# Patient Record
Sex: MALE | Race: WHITE | Employment: UNEMPLOYED | URBAN - METROPOLITAN AREA
[De-identification: names, ages, dates, MRNs, and addresses within clinical notes are randomized per-mention and may not be internally consistent; named-entity substitution may affect disease eponyms.]

---

## 2017-03-24 ENCOUNTER — APPOINTMENT (OUTPATIENT)
Dept: GENERAL RADIOLOGY | Age: 52
End: 2017-03-24
Payer: MEDICARE

## 2017-03-24 ENCOUNTER — HOSPITAL ENCOUNTER (EMERGENCY)
Age: 52
Discharge: HOME | End: 2017-03-24
Payer: MEDICARE

## 2017-03-24 VITALS
TEMPERATURE: 98.4 F | HEART RATE: 79 BPM | SYSTOLIC BLOOD PRESSURE: 172 MMHG | BODY MASS INDEX: 33.6 KG/M2 | RESPIRATION RATE: 18 BRPM | HEIGHT: 71 IN | OXYGEN SATURATION: 96 % | DIASTOLIC BLOOD PRESSURE: 95 MMHG | WEIGHT: 240 LBS

## 2017-03-24 DIAGNOSIS — R07.9 CHEST PAIN, UNSPECIFIED TYPE: Primary | ICD-10-CM

## 2017-03-24 LAB
ANION GAP SERPL CALCULATED.3IONS-SCNC: 9 MEQ/L (ref 7–13)
BUN BLDV-MCNC: 6 MG/DL (ref 6–20)
CALCIUM SERPL-MCNC: 9 MG/DL (ref 8.6–10.2)
CHLORIDE BLD-SCNC: 98 MEQ/L (ref 98–107)
CO2: 29 MEQ/L (ref 22–29)
CREAT SERPL-MCNC: 1.2 MG/DL (ref 0.7–1.2)
GFR AFRICAN AMERICAN: >60
GFR NON-AFRICAN AMERICAN: >60
GLUCOSE BLD-MCNC: 110 MG/DL (ref 74–109)
HCT VFR BLD CALC: 40.8 % (ref 42–52)
HEMOGLOBIN: 13.5 G/DL (ref 14–18)
MCH RBC QN AUTO: 30.6 PG (ref 27–31.3)
MCHC RBC AUTO-ENTMCNC: 33.1 % (ref 33–37)
MCV RBC AUTO: 92.5 FL (ref 80–100)
PDW BLD-RTO: 15.5 % (ref 11.5–14.5)
PLATELET # BLD: 375 K/UL (ref 130–400)
POTASSIUM SERPL-SCNC: 3.9 MEQ/L (ref 3.5–5.1)
RBC # BLD: 4.41 M/UL (ref 4.7–6.1)
SODIUM BLD-SCNC: 136 MEQ/L (ref 132–144)
TROPONIN: <0.01 NG/ML (ref 0–0.01)
WBC # BLD: 13.8 K/UL (ref 4.8–10.8)

## 2017-03-24 PROCEDURE — 93005 ELECTROCARDIOGRAM TRACING: CPT

## 2017-03-24 PROCEDURE — 36415 COLL VENOUS BLD VENIPUNCTURE: CPT

## 2017-03-24 PROCEDURE — 71010 XR CHEST PORTABLE: CPT

## 2017-03-24 PROCEDURE — 6370000000 HC RX 637 (ALT 250 FOR IP): Performed by: PHYSICIAN ASSISTANT

## 2017-03-24 PROCEDURE — 84484 ASSAY OF TROPONIN QUANT: CPT

## 2017-03-24 PROCEDURE — 99284 EMERGENCY DEPT VISIT MOD MDM: CPT

## 2017-03-24 PROCEDURE — 85027 COMPLETE CBC AUTOMATED: CPT

## 2017-03-24 PROCEDURE — 80048 BASIC METABOLIC PNL TOTAL CA: CPT

## 2017-03-24 RX ORDER — LORAZEPAM 0.5 MG/1
2 TABLET ORAL ONCE
Status: DISCONTINUED | OUTPATIENT
Start: 2017-03-24 | End: 2017-03-24

## 2017-03-24 RX ORDER — CARISOPRODOL 350 MG/1
350 TABLET ORAL
Status: ON HOLD | COMMUNITY
Start: 2017-02-23 | End: 2019-11-03

## 2017-03-24 RX ORDER — BUPRENORPHINE AND NALOXONE 8; 2 MG/1; MG/1
FILM, SOLUBLE BUCCAL; SUBLINGUAL 2 TIMES DAILY
COMMUNITY

## 2017-03-24 RX ORDER — BUPROPION HYDROCHLORIDE 150 MG/1
150 TABLET ORAL
Status: ON HOLD | COMMUNITY
Start: 2017-01-24 | End: 2019-11-03

## 2017-03-24 RX ORDER — LORAZEPAM 0.5 MG/1
2 TABLET ORAL ONCE
Status: COMPLETED | OUTPATIENT
Start: 2017-03-24 | End: 2017-03-24

## 2017-03-24 RX ADMIN — LORAZEPAM 1.5 MG: 0.5 TABLET ORAL at 20:26

## 2017-03-24 ASSESSMENT — PAIN SCALES - GENERAL: PAINLEVEL_OUTOF10: 7

## 2017-03-24 ASSESSMENT — PAIN DESCRIPTION - LOCATION: LOCATION: ARM;NECK

## 2017-03-24 ASSESSMENT — PAIN DESCRIPTION - PAIN TYPE: TYPE: ACUTE PAIN

## 2017-03-24 ASSESSMENT — PAIN DESCRIPTION - DESCRIPTORS: DESCRIPTORS: ACHING

## 2017-03-27 LAB
EKG ATRIAL RATE: 70 BPM
EKG P AXIS: 20 DEGREES
EKG P-R INTERVAL: 174 MS
EKG Q-T INTERVAL: 384 MS
EKG QRS DURATION: 90 MS
EKG QTC CALCULATION (BAZETT): 414 MS
EKG R AXIS: 26 DEGREES
EKG T AXIS: 25 DEGREES
EKG VENTRICULAR RATE: 70 BPM

## 2018-08-22 ENCOUNTER — HOSPITAL ENCOUNTER (EMERGENCY)
Age: 53
Discharge: HOME | End: 2018-08-22
Attending: STUDENT IN AN ORGANIZED HEALTH CARE EDUCATION/TRAINING PROGRAM | Admitting: INTERNAL MEDICINE
Payer: MEDICARE

## 2018-08-22 ENCOUNTER — APPOINTMENT (OUTPATIENT)
Dept: GENERAL RADIOLOGY | Age: 53
End: 2018-08-22
Payer: MEDICARE

## 2018-08-22 VITALS
OXYGEN SATURATION: 96 % | HEIGHT: 71 IN | BODY MASS INDEX: 36.4 KG/M2 | SYSTOLIC BLOOD PRESSURE: 124 MMHG | RESPIRATION RATE: 12 BRPM | DIASTOLIC BLOOD PRESSURE: 75 MMHG | TEMPERATURE: 97.4 F | WEIGHT: 260 LBS | HEART RATE: 65 BPM

## 2018-08-22 DIAGNOSIS — E78.1 HYPERTRIGLYCERIDEMIA: ICD-10-CM

## 2018-08-22 DIAGNOSIS — E78.5 DYSLIPIDEMIA (HIGH LDL; LOW HDL): ICD-10-CM

## 2018-08-22 DIAGNOSIS — R73.9 HYPERGLYCEMIA: ICD-10-CM

## 2018-08-22 DIAGNOSIS — I20.8 ANGINAL CHEST PAIN AT REST (HCC): Primary | ICD-10-CM

## 2018-08-22 DIAGNOSIS — E78.5 DYSLIPIDEMIA: ICD-10-CM

## 2018-08-22 DIAGNOSIS — E66.9 OBESITY WITHOUT SERIOUS COMORBIDITY, UNSPECIFIED CLASSIFICATION, UNSPECIFIED OBESITY TYPE: ICD-10-CM

## 2018-08-22 PROBLEM — I20.89 ANGINAL CHEST PAIN AT REST: Status: ACTIVE | Noted: 2018-08-22

## 2018-08-22 LAB
ALBUMIN SERPL-MCNC: 4 G/DL (ref 3.9–4.9)
ALP BLD-CCNC: 105 U/L (ref 35–104)
ALT SERPL-CCNC: 23 U/L (ref 0–41)
ANION GAP SERPL CALCULATED.3IONS-SCNC: 14 MEQ/L (ref 7–13)
APTT: 27.7 SEC (ref 21.6–35.4)
AST SERPL-CCNC: 24 U/L (ref 0–40)
BASOPHILS ABSOLUTE: 0.1 K/UL (ref 0–0.2)
BASOPHILS RELATIVE PERCENT: 1.3 %
BILIRUB SERPL-MCNC: 0.5 MG/DL (ref 0–1.2)
BUN BLDV-MCNC: 7 MG/DL (ref 6–20)
C-REACTIVE PROTEIN, HIGH SENSITIVITY: 13.4 MG/L (ref 0–5)
CALCIUM SERPL-MCNC: 9.1 MG/DL (ref 8.6–10.2)
CHLORIDE BLD-SCNC: 94 MEQ/L (ref 98–107)
CHOLESTEROL, TOTAL: 195 MG/DL (ref 0–199)
CK MB: 3.4 NG/ML (ref 0–6.7)
CO2: 30 MEQ/L (ref 22–29)
CREAT SERPL-MCNC: 0.96 MG/DL (ref 0.7–1.2)
CREATINE KINASE-MB INDEX: 0.9 % (ref 0–3.5)
EKG ATRIAL RATE: 81 BPM
EKG P AXIS: 23 DEGREES
EKG P-R INTERVAL: 178 MS
EKG Q-T INTERVAL: 364 MS
EKG QRS DURATION: 102 MS
EKG QTC CALCULATION (BAZETT): 422 MS
EKG R AXIS: 39 DEGREES
EKG T AXIS: 36 DEGREES
EKG VENTRICULAR RATE: 81 BPM
EOSINOPHILS ABSOLUTE: 0.6 K/UL (ref 0–0.7)
EOSINOPHILS RELATIVE PERCENT: 7.1 %
GFR AFRICAN AMERICAN: >60
GFR NON-AFRICAN AMERICAN: >60
GLOBULIN: 3.5 G/DL (ref 2.3–3.5)
GLUCOSE BLD-MCNC: 129 MG/DL (ref 74–109)
HCT VFR BLD CALC: 43.2 % (ref 42–52)
HDLC SERPL-MCNC: 22 MG/DL (ref 40–59)
HEMOGLOBIN: 14.8 G/DL (ref 14–18)
INR BLD: 1
LDL CHOLESTEROL CALCULATED: ABNORMAL MG/DL (ref 0–129)
LYMPHOCYTES ABSOLUTE: 3.3 K/UL (ref 1–4.8)
LYMPHOCYTES RELATIVE PERCENT: 37.2 %
MAGNESIUM: 1.7 MG/DL (ref 1.7–2.3)
MCH RBC QN AUTO: 31.6 PG (ref 27–31.3)
MCHC RBC AUTO-ENTMCNC: 34.3 % (ref 33–37)
MCV RBC AUTO: 92.1 FL (ref 80–100)
MONOCYTES ABSOLUTE: 0.5 K/UL (ref 0.2–0.8)
MONOCYTES RELATIVE PERCENT: 5.8 %
NEUTROPHILS ABSOLUTE: 4.3 K/UL (ref 1.4–6.5)
NEUTROPHILS RELATIVE PERCENT: 48.6 %
PDW BLD-RTO: 15.6 % (ref 11.5–14.5)
PLATELET # BLD: 234 K/UL (ref 130–400)
POTASSIUM SERPL-SCNC: 3.5 MEQ/L (ref 3.5–5.1)
PRO-BNP: 29 PG/ML
PROTHROMBIN TIME: 10.4 SEC (ref 9.6–12.3)
RBC # BLD: 4.69 M/UL (ref 4.7–6.1)
SODIUM BLD-SCNC: 138 MEQ/L (ref 132–144)
TOTAL CK: 358 U/L (ref 0–190)
TOTAL PROTEIN: 7.5 G/DL (ref 6.4–8.1)
TRIGL SERPL-MCNC: 404 MG/DL (ref 0–200)
TROPONIN: <0.01 NG/ML (ref 0–0.01)
TSH SERPL DL<=0.05 MIU/L-ACNC: 1.5 UIU/ML (ref 0.27–4.2)
WBC # BLD: 8.8 K/UL (ref 4.8–10.8)

## 2018-08-22 PROCEDURE — 6370000000 HC RX 637 (ALT 250 FOR IP): Performed by: STUDENT IN AN ORGANIZED HEALTH CARE EDUCATION/TRAINING PROGRAM

## 2018-08-22 PROCEDURE — 85730 THROMBOPLASTIN TIME PARTIAL: CPT

## 2018-08-22 PROCEDURE — G0378 HOSPITAL OBSERVATION PER HR: HCPCS

## 2018-08-22 PROCEDURE — 86141 C-REACTIVE PROTEIN HS: CPT

## 2018-08-22 PROCEDURE — 36415 COLL VENOUS BLD VENIPUNCTURE: CPT

## 2018-08-22 PROCEDURE — 84443 ASSAY THYROID STIM HORMONE: CPT

## 2018-08-22 PROCEDURE — 93005 ELECTROCARDIOGRAM TRACING: CPT

## 2018-08-22 PROCEDURE — 80053 COMPREHEN METABOLIC PANEL: CPT

## 2018-08-22 PROCEDURE — 85025 COMPLETE CBC W/AUTO DIFF WBC: CPT

## 2018-08-22 PROCEDURE — 84484 ASSAY OF TROPONIN QUANT: CPT

## 2018-08-22 PROCEDURE — 82550 ASSAY OF CK (CPK): CPT

## 2018-08-22 PROCEDURE — 80061 LIPID PANEL: CPT

## 2018-08-22 PROCEDURE — 82553 CREATINE MB FRACTION: CPT

## 2018-08-22 PROCEDURE — 85610 PROTHROMBIN TIME: CPT

## 2018-08-22 PROCEDURE — 71046 X-RAY EXAM CHEST 2 VIEWS: CPT

## 2018-08-22 PROCEDURE — 99285 EMERGENCY DEPT VISIT HI MDM: CPT

## 2018-08-22 PROCEDURE — 83735 ASSAY OF MAGNESIUM: CPT

## 2018-08-22 PROCEDURE — 83880 ASSAY OF NATRIURETIC PEPTIDE: CPT

## 2018-08-22 RX ORDER — METOPROLOL SUCCINATE 50 MG/1
50 TABLET, EXTENDED RELEASE ORAL 2 TIMES DAILY
Status: CANCELLED | OUTPATIENT
Start: 2018-08-22

## 2018-08-22 RX ORDER — FENOFIBRATE 145 MG/1
145 TABLET, COATED ORAL DAILY
Status: CANCELLED | OUTPATIENT
Start: 2018-08-22

## 2018-08-22 RX ORDER — PREGABALIN 150 MG/1
150 CAPSULE ORAL 2 TIMES DAILY
Status: CANCELLED | OUTPATIENT
Start: 2018-08-22

## 2018-08-22 RX ORDER — NITROGLYCERIN 0.4 MG/1
0.4 TABLET SUBLINGUAL EVERY 5 MIN PRN
Status: DISCONTINUED | OUTPATIENT
Start: 2018-08-22 | End: 2018-08-22 | Stop reason: HOSPADM

## 2018-08-22 RX ORDER — ONDANSETRON 2 MG/ML
4 INJECTION INTRAMUSCULAR; INTRAVENOUS EVERY 6 HOURS PRN
Status: CANCELLED | OUTPATIENT
Start: 2018-08-22

## 2018-08-22 RX ORDER — ATORVASTATIN CALCIUM 40 MG/1
20 TABLET, FILM COATED ORAL NIGHTLY
Status: CANCELLED | OUTPATIENT
Start: 2018-08-22

## 2018-08-22 RX ORDER — BUPROPION HYDROCHLORIDE 150 MG/1
150 TABLET ORAL DAILY
Status: CANCELLED | OUTPATIENT
Start: 2018-08-22

## 2018-08-22 RX ORDER — PREGABALIN 150 MG/1
300 CAPSULE ORAL 2 TIMES DAILY
COMMUNITY

## 2018-08-22 RX ORDER — SODIUM CHLORIDE 0.9 % (FLUSH) 0.9 %
10 SYRINGE (ML) INJECTION EVERY 12 HOURS SCHEDULED
Status: CANCELLED | OUTPATIENT
Start: 2018-08-22

## 2018-08-22 RX ORDER — SODIUM CHLORIDE 0.9 % (FLUSH) 0.9 %
10 SYRINGE (ML) INJECTION PRN
Status: CANCELLED | OUTPATIENT
Start: 2018-08-22

## 2018-08-22 RX ORDER — ACETAMINOPHEN 325 MG/1
650 TABLET ORAL EVERY 4 HOURS PRN
Status: CANCELLED | OUTPATIENT
Start: 2018-08-22

## 2018-08-22 RX ORDER — ASPIRIN 81 MG/1
324 TABLET, CHEWABLE ORAL ONCE
Status: COMPLETED | OUTPATIENT
Start: 2018-08-22 | End: 2018-08-22

## 2018-08-22 RX ORDER — NITROGLYCERIN 0.4 MG/1
0.4 TABLET SUBLINGUAL EVERY 5 MIN PRN
Status: CANCELLED | OUTPATIENT
Start: 2018-08-22

## 2018-08-22 RX ORDER — TIZANIDINE 2 MG/1
2 TABLET ORAL 3 TIMES DAILY
Status: CANCELLED | OUTPATIENT
Start: 2018-08-22

## 2018-08-22 RX ORDER — ASPIRIN 81 MG/1
81 TABLET, CHEWABLE ORAL DAILY
Status: CANCELLED | OUTPATIENT
Start: 2018-08-23

## 2018-08-22 RX ADMIN — ASPIRIN 81 MG 324 MG: 81 TABLET ORAL at 17:08

## 2018-08-22 RX ADMIN — NITROGLYCERIN 0.4 MG: 0.4 TABLET, ORALLY DISINTEGRATING SUBLINGUAL at 17:09

## 2018-08-22 ASSESSMENT — PAIN DESCRIPTION - PAIN TYPE
TYPE: ACUTE PAIN
TYPE: ACUTE PAIN

## 2018-08-22 ASSESSMENT — PAIN DESCRIPTION - LOCATION: LOCATION: CHEST

## 2018-08-22 ASSESSMENT — HEART SCORE
ECG: 0
ECG: 0

## 2018-08-22 ASSESSMENT — ENCOUNTER SYMPTOMS
DIARRHEA: 0
BACK PAIN: 0
SINUS PRESSURE: 0
CHEST TIGHTNESS: 0
ABDOMINAL PAIN: 0
VOMITING: 0
COUGH: 0
SHORTNESS OF BREATH: 0
TROUBLE SWALLOWING: 0

## 2018-08-22 ASSESSMENT — PAIN DESCRIPTION - PROGRESSION: CLINICAL_PROGRESSION: RAPIDLY IMPROVING

## 2018-08-22 ASSESSMENT — PAIN SCALES - GENERAL
PAINLEVEL_OUTOF10: 2
PAINLEVEL_OUTOF10: 6

## 2018-08-22 NOTE — ED TRIAGE NOTES
A  & o x4 obese male, skin pk/w/d, resp unlabored, pt states pain started after eating ravioli & he had a recent pain attack, his pain is in left side of chest, + nasal congestion, denies cough, denies n/v/d, debnies any edema, speech clear, hayes x4 = & spont.

## 2018-08-22 NOTE — ED NOTES
Talk with female  ,reinforce information given per nurse taking care of pt. Patient did not give permission to give any information out. This female started cussing,yelling ,\" stated she was coming down and there was going to be fucking problems \". Security aware.      Kell Nichols RN  08/22/18 5890

## 2018-08-22 NOTE — ED PROVIDER NOTES
[facility] ED  eMERGENCY dEPARTMENT eNCOUnter      Pt Name: William Suazo  MRN: [id number]  [place] 1965  Date of evaluation: 8/22/2018  Provider: Daxa Moreno, [address] [place] [address]       Chief Complaint   Patient presents with    Chest Pain     started one hour pta         HISTORY OF PRESENT ILLNESS   (Location/Symptom, Timing/Onset, Context/Setting, Quality, Duration, Modifying Factors, Severity)  Note limiting factors. William Suazo is a 48 y.o. male who presents to the emergency department with complaint of Chest pain as in left sided pressure. Patient states it felt like severe indigestion. Patient denies any fever, chills or cough. Patient denies any nausea or vomiting. HPI    Nursing Notes were reviewed. REVIEW OF SYSTEMS    (2-9 systems for level 4, 10 or more for level 5)     Review of Systems   Constitutional: Negative for activity change, appetite change, chills, fever and unexpected weight change. HENT: Negative for drooling, ear pain, nosebleeds, sinus pressure and trouble swallowing. Respiratory: Negative for cough, chest tightness and shortness of breath. Cardiovascular: Positive for chest pain. Negative for leg swelling. Gastrointestinal: Negative for abdominal pain, diarrhea and vomiting. Endocrine: Negative for polydipsia and polyphagia. Genitourinary: Negative for dysuria, flank pain and frequency. Musculoskeletal: Negative for back pain and myalgias. Skin: Negative for pallor and rash. Neurological: Negative for syncope, weakness and headaches. Hematological: Does not bruise/bleed easily. All other systems reviewed and are negative. Except as noted above the remainder of the review of systems was reviewed and negative.        PAST MEDICAL HISTORY     Past Medical History:   Diagnosis Date    Chronic pain     in neck & back, in pain management    Hypertension          SURGICAL HISTORY       Past Surgical History:   Procedure Laterality Date    BACK SURGERY      x2    NECK SURGERY      x2         CURRENT MEDICATIONS       Previous Medications    ATORVASTATIN (LIPITOR) 80 MG TABLET    Take 75 mg by mouth daily    BUPRENORPHINE HCL-NALOXONE HCL (SUBOXONE) 8-2 MG FILM    Place under the tongue    BUPROPION (WELLBUTRIN XL) 150 MG EXTENDED RELEASE TABLET    Take 150 mg by mouth    CARISOPRODOL (SOMA) 350 MG TABLET    Take 350 mg by mouth    FENOFIBRATE (TRICOR) 145 MG TABLET    Take 145 mg by mouth daily    LANSOPRAZOLE (PREVACID SOLUTAB) 30 MG DISINTEGRATING TABLET    Take 30 mg by mouth daily    METOPROLOL SUCCINATE (TOPROL XL) 50 MG EXTENDED RELEASE TABLET    Take 50 mg by mouth 2 times daily    PREGABALIN (LYRICA) 150 MG CAPSULE    Take 150 mg by mouth 2 times daily. .       ALLERGIES     Patient has no known allergies. FAMILY HISTORY     History reviewed. No pertinent family history.        SOCIAL HISTORY       Social History     Social History    Marital status:      Spouse name: N/A    Number of children: N/A    Years of education: N/A     Social History Main Topics    Smoking status: Current Every Day Smoker     Packs/day: 1.00     Years: 15.00     Types: Cigarettes    Smokeless tobacco: Never Used    Alcohol use No    Drug use: No    Sexual activity: Yes     Partners: Female     Other Topics Concern    None     Social History Narrative    None       SCREENINGS      Heart Score for chest pain patients  History: Highly Suspicious  ECG: Normal  Patient Age: > 39 and < 65 years  *Risk factors for Atherosclerotic disease: Hypercholesterolemia, Hypertension, Obesity  Risk Factors: > 3 Risk factors or history of atherosclerotic disease*  Troponin: < 1X normal limit  Heart Score Total: 5      PHYSICAL EXAM    (up to 7 for level 4, 8 or more for level 5)     ED Triage Vitals [08/22/18 1620]   BP Temp Temp Source Pulse Resp SpO2 Height Weight   (!) 157/82 97.4 °F (36.3 °C) Oral 85 18 96 % 5' 11\" (1.803 m) 260 lb (117.9 kg)

## 2018-08-23 PROCEDURE — 93010 ELECTROCARDIOGRAM REPORT: CPT | Performed by: INTERNAL MEDICINE

## 2019-02-03 ENCOUNTER — HOSPITAL ENCOUNTER (INPATIENT)
Age: 54
LOS: 2 days | Discharge: HOME | DRG: 682 | End: 2019-02-06
Attending: EMERGENCY MEDICINE | Admitting: INTERNAL MEDICINE
Payer: MEDICARE

## 2019-02-03 ENCOUNTER — APPOINTMENT (OUTPATIENT)
Dept: GENERAL RADIOLOGY | Age: 54
DRG: 682 | End: 2019-02-03
Payer: MEDICARE

## 2019-02-03 ENCOUNTER — APPOINTMENT (OUTPATIENT)
Dept: CT IMAGING | Age: 54
DRG: 682 | End: 2019-02-03
Payer: MEDICARE

## 2019-02-03 DIAGNOSIS — G47.30 SLEEP-DISORDERED BREATHING: ICD-10-CM

## 2019-02-03 DIAGNOSIS — D72.829 LEUKOCYTOSIS, UNSPECIFIED TYPE: ICD-10-CM

## 2019-02-03 DIAGNOSIS — J18.9 PNEUMONIA DUE TO ORGANISM: ICD-10-CM

## 2019-02-03 DIAGNOSIS — J44.1 COPD EXACERBATION (HCC): ICD-10-CM

## 2019-02-03 DIAGNOSIS — M62.82 NON-TRAUMATIC RHABDOMYOLYSIS: ICD-10-CM

## 2019-02-03 DIAGNOSIS — R41.82 ALTERED MENTAL STATUS, UNSPECIFIED ALTERED MENTAL STATUS TYPE: Primary | ICD-10-CM

## 2019-02-03 LAB
ACETAMINOPHEN LEVEL: <5 UG/ML (ref 10–30)
ALBUMIN SERPL-MCNC: 3.7 G/DL (ref 3.9–4.9)
ALP BLD-CCNC: 90 U/L (ref 35–104)
ALT SERPL-CCNC: 15 U/L (ref 0–41)
AMPHETAMINE SCREEN, URINE: NORMAL
ANION GAP SERPL CALCULATED.3IONS-SCNC: 15 MEQ/L (ref 7–13)
AST SERPL-CCNC: 49 U/L (ref 0–40)
ATYPICAL LYMPHOCYTE RELATIVE PERCENT: 3 %
BACTERIA: NEGATIVE /HPF
BARBITURATE SCREEN URINE: NORMAL
BASOPHILS ABSOLUTE: 0.3 K/UL (ref 0–0.2)
BASOPHILS RELATIVE PERCENT: 1 %
BENZODIAZEPINE SCREEN, URINE: NORMAL
BILIRUB SERPL-MCNC: 0.6 MG/DL (ref 0–1.2)
BILIRUBIN URINE: NEGATIVE
BLOOD, URINE: ABNORMAL
BUN BLDV-MCNC: 24 MG/DL (ref 6–20)
CALCIUM SERPL-MCNC: 9.4 MG/DL (ref 8.6–10.2)
CANNABINOID SCREEN URINE: NORMAL
CASTS: ABNORMAL /LPF
CHLORIDE BLD-SCNC: 95 MEQ/L (ref 98–107)
CK MB: 4.2 NG/ML (ref 0–6.7)
CLARITY: CLEAR
CO2: 23 MEQ/L (ref 22–29)
COCAINE METABOLITE SCREEN URINE: NORMAL
COLOR: YELLOW
CREAT SERPL-MCNC: 3.41 MG/DL (ref 0.7–1.2)
CREATINE KINASE-MB INDEX: 0.1 % (ref 0–3.5)
EOSINOPHILS ABSOLUTE: 0 K/UL (ref 0–0.7)
EOSINOPHILS RELATIVE PERCENT: 0.2 %
EPITHELIAL CELLS, UA: ABNORMAL /HPF (ref 0–5)
ETHANOL PERCENT: NORMAL G/DL
ETHANOL: <10 MG/DL (ref 0–0.08)
GFR AFRICAN AMERICAN: 23.1
GFR NON-AFRICAN AMERICAN: 19.1
GLOBULIN: 3.7 G/DL (ref 2.3–3.5)
GLUCOSE BLD-MCNC: 110 MG/DL (ref 74–109)
GLUCOSE URINE: NEGATIVE MG/DL
HCT VFR BLD CALC: 46.2 % (ref 42–52)
HEMOGLOBIN: 15.2 G/DL (ref 14–18)
KETONES, URINE: ABNORMAL MG/DL
LEUKOCYTE ESTERASE, URINE: NEGATIVE
LYMPHOCYTES ABSOLUTE: 2.7 K/UL (ref 1–4.8)
LYMPHOCYTES RELATIVE PERCENT: 7 %
Lab: NORMAL
MCH RBC QN AUTO: 31.2 PG (ref 27–31.3)
MCHC RBC AUTO-ENTMCNC: 33 % (ref 33–37)
MCV RBC AUTO: 94.8 FL (ref 80–100)
MONOCYTES ABSOLUTE: 1.4 K/UL (ref 0.2–0.8)
MONOCYTES RELATIVE PERCENT: 4.6 %
NEUTROPHILS ABSOLUTE: 22.8 K/UL (ref 1.4–6.5)
NEUTROPHILS RELATIVE PERCENT: 84 %
NITRITE, URINE: NEGATIVE
OPIATE SCREEN URINE: NORMAL
PDW BLD-RTO: 16.3 % (ref 11.5–14.5)
PH UA: 5 (ref 5–9)
PHENCYCLIDINE SCREEN URINE: NORMAL
PLATELET # BLD: ABNORMAL K/UL (ref 130–400)
PLATELET SLIDE REVIEW: ABNORMAL
POTASSIUM SERPL-SCNC: 4.7 MEQ/L (ref 3.5–5.1)
PROTEIN UA: ABNORMAL MG/DL
RBC # BLD: 4.87 M/UL (ref 4.7–6.1)
RBC UA: ABNORMAL /HPF (ref 0–5)
SALICYLATE, SERUM: <0.3 MG/DL (ref 15–30)
SODIUM BLD-SCNC: 133 MEQ/L (ref 132–144)
SPECIFIC GRAVITY UA: 1.01 (ref 1–1.03)
TOTAL CK: 5201 U/L (ref 0–190)
TOTAL PROTEIN: 7.4 G/DL (ref 6.4–8.1)
URINE REFLEX TO CULTURE: YES
UROBILINOGEN, URINE: 0.2 E.U./DL
WBC # BLD: 27.1 K/UL (ref 4.8–10.8)
WBC UA: ABNORMAL /HPF (ref 0–5)

## 2019-02-03 PROCEDURE — 99285 EMERGENCY DEPT VISIT HI MDM: CPT

## 2019-02-03 PROCEDURE — 80184 ASSAY OF PHENOBARBITAL: CPT

## 2019-02-03 PROCEDURE — 96365 THER/PROPH/DIAG IV INF INIT: CPT

## 2019-02-03 PROCEDURE — 80307 DRUG TEST PRSMV CHEM ANLYZR: CPT

## 2019-02-03 PROCEDURE — 87040 BLOOD CULTURE FOR BACTERIA: CPT

## 2019-02-03 PROCEDURE — 36415 COLL VENOUS BLD VENIPUNCTURE: CPT

## 2019-02-03 PROCEDURE — 2580000003 HC RX 258: Performed by: EMERGENCY MEDICINE

## 2019-02-03 PROCEDURE — 80177 DRUG SCRN QUAN LEVETIRACETAM: CPT

## 2019-02-03 PROCEDURE — 82553 CREATINE MB FRACTION: CPT

## 2019-02-03 PROCEDURE — 36600 WITHDRAWAL OF ARTERIAL BLOOD: CPT

## 2019-02-03 PROCEDURE — 84132 ASSAY OF SERUM POTASSIUM: CPT

## 2019-02-03 PROCEDURE — 84295 ASSAY OF SERUM SODIUM: CPT

## 2019-02-03 PROCEDURE — 83735 ASSAY OF MAGNESIUM: CPT

## 2019-02-03 PROCEDURE — G0480 DRUG TEST DEF 1-7 CLASSES: HCPCS

## 2019-02-03 PROCEDURE — 82803 BLOOD GASES ANY COMBINATION: CPT

## 2019-02-03 PROCEDURE — 80053 COMPREHEN METABOLIC PANEL: CPT

## 2019-02-03 PROCEDURE — 82550 ASSAY OF CK (CPK): CPT

## 2019-02-03 PROCEDURE — 85025 COMPLETE CBC W/AUTO DIFF WBC: CPT

## 2019-02-03 PROCEDURE — 87086 URINE CULTURE/COLONY COUNT: CPT

## 2019-02-03 PROCEDURE — 84145 PROCALCITONIN (PCT): CPT

## 2019-02-03 PROCEDURE — 82330 ASSAY OF CALCIUM: CPT

## 2019-02-03 PROCEDURE — 84484 ASSAY OF TROPONIN QUANT: CPT

## 2019-02-03 PROCEDURE — 70450 CT HEAD/BRAIN W/O DYE: CPT

## 2019-02-03 PROCEDURE — 83605 ASSAY OF LACTIC ACID: CPT

## 2019-02-03 PROCEDURE — 96375 TX/PRO/DX INJ NEW DRUG ADDON: CPT

## 2019-02-03 PROCEDURE — 6360000002 HC RX W HCPCS: Performed by: EMERGENCY MEDICINE

## 2019-02-03 PROCEDURE — 71045 X-RAY EXAM CHEST 1 VIEW: CPT

## 2019-02-03 PROCEDURE — 82435 ASSAY OF BLOOD CHLORIDE: CPT

## 2019-02-03 PROCEDURE — G0378 HOSPITAL OBSERVATION PER HR: HCPCS

## 2019-02-03 PROCEDURE — 85014 HEMATOCRIT: CPT

## 2019-02-03 PROCEDURE — 81001 URINALYSIS AUTO W/SCOPE: CPT

## 2019-02-03 RX ORDER — 0.9 % SODIUM CHLORIDE 0.9 %
1000 INTRAVENOUS SOLUTION INTRAVENOUS ONCE
Status: COMPLETED | OUTPATIENT
Start: 2019-02-03 | End: 2019-02-04

## 2019-02-03 RX ORDER — KETOROLAC TROMETHAMINE 30 MG/ML
30 INJECTION, SOLUTION INTRAMUSCULAR; INTRAVENOUS ONCE
Status: COMPLETED | OUTPATIENT
Start: 2019-02-03 | End: 2019-02-03

## 2019-02-03 RX ORDER — SODIUM CHLORIDE 0.9 % (FLUSH) 0.9 %
3 SYRINGE (ML) INJECTION EVERY 8 HOURS
Status: DISCONTINUED | OUTPATIENT
Start: 2019-02-03 | End: 2019-02-04

## 2019-02-03 RX ADMIN — CEFTRIAXONE SODIUM 1 G: 1 INJECTION, POWDER, FOR SOLUTION INTRAMUSCULAR; INTRAVENOUS at 23:30

## 2019-02-03 RX ADMIN — SODIUM CHLORIDE 1000 ML: 9 INJECTION, SOLUTION INTRAVENOUS at 21:00

## 2019-02-03 RX ADMIN — KETOROLAC TROMETHAMINE 30 MG: 30 INJECTION, SOLUTION INTRAMUSCULAR; INTRAVENOUS at 21:00

## 2019-02-03 RX ADMIN — SODIUM CHLORIDE 1000 ML: 9 INJECTION, SOLUTION INTRAVENOUS at 22:35

## 2019-02-03 ASSESSMENT — PAIN DESCRIPTION - FREQUENCY: FREQUENCY: CONTINUOUS

## 2019-02-03 ASSESSMENT — PAIN SCALES - GENERAL
PAINLEVEL_OUTOF10: 9
PAINLEVEL_OUTOF10: 9

## 2019-02-03 ASSESSMENT — PAIN DESCRIPTION - PROGRESSION: CLINICAL_PROGRESSION: NOT CHANGED

## 2019-02-04 ENCOUNTER — APPOINTMENT (OUTPATIENT)
Dept: MRI IMAGING | Age: 54
DRG: 682 | End: 2019-02-04
Payer: MEDICARE

## 2019-02-04 ENCOUNTER — APPOINTMENT (OUTPATIENT)
Dept: CT IMAGING | Age: 54
DRG: 682 | End: 2019-02-04
Payer: MEDICARE

## 2019-02-04 LAB
ACANTHOCYTES: 0
ALBUMIN SERPL-MCNC: 3 G/DL (ref 3.9–4.9)
ALP BLD-CCNC: 78 U/L (ref 35–104)
ALT SERPL-CCNC: 12 U/L (ref 0–41)
ANION GAP SERPL CALCULATED.3IONS-SCNC: 14 MEQ/L (ref 7–13)
ANISOCYTOSIS: 0
AST SERPL-CCNC: 38 U/L (ref 0–40)
AUER RODS: 0
BASE EXCESS VENOUS: 3 (ref -3–3)
BASOPHILIC STIPPLING: 0
BASOPHILS ABSOLUTE: 0.2 K/UL (ref 0–0.2)
BASOPHILS RELATIVE PERCENT: 1 %
BILIRUB SERPL-MCNC: <0.2 MG/DL (ref 0–1.2)
BUN BLDV-MCNC: 26 MG/DL (ref 6–20)
BURR CELLS: 0
CABOT RINGS: 0
CALCIUM IONIZED: 1.09 MMOL/L (ref 1.12–1.32)
CALCIUM SERPL-MCNC: 8 MG/DL (ref 8.6–10.2)
CHLORIDE BLD-SCNC: 100 MEQ/L (ref 98–107)
CK MB: 4.6 NG/ML (ref 0–6.7)
CK MB: 4.7 NG/ML (ref 0–6.7)
CO2: 22 MEQ/L (ref 22–29)
CREAT SERPL-MCNC: 3.14 MG/DL (ref 0.7–1.2)
CREATINE KINASE-MB INDEX: 0.1 % (ref 0–3.5)
CREATINE KINASE-MB INDEX: 0.1 % (ref 0–3.5)
DOHLE BODIES: 0
EOSINOPHILS ABSOLUTE: 0 K/UL (ref 0–0.7)
EOSINOPHILS RELATIVE PERCENT: 0.5 %
GFR AFRICAN AMERICAN: 25.4
GFR NON-AFRICAN AMERICAN: 21
GLOBULIN: 3.1 G/DL (ref 2.3–3.5)
GLUCOSE BLD-MCNC: 111 MG/DL (ref 60–115)
GLUCOSE BLD-MCNC: 118 MG/DL (ref 74–109)
GLUCOSE BLD-MCNC: 141 MG/DL (ref 60–115)
GLUCOSE, CSF: 137 MG/DL (ref 50–70)
GRAM STAIN RESULT: NORMAL
HAIRY CELLS: 0
HCO3 VENOUS: 27.1 MMOL/L (ref 23–29)
HCT VFR BLD CALC: 40.6 % (ref 42–52)
HEMOGLOBIN: 13.2 G/DL (ref 14–18)
HEMOGLOBIN: 14.4 GM/DL (ref 13.5–17.5)
HOWELL-JOLLY BODIES: 0
HYPERSEGMENTED NEUTROPHILS: 0
HYPOCHROMIA: 0
LACTATE: 2.43 MMOL/L (ref 0.4–2)
LACTIC ACID: 2.8 MMOL/L (ref 0.5–2.2)
LYMPHOCYTES ABSOLUTE: 4.2 K/UL (ref 1–4.8)
LYMPHOCYTES RELATIVE PERCENT: 20 %
MACROCYTES: 0
MAGNESIUM: 2.4 MG/DL (ref 1.7–2.3)
MCH RBC QN AUTO: 31.2 PG (ref 27–31.3)
MCHC RBC AUTO-ENTMCNC: 32.4 % (ref 33–37)
MCV RBC AUTO: 96.2 FL (ref 80–100)
MICROCYTES: 0
MONOCYTES ABSOLUTE: 1.7 K/UL (ref 0.2–0.8)
MONOCYTES RELATIVE PERCENT: 7.6 %
NEUTROPHILS ABSOLUTE: 15 K/UL (ref 1.4–6.5)
NEUTROPHILS RELATIVE PERCENT: 72 %
O2 SAT, VEN: 66 %
OVALOCYTES: 0
PAPPENHEIMER BODIES: 0
PCO2, VEN: 41.5 MM HG (ref 40–50)
PDW BLD-RTO: 16.2 % (ref 11.5–14.5)
PELGER HUET CELLS: 0 %
PERFORMED ON: ABNORMAL
PERFORMED ON: ABNORMAL
PH VENOUS: 7.42 (ref 7.35–7.45)
PHENOBARBITAL LEVEL: <2.4 UG/ML (ref 10–30)
PLATELET # BLD: 231 K/UL (ref 130–400)
PLATELET SLIDE REVIEW: ADEQUATE
PO2, VEN: 34 MM HG
POC CHLORIDE: 103 MEQ/L (ref 99–110)
POC HEMATOCRIT: 42 % (ref 41–53)
POC POTASSIUM: 4.3 MEQ/L (ref 3.5–5.1)
POC SAMPLE TYPE: ABNORMAL
POC SODIUM: 132 MEQ/L (ref 136–145)
POIKILOCYTES: 0
POLYCHROMASIA: 0
POTASSIUM REFLEX MAGNESIUM: 4.2 MEQ/L (ref 3.5–5.1)
PROCALCITONIN: 11.13 NG/ML (ref 0–0.15)
RBC # BLD: 4.22 M/UL (ref 4.7–6.1)
RBC # BLD: NORMAL 10*6/UL
REASON FOR REJECTION: NORMAL
REJECTED TEST: NORMAL
SCHISTOCYTES: 0
SICKLE CELLS: 0
SMUDGE CELLS: 2.8
SODIUM BLD-SCNC: 136 MEQ/L (ref 132–144)
SPHEROCYTES: 0
STOMATOCYTES: 0
TARGET CELLS: 0
TCO2 CALC VENOUS: 28 MMOL/L
TEAR DROP CELLS: 0
TOTAL CK: 3498 U/L (ref 0–190)
TOTAL CK: 4626 U/L (ref 0–190)
TOTAL PROTEIN: 6.1 G/DL (ref 6.4–8.1)
TOXIC GRANULATION: 0
TROPONIN: <0.01 NG/ML (ref 0–0.01)
VACUOLATED NEUTROPHILS: 0
WBC # BLD: 20.9 K/UL (ref 4.8–10.8)

## 2019-02-04 PROCEDURE — 2580000003 HC RX 258: Performed by: NURSE PRACTITIONER

## 2019-02-04 PROCEDURE — 009U3ZX DRAINAGE OF SPINAL CANAL, PERCUTANEOUS APPROACH, DIAGNOSTIC: ICD-10-PCS | Performed by: EMERGENCY MEDICINE

## 2019-02-04 PROCEDURE — 80053 COMPREHEN METABOLIC PANEL: CPT

## 2019-02-04 PROCEDURE — 72148 MRI LUMBAR SPINE W/O DYE: CPT

## 2019-02-04 PROCEDURE — 6370000000 HC RX 637 (ALT 250 FOR IP): Performed by: INTERNAL MEDICINE

## 2019-02-04 PROCEDURE — 94762 N-INVAS EAR/PLS OXIMTRY CONT: CPT

## 2019-02-04 PROCEDURE — 87040 BLOOD CULTURE FOR BACTERIA: CPT

## 2019-02-04 PROCEDURE — 95816 EEG AWAKE AND DROWSY: CPT

## 2019-02-04 PROCEDURE — 2580000003 HC RX 258

## 2019-02-04 PROCEDURE — 96372 THER/PROPH/DIAG INJ SC/IM: CPT

## 2019-02-04 PROCEDURE — 99223 1ST HOSP IP/OBS HIGH 75: CPT | Performed by: INTERNAL MEDICINE

## 2019-02-04 PROCEDURE — 80177 DRUG SCRN QUAN LEVETIRACETAM: CPT

## 2019-02-04 PROCEDURE — 6360000002 HC RX W HCPCS: Performed by: INTERNAL MEDICINE

## 2019-02-04 PROCEDURE — 2580000003 HC RX 258: Performed by: EMERGENCY MEDICINE

## 2019-02-04 PROCEDURE — 72131 CT LUMBAR SPINE W/O DYE: CPT

## 2019-02-04 PROCEDURE — 86694 HERPES SIMPLEX NES ANTBDY: CPT

## 2019-02-04 PROCEDURE — 82945 GLUCOSE OTHER FLUID: CPT

## 2019-02-04 PROCEDURE — 87186 SC STD MICRODIL/AGAR DIL: CPT

## 2019-02-04 PROCEDURE — 87077 CULTURE AEROBIC IDENTIFY: CPT

## 2019-02-04 PROCEDURE — 96375 TX/PRO/DX INJ NEW DRUG ADDON: CPT

## 2019-02-04 PROCEDURE — 87070 CULTURE OTHR SPECIMN AEROBIC: CPT

## 2019-02-04 PROCEDURE — 6360000002 HC RX W HCPCS: Performed by: EMERGENCY MEDICINE

## 2019-02-04 PROCEDURE — 82550 ASSAY OF CK (CPK): CPT

## 2019-02-04 PROCEDURE — 96366 THER/PROPH/DIAG IV INF ADDON: CPT

## 2019-02-04 PROCEDURE — 85025 COMPLETE CBC W/AUTO DIFF WBC: CPT

## 2019-02-04 PROCEDURE — 6360000002 HC RX W HCPCS: Performed by: NURSE PRACTITIONER

## 2019-02-04 PROCEDURE — 1210000000 HC MED SURG R&B

## 2019-02-04 PROCEDURE — 96367 TX/PROPH/DG ADDL SEQ IV INF: CPT

## 2019-02-04 PROCEDURE — 82553 CREATINE MB FRACTION: CPT

## 2019-02-04 PROCEDURE — G0480 DRUG TEST DEF 1-7 CLASSES: HCPCS

## 2019-02-04 PROCEDURE — 36415 COLL VENOUS BLD VENIPUNCTURE: CPT

## 2019-02-04 PROCEDURE — 87205 SMEAR GRAM STAIN: CPT

## 2019-02-04 PROCEDURE — 2500000003 HC RX 250 WO HCPCS: Performed by: INTERNAL MEDICINE

## 2019-02-04 RX ORDER — SODIUM CHLORIDE 0.9 % (FLUSH) 0.9 %
10 SYRINGE (ML) INJECTION PRN
Status: DISCONTINUED | OUTPATIENT
Start: 2019-02-04 | End: 2019-02-06 | Stop reason: HOSPADM

## 2019-02-04 RX ORDER — PANTOPRAZOLE SODIUM 20 MG/1
20 TABLET, DELAYED RELEASE ORAL
Status: DISCONTINUED | OUTPATIENT
Start: 2019-02-05 | End: 2019-02-06 | Stop reason: HOSPADM

## 2019-02-04 RX ORDER — SODIUM CHLORIDE 9 MG/ML
INJECTION, SOLUTION INTRAVENOUS CONTINUOUS
Status: DISCONTINUED | OUTPATIENT
Start: 2019-02-04 | End: 2019-02-06 | Stop reason: HOSPADM

## 2019-02-04 RX ORDER — ONDANSETRON 2 MG/ML
4 INJECTION INTRAMUSCULAR; INTRAVENOUS EVERY 6 HOURS PRN
Status: DISCONTINUED | OUTPATIENT
Start: 2019-02-04 | End: 2019-02-06 | Stop reason: HOSPADM

## 2019-02-04 RX ORDER — FENOFIBRATE 160 MG/1
160 TABLET ORAL DAILY
Status: DISCONTINUED | OUTPATIENT
Start: 2019-02-04 | End: 2019-02-06 | Stop reason: HOSPADM

## 2019-02-04 RX ORDER — 0.9 % SODIUM CHLORIDE 0.9 %
1000 INTRAVENOUS SOLUTION INTRAVENOUS ONCE
Status: COMPLETED | OUTPATIENT
Start: 2019-02-04 | End: 2019-02-04

## 2019-02-04 RX ORDER — SODIUM CHLORIDE 9 MG/ML
INJECTION, SOLUTION INTRAVENOUS
Status: COMPLETED
Start: 2019-02-04 | End: 2019-02-04

## 2019-02-04 RX ORDER — ATORVASTATIN CALCIUM 80 MG/1
80 TABLET, FILM COATED ORAL NIGHTLY
Status: DISCONTINUED | OUTPATIENT
Start: 2019-02-04 | End: 2019-02-06 | Stop reason: HOSPADM

## 2019-02-04 RX ORDER — BUPROPION HYDROCHLORIDE 100 MG/1
150 TABLET ORAL 2 TIMES DAILY
Status: DISCONTINUED | OUTPATIENT
Start: 2019-02-04 | End: 2019-02-06 | Stop reason: HOSPADM

## 2019-02-04 RX ORDER — METOPROLOL SUCCINATE 50 MG/1
50 TABLET, EXTENDED RELEASE ORAL 2 TIMES DAILY
Status: DISCONTINUED | OUTPATIENT
Start: 2019-02-04 | End: 2019-02-06 | Stop reason: HOSPADM

## 2019-02-04 RX ORDER — LORAZEPAM 2 MG/ML
1 INJECTION INTRAMUSCULAR
Status: ACTIVE | OUTPATIENT
Start: 2019-02-04 | End: 2019-02-04

## 2019-02-04 RX ORDER — SODIUM CHLORIDE 0.9 % (FLUSH) 0.9 %
10 SYRINGE (ML) INJECTION EVERY 12 HOURS SCHEDULED
Status: DISCONTINUED | OUTPATIENT
Start: 2019-02-04 | End: 2019-02-06 | Stop reason: HOSPADM

## 2019-02-04 RX ORDER — BUPRENORPHINE HYDROCHLORIDE AND NALOXONE HYDROCHLORIDE DIHYDRATE 8; 2 MG/1; MG/1
1 TABLET SUBLINGUAL DAILY
Status: DISCONTINUED | OUTPATIENT
Start: 2019-02-04 | End: 2019-02-05

## 2019-02-04 RX ORDER — LORAZEPAM 2 MG/ML
1 INJECTION INTRAMUSCULAR ONCE
Status: COMPLETED | OUTPATIENT
Start: 2019-02-04 | End: 2019-02-04

## 2019-02-04 RX ORDER — TIZANIDINE 2 MG/1
2 TABLET ORAL 3 TIMES DAILY
Status: DISCONTINUED | OUTPATIENT
Start: 2019-02-04 | End: 2019-02-06 | Stop reason: HOSPADM

## 2019-02-04 RX ORDER — CARISOPRODOL 350 MG/1
350 TABLET ORAL EVERY 12 HOURS
Status: DISCONTINUED | OUTPATIENT
Start: 2019-02-04 | End: 2019-02-04 | Stop reason: CLARIF

## 2019-02-04 RX ORDER — 0.9 % SODIUM CHLORIDE 0.9 %
500 INTRAVENOUS SOLUTION INTRAVENOUS ONCE
Status: COMPLETED | OUTPATIENT
Start: 2019-02-04 | End: 2019-02-04

## 2019-02-04 RX ORDER — PREGABALIN 150 MG/1
150 CAPSULE ORAL 2 TIMES DAILY
Status: DISCONTINUED | OUTPATIENT
Start: 2019-02-04 | End: 2019-02-06 | Stop reason: HOSPADM

## 2019-02-04 RX ADMIN — METOPROLOL SUCCINATE 50 MG: 50 TABLET, EXTENDED RELEASE ORAL at 19:06

## 2019-02-04 RX ADMIN — ATORVASTATIN CALCIUM 80 MG: 80 TABLET, FILM COATED ORAL at 19:06

## 2019-02-04 RX ADMIN — SODIUM CHLORIDE 500 ML: 9 INJECTION, SOLUTION INTRAVENOUS at 02:10

## 2019-02-04 RX ADMIN — TIZANIDINE 2 MG: 2 TABLET ORAL at 19:05

## 2019-02-04 RX ADMIN — BUPRENORPHINE HYDROCHLORIDE AND NALOXONE HYDROCHLORIDE DIHYDRATE 1 TABLET: 8; 2 TABLET SUBLINGUAL at 22:45

## 2019-02-04 RX ADMIN — PREGABALIN 150 MG: 150 CAPSULE ORAL at 19:06

## 2019-02-04 RX ADMIN — AZITHROMYCIN MONOHYDRATE 500 MG: 500 INJECTION, POWDER, LYOPHILIZED, FOR SOLUTION INTRAVENOUS at 02:10

## 2019-02-04 RX ADMIN — SODIUM CHLORIDE: 9 INJECTION, SOLUTION INTRAVENOUS at 00:22

## 2019-02-04 RX ADMIN — CEFTRIAXONE SODIUM 1 G: 1 INJECTION, POWDER, FOR SOLUTION INTRAMUSCULAR; INTRAVENOUS at 22:41

## 2019-02-04 RX ADMIN — Medication 3 ML: at 00:28

## 2019-02-04 RX ADMIN — Medication 10 ML: at 22:42

## 2019-02-04 RX ADMIN — Medication 10 ML: at 08:47

## 2019-02-04 RX ADMIN — BUPROPION HYDROCHLORIDE 150 MG: 100 TABLET, FILM COATED ORAL at 19:05

## 2019-02-04 RX ADMIN — LORAZEPAM 1 MG: 2 INJECTION, SOLUTION INTRAMUSCULAR; INTRAVENOUS at 13:26

## 2019-02-04 RX ADMIN — ENOXAPARIN SODIUM 30 MG: 30 INJECTION SUBCUTANEOUS at 08:47

## 2019-02-04 RX ADMIN — SODIUM CHLORIDE: 9 INJECTION, SOLUTION INTRAVENOUS at 00:26

## 2019-02-04 RX ADMIN — METRONIDAZOLE 500 MG: 500 INJECTION, SOLUTION INTRAVENOUS at 22:52

## 2019-02-04 RX ADMIN — SODIUM CHLORIDE 1000 ML: 9 INJECTION, SOLUTION INTRAVENOUS at 00:25

## 2019-02-04 RX ADMIN — SODIUM CHLORIDE: 9 INJECTION, SOLUTION INTRAVENOUS at 13:33

## 2019-02-04 RX ADMIN — SODIUM CHLORIDE: 9 INJECTION, SOLUTION INTRAVENOUS at 22:41

## 2019-02-04 ASSESSMENT — PAIN SCALES - GENERAL
PAINLEVEL_OUTOF10: 5
PAINLEVEL_OUTOF10: 4
PAINLEVEL_OUTOF10: 0
PAINLEVEL_OUTOF10: 4
PAINLEVEL_OUTOF10: 5
PAINLEVEL_OUTOF10: 0
PAINLEVEL_OUTOF10: 9
PAINLEVEL_OUTOF10: 5
PAINLEVEL_OUTOF10: 0

## 2019-02-04 ASSESSMENT — PAIN DESCRIPTION - PAIN TYPE
TYPE: CHRONIC PAIN
TYPE: CHRONIC PAIN

## 2019-02-04 ASSESSMENT — PAIN DESCRIPTION - ORIENTATION
ORIENTATION: LOWER
ORIENTATION: LOWER

## 2019-02-04 ASSESSMENT — PAIN DESCRIPTION - LOCATION
LOCATION: BACK;LEG
LOCATION: BACK;LEG

## 2019-02-04 ASSESSMENT — ENCOUNTER SYMPTOMS
BACK PAIN: 1
CHEST TIGHTNESS: 0
SHORTNESS OF BREATH: 0
TROUBLE SWALLOWING: 0
DIARRHEA: 0
VOMITING: 0
CHOKING: 0
NAUSEA: 0

## 2019-02-05 LAB
ALBUMIN SERPL-MCNC: 3 G/DL (ref 3.9–4.9)
ALP BLD-CCNC: 78 U/L (ref 35–104)
ALT SERPL-CCNC: 13 U/L (ref 0–41)
ANION GAP SERPL CALCULATED.3IONS-SCNC: 14 MEQ/L (ref 7–13)
AST SERPL-CCNC: 31 U/L (ref 0–40)
BASOPHILS ABSOLUTE: 0.1 K/UL (ref 0–0.2)
BASOPHILS RELATIVE PERCENT: 1 %
BILIRUB SERPL-MCNC: <0.2 MG/DL (ref 0–1.2)
BUN BLDV-MCNC: 14 MG/DL (ref 6–20)
CALCIUM SERPL-MCNC: 8.4 MG/DL (ref 8.6–10.2)
CHLORIDE BLD-SCNC: 106 MEQ/L (ref 98–107)
CK MB: 2.4 NG/ML (ref 0–6.7)
CK MB: 3.6 NG/ML (ref 0–6.7)
CO2: 21 MEQ/L (ref 22–29)
CREAT SERPL-MCNC: 1.33 MG/DL (ref 0.7–1.2)
CREATINE KINASE-MB INDEX: 0.1 % (ref 0–3.5)
CREATINE KINASE-MB INDEX: 0.2 % (ref 0–3.5)
EOSINOPHILS ABSOLUTE: 0.4 K/UL (ref 0–0.7)
EOSINOPHILS RELATIVE PERCENT: 3.2 %
GFR AFRICAN AMERICAN: >60
GFR NON-AFRICAN AMERICAN: 56.5
GLOBULIN: 3.3 G/DL (ref 2.3–3.5)
GLUCOSE BLD-MCNC: 130 MG/DL (ref 74–109)
HCT VFR BLD CALC: 40.3 % (ref 42–52)
HEMOGLOBIN: 13.3 G/DL (ref 14–18)
KEPPRA: <2 UG/ML (ref 12–46)
KEPPRA: <2 UG/ML (ref 12–46)
LYMPHOCYTES ABSOLUTE: 2.7 K/UL (ref 1–4.8)
LYMPHOCYTES RELATIVE PERCENT: 23.9 %
MCH RBC QN AUTO: 31 PG (ref 27–31.3)
MCHC RBC AUTO-ENTMCNC: 32.9 % (ref 33–37)
MCV RBC AUTO: 94.1 FL (ref 80–100)
MONOCYTES ABSOLUTE: 0.7 K/UL (ref 0.2–0.8)
MONOCYTES RELATIVE PERCENT: 6.1 %
NEUTROPHILS ABSOLUTE: 7.4 K/UL (ref 1.4–6.5)
NEUTROPHILS RELATIVE PERCENT: 65.8 %
PDW BLD-RTO: 16 % (ref 11.5–14.5)
PLATELET # BLD: 228 K/UL (ref 130–400)
POTASSIUM REFLEX MAGNESIUM: 4.2 MEQ/L (ref 3.5–5.1)
RBC # BLD: 4.28 M/UL (ref 4.7–6.1)
SODIUM BLD-SCNC: 141 MEQ/L (ref 132–144)
TOTAL CK: 1748 U/L (ref 0–190)
TOTAL CK: 2312 U/L (ref 0–190)
TOTAL PROTEIN: 6.3 G/DL (ref 6.4–8.1)
TSH SERPL DL<=0.05 MIU/L-ACNC: 2.88 UIU/ML (ref 0.27–4.2)
URINE CULTURE, ROUTINE: NORMAL
WBC # BLD: 11.3 K/UL (ref 4.8–10.8)

## 2019-02-05 PROCEDURE — 82553 CREATINE MB FRACTION: CPT

## 2019-02-05 PROCEDURE — 84443 ASSAY THYROID STIM HORMONE: CPT

## 2019-02-05 PROCEDURE — 2700000000 HC OXYGEN THERAPY PER DAY

## 2019-02-05 PROCEDURE — 36415 COLL VENOUS BLD VENIPUNCTURE: CPT

## 2019-02-05 PROCEDURE — 80053 COMPREHEN METABOLIC PANEL: CPT

## 2019-02-05 PROCEDURE — 6370000000 HC RX 637 (ALT 250 FOR IP): Performed by: INTERNAL MEDICINE

## 2019-02-05 PROCEDURE — 99232 SBSQ HOSP IP/OBS MODERATE 35: CPT | Performed by: INTERNAL MEDICINE

## 2019-02-05 PROCEDURE — 2500000003 HC RX 250 WO HCPCS: Performed by: INTERNAL MEDICINE

## 2019-02-05 PROCEDURE — 85025 COMPLETE CBC W/AUTO DIFF WBC: CPT

## 2019-02-05 PROCEDURE — 2580000003 HC RX 258: Performed by: EMERGENCY MEDICINE

## 2019-02-05 PROCEDURE — 6360000002 HC RX W HCPCS: Performed by: INTERNAL MEDICINE

## 2019-02-05 PROCEDURE — 82550 ASSAY OF CK (CPK): CPT

## 2019-02-05 PROCEDURE — 2580000003 HC RX 258: Performed by: NURSE PRACTITIONER

## 2019-02-05 PROCEDURE — 1210000000 HC MED SURG R&B

## 2019-02-05 PROCEDURE — 96366 THER/PROPH/DIAG IV INF ADDON: CPT

## 2019-02-05 RX ORDER — BUPRENORPHINE HYDROCHLORIDE AND NALOXONE HYDROCHLORIDE DIHYDRATE 8; 2 MG/1; MG/1
1 TABLET SUBLINGUAL 2 TIMES DAILY
Status: DISCONTINUED | OUTPATIENT
Start: 2019-02-05 | End: 2019-02-06 | Stop reason: HOSPADM

## 2019-02-05 RX ADMIN — PREGABALIN 150 MG: 150 CAPSULE ORAL at 20:10

## 2019-02-05 RX ADMIN — PANTOPRAZOLE SODIUM 20 MG: 20 TABLET, DELAYED RELEASE ORAL at 05:53

## 2019-02-05 RX ADMIN — BUPRENORPHINE HYDROCHLORIDE AND NALOXONE HYDROCHLORIDE DIHYDRATE 1 TABLET: 8; 2 TABLET SUBLINGUAL at 20:22

## 2019-02-05 RX ADMIN — TIZANIDINE 2 MG: 2 TABLET ORAL at 20:10

## 2019-02-05 RX ADMIN — Medication 10 ML: at 09:45

## 2019-02-05 RX ADMIN — METRONIDAZOLE 500 MG: 500 INJECTION, SOLUTION INTRAVENOUS at 13:23

## 2019-02-05 RX ADMIN — BUPRENORPHINE HYDROCHLORIDE AND NALOXONE HYDROCHLORIDE DIHYDRATE 1 TABLET: 8; 2 TABLET SUBLINGUAL at 09:59

## 2019-02-05 RX ADMIN — METOPROLOL SUCCINATE 50 MG: 50 TABLET, EXTENDED RELEASE ORAL at 09:44

## 2019-02-05 RX ADMIN — TIZANIDINE 2 MG: 2 TABLET ORAL at 09:44

## 2019-02-05 RX ADMIN — BUPROPION HYDROCHLORIDE 150 MG: 100 TABLET, FILM COATED ORAL at 09:43

## 2019-02-05 RX ADMIN — METOPROLOL SUCCINATE 50 MG: 50 TABLET, EXTENDED RELEASE ORAL at 20:10

## 2019-02-05 RX ADMIN — SODIUM CHLORIDE: 9 INJECTION, SOLUTION INTRAVENOUS at 06:10

## 2019-02-05 RX ADMIN — ATORVASTATIN CALCIUM 80 MG: 80 TABLET, FILM COATED ORAL at 20:08

## 2019-02-05 RX ADMIN — METRONIDAZOLE 500 MG: 500 INJECTION, SOLUTION INTRAVENOUS at 05:53

## 2019-02-05 RX ADMIN — PREGABALIN 150 MG: 150 CAPSULE ORAL at 09:43

## 2019-02-05 RX ADMIN — BUPROPION HYDROCHLORIDE 150 MG: 100 TABLET, FILM COATED ORAL at 20:09

## 2019-02-05 RX ADMIN — TIZANIDINE 2 MG: 2 TABLET ORAL at 13:23

## 2019-02-05 ASSESSMENT — PAIN - FUNCTIONAL ASSESSMENT: PAIN_FUNCTIONAL_ASSESSMENT: PREVENTS OR INTERFERES SOME ACTIVE ACTIVITIES AND ADLS

## 2019-02-05 ASSESSMENT — PAIN SCALES - GENERAL
PAINLEVEL_OUTOF10: 10
PAINLEVEL_OUTOF10: 9

## 2019-02-05 ASSESSMENT — PAIN DESCRIPTION - PAIN TYPE: TYPE: CHRONIC PAIN

## 2019-02-05 ASSESSMENT — PAIN DESCRIPTION - ONSET: ONSET: PROGRESSIVE

## 2019-02-05 ASSESSMENT — PAIN DESCRIPTION - PROGRESSION: CLINICAL_PROGRESSION: GRADUALLY WORSENING

## 2019-02-06 VITALS
WEIGHT: 264.55 LBS | SYSTOLIC BLOOD PRESSURE: 151 MMHG | BODY MASS INDEX: 37.04 KG/M2 | OXYGEN SATURATION: 99 % | HEART RATE: 71 BPM | DIASTOLIC BLOOD PRESSURE: 86 MMHG | TEMPERATURE: 97.9 F | RESPIRATION RATE: 16 BRPM | HEIGHT: 71 IN

## 2019-02-06 LAB
ALBUMIN SERPL-MCNC: 2.7 G/DL (ref 3.9–4.9)
ALP BLD-CCNC: 81 U/L (ref 35–104)
ALT SERPL-CCNC: 13 U/L (ref 0–41)
ANION GAP SERPL CALCULATED.3IONS-SCNC: 17 MEQ/L (ref 7–13)
AST SERPL-CCNC: 24 U/L (ref 0–40)
BASOPHILS ABSOLUTE: 0.1 K/UL (ref 0–0.2)
BASOPHILS RELATIVE PERCENT: 1.3 %
BILIRUB SERPL-MCNC: 0.4 MG/DL (ref 0–1.2)
BUN BLDV-MCNC: 9 MG/DL (ref 6–20)
CALCIUM SERPL-MCNC: 8.2 MG/DL (ref 8.6–10.2)
CHLORIDE BLD-SCNC: 104 MEQ/L (ref 98–107)
CK MB: 1.6 NG/ML (ref 0–6.7)
CO2: 19 MEQ/L (ref 22–29)
CREAT SERPL-MCNC: 0.96 MG/DL (ref 0.7–1.2)
CREATINE KINASE-MB INDEX: 0.1 % (ref 0–3.5)
CULTURE, RESPIRATORY: ABNORMAL
EOSINOPHILS ABSOLUTE: 0.5 K/UL (ref 0–0.7)
EOSINOPHILS RELATIVE PERCENT: 4.4 %
GFR AFRICAN AMERICAN: >60
GFR NON-AFRICAN AMERICAN: >60
GLOBULIN: 3.8 G/DL (ref 2.3–3.5)
GLUCOSE BLD-MCNC: 84 MG/DL (ref 74–109)
GRAM STAIN RESULT: ABNORMAL
HCT VFR BLD CALC: 42.5 % (ref 42–52)
HEMOGLOBIN: 14 G/DL (ref 14–18)
LYMPHOCYTES ABSOLUTE: 3.4 K/UL (ref 1–4.8)
LYMPHOCYTES RELATIVE PERCENT: 31.4 %
MCH RBC QN AUTO: 31.1 PG (ref 27–31.3)
MCHC RBC AUTO-ENTMCNC: 32.9 % (ref 33–37)
MCV RBC AUTO: 94.5 FL (ref 80–100)
MONOCYTES ABSOLUTE: 0.9 K/UL (ref 0.2–0.8)
MONOCYTES RELATIVE PERCENT: 8 %
NEUTROPHILS ABSOLUTE: 6 K/UL (ref 1.4–6.5)
NEUTROPHILS RELATIVE PERCENT: 54.9 %
ORGANISM: ABNORMAL
ORGANISM: ABNORMAL
PDW BLD-RTO: 16 % (ref 11.5–14.5)
PLATELET # BLD: 151 K/UL (ref 130–400)
POTASSIUM REFLEX MAGNESIUM: 4.3 MEQ/L (ref 3.5–5.1)
RBC # BLD: 4.5 M/UL (ref 4.7–6.1)
SODIUM BLD-SCNC: 140 MEQ/L (ref 132–144)
TOTAL CK: 1281 U/L (ref 0–190)
TOTAL PROTEIN: 6.5 G/DL (ref 6.4–8.1)
WBC # BLD: 10.9 K/UL (ref 4.8–10.8)

## 2019-02-06 PROCEDURE — 82550 ASSAY OF CK (CPK): CPT

## 2019-02-06 PROCEDURE — 96372 THER/PROPH/DIAG INJ SC/IM: CPT

## 2019-02-06 PROCEDURE — 80053 COMPREHEN METABOLIC PANEL: CPT

## 2019-02-06 PROCEDURE — 85025 COMPLETE CBC W/AUTO DIFF WBC: CPT

## 2019-02-06 PROCEDURE — 6360000002 HC RX W HCPCS: Performed by: NURSE PRACTITIONER

## 2019-02-06 PROCEDURE — 36415 COLL VENOUS BLD VENIPUNCTURE: CPT

## 2019-02-06 PROCEDURE — 2700000000 HC OXYGEN THERAPY PER DAY

## 2019-02-06 PROCEDURE — 6370000000 HC RX 637 (ALT 250 FOR IP): Performed by: INTERNAL MEDICINE

## 2019-02-06 PROCEDURE — 99232 SBSQ HOSP IP/OBS MODERATE 35: CPT | Performed by: INTERNAL MEDICINE

## 2019-02-06 PROCEDURE — 2580000003 HC RX 258: Performed by: NURSE PRACTITIONER

## 2019-02-06 PROCEDURE — 2580000003 HC RX 258: Performed by: EMERGENCY MEDICINE

## 2019-02-06 PROCEDURE — 6360000002 HC RX W HCPCS: Performed by: INTERNAL MEDICINE

## 2019-02-06 PROCEDURE — 82553 CREATINE MB FRACTION: CPT

## 2019-02-06 RX ADMIN — ENOXAPARIN SODIUM 30 MG: 30 INJECTION SUBCUTANEOUS at 08:40

## 2019-02-06 RX ADMIN — Medication 10 ML: at 08:42

## 2019-02-06 RX ADMIN — BUPRENORPHINE HYDROCHLORIDE AND NALOXONE HYDROCHLORIDE DIHYDRATE 1 TABLET: 8; 2 TABLET SUBLINGUAL at 08:40

## 2019-02-06 RX ADMIN — SODIUM CHLORIDE: 9 INJECTION, SOLUTION INTRAVENOUS at 00:45

## 2019-02-06 RX ADMIN — BUPROPION HYDROCHLORIDE 150 MG: 100 TABLET, FILM COATED ORAL at 08:40

## 2019-02-06 RX ADMIN — PREGABALIN 150 MG: 150 CAPSULE ORAL at 08:41

## 2019-02-06 RX ADMIN — FENOFIBRATE 160 MG: 160 TABLET ORAL at 08:40

## 2019-02-06 RX ADMIN — TIZANIDINE 2 MG: 2 TABLET ORAL at 08:40

## 2019-02-06 RX ADMIN — SODIUM CHLORIDE: 9 INJECTION, SOLUTION INTRAVENOUS at 08:40

## 2019-02-06 RX ADMIN — PANTOPRAZOLE SODIUM 20 MG: 20 TABLET, DELAYED RELEASE ORAL at 05:56

## 2019-02-06 RX ADMIN — METOPROLOL SUCCINATE 50 MG: 50 TABLET, EXTENDED RELEASE ORAL at 08:40

## 2019-02-06 ASSESSMENT — PAIN SCALES - GENERAL
PAINLEVEL_OUTOF10: 4
PAINLEVEL_OUTOF10: 9

## 2019-02-07 LAB — CSF CULTURE: NORMAL

## 2019-02-08 LAB
REASON FOR REJECTION: NORMAL
REJECTED TEST: NORMAL

## 2019-02-09 LAB
AMOBARBITAL URINE QUANT: <50 NG/ML
BLOOD CULTURE, ROUTINE: NORMAL
BUTALBITAL URINE QUANT: <50 NG/ML
CULTURE, BLOOD 2: NORMAL
PENTOBARBITAL URINE QUANT: <50 NG/ML
PHENOBARBITAL URINE QUANT: <50 NG/ML
SECOBARBITAL URINE QUANT: <50 NG/ML

## 2019-10-28 ENCOUNTER — APPOINTMENT (OUTPATIENT)
Dept: GENERAL RADIOLOGY | Age: 54
DRG: 917 | End: 2019-10-28
Payer: MEDICARE

## 2019-10-28 ENCOUNTER — HOSPITAL ENCOUNTER (INPATIENT)
Age: 54
LOS: 8 days | Discharge: HOME | DRG: 917 | End: 2019-11-05
Attending: INTERNAL MEDICINE | Admitting: INTERNAL MEDICINE
Payer: MEDICARE

## 2019-10-28 DIAGNOSIS — J96.02 ACUTE RESPIRATORY FAILURE WITH HYPERCAPNIA (HCC): ICD-10-CM

## 2019-10-28 DIAGNOSIS — T40.1X1A ACCIDENTAL OVERDOSE OF HEROIN, INITIAL ENCOUNTER (HCC): ICD-10-CM

## 2019-10-28 DIAGNOSIS — T40.601A OPIATE OVERDOSE, ACCIDENTAL OR UNINTENTIONAL, INITIAL ENCOUNTER (HCC): Primary | ICD-10-CM

## 2019-10-28 LAB
ALBUMIN SERPL-MCNC: 4 G/DL (ref 3.5–4.6)
ALP BLD-CCNC: 132 U/L (ref 35–104)
ALT SERPL-CCNC: 11 U/L (ref 0–41)
ANION GAP SERPL CALCULATED.3IONS-SCNC: 12 MEQ/L (ref 9–15)
AST SERPL-CCNC: 13 U/L (ref 0–40)
BASOPHILS ABSOLUTE: 0.1 K/UL (ref 0–0.2)
BASOPHILS RELATIVE PERCENT: 0.7 %
BILIRUB SERPL-MCNC: <0.2 MG/DL (ref 0.2–0.7)
BUN BLDV-MCNC: 7 MG/DL (ref 6–20)
CALCIUM SERPL-MCNC: 9.1 MG/DL (ref 8.5–9.9)
CHLORIDE BLD-SCNC: 96 MEQ/L (ref 95–107)
CO2: 35 MEQ/L (ref 20–31)
CREAT SERPL-MCNC: 0.86 MG/DL (ref 0.7–1.2)
EOSINOPHILS ABSOLUTE: 0.1 K/UL (ref 0–0.7)
EOSINOPHILS RELATIVE PERCENT: 0.4 %
ETHANOL PERCENT: NORMAL G/DL
ETHANOL PERCENT: NORMAL G/DL
ETHANOL: <10 MG/DL (ref 0–0.08)
ETHANOL: <10 MG/DL (ref 0–0.08)
GFR AFRICAN AMERICAN: >60
GFR NON-AFRICAN AMERICAN: >60
GLOBULIN: 3.8 G/DL (ref 2.3–3.5)
GLUCOSE BLD-MCNC: 154 MG/DL (ref 70–99)
HCT VFR BLD CALC: 44.6 % (ref 42–52)
HEMOGLOBIN: 14.7 G/DL (ref 14–18)
LYMPHOCYTES ABSOLUTE: 1.8 K/UL (ref 1–4.8)
LYMPHOCYTES RELATIVE PERCENT: 12.5 %
MCH RBC QN AUTO: 31.1 PG (ref 27–31.3)
MCHC RBC AUTO-ENTMCNC: 32.9 % (ref 33–37)
MCV RBC AUTO: 94.5 FL (ref 80–100)
MONOCYTES ABSOLUTE: 0.7 K/UL (ref 0.2–0.8)
MONOCYTES RELATIVE PERCENT: 4.4 %
NEUTROPHILS ABSOLUTE: 12.1 K/UL (ref 1.4–6.5)
NEUTROPHILS RELATIVE PERCENT: 82 %
PDW BLD-RTO: 16.5 % (ref 11.5–14.5)
PLATELET # BLD: 329 K/UL (ref 130–400)
POTASSIUM SERPL-SCNC: 4.4 MEQ/L (ref 3.4–4.9)
RBC # BLD: 4.72 M/UL (ref 4.7–6.1)
SODIUM BLD-SCNC: 143 MEQ/L (ref 135–144)
TOTAL PROTEIN: 7.8 G/DL (ref 6.3–8)
WBC # BLD: 14.8 K/UL (ref 4.8–10.8)

## 2019-10-28 PROCEDURE — 71045 X-RAY EXAM CHEST 1 VIEW: CPT

## 2019-10-28 PROCEDURE — 93005 ELECTROCARDIOGRAM TRACING: CPT | Performed by: PHYSICIAN ASSISTANT

## 2019-10-28 PROCEDURE — 96365 THER/PROPH/DIAG IV INF INIT: CPT

## 2019-10-28 PROCEDURE — 80053 COMPREHEN METABOLIC PANEL: CPT

## 2019-10-28 PROCEDURE — 99285 EMERGENCY DEPT VISIT HI MDM: CPT

## 2019-10-28 PROCEDURE — 6360000002 HC RX W HCPCS: Performed by: PHYSICIAN ASSISTANT

## 2019-10-28 PROCEDURE — 84145 PROCALCITONIN (PCT): CPT

## 2019-10-28 PROCEDURE — 36415 COLL VENOUS BLD VENIPUNCTURE: CPT

## 2019-10-28 PROCEDURE — 85025 COMPLETE CBC W/AUTO DIFF WBC: CPT

## 2019-10-28 PROCEDURE — 96361 HYDRATE IV INFUSION ADD-ON: CPT

## 2019-10-28 PROCEDURE — 96376 TX/PRO/DX INJ SAME DRUG ADON: CPT

## 2019-10-28 PROCEDURE — 83605 ASSAY OF LACTIC ACID: CPT

## 2019-10-28 PROCEDURE — 2580000003 HC RX 258: Performed by: NURSE PRACTITIONER

## 2019-10-28 PROCEDURE — 6360000002 HC RX W HCPCS

## 2019-10-28 PROCEDURE — 96375 TX/PRO/DX INJ NEW DRUG ADDON: CPT

## 2019-10-28 PROCEDURE — 6360000002 HC RX W HCPCS: Performed by: NURSE PRACTITIONER

## 2019-10-28 PROCEDURE — G0480 DRUG TEST DEF 1-7 CLASSES: HCPCS

## 2019-10-28 PROCEDURE — 2000000000 HC ICU R&B

## 2019-10-28 RX ORDER — NALOXONE HYDROCHLORIDE 1 MG/ML
INJECTION INTRAMUSCULAR; INTRAVENOUS; SUBCUTANEOUS
Status: COMPLETED
Start: 2019-10-28 | End: 2019-10-28

## 2019-10-28 RX ORDER — ONDANSETRON 2 MG/ML
4 INJECTION INTRAMUSCULAR; INTRAVENOUS EVERY 6 HOURS PRN
Status: CANCELLED | OUTPATIENT
Start: 2019-10-28

## 2019-10-28 RX ORDER — NALOXONE HYDROCHLORIDE 1 MG/ML
2 INJECTION INTRAMUSCULAR; INTRAVENOUS; SUBCUTANEOUS ONCE
Status: COMPLETED | OUTPATIENT
Start: 2019-10-28 | End: 2019-10-28

## 2019-10-28 RX ORDER — 0.9 % SODIUM CHLORIDE 0.9 %
1000 INTRAVENOUS SOLUTION INTRAVENOUS ONCE
Status: COMPLETED | OUTPATIENT
Start: 2019-10-28 | End: 2019-10-28

## 2019-10-28 RX ADMIN — NALOXONE HYDROCHLORIDE 2 MG: 1 INJECTION PARENTERAL at 21:02

## 2019-10-28 RX ADMIN — SODIUM CHLORIDE 1000 ML: 9 INJECTION, SOLUTION INTRAVENOUS at 18:42

## 2019-10-28 RX ADMIN — CEFTRIAXONE SODIUM 1 G: 1 INJECTION, POWDER, FOR SOLUTION INTRAMUSCULAR; INTRAVENOUS at 23:34

## 2019-10-28 RX ADMIN — NALOXONE HYDROCHLORIDE 0.4 MG/HR: 1 INJECTION PARENTERAL at 23:34

## 2019-10-28 RX ADMIN — NALOXONE HYDROCHLORIDE: 1 INJECTION PARENTERAL at 18:40

## 2019-10-28 RX ADMIN — NALOXONE HYDROCHLORIDE 2 MG: 1 INJECTION PARENTERAL at 21:03

## 2019-10-28 RX ADMIN — NALOXONE HYDROCHLORIDE 2 MG: 1 INJECTION PARENTERAL at 18:03

## 2019-10-28 ASSESSMENT — ENCOUNTER SYMPTOMS
ABDOMINAL PAIN: 0
SORE THROAT: 0
BACK PAIN: 0
EYE DISCHARGE: 0
COLOR CHANGE: 0
WHEEZING: 0
DIARRHEA: 0
EYE REDNESS: 0
BLOOD IN STOOL: 0
SHORTNESS OF BREATH: 0
VOMITING: 0
COUGH: 0
NAUSEA: 0
TROUBLE SWALLOWING: 0
RHINORRHEA: 0
EYE PAIN: 0
CONSTIPATION: 0

## 2019-10-29 ENCOUNTER — APPOINTMENT (OUTPATIENT)
Dept: CT IMAGING | Age: 54
DRG: 917 | End: 2019-10-29
Payer: MEDICARE

## 2019-10-29 ENCOUNTER — APPOINTMENT (OUTPATIENT)
Dept: GENERAL RADIOLOGY | Age: 54
DRG: 917 | End: 2019-10-29
Payer: MEDICARE

## 2019-10-29 PROBLEM — J96.02 ACUTE RESPIRATORY FAILURE WITH HYPERCAPNIA (HCC): Status: ACTIVE | Noted: 2019-10-29

## 2019-10-29 LAB
ACETAMINOPHEN LEVEL: <5 UG/ML (ref 10–30)
ALBUMIN SERPL-MCNC: 3.5 G/DL (ref 3.5–4.6)
ALP BLD-CCNC: 111 U/L (ref 35–104)
ALT SERPL-CCNC: 13 U/L (ref 0–41)
AMPHETAMINE SCREEN, URINE: NORMAL
ANION GAP SERPL CALCULATED.3IONS-SCNC: 12 MEQ/L (ref 9–15)
AST SERPL-CCNC: 16 U/L (ref 0–40)
BARBITURATE SCREEN URINE: NORMAL
BASE EXCESS ARTERIAL: 12 (ref -3–3)
BASE EXCESS ARTERIAL: 9 (ref -3–3)
BASOPHILS ABSOLUTE: 0.1 K/UL (ref 0–0.2)
BASOPHILS RELATIVE PERCENT: 0.6 %
BENZODIAZEPINE SCREEN, URINE: NORMAL
BILIRUB SERPL-MCNC: 0.4 MG/DL (ref 0.2–0.7)
BUN BLDV-MCNC: 9 MG/DL (ref 6–20)
CALCIUM IONIZED: 1.09 MMOL/L (ref 1.12–1.32)
CALCIUM IONIZED: 1.14 MMOL/L (ref 1.12–1.32)
CALCIUM SERPL-MCNC: 8 MG/DL (ref 8.5–9.9)
CANNABINOID SCREEN URINE: NORMAL
CARBOXYHEMOGLOBIN: 4.7 % (ref 0–4)
CHLORIDE BLD-SCNC: 95 MEQ/L (ref 95–107)
CO2: 32 MEQ/L (ref 20–31)
COCAINE METABOLITE SCREEN URINE: NORMAL
CREAT SERPL-MCNC: 0.84 MG/DL (ref 0.7–1.2)
EOSINOPHILS ABSOLUTE: 0 K/UL (ref 0–0.7)
EOSINOPHILS RELATIVE PERCENT: 0.1 %
GFR AFRICAN AMERICAN: >60
GFR NON-AFRICAN AMERICAN: >60
GLOBULIN: 3.2 G/DL (ref 2.3–3.5)
GLUCOSE BLD-MCNC: 136 MG/DL (ref 70–99)
GLUCOSE BLD-MCNC: 172 MG/DL (ref 60–115)
GLUCOSE BLD-MCNC: 172 MG/DL (ref 60–115)
HCO3 ARTERIAL: 37.9 MMOL/L (ref 21–29)
HCO3 ARTERIAL: 38.1 MMOL/L (ref 21–29)
HCT VFR BLD CALC: 44.1 % (ref 42–52)
HEMOGLOBIN: 14.1 G/DL (ref 14–18)
HEMOGLOBIN: 15.4 GM/DL (ref 13.5–17.5)
HEMOGLOBIN: 17.7 GM/DL (ref 13.5–17.5)
LACTATE: 1.27 MMOL/L (ref 0.4–2)
LACTATE: 2.03 MMOL/L (ref 0.4–2)
LACTIC ACID: 1.9 MMOL/L (ref 0.5–2.2)
LACTIC ACID: 2.9 MMOL/L (ref 0.5–2.2)
LYMPHOCYTES ABSOLUTE: 2.3 K/UL (ref 1–4.8)
LYMPHOCYTES RELATIVE PERCENT: 11.3 %
Lab: NORMAL
MCH RBC QN AUTO: 30.8 PG (ref 27–31.3)
MCHC RBC AUTO-ENTMCNC: 32 % (ref 33–37)
MCV RBC AUTO: 96.1 FL (ref 80–100)
METHADONE SCREEN, URINE: NORMAL
MONOCYTES ABSOLUTE: 1 K/UL (ref 0.2–0.8)
MONOCYTES RELATIVE PERCENT: 5 %
NEUTROPHILS ABSOLUTE: 17.2 K/UL (ref 1.4–6.5)
NEUTROPHILS RELATIVE PERCENT: 83 %
O2 SAT, ARTERIAL: 84 % (ref 93–100)
O2 SAT, ARTERIAL: 93 % (ref 93–100)
OPIATE SCREEN URINE: NORMAL
PCO2 ARTERIAL: 109 MM HG (ref 35–45)
PCO2 ARTERIAL: 73 MM HG (ref 35–45)
PDW BLD-RTO: 16.8 % (ref 11.5–14.5)
PERFORMED ON: ABNORMAL
PERFORMED ON: ABNORMAL
PH ARTERIAL: 7.15 (ref 7.35–7.45)
PH ARTERIAL: 7.33 (ref 7.35–7.45)
PHENCYCLIDINE SCREEN URINE: NORMAL
PLATELET # BLD: 253 K/UL (ref 130–400)
PO2 ARTERIAL: 67 MM HG (ref 75–108)
PO2 ARTERIAL: 74 MM HG (ref 75–108)
POC CHLORIDE: 100 MEQ/L (ref 99–110)
POC CHLORIDE: 99 MEQ/L (ref 99–110)
POC CREATININE: 0.7 MG/DL (ref 0.9–1.3)
POC CREATININE: 0.8 MG/DL (ref 0.9–1.3)
POC FIO2: 100
POC FIO2: 100
POC HEMATOCRIT: 45 % (ref 41–53)
POC HEMATOCRIT: 52 % (ref 41–53)
POC POTASSIUM: 4.7 MEQ/L (ref 3.5–5.1)
POC POTASSIUM: 4.9 MEQ/L (ref 3.5–5.1)
POC SAMPLE TYPE: ABNORMAL
POC SAMPLE TYPE: ABNORMAL
POC SODIUM: 138 MEQ/L (ref 136–145)
POC SODIUM: 138 MEQ/L (ref 136–145)
POTASSIUM REFLEX MAGNESIUM: 4.5 MEQ/L (ref 3.4–4.9)
PROCALCITONIN: 0.07 NG/ML (ref 0–0.15)
PROPOXYPHENE SCREEN, URINE: NORMAL
RBC # BLD: 4.59 M/UL (ref 4.7–6.1)
SALICYLATE, SERUM: <0.3 MG/DL (ref 15–30)
SODIUM BLD-SCNC: 139 MEQ/L (ref 135–144)
TCO2 ARTERIAL: 40 (ref 22–29)
TCO2 ARTERIAL: 41 (ref 22–29)
TOTAL PROTEIN: 6.7 G/DL (ref 6.3–8)
UR OXYCODONE RAPID SCREEN: NORMAL
WBC # BLD: 20.7 K/UL (ref 4.8–10.8)

## 2019-10-29 PROCEDURE — 94002 VENT MGMT INPAT INIT DAY: CPT

## 2019-10-29 PROCEDURE — 82803 BLOOD GASES ANY COMBINATION: CPT

## 2019-10-29 PROCEDURE — 93010 ELECTROCARDIOGRAM REPORT: CPT | Performed by: INTERNAL MEDICINE

## 2019-10-29 PROCEDURE — 71250 CT THORAX DX C-: CPT

## 2019-10-29 PROCEDURE — 82375 ASSAY CARBOXYHB QUANT: CPT

## 2019-10-29 PROCEDURE — 70450 CT HEAD/BRAIN W/O DYE: CPT

## 2019-10-29 PROCEDURE — 31500 INSERT EMERGENCY AIRWAY: CPT

## 2019-10-29 PROCEDURE — 2000000000 HC ICU R&B

## 2019-10-29 PROCEDURE — 99291 CRITICAL CARE FIRST HOUR: CPT | Performed by: INTERNAL MEDICINE

## 2019-10-29 PROCEDURE — 84295 ASSAY OF SERUM SODIUM: CPT

## 2019-10-29 PROCEDURE — 85014 HEMATOCRIT: CPT

## 2019-10-29 PROCEDURE — 2580000003 HC RX 258: Performed by: HOSPITALIST

## 2019-10-29 PROCEDURE — 94640 AIRWAY INHALATION TREATMENT: CPT

## 2019-10-29 PROCEDURE — 2500000003 HC RX 250 WO HCPCS: Performed by: HOSPITALIST

## 2019-10-29 PROCEDURE — 6360000002 HC RX W HCPCS: Performed by: HOSPITALIST

## 2019-10-29 PROCEDURE — 93005 ELECTROCARDIOGRAM TRACING: CPT | Performed by: HOSPITALIST

## 2019-10-29 PROCEDURE — 36415 COLL VENOUS BLD VENIPUNCTURE: CPT

## 2019-10-29 PROCEDURE — 2580000003 HC RX 258: Performed by: INTERNAL MEDICINE

## 2019-10-29 PROCEDURE — G0480 DRUG TEST DEF 1-7 CLASSES: HCPCS

## 2019-10-29 PROCEDURE — 87077 CULTURE AEROBIC IDENTIFY: CPT

## 2019-10-29 PROCEDURE — 80053 COMPREHEN METABOLIC PANEL: CPT

## 2019-10-29 PROCEDURE — 82330 ASSAY OF CALCIUM: CPT

## 2019-10-29 PROCEDURE — 80306 DRUG TEST PRSMV INSTRMNT: CPT

## 2019-10-29 PROCEDURE — 85025 COMPLETE CBC W/AUTO DIFF WBC: CPT

## 2019-10-29 PROCEDURE — 6360000002 HC RX W HCPCS

## 2019-10-29 PROCEDURE — 87040 BLOOD CULTURE FOR BACTERIA: CPT

## 2019-10-29 PROCEDURE — 6370000000 HC RX 637 (ALT 250 FOR IP): Performed by: HOSPITALIST

## 2019-10-29 PROCEDURE — 87205 SMEAR GRAM STAIN: CPT

## 2019-10-29 PROCEDURE — 87070 CULTURE OTHR SPECIMN AEROBIC: CPT

## 2019-10-29 PROCEDURE — 6370000000 HC RX 637 (ALT 250 FOR IP): Performed by: INTERNAL MEDICINE

## 2019-10-29 PROCEDURE — 84132 ASSAY OF SERUM POTASSIUM: CPT

## 2019-10-29 PROCEDURE — 82565 ASSAY OF CREATININE: CPT

## 2019-10-29 PROCEDURE — 83605 ASSAY OF LACTIC ACID: CPT

## 2019-10-29 PROCEDURE — 71045 X-RAY EXAM CHEST 1 VIEW: CPT

## 2019-10-29 PROCEDURE — 36600 WITHDRAWAL OF ARTERIAL BLOOD: CPT

## 2019-10-29 PROCEDURE — 87149 DNA/RNA DIRECT PROBE: CPT

## 2019-10-29 PROCEDURE — 80307 DRUG TEST PRSMV CHEM ANLYZR: CPT

## 2019-10-29 PROCEDURE — 82435 ASSAY OF BLOOD CHLORIDE: CPT

## 2019-10-29 PROCEDURE — 5A1945Z RESPIRATORY VENTILATION, 24-96 CONSECUTIVE HOURS: ICD-10-PCS | Performed by: INTERNAL MEDICINE

## 2019-10-29 RX ORDER — PROPOFOL 10 MG/ML
10 INJECTION, EMULSION INTRAVENOUS CONTINUOUS
Status: DISCONTINUED | OUTPATIENT
Start: 2019-10-29 | End: 2019-10-31

## 2019-10-29 RX ORDER — ETOMIDATE 2 MG/ML
20 INJECTION INTRAVENOUS ONCE
Status: DISCONTINUED | OUTPATIENT
Start: 2019-10-29 | End: 2019-10-29

## 2019-10-29 RX ORDER — SODIUM CHLORIDE 0.9 % (FLUSH) 0.9 %
10 SYRINGE (ML) INJECTION EVERY 12 HOURS SCHEDULED
Status: DISCONTINUED | OUTPATIENT
Start: 2019-10-29 | End: 2019-11-05 | Stop reason: HOSPADM

## 2019-10-29 RX ORDER — FENTANYL CITRATE 50 UG/ML
INJECTION, SOLUTION INTRAMUSCULAR; INTRAVENOUS
Status: COMPLETED
Start: 2019-10-29 | End: 2019-10-29

## 2019-10-29 RX ORDER — SODIUM CHLORIDE 9 MG/ML
INJECTION, SOLUTION INTRAVENOUS CONTINUOUS
Status: DISCONTINUED | OUTPATIENT
Start: 2019-10-29 | End: 2019-10-30

## 2019-10-29 RX ORDER — ONDANSETRON 2 MG/ML
4 INJECTION INTRAMUSCULAR; INTRAVENOUS EVERY 6 HOURS PRN
Status: DISCONTINUED | OUTPATIENT
Start: 2019-10-29 | End: 2019-11-05 | Stop reason: HOSPADM

## 2019-10-29 RX ORDER — ETOMIDATE 2 MG/ML
34 INJECTION INTRAVENOUS ONCE
Status: COMPLETED | OUTPATIENT
Start: 2019-10-29 | End: 2019-10-29

## 2019-10-29 RX ORDER — SUCCINYLCHOLINE/SOD CL,ISO/PF 100 MG/5ML
100 SYRINGE (ML) INTRAVENOUS ONCE
Status: COMPLETED | OUTPATIENT
Start: 2019-10-29 | End: 2019-10-29

## 2019-10-29 RX ORDER — ACETAMINOPHEN 650 MG/1
650 SUPPOSITORY RECTAL EVERY 4 HOURS PRN
Status: DISCONTINUED | OUTPATIENT
Start: 2019-10-29 | End: 2019-11-01

## 2019-10-29 RX ORDER — IPRATROPIUM BROMIDE AND ALBUTEROL SULFATE 2.5; .5 MG/3ML; MG/3ML
1 SOLUTION RESPIRATORY (INHALATION) 4 TIMES DAILY
Status: DISCONTINUED | OUTPATIENT
Start: 2019-10-29 | End: 2019-10-29

## 2019-10-29 RX ORDER — ACETAMINOPHEN 160 MG/5ML
650 SOLUTION ORAL EVERY 4 HOURS PRN
Status: DISCONTINUED | OUTPATIENT
Start: 2019-10-29 | End: 2019-11-01 | Stop reason: ALTCHOICE

## 2019-10-29 RX ORDER — FENTANYL CITRATE 50 UG/ML
50 INJECTION, SOLUTION INTRAMUSCULAR; INTRAVENOUS
Status: DISCONTINUED | OUTPATIENT
Start: 2019-10-29 | End: 2019-10-31

## 2019-10-29 RX ORDER — ALBUTEROL SULFATE 90 UG/1
4 AEROSOL, METERED RESPIRATORY (INHALATION) 4 TIMES DAILY
Status: DISCONTINUED | OUTPATIENT
Start: 2019-10-29 | End: 2019-10-31

## 2019-10-29 RX ORDER — FENTANYL CITRATE 50 UG/ML
50 INJECTION, SOLUTION INTRAMUSCULAR; INTRAVENOUS ONCE
Status: COMPLETED | OUTPATIENT
Start: 2019-10-30 | End: 2019-10-29

## 2019-10-29 RX ORDER — CHLORHEXIDINE GLUCONATE 0.12 MG/ML
15 RINSE ORAL 2 TIMES DAILY
Status: DISCONTINUED | OUTPATIENT
Start: 2019-10-29 | End: 2019-10-31

## 2019-10-29 RX ORDER — SODIUM CHLORIDE 0.9 % (FLUSH) 0.9 %
10 SYRINGE (ML) INJECTION PRN
Status: DISCONTINUED | OUTPATIENT
Start: 2019-10-29 | End: 2019-11-05 | Stop reason: HOSPADM

## 2019-10-29 RX ORDER — HYDRALAZINE HYDROCHLORIDE 20 MG/ML
10 INJECTION INTRAMUSCULAR; INTRAVENOUS EVERY 4 HOURS PRN
Status: DISCONTINUED | OUTPATIENT
Start: 2019-10-29 | End: 2019-11-05 | Stop reason: HOSPADM

## 2019-10-29 RX ORDER — ALBUTEROL SULFATE 2.5 MG/3ML
2.5 SOLUTION RESPIRATORY (INHALATION)
Status: DISCONTINUED | OUTPATIENT
Start: 2019-10-29 | End: 2019-11-03

## 2019-10-29 RX ADMIN — SODIUM CHLORIDE: 9 INJECTION, SOLUTION INTRAVENOUS at 04:24

## 2019-10-29 RX ADMIN — DEXMEDETOMIDINE 0.4 MCG/KG/HR: 100 INJECTION, SOLUTION, CONCENTRATE INTRAVENOUS at 12:40

## 2019-10-29 RX ADMIN — Medication 100 MG: at 01:10

## 2019-10-29 RX ADMIN — DEXMEDETOMIDINE 0.6 MCG/KG/HR: 100 INJECTION, SOLUTION, CONCENTRATE INTRAVENOUS at 00:06

## 2019-10-29 RX ADMIN — PROPOFOL 30 MCG/KG/MIN: 10 INJECTION, EMULSION INTRAVENOUS at 19:28

## 2019-10-29 RX ADMIN — SODIUM CHLORIDE: 9 INJECTION, SOLUTION INTRAVENOUS at 23:12

## 2019-10-29 RX ADMIN — SODIUM CHLORIDE 3 G: 900 INJECTION, SOLUTION INTRAVENOUS at 14:17

## 2019-10-29 RX ADMIN — PROPOFOL 10 MCG/KG/MIN: 10 INJECTION, EMULSION INTRAVENOUS at 01:10

## 2019-10-29 RX ADMIN — FAMOTIDINE 20 MG: 10 INJECTION, SOLUTION INTRAVENOUS at 08:28

## 2019-10-29 RX ADMIN — CHLORHEXIDINE GLUCONATE 0.12% ORAL RINSE 15 ML: 1.2 LIQUID ORAL at 21:21

## 2019-10-29 RX ADMIN — ACETAMINOPHEN 650 MG: 650 SUPPOSITORY RECTAL at 23:20

## 2019-10-29 RX ADMIN — FENTANYL CITRATE 50 MCG: 50 INJECTION, SOLUTION INTRAMUSCULAR; INTRAVENOUS at 23:40

## 2019-10-29 RX ADMIN — IPRATROPIUM BROMIDE 4 PUFF: 17 AEROSOL, METERED RESPIRATORY (INHALATION) at 21:00

## 2019-10-29 RX ADMIN — SODIUM CHLORIDE 3 G: 900 INJECTION, SOLUTION INTRAVENOUS at 09:24

## 2019-10-29 RX ADMIN — SODIUM CHLORIDE 3 G: 900 INJECTION, SOLUTION INTRAVENOUS at 04:24

## 2019-10-29 RX ADMIN — ALBUTEROL SULFATE 4 PUFF: 90 AEROSOL, METERED RESPIRATORY (INHALATION) at 21:00

## 2019-10-29 RX ADMIN — SODIUM CHLORIDE 3 G: 900 INJECTION, SOLUTION INTRAVENOUS at 21:21

## 2019-10-29 RX ADMIN — SODIUM CHLORIDE: 9 INJECTION, SOLUTION INTRAVENOUS at 12:40

## 2019-10-29 RX ADMIN — ENOXAPARIN SODIUM 40 MG: 40 INJECTION SUBCUTANEOUS at 08:28

## 2019-10-29 RX ADMIN — CHLORHEXIDINE GLUCONATE 0.12% ORAL RINSE 15 ML: 1.2 LIQUID ORAL at 08:27

## 2019-10-29 RX ADMIN — ETOMIDATE 20 MG: 2 INJECTION, SOLUTION INTRAVENOUS at 01:10

## 2019-10-29 RX ADMIN — PROPOFOL 30 MCG/KG/MIN: 10 INJECTION, EMULSION INTRAVENOUS at 23:14

## 2019-10-29 RX ADMIN — DEXMEDETOMIDINE 0.2 MCG/KG/HR: 100 INJECTION, SOLUTION, CONCENTRATE INTRAVENOUS at 02:00

## 2019-10-29 RX ADMIN — FAMOTIDINE 20 MG: 10 INJECTION, SOLUTION INTRAVENOUS at 21:21

## 2019-10-29 RX ADMIN — Medication 10 ML: at 08:28

## 2019-10-29 RX ADMIN — PROPOFOL 10 MCG/KG/MIN: 10 INJECTION, EMULSION INTRAVENOUS at 09:29

## 2019-10-29 ASSESSMENT — PAIN SCALES - GENERAL
PAINLEVEL_OUTOF10: 0
PAINLEVEL_OUTOF10: 3
PAINLEVEL_OUTOF10: 0

## 2019-10-29 ASSESSMENT — PULMONARY FUNCTION TESTS
PIF_VALUE: 23
PIF_VALUE: 22
PIF_VALUE: 24
PIF_VALUE: 26
PIF_VALUE: 19
PIF_VALUE: 23
PIF_VALUE: 19
PIF_VALUE: 20
PIF_VALUE: 23
PIF_VALUE: 30
PIF_VALUE: 19
PIF_VALUE: 22
PIF_VALUE: 30
PIF_VALUE: 24
PIF_VALUE: 28
PIF_VALUE: 22
PIF_VALUE: 23
PIF_VALUE: 25
PIF_VALUE: 32
PIF_VALUE: 19
PIF_VALUE: 22
PIF_VALUE: 22

## 2019-10-30 ENCOUNTER — APPOINTMENT (OUTPATIENT)
Dept: GENERAL RADIOLOGY | Age: 54
DRG: 917 | End: 2019-10-30
Payer: MEDICARE

## 2019-10-30 ENCOUNTER — APPOINTMENT (OUTPATIENT)
Dept: INTERVENTIONAL RADIOLOGY/VASCULAR | Age: 54
DRG: 917 | End: 2019-10-30
Payer: MEDICARE

## 2019-10-30 LAB
ALBUMIN SERPL-MCNC: 2.9 G/DL (ref 3.5–4.6)
ALP BLD-CCNC: 99 U/L (ref 35–104)
ALT SERPL-CCNC: 9 U/L (ref 0–41)
ANION GAP SERPL CALCULATED.3IONS-SCNC: 12 MEQ/L (ref 9–15)
AST SERPL-CCNC: 12 U/L (ref 0–40)
BASE EXCESS ARTERIAL: 9 (ref -3–3)
BASOPHILS ABSOLUTE: 0.1 K/UL (ref 0–0.2)
BASOPHILS RELATIVE PERCENT: 0.7 %
BILIRUB SERPL-MCNC: 0.5 MG/DL (ref 0.2–0.7)
BUN BLDV-MCNC: 12 MG/DL (ref 6–20)
CALCIUM IONIZED: 1.11 MMOL/L (ref 1.12–1.32)
CALCIUM SERPL-MCNC: 8.1 MG/DL (ref 8.5–9.9)
CHLORIDE BLD-SCNC: 104 MEQ/L (ref 95–107)
CO2: 28 MEQ/L (ref 20–31)
CREAT SERPL-MCNC: 0.84 MG/DL (ref 0.7–1.2)
EOSINOPHILS ABSOLUTE: 0.2 K/UL (ref 0–0.7)
EOSINOPHILS RELATIVE PERCENT: 1.3 %
GFR AFRICAN AMERICAN: >60
GFR AFRICAN AMERICAN: >60
GFR NON-AFRICAN AMERICAN: >60
GFR NON-AFRICAN AMERICAN: >60
GLOBULIN: 3.2 G/DL (ref 2.3–3.5)
GLUCOSE BLD-MCNC: 135 MG/DL (ref 70–99)
GLUCOSE BLD-MCNC: 145 MG/DL (ref 60–115)
HCO3 ARTERIAL: 32.6 MMOL/L (ref 21–29)
HCT VFR BLD CALC: 40 % (ref 42–52)
HEMOGLOBIN: 13.2 G/DL (ref 14–18)
HEMOGLOBIN: 13.9 GM/DL (ref 13.5–17.5)
LACTATE: 1.09 MMOL/L (ref 0.4–2)
LYMPHOCYTES ABSOLUTE: 2.5 K/UL (ref 1–4.8)
LYMPHOCYTES RELATIVE PERCENT: 21.5 %
MCH RBC QN AUTO: 30.8 PG (ref 27–31.3)
MCHC RBC AUTO-ENTMCNC: 33 % (ref 33–37)
MCV RBC AUTO: 93.1 FL (ref 80–100)
MONOCYTES ABSOLUTE: 0.5 K/UL (ref 0.2–0.8)
MONOCYTES RELATIVE PERCENT: 3.9 %
NEUTROPHILS ABSOLUTE: 8.5 K/UL (ref 1.4–6.5)
NEUTROPHILS RELATIVE PERCENT: 72.6 %
O2 SAT, ARTERIAL: 97 % (ref 93–100)
PCO2 ARTERIAL: 43 MM HG (ref 35–45)
PDW BLD-RTO: 15.9 % (ref 11.5–14.5)
PERFORMED ON: ABNORMAL
PH ARTERIAL: 7.49 (ref 7.35–7.45)
PLATELET # BLD: 230 K/UL (ref 130–400)
PO2 ARTERIAL: 84 MM HG (ref 75–108)
POC CHLORIDE: 105 MEQ/L (ref 99–110)
POC CREATININE: 0.7 MG/DL (ref 0.9–1.3)
POC FIO2: 90
POC HEMATOCRIT: 41 % (ref 41–53)
POC POTASSIUM: 3.5 MEQ/L (ref 3.5–5.1)
POC SAMPLE TYPE: ABNORMAL
POC SODIUM: 142 MEQ/L (ref 136–145)
POTASSIUM REFLEX MAGNESIUM: 3.6 MEQ/L (ref 3.4–4.9)
PROCALCITONIN: 0.42 NG/ML (ref 0–0.15)
RBC # BLD: 4.29 M/UL (ref 4.7–6.1)
SODIUM BLD-SCNC: 144 MEQ/L (ref 135–144)
TCO2 ARTERIAL: 34 (ref 22–29)
TOTAL PROTEIN: 6.1 G/DL (ref 6.3–8)
WBC # BLD: 11.7 K/UL (ref 4.8–10.8)

## 2019-10-30 PROCEDURE — 94640 AIRWAY INHALATION TREATMENT: CPT

## 2019-10-30 PROCEDURE — 85025 COMPLETE CBC W/AUTO DIFF WBC: CPT

## 2019-10-30 PROCEDURE — 82565 ASSAY OF CREATININE: CPT

## 2019-10-30 PROCEDURE — 2580000003 HC RX 258: Performed by: INTERNAL MEDICINE

## 2019-10-30 PROCEDURE — 2500000003 HC RX 250 WO HCPCS: Performed by: INTERNAL MEDICINE

## 2019-10-30 PROCEDURE — 2700000000 HC OXYGEN THERAPY PER DAY

## 2019-10-30 PROCEDURE — 2709999900 IR PICC WO SQ PORT/PUMP > 5 YEARS

## 2019-10-30 PROCEDURE — 84132 ASSAY OF SERUM POTASSIUM: CPT

## 2019-10-30 PROCEDURE — 6360000002 HC RX W HCPCS: Performed by: INTERNAL MEDICINE

## 2019-10-30 PROCEDURE — 82803 BLOOD GASES ANY COMBINATION: CPT

## 2019-10-30 PROCEDURE — 6370000000 HC RX 637 (ALT 250 FOR IP): Performed by: INTERNAL MEDICINE

## 2019-10-30 PROCEDURE — 2580000003 HC RX 258: Performed by: HOSPITALIST

## 2019-10-30 PROCEDURE — 71045 X-RAY EXAM CHEST 1 VIEW: CPT

## 2019-10-30 PROCEDURE — 6370000000 HC RX 637 (ALT 250 FOR IP): Performed by: HOSPITALIST

## 2019-10-30 PROCEDURE — 83605 ASSAY OF LACTIC ACID: CPT

## 2019-10-30 PROCEDURE — 02HV33Z INSERTION OF INFUSION DEVICE INTO SUPERIOR VENA CAVA, PERCUTANEOUS APPROACH: ICD-10-PCS | Performed by: INTERNAL MEDICINE

## 2019-10-30 PROCEDURE — 99291 CRITICAL CARE FIRST HOUR: CPT | Performed by: INTERNAL MEDICINE

## 2019-10-30 PROCEDURE — 6360000002 HC RX W HCPCS: Performed by: HOSPITALIST

## 2019-10-30 PROCEDURE — 84145 PROCALCITONIN (PCT): CPT

## 2019-10-30 PROCEDURE — 82330 ASSAY OF CALCIUM: CPT

## 2019-10-30 PROCEDURE — 80053 COMPREHEN METABOLIC PANEL: CPT

## 2019-10-30 PROCEDURE — 36573 INSJ PICC RS&I 5 YR+: CPT | Performed by: RADIOLOGY

## 2019-10-30 PROCEDURE — 94003 VENT MGMT INPAT SUBQ DAY: CPT

## 2019-10-30 PROCEDURE — 36600 WITHDRAWAL OF ARTERIAL BLOOD: CPT

## 2019-10-30 PROCEDURE — 93005 ELECTROCARDIOGRAM TRACING: CPT | Performed by: HOSPITALIST

## 2019-10-30 PROCEDURE — 84295 ASSAY OF SERUM SODIUM: CPT

## 2019-10-30 PROCEDURE — 85014 HEMATOCRIT: CPT

## 2019-10-30 PROCEDURE — 82435 ASSAY OF BLOOD CHLORIDE: CPT

## 2019-10-30 PROCEDURE — 2000000000 HC ICU R&B

## 2019-10-30 PROCEDURE — 2500000003 HC RX 250 WO HCPCS: Performed by: HOSPITALIST

## 2019-10-30 PROCEDURE — 36415 COLL VENOUS BLD VENIPUNCTURE: CPT

## 2019-10-30 RX ORDER — LIDOCAINE HYDROCHLORIDE 20 MG/ML
5 INJECTION, SOLUTION INFILTRATION; PERINEURAL ONCE
Status: COMPLETED | OUTPATIENT
Start: 2019-10-30 | End: 2019-10-30

## 2019-10-30 RX ORDER — SODIUM CHLORIDE 9 MG/ML
250 INJECTION, SOLUTION INTRAVENOUS ONCE
Status: COMPLETED | OUTPATIENT
Start: 2019-10-30 | End: 2019-10-30

## 2019-10-30 RX ORDER — SODIUM CHLORIDE 0.9 % (FLUSH) 0.9 %
10 SYRINGE (ML) INJECTION EVERY 12 HOURS SCHEDULED
Status: DISCONTINUED | OUTPATIENT
Start: 2019-10-30 | End: 2019-11-05 | Stop reason: HOSPADM

## 2019-10-30 RX ORDER — CLONIDINE HYDROCHLORIDE 0.1 MG/1
0.1 TABLET ORAL 2 TIMES DAILY
Status: DISCONTINUED | OUTPATIENT
Start: 2019-10-30 | End: 2019-11-04

## 2019-10-30 RX ORDER — SODIUM CHLORIDE 0.9 % (FLUSH) 0.9 %
10 SYRINGE (ML) INJECTION PRN
Status: DISCONTINUED | OUTPATIENT
Start: 2019-10-30 | End: 2019-11-05 | Stop reason: HOSPADM

## 2019-10-30 RX ADMIN — IPRATROPIUM BROMIDE 4 PUFF: 17 AEROSOL, METERED RESPIRATORY (INHALATION) at 20:08

## 2019-10-30 RX ADMIN — FENTANYL CITRATE 50 MCG/HR: 50 INJECTION, SOLUTION INTRAMUSCULAR; INTRAVENOUS at 11:22

## 2019-10-30 RX ADMIN — SODIUM CHLORIDE 250 ML: 9 INJECTION, SOLUTION INTRAVENOUS at 15:09

## 2019-10-30 RX ADMIN — ACETAMINOPHEN ORAL SOLUTION 650 MG: 325 SOLUTION ORAL at 17:13

## 2019-10-30 RX ADMIN — CHLORHEXIDINE GLUCONATE 0.12% ORAL RINSE 15 ML: 1.2 LIQUID ORAL at 20:58

## 2019-10-30 RX ADMIN — CLONIDINE HYDROCHLORIDE 0.1 MG: 0.1 TABLET ORAL at 20:41

## 2019-10-30 RX ADMIN — IPRATROPIUM BROMIDE 4 PUFF: 17 AEROSOL, METERED RESPIRATORY (INHALATION) at 15:40

## 2019-10-30 RX ADMIN — PROPOFOL 40 MCG/KG/MIN: 10 INJECTION, EMULSION INTRAVENOUS at 05:44

## 2019-10-30 RX ADMIN — SODIUM CHLORIDE 3 G: 900 INJECTION, SOLUTION INTRAVENOUS at 09:37

## 2019-10-30 RX ADMIN — ACETAMINOPHEN ORAL SOLUTION 650 MG: 325 SOLUTION ORAL at 08:02

## 2019-10-30 RX ADMIN — DEXMEDETOMIDINE 1 MCG/KG/HR: 100 INJECTION, SOLUTION, CONCENTRATE INTRAVENOUS at 04:49

## 2019-10-30 RX ADMIN — SODIUM CHLORIDE 3 G: 900 INJECTION, SOLUTION INTRAVENOUS at 03:19

## 2019-10-30 RX ADMIN — Medication 10 ML: at 08:04

## 2019-10-30 RX ADMIN — DEXMEDETOMIDINE 1 MCG/KG/HR: 100 INJECTION, SOLUTION, CONCENTRATE INTRAVENOUS at 12:49

## 2019-10-30 RX ADMIN — ALBUTEROL SULFATE 4 PUFF: 90 AEROSOL, METERED RESPIRATORY (INHALATION) at 20:08

## 2019-10-30 RX ADMIN — Medication 10 ML: at 20:48

## 2019-10-30 RX ADMIN — ALBUTEROL SULFATE 4 PUFF: 90 AEROSOL, METERED RESPIRATORY (INHALATION) at 10:45

## 2019-10-30 RX ADMIN — ALBUTEROL SULFATE 4 PUFF: 90 AEROSOL, METERED RESPIRATORY (INHALATION) at 15:40

## 2019-10-30 RX ADMIN — ALBUTEROL SULFATE 4 PUFF: 90 AEROSOL, METERED RESPIRATORY (INHALATION) at 04:53

## 2019-10-30 RX ADMIN — IPRATROPIUM BROMIDE 4 PUFF: 17 AEROSOL, METERED RESPIRATORY (INHALATION) at 10:45

## 2019-10-30 RX ADMIN — PROPOFOL 40 MCG/KG/MIN: 10 INJECTION, EMULSION INTRAVENOUS at 02:21

## 2019-10-30 RX ADMIN — SODIUM CHLORIDE 3 G: 900 INJECTION, SOLUTION INTRAVENOUS at 20:41

## 2019-10-30 RX ADMIN — HYDRALAZINE HYDROCHLORIDE 10 MG: 20 INJECTION INTRAMUSCULAR; INTRAVENOUS at 04:50

## 2019-10-30 RX ADMIN — DEXMEDETOMIDINE 1 MCG/KG/HR: 100 INJECTION, SOLUTION, CONCENTRATE INTRAVENOUS at 08:52

## 2019-10-30 RX ADMIN — ACETAMINOPHEN ORAL SOLUTION 650 MG: 325 SOLUTION ORAL at 03:23

## 2019-10-30 RX ADMIN — CLONIDINE HYDROCHLORIDE 0.1 MG: 0.1 TABLET ORAL at 10:21

## 2019-10-30 RX ADMIN — PROPOFOL 40 MCG/KG/MIN: 10 INJECTION, EMULSION INTRAVENOUS at 20:00

## 2019-10-30 RX ADMIN — PROPOFOL 30 MCG/KG/MIN: 10 INJECTION, EMULSION INTRAVENOUS at 10:35

## 2019-10-30 RX ADMIN — LIDOCAINE HYDROCHLORIDE 5 ML: 20 INJECTION, SOLUTION INFILTRATION; PERINEURAL at 15:10

## 2019-10-30 RX ADMIN — IPRATROPIUM BROMIDE 4 PUFF: 17 AEROSOL, METERED RESPIRATORY (INHALATION) at 04:53

## 2019-10-30 RX ADMIN — CHLORHEXIDINE GLUCONATE 0.12% ORAL RINSE 15 ML: 1.2 LIQUID ORAL at 08:02

## 2019-10-30 RX ADMIN — FAMOTIDINE 20 MG: 10 INJECTION, SOLUTION INTRAVENOUS at 20:41

## 2019-10-30 RX ADMIN — PROPOFOL 40 MCG/KG/MIN: 10 INJECTION, EMULSION INTRAVENOUS at 16:45

## 2019-10-30 RX ADMIN — PROPOFOL 40 MCG/KG/MIN: 10 INJECTION, EMULSION INTRAVENOUS at 13:26

## 2019-10-30 RX ADMIN — FAMOTIDINE 20 MG: 10 INJECTION, SOLUTION INTRAVENOUS at 08:03

## 2019-10-30 RX ADMIN — PROPOFOL 40 MCG/KG/MIN: 10 INJECTION, EMULSION INTRAVENOUS at 23:35

## 2019-10-30 RX ADMIN — DEXMEDETOMIDINE 1 MCG/KG/HR: 100 INJECTION, SOLUTION, CONCENTRATE INTRAVENOUS at 21:00

## 2019-10-30 RX ADMIN — ENOXAPARIN SODIUM 40 MG: 40 INJECTION SUBCUTANEOUS at 08:03

## 2019-10-30 RX ADMIN — DEXMEDETOMIDINE 40 MCG/KG/HR: 100 INJECTION, SOLUTION, CONCENTRATE INTRAVENOUS at 01:24

## 2019-10-30 RX ADMIN — SODIUM CHLORIDE: 9 INJECTION, SOLUTION INTRAVENOUS at 12:30

## 2019-10-30 RX ADMIN — ACETAMINOPHEN ORAL SOLUTION 650 MG: 325 SOLUTION ORAL at 21:39

## 2019-10-30 RX ADMIN — SODIUM CHLORIDE 3 G: 900 INJECTION, SOLUTION INTRAVENOUS at 13:52

## 2019-10-30 RX ADMIN — Medication 10 ML: at 21:26

## 2019-10-30 RX ADMIN — Medication 10 ML: at 16:46

## 2019-10-30 RX ADMIN — DEXMEDETOMIDINE 1 MCG/KG/HR: 100 INJECTION, SOLUTION, CONCENTRATE INTRAVENOUS at 16:44

## 2019-10-30 RX ADMIN — HYDRALAZINE HYDROCHLORIDE 10 MG: 20 INJECTION INTRAMUSCULAR; INTRAVENOUS at 08:03

## 2019-10-30 ASSESSMENT — PAIN SCALES - GENERAL
PAINLEVEL_OUTOF10: 0

## 2019-10-30 ASSESSMENT — PULMONARY FUNCTION TESTS
PIF_VALUE: 28
PIF_VALUE: 24
PIF_VALUE: 21
PIF_VALUE: 25
PIF_VALUE: 25
PIF_VALUE: 23
PIF_VALUE: 24
PIF_VALUE: 28
PIF_VALUE: 36
PIF_VALUE: 25
PIF_VALUE: 25
PIF_VALUE: 27
PIF_VALUE: 23
PIF_VALUE: 25
PIF_VALUE: 24
PIF_VALUE: 25
PIF_VALUE: 24
PIF_VALUE: 35
PIF_VALUE: 25
PIF_VALUE: 26
PIF_VALUE: 27
PIF_VALUE: 24
PIF_VALUE: 25

## 2019-10-31 ENCOUNTER — APPOINTMENT (OUTPATIENT)
Dept: GENERAL RADIOLOGY | Age: 54
DRG: 917 | End: 2019-10-31
Payer: MEDICARE

## 2019-10-31 LAB
6-ACETYLMORPHINE: NOT DETECTED
7-AMINOCLONAZEPAM: PRESENT
ALBUMIN SERPL-MCNC: 2.9 G/DL (ref 3.5–4.6)
ALP BLD-CCNC: 113 U/L (ref 35–104)
ALPHA-OH-ALPRAZOLAM: NOT DETECTED
ALPRAZOLAM: NOT DETECTED
ALT SERPL-CCNC: 10 U/L (ref 0–41)
AMPHETAMINE: NOT DETECTED
ANION GAP SERPL CALCULATED.3IONS-SCNC: 15 MEQ/L (ref 9–15)
AST SERPL-CCNC: 16 U/L (ref 0–40)
BARBITURATES: NOT DETECTED
BASE EXCESS ARTERIAL: 4 (ref -3–3)
BASOPHILS ABSOLUTE: 0.1 K/UL (ref 0–0.2)
BASOPHILS RELATIVE PERCENT: 0.8 %
BENZOYLECGONINE: NOT DETECTED
BILIRUB SERPL-MCNC: 0.8 MG/DL (ref 0.2–0.7)
BUN BLDV-MCNC: 9 MG/DL (ref 6–20)
BUPRENORPHINE: PRESENT
CALCIUM IONIZED: 1.13 MMOL/L (ref 1.12–1.32)
CALCIUM SERPL-MCNC: 7.8 MG/DL (ref 8.5–9.9)
CARISOPRODOL: NOT DETECTED
CHLORIDE BLD-SCNC: 100 MEQ/L (ref 95–107)
CLONAZEPAM: NOT DETECTED
CO2: 27 MEQ/L (ref 20–31)
CODEINE: NOT DETECTED
CREAT SERPL-MCNC: 0.72 MG/DL (ref 0.7–1.2)
CREATININE URINE: 78 MG/DL (ref 20–400)
CULTURE, BLOOD 2: ABNORMAL
CULTURE, RESPIRATORY: NORMAL
DIAZEPAM: NOT DETECTED
EER PAIN MGT DRUG PANEL, HIGH RES/EMIT U: NORMAL
EOSINOPHILS ABSOLUTE: 0.2 K/UL (ref 0–0.7)
EOSINOPHILS RELATIVE PERCENT: 1.6 %
ETHYL GLUCURONIDE: NOT DETECTED
FENTANYL: NOT DETECTED
GFR AFRICAN AMERICAN: >60
GFR AFRICAN AMERICAN: >60
GFR NON-AFRICAN AMERICAN: >60
GFR NON-AFRICAN AMERICAN: >60
GLOBULIN: 3.5 G/DL (ref 2.3–3.5)
GLUCOSE BLD-MCNC: 109 MG/DL (ref 70–99)
GLUCOSE BLD-MCNC: 165 MG/DL (ref 60–115)
GRAM STAIN RESULT: NORMAL
HCO3 ARTERIAL: 27.8 MMOL/L (ref 21–29)
HCT VFR BLD CALC: 40.1 % (ref 42–52)
HEMOGLOBIN: 13.2 G/DL (ref 14–18)
HEMOGLOBIN: 14.1 GM/DL (ref 13.5–17.5)
HYDROCODONE: NOT DETECTED
HYDROMORPHONE: NOT DETECTED
LACTATE: 1.09 MMOL/L (ref 0.4–2)
LORAZEPAM: NOT DETECTED
LYMPHOCYTES ABSOLUTE: 1.4 K/UL (ref 1–4.8)
LYMPHOCYTES RELATIVE PERCENT: 12.3 %
MAGNESIUM: 1.6 MG/DL (ref 1.7–2.4)
MARIJUANA METABOLITE: NOT DETECTED
MCH RBC QN AUTO: 30.6 PG (ref 27–31.3)
MCHC RBC AUTO-ENTMCNC: 33 % (ref 33–37)
MCV RBC AUTO: 92.6 FL (ref 80–100)
MDA: NOT DETECTED
MDEA: NOT DETECTED
MDMA URINE: NOT DETECTED
MEPERIDINE: NOT DETECTED
METHADONE: NOT DETECTED
METHAMPHETAMINE: NOT DETECTED
METHYLPHENIDATE: NOT DETECTED
MIDAZOLAM: NOT DETECTED
MONOCYTES ABSOLUTE: 0.4 K/UL (ref 0.2–0.8)
MONOCYTES RELATIVE PERCENT: 4 %
MORPHINE: NOT DETECTED
NEUTROPHILS ABSOLUTE: 9 K/UL (ref 1.4–6.5)
NEUTROPHILS RELATIVE PERCENT: 81.3 %
NORBUPRENORPHINE, FREE: PRESENT
NORDIAZEPAM: NOT DETECTED
NORFENTANYL: NOT DETECTED
NORHYDROCODONE, URINE: NOT DETECTED
NOROXYCODONE: NOT DETECTED
NOROXYMORPHONE, URINE: NOT DETECTED
O2 SAT, ARTERIAL: 96 % (ref 93–100)
ORGANISM: ABNORMAL
ORGANISM: ABNORMAL
OXAZEPAM: NOT DETECTED
OXYCODONE: NOT DETECTED
OXYMORPHONE: NOT DETECTED
PAIN MANAGEMENT DRUG PANEL: NORMAL
PCO2 ARTERIAL: 40 MM HG (ref 35–45)
PCP: NOT DETECTED
PDW BLD-RTO: 16.1 % (ref 11.5–14.5)
PERFORMED ON: ABNORMAL
PH ARTERIAL: 7.45 (ref 7.35–7.45)
PHENTERMINE: NOT DETECTED
PLATELET # BLD: 213 K/UL (ref 130–400)
PO2 ARTERIAL: 80 MM HG (ref 75–108)
POC CHLORIDE: 105 MEQ/L (ref 99–110)
POC CREATININE: 0.8 MG/DL (ref 0.9–1.3)
POC FIO2: 100
POC HEMATOCRIT: 42 % (ref 41–53)
POC POTASSIUM: 3.1 MEQ/L (ref 3.5–5.1)
POC SAMPLE TYPE: ABNORMAL
POC SODIUM: 139 MEQ/L (ref 136–145)
POTASSIUM REFLEX MAGNESIUM: 2.8 MEQ/L (ref 3.4–4.9)
PROPOXYPHENE: NOT DETECTED
RBC # BLD: 4.33 M/UL (ref 4.7–6.1)
SODIUM BLD-SCNC: 142 MEQ/L (ref 135–144)
TAPENTADOL, URINE: NOT DETECTED
TAPENTADOL-O-SULFATE, URINE: NOT DETECTED
TCO2 ARTERIAL: 29 (ref 22–29)
TEMAZEPAM: NOT DETECTED
TOTAL PROTEIN: 6.4 G/DL (ref 6.3–8)
TRAMADOL: NOT DETECTED
WBC # BLD: 11 K/UL (ref 4.8–10.8)
ZOLPIDEM: NOT DETECTED

## 2019-10-31 PROCEDURE — 83735 ASSAY OF MAGNESIUM: CPT

## 2019-10-31 PROCEDURE — 2580000003 HC RX 258: Performed by: INTERNAL MEDICINE

## 2019-10-31 PROCEDURE — 82330 ASSAY OF CALCIUM: CPT

## 2019-10-31 PROCEDURE — 93005 ELECTROCARDIOGRAM TRACING: CPT | Performed by: HOSPITALIST

## 2019-10-31 PROCEDURE — 6360000002 HC RX W HCPCS: Performed by: INTERNAL MEDICINE

## 2019-10-31 PROCEDURE — 93010 ELECTROCARDIOGRAM REPORT: CPT | Performed by: INTERNAL MEDICINE

## 2019-10-31 PROCEDURE — 36600 WITHDRAWAL OF ARTERIAL BLOOD: CPT

## 2019-10-31 PROCEDURE — 2500000003 HC RX 250 WO HCPCS: Performed by: HOSPITALIST

## 2019-10-31 PROCEDURE — 83605 ASSAY OF LACTIC ACID: CPT

## 2019-10-31 PROCEDURE — 85025 COMPLETE CBC W/AUTO DIFF WBC: CPT

## 2019-10-31 PROCEDURE — 6370000000 HC RX 637 (ALT 250 FOR IP): Performed by: HOSPITALIST

## 2019-10-31 PROCEDURE — 82565 ASSAY OF CREATININE: CPT

## 2019-10-31 PROCEDURE — 82435 ASSAY OF BLOOD CHLORIDE: CPT

## 2019-10-31 PROCEDURE — 2580000003 HC RX 258: Performed by: HOSPITALIST

## 2019-10-31 PROCEDURE — 36415 COLL VENOUS BLD VENIPUNCTURE: CPT

## 2019-10-31 PROCEDURE — 99291 CRITICAL CARE FIRST HOUR: CPT | Performed by: INTERNAL MEDICINE

## 2019-10-31 PROCEDURE — 84132 ASSAY OF SERUM POTASSIUM: CPT

## 2019-10-31 PROCEDURE — 6360000002 HC RX W HCPCS: Performed by: HOSPITALIST

## 2019-10-31 PROCEDURE — 84295 ASSAY OF SERUM SODIUM: CPT

## 2019-10-31 PROCEDURE — 6370000000 HC RX 637 (ALT 250 FOR IP): Performed by: INTERNAL MEDICINE

## 2019-10-31 PROCEDURE — 2000000000 HC ICU R&B

## 2019-10-31 PROCEDURE — 80053 COMPREHEN METABOLIC PANEL: CPT

## 2019-10-31 PROCEDURE — 85014 HEMATOCRIT: CPT

## 2019-10-31 PROCEDURE — 2700000000 HC OXYGEN THERAPY PER DAY

## 2019-10-31 PROCEDURE — 71045 X-RAY EXAM CHEST 1 VIEW: CPT

## 2019-10-31 PROCEDURE — 82803 BLOOD GASES ANY COMBINATION: CPT

## 2019-10-31 RX ORDER — HALOPERIDOL 5 MG/ML
5 INJECTION INTRAMUSCULAR EVERY 6 HOURS PRN
Status: DISPENSED | OUTPATIENT
Start: 2019-10-31 | End: 2019-11-01

## 2019-10-31 RX ORDER — IPRATROPIUM BROMIDE AND ALBUTEROL SULFATE 2.5; .5 MG/3ML; MG/3ML
1 SOLUTION RESPIRATORY (INHALATION)
Status: DISCONTINUED | OUTPATIENT
Start: 2019-10-31 | End: 2019-11-01

## 2019-10-31 RX ORDER — POTASSIUM CHLORIDE 29.8 MG/ML
20 INJECTION INTRAVENOUS ONCE
Status: COMPLETED | OUTPATIENT
Start: 2019-10-31 | End: 2019-10-31

## 2019-10-31 RX ORDER — HALOPERIDOL 5 MG/ML
5 INJECTION INTRAMUSCULAR ONCE
Status: COMPLETED | OUTPATIENT
Start: 2019-10-31 | End: 2019-10-31

## 2019-10-31 RX ORDER — MAGNESIUM SULFATE IN WATER 40 MG/ML
2 INJECTION, SOLUTION INTRAVENOUS ONCE
Status: COMPLETED | OUTPATIENT
Start: 2019-10-31 | End: 2019-10-31

## 2019-10-31 RX ORDER — POTASSIUM CHLORIDE 29.8 MG/ML
20 INJECTION INTRAVENOUS
Status: DISPENSED | OUTPATIENT
Start: 2019-10-31 | End: 2019-10-31

## 2019-10-31 RX ORDER — POTASSIUM CHLORIDE 29.8 MG/ML
20 INJECTION INTRAVENOUS
Status: COMPLETED | OUTPATIENT
Start: 2019-10-31 | End: 2019-10-31

## 2019-10-31 RX ORDER — FENTANYL CITRATE 50 UG/ML
25 INJECTION, SOLUTION INTRAMUSCULAR; INTRAVENOUS
Status: DISCONTINUED | OUTPATIENT
Start: 2019-10-31 | End: 2019-11-03

## 2019-10-31 RX ADMIN — FENTANYL CITRATE 25 MCG: 50 INJECTION, SOLUTION INTRAMUSCULAR; INTRAVENOUS at 02:29

## 2019-10-31 RX ADMIN — MAGNESIUM SULFATE HEPTAHYDRATE 2 G: 40 INJECTION, SOLUTION INTRAVENOUS at 18:21

## 2019-10-31 RX ADMIN — HALOPERIDOL LACTATE 5 MG: 5 INJECTION, SOLUTION INTRAMUSCULAR at 11:55

## 2019-10-31 RX ADMIN — DEXMEDETOMIDINE 0.5 MCG/KG/HR: 100 INJECTION, SOLUTION, CONCENTRATE INTRAVENOUS at 02:16

## 2019-10-31 RX ADMIN — ACETAMINOPHEN 650 MG: 650 SUPPOSITORY RECTAL at 22:12

## 2019-10-31 RX ADMIN — DEXMEDETOMIDINE 0.8 MCG/KG/HR: 100 INJECTION, SOLUTION, CONCENTRATE INTRAVENOUS at 11:49

## 2019-10-31 RX ADMIN — FENTANYL CITRATE 25 MCG: 50 INJECTION, SOLUTION INTRAMUSCULAR; INTRAVENOUS at 11:55

## 2019-10-31 RX ADMIN — SODIUM CHLORIDE 3 G: 900 INJECTION, SOLUTION INTRAVENOUS at 20:37

## 2019-10-31 RX ADMIN — POTASSIUM CHLORIDE 20 MEQ: 29.8 INJECTION, SOLUTION INTRAVENOUS at 06:11

## 2019-10-31 RX ADMIN — HYDRALAZINE HYDROCHLORIDE 10 MG: 20 INJECTION INTRAMUSCULAR; INTRAVENOUS at 22:00

## 2019-10-31 RX ADMIN — SODIUM CHLORIDE 3 G: 900 INJECTION, SOLUTION INTRAVENOUS at 09:34

## 2019-10-31 RX ADMIN — CLONIDINE HYDROCHLORIDE 0.1 MG: 0.1 TABLET ORAL at 21:42

## 2019-10-31 RX ADMIN — Medication 10 ML: at 21:40

## 2019-10-31 RX ADMIN — DEXMEDETOMIDINE 1 MCG/KG/HR: 100 INJECTION, SOLUTION, CONCENTRATE INTRAVENOUS at 15:36

## 2019-10-31 RX ADMIN — ENOXAPARIN SODIUM 40 MG: 40 INJECTION SUBCUTANEOUS at 08:18

## 2019-10-31 RX ADMIN — DEXMEDETOMIDINE 1.3 MCG/KG/HR: 100 INJECTION, SOLUTION, CONCENTRATE INTRAVENOUS at 19:55

## 2019-10-31 RX ADMIN — POTASSIUM CHLORIDE 20 MEQ: 29.8 INJECTION, SOLUTION INTRAVENOUS at 08:43

## 2019-10-31 RX ADMIN — Medication 10 ML: at 08:47

## 2019-10-31 RX ADMIN — HYDRALAZINE HYDROCHLORIDE 10 MG: 20 INJECTION INTRAMUSCULAR; INTRAVENOUS at 10:09

## 2019-10-31 RX ADMIN — HYDRALAZINE HYDROCHLORIDE 10 MG: 20 INJECTION INTRAMUSCULAR; INTRAVENOUS at 06:04

## 2019-10-31 RX ADMIN — DEXMEDETOMIDINE 1 MCG/KG/HR: 100 INJECTION, SOLUTION, CONCENTRATE INTRAVENOUS at 00:29

## 2019-10-31 RX ADMIN — POTASSIUM CHLORIDE 20 MEQ: 29.8 INJECTION, SOLUTION INTRAVENOUS at 07:43

## 2019-10-31 RX ADMIN — POTASSIUM CHLORIDE 20 MEQ: 29.8 INJECTION, SOLUTION INTRAVENOUS at 10:54

## 2019-10-31 RX ADMIN — FAMOTIDINE 20 MG: 10 INJECTION, SOLUTION INTRAVENOUS at 08:18

## 2019-10-31 RX ADMIN — HALOPERIDOL LACTATE 5 MG: 5 INJECTION, SOLUTION INTRAMUSCULAR at 02:43

## 2019-10-31 RX ADMIN — DEXMEDETOMIDINE 0.7 MCG/KG/HR: 100 INJECTION, SOLUTION, CONCENTRATE INTRAVENOUS at 06:31

## 2019-10-31 RX ADMIN — POTASSIUM CHLORIDE 20 MEQ: 29.8 INJECTION, SOLUTION INTRAVENOUS at 09:32

## 2019-10-31 RX ADMIN — DEXMEDETOMIDINE 1.4 MCG/KG/HR: 100 INJECTION, SOLUTION, CONCENTRATE INTRAVENOUS at 22:54

## 2019-10-31 RX ADMIN — SODIUM CHLORIDE 3 G: 900 INJECTION, SOLUTION INTRAVENOUS at 15:36

## 2019-10-31 RX ADMIN — SODIUM CHLORIDE 3 G: 900 INJECTION, SOLUTION INTRAVENOUS at 02:46

## 2019-10-31 RX ADMIN — FAMOTIDINE 20 MG: 10 INJECTION, SOLUTION INTRAVENOUS at 21:42

## 2019-10-31 RX ADMIN — IPRATROPIUM BROMIDE AND ALBUTEROL SULFATE 1 AMPULE: .5; 3 SOLUTION RESPIRATORY (INHALATION) at 08:30

## 2019-10-31 ASSESSMENT — PAIN SCALES - GENERAL
PAINLEVEL_OUTOF10: 0
PAINLEVEL_OUTOF10: 0
PAINLEVEL_OUTOF10: 10
PAINLEVEL_OUTOF10: 0

## 2019-10-31 ASSESSMENT — PULMONARY FUNCTION TESTS
PIF_VALUE: 24
PIF_VALUE: 24
PIF_VALUE: 36

## 2019-11-01 LAB
ALBUMIN SERPL-MCNC: 3 G/DL (ref 3.5–4.6)
ALP BLD-CCNC: 106 U/L (ref 35–104)
ALT SERPL-CCNC: 12 U/L (ref 0–41)
ANION GAP SERPL CALCULATED.3IONS-SCNC: 16 MEQ/L (ref 9–15)
AST SERPL-CCNC: 30 U/L (ref 0–40)
BASOPHILS ABSOLUTE: 0.1 K/UL (ref 0–0.2)
BASOPHILS RELATIVE PERCENT: 0.7 %
BILIRUB SERPL-MCNC: 1.1 MG/DL (ref 0.2–0.7)
BUN BLDV-MCNC: 11 MG/DL (ref 6–20)
CALCIUM SERPL-MCNC: 7.9 MG/DL (ref 8.5–9.9)
CHLORIDE BLD-SCNC: 104 MEQ/L (ref 95–107)
CO2: 20 MEQ/L (ref 20–31)
CREAT SERPL-MCNC: 0.67 MG/DL (ref 0.7–1.2)
EOSINOPHILS ABSOLUTE: 0 K/UL (ref 0–0.7)
EOSINOPHILS RELATIVE PERCENT: 0.4 %
GFR AFRICAN AMERICAN: >60
GFR NON-AFRICAN AMERICAN: >60
GLOBULIN: 3.8 G/DL (ref 2.3–3.5)
GLUCOSE BLD-MCNC: 111 MG/DL (ref 70–99)
HCT VFR BLD CALC: 38.6 % (ref 42–52)
HEMOGLOBIN: 13 G/DL (ref 14–18)
LYMPHOCYTES ABSOLUTE: 1.7 K/UL (ref 1–4.8)
LYMPHOCYTES RELATIVE PERCENT: 14.5 %
MCH RBC QN AUTO: 30.8 PG (ref 27–31.3)
MCHC RBC AUTO-ENTMCNC: 33.8 % (ref 33–37)
MCV RBC AUTO: 91.1 FL (ref 80–100)
MONOCYTES ABSOLUTE: 0.8 K/UL (ref 0.2–0.8)
MONOCYTES RELATIVE PERCENT: 7.2 %
NEUTROPHILS ABSOLUTE: 9 K/UL (ref 1.4–6.5)
NEUTROPHILS RELATIVE PERCENT: 77.2 %
PDW BLD-RTO: 15.9 % (ref 11.5–14.5)
PLATELET # BLD: 198 K/UL (ref 130–400)
POTASSIUM SERPL-SCNC: 3.3 MEQ/L (ref 3.4–4.9)
RBC # BLD: 4.24 M/UL (ref 4.7–6.1)
REASON FOR REJECTION: NORMAL
REJECTED TEST: NORMAL
SODIUM BLD-SCNC: 140 MEQ/L (ref 135–144)
TOTAL PROTEIN: 6.8 G/DL (ref 6.3–8)
WBC # BLD: 11.7 K/UL (ref 4.8–10.8)

## 2019-11-01 PROCEDURE — 2500000003 HC RX 250 WO HCPCS: Performed by: HOSPITALIST

## 2019-11-01 PROCEDURE — 6360000002 HC RX W HCPCS: Performed by: INTERNAL MEDICINE

## 2019-11-01 PROCEDURE — 6370000000 HC RX 637 (ALT 250 FOR IP): Performed by: HOSPITALIST

## 2019-11-01 PROCEDURE — 2580000003 HC RX 258: Performed by: INTERNAL MEDICINE

## 2019-11-01 PROCEDURE — 2580000003 HC RX 258: Performed by: HOSPITALIST

## 2019-11-01 PROCEDURE — 85025 COMPLETE CBC W/AUTO DIFF WBC: CPT

## 2019-11-01 PROCEDURE — 99233 SBSQ HOSP IP/OBS HIGH 50: CPT | Performed by: INTERNAL MEDICINE

## 2019-11-01 PROCEDURE — 2000000000 HC ICU R&B

## 2019-11-01 PROCEDURE — 93005 ELECTROCARDIOGRAM TRACING: CPT | Performed by: HOSPITALIST

## 2019-11-01 PROCEDURE — 80053 COMPREHEN METABOLIC PANEL: CPT

## 2019-11-01 PROCEDURE — 6370000000 HC RX 637 (ALT 250 FOR IP): Performed by: INTERNAL MEDICINE

## 2019-11-01 PROCEDURE — 94640 AIRWAY INHALATION TREATMENT: CPT

## 2019-11-01 PROCEDURE — 6360000002 HC RX W HCPCS: Performed by: HOSPITALIST

## 2019-11-01 RX ORDER — LORAZEPAM 1 MG/1
3 TABLET ORAL
Status: DISCONTINUED | OUTPATIENT
Start: 2019-11-01 | End: 2019-11-04

## 2019-11-01 RX ORDER — SODIUM CHLORIDE 0.9 % (FLUSH) 0.9 %
10 SYRINGE (ML) INJECTION PRN
Status: DISCONTINUED | OUTPATIENT
Start: 2019-11-01 | End: 2019-11-05 | Stop reason: HOSPADM

## 2019-11-01 RX ORDER — LORAZEPAM 1 MG/1
1 TABLET ORAL
Status: DISCONTINUED | OUTPATIENT
Start: 2019-11-01 | End: 2019-11-04

## 2019-11-01 RX ORDER — IPRATROPIUM BROMIDE AND ALBUTEROL SULFATE 2.5; .5 MG/3ML; MG/3ML
1 SOLUTION RESPIRATORY (INHALATION)
Status: DISCONTINUED | OUTPATIENT
Start: 2019-11-01 | End: 2019-11-03

## 2019-11-01 RX ORDER — THIAMINE HYDROCHLORIDE 100 MG/ML
100 INJECTION, SOLUTION INTRAMUSCULAR; INTRAVENOUS DAILY
Status: DISCONTINUED | OUTPATIENT
Start: 2019-11-01 | End: 2019-11-05 | Stop reason: HOSPADM

## 2019-11-01 RX ORDER — ACETAMINOPHEN 325 MG/1
TABLET ORAL
Status: DISPENSED
Start: 2019-11-01 | End: 2019-11-02

## 2019-11-01 RX ORDER — LORAZEPAM 2 MG/ML
1 INJECTION INTRAMUSCULAR
Status: DISCONTINUED | OUTPATIENT
Start: 2019-11-01 | End: 2019-11-04

## 2019-11-01 RX ORDER — LORAZEPAM 2 MG/ML
4 INJECTION INTRAMUSCULAR
Status: DISCONTINUED | OUTPATIENT
Start: 2019-11-01 | End: 2019-11-04

## 2019-11-01 RX ORDER — SODIUM CHLORIDE 0.9 % (FLUSH) 0.9 %
10 SYRINGE (ML) INJECTION EVERY 12 HOURS SCHEDULED
Status: DISCONTINUED | OUTPATIENT
Start: 2019-11-01 | End: 2019-11-05 | Stop reason: HOSPADM

## 2019-11-01 RX ORDER — LORAZEPAM 2 MG/ML
2 INJECTION INTRAMUSCULAR
Status: DISCONTINUED | OUTPATIENT
Start: 2019-11-01 | End: 2019-11-04

## 2019-11-01 RX ORDER — ACETAMINOPHEN 325 MG/1
650 TABLET ORAL EVERY 4 HOURS PRN
Status: DISCONTINUED | OUTPATIENT
Start: 2019-11-01 | End: 2019-11-05 | Stop reason: HOSPADM

## 2019-11-01 RX ORDER — LORAZEPAM 1 MG/1
4 TABLET ORAL
Status: DISCONTINUED | OUTPATIENT
Start: 2019-11-01 | End: 2019-11-04

## 2019-11-01 RX ORDER — ACETAMINOPHEN 325 MG/1
650 TABLET ORAL EVERY 4 HOURS PRN
Status: DISCONTINUED | OUTPATIENT
Start: 2019-11-01 | End: 2019-11-01

## 2019-11-01 RX ORDER — LORAZEPAM 2 MG/ML
3 INJECTION INTRAMUSCULAR
Status: DISCONTINUED | OUTPATIENT
Start: 2019-11-01 | End: 2019-11-04

## 2019-11-01 RX ORDER — ACETAMINOPHEN 160 MG/5ML
650 SOLUTION ORAL EVERY 4 HOURS PRN
Status: DISCONTINUED | OUTPATIENT
Start: 2019-11-01 | End: 2019-11-01

## 2019-11-01 RX ORDER — LORAZEPAM 1 MG/1
2 TABLET ORAL
Status: DISCONTINUED | OUTPATIENT
Start: 2019-11-01 | End: 2019-11-04

## 2019-11-01 RX ADMIN — Medication 10 ML: at 09:07

## 2019-11-01 RX ADMIN — SODIUM CHLORIDE 3 G: 900 INJECTION, SOLUTION INTRAVENOUS at 04:00

## 2019-11-01 RX ADMIN — Medication 10 ML: at 09:06

## 2019-11-01 RX ADMIN — DEXMEDETOMIDINE 1.48 MCG/KG/HR: 100 INJECTION, SOLUTION, CONCENTRATE INTRAVENOUS at 10:10

## 2019-11-01 RX ADMIN — Medication 10 ML: at 20:51

## 2019-11-01 RX ADMIN — CLONIDINE HYDROCHLORIDE 0.1 MG: 0.1 TABLET ORAL at 09:21

## 2019-11-01 RX ADMIN — ACETAMINOPHEN 650 MG: 325 TABLET ORAL at 20:49

## 2019-11-01 RX ADMIN — ENOXAPARIN SODIUM 40 MG: 40 INJECTION SUBCUTANEOUS at 09:21

## 2019-11-01 RX ADMIN — DEXMEDETOMIDINE 1.4 MCG/KG/HR: 100 INJECTION, SOLUTION, CONCENTRATE INTRAVENOUS at 21:48

## 2019-11-01 RX ADMIN — DEXMEDETOMIDINE 1.4 MCG/KG/HR: 100 INJECTION, SOLUTION, CONCENTRATE INTRAVENOUS at 06:16

## 2019-11-01 RX ADMIN — FAMOTIDINE 20 MG: 10 INJECTION, SOLUTION INTRAVENOUS at 20:50

## 2019-11-01 RX ADMIN — IPRATROPIUM BROMIDE AND ALBUTEROL SULFATE 1 AMPULE: .5; 3 SOLUTION RESPIRATORY (INHALATION) at 21:13

## 2019-11-01 RX ADMIN — FAMOTIDINE 20 MG: 10 INJECTION, SOLUTION INTRAVENOUS at 09:21

## 2019-11-01 RX ADMIN — ALTEPLASE 1 MG: 2.2 INJECTION, POWDER, LYOPHILIZED, FOR SOLUTION INTRAVENOUS at 17:59

## 2019-11-01 RX ADMIN — DEXMEDETOMIDINE 1.4 MCG/KG/HR: 100 INJECTION, SOLUTION, CONCENTRATE INTRAVENOUS at 01:19

## 2019-11-01 RX ADMIN — LORAZEPAM 1 MG: 1 TABLET ORAL at 22:06

## 2019-11-01 RX ADMIN — THIAMINE HYDROCHLORIDE 100 MG: 100 INJECTION, SOLUTION INTRAMUSCULAR; INTRAVENOUS at 17:58

## 2019-11-01 RX ADMIN — DEXMEDETOMIDINE 1.48 MCG/KG/HR: 100 INJECTION, SOLUTION, CONCENTRATE INTRAVENOUS at 16:51

## 2019-11-01 RX ADMIN — DEXMEDETOMIDINE 1.48 MCG/KG/HR: 100 INJECTION, SOLUTION, CONCENTRATE INTRAVENOUS at 08:10

## 2019-11-01 RX ADMIN — DEXMEDETOMIDINE 1.4 MCG/KG/HR: 100 INJECTION, SOLUTION, CONCENTRATE INTRAVENOUS at 03:58

## 2019-11-01 RX ADMIN — DEXMEDETOMIDINE 1.48 MCG/KG/HR: 100 INJECTION, SOLUTION, CONCENTRATE INTRAVENOUS at 14:20

## 2019-11-01 RX ADMIN — CLONIDINE HYDROCHLORIDE 0.1 MG: 0.1 TABLET ORAL at 20:49

## 2019-11-01 RX ADMIN — HYDRALAZINE HYDROCHLORIDE 10 MG: 20 INJECTION INTRAMUSCULAR; INTRAVENOUS at 04:12

## 2019-11-01 RX ADMIN — DEXMEDETOMIDINE 1.48 MCG/KG/HR: 100 INJECTION, SOLUTION, CONCENTRATE INTRAVENOUS at 12:00

## 2019-11-01 RX ADMIN — IPRATROPIUM BROMIDE AND ALBUTEROL SULFATE 1 AMPULE: .5; 3 SOLUTION RESPIRATORY (INHALATION) at 14:55

## 2019-11-01 RX ADMIN — ACETAMINOPHEN 650 MG: 650 SUPPOSITORY RECTAL at 04:32

## 2019-11-01 RX ADMIN — SODIUM CHLORIDE 3 G: 900 INJECTION, SOLUTION INTRAVENOUS at 14:25

## 2019-11-01 RX ADMIN — SODIUM CHLORIDE 3 G: 900 INJECTION, SOLUTION INTRAVENOUS at 20:35

## 2019-11-01 RX ADMIN — SODIUM CHLORIDE 3 G: 900 INJECTION, SOLUTION INTRAVENOUS at 08:30

## 2019-11-01 ASSESSMENT — PAIN DESCRIPTION - PAIN TYPE: TYPE: CHRONIC PAIN

## 2019-11-01 ASSESSMENT — PAIN SCALES - GENERAL
PAINLEVEL_OUTOF10: 2
PAINLEVEL_OUTOF10: 0

## 2019-11-01 ASSESSMENT — PAIN DESCRIPTION - LOCATION: LOCATION: BACK

## 2019-11-01 ASSESSMENT — PAIN DESCRIPTION - FREQUENCY: FREQUENCY: INTERMITTENT

## 2019-11-02 LAB
ALBUMIN SERPL-MCNC: 3.2 G/DL (ref 3.5–4.6)
ALP BLD-CCNC: 100 U/L (ref 35–104)
ALT SERPL-CCNC: 19 U/L (ref 0–41)
ANION GAP SERPL CALCULATED.3IONS-SCNC: 17 MEQ/L (ref 9–15)
AST SERPL-CCNC: 29 U/L (ref 0–40)
BASOPHILS ABSOLUTE: 0.1 K/UL (ref 0–0.2)
BASOPHILS RELATIVE PERCENT: 1.5 %
BILIRUB SERPL-MCNC: 0.6 MG/DL (ref 0.2–0.7)
BUN BLDV-MCNC: 13 MG/DL (ref 6–20)
CALCIUM SERPL-MCNC: 8.2 MG/DL (ref 8.5–9.9)
CHLORIDE BLD-SCNC: 97 MEQ/L (ref 95–107)
CO2: 23 MEQ/L (ref 20–31)
CREAT SERPL-MCNC: 0.7 MG/DL (ref 0.7–1.2)
EOSINOPHILS ABSOLUTE: 0.3 K/UL (ref 0–0.7)
EOSINOPHILS RELATIVE PERCENT: 3.1 %
GFR AFRICAN AMERICAN: >60
GFR NON-AFRICAN AMERICAN: >60
GLOBULIN: 3.7 G/DL (ref 2.3–3.5)
GLUCOSE BLD-MCNC: 147 MG/DL (ref 70–99)
HCT VFR BLD CALC: 40.7 % (ref 42–52)
HEMOGLOBIN: 13.6 G/DL (ref 14–18)
LYMPHOCYTES ABSOLUTE: 2.3 K/UL (ref 1–4.8)
LYMPHOCYTES RELATIVE PERCENT: 23.3 %
MAGNESIUM: 2 MG/DL (ref 1.7–2.4)
MCH RBC QN AUTO: 31 PG (ref 27–31.3)
MCHC RBC AUTO-ENTMCNC: 33.5 % (ref 33–37)
MCV RBC AUTO: 92.7 FL (ref 80–100)
MONOCYTES ABSOLUTE: 0.6 K/UL (ref 0.2–0.8)
MONOCYTES RELATIVE PERCENT: 6.5 %
NEUTROPHILS ABSOLUTE: 6.5 K/UL (ref 1.4–6.5)
NEUTROPHILS RELATIVE PERCENT: 65.6 %
PDW BLD-RTO: 16 % (ref 11.5–14.5)
PLATELET # BLD: 248 K/UL (ref 130–400)
POTASSIUM REFLEX MAGNESIUM: 2.9 MEQ/L (ref 3.4–4.9)
RBC # BLD: 4.39 M/UL (ref 4.7–6.1)
SODIUM BLD-SCNC: 137 MEQ/L (ref 135–144)
TOTAL PROTEIN: 6.9 G/DL (ref 6.3–8)
WBC # BLD: 9.9 K/UL (ref 4.8–10.8)

## 2019-11-02 PROCEDURE — 6360000002 HC RX W HCPCS: Performed by: INTERNAL MEDICINE

## 2019-11-02 PROCEDURE — 85025 COMPLETE CBC W/AUTO DIFF WBC: CPT

## 2019-11-02 PROCEDURE — 2000000000 HC ICU R&B

## 2019-11-02 PROCEDURE — 6370000000 HC RX 637 (ALT 250 FOR IP): Performed by: INTERNAL MEDICINE

## 2019-11-02 PROCEDURE — 2580000003 HC RX 258: Performed by: INTERNAL MEDICINE

## 2019-11-02 PROCEDURE — 80053 COMPREHEN METABOLIC PANEL: CPT

## 2019-11-02 PROCEDURE — 2580000003 HC RX 258: Performed by: HOSPITALIST

## 2019-11-02 PROCEDURE — 94640 AIRWAY INHALATION TREATMENT: CPT

## 2019-11-02 PROCEDURE — 93005 ELECTROCARDIOGRAM TRACING: CPT | Performed by: HOSPITALIST

## 2019-11-02 PROCEDURE — 2500000003 HC RX 250 WO HCPCS: Performed by: HOSPITALIST

## 2019-11-02 PROCEDURE — 6360000002 HC RX W HCPCS: Performed by: HOSPITALIST

## 2019-11-02 PROCEDURE — 83735 ASSAY OF MAGNESIUM: CPT

## 2019-11-02 PROCEDURE — 99233 SBSQ HOSP IP/OBS HIGH 50: CPT | Performed by: INTERNAL MEDICINE

## 2019-11-02 RX ORDER — POTASSIUM CHLORIDE 29.8 MG/ML
20 INJECTION INTRAVENOUS
Status: COMPLETED | OUTPATIENT
Start: 2019-11-02 | End: 2019-11-02

## 2019-11-02 RX ORDER — POTASSIUM CHLORIDE 20 MEQ/1
40 TABLET, EXTENDED RELEASE ORAL ONCE
Status: COMPLETED | OUTPATIENT
Start: 2019-11-02 | End: 2019-11-02

## 2019-11-02 RX ADMIN — LORAZEPAM 1 MG: 1 TABLET ORAL at 20:01

## 2019-11-02 RX ADMIN — DEXMEDETOMIDINE 1.4 MCG/KG/HR: 100 INJECTION, SOLUTION, CONCENTRATE INTRAVENOUS at 03:38

## 2019-11-02 RX ADMIN — IPRATROPIUM BROMIDE AND ALBUTEROL SULFATE 1 AMPULE: .5; 3 SOLUTION RESPIRATORY (INHALATION) at 10:46

## 2019-11-02 RX ADMIN — LORAZEPAM 1 MG: 1 TABLET ORAL at 02:58

## 2019-11-02 RX ADMIN — HYDRALAZINE HYDROCHLORIDE 10 MG: 20 INJECTION INTRAMUSCULAR; INTRAVENOUS at 20:03

## 2019-11-02 RX ADMIN — Medication 10 ML: at 09:00

## 2019-11-02 RX ADMIN — LORAZEPAM 1 MG: 1 TABLET ORAL at 12:35

## 2019-11-02 RX ADMIN — DEXMEDETOMIDINE 1.1 MCG/KG/HR: 100 INJECTION, SOLUTION, CONCENTRATE INTRAVENOUS at 09:54

## 2019-11-02 RX ADMIN — Medication 10 ML: at 10:32

## 2019-11-02 RX ADMIN — LORAZEPAM 1 MG: 2 INJECTION INTRAMUSCULAR; INTRAVENOUS at 23:21

## 2019-11-02 RX ADMIN — POTASSIUM CHLORIDE 40 MEQ: 20 TABLET, EXTENDED RELEASE ORAL at 08:32

## 2019-11-02 RX ADMIN — SODIUM CHLORIDE 3 G: 900 INJECTION, SOLUTION INTRAVENOUS at 19:59

## 2019-11-02 RX ADMIN — DEXMEDETOMIDINE 1 MCG/KG/HR: 100 INJECTION, SOLUTION, CONCENTRATE INTRAVENOUS at 20:13

## 2019-11-02 RX ADMIN — LORAZEPAM 1 MG: 1 TABLET ORAL at 05:39

## 2019-11-02 RX ADMIN — IPRATROPIUM BROMIDE AND ALBUTEROL SULFATE 1 AMPULE: .5; 3 SOLUTION RESPIRATORY (INHALATION) at 05:13

## 2019-11-02 RX ADMIN — LORAZEPAM 1 MG: 1 TABLET ORAL at 17:52

## 2019-11-02 RX ADMIN — CLONIDINE HYDROCHLORIDE 0.1 MG: 0.1 TABLET ORAL at 08:32

## 2019-11-02 RX ADMIN — Medication 10 ML: at 21:51

## 2019-11-02 RX ADMIN — ENOXAPARIN SODIUM 40 MG: 40 INJECTION SUBCUTANEOUS at 08:33

## 2019-11-02 RX ADMIN — THIAMINE HYDROCHLORIDE 100 MG: 100 INJECTION, SOLUTION INTRAMUSCULAR; INTRAVENOUS at 20:06

## 2019-11-02 RX ADMIN — Medication 10 ML: at 08:35

## 2019-11-02 RX ADMIN — SODIUM CHLORIDE 3 G: 900 INJECTION, SOLUTION INTRAVENOUS at 13:54

## 2019-11-02 RX ADMIN — LORAZEPAM 1 MG: 2 INJECTION INTRAMUSCULAR; INTRAVENOUS at 21:48

## 2019-11-02 RX ADMIN — CLONIDINE HYDROCHLORIDE 0.1 MG: 0.1 TABLET ORAL at 20:01

## 2019-11-02 RX ADMIN — POTASSIUM CHLORIDE 20 MEQ: 29.8 INJECTION, SOLUTION INTRAVENOUS at 08:35

## 2019-11-02 RX ADMIN — SODIUM CHLORIDE 3 G: 900 INJECTION, SOLUTION INTRAVENOUS at 02:26

## 2019-11-02 RX ADMIN — LORAZEPAM 1 MG: 1 TABLET ORAL at 15:33

## 2019-11-02 RX ADMIN — FAMOTIDINE 20 MG: 10 INJECTION, SOLUTION INTRAVENOUS at 20:05

## 2019-11-02 RX ADMIN — SODIUM CHLORIDE 3 G: 900 INJECTION, SOLUTION INTRAVENOUS at 08:40

## 2019-11-02 RX ADMIN — FAMOTIDINE 20 MG: 10 INJECTION, SOLUTION INTRAVENOUS at 08:33

## 2019-11-02 RX ADMIN — DEXMEDETOMIDINE 0.5 MCG/KG/HR: 100 INJECTION, SOLUTION, CONCENTRATE INTRAVENOUS at 15:30

## 2019-11-02 RX ADMIN — POTASSIUM CHLORIDE 20 MEQ: 29.8 INJECTION, SOLUTION INTRAVENOUS at 09:56

## 2019-11-02 RX ADMIN — Medication 10 ML: at 08:36

## 2019-11-02 RX ADMIN — LORAZEPAM 1 MG: 1 TABLET ORAL at 08:32

## 2019-11-02 RX ADMIN — DEXMEDETOMIDINE 1.4 MCG/KG/HR: 100 INJECTION, SOLUTION, CONCENTRATE INTRAVENOUS at 06:20

## 2019-11-02 RX ADMIN — DEXMEDETOMIDINE 1.4 MCG/KG/HR: 100 INJECTION, SOLUTION, CONCENTRATE INTRAVENOUS at 00:44

## 2019-11-02 ASSESSMENT — PAIN SCALES - GENERAL
PAINLEVEL_OUTOF10: 0

## 2019-11-03 ENCOUNTER — APPOINTMENT (OUTPATIENT)
Dept: GENERAL RADIOLOGY | Age: 54
DRG: 917 | End: 2019-11-03
Payer: MEDICARE

## 2019-11-03 LAB
ALBUMIN SERPL-MCNC: 2.9 G/DL (ref 3.5–4.6)
ALP BLD-CCNC: 96 U/L (ref 35–104)
ALT SERPL-CCNC: 18 U/L (ref 0–41)
ANION GAP SERPL CALCULATED.3IONS-SCNC: 13 MEQ/L (ref 9–15)
AST SERPL-CCNC: 21 U/L (ref 0–40)
BASOPHILS ABSOLUTE: 0.1 K/UL (ref 0–0.2)
BASOPHILS RELATIVE PERCENT: 0.6 %
BILIRUB SERPL-MCNC: 0.4 MG/DL (ref 0.2–0.7)
BLOOD CULTURE, ROUTINE: NORMAL
BUN BLDV-MCNC: 9 MG/DL (ref 6–20)
CALCIUM SERPL-MCNC: 8.3 MG/DL (ref 8.5–9.9)
CHLORIDE BLD-SCNC: 102 MEQ/L (ref 95–107)
CO2: 25 MEQ/L (ref 20–31)
CREAT SERPL-MCNC: 0.66 MG/DL (ref 0.7–1.2)
EOSINOPHILS ABSOLUTE: 0.5 K/UL (ref 0–0.7)
EOSINOPHILS RELATIVE PERCENT: 5.3 %
GFR AFRICAN AMERICAN: >60
GFR NON-AFRICAN AMERICAN: >60
GLOBULIN: 3.9 G/DL (ref 2.3–3.5)
GLUCOSE BLD-MCNC: 82 MG/DL (ref 70–99)
HCT VFR BLD CALC: 40.3 % (ref 42–52)
HEMOGLOBIN: 13.5 G/DL (ref 14–18)
LYMPHOCYTES ABSOLUTE: 2.5 K/UL (ref 1–4.8)
LYMPHOCYTES RELATIVE PERCENT: 29.5 %
MAGNESIUM: 2 MG/DL (ref 1.7–2.4)
MCH RBC QN AUTO: 31.1 PG (ref 27–31.3)
MCHC RBC AUTO-ENTMCNC: 33.6 % (ref 33–37)
MCV RBC AUTO: 92.5 FL (ref 80–100)
MONOCYTES ABSOLUTE: 0.9 K/UL (ref 0.2–0.8)
MONOCYTES RELATIVE PERCENT: 9.9 %
NEUTROPHILS ABSOLUTE: 4.7 K/UL (ref 1.4–6.5)
NEUTROPHILS RELATIVE PERCENT: 54.7 %
PDW BLD-RTO: 15.9 % (ref 11.5–14.5)
PHOSPHORUS: 4 MG/DL (ref 2.3–4.8)
PLATELET # BLD: 248 K/UL (ref 130–400)
POTASSIUM REFLEX MAGNESIUM: 3.5 MEQ/L (ref 3.4–4.9)
PROCALCITONIN: 0.2 NG/ML (ref 0–0.15)
RBC # BLD: 4.35 M/UL (ref 4.7–6.1)
SODIUM BLD-SCNC: 140 MEQ/L (ref 135–144)
TOTAL PROTEIN: 6.8 G/DL (ref 6.3–8)
WBC # BLD: 8.6 K/UL (ref 4.8–10.8)

## 2019-11-03 PROCEDURE — 99232 SBSQ HOSP IP/OBS MODERATE 35: CPT | Performed by: INTERNAL MEDICINE

## 2019-11-03 PROCEDURE — 84100 ASSAY OF PHOSPHORUS: CPT

## 2019-11-03 PROCEDURE — 6370000000 HC RX 637 (ALT 250 FOR IP): Performed by: INTERNAL MEDICINE

## 2019-11-03 PROCEDURE — 97162 PT EVAL MOD COMPLEX 30 MIN: CPT

## 2019-11-03 PROCEDURE — 84145 PROCALCITONIN (PCT): CPT

## 2019-11-03 PROCEDURE — 83735 ASSAY OF MAGNESIUM: CPT

## 2019-11-03 PROCEDURE — 2580000003 HC RX 258: Performed by: INTERNAL MEDICINE

## 2019-11-03 PROCEDURE — 6370000000 HC RX 637 (ALT 250 FOR IP): Performed by: ANESTHESIOLOGY

## 2019-11-03 PROCEDURE — 93005 ELECTROCARDIOGRAM TRACING: CPT | Performed by: HOSPITALIST

## 2019-11-03 PROCEDURE — 2500000003 HC RX 250 WO HCPCS: Performed by: HOSPITALIST

## 2019-11-03 PROCEDURE — 97166 OT EVAL MOD COMPLEX 45 MIN: CPT

## 2019-11-03 PROCEDURE — 2580000003 HC RX 258: Performed by: HOSPITALIST

## 2019-11-03 PROCEDURE — 1210000000 HC MED SURG R&B

## 2019-11-03 PROCEDURE — 85025 COMPLETE CBC W/AUTO DIFF WBC: CPT

## 2019-11-03 PROCEDURE — 71045 X-RAY EXAM CHEST 1 VIEW: CPT

## 2019-11-03 PROCEDURE — 80053 COMPREHEN METABOLIC PANEL: CPT

## 2019-11-03 PROCEDURE — 36591 DRAW BLOOD OFF VENOUS DEVICE: CPT

## 2019-11-03 PROCEDURE — 6360000002 HC RX W HCPCS: Performed by: INTERNAL MEDICINE

## 2019-11-03 PROCEDURE — 6360000002 HC RX W HCPCS: Performed by: HOSPITALIST

## 2019-11-03 RX ORDER — POTASSIUM CHLORIDE 20 MEQ/1
40 TABLET, EXTENDED RELEASE ORAL ONCE
Status: COMPLETED | OUTPATIENT
Start: 2019-11-03 | End: 2019-11-03

## 2019-11-03 RX ORDER — ESCITALOPRAM OXALATE 10 MG/1
10 TABLET ORAL NIGHTLY
COMMUNITY

## 2019-11-03 RX ORDER — IPRATROPIUM BROMIDE AND ALBUTEROL SULFATE 2.5; .5 MG/3ML; MG/3ML
1 SOLUTION RESPIRATORY (INHALATION) EVERY 4 HOURS PRN
Status: DISCONTINUED | OUTPATIENT
Start: 2019-11-03 | End: 2019-11-05 | Stop reason: HOSPADM

## 2019-11-03 RX ORDER — METOPROLOL SUCCINATE 50 MG/1
50 TABLET, EXTENDED RELEASE ORAL DAILY
Status: DISCONTINUED | OUTPATIENT
Start: 2019-11-03 | End: 2019-11-05 | Stop reason: HOSPADM

## 2019-11-03 RX ORDER — ORPHENADRINE CITRATE 100 MG/1
100 TABLET, EXTENDED RELEASE ORAL 2 TIMES DAILY
Status: DISCONTINUED | OUTPATIENT
Start: 2019-11-03 | End: 2019-11-05 | Stop reason: HOSPADM

## 2019-11-03 RX ORDER — AMOXICILLIN AND CLAVULANATE POTASSIUM 875; 125 MG/1; MG/1
1 TABLET, FILM COATED ORAL EVERY 12 HOURS SCHEDULED
Status: DISCONTINUED | OUTPATIENT
Start: 2019-11-03 | End: 2019-11-05 | Stop reason: HOSPADM

## 2019-11-03 RX ORDER — BUPRENORPHINE HYDROCHLORIDE AND NALOXONE HYDROCHLORIDE DIHYDRATE 8; 2 MG/1; MG/1
1 TABLET SUBLINGUAL 2 TIMES DAILY
Status: DISCONTINUED | OUTPATIENT
Start: 2019-11-03 | End: 2019-11-05 | Stop reason: HOSPADM

## 2019-11-03 RX ORDER — MIRTAZAPINE 15 MG/1
15 TABLET, FILM COATED ORAL NIGHTLY
Status: ON HOLD | COMMUNITY
End: 2020-01-23 | Stop reason: HOSPADM

## 2019-11-03 RX ADMIN — LORAZEPAM 1 MG: 2 INJECTION INTRAMUSCULAR; INTRAVENOUS at 01:34

## 2019-11-03 RX ADMIN — Medication 10 ML: at 10:16

## 2019-11-03 RX ADMIN — AMOXICILLIN AND CLAVULANATE POTASSIUM 1 TABLET: 875; 125 TABLET, FILM COATED ORAL at 20:38

## 2019-11-03 RX ADMIN — ORPHENADRINE CITRATE 100 MG: 100 TABLET, EXTENDED RELEASE ORAL at 20:38

## 2019-11-03 RX ADMIN — POTASSIUM CHLORIDE 40 MEQ: 20 TABLET, EXTENDED RELEASE ORAL at 16:42

## 2019-11-03 RX ADMIN — LORAZEPAM 1 MG: 1 TABLET ORAL at 05:26

## 2019-11-03 RX ADMIN — FAMOTIDINE 20 MG: 10 INJECTION, SOLUTION INTRAVENOUS at 10:10

## 2019-11-03 RX ADMIN — FAMOTIDINE 20 MG: 10 INJECTION, SOLUTION INTRAVENOUS at 20:37

## 2019-11-03 RX ADMIN — THIAMINE HYDROCHLORIDE 100 MG: 100 INJECTION, SOLUTION INTRAMUSCULAR; INTRAVENOUS at 10:10

## 2019-11-03 RX ADMIN — LORAZEPAM 1 MG: 1 TABLET ORAL at 07:34

## 2019-11-03 RX ADMIN — Medication 10 ML: at 20:41

## 2019-11-03 RX ADMIN — METOPROLOL SUCCINATE 50 MG: 50 TABLET, EXTENDED RELEASE ORAL at 18:09

## 2019-11-03 RX ADMIN — DEXMEDETOMIDINE 0.8 MCG/KG/HR: 100 INJECTION, SOLUTION, CONCENTRATE INTRAVENOUS at 01:17

## 2019-11-03 RX ADMIN — Medication 10 ML: at 20:39

## 2019-11-03 RX ADMIN — LORAZEPAM 4 MG: 2 INJECTION INTRAMUSCULAR; INTRAVENOUS at 23:09

## 2019-11-03 RX ADMIN — SODIUM CHLORIDE 3 G: 900 INJECTION, SOLUTION INTRAVENOUS at 02:29

## 2019-11-03 RX ADMIN — BUPRENORPHINE AND NALOXONE 1 TABLET: 8; 2 TABLET SUBLINGUAL at 16:42

## 2019-11-03 RX ADMIN — LORAZEPAM 4 MG: 2 INJECTION INTRAMUSCULAR; INTRAVENOUS at 11:02

## 2019-11-03 RX ADMIN — CLONIDINE HYDROCHLORIDE 0.1 MG: 0.1 TABLET ORAL at 20:38

## 2019-11-03 RX ADMIN — Medication 10 ML: at 20:40

## 2019-11-03 RX ADMIN — LORAZEPAM 4 MG: 2 INJECTION INTRAMUSCULAR; INTRAVENOUS at 09:49

## 2019-11-03 RX ADMIN — LORAZEPAM 2 MG: 2 INJECTION INTRAMUSCULAR; INTRAVENOUS at 14:33

## 2019-11-03 RX ADMIN — LORAZEPAM 2 MG: 2 INJECTION INTRAMUSCULAR; INTRAVENOUS at 21:37

## 2019-11-03 RX ADMIN — SODIUM CHLORIDE 3 G: 900 INJECTION, SOLUTION INTRAVENOUS at 07:40

## 2019-11-03 RX ADMIN — LORAZEPAM 2 MG: 2 INJECTION INTRAMUSCULAR; INTRAVENOUS at 20:37

## 2019-11-03 RX ADMIN — LORAZEPAM 2 MG: 2 INJECTION INTRAMUSCULAR; INTRAVENOUS at 13:12

## 2019-11-03 RX ADMIN — Medication 10 ML: at 10:15

## 2019-11-03 RX ADMIN — CLONIDINE HYDROCHLORIDE 0.1 MG: 0.1 TABLET ORAL at 10:10

## 2019-11-03 ASSESSMENT — ENCOUNTER SYMPTOMS
STRIDOR: 0
ALLERGIC/IMMUNOLOGIC NEGATIVE: 1
SHORTNESS OF BREATH: 1
APNEA: 0
COUGH: 1
GASTROINTESTINAL NEGATIVE: 1
CHEST TIGHTNESS: 1
EYES NEGATIVE: 1
WHEEZING: 1
CHOKING: 0
BACK PAIN: 1

## 2019-11-03 ASSESSMENT — PAIN SCALES - GENERAL
PAINLEVEL_OUTOF10: 7
PAINLEVEL_OUTOF10: 6
PAINLEVEL_OUTOF10: 3
PAINLEVEL_OUTOF10: 0

## 2019-11-03 ASSESSMENT — PAIN DESCRIPTION - PAIN TYPE
TYPE: CHRONIC PAIN
TYPE: CHRONIC PAIN

## 2019-11-03 ASSESSMENT — PAIN DESCRIPTION - FREQUENCY: FREQUENCY: INTERMITTENT

## 2019-11-03 ASSESSMENT — PAIN DESCRIPTION - LOCATION
LOCATION: BACK
LOCATION: GENERALIZED

## 2019-11-04 VITALS
DIASTOLIC BLOOD PRESSURE: 69 MMHG | SYSTOLIC BLOOD PRESSURE: 111 MMHG | TEMPERATURE: 98.2 F | RESPIRATION RATE: 16 BRPM | BODY MASS INDEX: 35.31 KG/M2 | HEART RATE: 90 BPM | WEIGHT: 252.21 LBS | OXYGEN SATURATION: 90 % | HEIGHT: 71 IN

## 2019-11-04 LAB
ALBUMIN SERPL-MCNC: 3.4 G/DL (ref 3.5–4.6)
ALP BLD-CCNC: 98 U/L (ref 35–104)
ALT SERPL-CCNC: 16 U/L (ref 0–41)
ANION GAP SERPL CALCULATED.3IONS-SCNC: 14 MEQ/L (ref 9–15)
AST SERPL-CCNC: 19 U/L (ref 0–40)
BASOPHILS ABSOLUTE: 0 K/UL (ref 0–0.2)
BASOPHILS RELATIVE PERCENT: 0.5 %
BILIRUB SERPL-MCNC: 0.4 MG/DL (ref 0.2–0.7)
BUN BLDV-MCNC: 12 MG/DL (ref 6–20)
CALCIUM SERPL-MCNC: 9.1 MG/DL (ref 8.5–9.9)
CHLORIDE BLD-SCNC: 103 MEQ/L (ref 95–107)
CO2: 25 MEQ/L (ref 20–31)
CREAT SERPL-MCNC: 0.78 MG/DL (ref 0.7–1.2)
EOSINOPHILS ABSOLUTE: 0.3 K/UL (ref 0–0.7)
EOSINOPHILS RELATIVE PERCENT: 3.3 %
GFR AFRICAN AMERICAN: >60
GFR NON-AFRICAN AMERICAN: >60
GLOBULIN: 3.8 G/DL (ref 2.3–3.5)
GLUCOSE BLD-MCNC: 81 MG/DL (ref 70–99)
HCT VFR BLD CALC: 39.5 % (ref 42–52)
HEMOGLOBIN: 13.2 G/DL (ref 14–18)
LYMPHOCYTES ABSOLUTE: 2.8 K/UL (ref 1–4.8)
LYMPHOCYTES RELATIVE PERCENT: 28.8 %
MCH RBC QN AUTO: 30.8 PG (ref 27–31.3)
MCHC RBC AUTO-ENTMCNC: 33.4 % (ref 33–37)
MCV RBC AUTO: 92.1 FL (ref 80–100)
MONOCYTES ABSOLUTE: 0.9 K/UL (ref 0.2–0.8)
MONOCYTES RELATIVE PERCENT: 8.7 %
NEUTROPHILS ABSOLUTE: 5.8 K/UL (ref 1.4–6.5)
NEUTROPHILS RELATIVE PERCENT: 58.7 %
PDW BLD-RTO: 16.2 % (ref 11.5–14.5)
PLATELET # BLD: 296 K/UL (ref 130–400)
POTASSIUM REFLEX MAGNESIUM: 3.8 MEQ/L (ref 3.4–4.9)
RBC # BLD: 4.29 M/UL (ref 4.7–6.1)
SODIUM BLD-SCNC: 142 MEQ/L (ref 135–144)
TOTAL PROTEIN: 7.2 G/DL (ref 6.3–8)
WBC # BLD: 9.8 K/UL (ref 4.8–10.8)

## 2019-11-04 PROCEDURE — 97116 GAIT TRAINING THERAPY: CPT

## 2019-11-04 PROCEDURE — 6370000000 HC RX 637 (ALT 250 FOR IP): Performed by: INTERNAL MEDICINE

## 2019-11-04 PROCEDURE — 2580000003 HC RX 258: Performed by: INTERNAL MEDICINE

## 2019-11-04 PROCEDURE — 97535 SELF CARE MNGMENT TRAINING: CPT

## 2019-11-04 PROCEDURE — 1210000000 HC MED SURG R&B

## 2019-11-04 PROCEDURE — 93010 ELECTROCARDIOGRAM REPORT: CPT | Performed by: INTERNAL MEDICINE

## 2019-11-04 PROCEDURE — 6360000002 HC RX W HCPCS: Performed by: HOSPITALIST

## 2019-11-04 PROCEDURE — 85025 COMPLETE CBC W/AUTO DIFF WBC: CPT

## 2019-11-04 PROCEDURE — 80053 COMPREHEN METABOLIC PANEL: CPT

## 2019-11-04 PROCEDURE — 6360000002 HC RX W HCPCS: Performed by: INTERNAL MEDICINE

## 2019-11-04 PROCEDURE — 6360000002 HC RX W HCPCS

## 2019-11-04 PROCEDURE — 6370000000 HC RX 637 (ALT 250 FOR IP): Performed by: ANESTHESIOLOGY

## 2019-11-04 PROCEDURE — 93005 ELECTROCARDIOGRAM TRACING: CPT | Performed by: HOSPITALIST

## 2019-11-04 RX ORDER — CLONIDINE HYDROCHLORIDE 0.1 MG/1
0.1 TABLET ORAL 3 TIMES DAILY
Status: DISCONTINUED | OUTPATIENT
Start: 2019-11-04 | End: 2019-11-05 | Stop reason: HOSPADM

## 2019-11-04 RX ORDER — HALOPERIDOL 5 MG/ML
5 INJECTION INTRAMUSCULAR ONCE
Status: DISCONTINUED | OUTPATIENT
Start: 2019-11-04 | End: 2019-11-05 | Stop reason: HOSPADM

## 2019-11-04 RX ORDER — DIPHENHYDRAMINE HYDROCHLORIDE 50 MG/ML
50 INJECTION INTRAMUSCULAR; INTRAVENOUS ONCE
Status: COMPLETED | OUTPATIENT
Start: 2019-11-04 | End: 2019-11-04

## 2019-11-04 RX ORDER — HALOPERIDOL 5 MG/ML
INJECTION INTRAMUSCULAR
Status: COMPLETED
Start: 2019-11-04 | End: 2019-11-04

## 2019-11-04 RX ORDER — OLANZAPINE 5 MG/1
5 TABLET ORAL ONCE
Status: COMPLETED | OUTPATIENT
Start: 2019-11-04 | End: 2019-11-04

## 2019-11-04 RX ORDER — ZIPRASIDONE MESYLATE 20 MG/ML
20 INJECTION, POWDER, LYOPHILIZED, FOR SOLUTION INTRAMUSCULAR ONCE
Status: COMPLETED | OUTPATIENT
Start: 2019-11-04 | End: 2019-11-04

## 2019-11-04 RX ORDER — HALOPERIDOL 5 MG
5 TABLET ORAL EVERY 6 HOURS PRN
Status: DISCONTINUED | OUTPATIENT
Start: 2019-11-04 | End: 2019-11-04

## 2019-11-04 RX ORDER — CHLORDIAZEPOXIDE HYDROCHLORIDE 10 MG/1
10 CAPSULE, GELATIN COATED ORAL 3 TIMES DAILY
Status: DISCONTINUED | OUTPATIENT
Start: 2019-11-05 | End: 2019-11-05 | Stop reason: HOSPADM

## 2019-11-04 RX ORDER — CHLORDIAZEPOXIDE HYDROCHLORIDE 25 MG/1
25 CAPSULE, GELATIN COATED ORAL 3 TIMES DAILY
Status: COMPLETED | OUTPATIENT
Start: 2019-11-04 | End: 2019-11-05

## 2019-11-04 RX ORDER — PANTOPRAZOLE SODIUM 20 MG/1
20 TABLET, DELAYED RELEASE ORAL
Status: DISCONTINUED | OUTPATIENT
Start: 2019-11-05 | End: 2019-11-05 | Stop reason: HOSPADM

## 2019-11-04 RX ORDER — HALOPERIDOL 5 MG/ML
5 INJECTION INTRAMUSCULAR EVERY 6 HOURS PRN
Status: DISCONTINUED | OUTPATIENT
Start: 2019-11-04 | End: 2019-11-05 | Stop reason: HOSPADM

## 2019-11-04 RX ADMIN — HALOPERIDOL 5 MG: 5 TABLET ORAL at 01:55

## 2019-11-04 RX ADMIN — OLANZAPINE 5 MG: 5 TABLET, FILM COATED ORAL at 01:55

## 2019-11-04 RX ADMIN — CHLORDIAZEPOXIDE HYDROCHLORIDE 25 MG: 25 CAPSULE ORAL at 21:05

## 2019-11-04 RX ADMIN — Medication 10 ML: at 21:06

## 2019-11-04 RX ADMIN — CLONIDINE HYDROCHLORIDE 0.1 MG: 0.1 TABLET ORAL at 01:55

## 2019-11-04 RX ADMIN — Medication 10 ML: at 09:01

## 2019-11-04 RX ADMIN — CLONIDINE HYDROCHLORIDE 0.1 MG: 0.1 TABLET ORAL at 09:00

## 2019-11-04 RX ADMIN — BUPRENORPHINE AND NALOXONE 1 TABLET: 8; 2 TABLET SUBLINGUAL at 21:05

## 2019-11-04 RX ADMIN — BUPRENORPHINE AND NALOXONE 1 TABLET: 8; 2 TABLET SUBLINGUAL at 09:00

## 2019-11-04 RX ADMIN — ORPHENADRINE CITRATE 100 MG: 100 TABLET, EXTENDED RELEASE ORAL at 09:00

## 2019-11-04 RX ADMIN — HALOPERIDOL LACTATE 5 MG: 5 INJECTION, SOLUTION INTRAMUSCULAR at 12:45

## 2019-11-04 RX ADMIN — LORAZEPAM 4 MG: 2 INJECTION INTRAMUSCULAR; INTRAVENOUS at 01:04

## 2019-11-04 RX ADMIN — AMOXICILLIN AND CLAVULANATE POTASSIUM 1 TABLET: 875; 125 TABLET, FILM COATED ORAL at 21:06

## 2019-11-04 RX ADMIN — AMOXICILLIN AND CLAVULANATE POTASSIUM 1 TABLET: 875; 125 TABLET, FILM COATED ORAL at 09:00

## 2019-11-04 RX ADMIN — DIPHENHYDRAMINE HYDROCHLORIDE 50 MG: 50 INJECTION, SOLUTION INTRAMUSCULAR; INTRAVENOUS at 02:47

## 2019-11-04 RX ADMIN — ENOXAPARIN SODIUM 40 MG: 40 INJECTION SUBCUTANEOUS at 08:59

## 2019-11-04 RX ADMIN — Medication 10 ML: at 01:04

## 2019-11-04 RX ADMIN — ONDANSETRON 4 MG: 2 INJECTION INTRAMUSCULAR; INTRAVENOUS at 08:59

## 2019-11-04 RX ADMIN — THIAMINE HYDROCHLORIDE 100 MG: 100 INJECTION, SOLUTION INTRAMUSCULAR; INTRAVENOUS at 09:00

## 2019-11-04 RX ADMIN — HALOPERIDOL 5 MG: 5 TABLET ORAL at 09:00

## 2019-11-04 RX ADMIN — METOPROLOL SUCCINATE 50 MG: 50 TABLET, EXTENDED RELEASE ORAL at 09:00

## 2019-11-04 RX ADMIN — IBUPROFEN 600 MG: 400 TABLET, FILM COATED ORAL at 09:00

## 2019-11-04 RX ADMIN — CLONIDINE HYDROCHLORIDE 0.1 MG: 0.1 TABLET ORAL at 21:05

## 2019-11-04 RX ADMIN — ZIPRASIDONE MESYLATE 20 MG: 20 INJECTION, POWDER, LYOPHILIZED, FOR SOLUTION INTRAMUSCULAR at 02:21

## 2019-11-04 RX ADMIN — ORPHENADRINE CITRATE 100 MG: 100 TABLET, EXTENDED RELEASE ORAL at 21:05

## 2019-11-04 ASSESSMENT — PAIN SCALES - GENERAL
PAINLEVEL_OUTOF10: 0
PAINLEVEL_OUTOF10: 9
PAINLEVEL_OUTOF10: 7

## 2019-11-05 LAB
ALBUMIN SERPL-MCNC: 3.5 G/DL (ref 3.5–4.6)
ALP BLD-CCNC: 97 U/L (ref 35–104)
ALT SERPL-CCNC: 16 U/L (ref 0–41)
ANION GAP SERPL CALCULATED.3IONS-SCNC: 15 MEQ/L (ref 9–15)
AST SERPL-CCNC: 22 U/L (ref 0–40)
BASOPHILS ABSOLUTE: 0.1 K/UL (ref 0–0.2)
BASOPHILS RELATIVE PERCENT: 0.9 %
BILIRUB SERPL-MCNC: 0.3 MG/DL (ref 0.2–0.7)
BUN BLDV-MCNC: 12 MG/DL (ref 6–20)
CALCIUM SERPL-MCNC: 8.9 MG/DL (ref 8.5–9.9)
CHLORIDE BLD-SCNC: 99 MEQ/L (ref 95–107)
CO2: 26 MEQ/L (ref 20–31)
CREAT SERPL-MCNC: 0.95 MG/DL (ref 0.7–1.2)
EKG ATRIAL RATE: 101 BPM
EKG ATRIAL RATE: 104 BPM
EKG ATRIAL RATE: 51 BPM
EKG ATRIAL RATE: 60 BPM
EKG ATRIAL RATE: 65 BPM
EKG ATRIAL RATE: 68 BPM
EKG ATRIAL RATE: 70 BPM
EKG ATRIAL RATE: 77 BPM
EKG ATRIAL RATE: 84 BPM
EKG P AXIS: 27 DEGREES
EKG P AXIS: 28 DEGREES
EKG P AXIS: 3 DEGREES
EKG P AXIS: 42 DEGREES
EKG P AXIS: 44 DEGREES
EKG P AXIS: 46 DEGREES
EKG P AXIS: 47 DEGREES
EKG P AXIS: 53 DEGREES
EKG P AXIS: 53 DEGREES
EKG P-R INTERVAL: 134 MS
EKG P-R INTERVAL: 138 MS
EKG P-R INTERVAL: 150 MS
EKG P-R INTERVAL: 158 MS
EKG P-R INTERVAL: 160 MS
EKG P-R INTERVAL: 164 MS
EKG P-R INTERVAL: 166 MS
EKG P-R INTERVAL: 176 MS
EKG P-R INTERVAL: 176 MS
EKG Q-T INTERVAL: 330 MS
EKG Q-T INTERVAL: 360 MS
EKG Q-T INTERVAL: 392 MS
EKG Q-T INTERVAL: 404 MS
EKG Q-T INTERVAL: 416 MS
EKG Q-T INTERVAL: 418 MS
EKG Q-T INTERVAL: 420 MS
EKG Q-T INTERVAL: 450 MS
EKG Q-T INTERVAL: 474 MS
EKG QRS DURATION: 106 MS
EKG QRS DURATION: 106 MS
EKG QRS DURATION: 86 MS
EKG QRS DURATION: 88 MS
EKG QRS DURATION: 90 MS
EKG QRS DURATION: 92 MS
EKG QRS DURATION: 98 MS
EKG QTC CALCULATION (BAZETT): 418 MS
EKG QTC CALCULATION (BAZETT): 429 MS
EKG QTC CALCULATION (BAZETT): 433 MS
EKG QTC CALCULATION (BAZETT): 436 MS
EKG QTC CALCULATION (BAZETT): 453 MS
EKG QTC CALCULATION (BAZETT): 463 MS
EKG QTC CALCULATION (BAZETT): 466 MS
EKG QTC CALCULATION (BAZETT): 468 MS
EKG QTC CALCULATION (BAZETT): 470 MS
EKG R AXIS: 14 DEGREES
EKG R AXIS: 29 DEGREES
EKG R AXIS: 30 DEGREES
EKG R AXIS: 37 DEGREES
EKG R AXIS: 38 DEGREES
EKG R AXIS: 39 DEGREES
EKG R AXIS: 41 DEGREES
EKG R AXIS: 45 DEGREES
EKG R AXIS: 47 DEGREES
EKG T AXIS: 19 DEGREES
EKG T AXIS: 23 DEGREES
EKG T AXIS: 28 DEGREES
EKG T AXIS: 30 DEGREES
EKG T AXIS: 38 DEGREES
EKG T AXIS: 41 DEGREES
EKG T AXIS: 42 DEGREES
EKG T AXIS: 51 DEGREES
EKG T AXIS: 6 DEGREES
EKG VENTRICULAR RATE: 101 BPM
EKG VENTRICULAR RATE: 104 BPM
EKG VENTRICULAR RATE: 51 BPM
EKG VENTRICULAR RATE: 60 BPM
EKG VENTRICULAR RATE: 65 BPM
EKG VENTRICULAR RATE: 68 BPM
EKG VENTRICULAR RATE: 70 BPM
EKG VENTRICULAR RATE: 77 BPM
EKG VENTRICULAR RATE: 84 BPM
EOSINOPHILS ABSOLUTE: 0.6 K/UL (ref 0–0.7)
EOSINOPHILS RELATIVE PERCENT: 5.5 %
GFR AFRICAN AMERICAN: >60
GFR NON-AFRICAN AMERICAN: >60
GLOBULIN: 3.8 G/DL (ref 2.3–3.5)
GLUCOSE BLD-MCNC: 107 MG/DL (ref 70–99)
HCT VFR BLD CALC: 40.3 % (ref 42–52)
HEMOGLOBIN: 13.3 G/DL (ref 14–18)
LYMPHOCYTES ABSOLUTE: 3.8 K/UL (ref 1–4.8)
LYMPHOCYTES RELATIVE PERCENT: 36 %
MCH RBC QN AUTO: 30.7 PG (ref 27–31.3)
MCHC RBC AUTO-ENTMCNC: 32.9 % (ref 33–37)
MCV RBC AUTO: 93.4 FL (ref 80–100)
MONOCYTES ABSOLUTE: 0.8 K/UL (ref 0.2–0.8)
MONOCYTES RELATIVE PERCENT: 7.3 %
NEUTROPHILS ABSOLUTE: 5.3 K/UL (ref 1.4–6.5)
NEUTROPHILS RELATIVE PERCENT: 50.3 %
PDW BLD-RTO: 16.2 % (ref 11.5–14.5)
PLATELET # BLD: 368 K/UL (ref 130–400)
POTASSIUM REFLEX MAGNESIUM: 3.8 MEQ/L (ref 3.4–4.9)
RBC # BLD: 4.32 M/UL (ref 4.7–6.1)
SODIUM BLD-SCNC: 140 MEQ/L (ref 135–144)
TOTAL PROTEIN: 7.3 G/DL (ref 6.3–8)
WBC # BLD: 10.5 K/UL (ref 4.8–10.8)

## 2019-11-05 PROCEDURE — 6370000000 HC RX 637 (ALT 250 FOR IP): Performed by: INTERNAL MEDICINE

## 2019-11-05 PROCEDURE — 2580000003 HC RX 258: Performed by: INTERNAL MEDICINE

## 2019-11-05 PROCEDURE — 93010 ELECTROCARDIOGRAM REPORT: CPT | Performed by: INTERNAL MEDICINE

## 2019-11-05 PROCEDURE — 93005 ELECTROCARDIOGRAM TRACING: CPT | Performed by: HOSPITALIST

## 2019-11-05 PROCEDURE — 6360000002 HC RX W HCPCS: Performed by: INTERNAL MEDICINE

## 2019-11-05 PROCEDURE — 80053 COMPREHEN METABOLIC PANEL: CPT

## 2019-11-05 PROCEDURE — 85025 COMPLETE CBC W/AUTO DIFF WBC: CPT

## 2019-11-05 PROCEDURE — 99221 1ST HOSP IP/OBS SF/LOW 40: CPT | Performed by: PSYCHIATRY & NEUROLOGY

## 2019-11-05 PROCEDURE — 6360000002 HC RX W HCPCS: Performed by: HOSPITALIST

## 2019-11-05 PROCEDURE — 6370000000 HC RX 637 (ALT 250 FOR IP): Performed by: ANESTHESIOLOGY

## 2019-11-05 RX ADMIN — PANTOPRAZOLE SODIUM 20 MG: 20 TABLET, DELAYED RELEASE ORAL at 08:37

## 2019-11-05 RX ADMIN — Medication 10 ML: at 08:36

## 2019-11-05 RX ADMIN — CHLORDIAZEPOXIDE HYDROCHLORIDE 25 MG: 25 CAPSULE ORAL at 08:36

## 2019-11-05 RX ADMIN — THIAMINE HYDROCHLORIDE 100 MG: 100 INJECTION, SOLUTION INTRAMUSCULAR; INTRAVENOUS at 08:37

## 2019-11-05 RX ADMIN — CLONIDINE HYDROCHLORIDE 0.1 MG: 0.1 TABLET ORAL at 08:36

## 2019-11-05 RX ADMIN — AMOXICILLIN AND CLAVULANATE POTASSIUM 1 TABLET: 875; 125 TABLET, FILM COATED ORAL at 08:37

## 2019-11-05 RX ADMIN — METOPROLOL SUCCINATE 50 MG: 50 TABLET, EXTENDED RELEASE ORAL at 08:37

## 2019-11-05 RX ADMIN — ENOXAPARIN SODIUM 40 MG: 40 INJECTION SUBCUTANEOUS at 08:36

## 2019-11-05 RX ADMIN — BUPRENORPHINE AND NALOXONE 1 TABLET: 8; 2 TABLET SUBLINGUAL at 08:36

## 2019-11-05 RX ADMIN — ORPHENADRINE CITRATE 100 MG: 100 TABLET, EXTENDED RELEASE ORAL at 08:37

## 2019-11-05 ASSESSMENT — PAIN SCALES - GENERAL: PAINLEVEL_OUTOF10: 9

## 2019-11-09 ENCOUNTER — HOSPITAL ENCOUNTER (EMERGENCY)
Age: 54
Discharge: HOME | End: 2019-11-09
Payer: MEDICARE

## 2019-11-09 VITALS
BODY MASS INDEX: 34.3 KG/M2 | OXYGEN SATURATION: 90 % | SYSTOLIC BLOOD PRESSURE: 117 MMHG | HEART RATE: 118 BPM | TEMPERATURE: 98.9 F | WEIGHT: 245 LBS | HEIGHT: 71 IN | RESPIRATION RATE: 24 BRPM | DIASTOLIC BLOOD PRESSURE: 79 MMHG

## 2019-11-09 DIAGNOSIS — R09.02 HYPOXIA: ICD-10-CM

## 2019-11-09 DIAGNOSIS — R00.0 TACHYCARDIA: ICD-10-CM

## 2019-11-09 DIAGNOSIS — J18.9 PNEUMONIA DUE TO ORGANISM: Primary | ICD-10-CM

## 2019-11-09 PROCEDURE — 6370000000 HC RX 637 (ALT 250 FOR IP): Performed by: PERSONAL EMERGENCY RESPONSE ATTENDANT

## 2019-11-09 PROCEDURE — 99281 EMR DPT VST MAYX REQ PHY/QHP: CPT

## 2019-11-09 RX ORDER — AMOXICILLIN AND CLAVULANATE POTASSIUM 875; 125 MG/1; MG/1
1 TABLET, FILM COATED ORAL 2 TIMES DAILY
Qty: 20 TABLET | Refills: 0 | Status: SHIPPED | OUTPATIENT
Start: 2019-11-09 | End: 2019-11-19

## 2019-11-09 RX ORDER — ALBUTEROL SULFATE 90 UG/1
AEROSOL, METERED RESPIRATORY (INHALATION)
Qty: 1 INHALER | Refills: 0 | Status: SHIPPED | OUTPATIENT
Start: 2019-11-09

## 2019-11-09 RX ORDER — AMOXICILLIN AND CLAVULANATE POTASSIUM 875; 125 MG/1; MG/1
1 TABLET, FILM COATED ORAL ONCE
Status: COMPLETED | OUTPATIENT
Start: 2019-11-09 | End: 2019-11-09

## 2019-11-09 RX ORDER — PREDNISONE 50 MG/1
50 TABLET ORAL DAILY
Qty: 5 TABLET | Refills: 0 | Status: SHIPPED | OUTPATIENT
Start: 2019-11-09 | End: 2019-11-14

## 2019-11-09 RX ADMIN — PREDNISONE 50 MG: 20 TABLET ORAL at 19:50

## 2019-11-09 RX ADMIN — AMOXICILLIN AND CLAVULANATE POTASSIUM 1 TABLET: 875; 125 TABLET, FILM COATED ORAL at 19:51

## 2019-11-09 ASSESSMENT — ENCOUNTER SYMPTOMS
BLOOD IN STOOL: 0
NAUSEA: 0
SHORTNESS OF BREATH: 0
ABDOMINAL PAIN: 0
DIARRHEA: 0
WHEEZING: 1
COLOR CHANGE: 0
COUGH: 1
VOMITING: 0
RHINORRHEA: 0
SORE THROAT: 0

## 2019-11-09 ASSESSMENT — PAIN DESCRIPTION - PAIN TYPE: TYPE: ACUTE PAIN

## 2019-11-09 ASSESSMENT — PAIN SCALES - GENERAL: PAINLEVEL_OUTOF10: 5

## 2019-11-09 ASSESSMENT — PAIN DESCRIPTION - LOCATION: LOCATION: GENERALIZED

## 2019-11-09 ASSESSMENT — PAIN DESCRIPTION - DESCRIPTORS: DESCRIPTORS: ACHING

## 2020-01-07 ENCOUNTER — APPOINTMENT (OUTPATIENT)
Dept: GENERAL RADIOLOGY | Age: 55
End: 2020-01-07
Payer: MEDICARE

## 2020-01-07 ENCOUNTER — HOSPITAL ENCOUNTER (EMERGENCY)
Age: 55
Discharge: HOME | End: 2020-01-07
Attending: STUDENT IN AN ORGANIZED HEALTH CARE EDUCATION/TRAINING PROGRAM
Payer: MEDICARE

## 2020-01-07 VITALS
OXYGEN SATURATION: 94 % | SYSTOLIC BLOOD PRESSURE: 145 MMHG | TEMPERATURE: 96.9 F | DIASTOLIC BLOOD PRESSURE: 88 MMHG | RESPIRATION RATE: 18 BRPM | BODY MASS INDEX: 36.4 KG/M2 | HEART RATE: 67 BPM | HEIGHT: 71 IN | WEIGHT: 260 LBS

## 2020-01-07 LAB
INFLUENZA A BY PCR: NEGATIVE
INFLUENZA B BY PCR: NEGATIVE

## 2020-01-07 PROCEDURE — 6370000000 HC RX 637 (ALT 250 FOR IP): Performed by: STUDENT IN AN ORGANIZED HEALTH CARE EDUCATION/TRAINING PROGRAM

## 2020-01-07 PROCEDURE — 71046 X-RAY EXAM CHEST 2 VIEWS: CPT

## 2020-01-07 PROCEDURE — 87502 INFLUENZA DNA AMP PROBE: CPT

## 2020-01-07 PROCEDURE — 99283 EMERGENCY DEPT VISIT LOW MDM: CPT

## 2020-01-07 RX ORDER — AZITHROMYCIN 250 MG/1
TABLET, FILM COATED ORAL
Qty: 1 PACKET | Refills: 0 | Status: SHIPPED | OUTPATIENT
Start: 2020-01-07 | End: 2020-01-11

## 2020-01-07 RX ORDER — BENZONATATE 100 MG/1
200 CAPSULE ORAL ONCE
Status: COMPLETED | OUTPATIENT
Start: 2020-01-07 | End: 2020-01-07

## 2020-01-07 RX ORDER — AZITHROMYCIN 500 MG/1
500 TABLET, FILM COATED ORAL ONCE
Status: COMPLETED | OUTPATIENT
Start: 2020-01-07 | End: 2020-01-07

## 2020-01-07 RX ORDER — ECHINACEA PURPUREA EXTRACT 125 MG
1 TABLET ORAL PRN
Qty: 1 BOTTLE | Refills: 0 | Status: SHIPPED | OUTPATIENT
Start: 2020-01-07 | End: 2020-01-18

## 2020-01-07 RX ADMIN — BENZONATATE 200 MG: 100 CAPSULE ORAL at 15:46

## 2020-01-07 RX ADMIN — AZITHROMYCIN MONOHYDRATE 500 MG: 500 TABLET ORAL at 15:46

## 2020-01-07 ASSESSMENT — ENCOUNTER SYMPTOMS
SHORTNESS OF BREATH: 0
TROUBLE SWALLOWING: 0
CHEST TIGHTNESS: 0
SINUS PRESSURE: 0
ABDOMINAL PAIN: 0
BACK PAIN: 0
VOMITING: 0
COUGH: 1
DIARRHEA: 0
NAUSEA: 0

## 2020-01-07 NOTE — ED PROVIDER NOTES
[address] [facility] ED  eMERGENCY dEPARTMENT eNCOUnter      Pt Name: Aron Conteh  MRN: [id number]  [place] 1965  Date of evaluation: 1/7/2020  Provider: Mckayla Gresham DO    CHIEF COMPLAINT       Chief Complaint   Patient presents with    Cough     Cough and congestion times one week. HISTORY OF PRESENT ILLNESS   (Location/Symptom, Timing/Onset,Context/Setting, Quality, Duration, Modifying Factors, Severity)  Note limiting factors. Aron Conteh is a 48 y.o. male who presents to the emergency department with c/o congestion x1 month which was written on his form. This is contrary to the nurses notes that states 1 week. Patient states that he is because so much that he is actually quit smoking x1 week. Patient states he is coughing up mucus. Patient is concerned that he may have pneumonia. Patient states little over a month ago he had aspirated and was told that it was due to an accidental overdose but he does not necessarily believe this. Patient states that he feels congested. Patient's blood pressure is elevated and he states that he did take his blood pressure medication this morning. Patient denies any chest pain, shortness of breath, nausea, vomiting or diarrhea. Patient admits that he did get a flu shot during this flu season. States that nothing has helped his cough. The history is provided by the patient. NursingNotes were reviewed. REVIEW OF SYSTEMS    (2-9 systems for level 4, 10 or more for level 5)     Review of Systems   Constitutional: Negative for activity change, appetite change, chills, fever and unexpected weight change. HENT: Positive for congestion. Negative for drooling, ear pain, nosebleeds, sinus pressure and trouble swallowing. Respiratory: Positive for cough. Negative for chest tightness and shortness of breath. Cardiovascular: Negative for chest pain and leg swelling.    Gastrointestinal: Negative for abdominal pain, diarrhea, Right Ear: External ear normal.      Left Ear: External ear normal.      Nose: Rhinorrhea present. Mouth/Throat:      Mouth: Mucous membranes are moist.      Pharynx: No oropharyngeal exudate or posterior oropharyngeal erythema. Eyes:      General: No scleral icterus. Right eye: No foreign body. Conjunctiva/sclera: Conjunctivae normal.      Left eye: No exudate. Pupils: Pupils are equal, round, and reactive to light. Neck:      Musculoskeletal: Normal range of motion and neck supple. No neck rigidity. Vascular: No JVD. Trachea: No tracheal deviation. Cardiovascular:      Rate and Rhythm: Normal rate and regular rhythm. Heart sounds: Normal heart sounds. Heart sounds not distant. No murmur. No friction rub. No gallop. Pulmonary:      Effort: Pulmonary effort is normal. No tachypnea, accessory muscle usage, prolonged expiration, respiratory distress or retractions. Breath sounds: Normal breath sounds. No stridor. No wheezing, rhonchi or rales. Chest:      Chest wall: No tenderness. Abdominal:      General: Abdomen is flat. Bowel sounds are normal. There is no distension. Palpations: Abdomen is soft. There is no mass. Tenderness: There is no tenderness. There is no guarding or rebound. Hernia: No hernia is present. Musculoskeletal: Normal range of motion. General: No tenderness. Lymphadenopathy:      Head:      Right side of head: No submental adenopathy. Left side of head: No submental adenopathy. Skin:     General: Skin is warm and dry. Capillary Refill: Capillary refill takes less than 2 seconds. Coloration: Skin is not jaundiced or pale. Findings: No bruising, erythema, lesion or rash. Neurological:      General: No focal deficit present. Mental Status: He is alert. Mental status is at baseline. Cranial Nerves: No cranial nerve deficit. Sensory: No sensory deficit. Motor: No weakness.

## 2020-01-18 ENCOUNTER — APPOINTMENT (OUTPATIENT)
Dept: GENERAL RADIOLOGY | Age: 55
DRG: 208 | End: 2020-01-18
Payer: MEDICARE

## 2020-01-18 ENCOUNTER — ANESTHESIA (OUTPATIENT)
Dept: ICU | Age: 55
DRG: 208 | End: 2020-01-18
Payer: MEDICARE

## 2020-01-18 ENCOUNTER — APPOINTMENT (OUTPATIENT)
Dept: CT IMAGING | Age: 55
DRG: 208 | End: 2020-01-18
Payer: MEDICARE

## 2020-01-18 ENCOUNTER — ANESTHESIA EVENT (OUTPATIENT)
Dept: ICU | Age: 55
DRG: 208 | End: 2020-01-18
Payer: MEDICARE

## 2020-01-18 ENCOUNTER — HOSPITAL ENCOUNTER (INPATIENT)
Age: 55
LOS: 5 days | Discharge: HOME HEALTH | DRG: 208 | End: 2020-01-23
Attending: FAMILY MEDICINE | Admitting: INTERNAL MEDICINE
Payer: MEDICARE

## 2020-01-18 PROBLEM — I95.89 HYPOTENSION DUE TO HYPOVOLEMIA: Status: ACTIVE | Noted: 2020-01-18

## 2020-01-18 PROBLEM — E86.1 HYPOTENSION DUE TO HYPOVOLEMIA: Status: ACTIVE | Noted: 2020-01-18

## 2020-01-18 LAB
ALBUMIN SERPL-MCNC: 3.5 G/DL (ref 3.5–4.6)
ALP BLD-CCNC: 103 U/L (ref 35–104)
ALT SERPL-CCNC: 11 U/L (ref 0–41)
ANION GAP SERPL CALCULATED.3IONS-SCNC: 13 MEQ/L (ref 9–15)
ANISOCYTOSIS: ABNORMAL
AST SERPL-CCNC: 15 U/L (ref 0–40)
BANDED NEUTROPHILS RELATIVE PERCENT: 4 %
BASE EXCESS ARTERIAL: 9 (ref -3–3)
BASE EXCESS ARTERIAL: 9 (ref -3–3)
BASOPHILS ABSOLUTE: 0 K/UL (ref 0–0.2)
BASOPHILS RELATIVE PERCENT: 1.2 %
BILIRUB SERPL-MCNC: 0.3 MG/DL (ref 0.2–0.7)
BUN BLDV-MCNC: 10 MG/DL (ref 6–20)
CALCIUM IONIZED: 1.06 MMOL/L (ref 1.12–1.32)
CALCIUM IONIZED: 1.11 MMOL/L (ref 1.12–1.32)
CALCIUM SERPL-MCNC: 8.6 MG/DL (ref 8.5–9.9)
CHLORIDE BLD-SCNC: 92 MEQ/L (ref 95–107)
CO2: 30 MEQ/L (ref 20–31)
CREAT SERPL-MCNC: 1.92 MG/DL (ref 0.7–1.2)
EOSINOPHILS ABSOLUTE: 0.3 K/UL (ref 0–0.7)
EOSINOPHILS RELATIVE PERCENT: 2 %
ETHANOL PERCENT: NORMAL G/DL
ETHANOL: <10 MG/DL (ref 0–0.08)
GFR AFRICAN AMERICAN: 43
GFR AFRICAN AMERICAN: 44.6
GFR AFRICAN AMERICAN: 48
GFR NON-AFRICAN AMERICAN: 35
GFR NON-AFRICAN AMERICAN: 36.8
GFR NON-AFRICAN AMERICAN: 40
GLOBULIN: 3.4 G/DL (ref 2.3–3.5)
GLUCOSE BLD-MCNC: 119 MG/DL (ref 60–115)
GLUCOSE BLD-MCNC: 120 MG/DL (ref 70–99)
GLUCOSE BLD-MCNC: 127 MG/DL (ref 60–115)
HCO3 ARTERIAL: 34.5 MMOL/L (ref 21–29)
HCO3 ARTERIAL: 35.4 MMOL/L (ref 21–29)
HCT VFR BLD CALC: 41 % (ref 42–52)
HEMOGLOBIN: 14 G/DL (ref 14–18)
HEMOGLOBIN: 14.2 GM/DL (ref 13.5–17.5)
HEMOGLOBIN: 14.8 GM/DL (ref 13.5–17.5)
LACTATE: 1.14 MMOL/L (ref 0.4–2)
LACTATE: 1.7 MMOL/L (ref 0.4–2)
LYMPHOCYTES ABSOLUTE: 4.1 K/UL (ref 1–4.8)
LYMPHOCYTES RELATIVE PERCENT: 25 %
MCH RBC QN AUTO: 31.4 PG (ref 27–31.3)
MCHC RBC AUTO-ENTMCNC: 34.1 % (ref 33–37)
MCV RBC AUTO: 92 FL (ref 80–100)
MONOCYTES ABSOLUTE: 1.3 K/UL (ref 0.2–0.8)
MONOCYTES RELATIVE PERCENT: 8.4 %
NEUTROPHILS ABSOLUTE: 10.7 K/UL (ref 1.4–6.5)
NEUTROPHILS RELATIVE PERCENT: 61 %
O2 SAT, ARTERIAL: 91 % (ref 93–100)
O2 SAT, ARTERIAL: 93 % (ref 93–100)
PCO2 ARTERIAL: 65 MM HG (ref 35–45)
PCO2 ARTERIAL: 69 MM HG (ref 35–45)
PDW BLD-RTO: 15.8 % (ref 11.5–14.5)
PERFORMED ON: ABNORMAL
PERFORMED ON: ABNORMAL
PH ARTERIAL: 7.32 (ref 7.35–7.45)
PH ARTERIAL: 7.33 (ref 7.35–7.45)
PLATELET # BLD: 376 K/UL (ref 130–400)
PLATELET SLIDE REVIEW: NORMAL
PO2 ARTERIAL: 68 MM HG (ref 75–108)
PO2 ARTERIAL: 76 MM HG (ref 75–108)
POC CHLORIDE: 92 MEQ/L (ref 99–110)
POC CHLORIDE: 94 MEQ/L (ref 99–110)
POC CREATININE: 1.8 MG/DL (ref 0.9–1.3)
POC CREATININE: 2 MG/DL (ref 0.9–1.3)
POC FIO2: 3
POC FIO2: 40
POC HEMATOCRIT: 42 % (ref 41–53)
POC HEMATOCRIT: 44 % (ref 41–53)
POC POTASSIUM: 3.2 MEQ/L (ref 3.5–5.1)
POC POTASSIUM: 3.7 MEQ/L (ref 3.5–5.1)
POC SAMPLE TYPE: ABNORMAL
POC SAMPLE TYPE: ABNORMAL
POC SODIUM: 136 MEQ/L (ref 136–145)
POC SODIUM: 136 MEQ/L (ref 136–145)
POTASSIUM SERPL-SCNC: 3.8 MEQ/L (ref 3.4–4.9)
PROCALCITONIN: 0.17 NG/ML (ref 0–0.15)
RBC # BLD: 4.45 M/UL (ref 4.7–6.1)
SLIDE REVIEW: ABNORMAL
SODIUM BLD-SCNC: 135 MEQ/L (ref 135–144)
TCO2 ARTERIAL: 37 (ref 22–29)
TCO2 ARTERIAL: 38 (ref 22–29)
TOTAL PROTEIN: 6.9 G/DL (ref 6.3–8)
WBC # BLD: 16.5 K/UL (ref 4.8–10.8)

## 2020-01-18 PROCEDURE — 2000000000 HC ICU R&B

## 2020-01-18 PROCEDURE — 85014 HEMATOCRIT: CPT

## 2020-01-18 PROCEDURE — 31500 INSERT EMERGENCY AIRWAY: CPT

## 2020-01-18 PROCEDURE — 82435 ASSAY OF BLOOD CHLORIDE: CPT

## 2020-01-18 PROCEDURE — 84295 ASSAY OF SERUM SODIUM: CPT

## 2020-01-18 PROCEDURE — 96374 THER/PROPH/DIAG INJ IV PUSH: CPT

## 2020-01-18 PROCEDURE — 84132 ASSAY OF SERUM POTASSIUM: CPT

## 2020-01-18 PROCEDURE — 31500 INSERT EMERGENCY AIRWAY: CPT | Performed by: NURSE ANESTHETIST, CERTIFIED REGISTERED

## 2020-01-18 PROCEDURE — 96361 HYDRATE IV INFUSION ADD-ON: CPT

## 2020-01-18 PROCEDURE — 5A1945Z RESPIRATORY VENTILATION, 24-96 CONSECUTIVE HOURS: ICD-10-PCS | Performed by: INTERNAL MEDICINE

## 2020-01-18 PROCEDURE — 96372 THER/PROPH/DIAG INJ SC/IM: CPT

## 2020-01-18 PROCEDURE — 94002 VENT MGMT INPAT INIT DAY: CPT

## 2020-01-18 PROCEDURE — 71045 X-RAY EXAM CHEST 1 VIEW: CPT

## 2020-01-18 PROCEDURE — 83605 ASSAY OF LACTIC ACID: CPT

## 2020-01-18 PROCEDURE — 85025 COMPLETE CBC W/AUTO DIFF WBC: CPT

## 2020-01-18 PROCEDURE — 2580000003 HC RX 258: Performed by: INTERNAL MEDICINE

## 2020-01-18 PROCEDURE — 96375 TX/PRO/DX INJ NEW DRUG ADDON: CPT

## 2020-01-18 PROCEDURE — G0480 DRUG TEST DEF 1-7 CLASSES: HCPCS

## 2020-01-18 PROCEDURE — 84145 PROCALCITONIN (PCT): CPT

## 2020-01-18 PROCEDURE — 73700 CT LOWER EXTREMITY W/O DYE: CPT

## 2020-01-18 PROCEDURE — 2580000003 HC RX 258: Performed by: NURSE PRACTITIONER

## 2020-01-18 PROCEDURE — 36415 COLL VENOUS BLD VENIPUNCTURE: CPT

## 2020-01-18 PROCEDURE — 0BH17EZ INSERTION OF ENDOTRACHEAL AIRWAY INTO TRACHEA, VIA NATURAL OR ARTIFICIAL OPENING: ICD-10-PCS | Performed by: INTERNAL MEDICINE

## 2020-01-18 PROCEDURE — 2500000003 HC RX 250 WO HCPCS: Performed by: INTERNAL MEDICINE

## 2020-01-18 PROCEDURE — 70450 CT HEAD/BRAIN W/O DYE: CPT

## 2020-01-18 PROCEDURE — 6360000002 HC RX W HCPCS: Performed by: NURSE PRACTITIONER

## 2020-01-18 PROCEDURE — 73630 X-RAY EXAM OF FOOT: CPT

## 2020-01-18 PROCEDURE — 76376 3D RENDER W/INTRP POSTPROCES: CPT

## 2020-01-18 PROCEDURE — 6370000000 HC RX 637 (ALT 250 FOR IP): Performed by: INTERNAL MEDICINE

## 2020-01-18 PROCEDURE — 80053 COMPREHEN METABOLIC PANEL: CPT

## 2020-01-18 PROCEDURE — 6360000002 HC RX W HCPCS: Performed by: INTERNAL MEDICINE

## 2020-01-18 PROCEDURE — 36600 WITHDRAWAL OF ARTERIAL BLOOD: CPT

## 2020-01-18 PROCEDURE — 99285 EMERGENCY DEPT VISIT HI MDM: CPT

## 2020-01-18 PROCEDURE — 94660 CPAP INITIATION&MGMT: CPT

## 2020-01-18 PROCEDURE — 82565 ASSAY OF CREATININE: CPT

## 2020-01-18 PROCEDURE — 6360000002 HC RX W HCPCS

## 2020-01-18 PROCEDURE — 82330 ASSAY OF CALCIUM: CPT

## 2020-01-18 PROCEDURE — 82803 BLOOD GASES ANY COMBINATION: CPT

## 2020-01-18 PROCEDURE — 73610 X-RAY EXAM OF ANKLE: CPT

## 2020-01-18 RX ORDER — SUCCINYLCHOLINE/SOD CL,ISO/PF 100 MG/5ML
SYRINGE (ML) INTRAVENOUS
Status: COMPLETED
Start: 2020-01-18 | End: 2020-01-18

## 2020-01-18 RX ORDER — 0.9 % SODIUM CHLORIDE 0.9 %
1000 INTRAVENOUS SOLUTION INTRAVENOUS ONCE
Status: DISCONTINUED | OUTPATIENT
Start: 2020-01-19 | End: 2020-01-21

## 2020-01-18 RX ORDER — SUCCINYLCHOLINE/SOD CL,ISO/PF 100 MG/5ML
100 SYRINGE (ML) INTRAVENOUS ONCE
Status: COMPLETED | OUTPATIENT
Start: 2020-01-18 | End: 2020-01-18

## 2020-01-18 RX ORDER — CHLORHEXIDINE GLUCONATE 0.12 MG/ML
15 RINSE ORAL 2 TIMES DAILY
Status: DISCONTINUED | OUTPATIENT
Start: 2020-01-18 | End: 2020-01-20

## 2020-01-18 RX ORDER — FAMOTIDINE 20 MG/1
20 TABLET, FILM COATED ORAL 2 TIMES DAILY
Status: DISCONTINUED | OUTPATIENT
Start: 2020-01-18 | End: 2020-01-20

## 2020-01-18 RX ORDER — IPRATROPIUM BROMIDE AND ALBUTEROL SULFATE 2.5; .5 MG/3ML; MG/3ML
1 SOLUTION RESPIRATORY (INHALATION)
Status: DISCONTINUED | OUTPATIENT
Start: 2020-01-19 | End: 2020-01-19

## 2020-01-18 RX ORDER — ONDANSETRON 2 MG/ML
4 INJECTION INTRAMUSCULAR; INTRAVENOUS EVERY 6 HOURS PRN
Status: CANCELLED | OUTPATIENT
Start: 2020-01-18

## 2020-01-18 RX ORDER — METHYLPREDNISOLONE SODIUM SUCCINATE 40 MG/ML
40 INJECTION, POWDER, LYOPHILIZED, FOR SOLUTION INTRAMUSCULAR; INTRAVENOUS DAILY
Status: DISCONTINUED | OUTPATIENT
Start: 2020-01-18 | End: 2020-01-22

## 2020-01-18 RX ORDER — FENTANYL CITRATE 50 UG/ML
INJECTION, SOLUTION INTRAMUSCULAR; INTRAVENOUS
Status: COMPLETED
Start: 2020-01-18 | End: 2020-01-18

## 2020-01-18 RX ORDER — ALBUTEROL SULFATE 2.5 MG/3ML
2.5 SOLUTION RESPIRATORY (INHALATION) EVERY 6 HOURS PRN
Status: DISCONTINUED | OUTPATIENT
Start: 2020-01-18 | End: 2020-01-21

## 2020-01-18 RX ORDER — SODIUM CHLORIDE 9 MG/ML
INJECTION, SOLUTION INTRAVENOUS CONTINUOUS
Status: DISCONTINUED | OUTPATIENT
Start: 2020-01-18 | End: 2020-01-21

## 2020-01-18 RX ORDER — HYDROCODONE BITARTRATE AND ACETAMINOPHEN 5; 325 MG/1; MG/1
1 TABLET ORAL ONCE
Status: DISCONTINUED | OUTPATIENT
Start: 2020-01-18 | End: 2020-01-18

## 2020-01-18 RX ORDER — KETOROLAC TROMETHAMINE 30 MG/ML
30 INJECTION, SOLUTION INTRAMUSCULAR; INTRAVENOUS ONCE
Status: COMPLETED | OUTPATIENT
Start: 2020-01-18 | End: 2020-01-18

## 2020-01-18 RX ORDER — SODIUM CHLORIDE 0.9 % (FLUSH) 0.9 %
10 SYRINGE (ML) INJECTION EVERY 12 HOURS SCHEDULED
Status: DISCONTINUED | OUTPATIENT
Start: 2020-01-18 | End: 2020-01-23 | Stop reason: HOSPADM

## 2020-01-18 RX ORDER — PROPOFOL 10 MG/ML
INJECTION, EMULSION INTRAVENOUS
Status: COMPLETED
Start: 2020-01-18 | End: 2020-01-18

## 2020-01-18 RX ORDER — SODIUM CHLORIDE 0.9 % (FLUSH) 0.9 %
10 SYRINGE (ML) INJECTION PRN
Status: DISCONTINUED | OUTPATIENT
Start: 2020-01-18 | End: 2020-01-23 | Stop reason: HOSPADM

## 2020-01-18 RX ORDER — 0.9 % SODIUM CHLORIDE 0.9 %
1000 INTRAVENOUS SOLUTION INTRAVENOUS ONCE
Status: COMPLETED | OUTPATIENT
Start: 2020-01-18 | End: 2020-01-18

## 2020-01-18 RX ORDER — FENTANYL CITRATE 50 UG/ML
50 INJECTION, SOLUTION INTRAMUSCULAR; INTRAVENOUS ONCE
Status: COMPLETED | OUTPATIENT
Start: 2020-01-18 | End: 2020-01-18

## 2020-01-18 RX ADMIN — FENTANYL CITRATE 50 MCG: 50 INJECTION, SOLUTION INTRAMUSCULAR; INTRAVENOUS at 22:57

## 2020-01-18 RX ADMIN — Medication 100 MG: at 22:20

## 2020-01-18 RX ADMIN — KETOROLAC TROMETHAMINE 30 MG: 30 INJECTION, SOLUTION INTRAMUSCULAR at 14:27

## 2020-01-18 RX ADMIN — METHYLPREDNISOLONE SODIUM SUCCINATE 40 MG: 40 INJECTION, POWDER, FOR SOLUTION INTRAMUSCULAR; INTRAVENOUS at 21:11

## 2020-01-18 RX ADMIN — PROPOFOL 300 MG: 10 INJECTION, EMULSION INTRAVENOUS at 22:00

## 2020-01-18 RX ADMIN — CHLORHEXIDINE GLUCONATE 0.12% ORAL RINSE 15 ML: 1.2 LIQUID ORAL at 23:56

## 2020-01-18 RX ADMIN — Medication 100 MG: at 23:40

## 2020-01-18 RX ADMIN — SODIUM CHLORIDE 1000 ML: 9 INJECTION, SOLUTION INTRAVENOUS at 17:11

## 2020-01-18 RX ADMIN — SODIUM CHLORIDE: 9 INJECTION, SOLUTION INTRAVENOUS at 21:19

## 2020-01-18 RX ADMIN — ENOXAPARIN SODIUM 40 MG: 40 INJECTION SUBCUTANEOUS at 21:11

## 2020-01-18 RX ADMIN — CEFTRIAXONE SODIUM 1 G: 1 INJECTION, POWDER, FOR SOLUTION INTRAMUSCULAR; INTRAVENOUS at 18:46

## 2020-01-18 RX ADMIN — PIPERACILLIN SODIUM AND TAZOBACTAM SODIUM 3.38 G: 3; .375 INJECTION, POWDER, LYOPHILIZED, FOR SOLUTION INTRAVENOUS at 21:05

## 2020-01-18 RX ADMIN — DEXMEDETOMIDINE HYDROCHLORIDE 0.1 MCG/KG/HR: 100 INJECTION, SOLUTION INTRAVENOUS at 23:50

## 2020-01-18 RX ADMIN — Medication 10 ML: at 21:11

## 2020-01-18 ASSESSMENT — ENCOUNTER SYMPTOMS
SHORTNESS OF BREATH: 0
COUGH: 0
ABDOMINAL PAIN: 0
BACK PAIN: 0

## 2020-01-18 ASSESSMENT — PULMONARY FUNCTION TESTS
PIF_VALUE: 10
PIF_VALUE: 33
PIF_VALUE: 6
PIF_VALUE: 50
PIF_VALUE: 27
PIF_VALUE: 33
PIF_VALUE: 28
PIF_VALUE: 28
PIF_VALUE: 30
PIF_VALUE: 51

## 2020-01-18 ASSESSMENT — PAIN SCALES - GENERAL
PAINLEVEL_OUTOF10: 5
PAINLEVEL_OUTOF10: 10
PAINLEVEL_OUTOF10: 5
PAINLEVEL_OUTOF10: 0

## 2020-01-18 ASSESSMENT — PAIN DESCRIPTION - ORIENTATION: ORIENTATION: RIGHT

## 2020-01-18 ASSESSMENT — PAIN DESCRIPTION - LOCATION: LOCATION: FOOT

## 2020-01-18 NOTE — ED PROVIDER NOTES
[address] [facility] ED  eMERGENCY dEPARTMENT eNCOUnter      Pt Name: Ravin Peng  MRN: [id number]  [place] 1965  Date of evaluation: 1/18/2020  Provider: PEGGY Ruiz CNP      HISTORY OF PRESENT ILLNESS    Ravin Peng is a 48 y.o. male who presents to the Emergency Department with R ankle pain after inverting his foot last night. Pain is moderate. He states it is too painful to ambulate on. No other injury. REVIEW OF SYSTEMS       Review of Systems   Constitutional: Negative for fever. HENT: Negative for congestion. Respiratory: Negative for cough and shortness of breath. Cardiovascular: Negative for chest pain. Gastrointestinal: Negative for abdominal pain. Genitourinary: Negative for dysuria. Musculoskeletal: Negative for arthralgias and back pain. R foot pain   Skin: Negative for rash. All other systems reviewed and are negative. PAST MEDICAL HISTORY     Past Medical History:   Diagnosis Date    Aspiration into airway     Chronic pain     in neck & back, in pain management    Heroin use     Hypertension     Lumbar radiculopathy, chronic     Metabolic encephalopathy     medication induced    Non-traumatic rhabdomyolysis     Obesity     Pneumonia          SURGICAL HISTORY       Past Surgical History:   Procedure Laterality Date    BACK SURGERY      x2    NECK SURGERY      x2         CURRENT MEDICATIONS       Previous Medications    ALBUTEROL SULFATE HFA (PROAIR HFA) 108 (90 BASE) MCG/ACT INHALER    Use every 4 hours while awake for 7-10 days then PRN wheezing  Dispense with SPACER and Instruct on use. May sub Ventolin or Proventil as needed per [employer]. BUPRENORPHINE HCL-NALOXONE HCL (SUBOXONE) 8-2 MG FILM    Place under the tongue 2 times daily.      ESCITALOPRAM (LEXAPRO) 10 MG TABLET    Take 10 mg by mouth nightly    GUAIFENESIN (ROBITUSSIN) 100 MG/5ML LIQUID    Take 10 mLs by mouth 3 times daily as needed for Cough or Congestion    METOPROLOL SUCCINATE (TOPROL XL) 50 MG EXTENDED RELEASE TABLET    Take 50 mg by mouth 2 times daily    MIRTAZAPINE (REMERON) 15 MG TABLET    Take 15 mg by mouth nightly    PREGABALIN (LYRICA) 150 MG CAPSULE    Take 300 mg by mouth 2 times daily. SODIUM CHLORIDE (OCEAN) 0.65 % NASAL SPRAY    1 spray by Nasal route as needed for Congestion       ALLERGIES     Patient has no known allergies. FAMILY HISTORY     History reviewed. No pertinent family history.        SOCIAL HISTORY       Social History     Socioeconomic History    Marital status:      Spouse name: None    Number of children: None    Years of education: None    Highest education level: None   Occupational History    None   Social Needs    Financial resource strain: None    Food insecurity:     Worry: None     Inability: None    Transportation needs:     Medical: None     Non-medical: None   Tobacco Use    Smoking status: Current Every Day Smoker     Packs/day: 1.00     Years: 15.00     Pack years: 15.00     Types: Cigarettes    Smokeless tobacco: Never Used   Substance and Sexual Activity    Alcohol use: No    Drug use: Not Currently     Types: Opiates     Sexual activity: Yes     Partners: Female   Lifestyle    Physical activity:     Days per week: None     Minutes per session: None    Stress: None   Relationships    Social connections:     Talks on phone: None     Gets together: None     Attends Latter day service: None     Active member of club or organization: None     Attends meetings of clubs or organizations: None     Relationship status: None    Intimate partner violence:     Fear of current or ex partner: None     Emotionally abused: None     Physically abused: None     Forced sexual activity: None   Other Topics Concern    None   Social History Narrative    None       SCREENINGS      @FLOW([id number])@      PHYSICAL EXAM    (up to 7 for level 4, 8 or more for level 5)     ED Triage Vitals [01/18/20 1239] minutes    PROCEDURES:  Unless otherwise noted below, none     Procedures      FINAL IMPRESSION      1. Encephalopathy acute    2. Drug overdose, undetermined intent, initial encounter    3.  Aspiration pneumonia of right lower lobe, unspecified aspiration pneumonia type [facility])          DISPOSITION/PLAN   DISPOSITION Decision To Admit 01/18/2020 05:35:09 PM          PEGGY Macias - CNP (electronically signed)  Attending Emergency Physician     Chris Garecs MD  01/18/20 0149

## 2020-01-18 NOTE — H&P
Hospital Medicine  History and Physical    Patient:  Sandra Jimenez  MRN: [id number]    CHIEF COMPLAINT:    Chief Complaint   Patient presents with    Foot Injury     right foot injury sustained  last night       History Obtained From:  Patient, EMR  Primary Care Physician: Camille Reyes MD    HISTORY OF PRESENT ILLNESS:   This is a 51-year-old male with a past medical history of heroin use, hypertension, chronic pain, who presents with right ankle pain. Patient is somewhat obtunded and on BiPAP currently and is unable to provide a meaningful history. History is therefore obtained from ED staff. Patient initially came in for right ankle pain. States the pain was really severe and took for Lyrica. Then became hypotensive and slightly obtunded. Gases obtained and he is mildly hypercapnic. He has done this in the past as well. Blood pressures are now okay after fluid bolus, but he remains slightly obtunded. He is tolerating BiPAP well. He will need to be transferred to ICU for further evaluation and treatment. Ortho is also on consult for distal tib  fracture. Chest x-ray was obtained which showed questionable infiltrates. Past Medical History:      Diagnosis Date    Aspiration into airway     Chronic pain     in neck & back, in pain management    Heroin use     Hypertension     Lumbar radiculopathy, chronic     Metabolic encephalopathy     medication induced    Non-traumatic rhabdomyolysis     Obesity     Pneumonia        Past Surgical History:      Procedure Laterality Date    BACK SURGERY      x2    NECK SURGERY      x2       Medications Prior to Admission:    Prior to Admission medications    Medication Sig Start Date End Date Taking? Authorizing Provider   albuterol sulfate HFA (PROAIR HFA) 108 (90 Base) MCG/ACT inhaler Use every 4 hours while awake for 7-10 days then PRN wheezing  Dispense with SPACER and Instruct on use. May sub Ventolin or Proventil as needed per [employer].  11/9/19 tib/malleolar fracture  3. Mild acute hypercapnic respiratory failure: On BiPAP  4. Hypertension  5. History of drug abuse: Urine drug screen negative    Plan:  -Monitor in ICU  -BiPAP PRN  -Repeat blood gas in 2 hours  -Monitor for signs and symptoms of respiratory failure, intubation if necessary  - Serial EKGs for QT assessment  -Neurology consult  -Critical care consult  -Continue home medications when able to take p.o. Patient Active Problem List   Diagnosis Code    Acute encephalopathy G93.40    Accidental drug overdose T50.901A    Chronic back pain M54.9, G89.29    Fatty infiltration of liver K76.0    Acid reflux K21.9    Herniation of lumbar intervertebral disc without myelopathy M51.26    Current smoker F17.200    Lumbar canal stenosis M48.061    Anginal chest pain at rest (HCC) I20.8    Change in mental status R41.82    Non-traumatic rhabdomyolysis M62.82    Leukocytosis D72.829    Chronic pain syndrome G89.4    Aspiration into airway T17.908A    Accidental overdose of heroin (HCC) T40.1X1A    Acute respiratory failure with hypercapnia (HCC) J96.02    Hypotension due to hypovolemia I95.89, E86.1       Elena Lunch, DO  Admitting Hospitalist    TTS: 85mins where I focused more than 75% of my attention on rendering care, and planning treatment course for this patient, in addition to talking to RN team, mid levels, consulting with other physicians and following up on labs and imaging. High Risk Readmission Screening Tool Score Noted.      Emergency Contact:

## 2020-01-18 NOTE — ED NOTES
Yasmin EDWARDS requested assistance from charge Pritesh Jade with removing pt's pants. Pt  refused.      Foster Primrose, RN  01/18/20 6993

## 2020-01-18 NOTE — ED TRIAGE NOTES
Pt c/o  Right ankle injury after twisting the  Ankle  Last night while standing up to use the restroom.   Pt  States his left ankle gave out on him and  Caused him to twist his right ankle and is unable to walk on it

## 2020-01-19 ENCOUNTER — APPOINTMENT (OUTPATIENT)
Dept: GENERAL RADIOLOGY | Age: 55
DRG: 208 | End: 2020-01-19
Payer: MEDICARE

## 2020-01-19 LAB
ANION GAP SERPL CALCULATED.3IONS-SCNC: 9 MEQ/L (ref 9–15)
BASE EXCESS ARTERIAL: 4 (ref -3–3)
BUN BLDV-MCNC: 15 MG/DL (ref 6–20)
CALCIUM IONIZED: 1.11 MMOL/L (ref 1.12–1.32)
CALCIUM SERPL-MCNC: 8.1 MG/DL (ref 8.5–9.9)
CHLORIDE BLD-SCNC: 100 MEQ/L (ref 95–107)
CO2: 29 MEQ/L (ref 20–31)
CREAT SERPL-MCNC: 1.37 MG/DL (ref 0.7–1.2)
GFR AFRICAN AMERICAN: 59
GFR AFRICAN AMERICAN: >60
GFR NON-AFRICAN AMERICAN: 49
GFR NON-AFRICAN AMERICAN: 54.4
GLUCOSE BLD-MCNC: 135 MG/DL (ref 60–115)
GLUCOSE BLD-MCNC: 145 MG/DL (ref 70–99)
HCO3 ARTERIAL: 30.7 MMOL/L (ref 21–29)
HEMOGLOBIN: 13.6 GM/DL (ref 13.5–17.5)
LACTATE: 1.11 MMOL/L (ref 0.4–2)
MAGNESIUM: 1.7 MG/DL (ref 1.7–2.4)
O2 SAT, ARTERIAL: 93 % (ref 93–100)
PCO2 ARTERIAL: 61 MM HG (ref 35–45)
PERFORMED ON: ABNORMAL
PH ARTERIAL: 7.31 (ref 7.35–7.45)
PO2 ARTERIAL: 74 MM HG (ref 75–108)
POC CHLORIDE: 99 MEQ/L (ref 99–110)
POC CREATININE: 1.5 MG/DL (ref 0.9–1.3)
POC FIO2: 50
POC HEMATOCRIT: 40 % (ref 41–53)
POC POTASSIUM: 4.2 MEQ/L (ref 3.5–5.1)
POC SAMPLE TYPE: ABNORMAL
POC SODIUM: 138 MEQ/L (ref 136–145)
POTASSIUM REFLEX MAGNESIUM: 4.8 MEQ/L (ref 3.4–4.9)
SODIUM BLD-SCNC: 138 MEQ/L (ref 135–144)
TCO2 ARTERIAL: 33 (ref 22–29)

## 2020-01-19 PROCEDURE — 6360000002 HC RX W HCPCS: Performed by: HOSPITALIST

## 2020-01-19 PROCEDURE — 2700000000 HC OXYGEN THERAPY PER DAY

## 2020-01-19 PROCEDURE — 84295 ASSAY OF SERUM SODIUM: CPT

## 2020-01-19 PROCEDURE — 93005 ELECTROCARDIOGRAM TRACING: CPT | Performed by: INTERNAL MEDICINE

## 2020-01-19 PROCEDURE — 6360000002 HC RX W HCPCS: Performed by: INTERNAL MEDICINE

## 2020-01-19 PROCEDURE — 99223 1ST HOSP IP/OBS HIGH 75: CPT | Performed by: INTERNAL MEDICINE

## 2020-01-19 PROCEDURE — 2500000003 HC RX 250 WO HCPCS: Performed by: INTERNAL MEDICINE

## 2020-01-19 PROCEDURE — 85014 HEMATOCRIT: CPT

## 2020-01-19 PROCEDURE — 80048 BASIC METABOLIC PNL TOTAL CA: CPT

## 2020-01-19 PROCEDURE — 82803 BLOOD GASES ANY COMBINATION: CPT

## 2020-01-19 PROCEDURE — 94003 VENT MGMT INPAT SUBQ DAY: CPT

## 2020-01-19 PROCEDURE — 2000000000 HC ICU R&B

## 2020-01-19 PROCEDURE — 94761 N-INVAS EAR/PLS OXIMETRY MLT: CPT

## 2020-01-19 PROCEDURE — 82435 ASSAY OF BLOOD CHLORIDE: CPT

## 2020-01-19 PROCEDURE — 82565 ASSAY OF CREATININE: CPT

## 2020-01-19 PROCEDURE — 6370000000 HC RX 637 (ALT 250 FOR IP): Performed by: INTERNAL MEDICINE

## 2020-01-19 PROCEDURE — 99222 1ST HOSP IP/OBS MODERATE 55: CPT | Performed by: PSYCHIATRY & NEUROLOGY

## 2020-01-19 PROCEDURE — 83605 ASSAY OF LACTIC ACID: CPT

## 2020-01-19 PROCEDURE — 83735 ASSAY OF MAGNESIUM: CPT

## 2020-01-19 PROCEDURE — 36600 WITHDRAWAL OF ARTERIAL BLOOD: CPT

## 2020-01-19 PROCEDURE — 94640 AIRWAY INHALATION TREATMENT: CPT

## 2020-01-19 PROCEDURE — 82330 ASSAY OF CALCIUM: CPT

## 2020-01-19 PROCEDURE — 2580000003 HC RX 258: Performed by: INTERNAL MEDICINE

## 2020-01-19 PROCEDURE — 71045 X-RAY EXAM CHEST 1 VIEW: CPT

## 2020-01-19 PROCEDURE — 84132 ASSAY OF SERUM POTASSIUM: CPT

## 2020-01-19 PROCEDURE — 36415 COLL VENOUS BLD VENIPUNCTURE: CPT

## 2020-01-19 PROCEDURE — 51702 INSERT TEMP BLADDER CATH: CPT

## 2020-01-19 RX ORDER — HYDRALAZINE HYDROCHLORIDE 20 MG/ML
20 INJECTION INTRAMUSCULAR; INTRAVENOUS ONCE
Status: COMPLETED | OUTPATIENT
Start: 2020-01-20 | End: 2020-01-19

## 2020-01-19 RX ORDER — ALBUTEROL SULFATE 90 UG/1
2 AEROSOL, METERED RESPIRATORY (INHALATION)
Status: DISCONTINUED | OUTPATIENT
Start: 2020-01-19 | End: 2020-01-20

## 2020-01-19 RX ORDER — HYDRALAZINE HYDROCHLORIDE 20 MG/ML
10 INJECTION INTRAMUSCULAR; INTRAVENOUS EVERY 4 HOURS PRN
Status: DISCONTINUED | OUTPATIENT
Start: 2020-01-19 | End: 2020-01-23 | Stop reason: HOSPADM

## 2020-01-19 RX ADMIN — Medication 10 ML: at 20:27

## 2020-01-19 RX ADMIN — NOREPINEPHRINE BITARTRATE 2 MCG/MIN: 1 INJECTION, SOLUTION, CONCENTRATE INTRAVENOUS at 04:45

## 2020-01-19 RX ADMIN — IPRATROPIUM BROMIDE 2 PUFF: 17 AEROSOL, METERED RESPIRATORY (INHALATION) at 19:54

## 2020-01-19 RX ADMIN — FAMOTIDINE 20 MG: 20 TABLET ORAL at 20:15

## 2020-01-19 RX ADMIN — PIPERACILLIN SODIUM AND TAZOBACTAM SODIUM 3.38 G: 3; .375 INJECTION, POWDER, LYOPHILIZED, FOR SOLUTION INTRAVENOUS at 20:27

## 2020-01-19 RX ADMIN — CHLORHEXIDINE GLUCONATE 0.12% ORAL RINSE 15 ML: 1.2 LIQUID ORAL at 20:18

## 2020-01-19 RX ADMIN — PIPERACILLIN SODIUM AND TAZOBACTAM SODIUM 3.38 G: 3; .375 INJECTION, POWDER, LYOPHILIZED, FOR SOLUTION INTRAVENOUS at 12:07

## 2020-01-19 RX ADMIN — ALBUTEROL SULFATE 2 PUFF: 90 AEROSOL, METERED RESPIRATORY (INHALATION) at 11:46

## 2020-01-19 RX ADMIN — CHLORHEXIDINE GLUCONATE 0.12% ORAL RINSE 15 ML: 1.2 LIQUID ORAL at 08:44

## 2020-01-19 RX ADMIN — HYDRALAZINE HYDROCHLORIDE 10 MG: 20 INJECTION INTRAMUSCULAR; INTRAVENOUS at 21:50

## 2020-01-19 RX ADMIN — IPRATROPIUM BROMIDE 2 PUFF: 17 AEROSOL, METERED RESPIRATORY (INHALATION) at 07:42

## 2020-01-19 RX ADMIN — IPRATROPIUM BROMIDE 2 PUFF: 17 AEROSOL, METERED RESPIRATORY (INHALATION) at 11:46

## 2020-01-19 RX ADMIN — ENOXAPARIN SODIUM 40 MG: 40 INJECTION SUBCUTANEOUS at 08:44

## 2020-01-19 RX ADMIN — METHYLPREDNISOLONE SODIUM SUCCINATE 40 MG: 40 INJECTION, POWDER, FOR SOLUTION INTRAMUSCULAR; INTRAVENOUS at 08:44

## 2020-01-19 RX ADMIN — SODIUM CHLORIDE: 9 INJECTION, SOLUTION INTRAVENOUS at 17:22

## 2020-01-19 RX ADMIN — HYDRALAZINE HYDROCHLORIDE 20 MG: 20 INJECTION INTRAMUSCULAR; INTRAVENOUS at 23:38

## 2020-01-19 RX ADMIN — PIPERACILLIN SODIUM AND TAZOBACTAM SODIUM 3.38 G: 3; .375 INJECTION, POWDER, LYOPHILIZED, FOR SOLUTION INTRAVENOUS at 05:00

## 2020-01-19 RX ADMIN — ALBUTEROL SULFATE 2 PUFF: 90 AEROSOL, METERED RESPIRATORY (INHALATION) at 04:30

## 2020-01-19 RX ADMIN — ALBUTEROL SULFATE 2 PUFF: 90 AEROSOL, METERED RESPIRATORY (INHALATION) at 07:42

## 2020-01-19 RX ADMIN — SODIUM CHLORIDE: 9 INJECTION, SOLUTION INTRAVENOUS at 00:30

## 2020-01-19 RX ADMIN — ALBUTEROL SULFATE 2 PUFF: 90 AEROSOL, METERED RESPIRATORY (INHALATION) at 19:54

## 2020-01-19 RX ADMIN — Medication 10 ML: at 08:45

## 2020-01-19 RX ADMIN — IPRATROPIUM BROMIDE 2 PUFF: 17 AEROSOL, METERED RESPIRATORY (INHALATION) at 16:38

## 2020-01-19 RX ADMIN — IPRATROPIUM BROMIDE 2 PUFF: 17 AEROSOL, METERED RESPIRATORY (INHALATION) at 04:30

## 2020-01-19 RX ADMIN — FAMOTIDINE 20 MG: 20 TABLET ORAL at 08:44

## 2020-01-19 RX ADMIN — DEXMEDETOMIDINE HYDROCHLORIDE 0.6 MCG/KG/HR: 100 INJECTION, SOLUTION INTRAVENOUS at 13:09

## 2020-01-19 RX ADMIN — DEXMEDETOMIDINE HYDROCHLORIDE 0.6 MCG/KG/HR: 100 INJECTION, SOLUTION INTRAVENOUS at 20:39

## 2020-01-19 RX ADMIN — ALBUTEROL SULFATE 2 PUFF: 90 AEROSOL, METERED RESPIRATORY (INHALATION) at 16:38

## 2020-01-19 ASSESSMENT — PULMONARY FUNCTION TESTS
PIF_VALUE: 27
PIF_VALUE: 24
PIF_VALUE: 24
PIF_VALUE: 26
PIF_VALUE: 23
PIF_VALUE: 27
PIF_VALUE: 24
PIF_VALUE: 23
PIF_VALUE: 22
PIF_VALUE: 21
PIF_VALUE: 25
PIF_VALUE: 22
PIF_VALUE: 24
PIF_VALUE: 20
PIF_VALUE: 25
PIF_VALUE: 19
PIF_VALUE: 25
PIF_VALUE: 25
PIF_VALUE: 26
PIF_VALUE: 24
PIF_VALUE: 27
PIF_VALUE: 26
PIF_VALUE: 20
PIF_VALUE: 23
PIF_VALUE: 22
PIF_VALUE: 8
PIF_VALUE: 24
PIF_VALUE: 27
PIF_VALUE: 17
PIF_VALUE: 23
PIF_VALUE: 26
PIF_VALUE: 32
PIF_VALUE: 9
PIF_VALUE: 25
PIF_VALUE: 24
PIF_VALUE: 18
PIF_VALUE: 9
PIF_VALUE: 27
PIF_VALUE: 27
PIF_VALUE: 24
PIF_VALUE: 22
PIF_VALUE: 26
PIF_VALUE: 25
PIF_VALUE: 22
PIF_VALUE: 20
PIF_VALUE: 25
PIF_VALUE: 23
PIF_VALUE: 26
PIF_VALUE: 25
PIF_VALUE: 21
PIF_VALUE: 18
PIF_VALUE: 23
PIF_VALUE: 21
PIF_VALUE: 25
PIF_VALUE: 19
PIF_VALUE: 12
PIF_VALUE: 24
PIF_VALUE: 24
PIF_VALUE: 25
PIF_VALUE: 23
PIF_VALUE: 20
PIF_VALUE: 25
PIF_VALUE: 26
PIF_VALUE: 27
PIF_VALUE: 24
PIF_VALUE: 25
PIF_VALUE: 28
PIF_VALUE: 25
PIF_VALUE: 24
PIF_VALUE: 25

## 2020-01-19 ASSESSMENT — PAIN SCALES - GENERAL
PAINLEVEL_OUTOF10: 0

## 2020-01-19 ASSESSMENT — ENCOUNTER SYMPTOMS
VOMITING: 0
TROUBLE SWALLOWING: 0
CHOKING: 0
NAUSEA: 0
PHOTOPHOBIA: 0
SHORTNESS OF BREATH: 0
BACK PAIN: 0

## 2020-01-19 NOTE — CONSULTS
Subjective:      Patient ID: Ravin Peng is a 48 y.o. male who presents today for:  Chief Complaint   Patient presents with    Foot Injury     right foot injury sustained  last night       HPI 70-year-old right-hand gentleman who was admitted with history of hypertension with encephalopathy. Patient fell at home and sustained a fracture in his right lower extremity which is in a cast.  I was consulted patient remained encephalopathic yesterday and had to be intubated. His blood gases show that he had acidosis and hypercarbia. Is much comfortable now and is able to nod to all the questions. He does have sleep apnea but does not use a CPAP machine at home. He is endotracheal tube was adjusted and he is much better now. His blood pressure is better as well. CT scan of the head does not show anything significant. He denies any previous history of seizures or strokes. Review of Systems   Constitutional: Negative for fever. HENT: Negative for ear pain, tinnitus and trouble swallowing. Eyes: Negative for photophobia and visual disturbance. Respiratory: Negative for choking and shortness of breath. Cardiovascular: Negative for chest pain and palpitations. Gastrointestinal: Negative for nausea and vomiting. Musculoskeletal: Negative for back pain, gait problem, joint swelling, myalgias, neck pain and neck stiffness. Neurological: Negative for dizziness, tremors, seizures, syncope, facial asymmetry, speech difficulty, weakness, light-headedness, numbness and headaches. Psychiatric/Behavioral: Negative for behavioral problems, confusion, hallucinations and sleep disturbance.    His entire review of system is done through with yes and no    Past Medical History:   Diagnosis Date    Aspiration into airway     Chronic pain     in neck & back, in pain management    Heroin use     Hypertension     Lumbar radiculopathy, chronic     Metabolic encephalopathy     medication induced    Non-traumatic rhabdomyolysis     Obesity     Pneumonia      Past Surgical History:   Procedure Laterality Date    BACK SURGERY      x2    NECK SURGERY      x2     Social History     Socioeconomic History    Marital status:      Spouse name: Not on file    Number of children: Not on file    Years of education: Not on file    Highest education level: Not on file   Occupational History    Not on file   Social Needs    Financial resource strain: Not on file    Food insecurity:     Worry: Not on file     Inability: Not on file    Transportation needs:     Medical: Not on file     Non-medical: Not on file   Tobacco Use    Smoking status: Current Every Day Smoker     Packs/day: 1.00     Years: 15.00     Pack years: 15.00     Types: Cigarettes    Smokeless tobacco: Never Used   Substance and Sexual Activity    Alcohol use: No    Drug use: Not Currently     Types: Opiates     Sexual activity: Yes     Partners: Female   Lifestyle    Physical activity:     Days per week: Not on file     Minutes per session: Not on file    Stress: Not on file   Relationships    Social connections:     Talks on phone: Not on file     Gets together: Not on file     Attends Confucianist service: Not on file     Active member of club or organization: Not on file     Attends meetings of clubs or organizations: Not on file     Relationship status: Not on file    Intimate partner violence:     Fear of current or ex partner: Not on file     Emotionally abused: Not on file     Physically abused: Not on file     Forced sexual activity: Not on file   Other Topics Concern    Not on file   Social History Narrative    Not on file     History reviewed. No pertinent family history.   No Known Allergies  Current Facility-Administered Medications   Medication Dose Route Frequency Provider Last Rate Last Dose    albuterol sulfate  (90 Base) MCG/ACT inhaler 2 puff  2 puff Inhalation Q4H While awake [employer], DO   2 puff at 01/19/20 0742    ipratropium (ATROVENT HFA) 17 MCG/ACT inhaler 2 puff  2 puff Inhalation Q4H WA Navneet Bettencourt DO   2 puff at 01/19/20 0742    norepinephrine (LEVOPHED) 16 mg in dextrose 5 % 250 mL infusion  2 mcg/min Intravenous Continuous Navneet Bettencourt, DO 1.9 mL/hr at 01/19/20 0600 2 mcg/min at 01/19/20 0600    sodium chloride flush 0.9 % injection 10 mL  10 mL Intravenous 2 times per day Paola Motts, DO   10 mL at 01/19/20 0845    sodium chloride flush 0.9 % injection 10 mL  10 mL Intravenous PRN Paola Motts, DO        magnesium hydroxide (MILK OF MAGNESIA) 400 MG/5ML suspension 30 mL  30 mL Oral Daily PRN Paola Motts, DO        enoxaparin (LOVENOX) injection 40 mg  40 mg Subcutaneous Daily Paola Motts, DO   40 mg at 01/19/20 0844    0.9 % sodium chloride infusion   Intravenous Continuous Paola Motts,  mL/hr at 01/19/20 0030      piperacillin-tazobactam (ZOSYN) 3.375 g in dextrose 5 % 50 mL IVPB extended infusion (mini-bag)  3.375 g Intravenous Q8H Paola Motts, DO 12.5 mL/hr at 01/19/20 0500 3.375 g at 01/19/20 0500    0.9 % sodium chloride infusion   Intravenous Continuous Walter Garces MD        methylPREDNISolone sodium (SOLU-MEDROL) injection 40 mg  40 mg Intravenous Daily Navneet Bettencourt, DO   40 mg at 01/19/20 0844    albuterol (PROVENTIL) nebulizer solution 2.5 mg  2.5 mg Nebulization Q6H PRN Navneet Bettencourt DO        chlorhexidine (PERIDEX) 0.12 % solution 15 mL  15 mL Mouth/Throat BID Navneet Bettencourt, DO   15 mL at 01/19/20 0844    famotidine (PEPCID) tablet 20 mg  20 mg Per NG tube BID Navneet Bettencourt, DO   20 mg at 01/19/20 0844    dexmedetomidine (PRECEDEX) 400 mcg in sodium chloride 0.9 % 100 mL infusion  0.2 mcg/kg/hr Intravenous Continuous Navneet Bettencourt DO 6.1 mL/hr at 01/19/20 0636 0.2 mcg/kg/hr at 01/19/20 0636    0.9 % sodium chloride bolus  1,000 mL Intravenous Once Navneet Bettencourt DO            Objective:   BP (!) 157/78   Pulse 56   Temp 98.9 °F (37.2 °C) Resp 20   Ht 5' 11\" (1.803 m)   Wt 270 lb 15.1 oz (122.9 kg)   SpO2 97%   BMI 37.79 kg/m²     Physical Exam patient is intubated not sedated is awake aware and is good to comprehend questions and follows all commands. Pupils are equal reactive gaze is conjugate with no notable facial symmetry noted. Tongue and palate could not be examined. His extremities move to good strength except his right lower extremity is in a cast.  Reflexes are 1+ in the upper extremities no definite sensory levels are notable there is no meningeal signs. Cardiovascular system S1 and S2 are heard normal no murmurs appreciated respiratory system is clear to auscultation and there are no carotid bruits this is as far as we could get to the examination gait is deferred. Xr Ankle Right (min 3 Views)    Result Date: 1/18/2020  XR FOOT RIGHT (MIN 3 VIEWS), XR ANKLE RIGHT (MIN 3 VIEWS) : 1/18/2020 CLINICAL HISTORY: Pain after injury. COMPARISON: None available. TECHNIQUE: AP, lateral and oblique radiographs of the right foot, and AP, lateral and oblique radiographs of the right ankle were obtained. FINDINGS: A vertically oriented nondisplaced oblique fracture of the medial ankle mortise extends into the distal tibial metadiaphysis. Several small old avulsion fracture fragments are noted inferior to both malleoli; as well as soft tissue swelling about the ankle. There is no other fracture, radiodense foreign bodies, pathologic calcifications, or worrisome bone destruction identified. The visualized joint spaces and ankle mortise are intact. Mild to moderate degenerative changes are present of the first MTP joint. NONDISPLACED VERTICALLY ORIENTED MEDIAL MALLEOLAR/DISTAL TIBIAL FRACTURE AS NOTED. Xr Foot Right (min 3 Views)    Result Date: 1/18/2020  XR FOOT RIGHT (MIN 3 VIEWS), XR ANKLE RIGHT (MIN 3 VIEWS) : 1/18/2020 CLINICAL HISTORY: Pain after injury. COMPARISON: None available.  TECHNIQUE: AP, lateral and oblique radiographs of a 3-D workstation. All CT scans at this facility use dose modulation, iterative reconstruction, and/or weight based dosing when appropriate to reduce radiation dose to as low as reasonably achievable. FINDINGS: A vertically oriented nondisplaced oblique fracture of the medial ankle mortise extends into the distal tibial metadiaphysis. Several small old avulsion fracture fragments are noted inferior to both malleoli; as well as soft tissue swelling about the ankle. There is no other fracture, radiodense foreign bodies, pathologic calcifications, or worrisome bone destruction identified. The visualized joint spaces and ankle mortise are intact. NONDISPLACED VERTICALLY ORIENTED MEDIAL MALLEOLAR/DISTAL TIBIAL FRACTURE AS NOTED. Xr Chest Portable    Result Date: 1/19/2020  XR CHEST PORTABLE : 1/18/2020 CLINICAL HISTORY: Tube placement. COMPARISON: Earlier 1/18/2020. TECHNIQUE: A portable upright AP radiograph of the chest was obtained at approximately 11:38 PM. FINDINGS: Since the earlier study, endotracheal tube has migrated distally, with its tip extending into the right main bronchus, which should be withdrawn approximately 4-5 cm. There is been interval placement of an orogastric tube with its tip coiled overlying the fundus of the stomach, likely in good position. Significantly shallow inspiratory volumes are present with patchy airspace and interstitial infiltrates worsening inferiorly, which are probably mildly worsened compared to earlier. There is no significant cardiomegaly, worsening vascular congestion, pleural effusion, pneumothorax, or displaced fractures identified. ENDOTRACHEAL TUBE EXTENDING INTO THE RIGHT MAIN BRONCHUS, WHICH SHOULD BE WITHDRAWN 4 TO 5 CM. OROGASTRIC TUBE IN GOOD APPARENT POSITION. SHALLOW INSPIRATORY VOLUMES WITH PATCHY BILATERAL PULMONARY INFILTRATES. MOST LIKELY POSSIBILITIES ARE EDEMA, ATELECTASIS, BRONCHOPNEUMONIA AND/OR ARDS.  The findings were discussed with the patient's nurse at approximately 7:20 AM on 1/19/2020. Xr Chest Portable    Result Date: 1/18/2020  XR CHEST PORTABLE : 1/18/2020 CLINICAL HISTORY:  ET tube placement . COMPARISON: Earlier 1/18/2020. A portable upright AP radiograph of the chest was obtained at approximately 8:40 PM. FINDINGS: An endotracheal tube is noted with its approximately 3 cm above the peggy. A significantly poor inspiratory volume is present with moderate bibasilar infiltrate/atelectasis,, which appears worsened when compared to earlier 1/18/2020. There is no cardiomegaly, significant pleural effusion, vascular congestion, pneumothorax, or displaced fractures identified. ENDOTRACHEAL TUBE TIP APPROXIMATELY 3 CM ABOVE THE PEGGY. SIGNIFICANTLY POOR INSPIRATION WITH MILDLY WORSENING MODERATE BIBASILAR INFILTRATE/ATELECTASIS. Xr Chest Portable    Result Date: 1/18/2020  XR CHEST PORTABLE : 1/18/2020 CLINICAL HISTORY:  cough . COMPARISON: 1/7/2020. A portable upright AP radiograph of the chest was obtained. FINDINGS: A poor inspiratory volume is present with mild probable bibasilar atelectasis. Bronchopneumonia should be excluded clinically. There is no cardiomegaly, significant pleural effusion, vascular congestion, pneumothorax, or displaced fractures identified. POOR INSPIRATION WITH MILD PROBABLE BIBASILAR ATELECTASIS. BRONCHOPNEUMONIA SHOULD BE EXCLUDED CLINICALLY. Ct 3d Reconstruction    Result Date: 1/18/2020  CT ANKLE RIGHT WO CONTRAST, CT 3D RECONSTRUCTION : 1/18/2020 CLINICAL HISTORY: pain . COMPARISON: Right foot and ankle radiographs from earlier 1/18/2020. TECHNIQUE: Spiral unenhanced imaging of the right ankle was performed, with routine multiplanar reconstructions and volume rendered images obtained on a 3-D workstation. All CT scans at this facility use dose modulation, iterative reconstruction, and/or weight based dosing when appropriate to reduce radiation dose to as low as reasonably achievable.  FINDINGS: A vertically oriented nondisplaced oblique fracture of the medial ankle mortise extends into the distal tibial metadiaphysis. Several small old avulsion fracture fragments are noted inferior to both malleoli; as well as soft tissue swelling about the ankle. There is no other fracture, radiodense foreign bodies, pathologic calcifications, or worrisome bone destruction identified. The visualized joint spaces and ankle mortise are intact. NONDISPLACED VERTICALLY ORIENTED MEDIAL MALLEOLAR/DISTAL TIBIAL FRACTURE AS NOTED. Lab Results   Component Value Date    WBC 16.5 01/18/2020    RBC 4.45 01/18/2020    HGB 13.6 01/19/2020    HCT 41.0 01/18/2020    MCV 92.0 01/18/2020    MCH 31.4 01/18/2020    MCHC 34.1 01/18/2020    RDW 15.8 01/18/2020     01/18/2020    MPV 7.6 10/21/2015     Lab Results   Component Value Date     01/19/2020    K 4.8 01/19/2020     01/19/2020    CO2 29 01/19/2020    BUN 15 01/19/2020    CREATININE 1.37 01/19/2020    GFRAA >60.0 01/19/2020    LABGLOM 54.4 01/19/2020    GLUCOSE 145 01/19/2020    PROT 6.9 01/18/2020    LABALBU 3.5 01/18/2020    CALCIUM 8.1 01/19/2020    BILITOT 0.3 01/18/2020    ALKPHOS 103 01/18/2020    AST 15 01/18/2020    ALT 11 01/18/2020     Lab Results   Component Value Date    PROTIME 10.4 08/22/2018    INR 1.0 08/22/2018     Lab Results   Component Value Date    TSH 2.880 02/05/2019     Lab Results   Component Value Date    TRIG 404 08/22/2018    HDL 22 08/22/2018    LDLCALC see below 08/22/2018     Lab Results   Component Value Date    LABAMPH Neg 10/29/2019    BARBSCNU Neg 10/29/2019    LABBENZ Neg 10/29/2019    LABBENZ DTCD 07/28/2012    LABMETH Neg 10/29/2019    OPIATESCREENURINE Neg 10/29/2019    PHENCYCLIDINESCREENURINE Neg 10/29/2019    ETOH <10 01/18/2020     No results found for: LITHIUM, DILFRTOT, VALPROATE    Assessment:   Encephalopathy related to acute respiratory failure.   Patient does have sleep apnea and is likely that he had an accumulation of carbon dioxide with acidosis which may have initially caused his medical issues and a fall with sustained fracture in the right lower extremity and now much improved with intubation. Neurological examination is nonfocal CT scan of the head was reviewed. This is normal. we await extubation and then reexamine him. Detailed ER records and ICU records are reviewed.       Plan:

## 2020-01-19 NOTE — CARE COORDINATION
PER NURSING PT IS FROM HOME WITH FAMILY. PT CURRENTLY ON VENT. RN TO CALL CM IF FAMILY COMES IN. WILL CHECK TOMORROW.

## 2020-01-19 NOTE — ANESTHESIA PROCEDURE NOTES
Airway  Date/Time: 1/18/2020 10:41 PM  Urgency: emergent    Difficult airway    General Information and Staff    Patient location during procedure: ICU    Consent for Airway (if performed for an anesthetic, see related documentation for consents)  Patient identity confirmed: per hospital policy  Consent: The procedure was performed in an emergent situation. Verbal consent not obtained. Written consent not obtained.   Risks and benefits: risks, benefits and alternatives were not discussed      Indications and Patient Condition  Indications for airway management: respiratory distress and respiratory failure  Spontaneous ventilation: present  Sedation level: minimal  Preoxygenated: yes  Patient position: sniffing  MILS maintained throughout  Mask difficulty assessment: vent by bag mask + OA or adjuvant +/- NMBA    Final Airway Details  Final airway type: endotracheal airway      Successful airway: ETT  Cuffed: yes   Successful intubation technique: video laryngoscopy  Endotracheal tube insertion site: oral  Blade size: #4  ETT size (mm): 7.5  Cormack-Lehane Classification: grade IIa - partial view of glottis  Placement verified by: chest auscultation   Measured from: lips  ETT to lips (cm): 24  Number of attempts at approach: 2 (2 by anesthesia-unknown previous attemtps.)  Ventilation between attempts: 2 hand mask  Number of other approaches attempted: 1

## 2020-01-19 NOTE — ED NOTES
Pt sitting in wheel chair per request. PT alert, resps even and unlabored. PT declined po percocet. Requested tordol. Provider made aware.  PT to be medicated after CT     Tamera Gutierrez RN  01/19/20 [id number]

## 2020-01-19 NOTE — PROGRESS NOTES
The following substitutions have been approved by the P&T Committee. They are intended to provide the patient with most appropriate care possible. CURRENT ORDER: duoneb q4wa______________________________________________________    SUBSTITUTED TO:    If Beta2 Agonist ordered, substitute:   [x] Albuterol Sulfate MDI 4 puffs by inhalation at the same frequency    If Anticholinergic Bronchodilator ordered without Beta2 Agonist, substitute:   [x] Ipratropium Bromide (Atrovent) MDI 4 puffs by inhalation at the same frequency    If Beta2 Agonist and Anticholinergic Bronchodilator ordered, substitute:   [] Ipratropium Bromide and Albuterol Sulfate (Combivent) MDI 4 puffs by inhalation at the same frequency    If an Inhaled Steroid ordered, substitute:   [] Beclomethasone (Qvar) 80 mcg 2 puffs by inhalation BID    After extubation the patient may be converted to nebulizer order if they are unable to perform the MDI.     Physician signature not required, per protocol

## 2020-01-19 NOTE — CONSULTS
sodium chloride bolus  1,000 mL Intravenous Once Cameron Jones,            Family History    History reviewed. No pertinent family history. Social History:    Social History     Socioeconomic History    Marital status:      Spouse name: Not on file    Number of children: Not on file    Years of education: Not on file    Highest education level: Not on file   Occupational History    Not on file   Social Needs    Financial resource strain: Not on file    Food insecurity:     Worry: Not on file     Inability: Not on file    Transportation needs:     Medical: Not on file     Non-medical: Not on file   Tobacco Use    Smoking status: Current Every Day Smoker     Packs/day: 1.00     Years: 15.00     Pack years: 15.00     Types: Cigarettes    Smokeless tobacco: Never Used   Substance and Sexual Activity    Alcohol use: No    Drug use: Not Currently     Types: Opiates     Sexual activity: Yes     Partners: Female   Lifestyle    Physical activity:     Days per week: Not on file     Minutes per session: Not on file    Stress: Not on file   Relationships    Social connections:     Talks on phone: Not on file     Gets together: Not on file     Attends Hinduism service: Not on file     Active member of club or organization: Not on file     Attends meetings of clubs or organizations: Not on file     Relationship status: Not on file    Intimate partner violence:     Fear of current or ex partner: Not on file     Emotionally abused: Not on file     Physically abused: Not on file     Forced sexual activity: Not on file   Other Topics Concern    Not on file   Social History Narrative    Not on file       Review of Systems:  A 14 point review of systems was completed, reviewed and placed in the patients chart. It is otherwise negative except for the pertinent information in the HPI. Constitutional: Negative for fever, appetite change and unexpected weight change.    HENT: Negative for hearing loss, TIBIAL FRACTURE AS NOTED. Ct Head Wo Contrast    Result Date: 1/18/2020  CT HEAD WO CONTRAST : 1/18/2020 CLINICAL HISTORY:  change in mental status . COMPARISON: 10/29/2019. TECHNIQUE: Spiral unenhanced images were obtained of the head, with routine multiplanar reconstructions performed. All CT scans at this facility use dose modulation, iterative reconstruction, and/or weight based dosing when appropriate to reduce radiation dose to as low as reasonably achievable. FINDINGS: There is no intracranial hemorrhage, mass effect, midline shift, extra-axial collection, evidence of hydrocephalus, recent ischemic infarct, or skull fracture identified. There is no significant atrophy or white matter changes, for age. Moderate mucosal thickening within the paranasal sinuses again noted. The mastoid air cells are clear. NO ACUTE INTRACRANIAL PROCESS, OR SIGNIFICANT CHANGE FROM 10/29/2019 IDENTIFIED. Ct Ankle Right Wo Contrast    Result Date: 1/18/2020  CT ANKLE RIGHT WO CONTRAST, CT 3D RECONSTRUCTION : 1/18/2020 CLINICAL HISTORY: pain . COMPARISON: Right foot and ankle radiographs from earlier 1/18/2020. TECHNIQUE: Spiral unenhanced imaging of the right ankle was performed, with routine multiplanar reconstructions and volume rendered images obtained on a 3-D workstation. All CT scans at this facility use dose modulation, iterative reconstruction, and/or weight based dosing when appropriate to reduce radiation dose to as low as reasonably achievable. FINDINGS: A vertically oriented nondisplaced oblique fracture of the medial ankle mortise extends into the distal tibial metadiaphysis. Several small old avulsion fracture fragments are noted inferior to both malleoli; as well as soft tissue swelling about the ankle. There is no other fracture, radiodense foreign bodies, pathologic calcifications, or worrisome bone destruction identified. The visualized joint spaces and ankle mortise are intact.      NONDISPLACED VERTICALLY ORIENTED MEDIAL MALLEOLAR/DISTAL TIBIAL FRACTURE AS NOTED. Xr Chest Portable    Result Date: 1/19/2020  XR CHEST PORTABLE : 1/18/2020 CLINICAL HISTORY: Tube placement. COMPARISON: Earlier 1/18/2020. TECHNIQUE: A portable upright AP radiograph of the chest was obtained at approximately 11:38 PM. FINDINGS: Since the earlier study, endotracheal tube has migrated distally, with its tip extending into the right main bronchus, which should be withdrawn approximately 4-5 cm. There is been interval placement of an orogastric tube with its tip coiled overlying the fundus of the stomach, likely in good position. Significantly shallow inspiratory volumes are present with patchy airspace and interstitial infiltrates worsening inferiorly, which are probably mildly worsened compared to earlier. There is no significant cardiomegaly, worsening vascular congestion, pleural effusion, pneumothorax, or displaced fractures identified. ENDOTRACHEAL TUBE EXTENDING INTO THE RIGHT MAIN BRONCHUS, WHICH SHOULD BE WITHDRAWN 4 TO 5 CM. OROGASTRIC TUBE IN GOOD APPARENT POSITION. SHALLOW INSPIRATORY VOLUMES WITH PATCHY BILATERAL PULMONARY INFILTRATES. MOST LIKELY POSSIBILITIES ARE EDEMA, ATELECTASIS, BRONCHOPNEUMONIA AND/OR ARDS. The findings were discussed with the patient's nurse at approximately 7:20 AM on 1/19/2020. Xr Chest Portable    Result Date: 1/18/2020  XR CHEST PORTABLE : 1/18/2020 CLINICAL HISTORY:  ET tube placement . COMPARISON: Earlier 1/18/2020. A portable upright AP radiograph of the chest was obtained at approximately 8:40 PM. FINDINGS: An endotracheal tube is noted with its approximately 3 cm above the peggy. A significantly poor inspiratory volume is present with moderate bibasilar infiltrate/atelectasis,, which appears worsened when compared to earlier 1/18/2020. There is no cardiomegaly, significant pleural effusion, vascular congestion, pneumothorax, or displaced fractures identified. ENDOTRACHEAL TUBE TIP APPROXIMATELY 3 CM ABOVE THE GLADIS. SIGNIFICANTLY POOR INSPIRATION WITH MILDLY WORSENING MODERATE BIBASILAR INFILTRATE/ATELECTASIS. Xr Chest Portable    Result Date: 1/18/2020  XR CHEST PORTABLE : 1/18/2020 CLINICAL HISTORY:  cough . COMPARISON: 1/7/2020. A portable upright AP radiograph of the chest was obtained. FINDINGS: A poor inspiratory volume is present with mild probable bibasilar atelectasis. Bronchopneumonia should be excluded clinically. There is no cardiomegaly, significant pleural effusion, vascular congestion, pneumothorax, or displaced fractures identified. POOR INSPIRATION WITH MILD PROBABLE BIBASILAR ATELECTASIS. BRONCHOPNEUMONIA SHOULD BE EXCLUDED CLINICALLY. Ct 3d Reconstruction    Result Date: 1/18/2020  CT ANKLE RIGHT WO CONTRAST, CT 3D RECONSTRUCTION : 1/18/2020 CLINICAL HISTORY: pain . COMPARISON: Right foot and ankle radiographs from earlier 1/18/2020. TECHNIQUE: Spiral unenhanced imaging of the right ankle was performed, with routine multiplanar reconstructions and volume rendered images obtained on a 3-D workstation. All CT scans at this facility use dose modulation, iterative reconstruction, and/or weight based dosing when appropriate to reduce radiation dose to as low as reasonably achievable. FINDINGS: A vertically oriented nondisplaced oblique fracture of the medial ankle mortise extends into the distal tibial metadiaphysis. Several small old avulsion fracture fragments are noted inferior to both malleoli; as well as soft tissue swelling about the ankle. There is no other fracture, radiodense foreign bodies, pathologic calcifications, or worrisome bone destruction identified. The visualized joint spaces and ankle mortise are intact. NONDISPLACED VERTICALLY ORIENTED MEDIAL MALLEOLAR/DISTAL TIBIAL FRACTURE AS NOTED.          Problem List    Patient Active Problem List   Diagnosis    Acute encephalopathy    Accidental drug overdose    Chronic back pain    Fatty infiltration of liver    Acid reflux    Herniation of lumbar intervertebral disc without myelopathy    Current smoker    Lumbar canal stenosis    Anginal chest pain at rest [facility])    Change in mental status    Non-traumatic rhabdomyolysis    Leukocytosis    Chronic pain syndrome    Aspiration into airway    Accidental overdose of heroin ([facility] [address]    Acute respiratory failure with hypercapnia (HCC)    Hypotension due to hypovolemia          Note - This record has been created using [employer]. Chart creation errors have been sought, but may not always have been located. Such creation errors do not reflect on the standard of medical care.

## 2020-01-19 NOTE — CONSULTS
Pulmonary Medicine  Consult Note      Reason for consultation: Respiratory failure status post intubation on vent support    Consulting physician: Dr. Neisha Wynntingham:    This is a 28-year-old male with past medical history significant for hypertension, chronic back pain, obesity, lumbar radiculopathy chronic, encephalopathy, heroin use had fall at home and sustaining right ankle fracture currently on temporary cast.  He took Lyrica due to pain. Patient was very lethargic encephalopathy yesterday, he was placed on BiPAP. Patient was transferred to ICU and eventually required intubation and vent support. Patient was difficult intubation. Plus ET tube has cuff leak and required to exchange ET tube. Pulmonary was consulted. Patient was hypotensive improved with fluid and Levophed. Chest x-ray showing ET tube in right mainstem bronchus. ET tube was pulled out and repeat chest x-ray at this morning shows ET tube near the peggy. He is currently on a vent support with assist control with rate of 18 tidal volume 450 FiO2 of 50% and PEEP of 7. ABG done early this morning shows pH 7.308 PCO2 61 PO2 74 saturation 93 bicarb 30. 7. Rudi Garcia Currently O2 saturation 95%. patient is sedated with Precedex. He is arousable and comfortable in bed      Past Medical History:        Diagnosis Date    Aspiration into airway     Chronic pain     in neck & back, in pain management    Heroin use     Hypertension     Lumbar radiculopathy, chronic     Metabolic encephalopathy     medication induced    Non-traumatic rhabdomyolysis     Obesity     Pneumonia        Past Surgical History:        Procedure Laterality Date    BACK SURGERY      x2    NECK SURGERY      x2       Social History:     reports that he has been smoking cigarettes. He has a 15.00 pack-year smoking history. He has never used smokeless tobacco. He reports previous drug use. Drug: Opiates . He reports that he does not drink alcohol.     Family abnormality, normal reflex and sensation, no focal weakness   Skin: Warm and dry. No erythema rash on exposed extremities. Data Review  Recent Labs     01/18/20  1630 01/18/20 1736 01/18/20 1947 01/19/20 0059   WBC 16.5*  --   --   --    HGB 14.0 14.2 14.8 13.6   HCT 41.0*  --   --   --      --   --   --       Recent Labs     01/18/20  1645  01/18/20 1947 01/19/20 0059 01/19/20  0518     --   --   --  138   K 3.8  --   --   --  4.8   CL 92*  --   --   --  100   CO2 30  --   --   --  29   BUN 10  --   --   --  15   CREATININE 1.92*   < > 1.8* 1.5* 1.37*   GLUCOSE 120*  --   --   --  145*    < > = values in this interval not displayed. MV Settings:     Vent Mode: (P) AC/VC  Vt Ordered: 450 mL  Rate Set: 18 bmp  PEEP/CPAP: 7  Peak Inspiratory Pressure: 22 cmH2O  I:E Ratio: 1:2.60    ABGs:   Recent Labs     01/18/20 1736 01/18/20 1947 01/19/20 0059   PHART 7.333* 7.319* 7.308*   LEJ0RXA 65* 69* 61*   PO2ART 68* 76* 74*   DGY1YPC 34.5* 35.4* 30.7*   BEART 9* 9* 4*   X3JRLCKH 91* 93* 93*   LNC4XYG 37* 38* 33*     O2 Device: Ventilator  Lab Results   Component Value Date    LACTA 2.9 10/29/2019    LACTA 1.9 10/28/2019    LACTA 2.8 02/03/2019       Radiology  Xr Chest (single View Frontal)    Result Date: 1/19/2020  XR CHEST (SINGLE VIEW FRONTAL) : 1/19/2020 8:30 AM CLINICAL HISTORY:  ET tube placement . COMPARISON: None available. A portable upright AP radiograph of the chest was obtained. FINDINGS: An endotracheal tube has been mildly withdrawn with its tip approximately 1 cm above the peggy. An orogastric tube is again noted. A poor inspiratory volume is present with improving mild to moderate bibasilar infiltrates. There is no cardiomegaly, significant pleural effusion, vascular congestion, pneumothorax, or displaced fractures identified. MILD WITHDRAWAL OF AN ENDOTRACHEAL TUBE, WHICH IS STILL SOMEWHAT LOW IN POSITION.  IMPROVING PULMONARY INFILTRATES WITH MILD TO MODERATE RESIDUAL BIBASILAR OPACITIES. OTHERWISE, STABLE CHEST FROM 2020. Xr Chest Standard (2 Vw)    Result Date: 2020  Patient MRN: [id number] : 1965 Age:  48 years Gender: Male Order Date: 2020 2:30 PM. Exam: XR CHEST (2 VW) Number of Views: 2 Indication:  Cough x1 week. Evaluate for pneumonia Comparison: 11/3/2019 Findings: Evidence previous anterior cervical fusion. Cardiomediastinal silhouette is within normal limits. Lungs are clear without evidence of infiltrate/pneumonia, pneumothorax or pleural effusion. Impression:  No radiographic evidence of acute cardiopulmonary disease     Xr Ankle Right (min 3 Views)    Result Date: 2020  XR FOOT RIGHT (MIN 3 VIEWS), XR ANKLE RIGHT (MIN 3 VIEWS) : 2020 CLINICAL HISTORY: Pain after injury. COMPARISON: None available. TECHNIQUE: AP, lateral and oblique radiographs of the right foot, and AP, lateral and oblique radiographs of the right ankle were obtained. FINDINGS: A vertically oriented nondisplaced oblique fracture of the medial ankle mortise extends into the distal tibial metadiaphysis. Several small old avulsion fracture fragments are noted inferior to both malleoli; as well as soft tissue swelling about the ankle. There is no other fracture, radiodense foreign bodies, pathologic calcifications, or worrisome bone destruction identified. The visualized joint spaces and ankle mortise are intact. Mild to moderate degenerative changes are present of the first MTP joint. NONDISPLACED VERTICALLY ORIENTED MEDIAL MALLEOLAR/DISTAL TIBIAL FRACTURE AS NOTED. Xr Foot Right (min 3 Views)    Result Date: 2020  XR FOOT RIGHT (MIN 3 VIEWS), XR ANKLE RIGHT (MIN 3 VIEWS) : 2020 CLINICAL HISTORY: Pain after injury. COMPARISON: None available. TECHNIQUE: AP, lateral and oblique radiographs of the right foot, and AP, lateral and oblique radiographs of the right ankle were obtained.  FINDINGS: A vertically oriented nondisplaced oblique fracture of the medial ankle mortise extends into the distal tibial metadiaphysis. Several small old avulsion fracture fragments are noted inferior to both malleoli; as well as soft tissue swelling about the ankle. There is no other fracture, radiodense foreign bodies, pathologic calcifications, or worrisome bone destruction identified. The visualized joint spaces and ankle mortise are intact. Mild to moderate degenerative changes are present of the first MTP joint. NONDISPLACED VERTICALLY ORIENTED MEDIAL MALLEOLAR/DISTAL TIBIAL FRACTURE AS NOTED. Ct Head Wo Contrast    Result Date: 1/18/2020  CT HEAD WO CONTRAST : 1/18/2020 CLINICAL HISTORY:  change in mental status . COMPARISON: 10/29/2019. TECHNIQUE: Spiral unenhanced images were obtained of the head, with routine multiplanar reconstructions performed. All CT scans at this facility use dose modulation, iterative reconstruction, and/or weight based dosing when appropriate to reduce radiation dose to as low as reasonably achievable. FINDINGS: There is no intracranial hemorrhage, mass effect, midline shift, extra-axial collection, evidence of hydrocephalus, recent ischemic infarct, or skull fracture identified. There is no significant atrophy or white matter changes, for age. Moderate mucosal thickening within the paranasal sinuses again noted. The mastoid air cells are clear. NO ACUTE INTRACRANIAL PROCESS, OR SIGNIFICANT CHANGE FROM 10/29/2019 IDENTIFIED. Ct Ankle Right Wo Contrast    Result Date: 1/18/2020  CT ANKLE RIGHT WO CONTRAST, CT 3D RECONSTRUCTION : 1/18/2020 CLINICAL HISTORY: pain . COMPARISON: Right foot and ankle radiographs from earlier 1/18/2020. TECHNIQUE: Spiral unenhanced imaging of the right ankle was performed, with routine multiplanar reconstructions and volume rendered images obtained on a 3-D workstation.  All CT scans at this facility use dose modulation, iterative reconstruction, and/or weight based dosing when appropriate to reduce radiation dose to as low as reasonably achievable. FINDINGS: A vertically oriented nondisplaced oblique fracture of the medial ankle mortise extends into the distal tibial metadiaphysis. Several small old avulsion fracture fragments are noted inferior to both malleoli; as well as soft tissue swelling about the ankle. There is no other fracture, radiodense foreign bodies, pathologic calcifications, or worrisome bone destruction identified. The visualized joint spaces and ankle mortise are intact. NONDISPLACED VERTICALLY ORIENTED MEDIAL MALLEOLAR/DISTAL TIBIAL FRACTURE AS NOTED. Xr Chest Portable    Result Date: 1/19/2020  XR CHEST PORTABLE : 1/18/2020 CLINICAL HISTORY: Tube placement. COMPARISON: Earlier 1/18/2020. TECHNIQUE: A portable upright AP radiograph of the chest was obtained at approximately 11:38 PM. FINDINGS: Since the earlier study, endotracheal tube has migrated distally, with its tip extending into the right main bronchus, which should be withdrawn approximately 4-5 cm. There is been interval placement of an orogastric tube with its tip coiled overlying the fundus of the stomach, likely in good position. Significantly shallow inspiratory volumes are present with patchy airspace and interstitial infiltrates worsening inferiorly, which are probably mildly worsened compared to earlier. There is no significant cardiomegaly, worsening vascular congestion, pleural effusion, pneumothorax, or displaced fractures identified. ENDOTRACHEAL TUBE EXTENDING INTO THE RIGHT MAIN BRONCHUS, WHICH SHOULD BE WITHDRAWN 4 TO 5 CM. OROGASTRIC TUBE IN GOOD APPARENT POSITION. SHALLOW INSPIRATORY VOLUMES WITH PATCHY BILATERAL PULMONARY INFILTRATES. MOST LIKELY POSSIBILITIES ARE EDEMA, ATELECTASIS, BRONCHOPNEUMONIA AND/OR ARDS. The findings were discussed with the patient's nurse at approximately 7:20 AM on 1/19/2020.      Xr Chest Portable    Result Date: 1/18/2020  XR CHEST PORTABLE : 1/18/2020 CLINICAL HISTORY:  ET

## 2020-01-19 NOTE — PROGRESS NOTES
Hospitalist Progress Note      PCP: Fabiana Colon MD    Date of Admission: 1/18/2020    Chief Complaint:      Subjective: : Increasing lethargy overnight and decision was made to intubate the patient. He remains intubated today, but is awake and alert and answering some questions. No other acute events overnight. Tolerating mechanical ventilation well with Precedex onboard. Medications:  Reviewed    Infusion Medications    norepinephrine Stopped (01/19/20 1215)    sodium chloride 125 mL/hr at 01/19/20 0030    sodium chloride      dexmedetomidine (PRECEDEX) IV infusion 0.6 mcg/kg/hr (01/19/20 1309)     Scheduled Medications    albuterol sulfate HFA  2 puff Inhalation Q4H While awake    ipratropium  2 puff Inhalation Q4H WA    sodium chloride flush  10 mL Intravenous 2 times per day    enoxaparin  40 mg Subcutaneous Daily    piperacillin-tazobactam  3.375 g Intravenous Q8H    methylPREDNISolone  40 mg Intravenous Daily    chlorhexidine  15 mL Mouth/Throat BID    famotidine  20 mg Per NG tube BID    sodium chloride  1,000 mL Intravenous Once     PRN Meds: sodium chloride flush, magnesium hydroxide, albuterol      Intake/Output Summary (Last 24 hours) at 1/19/2020 1452  Last data filed at 1/19/2020 1112  Gross per 24 hour   Intake 3120 ml   Output 2625 ml   Net 495 ml       Exam:    BP (!) 141/92   Pulse 59   Temp 98.9 °F (37.2 °C)   Resp 19   Ht 5' 11\" (1.803 m)   Wt 270 lb 15.1 oz (122.9 kg)   SpO2 93%   BMI 37.79 kg/m²     General appearance: Intubated and sedated     Neck: Supple, with full range of motion. No jugular venous distention. Trachea midline. Respiratory: Intubated. cLear to auscultation, bilaterally without Rales/Wheezes/Rhonchi. Cardiovascular: Regular rate and rhythm with normal S1/S2 without murmurs, rubs or gallops. Abdomen: Soft, non-tender, non-distended with normal bowel sounds. Musculoskeletal: No clubbing, cyanosis or edema bilaterally.   Full range of motion Final Result      ENDOTRACHEAL TUBE TIP APPROXIMATELY 3 CM ABOVE THE GLADIS. SIGNIFICANTLY POOR INSPIRATION WITH MILDLY WORSENING MODERATE BIBASILAR INFILTRATE/ATELECTASIS. CT Head WO Contrast   Final Result      NO ACUTE INTRACRANIAL PROCESS, OR SIGNIFICANT CHANGE FROM 10/29/2019 IDENTIFIED. XR CHEST PORTABLE   Final Result      POOR INSPIRATION WITH MILD PROBABLE BIBASILAR ATELECTASIS. BRONCHOPNEUMONIA SHOULD BE EXCLUDED CLINICALLY. CT ANKLE RIGHT WO CONTRAST   Final Result      NONDISPLACED VERTICALLY ORIENTED MEDIAL MALLEOLAR/DISTAL TIBIAL FRACTURE AS NOTED. CT 3D RECONSTRUCTION   Final Result      NONDISPLACED VERTICALLY ORIENTED MEDIAL MALLEOLAR/DISTAL TIBIAL FRACTURE AS NOTED. XR ANKLE RIGHT (MIN 3 VIEWS)   Final Result      NONDISPLACED VERTICALLY ORIENTED MEDIAL MALLEOLAR/DISTAL TIBIAL FRACTURE AS NOTED. XR FOOT RIGHT (MIN 3 VIEWS)   Final Result      NONDISPLACED VERTICALLY ORIENTED MEDIAL MALLEOLAR/DISTAL TIBIAL FRACTURE AS NOTED. XR CHEST PORTABLE    (Results Pending)   XR CHEST (SINGLE VIEW FRONTAL)    (Results Pending)           Assessment/Plan:    Active Hospital Problems    Diagnosis Date Noted    Hypotension due to hypovolemia [I95.89, E86.1] 01/18/2020        1) nondisplaced medial malleoli are/distal tibia fracture  2) acute hypercapnic respiratory failure: requiring intubation  3) toxic encephalopathy  4) accidental drug overdose:. Lyrica  5) history of drug abuse  6) hypertension    Plan:  - Continue mechanical ventilation  - Critical care following for vent management  - Continue SBT trials daily/ICU prophylaxis  - Serial blood work  - Monitor QTC  - Orthopedics on consult for fracture  - Continue PRN antihypertensive regimen    Additional work up or/and treatment plan may be added today or then after based on clinical progression.  I am managing a portion of pt care. Some medical issues are handled by other specialists. Additional work up and treatment should be done in out pt setting by pt PCP and other out pt providers. In addition to examining and evaluating pt, I spent additional time explaining care, normal and abnormal findings, and treatment plan. All of pt questions were answered. Counseling, diet and education were  provided. Case will be discussed with nursing staff when appropriate. Family will be updated if and when appropriate.       Diet: No diet orders on file    Code Status: Full Code    PT/OT Eval           Electronically signed by Arlen Ritter DO on 1/19/2020 at 2:52 PM Pain management with PO meds  PO hydration  Urine culture  Flomax 0.4 mg daily  Strain urine  Case discussed with Dr Mcgowan/Dr Damico  F/U with Urology in 1-2 weeks [phone]  If febrile, uncontrolled pain, unable to tolerate PO, return To ED

## 2020-01-19 NOTE — PROGRESS NOTES
2018: Pt transferred from ED.    2030: Pt assessed and medicated per [facility].     2200: Hospitalist contacted about decline in pt respiratory status. Pt increasingly lethargic and respirations becoming labored. Pt SP02 in low 90s on 50% FiO2.     2230: Pt is intubated by anesthesia. 2300: Leak in ETT cuff discovered. Anasthesia contacted to change the tube. OG placed and x-ray completed to verify placement. 2330: Hospitalist contacted about hypotension. 1L NS bolus ordered. 0000: Pt assessed. See flowsheets. 0116: Dr. Osbaldo Rausch contacted about vent settings post ABG.     0125: Pt wife contacted about pt intubation. 0400: Pt assessed. No change. 0687: Pt continues to be hypotensive. Levophed ordered. 0600: Pt is more responsive than overnight. Pt gestures appropriately and follows commands.

## 2020-01-20 ENCOUNTER — APPOINTMENT (OUTPATIENT)
Dept: GENERAL RADIOLOGY | Age: 55
DRG: 208 | End: 2020-01-20
Payer: MEDICARE

## 2020-01-20 LAB
ALBUMIN SERPL-MCNC: 2.8 G/DL (ref 3.5–4.6)
ALP BLD-CCNC: 75 U/L (ref 35–104)
ALT SERPL-CCNC: 12 U/L (ref 0–41)
ANION GAP SERPL CALCULATED.3IONS-SCNC: 11 MEQ/L (ref 9–15)
AST SERPL-CCNC: 12 U/L (ref 0–40)
BASE EXCESS ARTERIAL: 7 (ref -3–3)
BASE EXCESS ARTERIAL: 8 (ref -3–3)
BASOPHILS ABSOLUTE: 0.1 K/UL (ref 0–0.2)
BASOPHILS RELATIVE PERCENT: 1.1 %
BILIRUB SERPL-MCNC: 0.3 MG/DL (ref 0.2–0.7)
BUN BLDV-MCNC: 19 MG/DL (ref 6–20)
C-REACTIVE PROTEIN, HIGH SENSITIVITY: 121.3 MG/L (ref 0–5)
CALCIUM IONIZED: 1.14 MMOL/L (ref 1.12–1.32)
CALCIUM IONIZED: 1.15 MMOL/L (ref 1.12–1.32)
CALCIUM SERPL-MCNC: 8.5 MG/DL (ref 8.5–9.9)
CHLORIDE BLD-SCNC: 99 MEQ/L (ref 95–107)
CO2: 29 MEQ/L (ref 20–31)
CREAT SERPL-MCNC: 0.85 MG/DL (ref 0.7–1.2)
EOSINOPHILS ABSOLUTE: 0.1 K/UL (ref 0–0.7)
EOSINOPHILS RELATIVE PERCENT: 0.6 %
GFR AFRICAN AMERICAN: >60
GFR NON-AFRICAN AMERICAN: >60
GLOBULIN: 3.2 G/DL (ref 2.3–3.5)
GLUCOSE BLD-MCNC: 119 MG/DL (ref 60–115)
GLUCOSE BLD-MCNC: 70 MG/DL (ref 60–115)
GLUCOSE BLD-MCNC: 83 MG/DL (ref 70–99)
HCO3 ARTERIAL: 27.4 MMOL/L (ref 21–29)
HCO3 ARTERIAL: 31.3 MMOL/L (ref 21–29)
HCT VFR BLD CALC: 35.7 % (ref 42–52)
HEMOGLOBIN: 11.7 GM/DL (ref 13.5–17.5)
HEMOGLOBIN: 12.1 G/DL (ref 14–18)
HEMOGLOBIN: 13.6 GM/DL (ref 13.5–17.5)
LACTATE: 0.74 MMOL/L (ref 0.4–2)
LACTATE: 0.86 MMOL/L (ref 0.4–2)
LV EF: 60 %
LVEF MODALITY: NORMAL
LYMPHOCYTES ABSOLUTE: 3.9 K/UL (ref 1–4.8)
LYMPHOCYTES RELATIVE PERCENT: 36.8 %
MAGNESIUM: 1.8 MG/DL (ref 1.7–2.4)
MCH RBC QN AUTO: 31.2 PG (ref 27–31.3)
MCHC RBC AUTO-ENTMCNC: 33.8 % (ref 33–37)
MCV RBC AUTO: 92.3 FL (ref 80–100)
MONOCYTES ABSOLUTE: 0.8 K/UL (ref 0.2–0.8)
MONOCYTES RELATIVE PERCENT: 7.1 %
NEUTROPHILS ABSOLUTE: 5.8 K/UL (ref 1.4–6.5)
NEUTROPHILS RELATIVE PERCENT: 54.4 %
O2 SAT, ARTERIAL: 100 % (ref 93–100)
O2 SAT, ARTERIAL: 95 % (ref 93–100)
PCO2 ARTERIAL: 22 MM HG (ref 35–45)
PCO2 ARTERIAL: 45 MM HG (ref 35–45)
PDW BLD-RTO: 15.4 % (ref 11.5–14.5)
PERFORMED ON: ABNORMAL
PERFORMED ON: ABNORMAL
PH ARTERIAL: 7.45 (ref 7.35–7.45)
PH ARTERIAL: 7.7 (ref 7.35–7.45)
PLATELET # BLD: 279 K/UL (ref 130–400)
PO2 ARTERIAL: 162 MM HG (ref 75–108)
PO2 ARTERIAL: 71 MM HG (ref 75–108)
POC CHLORIDE: 101 MEQ/L (ref 99–110)
POC CHLORIDE: 106 MEQ/L (ref 99–110)
POC CREATININE: 0.4 MG/DL (ref 0.9–1.3)
POC CREATININE: 0.8 MG/DL (ref 0.9–1.3)
POC FIO2: 40
POC FIO2: 50
POC HEMATOCRIT: 34 % (ref 41–53)
POC HEMATOCRIT: 40 % (ref 41–53)
POC POTASSIUM: 3.8 MEQ/L (ref 3.5–5.1)
POC POTASSIUM: 4.7 MEQ/L (ref 3.5–5.1)
POC SAMPLE TYPE: ABNORMAL
POC SAMPLE TYPE: ABNORMAL
POC SODIUM: 139 MEQ/L (ref 136–145)
POC SODIUM: 142 MEQ/L (ref 136–145)
POTASSIUM SERPL-SCNC: 3.8 MEQ/L (ref 3.4–4.9)
PROCALCITONIN: 0.14 NG/ML (ref 0–0.15)
RBC # BLD: 3.87 M/UL (ref 4.7–6.1)
SODIUM BLD-SCNC: 139 MEQ/L (ref 135–144)
TCO2 ARTERIAL: 28 (ref 22–29)
TCO2 ARTERIAL: 33 (ref 22–29)
TOTAL PROTEIN: 6 G/DL (ref 6.3–8)
WBC # BLD: 10.6 K/UL (ref 4.8–10.8)

## 2020-01-20 PROCEDURE — 80053 COMPREHEN METABOLIC PANEL: CPT

## 2020-01-20 PROCEDURE — 6370000000 HC RX 637 (ALT 250 FOR IP): Performed by: INTERNAL MEDICINE

## 2020-01-20 PROCEDURE — 6360000002 HC RX W HCPCS: Performed by: INTERNAL MEDICINE

## 2020-01-20 PROCEDURE — 2000000000 HC ICU R&B

## 2020-01-20 PROCEDURE — 2700000000 HC OXYGEN THERAPY PER DAY

## 2020-01-20 PROCEDURE — 71045 X-RAY EXAM CHEST 1 VIEW: CPT

## 2020-01-20 PROCEDURE — 94761 N-INVAS EAR/PLS OXIMETRY MLT: CPT

## 2020-01-20 PROCEDURE — 2580000003 HC RX 258: Performed by: INTERNAL MEDICINE

## 2020-01-20 PROCEDURE — 99233 SBSQ HOSP IP/OBS HIGH 50: CPT | Performed by: PSYCHIATRY & NEUROLOGY

## 2020-01-20 PROCEDURE — 85014 HEMATOCRIT: CPT

## 2020-01-20 PROCEDURE — 2500000003 HC RX 250 WO HCPCS: Performed by: INTERNAL MEDICINE

## 2020-01-20 PROCEDURE — C8929 TTE W OR WO FOL WCON,DOPPLER: HCPCS

## 2020-01-20 PROCEDURE — 93005 ELECTROCARDIOGRAM TRACING: CPT | Performed by: INTERNAL MEDICINE

## 2020-01-20 PROCEDURE — 99291 CRITICAL CARE FIRST HOUR: CPT | Performed by: INTERNAL MEDICINE

## 2020-01-20 PROCEDURE — 84145 PROCALCITONIN (PCT): CPT

## 2020-01-20 PROCEDURE — 83735 ASSAY OF MAGNESIUM: CPT

## 2020-01-20 PROCEDURE — 6360000002 HC RX W HCPCS: Performed by: HOSPITALIST

## 2020-01-20 PROCEDURE — 94640 AIRWAY INHALATION TREATMENT: CPT

## 2020-01-20 PROCEDURE — 94799 UNLISTED PULMONARY SVC/PX: CPT

## 2020-01-20 PROCEDURE — 93010 ELECTROCARDIOGRAM REPORT: CPT | Performed by: INTERNAL MEDICINE

## 2020-01-20 PROCEDURE — 84295 ASSAY OF SERUM SODIUM: CPT

## 2020-01-20 PROCEDURE — 82330 ASSAY OF CALCIUM: CPT

## 2020-01-20 PROCEDURE — 99223 1ST HOSP IP/OBS HIGH 75: CPT | Performed by: INTERNAL MEDICINE

## 2020-01-20 PROCEDURE — 82565 ASSAY OF CREATININE: CPT

## 2020-01-20 PROCEDURE — 36600 WITHDRAWAL OF ARTERIAL BLOOD: CPT

## 2020-01-20 PROCEDURE — 83605 ASSAY OF LACTIC ACID: CPT

## 2020-01-20 PROCEDURE — 94003 VENT MGMT INPAT SUBQ DAY: CPT

## 2020-01-20 PROCEDURE — 6360000002 HC RX W HCPCS

## 2020-01-20 PROCEDURE — 6360000004 HC RX CONTRAST MEDICATION: Performed by: INTERNAL MEDICINE

## 2020-01-20 PROCEDURE — 36415 COLL VENOUS BLD VENIPUNCTURE: CPT

## 2020-01-20 PROCEDURE — 82435 ASSAY OF BLOOD CHLORIDE: CPT

## 2020-01-20 PROCEDURE — 82803 BLOOD GASES ANY COMBINATION: CPT

## 2020-01-20 PROCEDURE — 84132 ASSAY OF SERUM POTASSIUM: CPT

## 2020-01-20 PROCEDURE — 85025 COMPLETE CBC W/AUTO DIFF WBC: CPT

## 2020-01-20 PROCEDURE — 86141 C-REACTIVE PROTEIN HS: CPT

## 2020-01-20 RX ORDER — PROPOFOL 10 MG/ML
10 INJECTION, EMULSION INTRAVENOUS
Status: DISCONTINUED | OUTPATIENT
Start: 2020-01-20 | End: 2020-01-21

## 2020-01-20 RX ORDER — UREA 10 %
2 LOTION (ML) TOPICAL ONCE
Status: COMPLETED | OUTPATIENT
Start: 2020-01-20 | End: 2020-01-20

## 2020-01-20 RX ORDER — POTASSIUM CHLORIDE 20 MEQ/1
20 TABLET, EXTENDED RELEASE ORAL ONCE
Status: COMPLETED | OUTPATIENT
Start: 2020-01-20 | End: 2020-01-20

## 2020-01-20 RX ORDER — LANOLIN ALCOHOL/MO/W.PET/CERES
400 CREAM (GRAM) TOPICAL ONCE
Status: COMPLETED | OUTPATIENT
Start: 2020-01-20 | End: 2020-01-20

## 2020-01-20 RX ORDER — KETOROLAC TROMETHAMINE 30 MG/ML
30 INJECTION, SOLUTION INTRAMUSCULAR; INTRAVENOUS ONCE
Status: COMPLETED | OUTPATIENT
Start: 2020-01-20 | End: 2020-01-20

## 2020-01-20 RX ORDER — IPRATROPIUM BROMIDE AND ALBUTEROL SULFATE 2.5; .5 MG/3ML; MG/3ML
1 SOLUTION RESPIRATORY (INHALATION) 3 TIMES DAILY
Status: DISCONTINUED | OUTPATIENT
Start: 2020-01-20 | End: 2020-01-23 | Stop reason: HOSPADM

## 2020-01-20 RX ORDER — PROPOFOL 10 MG/ML
INJECTION, EMULSION INTRAVENOUS
Status: COMPLETED
Start: 2020-01-20 | End: 2020-01-20

## 2020-01-20 RX ORDER — ALBUTEROL SULFATE 2.5 MG/3ML
2.5 SOLUTION RESPIRATORY (INHALATION) EVERY 4 HOURS PRN
Status: DISCONTINUED | OUTPATIENT
Start: 2020-01-20 | End: 2020-01-23 | Stop reason: HOSPADM

## 2020-01-20 RX ADMIN — IPRATROPIUM BROMIDE 2 PUFF: 17 AEROSOL, METERED RESPIRATORY (INHALATION) at 04:01

## 2020-01-20 RX ADMIN — DEXMEDETOMIDINE HYDROCHLORIDE 1 MCG/KG/HR: 100 INJECTION, SOLUTION INTRAVENOUS at 01:05

## 2020-01-20 RX ADMIN — ALBUTEROL SULFATE 2 PUFF: 90 AEROSOL, METERED RESPIRATORY (INHALATION) at 04:01

## 2020-01-20 RX ADMIN — Medication 10 ML: at 08:32

## 2020-01-20 RX ADMIN — SODIUM CHLORIDE: 9 INJECTION, SOLUTION INTRAVENOUS at 02:11

## 2020-01-20 RX ADMIN — FAMOTIDINE 20 MG: 20 TABLET ORAL at 09:38

## 2020-01-20 RX ADMIN — PERFLUTREN 1.65 MG: 6.52 INJECTION, SUSPENSION INTRAVENOUS at 14:20

## 2020-01-20 RX ADMIN — PIPERACILLIN SODIUM AND TAZOBACTAM SODIUM 3.38 G: 3; .375 INJECTION, POWDER, LYOPHILIZED, FOR SOLUTION INTRAVENOUS at 04:47

## 2020-01-20 RX ADMIN — KETOROLAC TROMETHAMINE 30 MG: 30 INJECTION, SOLUTION INTRAMUSCULAR; INTRAVENOUS at 22:10

## 2020-01-20 RX ADMIN — PROPOFOL 5 MCG/KG/MIN: 10 INJECTION, EMULSION INTRAVENOUS at 01:00

## 2020-01-20 RX ADMIN — IPRATROPIUM BROMIDE AND ALBUTEROL SULFATE 1 AMPULE: .5; 3 SOLUTION RESPIRATORY (INHALATION) at 19:58

## 2020-01-20 RX ADMIN — ENOXAPARIN SODIUM 40 MG: 40 INJECTION SUBCUTANEOUS at 08:32

## 2020-01-20 RX ADMIN — Medication 2 MG: at 22:10

## 2020-01-20 RX ADMIN — PIPERACILLIN SODIUM AND TAZOBACTAM SODIUM 3.38 G: 3; .375 INJECTION, POWDER, LYOPHILIZED, FOR SOLUTION INTRAVENOUS at 12:42

## 2020-01-20 RX ADMIN — Medication 400 MG: at 17:08

## 2020-01-20 RX ADMIN — SODIUM CHLORIDE 125 ML/HR: 9 INJECTION, SOLUTION INTRAVENOUS at 18:30

## 2020-01-20 RX ADMIN — CHLORHEXIDINE GLUCONATE 0.12% ORAL RINSE 15 ML: 1.2 LIQUID ORAL at 08:32

## 2020-01-20 RX ADMIN — POTASSIUM CHLORIDE 20 MEQ: 20 TABLET, EXTENDED RELEASE ORAL at 17:08

## 2020-01-20 RX ADMIN — IPRATROPIUM BROMIDE 2 PUFF: 17 AEROSOL, METERED RESPIRATORY (INHALATION) at 07:59

## 2020-01-20 RX ADMIN — METHYLPREDNISOLONE SODIUM SUCCINATE 40 MG: 40 INJECTION, POWDER, FOR SOLUTION INTRAMUSCULAR; INTRAVENOUS at 08:32

## 2020-01-20 RX ADMIN — ALBUTEROL SULFATE 2 PUFF: 90 AEROSOL, METERED RESPIRATORY (INHALATION) at 07:59

## 2020-01-20 RX ADMIN — IPRATROPIUM BROMIDE AND ALBUTEROL SULFATE 1 AMPULE: .5; 3 SOLUTION RESPIRATORY (INHALATION) at 14:51

## 2020-01-20 RX ADMIN — PIPERACILLIN SODIUM AND TAZOBACTAM SODIUM 3.38 G: 3; .375 INJECTION, POWDER, LYOPHILIZED, FOR SOLUTION INTRAVENOUS at 20:45

## 2020-01-20 RX ADMIN — PROPOFOL 15 MCG/KG/MIN: 10 INJECTION, EMULSION INTRAVENOUS at 09:39

## 2020-01-20 RX ADMIN — SODIUM CHLORIDE: 9 INJECTION, SOLUTION INTRAVENOUS at 10:18

## 2020-01-20 ASSESSMENT — PULMONARY FUNCTION TESTS
PIF_VALUE: 8
PIF_VALUE: 15
PIF_VALUE: 15
PIF_VALUE: 10
PIF_VALUE: 8
PIF_VALUE: 8
PIF_VALUE: 9
PIF_VALUE: 15
PIF_VALUE: 9
PIF_VALUE: 21
PIF_VALUE: 22
PIF_VALUE: 10
PIF_VALUE: 8
PIF_VALUE: 8
PIF_VALUE: 11
PIF_VALUE: 21
PIF_VALUE: 9
PIF_VALUE: 15
PIF_VALUE: 8
PIF_VALUE: 10
PIF_VALUE: 9
PIF_VALUE: 8
PIF_VALUE: 15

## 2020-01-20 ASSESSMENT — PAIN SCALES - GENERAL
PAINLEVEL_OUTOF10: 0
PAINLEVEL_OUTOF10: 8
PAINLEVEL_OUTOF10: 0

## 2020-01-20 NOTE — PROGRESS NOTES
0800 Assessment complete. Patient intubated and sedate. Opens eyes to verbal stimuli and follows simple commands. Mouth care done and patient repositioned for comfort. 0840 Ortho here and changed splint on right lower leg. 1000 Seen by Dr Kevan Eden on morning rounds, update given, Decreased propofol and placed on CPAP by Dr Kevan Eden. 1200 Patient extubated per resp therapy, patient yanira well. Placed on O2 4L NC, SPO2 92- 93%. Victor Hugo wrist restraints removed. 1210 SPO2 decreased to 89%, increased O2 to 5L NC, will cont to monitor. 1410 Echo being at present. No changes in assessment noted. 1515 Patients SPO2 dropping to 89% when he falls a sleep, resp therapy attempted to place patient on bipap but patient unable to yanira it. Encouraged patient to cough and deep breath as well as breathe through his nose. SOPO2 92% at present time. Will con to monitor. 1545 Around 1310 patient had about a 6 second pause was totally asytmptomatic, eating lunch. Dr Jana ralph servered to notify. 1645 Seen by Dr Marc Cortez, updated and shown EKG strip of patients pause on the monitor. Discussed plan condition and plan of care with patient and his wife. 1800 Resting watching TV, No changes in assessment noted.

## 2020-01-20 NOTE — PROGRESS NOTES
Hospitalist Progress Note      PCP: Jarocho Brown MD    Date of Admission: 1/18/2020    Chief Complaint:  Patient is intubated, on mechanical ventilation, on CPAP trial, off norepinephrine infusion    Medications:  Reviewed    Infusion Medications    propofol 15 mcg/kg/min (01/20/20 0939)    norepinephrine Stopped (01/19/20 1215)    sodium chloride 125 mL/hr at 01/20/20 0211    sodium chloride      dexmedetomidine (PRECEDEX) IV infusion Stopped (01/20/20 0630)     Scheduled Medications    albuterol sulfate HFA  2 puff Inhalation Q4H While awake    ipratropium  2 puff Inhalation Q4H WA    sodium chloride flush  10 mL Intravenous 2 times per day    enoxaparin  40 mg Subcutaneous Daily    piperacillin-tazobactam  3.375 g Intravenous Q8H    methylPREDNISolone  40 mg Intravenous Daily    chlorhexidine  15 mL Mouth/Throat BID    famotidine  20 mg Per NG tube BID    sodium chloride  1,000 mL Intravenous Once     PRN Meds: hydrALAZINE, sodium chloride flush, magnesium hydroxide, albuterol      Intake/Output Summary (Last 24 hours) at 1/20/2020 1016  Last data filed at 1/20/2020 0630  Gross per 24 hour   Intake 3595 ml   Output 2750 ml   Net 845 ml       Exam:    BP (!) 129/59   Pulse 75   Temp 98.7 °F (37.1 °C) (Axillary)   Resp 14   Ht 5' 11\" (1.803 m)   Wt 266 lb 5.1 oz (120.8 kg)   SpO2 96%   BMI 37.14 kg/m²     General appearance: intubated, on mechanical ventilation. Respiratory:  Clear to auscultation, bilaterally on anterior chest.  Cardiovascular: Regular rate and rhythm with normal S1/S2 . Abdomen: Soft, active bowel sounds. Musculoskeletal: right LE wrapped with splint in place. Labs:   Recent Labs     01/18/20  1630  01/18/20  1947 01/19/20  0059 01/20/20  0459   WBC 16.5*  --   --   --   --    HGB 14.0   < > 14.8 13.6 13.6   HCT 41.0*  --   --   --   --      --   --   --   --     < > = values in this interval not displayed.      Recent Labs     01/18/20  1645  01/19/20 0059 01/19/20  0518 01/20/20  0459     --   --  138  --    K 3.8  --   --  4.8  --    CL 92*  --   --  100  --    CO2 30  --   --  29  --    BUN 10  --   --  15  --    CREATININE 1.92*   < > 1.5* 1.37* 0.8*   CALCIUM 8.6  --   --  8.1*  --     < > = values in this interval not displayed. Recent Labs     01/18/20  1645   AST 15   ALT 11   BILITOT 0.3   ALKPHOS 103     No results for input(s): INR in the last 72 hours. No results for input(s): [place] in the last 72 hours. Urinalysis:      Lab Results   Component Value Date    NITRU Negative 02/03/2019    WBCUA 0-2 02/03/2019    BACTERIA Negative 02/03/2019    RBCUA 6-10 02/03/2019    BLOODU TRACE 02/03/2019    SPECGRAV 1.015 02/03/2019    GLUCOSEU Negative 02/03/2019       Radiology:  XR CHEST PORTABLE   Final Result   Impression:  Stable, enlarged cardiac mediastinal silhouette with mild central pulmonary vascular congestion. Nonspecific bibasilar airspace disease, worsened in overall appearance compared with the previous study, findings can be seen in    infiltrate/pneumonia and/or atelectasis. XR CHEST (SINGLE VIEW FRONTAL)   Final Result      MILD WITHDRAWAL OF AN ENDOTRACHEAL TUBE IN GOOD APPARENT POSITION. OTHERWISE, STABLE CHEST FROM EARLIER 1/19/2020. XR CHEST (SINGLE VIEW FRONTAL)   Final Result      MILD WITHDRAWAL OF AN ENDOTRACHEAL TUBE, WHICH IS STILL SOMEWHAT LOW IN POSITION. IMPROVING PULMONARY INFILTRATES WITH MILD TO MODERATE RESIDUAL BIBASILAR OPACITIES. OTHERWISE, STABLE CHEST FROM 1/18/2020. XR CHEST PORTABLE   Final Result      ENDOTRACHEAL TUBE EXTENDING INTO THE RIGHT MAIN BRONCHUS, WHICH SHOULD BE WITHDRAWN 4 TO 5 CM. OROGASTRIC TUBE IN GOOD APPARENT POSITION. SHALLOW INSPIRATORY VOLUMES WITH PATCHY BILATERAL PULMONARY INFILTRATES. MOST LIKELY POSSIBILITIES ARE EDEMA, ATELECTASIS, BRONCHOPNEUMONIA AND/OR ARDS.             The findings were discussed with the patient's nurse at approximately 7:20 AM on 1/19/2020. XR CHEST PORTABLE   Final Result      ENDOTRACHEAL TUBE TIP APPROXIMATELY 3 CM ABOVE THE GLADIS. SIGNIFICANTLY POOR INSPIRATION WITH MILDLY WORSENING MODERATE BIBASILAR INFILTRATE/ATELECTASIS. CT Head WO Contrast   Final Result      NO ACUTE INTRACRANIAL PROCESS, OR SIGNIFICANT CHANGE FROM 10/29/2019 IDENTIFIED. XR CHEST PORTABLE   Final Result      POOR INSPIRATION WITH MILD PROBABLE BIBASILAR ATELECTASIS. BRONCHOPNEUMONIA SHOULD BE EXCLUDED CLINICALLY. CT ANKLE RIGHT WO CONTRAST   Final Result      NONDISPLACED VERTICALLY ORIENTED MEDIAL MALLEOLAR/DISTAL TIBIAL FRACTURE AS NOTED. CT 3D RECONSTRUCTION   Final Result      NONDISPLACED VERTICALLY ORIENTED MEDIAL MALLEOLAR/DISTAL TIBIAL FRACTURE AS NOTED. XR ANKLE RIGHT (MIN 3 VIEWS)   Final Result      NONDISPLACED VERTICALLY ORIENTED MEDIAL MALLEOLAR/DISTAL TIBIAL FRACTURE AS NOTED. XR FOOT RIGHT (MIN 3 VIEWS)   Final Result      NONDISPLACED VERTICALLY ORIENTED MEDIAL MALLEOLAR/DISTAL TIBIAL FRACTURE AS NOTED.                     Assessment/Plan:    73-year-old male with a past medical history of heroin use, hypertension, chronic pain, who presented with right ankle pain    Nondisplaced right distal tibia fracture  - nonoperative management per Ortho    Acute hypercapnic/hypoxemic respiratory failure  - requiring intubation and mechanical ventilation  - on IV solumedrol and IV zosyn for possible PNA  - extubated on 1/20  - critical care service managing    Toxic-metabolic encephalopathy  - due to accidental drug overdose with Lyrica and hypercapnia  - CT head was negative for acute findings  - neurology following    Hypotension  - off levophed  - monitor BP closely    DIOGENES  - resolved    Leukocytosis  - monitor trend    Sinus pause  - noted to have a 6 second pause on telemetry today per nursing report  - TTE showed preserved LVEF with no significant valve disease  - cardiology consulted  - monitor on telemetry           Electronically signed by Angie Valle MD on 1/20/2020 at 10:16 AM

## 2020-01-20 NOTE — CARE COORDINATION
[facility] [place] Case Management Initial Discharge Assessment    Met with Patient to discuss discharge plan. PCP: Camille Reyes MD                                Date of Last Visit: 1/13/20    If no PCP, list provided? N/A    Discharge Planning    Living Arrangements: independently at home    Who do you live with? spouse    Who helps you with your care:  self    If lives at home:     Do you have any barriers navigating in your home? no    Patient can perform ADL? Yes    Current Services (outpatient and in home) :  None    Dialysis: No    Is transportation available to get to your appointments? Yes    DME Equipment:  no    Respiratory equipment: None    Respiratory provider:  no     Pharmacy:  yes - [facility] on [address] with Medication Assistance Program?  No      Patient agreeable to [address]? No and N/A    Patient agreeable to SNF/Rehab? Yes, Company Pt unsure if any needed    Other discharge needs identified? Other needs pt/ot eval     Freedom of choice list provided with basic dialogue that supports the patient's individualized plan of care/goals and shares the quality data associated with the providers. Yes    Does Patient Have a High-Risk for Readmission Diagnosis (CHF, PN, MI, COPD)? Yes    If Yes,     Consult with pulmonologist? Yes   Consult with cardiologist? N/A   Cardiac Rehab referral if EF <35%? N/A   Consult with Pharmacy for medication assessment prior to discharge? N/A   Consult with Behavioral health to aid in depression, anxiety, or coping issues? Yes may benefit from this  [place]? N/A   Pulmonary Rehab order for COPD, PN, and CHF (if EF > 35%)? Yes    Does patient have a reliable scale and know how to read it (for CHF)? N/A   Nutrition consult for CHF? N/A   Respiratory therapy consult that includes bedside instruction on administration of nebulizers and/or inhalers, and assessment of oxygen and equipment needs in the home?  Yes    The plan for Transition of Care is related to the following treatment goals:Improved resp status     Initial Discharge Plan? (Note: please see concurrent daily documentation for any updates after initial note). Pt plans to return home but will need pt/ot eval to determine any needs     Met with patient and he was just extubated. He states that he uses no hospital or resp equip at home. Per notes he does have RENETTA and has been a smoker for 23 + years. He has cast/brace to RLE and will need pt/ot eval to help determine any other d/c needs.    Electronically signed by Morena Garduno on 1/20/2020 at 3:28 PM

## 2020-01-20 NOTE — ED NOTES
Per dr Terrell Palomares, pt's fluids hung as bolus prior to transfer to ICU. Pt showed ongoing decreased alertness while in ED. Immediately priot to ICU transfer, pt very difficutl to arouse. Dr Garces at the bedside. Pt \"nodding off', awakening and telling staff he is \"fine\" and \"just sleeping\".       Foster Primrose, RN  01/19/20 9543

## 2020-01-20 NOTE — PROGRESS NOTES
1900: Report received from day shift nurse. 2000: Pt is assessed. Medications administered per MAR. Oral care performed. 2030: Pt's sister called and was updated on pt.     0000: Assessment unchanged. 0100: Propofol added for pt sedation. Pt bathed and complete linen change. Oral care performed. 0400: No change in pt assessment. Oral care performed.

## 2020-01-21 LAB
ALBUMIN SERPL-MCNC: 3 G/DL (ref 3.5–4.6)
ANION GAP SERPL CALCULATED.3IONS-SCNC: 14 MEQ/L (ref 9–15)
BASE EXCESS ARTERIAL: 4 (ref -3–3)
BUN BLDV-MCNC: 16 MG/DL (ref 6–20)
CALCIUM IONIZED: 1.18 MMOL/L (ref 1.12–1.32)
CALCIUM SERPL-MCNC: 8.7 MG/DL (ref 8.5–9.9)
CHLORIDE BLD-SCNC: 101 MEQ/L (ref 95–107)
CO2: 27 MEQ/L (ref 20–31)
CREAT SERPL-MCNC: 0.82 MG/DL (ref 0.7–1.2)
GFR AFRICAN AMERICAN: >60
GFR AFRICAN AMERICAN: >60
GFR NON-AFRICAN AMERICAN: >60
GFR NON-AFRICAN AMERICAN: >60
GLUCOSE BLD-MCNC: 83 MG/DL (ref 60–115)
GLUCOSE BLD-MCNC: 90 MG/DL (ref 70–99)
HCO3 ARTERIAL: 28 MMOL/L (ref 21–29)
HCT VFR BLD CALC: 37.3 % (ref 42–52)
HEMOGLOBIN: 12.5 GM/DL (ref 13.5–17.5)
HEMOGLOBIN: 12.6 G/DL (ref 14–18)
LACTATE: 0.63 MMOL/L (ref 0.4–2)
MAGNESIUM: 1.8 MG/DL (ref 1.7–2.4)
MCH RBC QN AUTO: 31.5 PG (ref 27–31.3)
MCHC RBC AUTO-ENTMCNC: 33.7 % (ref 33–37)
MCV RBC AUTO: 93.4 FL (ref 80–100)
O2 SAT, ARTERIAL: 97 % (ref 93–100)
PCO2 ARTERIAL: 42 MM HG (ref 35–45)
PDW BLD-RTO: 15.8 % (ref 11.5–14.5)
PERFORMED ON: ABNORMAL
PH ARTERIAL: 7.43 (ref 7.35–7.45)
PHOSPHORUS: 4.1 MG/DL (ref 2.3–4.8)
PLATELET # BLD: 310 K/UL (ref 130–400)
PO2 ARTERIAL: 87 MM HG (ref 75–108)
POC CHLORIDE: 101 MEQ/L (ref 99–110)
POC CREATININE: 0.7 MG/DL (ref 0.9–1.3)
POC FIO2: 5
POC HEMATOCRIT: 37 % (ref 41–53)
POC POTASSIUM: 3.8 MEQ/L (ref 3.5–5.1)
POC SAMPLE TYPE: ABNORMAL
POC SODIUM: 138 MEQ/L (ref 136–145)
POTASSIUM SERPL-SCNC: 3.9 MEQ/L (ref 3.4–4.9)
RBC # BLD: 3.99 M/UL (ref 4.7–6.1)
SODIUM BLD-SCNC: 142 MEQ/L (ref 135–144)
TCO2 ARTERIAL: 29 (ref 22–29)
WBC # BLD: 9.2 K/UL (ref 4.8–10.8)

## 2020-01-21 PROCEDURE — 6360000002 HC RX W HCPCS: Performed by: INTERNAL MEDICINE

## 2020-01-21 PROCEDURE — 36600 WITHDRAWAL OF ARTERIAL BLOOD: CPT

## 2020-01-21 PROCEDURE — 6360000002 HC RX W HCPCS: Performed by: HOSPITALIST

## 2020-01-21 PROCEDURE — 83605 ASSAY OF LACTIC ACID: CPT

## 2020-01-21 PROCEDURE — 36415 COLL VENOUS BLD VENIPUNCTURE: CPT

## 2020-01-21 PROCEDURE — 99232 SBSQ HOSP IP/OBS MODERATE 35: CPT | Performed by: INTERNAL MEDICINE

## 2020-01-21 PROCEDURE — 83735 ASSAY OF MAGNESIUM: CPT

## 2020-01-21 PROCEDURE — 82803 BLOOD GASES ANY COMBINATION: CPT

## 2020-01-21 PROCEDURE — 94664 DEMO&/EVAL PT USE INHALER: CPT

## 2020-01-21 PROCEDURE — 93005 ELECTROCARDIOGRAM TRACING: CPT | Performed by: INTERNAL MEDICINE

## 2020-01-21 PROCEDURE — 80069 RENAL FUNCTION PANEL: CPT

## 2020-01-21 PROCEDURE — 94640 AIRWAY INHALATION TREATMENT: CPT

## 2020-01-21 PROCEDURE — 82565 ASSAY OF CREATININE: CPT

## 2020-01-21 PROCEDURE — 94761 N-INVAS EAR/PLS OXIMETRY MLT: CPT

## 2020-01-21 PROCEDURE — 84132 ASSAY OF SERUM POTASSIUM: CPT

## 2020-01-21 PROCEDURE — 99233 SBSQ HOSP IP/OBS HIGH 50: CPT | Performed by: PSYCHIATRY & NEUROLOGY

## 2020-01-21 PROCEDURE — 6370000000 HC RX 637 (ALT 250 FOR IP): Performed by: INTERNAL MEDICINE

## 2020-01-21 PROCEDURE — 1210000000 HC MED SURG R&B

## 2020-01-21 PROCEDURE — 82435 ASSAY OF BLOOD CHLORIDE: CPT

## 2020-01-21 PROCEDURE — 2580000003 HC RX 258: Performed by: FAMILY MEDICINE

## 2020-01-21 PROCEDURE — 85027 COMPLETE CBC AUTOMATED: CPT

## 2020-01-21 PROCEDURE — 84295 ASSAY OF SERUM SODIUM: CPT

## 2020-01-21 PROCEDURE — 2580000003 HC RX 258: Performed by: INTERNAL MEDICINE

## 2020-01-21 PROCEDURE — 85014 HEMATOCRIT: CPT

## 2020-01-21 PROCEDURE — 94660 CPAP INITIATION&MGMT: CPT

## 2020-01-21 PROCEDURE — 99233 SBSQ HOSP IP/OBS HIGH 50: CPT | Performed by: INTERNAL MEDICINE

## 2020-01-21 PROCEDURE — 82330 ASSAY OF CALCIUM: CPT

## 2020-01-21 RX ORDER — BUPRENORPHINE HYDROCHLORIDE AND NALOXONE HYDROCHLORIDE DIHYDRATE 8; 2 MG/1; MG/1
1 TABLET SUBLINGUAL 2 TIMES DAILY
Status: DISCONTINUED | OUTPATIENT
Start: 2020-01-21 | End: 2020-01-23 | Stop reason: HOSPADM

## 2020-01-21 RX ORDER — PREGABALIN 150 MG/1
300 CAPSULE ORAL 2 TIMES DAILY
Status: DISCONTINUED | OUTPATIENT
Start: 2020-01-21 | End: 2020-01-23 | Stop reason: HOSPADM

## 2020-01-21 RX ORDER — AMLODIPINE BESYLATE 5 MG/1
5 TABLET ORAL DAILY
Status: DISCONTINUED | OUTPATIENT
Start: 2020-01-21 | End: 2020-01-21

## 2020-01-21 RX ORDER — MIRTAZAPINE 15 MG/1
15 TABLET, FILM COATED ORAL NIGHTLY
Status: DISCONTINUED | OUTPATIENT
Start: 2020-01-21 | End: 2020-01-23 | Stop reason: HOSPADM

## 2020-01-21 RX ORDER — ESCITALOPRAM OXALATE 10 MG/1
10 TABLET ORAL NIGHTLY
Status: DISCONTINUED | OUTPATIENT
Start: 2020-01-21 | End: 2020-01-23 | Stop reason: HOSPADM

## 2020-01-21 RX ORDER — AMLODIPINE BESYLATE 10 MG/1
10 TABLET ORAL DAILY
Status: DISCONTINUED | OUTPATIENT
Start: 2020-01-22 | End: 2020-01-23 | Stop reason: HOSPADM

## 2020-01-21 RX ORDER — LOSARTAN POTASSIUM 25 MG/1
25 TABLET ORAL DAILY
Status: DISCONTINUED | OUTPATIENT
Start: 2020-01-21 | End: 2020-01-23

## 2020-01-21 RX ORDER — KETOROLAC TROMETHAMINE 30 MG/ML
30 INJECTION, SOLUTION INTRAMUSCULAR; INTRAVENOUS EVERY 6 HOURS PRN
Status: DISCONTINUED | OUTPATIENT
Start: 2020-01-21 | End: 2020-01-23 | Stop reason: HOSPADM

## 2020-01-21 RX ADMIN — IPRATROPIUM BROMIDE AND ALBUTEROL SULFATE 1 AMPULE: .5; 3 SOLUTION RESPIRATORY (INHALATION) at 19:19

## 2020-01-21 RX ADMIN — KETOROLAC TROMETHAMINE 30 MG: 30 INJECTION, SOLUTION INTRAMUSCULAR; INTRAVENOUS at 11:13

## 2020-01-21 RX ADMIN — HYDRALAZINE HYDROCHLORIDE 10 MG: 20 INJECTION INTRAMUSCULAR; INTRAVENOUS at 03:13

## 2020-01-21 RX ADMIN — BUPRENORPHINE AND NALOXONE 1 TABLET: 8; 2 TABLET SUBLINGUAL at 20:54

## 2020-01-21 RX ADMIN — Medication 10 ML: at 21:02

## 2020-01-21 RX ADMIN — PREGABALIN 300 MG: 150 CAPSULE ORAL at 09:46

## 2020-01-21 RX ADMIN — Medication 10 ML: at 08:34

## 2020-01-21 RX ADMIN — PIPERACILLIN SODIUM AND TAZOBACTAM SODIUM 3.38 G: 3; .375 INJECTION, POWDER, LYOPHILIZED, FOR SOLUTION INTRAVENOUS at 20:55

## 2020-01-21 RX ADMIN — MIRTAZAPINE 15 MG: 15 TABLET, FILM COATED ORAL at 20:54

## 2020-01-21 RX ADMIN — BUPRENORPHINE AND NALOXONE 1 TABLET: 8; 2 TABLET SUBLINGUAL at 09:46

## 2020-01-21 RX ADMIN — METHYLPREDNISOLONE SODIUM SUCCINATE 40 MG: 40 INJECTION, POWDER, FOR SOLUTION INTRAMUSCULAR; INTRAVENOUS at 08:34

## 2020-01-21 RX ADMIN — SODIUM CHLORIDE: 9 INJECTION, SOLUTION INTRAVENOUS at 07:24

## 2020-01-21 RX ADMIN — AMLODIPINE BESYLATE 5 MG: 5 TABLET ORAL at 11:13

## 2020-01-21 RX ADMIN — PIPERACILLIN SODIUM AND TAZOBACTAM SODIUM 3.38 G: 3; .375 INJECTION, POWDER, LYOPHILIZED, FOR SOLUTION INTRAVENOUS at 04:45

## 2020-01-21 RX ADMIN — PREGABALIN 300 MG: 150 CAPSULE ORAL at 20:54

## 2020-01-21 RX ADMIN — IPRATROPIUM BROMIDE AND ALBUTEROL SULFATE 1 AMPULE: .5; 3 SOLUTION RESPIRATORY (INHALATION) at 12:42

## 2020-01-21 RX ADMIN — PIPERACILLIN SODIUM AND TAZOBACTAM SODIUM 3.38 G: 3; .375 INJECTION, POWDER, LYOPHILIZED, FOR SOLUTION INTRAVENOUS at 12:41

## 2020-01-21 RX ADMIN — HYDRALAZINE HYDROCHLORIDE 10 MG: 20 INJECTION INTRAMUSCULAR; INTRAVENOUS at 08:34

## 2020-01-21 RX ADMIN — IPRATROPIUM BROMIDE AND ALBUTEROL SULFATE 1 AMPULE: .5; 3 SOLUTION RESPIRATORY (INHALATION) at 04:54

## 2020-01-21 RX ADMIN — LOSARTAN POTASSIUM 25 MG: 25 TABLET ORAL at 20:54

## 2020-01-21 RX ADMIN — KETOROLAC TROMETHAMINE 30 MG: 30 INJECTION, SOLUTION INTRAMUSCULAR; INTRAVENOUS at 18:33

## 2020-01-21 RX ADMIN — ENOXAPARIN SODIUM 40 MG: 40 INJECTION SUBCUTANEOUS at 08:34

## 2020-01-21 RX ADMIN — SODIUM CHLORIDE 125 ML/HR: 9 INJECTION, SOLUTION INTRAVENOUS at 09:50

## 2020-01-21 RX ADMIN — ESCITALOPRAM OXALATE 10 MG: 10 TABLET ORAL at 20:54

## 2020-01-21 ASSESSMENT — PAIN SCALES - GENERAL
PAINLEVEL_OUTOF10: 8
PAINLEVEL_OUTOF10: 4
PAINLEVEL_OUTOF10: 5
PAINLEVEL_OUTOF10: 4
PAINLEVEL_OUTOF10: 7
PAINLEVEL_OUTOF10: 7
PAINLEVEL_OUTOF10: 6
PAINLEVEL_OUTOF10: 5
PAINLEVEL_OUTOF10: 8
PAINLEVEL_OUTOF10: 4
PAINLEVEL_OUTOF10: 4

## 2020-01-21 ASSESSMENT — ENCOUNTER SYMPTOMS
NAUSEA: 0
VOMITING: 0
SHORTNESS OF BREATH: 1
EYES NEGATIVE: 1
WHEEZING: 0
ALLERGIC/IMMUNOLOGIC NEGATIVE: 1
GASTROINTESTINAL NEGATIVE: 1
ABDOMINAL PAIN: 0

## 2020-01-21 ASSESSMENT — PAIN DESCRIPTION - ONSET: ONSET: ON-GOING

## 2020-01-21 ASSESSMENT — PULMONARY FUNCTION TESTS: PIF_VALUE: 15

## 2020-01-21 ASSESSMENT — PAIN DESCRIPTION - DESCRIPTORS: DESCRIPTORS: ACHING;THROBBING

## 2020-01-21 ASSESSMENT — PAIN DESCRIPTION - ORIENTATION
ORIENTATION: RIGHT
ORIENTATION: RIGHT

## 2020-01-21 ASSESSMENT — PAIN DESCRIPTION - PROGRESSION: CLINICAL_PROGRESSION: GRADUALLY WORSENING

## 2020-01-21 ASSESSMENT — PAIN DESCRIPTION - FREQUENCY: FREQUENCY: CONTINUOUS

## 2020-01-21 ASSESSMENT — PAIN DESCRIPTION - LOCATION
LOCATION: FOOT
LOCATION: FOOT

## 2020-01-21 NOTE — PROGRESS NOTES
Severe or Chronic w/ Exacerbation  []     Surgical Status No [x]   Surgeries     General []   Surgery Lower []   Abdominal Thoracic or []   Upper Abdominal Thoracic with  PulmonaryDisorder  []     Chest X-ray Clear/Not  Ordered     []  Chronic Changes  Results Pending  [x]  Infiltrates, atelectasis, pleural effusion, or edema  []  Infiltrates in more than one lobe []  Infiltrate + Atelectasis, &/or pleural effusion  []    Respiratory Pattern Regular,  RR = 12-20 [x]  Increased,  RR = 21-25 []  REED, irregular,  or RR = 26-30 []  Decreased FEV1  or RR = 31-35 []  Severe SOB, use  of accessory muscles, or RR ? 35  []    Mental Status Alert, oriented,  Cooperative [x]  Confused but Follows commands []  Lethargic or unable to follow commands []  Obtunded  []  Comatose  []    Breath Sounds Clear to  auscultation  []  Decreased unilaterally or  in bases only []  Decreased  bilaterally  [x]  Crackles or intermittent wheezes []  Wheezes []    Cough Strong, Spontan., & nonproductive [x]  Strong,  spontaneous, &  productive []  Weak,  Nonproductive []  Weak, productive or  with wheezes []  No spontaneous  cough or may require suctioning []    Level of Activity Ambulatory []  Ambulatory w/ Assist  [x]  Non-ambulatory []  Paraplegic []  Quadriplegic []    Total    Score:___6____     Triage Score:____4____      Tri       Triage:     1. (>20) Freq: Q3    2. (16-20) Freq: Q4   3. (11-15) Freq: QID & Albuterol Q2 PRN    4. (6-10) Freq: TID & Albuterol Q2 PRN    5. (0-5) Freq Q4prn

## 2020-01-21 NOTE — PROGRESS NOTES
135   < > 139  --   --  142   K 3.8   < > 3.8  --   --  3.9   CL 92*   < > 99  --   --  101   CO2 30   < > 29  --   --  27   BUN 10   < > 19  --   --  16   CREATININE 1.92*   < > 0.85   < > 0.7* 0.82   GLUCOSE 120*   < > 83  --   --  90   CALCIUM 8.6   < > 8.5  --   --  8.7   PROT 6.9  --  6.0*  --   --   --    LABALBU 3.5  --  2.8*  --   --  3.0*   BILITOT 0.3  --  0.3  --   --   --    ALKPHOS 103  --  75  --   --   --    AST 15  --  12  --   --   --    ALT 11  --  12  --   --   --    LABGLOM 36.8*   < > >60.0   < > >60 >60.0   GFRAA 44.6*   < > >60.0   < > >60 >60.0   GLOB 3.4  --  3.2  --   --   --     < > = values in this interval not displayed.        MV Settings:     Vent Mode: AC/VC  Vt Ordered: 450 mL  Rate Set: 18 bmp  FiO2 : 40 %  PEEP/CPAP: 6  Pressure Support: 6 cmH20  Peak Inspiratory Pressure: 15 cmH2O  Mean Airway Pressure: 10 cmH20  I:E Ratio: 1:2.40    Recent Labs     01/21/20  0539   PHART 7.432   CEE5LTI 42   PO2ART 87*   IKK3MRY 28.0   BEART 4*   [id number] 97*       O2 Device: Nasal cannula  O2 Flow Rate (L/min): 6 L/min    DIET GENERAL;     MEDICATIONS during current hospitalization:    Continuous Infusions:   propofol Stopped (01/20/20 1200)    norepinephrine Stopped (01/19/20 1215)    sodium chloride 125 mL/hr (01/21/20 0950)    sodium chloride 100 mL/hr at 01/21/20 0724    dexmedetomidine (PRECEDEX) IV infusion Stopped (01/20/20 1315)       Scheduled Meds:   buprenorphine-naloxone  1 tablet Sublingual BID    escitalopram  10 mg Oral Nightly    mirtazapine  15 mg Oral Nightly    pregabalin  300 mg Oral BID    amLODIPine  5 mg Oral Daily    ipratropium-albuterol  1 ampule Inhalation TID    sodium chloride flush  10 mL Intravenous 2 times per day    enoxaparin  40 mg Subcutaneous Daily    piperacillin-tazobactam  3.375 g Intravenous Q8H    methylPREDNISolone  40 mg Intravenous Daily       PRN Meds:ketorolac, albuterol, hydrALAZINE, sodium chloride flush, magnesium hydroxide,

## 2020-01-21 NOTE — PROGRESS NOTES
encephalopathy  - due to accidental drug overdose with Lyrica and hypercapnia  - CT head was negative for acute findings  - improved  - neurology following    HTN  - takes Toprol at home  - will hold off resuming Toprol for now until seen by cardiology due to pause yesterday  - start Norvasc  - Hydralazine PRN  - monitor BP closely    DIOGENES  - resolved    Leukocytosis  - resolved    Sinus pause  - noted to have a 6 second pause on telemetry on 1/20 per nursing report  - TTE showed preserved LVEF with no significant valve disease  - cardiology consulted  - monitor on telemetry           Electronically signed by Ron Bruce MD on 1/21/2020 at 10:08 AM

## 2020-01-21 NOTE — PROGRESS NOTES
ORTHOPAEDIC PROGRESS NOTE      Subjective: No acute events overnight. Intubated and sedated. Exam:   Temp (24hrs), Av.9 °F (37.2 °C), Min:98.5 °F (36.9 °C), Max:99.9 °F (37.7 °C)     Intubated and sedated  Right leg: Skin intact with moderate swelling. Calf soft and compressible. Toes warm and well-perfused. Procedure: Application of short leg splint  Previous splint was removed as padding and coverage were inadequate. A well-padded short leg splint with a stirrup and posterior slab was applied. Assessment/Plan: 48 y.o. male with right distal tibia fracture  1. Pain control  2. NWB right LE  3. Will follow peripherally, please call with questions. Follow-up in 7-10 days in the orthopaedic clinic. Labs reviewed:  CBC:   Recent Labs     20  1630  20  0459 20  1049 20  1141   WBC 16.5*  --   --  10.6  --    HGB 14.0   < > 13.6 12.1* 11.7*     --   --  279  --     < > = values in this interval not displayed. BMP:    Recent Labs     20  1645  20  0518 20  0459 20  1049 20  1141     --  138  --  139  --    K 3.8  --  4.8  --  3.8  --    CL 92*  --  100  --  99  --    CO2 30  --  29  --  29  --    BUN 10  --  15  --  19  --    CREATININE 1.92*   < > 1.37* 0.8* 0.85 0.4*   GLUCOSE 120*  --  145*  --  83  --     < > = values in this interval not displayed. I&O  I/O last 3 completed shifts:   In: 6722 [I.V.:3501; IV Piggyback:94]  Out: 4418 Illona Mortimer MD  2020 8:54 PM

## 2020-01-21 NOTE — PROGRESS NOTES
2000 assessment done, 6LNC  2200 wife at bedside agitating patient. Plan to monitor, cardiology here  2300 pt c/o rt foot pain and medicated with toradol.  Pt is on suboxone  0100 resting comfortably  0300 restinf, ABX running, PRN hydralazine given  0600 plan for transfer to floor

## 2020-01-21 NOTE — PROGRESS NOTES
Chief Complaint   Patient presents with    Foot Injury       right foot injury sustained  last night         Post dated note, patient seen on rounds at 4:30pm 1-20-20:         Patient is a 48 y.o. male who presents with a chief complaint of fall at home. Patient is followed on a regular basis by Dr. Ludin Andrade MD. Patient overdosed lyrica again requiring intubation. He is s/p extubation this am. Noted to have a 6.7 sec pause most likely while he was dozing off/sleeping. Patient with hx of RENETTA but non compliant with his CPAP. + hx of HTN, HLD and tob abuse. Also has hx of heroin use. Denies hx of MI,CHF or arrhythmia. No hx of stress test or LHC. Wife at bedside who is a nurse. Denies any palpitations, rapid heart beat or syncope. S/p echo with normal LVF. NSR on tele with 6.5 sec pause. 1-21-20: No further episodes of bradycardia/pauses today. Patient is attempting to wear his BiPAP machine. He denies any chest pain or shortness of breath. He feels much better today. He is hemodynamically stable but blood pressure is elevated.   Renal function is normal.     Past Medical History        Past Medical History:   Diagnosis Date    Aspiration into airway      Chronic pain       in neck & back, in pain management    Heroin use      Hypertension      Lumbar radiculopathy, chronic      Metabolic encephalopathy       medication induced    Non-traumatic rhabdomyolysis      Obesity      Pneumonia               Patient Active Problem List   Diagnosis    Acute encephalopathy    Accidental drug overdose    Chronic back pain    Fatty infiltration of liver    Acid reflux    Herniation of lumbar intervertebral disc without myelopathy    Current smoker    Lumbar canal stenosis    Anginal chest pain at rest (HCC)    Change in mental status    Non-traumatic rhabdomyolysis    Leukocytosis    Chronic pain syndrome    Aspiration into airway    Accidental overdose of heroin (HCC)    Acute respiratory failure with hypercapnia (HCC)    Hypotension due to hypovolemia         Past Surgical History         Past Surgical History:   Procedure Laterality Date    BACK SURGERY         x2    NECK SURGERY         x2            Social History   Social History            Socioeconomic History    Marital status:        Spouse name: None    Number of children: None    Years of education: None    Highest education level: None   Occupational History    None   Social Needs    Financial resource strain: None    Food insecurity:       Worry: None       Inability: None    Transportation needs:       Medical: None       Non-medical: None   Tobacco Use    Smoking status: Current Every Day Smoker       Packs/day: 1.00       Years: 15.00       Pack years: 15.00       Types: Cigarettes    Smokeless tobacco: Never Used   Substance and Sexual Activity    Alcohol use: No    Drug use: Not Currently       Types: Opiates     Sexual activity: Yes       Partners: Female   Lifestyle    Physical activity:       Days per week: None       Minutes per session: None    Stress: None   Relationships    Social connections:       Talks on phone: None       Gets together: None       Attends [employer] service: None       Active member of club or organization: None       Attends meetings of clubs or organizations: None       Relationship status: None    Intimate partner violence:       Fear of current or ex partner: None       Emotionally abused: None       Physically abused: None       Forced sexual activity: None   Other Topics Concern    None   Social History Narrative    None            Family History   History reviewed.  No pertinent family history.        Current Facility-Administered Medications             Current Facility-Administered Medications   Medication Dose Route Frequency Provider Last Rate Last Dose    propofol injection  10 mcg/kg/min Intravenous Titrated PEGGY Payan CNP   Stopped at 01/20/20 1200    ipratropium-albuterol (DUONEB) nebulizer solution 1 ampule  1 ampule Inhalation TID Cole Sigala MD   1 ampule at 01/21/20 0454    albuterol (PROVENTIL) nebulizer solution 2.5 mg  2.5 mg Nebulization Q4H PRN Cole Sigala MD        norepinephrine (LEVOPHED) 16 mg in dextrose 5 % 250 mL infusion  2 mcg/min Intravenous Continuous Barrera Powell, DO   Stopped at 01/19/20 1215    hydrALAZINE (APRESOLINE) injection 10 mg  10 mg Intravenous Q4H PRN Grzegorz Slice, APRN - CNP   10 mg at 01/21/20 0313    sodium chloride flush 0.9 % injection 10 mL  10 mL Intravenous 2 times per day Keri Mills, DO   10 mL at 01/20/20 4182    sodium chloride flush 0.9 % injection 10 mL  10 mL Intravenous PRN Rosefranchescala Haff, DO        magnesium hydroxide (MILK OF MAGNESIA) 400 MG/5ML suspension 30 mL  30 mL Oral Daily PRN Keri Heff, DO        enoxaparin (LOVENOX) injection 40 mg  40 mg Subcutaneous Daily Keri Mills, DO   40 mg at 01/20/20 6496    0.9 % sodium chloride infusion   Intravenous Continuous Keri Mills,  mL/hr at 01/20/20 1830 125 mL/hr at 01/20/20 1830    piperacillin-tazobactam (ZOSYN) 3.375 g in dextrose 5 % 50 mL IVPB extended infusion (mini-bag)  3.375 g Intravenous Q8H Roseshannon Mills, DO 12.5 mL/hr at 01/21/20 0445 3.375 g at 01/21/20 0445    0.9 % sodium chloride infusion   Intravenous Continuous Ashley Garces  mL/hr at 01/21/20 0724      methylPREDNISolone sodium (SOLU-MEDROL) injection 40 mg  40 mg Intravenous Daily Barrera Powell, DO   40 mg at 01/20/20 0287    albuterol (PROVENTIL) nebulizer solution 2.5 mg  2.5 mg Nebulization Q6H PRN Barrera Powell, DO        dexmedetomidine (PRECEDEX) 400 mcg in sodium chloride 0.9 % 100 mL infusion  0.2 mcg/kg/hr Intravenous Continuous Barrera Powell, DO   Stopped at 01/20/20 1315    0.9 % sodium chloride bolus  1,000 mL Intravenous Once Barrera Powell DO   Stopped at 01/19/20 5467            ALLERGIES: Patient has no known allergies.     Review of Ref Range     POC Sodium 139 136 - 145 mEq/L     POC Potassium 4.7 3.5 - 5.1 mEq/L     POC Chloride 106 99 - 110 mEq/L     POC Glucose 70 60 - 115 mg/dl     POC Creatinine 0.4 (L) 0.9 - 1.3 mg/dL     GFR Non- >60 >60     GFR  >60 >60     Calcium, Ion 1.14 1.12 - 1.32 mmol/L     pH, Arterial 7.701 (HH) 7.350 - 7.450     pCO2, Arterial 22 (L) 35 - 45 mm Hg     pO2, Arterial 162 (HH) 75 - 108 mm Hg     HCO3, Arterial 27.4 21.0 - 29.0 mmol/L     Base Excess, Arterial 8 (H) -3 - 3     O2 Sat, Arterial 100 (HH) 93 - 100 %     TCO2, Arterial 28 22 - 29     Lactate 0.74 0.40 - 2.00 mmol/L     POC Hematocrit 34 (L) 41 - 53 %     Hemoglobin 11.7 (L) 13.5 - 17.5 gm/dL     FIO2 40.000       Sample Type ART       Performed on SEE BELOW     POCT Arterial     Collection Time: 01/21/20  5:39 AM   Result Value Ref Range     POC Sodium 138 136 - 145 mEq/L     POC Potassium 3.8 3.5 - 5.1 mEq/L     POC Chloride 101 99 - 110 mEq/L     POC Glucose 83 60 - 115 mg/dl     POC Creatinine 0.7 (L) 0.9 - 1.3 mg/dL     GFR Non- >60 >60     GFR  >60 >60     Calcium, Ion 1.18 1.12 - 1.32 mmol/L     pH, Arterial 7.432 7.350 - 7.450     pCO2, Arterial 42 35 - 45 mm Hg     pO2, Arterial 87 (HH) 75 - 108 mm Hg     HCO3, Arterial 28.0 21.0 - 29.0 mmol/L     Base Excess, Arterial 4 (H) -3 - 3     O2 Sat, Arterial 97 (HH) 93 - 100 %     TCO2, Arterial 29 22 - 29     Lactate 0.63 0.40 - 2.00 mmol/L     POC Hematocrit 37 (L) 41 - 53 %     Hemoglobin 12.5 (L) 13.5 - 17.5 gm/dL     FIO2 5.000       Sample Type ART       Performed on SEE BELOW     CBC     Collection Time: 01/21/20  5:47 AM   Result Value Ref Range     WBC 9.2 4.8 - 10.8 K/uL     RBC 3.99 (L) 4.70 - 6.10 M/uL     Hemoglobin 12.6 (L) 14.0 - 18.0 g/dL     Hematocrit 37.3 (L) 42.0 - 52.0 %     MCV 93.4 80.0 - 100.0 fL     MCH 31.5 (H) 27.0 - 31.3 pg     MCHC 33.7 33.0 - 37.0 %     RDW 15.8 (H) 11.5 - 14.5 %     Platelets 997 995 -

## 2020-01-21 NOTE — PROGRESS NOTES
Patient refused to wear Bipap tonight. Patient stated that he tried it already today and it freaked him out and cannot tolerate it. Explained to patient the importance of wearing the Bipap and patient still refusing.

## 2020-01-21 NOTE — CARE COORDINATION
Received call from patients wife Jeffrey Emerson who states he is a nurse. Her number is [phone] and she is at work today. She is asking for patient to have cast on his leg and I let her know he had a new splint placed yesterday. She wants PT/OT ordered for him because she states she cannot take him home. She would like him to go somewhere and states he will have to. She says he broke his good ankle and the other side is weaker. She says he has no coordination skills. She is also asking about his need for bipap machine. I explained that Dr. Andrew Gray wants to set him up for outpatient sleep study and spent time teaching him the importance of wearing this. He is going to try during day today and did instruct him on the machine and why he needs to use it. I let nurse Harjeet Jacob know we need to get PT/OT ordered to determine patient needs . I did discuss SNF/Re[facility] with Jeffrey Emerson and [place] of choice. I also let her know patient has to agree to go .

## 2020-01-21 NOTE — CARE COORDINATION
Spoke with nurse and she did talk to Sarahi regarding PT/OT orders and NWB status. [facility] states she will place orders for this. I had messaged Dr. Ryan Martinez and then let him know [facility] will address. Tiffanie Rangel was going to meet with patient but states she will wait until PT/OT evals are done.

## 2020-01-21 NOTE — PROGRESS NOTES
Neurology Follow up    SUBJECTIVE:  NO Headache, double vision,blurry vision,difficulty with speech,difficulty with swallowing,weakness,numbness,pain,nausea,vomitting,chocking,neck pain,dizziness,    PHYSICAL EXAM:    BP (!) 163/73   Pulse 68   Temp 98.5 °F (36.9 °C)   Resp 15   Ht 5' 11\" (1.803 m)   Wt 266 lb 5.1 oz (120.8 kg)   SpO2 95%   BMI 37.14 kg/m²    General Appearance:      Mental Status Exam:             Level of Alertness:   awake            Orientation:   person, place, time                      Attention/Concentration:  normal            Language:  normal      Funduscopic Exam:     Cranial Nerves            Cranial nerve III           Pupils:  equal, round, reactive to light      Cranial nerves III, IV, VI           Extraocular Movements: intact      Cranial nerve V           Facial sensation:  intact      Cranial nerve VII           Facial strength: intact      Cranial nerve VIII           Hearing:  intact      Cranial nerve IX           Palate:  intact      Cranial nerve XI         Shoulder shrug:  intact      Cranial nerve XII          Tongue movement:  normal    Motor:    Drift:  absent  Motor exam is symmetrical 5 out of 5 all extremities bilaterally  Tone:  normal  Abnormal Movements:  absent            Sensory:        Pinprick             Right Upper Extremity:  normal             Left Upper Extremity:  normal             Right Lower Extremity:  normal             Left Lower Extremity:  normal           Vibration                         Touch            Proprioception                 Coordination:           Finger/Nose   Right:  normal              Left:  normal          Heel-Knee-Shin                Right:  normal              Left:  normal          Rapid Alternating Movements              Right:  normal              Left:  normal          Gait:                       Casual:  Not tested            Romberg:  normal            Reflexes:             Deep Tendon Reflexes: Reflexes are 2 +             Plantar response:                Right:  downgoing               Left:  downgoing    Vascular:  Cardiac Exam:  normal         Xr Chest (single View Frontal)    Result Date: 1/19/2020  XR CHEST (SINGLE VIEW FRONTAL) : 1/19/2020 CLINICAL HISTORY:  Tube placement . COMPARISON: Earlier 1/19/2020. TECHNIQUE: An upright portable AP radiograph of the chest was obtained at approximately 1:57 PM. FINDINGS: An endotracheal tube has been mildly withdrawn with its tip approximately 4 cm above the peggy, in good apparent position. An orogastric tube remains in good apparent position with its tip coiled overlying the fundus of the stomach. A shallow inspiration is present with stable mild to moderate bibasilar infiltrate/consolidations. There is no cardiomegaly, significant pleural effusion, vascular congestion, pneumothorax, or displaced fractures identified. MILD WITHDRAWAL OF AN ENDOTRACHEAL TUBE IN GOOD APPARENT POSITION. OTHERWISE, STABLE CHEST FROM EARLIER 1/19/2020. Xr Chest (single View Frontal)    Result Date: 1/19/2020  XR CHEST (SINGLE VIEW FRONTAL) : 1/19/2020 8:30 AM CLINICAL HISTORY:  ET tube placement . COMPARISON: None available. A portable upright AP radiograph of the chest was obtained. FINDINGS: An endotracheal tube has been mildly withdrawn with its tip approximately 1 cm above the peggy. An orogastric tube is again noted. A poor inspiratory volume is present with improving mild to moderate bibasilar infiltrates. There is no cardiomegaly, significant pleural effusion, vascular congestion, pneumothorax, or displaced fractures identified. MILD WITHDRAWAL OF AN ENDOTRACHEAL TUBE, WHICH IS STILL SOMEWHAT LOW IN POSITION. IMPROVING PULMONARY INFILTRATES WITH MILD TO MODERATE RESIDUAL BIBASILAR OPACITIES. OTHERWISE, STABLE CHEST FROM 1/18/2020.       Xr Ankle Right (min 3 Views)    Result Date: 1/18/2020  XR FOOT RIGHT (MIN 3 VIEWS), XR ANKLE RIGHT (MIN 3 VIEWS) : 1/18/2020 CLINICAL HISTORY: Pain after injury. COMPARISON: None available. TECHNIQUE: AP, lateral and oblique radiographs of the right foot, and AP, lateral and oblique radiographs of the right ankle were obtained. FINDINGS: A vertically oriented nondisplaced oblique fracture of the medial ankle mortise extends into the distal tibial metadiaphysis. Several small old avulsion fracture fragments are noted inferior to both malleoli; as well as soft tissue swelling about the ankle. There is no other fracture, radiodense foreign bodies, pathologic calcifications, or worrisome bone destruction identified. The visualized joint spaces and ankle mortise are intact. Mild to moderate degenerative changes are present of the first MTP joint. NONDISPLACED VERTICALLY ORIENTED MEDIAL MALLEOLAR/DISTAL TIBIAL FRACTURE AS NOTED. Xr Foot Right (min 3 Views)    Result Date: 1/18/2020  XR FOOT RIGHT (MIN 3 VIEWS), XR ANKLE RIGHT (MIN 3 VIEWS) : 1/18/2020 CLINICAL HISTORY: Pain after injury. COMPARISON: None available. TECHNIQUE: AP, lateral and oblique radiographs of the right foot, and AP, lateral and oblique radiographs of the right ankle were obtained. FINDINGS: A vertically oriented nondisplaced oblique fracture of the medial ankle mortise extends into the distal tibial metadiaphysis. Several small old avulsion fracture fragments are noted inferior to both malleoli; as well as soft tissue swelling about the ankle. There is no other fracture, radiodense foreign bodies, pathologic calcifications, or worrisome bone destruction identified. The visualized joint spaces and ankle mortise are intact. Mild to moderate degenerative changes are present of the first MTP joint. NONDISPLACED VERTICALLY ORIENTED MEDIAL MALLEOLAR/DISTAL TIBIAL FRACTURE AS NOTED. Ct Head Wo Contrast    Result Date: 1/18/2020  CT HEAD WO CONTRAST : 1/18/2020 CLINICAL HISTORY:  change in mental status . COMPARISON: 10/29/2019.  TECHNIQUE: Spiral unenhanced images were obtained of the head, with routine multiplanar reconstructions performed. All CT scans at this facility use dose modulation, iterative reconstruction, and/or weight based dosing when appropriate to reduce radiation dose to as low as reasonably achievable. FINDINGS: There is no intracranial hemorrhage, mass effect, midline shift, extra-axial collection, evidence of hydrocephalus, recent ischemic infarct, or skull fracture identified. There is no significant atrophy or white matter changes, for age. Moderate mucosal thickening within the paranasal sinuses again noted. The mastoid air cells are clear. NO ACUTE INTRACRANIAL PROCESS, OR SIGNIFICANT CHANGE FROM 10/29/2019 IDENTIFIED. Ct Ankle Right Wo Contrast    Result Date: 1/18/2020  CT ANKLE RIGHT WO CONTRAST, CT 3D RECONSTRUCTION : 1/18/2020 CLINICAL HISTORY: pain . COMPARISON: Right foot and ankle radiographs from earlier 1/18/2020. TECHNIQUE: Spiral unenhanced imaging of the right ankle was performed, with routine multiplanar reconstructions and volume rendered images obtained on a 3-D workstation. All CT scans at this facility use dose modulation, iterative reconstruction, and/or weight based dosing when appropriate to reduce radiation dose to as low as reasonably achievable. FINDINGS: A vertically oriented nondisplaced oblique fracture of the medial ankle mortise extends into the distal tibial metadiaphysis. Several small old avulsion fracture fragments are noted inferior to both malleoli; as well as soft tissue swelling about the ankle. There is no other fracture, radiodense foreign bodies, pathologic calcifications, or worrisome bone destruction identified. The visualized joint spaces and ankle mortise are intact. NONDISPLACED VERTICALLY ORIENTED MEDIAL MALLEOLAR/DISTAL TIBIAL FRACTURE AS NOTED. Xr Chest Portable    Result Date: 1/19/2020  XR CHEST PORTABLE : 1/18/2020 CLINICAL HISTORY: Tube placement. COMPARISON: Earlier 1/18/2020.

## 2020-01-21 NOTE — CONSULTS
Chief Complaint   Patient presents with    Foot Injury     right foot injury sustained  last night       Post dated note, patient seen on rounds at 4:30pm 1-20-20:       Patient is a 48 y.o. male who presents with a chief complaint of fall at home. Patient is followed on a regular basis by Dr. Julio Ferrer MD. Patient overdosed lyrica again requiring intubation. He is s/p extubation this am. Noted to have a 6.7 sec pause most likely while he was dozing off/sleeping. Patient with hx of RENETTA but non compliant with his CPAP. + hx of HTN, HLD and tob abuse. Also has hx of heroin use. Denies hx of MI,CHF or arrhythmia. No hx of stress test or LHC. Wife at bedside who is a nurse. Denies any palpitations, rapid heart beat or syncope. S/p echo with normal LVF. NSR on tele with 6.5 sec pause.      Past Medical History:   Diagnosis Date    Aspiration into airway     Chronic pain     in neck & back, in pain management    Heroin use     Hypertension     Lumbar radiculopathy, chronic     Metabolic encephalopathy     medication induced    Non-traumatic rhabdomyolysis     Obesity     Pneumonia       Patient Active Problem List   Diagnosis    Acute encephalopathy    Accidental drug overdose    Chronic back pain    Fatty infiltration of liver    Acid reflux    Herniation of lumbar intervertebral disc without myelopathy    Current smoker    Lumbar canal stenosis    Anginal chest pain at rest (HCC)    Change in mental status    Non-traumatic rhabdomyolysis    Leukocytosis    Chronic pain syndrome    Aspiration into airway    Accidental overdose of heroin (HCC)    Acute respiratory failure with hypercapnia (HCC)    Hypotension due to hypovolemia       Past Surgical History:   Procedure Laterality Date    BACK SURGERY      x2    NECK SURGERY      x2       Social History     Socioeconomic History    Marital status:      Spouse name: None    Number of children: None    Years of education: None    Highest education level: None   Occupational History    None   Social Needs    Financial resource strain: None    Food insecurity:     Worry: None     Inability: None    Transportation needs:     Medical: None     Non-medical: None   Tobacco Use    Smoking status: Current Every Day Smoker     Packs/day: 1.00     Years: 15.00     Pack years: 15.00     Types: Cigarettes    Smokeless tobacco: Never Used   Substance and Sexual Activity    Alcohol use: No    Drug use: Not Currently     Types: Opiates     Sexual activity: Yes     Partners: Female   Lifestyle    Physical activity:     Days per week: None     Minutes per session: None    Stress: None   Relationships    Social connections:     Talks on phone: None     Gets together: None     Attends Faith service: None     Active member of club or organization: None     Attends meetings of clubs or organizations: None     Relationship status: None    Intimate partner violence:     Fear of current or ex partner: None     Emotionally abused: None     Physically abused: None     Forced sexual activity: None   Other Topics Concern    None   Social History Narrative    None       History reviewed. No pertinent family history.     Current Facility-Administered Medications   Medication Dose Route Frequency Provider Last Rate Last Dose    propofol injection  10 mcg/kg/min Intravenous Titrated PEGGY Sams CNP   Stopped at 01/20/20 1200    ipratropium-albuterol (DUONEB) nebulizer solution 1 ampule  1 ampule Inhalation TID Lev Lomas MD   1 ampule at 01/21/20 0454    albuterol (PROVENTIL) nebulizer solution 2.5 mg  2.5 mg Nebulization Q4H PRN Lev Lomas MD        norepinephrine (LEVOPHED) 16 mg in dextrose 5 % 250 mL infusion  2 mcg/min Intravenous Continuous [facility] DO   Stopped at 01/19/20 1215    hydrALAZINE (APRESOLINE) injection 10 mg  10 mg Intravenous Q4H PRN PEGGY Sams CNP   10 mg at 01/21/20 0313    sodium chloride flush 0.9 % injection 10 mL  10 mL Intravenous 2 times per day Benjamin , DO   10 mL at 01/20/20 5332    sodium chloride flush 0.9 % injection 10 mL  10 mL Intravenous PRN Benjamin Samyer, DO        magnesium hydroxide (MILK OF MAGNESIA) 400 MG/5ML suspension 30 mL  30 mL Oral Daily PRN Benjamin Teague, DO        enoxaparin (LOVENOX) injection 40 mg  40 mg Subcutaneous Daily Benjamin Teague, DO   40 mg at 01/20/20 4213    0.9 % sodium chloride infusion   Intravenous Continuous Benjamin ,  mL/hr at 01/20/20 1830 125 mL/hr at 01/20/20 1830    piperacillin-tazobactam (ZOSYN) 3.375 g in dextrose 5 % 50 mL IVPB extended infusion (mini-bag)  3.375 g Intravenous Q8H Benjamin Teague, DO 12.5 mL/hr at 01/21/20 0445 3.375 g at 01/21/20 0445    0.9 % sodium chloride infusion   Intravenous Continuous Dav Garces  mL/hr at 01/21/20 0724      methylPREDNISolone sodium (SOLU-MEDROL) injection 40 mg  40 mg Intravenous Daily Eloisa Maurice DO   40 mg at 01/20/20 0336    albuterol (PROVENTIL) nebulizer solution 2.5 mg  2.5 mg Nebulization Q6H PRN Eloisa Maurice DO        dexmedetomidine (PRECEDEX) 400 mcg in sodium chloride 0.9 % 100 mL infusion  0.2 mcg/kg/hr Intravenous Continuous Eloisa Maurice DO   Stopped at 01/20/20 1315    0.9 % sodium chloride bolus  1,000 mL Intravenous Once Eloisa Maurice DO   Stopped at 01/19/20 2345       ALLERGIES: Patient has no known allergies. Review of Systems   Constitutional: Positive for fatigue. Negative for chills and fever. HENT: Negative. Eyes: Negative. Respiratory: Positive for shortness of breath. Negative for wheezing. Cardiovascular: Negative for chest pain, palpitations and leg swelling. Gastrointestinal: Negative. Negative for abdominal pain, nausea and vomiting. Endocrine: Negative. Genitourinary: Negative. Musculoskeletal: Negative. Skin: Negative. Negative for rash. Allergic/Immunologic: Negative.     Neurological: Positive for weakness. Negative for dizziness and headaches. Psychiatric/Behavioral: Negative. VITALS:  Blood pressure (!) 168/84, pulse 82, temperature 97.8 °F (36.6 °C), temperature source Oral, resp. rate 14, height 5' 11\" (1.803 m), weight 266 lb 5.1 oz (120.8 kg), SpO2 99 %. Body mass index is 37.14 kg/m². Physical Exam   Constitutional: He is oriented to person, place, and time. He appears well-developed and well-nourished. HENT:   Head: Normocephalic and atraumatic. Eyes: Pupils are equal, round, and reactive to light. Neck: Normal range of motion. Neck supple. No JVD present. No tracheal deviation present. No thyromegaly present. Cardiovascular: Normal rate, regular rhythm, normal heart sounds and intact distal pulses. PMI is not displaced. Exam reveals no gallop, no S3, no distant heart sounds and no friction rub. No murmur heard. Pulmonary/Chest: No respiratory distress. He has no wheezes. He has no rales. He exhibits no tenderness. Abdominal: Soft. Bowel sounds are normal. He exhibits no distension and no mass. There is no tenderness. There is no rebound and no guarding. Musculoskeletal:         General: No edema. Neurological: He is alert and oriented to person, place, and time. No cranial nerve deficit. Skin: Skin is warm and dry. No rash noted. He is not diaphoretic. No erythema. No pallor. Psychiatric: He has a normal mood and affect.  His behavior is normal. Judgment and thought content normal.       LABS:  Recent Results (from the past 24 hour(s))   CBC Auto Differential    Collection Time: 01/20/20 10:49 AM   Result Value Ref Range    WBC 10.6 4.8 - 10.8 K/uL    RBC 3.87 (L) 4.70 - 6.10 M/uL    Hemoglobin 12.1 (L) 14.0 - 18.0 g/dL    Hematocrit 35.7 (L) 42.0 - 52.0 %    MCV 92.3 80.0 - 100.0 fL    MCH 31.2 27.0 - 31.3 pg    MCHC 33.8 33.0 - 37.0 %    RDW 15.4 (H) 11.5 - 14.5 %    Platelets 634 837 - 705 K/uL    Neutrophils % 54.4 %    Lymphocytes % 36.8 %    Monocytes % 7.1 % POC Hematocrit 34 (L) 41 - 53 %    Hemoglobin 11.7 (L) 13.5 - 17.5 gm/dL    FIO2 40.000     Sample Type ART     Performed on SEE BELOW    POCT Arterial    Collection Time: 01/21/20  5:39 AM   Result Value Ref Range    POC Sodium 138 136 - 145 mEq/L    POC Potassium 3.8 3.5 - 5.1 mEq/L    POC Chloride 101 99 - 110 mEq/L    POC Glucose 83 60 - 115 mg/dl    POC Creatinine 0.7 (L) 0.9 - 1.3 mg/dL    GFR Non-African American >60 >60    GFR African American >60 >60    Calcium, Ion 1.18 1.12 - 1.32 mmol/L    pH, Arterial 7.432 7.350 - 7.450    pCO2, Arterial 42 35 - 45 mm Hg    pO2, Arterial 87 (HH) 75 - 108 mm Hg    HCO3, Arterial 28.0 21.0 - 29.0 mmol/L    Base Excess, Arterial 4 (H) -3 - 3    O2 Sat, Arterial 97 (HH) 93 - 100 %    TCO2, Arterial 29 22 - 29    Lactate 0.63 0.40 - 2.00 mmol/L    POC Hematocrit 37 (L) 41 - 53 %    Hemoglobin 12.5 (L) 13.5 - 17.5 gm/dL    FIO2 5.000     Sample Type ART     Performed on SEE BELOW    CBC    Collection Time: 01/21/20  5:47 AM   Result Value Ref Range    WBC 9.2 4.8 - 10.8 K/uL    RBC 3.99 (L) 4.70 - 6.10 M/uL    Hemoglobin 12.6 (L) 14.0 - 18.0 g/dL    Hematocrit 37.3 (L) 42.0 - 52.0 %    MCV 93.4 80.0 - 100.0 fL    MCH 31.5 (H) 27.0 - 31.3 pg    MCHC 33.7 33.0 - 37.0 %    RDW 15.8 (H) 11.5 - 14.5 %    Platelets 720 310 - 193 K/uL   RENAL FUNCTION PANEL    Collection Time: 01/21/20  5:47 AM   Result Value Ref Range    Sodium 142 135 - 144 mEq/L    Potassium 3.9 3.4 - 4.9 mEq/L    Chloride 101 95 - 107 mEq/L    CO2 27 20 - 31 mEq/L    Anion Gap 14 9 - 15 mEq/L    Glucose 90 70 - 99 mg/dL    BUN 16 6 - 20 mg/dL    CREATININE 0.82 0.70 - 1.20 mg/dL    GFR Non-African American >60.0 >60    GFR  >60.0 >60    Calcium 8.7 8.5 - 9.9 mg/dL    Phosphorus 4.1 2.3 - 4.8 mg/dL    Alb 3.0 (L) 3.5 - 4.6 g/dL   Magnesium    Collection Time: 01/21/20  5:47 AM   Result Value Ref Range    Magnesium 1.8 1.7 - 2.4 mg/dL     Troponin:   Lab Results   Component Value Date    TROPONINI

## 2020-01-22 PROBLEM — M79.671 RIGHT FOOT PAIN: Status: ACTIVE | Noted: 2020-01-22

## 2020-01-22 PROBLEM — S82.891A CLOSED RIGHT ANKLE FRACTURE, INITIAL ENCOUNTER: Status: ACTIVE | Noted: 2020-01-22

## 2020-01-22 LAB
ALBUMIN SERPL-MCNC: 2.9 G/DL (ref 3.5–4.6)
ANION GAP SERPL CALCULATED.3IONS-SCNC: 13 MEQ/L (ref 9–15)
BUN BLDV-MCNC: 10 MG/DL (ref 6–20)
CALCIUM SERPL-MCNC: 8.7 MG/DL (ref 8.5–9.9)
CHLORIDE BLD-SCNC: 99 MEQ/L (ref 95–107)
CO2: 28 MEQ/L (ref 20–31)
CREAT SERPL-MCNC: 0.84 MG/DL (ref 0.7–1.2)
GFR AFRICAN AMERICAN: >60
GFR NON-AFRICAN AMERICAN: >60
GLUCOSE BLD-MCNC: 105 MG/DL (ref 70–99)
HCT VFR BLD CALC: 39.5 % (ref 42–52)
HEMOGLOBIN: 13 G/DL (ref 14–18)
MAGNESIUM: 1.9 MG/DL (ref 1.7–2.4)
MCH RBC QN AUTO: 30.8 PG (ref 27–31.3)
MCHC RBC AUTO-ENTMCNC: 32.9 % (ref 33–37)
MCV RBC AUTO: 93.6 FL (ref 80–100)
PDW BLD-RTO: 15.6 % (ref 11.5–14.5)
PHOSPHORUS: 5.2 MG/DL (ref 2.3–4.8)
PLATELET # BLD: 302 K/UL (ref 130–400)
POTASSIUM SERPL-SCNC: 3.9 MEQ/L (ref 3.4–4.9)
RBC # BLD: 4.22 M/UL (ref 4.7–6.1)
SODIUM BLD-SCNC: 140 MEQ/L (ref 135–144)
WBC # BLD: 7.5 K/UL (ref 4.8–10.8)

## 2020-01-22 PROCEDURE — 97535 SELF CARE MNGMENT TRAINING: CPT

## 2020-01-22 PROCEDURE — 6360000002 HC RX W HCPCS: Performed by: INTERNAL MEDICINE

## 2020-01-22 PROCEDURE — 6370000000 HC RX 637 (ALT 250 FOR IP): Performed by: INTERNAL MEDICINE

## 2020-01-22 PROCEDURE — 99232 SBSQ HOSP IP/OBS MODERATE 35: CPT | Performed by: INTERNAL MEDICINE

## 2020-01-22 PROCEDURE — 97116 GAIT TRAINING THERAPY: CPT

## 2020-01-22 PROCEDURE — 2700000000 HC OXYGEN THERAPY PER DAY

## 2020-01-22 PROCEDURE — 94660 CPAP INITIATION&MGMT: CPT

## 2020-01-22 PROCEDURE — 2580000003 HC RX 258: Performed by: INTERNAL MEDICINE

## 2020-01-22 PROCEDURE — 99232 SBSQ HOSP IP/OBS MODERATE 35: CPT | Performed by: PSYCHIATRY & NEUROLOGY

## 2020-01-22 PROCEDURE — 94640 AIRWAY INHALATION TREATMENT: CPT

## 2020-01-22 PROCEDURE — 94760 N-INVAS EAR/PLS OXIMETRY 1: CPT

## 2020-01-22 PROCEDURE — 1210000000 HC MED SURG R&B

## 2020-01-22 PROCEDURE — 80069 RENAL FUNCTION PANEL: CPT

## 2020-01-22 PROCEDURE — 85027 COMPLETE CBC AUTOMATED: CPT

## 2020-01-22 PROCEDURE — 83735 ASSAY OF MAGNESIUM: CPT

## 2020-01-22 PROCEDURE — 97161 PT EVAL LOW COMPLEX 20 MIN: CPT

## 2020-01-22 PROCEDURE — 97166 OT EVAL MOD COMPLEX 45 MIN: CPT

## 2020-01-22 PROCEDURE — 93005 ELECTROCARDIOGRAM TRACING: CPT | Performed by: INTERNAL MEDICINE

## 2020-01-22 PROCEDURE — 36415 COLL VENOUS BLD VENIPUNCTURE: CPT

## 2020-01-22 RX ORDER — TIZANIDINE 4 MG/1
4 TABLET ORAL 3 TIMES DAILY PRN
Qty: 45 TABLET | Refills: 0 | Status: SHIPPED | OUTPATIENT
Start: 2020-01-22 | End: 2020-02-06

## 2020-01-22 RX ORDER — ACETAMINOPHEN 500 MG
1000 TABLET ORAL 3 TIMES DAILY
Status: DISCONTINUED | OUTPATIENT
Start: 2020-01-22 | End: 2020-01-23 | Stop reason: HOSPADM

## 2020-01-22 RX ORDER — KETOROLAC TROMETHAMINE 10 MG/1
10 TABLET, FILM COATED ORAL EVERY 6 HOURS PRN
Qty: 20 TABLET | Refills: 0 | Status: ON HOLD | OUTPATIENT
Start: 2020-01-22 | End: 2021-10-07 | Stop reason: ALTCHOICE

## 2020-01-22 RX ORDER — ACETAMINOPHEN 500 MG
500 TABLET ORAL EVERY 6 HOURS PRN
Qty: 120 TABLET | Refills: 3 | Status: SHIPPED | OUTPATIENT
Start: 2020-01-22

## 2020-01-22 RX ORDER — TIZANIDINE 4 MG/1
4 TABLET ORAL EVERY 6 HOURS PRN
Status: DISCONTINUED | OUTPATIENT
Start: 2020-01-22 | End: 2020-01-23 | Stop reason: HOSPADM

## 2020-01-22 RX ADMIN — IPRATROPIUM BROMIDE AND ALBUTEROL SULFATE 1 AMPULE: .5; 3 SOLUTION RESPIRATORY (INHALATION) at 14:43

## 2020-01-22 RX ADMIN — AMLODIPINE BESYLATE 10 MG: 10 TABLET ORAL at 10:13

## 2020-01-22 RX ADMIN — KETOROLAC TROMETHAMINE 30 MG: 30 INJECTION, SOLUTION INTRAMUSCULAR; INTRAVENOUS at 05:41

## 2020-01-22 RX ADMIN — MIRTAZAPINE 15 MG: 15 TABLET, FILM COATED ORAL at 21:33

## 2020-01-22 RX ADMIN — BUPRENORPHINE AND NALOXONE 1 TABLET: 8; 2 TABLET SUBLINGUAL at 21:33

## 2020-01-22 RX ADMIN — IPRATROPIUM BROMIDE AND ALBUTEROL SULFATE 1 AMPULE: .5; 3 SOLUTION RESPIRATORY (INHALATION) at 08:32

## 2020-01-22 RX ADMIN — PREGABALIN 150 MG: 150 CAPSULE ORAL at 21:38

## 2020-01-22 RX ADMIN — Medication 10 ML: at 21:35

## 2020-01-22 RX ADMIN — BUPRENORPHINE AND NALOXONE 1 TABLET: 8; 2 TABLET SUBLINGUAL at 10:14

## 2020-01-22 RX ADMIN — IPRATROPIUM BROMIDE AND ALBUTEROL SULFATE 1 AMPULE: .5; 3 SOLUTION RESPIRATORY (INHALATION) at 20:01

## 2020-01-22 RX ADMIN — PREGABALIN 300 MG: 150 CAPSULE ORAL at 10:14

## 2020-01-22 RX ADMIN — Medication 10 ML: at 10:24

## 2020-01-22 RX ADMIN — PREGABALIN 150 MG: 150 CAPSULE ORAL at 21:33

## 2020-01-22 RX ADMIN — ENOXAPARIN SODIUM 40 MG: 40 INJECTION SUBCUTANEOUS at 10:13

## 2020-01-22 RX ADMIN — PIPERACILLIN SODIUM AND TAZOBACTAM SODIUM 3.38 G: 3; .375 INJECTION, POWDER, LYOPHILIZED, FOR SOLUTION INTRAVENOUS at 05:27

## 2020-01-22 RX ADMIN — ESCITALOPRAM OXALATE 10 MG: 10 TABLET ORAL at 21:39

## 2020-01-22 RX ADMIN — KETOROLAC TROMETHAMINE 30 MG: 30 INJECTION, SOLUTION INTRAMUSCULAR; INTRAVENOUS at 13:55

## 2020-01-22 RX ADMIN — LOSARTAN POTASSIUM 25 MG: 25 TABLET ORAL at 10:13

## 2020-01-22 ASSESSMENT — PAIN DESCRIPTION - LOCATION
LOCATION: ANKLE;FOOT
LOCATION: ANKLE

## 2020-01-22 ASSESSMENT — ENCOUNTER SYMPTOMS
RESPIRATORY NEGATIVE: 1
WHEEZING: 0
GASTROINTESTINAL NEGATIVE: 1
ALLERGIC/IMMUNOLOGIC NEGATIVE: 1
VOMITING: 0
CHEST TIGHTNESS: 0
SHORTNESS OF BREATH: 0
NAUSEA: 0
BACK PAIN: 0
EYES NEGATIVE: 1
TROUBLE SWALLOWING: 0
COLOR CHANGE: 0
COUGH: 0

## 2020-01-22 ASSESSMENT — PAIN SCALES - GENERAL
PAINLEVEL_OUTOF10: 8
PAINLEVEL_OUTOF10: 9
PAINLEVEL_OUTOF10: 7
PAINLEVEL_OUTOF10: 0
PAINLEVEL_OUTOF10: 0
PAINLEVEL_OUTOF10: 8
PAINLEVEL_OUTOF10: 7
PAINLEVEL_OUTOF10: 0

## 2020-01-22 ASSESSMENT — PAIN DESCRIPTION - ORIENTATION
ORIENTATION: RIGHT
ORIENTATION: RIGHT

## 2020-01-22 ASSESSMENT — PAIN DESCRIPTION - PAIN TYPE: TYPE: ACUTE PAIN

## 2020-01-22 ASSESSMENT — PAIN DESCRIPTION - DESCRIPTORS: DESCRIPTORS: THROBBING

## 2020-01-22 NOTE — CARE COORDINATION
Spoke to Cari Sic PT. States patient needing an additional visit to determine final recommendations but progressed significantly t/o visit this morning and has the potential to be safe at home. States she would anticipate patient to not need consistent PT/OT for several weeks until he can be full weight bearing and only need some additional visits while here.

## 2020-01-22 NOTE — CARE COORDINATION
Spoke to patient about all discharge needs. Wife called in and voicing concerns about patient returning home. Spoke to Ingrid/ PTA about follow-up visit. Stated they worked with patient on hopping with walker. Patient able to hop short distance, kept short d/t fatigue. Patient has a friend that will come in tomorrow to learn how to navigate a 6\" bump to get into home. [facility] advised initially for strengthening and stamina. Patient plans to return home and states he feels safe to return home. Discussed PT recommendations and reasoning for wheelchair. Wife states concerns that patient will not be able to navigate in home. Discussed with patient likelihood that only wheelchair will be covered. Patient agreeable to [address], no preference to agency. Patient stated PCP Rhonda dinh/ [facility], no recent visit noted. Aware he will need to follow-up with PCP for [address] to see. Wife stated she was told patient would need a PPM. No indications that patient in need of a PPM. Requested scripts for [address], walker and wheelchair. Spoke to [address] on need for wheelchair/ walker. States they were following for potential [facility] need - note left for pulm to address. Patient not agreeable to any other discharge plans. Anticipate potential for patient to discharge tomorrow.

## 2020-01-22 NOTE — PROGRESS NOTES
[facility] OCCUPATIONAL THERAPY EVALUATION - ACUTE     NAME: Sandra Jimenez  : 1965 (48 y.o.)  MRN: [id number]  CODE STATUS: Full Code  Room: [facility]    Date of Service: 2020    Patient Diagnosis(es): Hypotension due to hypovolemia [I95.89, E86.1]  Hypotension due to hypovolemia [I95.89, E86.1]   Chief Complaint   Patient presents with    Foot Injury     right foot injury sustained  last night     Patient Active Problem List    Diagnosis Date Noted    Hypotension due to hypovolemia 2020    Acute respiratory failure with hypercapnia ([facility] [address] 10/29/2019    Accidental overdose of heroin ([facility] [facility] 10/28/2019    Chronic pain syndrome     Aspiration into airway     Change in mental status 2019    Non-traumatic rhabdomyolysis 2019    Leukocytosis 2019    Anginal chest pain at rest ([facility] [address] [facility] 2018    Chronic back pain 2016    Fatty infiltration of liver 2016    Acid reflux 2016    Acute encephalopathy 2016    Accidental drug overdose 2016    Current smoker 2013    Lumbar canal stenosis 10/15/2012    Herniation of lumbar intervertebral disc without myelopathy 2012        Past Medical History:   Diagnosis Date    Aspiration into airway     Chronic pain     in neck & back, in pain management    Heroin use     Hypertension     Lumbar radiculopathy, chronic     Metabolic encephalopathy     medication induced    Non-traumatic rhabdomyolysis     Obesity     Pneumonia      Past Surgical History:   Procedure Laterality Date    BACK SURGERY      x2    NECK SURGERY      x2        Restrictions  Restrictions/Precautions: Weight Bearing, Fall Risk  Lower Extremity Weight Bearing Restrictions  Right Lower Extremity Weight Bearing: Non Weight Bearing  Left Lower Extremity Weight Bearing: Weight Bearing As Tolerated     Safety Devices: Safety Devices  Safety Devices in place: Yes  Type of devices:  All fall risk precautions in place    Subjective  Pre Treatment Pain Screening  Pain at present: 0  Scale Used: Numeric Score  Intervention List: Patient able to continue with treatment    Pain Reassessment:   Pain Assessment  Patient Currently in Pain: No  Pain Assessment: 0-10  Pain Level: 0       Prior Level of Function:  Social/Functional History  Lives With: Spouse  Type of Home: House  Home Layout: One level  Home Access: Stairs to enter with rails, Stairs to enter without rails  Entrance Stairs - Number of Steps: one  Bathroom Shower/Tub: Tub/Shower unit  Home Equipment: Crutches  ADL Assistance: Independent  Homemaking Assistance: Independent  Ambulation Assistance: Independent  Transfer Assistance: Independent  Active : Yes  Occupation: On disability(due to back and left leg concerns)    OBJECTIVE:     Orientation Status:  Orientation  Overall Orientation Status: Within Normal Limits    Observation:  Observation/Palpation  Posture: Good  Observation: Pt. alert and attentive, RLE dressing intact    Cognition Status:  Cognition  Overall Cognitive Status: WFL    Perception Status:  Perception  Overall Perceptual Status: WFL    Sensation Status:  Sensation  Overall Sensation Status: WFL    Vision and Hearing Status:  Vision  Vision: Within Functional Limits  Hearing  Hearing: Within functional limits     ROM:   LUE AROM (degrees)  LUE AROM : WFL  Left Hand AROM (degrees)  Left Hand AROM: WFL  RUE AROM (degrees)  RUE AROM : WFL  Right Hand AROM (degrees)  Right Hand AROM: WFL    Strength:  LUE Strength  Gross LUE Strength: WFL  L Hand General: 4/5  LUE Strength Comment: 4/5   RUE Strength  Gross RUE Strength: WFL  R Hand General: 4/5  RUE Strength Comment: 4/5     Coordination, Tone, Quality of Movement:    Tone RUE  RUE Tone: Normotonic  Tone LUE  LUE Tone: Normotonic  Coordination  Movements Are Fluid And Coordinated: Yes    Hand Dominance:  Hand Dominance  Hand Dominance: Left    ADL Status:  ADL  Feeding: Independent  Grooming: Setup  UE Bathing: Setup  LE Bathing: Maximum assistance  UE Dressing: Setup  LE Dressing: Maximum assistance  Toileting: Maximum assistance  Additional Comments: Simulated ADLs as above. Provided extensive education for LE dressing techniques including use of reacher and sock aid in order to maintain NWB on RLE Pt. states he has seen a reacher and a sock aid but has not used them before; G verbal teach back of reasons behind not wanting to push through RLE during dressing tasks. Toilet Transfers  Toilet Transfer: Unable to assess  Toilet Transfers Comments: Anticipate CGA to [facility]       Functional Mobility:  Functional Mobility  Functional Mobility Comments: Pt. unable to take any steps. Multiple trials performed of pivot transfer to chair with Pt. demonstrating improving independence. Educated that pt. may require a BSC for home as he is unable to take any steps and would be unable to hop from doorway to bathroom. Educated pt. that [facility] would be safest to limit his need to hop. Transfers  Stand Pivot Transfers: Maximum assistance, Stand by assistance(Initially max A for safety and increased time; pt. limited by safety initially but after multiple transfers independence improves.)  Sit to stand: Maximum assistance, 2 Person assistance  Stand to sit: Maximum assistance, 2 Person assistance  Transfer Comments: G safety awareness of RLE NWB but requires max increased time.      Bed Mobility  Bed mobility  Rolling to Left: Independent  Rolling to Right: Independent  Supine to Sit: Independent  Sit to Supine: Independent  Scooting: Independent  Comment: Increased time and effort but was able to perform without assist    Seated and Standing Balance:  Balance  Sitting Balance: Supervision  Standing Balance: Maximum assistance    Functional Endurance:  Activity Tolerance  Activity Tolerance: Patient Tolerated treatment well    D/C Recommendations:  OT D/C RECOMMENDATIONS  REQUIRES OT FOLLOW UP: Yes    Equipment Recommendations:  OT Equipment Recommendations  Other: Continue to assess    OT Education:   OT Education  OT Education: OT Role, Plan of Care, Transfer Training, ADL Adaptive Strategies  Patient Education: Educated pt. on NWB and LE dressing and tub transfers, with F understanding. Barriers to Learning: Anxiety at times    OT Follow Up:  OT D/C RECOMMENDATIONS  REQUIRES OT FOLLOW UP: Yes       Assessment/Discharge Disposition:  Assessment: Pt. is a 48year old man from home with spouse who presents to [facility] with the above deficits which impact his ability to perform ADLs and IADLs. Pt would benefit from continued OT to maximize independence and safety with ADL tasks.    Performance deficits / Impairments: Decreased functional mobility , Decreased ADL status, Decreased endurance, Decreased balance, Decreased high-level IADLs  Prognosis: Good  Discharge Recommendations: Continue to assess pending progress  Decision Making: Medium Complexity  History: Pt's medical history is moderately complex  Exam: Pt. has 5 performance deficits  Assistance / Modification: Pt. requires mod A for ADLs    Six Click Score   How much help for putting on and taking off regular lower body clothing?: A Lot  How much help for Bathing?: A Lot  How much help for Toileting?: A Lot  How much help for putting on and taking off regular upper body clothing?: A Little  How much help for taking care of personal grooming?: None  How much help for eating meals?: None  AM-PAC Inpatient Daily Activity Raw Score: 17  AM-PAC Inpatient ADL T-Scale Score : 37.26  ADL Inpatient CMS 0-100% Score: 50.11    Plan:  Plan  Times per week: 1-3x  Plan weeks: Length of acute stay  Current Treatment Recommendations: Balance Training, Functional Mobility Training, Endurance Training, Pain Management, Patient/Caregiver Education & Training, Equipment Evaluation, Education, & procurement, Self-Care / ADL, Home Management Training, Safety Education & Training, Positioning, Wheelchair Mobility Training    Goals:   Patient will:    - Improve functional endurance to tolerate/complete 60 mins of ADL's  - Be Mod I in UB ADLs   - Be Mod I in LB ADLs  - Be Mod I in ADL transfers without LOB  - Be Mod I in toileting tasks  - Access appropriate D/C site with as few architectural barriers as possible. - Sequence self-care tasks with no verbal cues for safety    Patient Goal: Patient goals : \"I want to go home\"     Discussed and agreed upon: Yes Comments:     Therapy Time:   OT Individual Minutes  Time In: 737  Time Out: 920  Minutes: 45    ADL trainin minutes  Eval: 15 minutes     Electronically signed by:     CHELE Orourke  2020, 12:47 PM

## 2020-01-22 NOTE — PROGRESS NOTES
3.9 3.9   CL 99  --  101 99   CO2 29  --  27 28   BUN 19  --  16 10   CREATININE 0.85   < > 0.82 0.84   GLUCOSE 83  --  90 105*   CALCIUM 8.5  --  8.7 8.7   PROT 6.0*  --   --   --    LABALBU 2.8*  --  3.0* 2.9*   BILITOT 0.3  --   --   --    ALKPHOS 75  --   --   --    AST 12  --   --   --    ALT 12  --   --   --    LABGLOM >60.0   < > >60.0 >60.0   GFRAA >60.0   < > >60.0 >60.0   GLOB 3.2  --   --   --     < > = values in this interval not displayed. MV Settings:     Vent Mode: AC/VC  Vt Ordered: 450 mL  Rate Set: 10 bmp  FiO2 : 35 %  PEEP/CPAP: 8  Pressure Support: 6 cmH20  Peak Inspiratory Pressure: 15 cmH2O  Mean Airway Pressure: 10 cmH20  I:E Ratio: 1:2.40    Recent Labs     01/21/20  0539   PHART 7.432   MZX3ASE 42   PO2ART 87*   DGA9GXE 28.0   BEART 4*   J9DGTEIA 97*       O2 Device: None (Room air)  O2 Flow Rate (L/min): 3 L/min    DIET GENERAL;     MEDICATIONS during current hospitalization:    Continuous Infusions:    Scheduled Meds:   buprenorphine-naloxone  1 tablet Sublingual BID    escitalopram  10 mg Oral Nightly    mirtazapine  15 mg Oral Nightly    pregabalin  300 mg Oral BID    amLODIPine  10 mg Oral Daily    losartan  25 mg Oral Daily    ipratropium-albuterol  1 ampule Inhalation TID    sodium chloride flush  10 mL Intravenous 2 times per day    enoxaparin  40 mg Subcutaneous Daily       PRN Meds:ketorolac, albuterol, hydrALAZINE, sodium chloride flush, magnesium hydroxide    Radiology  Xr Chest (single View Frontal)    Result Date: 1/19/2020  XR CHEST (SINGLE VIEW FRONTAL) : 1/19/2020 CLINICAL HISTORY:  Tube placement . COMPARISON: Earlier 1/19/2020. TECHNIQUE: An upright portable AP radiograph of the chest was obtained at approximately 1:57 PM. FINDINGS: An endotracheal tube has been mildly withdrawn with its tip approximately 4 cm above the peggy, in good apparent position.  An orogastric tube remains in good apparent position with its tip coiled overlying the fundus of the stomach. A shallow inspiration is present with stable mild to moderate bibasilar infiltrate/consolidations. There is no cardiomegaly, significant pleural effusion, vascular congestion, pneumothorax, or displaced fractures identified. MILD WITHDRAWAL OF AN ENDOTRACHEAL TUBE IN GOOD APPARENT POSITION. OTHERWISE, STABLE CHEST FROM EARLIER 2020. Xr Chest (single View Frontal)    Result Date: 2020  XR CHEST (SINGLE VIEW FRONTAL) : 2020 8:30 AM CLINICAL HISTORY:  ET tube placement . COMPARISON: None available. A portable upright AP radiograph of the chest was obtained. FINDINGS: An endotracheal tube has been mildly withdrawn with its tip approximately 1 cm above the peggy. An orogastric tube is again noted. A poor inspiratory volume is present with improving mild to moderate bibasilar infiltrates. There is no cardiomegaly, significant pleural effusion, vascular congestion, pneumothorax, or displaced fractures identified. MILD WITHDRAWAL OF AN ENDOTRACHEAL TUBE, WHICH IS STILL SOMEWHAT LOW IN POSITION. IMPROVING PULMONARY INFILTRATES WITH MILD TO MODERATE RESIDUAL BIBASILAR OPACITIES. OTHERWISE, STABLE CHEST FROM 2020. Xr Chest Standard (2 Vw)    Result Date: 2020  Patient MRN: [id number] : 1965 Age:  48 years Gender: Male Order Date: 2020 2:30 PM. Exam: XR CHEST (2 VW) Number of Views: 2 Indication:  Cough x1 week. Evaluate for pneumonia Comparison: 11/3/2019 Findings: Evidence previous anterior cervical fusion. Cardiomediastinal silhouette is within normal limits. Lungs are clear without evidence of infiltrate/pneumonia, pneumothorax or pleural effusion. Impression:  No radiographic evidence of acute cardiopulmonary disease     Xr Ankle Right (min 3 Views)    Result Date: 2020  XR FOOT RIGHT (MIN 3 VIEWS), XR ANKLE RIGHT (MIN 3 VIEWS) : 2020 CLINICAL HISTORY: Pain after injury. COMPARISON: None available.  TECHNIQUE: AP, lateral and oblique radiographs of enlarged cardiac mediastinal silhouette with underlying central pulmonary vascular congestion and bibasilar airspace disease. Stable life support devices. Impression:  Stable, enlarged cardiac mediastinal silhouette with mild central pulmonary vascular congestion. Nonspecific bibasilar airspace disease, worsened in overall appearance compared with the previous study, findings can be seen in infiltrate/pneumonia and/or atelectasis. Xr Chest Portable    Result Date: 1/19/2020  XR CHEST PORTABLE : 1/18/2020 CLINICAL HISTORY: Tube placement. COMPARISON: Earlier 1/18/2020. TECHNIQUE: A portable upright AP radiograph of the chest was obtained at approximately 11:38 PM. FINDINGS: Since the earlier study, endotracheal tube has migrated distally, with its tip extending into the right main bronchus, which should be withdrawn approximately 4-5 cm. There is been interval placement of an orogastric tube with its tip coiled overlying the fundus of the stomach, likely in good position. Significantly shallow inspiratory volumes are present with patchy airspace and interstitial infiltrates worsening inferiorly, which are probably mildly worsened compared to earlier. There is no significant cardiomegaly, worsening vascular congestion, pleural effusion, pneumothorax, or displaced fractures identified. ENDOTRACHEAL TUBE EXTENDING INTO THE RIGHT MAIN BRONCHUS, WHICH SHOULD BE WITHDRAWN 4 TO 5 CM. OROGASTRIC TUBE IN GOOD APPARENT POSITION. SHALLOW INSPIRATORY VOLUMES WITH PATCHY BILATERAL PULMONARY INFILTRATES. MOST LIKELY POSSIBILITIES ARE EDEMA, ATELECTASIS, BRONCHOPNEUMONIA AND/OR ARDS. The findings were discussed with the patient's nurse at approximately 7:20 AM on 1/19/2020. Xr Chest Portable    Result Date: 1/18/2020  XR CHEST PORTABLE : 1/18/2020 CLINICAL HISTORY:  ET tube placement . COMPARISON: Earlier 1/18/2020.  A portable upright AP radiograph of the chest was obtained at approximately 8:40 PM. FINDINGS: An

## 2020-01-22 NOTE — PROGRESS NOTES
Physical Therapy Med Surg Daily Treatment Note  Facility/Department: [facility]  Room: N228/N228-01       NAME: Shen Molina  : 1965 (48 y.o.)  MRN: [id number]  CODE STATUS: Full Code    Date of Service: 2020    Patient Diagnosis(es): Hypotension due to hypovolemia [I95.89, E86.1]  Hypotension due to hypovolemia [I95.89, E86.1]   Chief Complaint   Patient presents with    Foot Injury     right foot injury sustained  last night     Patient Active Problem List    Diagnosis Date Noted    Hypotension due to hypovolemia 2020    Acute respiratory failure with hypercapnia ([facility] [address] 10/29/2019    Accidental overdose of heroin ([facility] [address] 10/28/2019    Chronic pain syndrome     Aspiration into airway     Change in mental status 2019    Non-traumatic rhabdomyolysis 2019    Leukocytosis 2019    Anginal chest pain at rest ([facility] [address] 2018    Chronic back pain 2016    Fatty infiltration of liver 2016    Acid reflux 2016    Acute encephalopathy 2016    Accidental drug overdose 2016    Current smoker 2013    Lumbar canal stenosis 10/15/2012    Herniation of lumbar intervertebral disc without myelopathy 2012        Past Medical History:   Diagnosis Date    Aspiration into airway     Chronic pain     in neck & back, in pain management    Heroin use     Hypertension     Lumbar radiculopathy, chronic     Metabolic encephalopathy     medication induced    Non-traumatic rhabdomyolysis     Obesity     Pneumonia      Past Surgical History:   Procedure Laterality Date    BACK SURGERY      x2    NECK SURGERY      x2          Restrictions  Restrictions/Precautions: Weight Bearing; Fall Risk  Lower Extremity Weight Bearing Restrictions  Right Lower Extremity Weight Bearing: Non Weight Bearing  Left Lower Extremity Weight Bearing: Weight Bearing As Tolerated    SUBJECTIVE   Subjective  Subjective: I've learned so much from all you guys today. Pre-Session Pain Report  Pre Treatment Pain Screening  Pain at present: 8  Intervention List: Patient able to continue with treatment;Nurse/physician notified          Post-Session Pain Report  Pain Assessment  Pain Level: 9  Pain Location: Ankle; Foot  Pain Orientation: Right  Pain Descriptors: Throbbing         OBJECTIVE        Bed mobility  Rolling to Left: Independent  Rolling to Right: Independent  Supine to Sit: Independent  Sit to Supine: Independent  Scooting: Independent  Comment: Increased time and effort but able to perform without assistance    Transfers  Sit to Stand: Contact guard assistance;Stand by assistance  Stand to sit: Contact guard assistance;Stand by assistance  Bed to Chair: Stand by assistance  Car Transfer: Contact guard assistance;Stand by assistance  Comment: vc's and t'c for hand placement and safety; demo for improved indep.; vc's to slow down for safety. pt is slightly impulsive. Ambulation  Ambulation?: Yes  More Ambulation?: No  Ambulation 1  Surface: level tile  Device: Rolling Walker  Assistance: Contact guard assistance;Minimal assistance  Quality of Gait: small pendulum steps; demo for proper technique and less jarring  Distance: 4-5 steps  Comments: good bracing on [employer] and follow through on pendulum step. slightly impulsive vc's to slow down for safety      Stairs/Curb  Stairs?: Yes  Stairs  Curbs: 6\"  Device: 4 wheeled walker  Assistance: Minimal assistance;2 Person assistance  Comment: vc's for technique. Pt bumped up on 6\" platform.  Able to assist with wheel propulsion    Wheelchair Activities  Wheelchair Size: 18\" WITH removable and adjustable legs  Wheelchair Type: Standard  Pressure Relief Type: Self Adjusts  Level of Assistance for pressure relief activities: Stand by assistance  Wheelchair Parts Management: Yes  Left Leg Rest Level of Assistance: Stand by assistance  Right Leg Rest Level of Assistance: Stand by assistance  Left Brakes Level of Assistance: Stand by assistance  Right Brakes Level of Assistance: Stand by Assist  Propulsion: Yes  Propulsion 1  Propulsion: Manual  Level: Level Tile  Level of Assistance: Stand by assistance  Description/ Details: Pt able to manually propel himself, turns, backup, don/doff leg rests  Distance: 250'                               Activity Tolerance  Activity Tolerance: Patient Tolerated treatment well          ASSESSMENT   Assessment: Good participation; Pt is slightly impulsive requiring increased cueing for safety; Pt able to don/doff leg rest and set brakes; initiated 6\" curb bump up and car transfer. Discharge Recommendations:  Continue to assess pending progress    Goals  Short term goals  Short term goal 1: indep pivot transfer  Short term goal 2:  indep w/c mobility 50 feet and set up for transfers  Short term goal 3: determine safest method for one step home entry  Short term goal 4: min assist +1 sit to stand to ww  Short term goal 5: pt able to stand with ww 2 min and participate in weight shifting or gait tasks maintaining NWB  Patient Goals   Patient goals : to return home    PLAN    Plan  Plan Comment: Cont. POC  Safety Devices  Type of devices: All fall risk precautions in place; Bed alarm in place;Call light within reach; Left in bed     [facility] (6 CLICK) Naomi Oliva 28 Inpatient Mobility Raw Score : 17      Therapy Time   Individual   Time In 1308   Time Out 1352   Minutes 44      bm/Trsf - 20 mins  Gait - 24 mins         Rj Kellogg PTA, 01/22/20 at 2:12 PM

## 2020-01-22 NOTE — PROGRESS NOTES
Exam  Vitals signs and nursing note reviewed. Constitutional:       General: He is not in acute distress. Appearance: He is not diaphoretic. HENT:      Head: Normocephalic and atraumatic. Eyes:      Extraocular Movements: Extraocular movements intact. Pupils: Pupils are equal, round, and reactive to light. Cardiovascular:      Rate and Rhythm: Normal rate and regular rhythm. Pulmonary:      Effort: Pulmonary effort is normal. No respiratory distress. Breath sounds: Normal breath sounds. Abdominal:      General: Bowel sounds are normal. There is no distension. Palpations: Abdomen is soft. Tenderness: There is no tenderness. Skin:     General: Skin is warm and dry. Neurological:      General: No focal deficit present. Mental Status: He is alert and oriented to person, place, and time. Mental status is at baseline. Cranial Nerves: No cranial nerve deficit. Sensory: No sensory deficit. Medications:  Reviewed    Infusion Medications:   Scheduled Medications:    buprenorphine-naloxone  1 tablet Sublingual BID    escitalopram  10 mg Oral Nightly    mirtazapine  15 mg Oral Nightly    pregabalin  300 mg Oral BID    amLODIPine  10 mg Oral Daily    losartan  25 mg Oral Daily    ipratropium-albuterol  1 ampule Inhalation TID    sodium chloride flush  10 mL Intravenous 2 times per day    enoxaparin  40 mg Subcutaneous Daily     PRN Meds: ketorolac, albuterol, hydrALAZINE, sodium chloride flush, magnesium hydroxide    Labs:   Recent Labs     01/20/20  1049  01/21/20  0539 01/21/20  0547 01/22/20  0608   WBC 10.6  --   --  9.2 7.5   HGB 12.1*   < > 12.5* 12.6* 13.0*   HCT 35.7*  --   --  37.3* 39.5*     --   --  310 302    < > = values in this interval not displayed.      Recent Labs     01/20/20  1049  01/21/20  0539 01/21/20  0547 01/22/20  0608     --   --  142 140   K 3.8  --   --  3.9 3.9   CL 99  --   --  101 99   CO2 29  --   --  27 28   BUN

## 2020-01-22 NOTE — PROGRESS NOTES
Earlier 1/18/2020. TECHNIQUE: A portable upright AP radiograph of the chest was obtained at approximately 11:38 PM. FINDINGS: Since the earlier study, endotracheal tube has migrated distally, with its tip extending into the right main bronchus, which should be withdrawn approximately 4-5 cm. There is been interval placement of an orogastric tube with its tip coiled overlying the fundus of the stomach, likely in good position. Significantly shallow inspiratory volumes are present with patchy airspace and interstitial infiltrates worsening inferiorly, which are probably mildly worsened compared to earlier. There is no significant cardiomegaly, worsening vascular congestion, pleural effusion, pneumothorax, or displaced fractures identified. ENDOTRACHEAL TUBE EXTENDING INTO THE RIGHT MAIN BRONCHUS, WHICH SHOULD BE WITHDRAWN 4 TO 5 CM. OROGASTRIC TUBE IN GOOD APPARENT POSITION. SHALLOW INSPIRATORY VOLUMES WITH PATCHY BILATERAL PULMONARY INFILTRATES. MOST LIKELY POSSIBILITIES ARE EDEMA, ATELECTASIS, BRONCHOPNEUMONIA AND/OR ARDS. The findings were discussed with the patient's nurse at approximately 7:20 AM on 1/19/2020. Xr Chest Portable    Result Date: 1/18/2020  XR CHEST PORTABLE : 1/18/2020 CLINICAL HISTORY:  ET tube placement . COMPARISON: Earlier 1/18/2020. A portable upright AP radiograph of the chest was obtained at approximately 8:40 PM. FINDINGS: An endotracheal tube is noted with its approximately 3 cm above the peggy. A significantly poor inspiratory volume is present with moderate bibasilar infiltrate/atelectasis,, which appears worsened when compared to earlier 1/18/2020. There is no cardiomegaly, significant pleural effusion, vascular congestion, pneumothorax, or displaced fractures identified. ENDOTRACHEAL TUBE TIP APPROXIMATELY 3 CM ABOVE THE EPGGY. SIGNIFICANTLY POOR INSPIRATION WITH MILDLY WORSENING MODERATE BIBASILAR INFILTRATE/ATELECTASIS.      Xr Chest Portable    Result Date: 1/18/2020  XR CHEST PORTABLE : 1/18/2020 CLINICAL HISTORY:  cough . COMPARISON: 1/7/2020. A portable upright AP radiograph of the chest was obtained. FINDINGS: A poor inspiratory volume is present with mild probable bibasilar atelectasis. Bronchopneumonia should be excluded clinically. There is no cardiomegaly, significant pleural effusion, vascular congestion, pneumothorax, or displaced fractures identified. POOR INSPIRATION WITH MILD PROBABLE BIBASILAR ATELECTASIS. BRONCHOPNEUMONIA SHOULD BE EXCLUDED CLINICALLY. Ct 3d Reconstruction    Result Date: 1/18/2020  CT ANKLE RIGHT WO CONTRAST, CT 3D RECONSTRUCTION : 1/18/2020 CLINICAL HISTORY: pain . COMPARISON: Right foot and ankle radiographs from earlier 1/18/2020. TECHNIQUE: Spiral unenhanced imaging of the right ankle was performed, with routine multiplanar reconstructions and volume rendered images obtained on a 3-D workstation. All CT scans at this facility use dose modulation, iterative reconstruction, and/or weight based dosing when appropriate to reduce radiation dose to as low as reasonably achievable. FINDINGS: A vertically oriented nondisplaced oblique fracture of the medial ankle mortise extends into the distal tibial metadiaphysis. Several small old avulsion fracture fragments are noted inferior to both malleoli; as well as soft tissue swelling about the ankle. There is no other fracture, radiodense foreign bodies, pathologic calcifications, or worrisome bone destruction identified. The visualized joint spaces and ankle mortise are intact. NONDISPLACED VERTICALLY ORIENTED MEDIAL MALLEOLAR/DISTAL TIBIAL FRACTURE AS NOTED. Recent Labs     01/18/20  1630  01/20/20  0459 01/20/20  1049 01/20/20  1141   WBC 16.5*  --   --  10.6  --    HGB 14.0   < > 13.6 12.1* 11.7*     --   --  279  --     < > = values in this interval not displayed.      Recent Labs     01/18/20  1645  01/19/20  0518 01/20/20  0459 01/20/20  1049 01/20/20  1141   NA

## 2020-01-22 NOTE — PLAN OF CARE
See OT evaluation for all goals and OT POC.  Electronically signed by Laura Gutierrez OTR/L on 1/22/2020 at 12:49 PM

## 2020-01-22 NOTE — CARE COORDINATION
Patient seen during rounds. Stated no prior dme used or owned, no prior O2 needs or owned. RN reported patient weaned off O2 and 96% on RA with PT eval. RN reported PT indicated patient would be okay to go home w/ [facility]. Patient reported PT recommending wheelchair and patient NWB RLE. ML for Hodan Harding/PT to return call to verify recommendations and verify equipment recommendations. Eval does not seem consistent with verbal report. Patient also stated his wife thinks he needs to stay somewhere and have some therapy and, \"Don't let my wife delay my discharge. \"

## 2020-01-22 NOTE — PROGRESS NOTES
Hospitalist Progress Note      PCP: Leslie Cota MD    Date of Admission: 1/18/2020    Chief Complaint:  No acute events, afebrile, stable HD    Medications:  Reviewed    Infusion Medications     Scheduled Medications    buprenorphine-naloxone  1 tablet Sublingual BID    escitalopram  10 mg Oral Nightly    mirtazapine  15 mg Oral Nightly    pregabalin  300 mg Oral BID    amLODIPine  10 mg Oral Daily    losartan  25 mg Oral Daily    ipratropium-albuterol  1 ampule Inhalation TID    sodium chloride flush  10 mL Intravenous 2 times per day    enoxaparin  40 mg Subcutaneous Daily    piperacillin-tazobactam  3.375 g Intravenous Q8H    methylPREDNISolone  40 mg Intravenous Daily     PRN Meds: ketorolac, albuterol, hydrALAZINE, sodium chloride flush, magnesium hydroxide      Intake/Output Summary (Last 24 hours) at 1/22/2020 0944  Last data filed at 1/22/2020 0636  Gross per 24 hour   Intake --   Output 3050 ml   Net -3050 ml       Exam:    /71   Pulse 77   Temp 98.4 °F (36.9 °C) (Oral)   Resp 16   Ht 5' 11\" (1.803 m)   Wt 266 lb 5.1 oz (120.8 kg)   SpO2 91%   BMI 37.14 kg/m²     General appearance: awake and alert,  Respiratory:  Clear to auscultation, bilaterally on anterior chest.  Cardiovascular: Regular rate and rhythm with normal S1/S2 . Abdomen: Soft, active bowel sounds. Musculoskeletal: right LE wrapped with splint in place. Labs:   Recent Labs     01/20/20  1049  01/21/20  0539 01/21/20  0547 01/22/20  0608   WBC 10.6  --   --  9.2 7.5   HGB 12.1*   < > 12.5* 12.6* 13.0*   HCT 35.7*  --   --  37.3* 39.5*     --   --  310 302    < > = values in this interval not displayed.      Recent Labs     01/20/20  1049  01/21/20  0539 01/21/20  0547 01/22/20  0608     --   --  142 140   K 3.8  --   --  3.9 3.9   CL 99  --   --  101 99   CO2 29  --   --  27 28   BUN 19  --   --  16 10   CREATININE 0.85   < > 0.7* 0.82 0.84   CALCIUM 8.5  --   --  8.7 8.7   PHOS  --   --   --  4.1 5.2*    < > = values in this interval not displayed. Recent Labs     01/20/20  1049   AST 12   ALT 12   BILITOT 0.3   ALKPHOS 75     No results for input(s): INR in the last 72 hours. No results for input(s): Ryan Flatter in the last 72 hours. Urinalysis:      Lab Results   Component Value Date    NITRU Negative 02/03/2019    WBCUA 0-2 02/03/2019    BACTERIA Negative 02/03/2019    RBCUA 6-10 02/03/2019    BLOODU TRACE 02/03/2019    SPECGRAV 1.015 02/03/2019    GLUCOSEU Negative 02/03/2019       Radiology:  XR CHEST PORTABLE   Final Result   Impression:  Stable, enlarged cardiac mediastinal silhouette with mild central pulmonary vascular congestion. Nonspecific bibasilar airspace disease, worsened in overall appearance compared with the previous study, findings can be seen in    infiltrate/pneumonia and/or atelectasis. XR CHEST (SINGLE VIEW FRONTAL)   Final Result      MILD WITHDRAWAL OF AN ENDOTRACHEAL TUBE IN GOOD APPARENT POSITION. OTHERWISE, STABLE CHEST FROM EARLIER 1/19/2020. XR CHEST (SINGLE VIEW FRONTAL)   Final Result      MILD WITHDRAWAL OF AN ENDOTRACHEAL TUBE, WHICH IS STILL SOMEWHAT LOW IN POSITION. IMPROVING PULMONARY INFILTRATES WITH MILD TO MODERATE RESIDUAL BIBASILAR OPACITIES. OTHERWISE, STABLE CHEST FROM 1/18/2020. XR CHEST PORTABLE   Final Result      ENDOTRACHEAL TUBE EXTENDING INTO THE RIGHT MAIN BRONCHUS, WHICH SHOULD BE WITHDRAWN 4 TO 5 CM. OROGASTRIC TUBE IN GOOD APPARENT POSITION. SHALLOW INSPIRATORY VOLUMES WITH PATCHY BILATERAL PULMONARY INFILTRATES. MOST LIKELY POSSIBILITIES ARE EDEMA, ATELECTASIS, BRONCHOPNEUMONIA AND/OR ARDS. The findings were discussed with the patient's nurse at approximately 7:20 AM on 1/19/2020. XR CHEST PORTABLE   Final Result      ENDOTRACHEAL TUBE TIP APPROXIMATELY 3 CM ABOVE THE GLADIS.        SIGNIFICANTLY POOR INSPIRATION WITH MILDLY WORSENING Hydralazine PRN  - monitor BP closely    DIOGENES  - resolved    Leukocytosis  - resolved    Sinus pause  - noted to have a 6 second pause on telemetry on 1/20 per nursing report  - likely related to RENETTA  - TTE showed preserved LVEF with no significant valve disease  - avoid negative chronotropic meds  - cardiology following      Disposition - PT eval pending        Electronically signed by Kev Zepeda MD on 1/22/2020 at 9:44 AM

## 2020-01-22 NOTE — PROGRESS NOTES
Physical Therapy Med Surg Initial Assessment  Facility/Department: [facility] NEURO  Room: N228/N228-01       NAME: Sandra Jimenez  : 1965 (48 y.o.)  MRN: [id number]  CODE STATUS: Full Code    Date of Service: 2020    Patient Diagnosis(es): Hypotension due to hypovolemia [I95.89, E86.1]  Hypotension due to hypovolemia [I95.89, E86.1]   Chief Complaint   Patient presents with    Foot Injury     right foot injury sustained  last night     Patient Active Problem List    Diagnosis Date Noted    Hypotension due to hypovolemia 2020    Acute respiratory failure with hypercapnia ([facility] [address] 10/29/2019    Accidental overdose of heroin ([facility] [address] 10/28/2019    Chronic pain syndrome     Aspiration into airway     Change in mental status 2019    Non-traumatic rhabdomyolysis 2019    Leukocytosis 2019    Anginal chest pain at rest [facility]) 2018    Chronic back pain 2016    Fatty infiltration of liver 2016    Acid reflux 2016    Acute encephalopathy 2016    Accidental drug overdose 2016    Current smoker 2013    Lumbar canal stenosis 10/15/2012    Herniation of lumbar intervertebral disc without myelopathy 2012        Past Medical History:   Diagnosis Date    Aspiration into airway     Chronic pain     in neck & back, in pain management    Heroin use     Hypertension     Lumbar radiculopathy, chronic     Metabolic encephalopathy     medication induced    Non-traumatic rhabdomyolysis     Obesity     Pneumonia      Past Surgical History:   Procedure Laterality Date    BACK SURGERY      x2    NECK SURGERY      x2       Chart Reviewed: Yes  Family / Caregiver Present: No    Restrictions:  Restrictions/Precautions: Weight Bearing, Fall Risk  Lower Extremity Weight Bearing Restrictions  Right Lower Extremity Weight Bearing: Non Weight Bearing  Left Lower Extremity Weight Bearing: Weight Bearing As Tolerated     SUBJECTIVE:      Pain  Pre Treatment Pain Screening  Pain at present: 6  Intervention List: Patient able to continue with treatment  Comments / Details: right foot throbbing    Post Treatment Pain Screening:   Pain Assessment  Pain Level: 0    Prior Level of Function:  Social/Functional History  Lives With: Spouse  Type of Home: House  Home Layout: One level  Home Access: Stairs to enter with rails, Stairs to enter without rails  Entrance Stairs - Number of Steps: one  Bathroom Shower/Tub: Tub/Shower unit  Home Equipment: Crutches  ADL Assistance: Independent  Homemaking Assistance: Independent  Ambulation Assistance: Independent  Transfer Assistance: Independent  Active : Yes  Occupation: On disability(due ti back and left leg concerns)    OBJECTIVE:   Vision: Within Functional Limits  Hearing: Within functional limits    Cognition:  Overall Orientation Status: Within Functional Limits  Follows Commands: Within Functional Limits         ROM:  RLE PROM: WFL  RLE General PROM: ankle NT due to splint  LLE PROM: WFL    Strength:  Strength LLE  Comment: 4/5 - 1/5 df; 0/5 gr toe ext    Neuro:  Balance  Posture: Good  Sitting - Static: Good  Sitting - Dynamic: Good  Standing - Static: Poor  Standing - Dynamic: Poor  Comments: Pt able to stand with bilat UE support with assist +1 after initial trial. With weight shifting pt demonstrated decreased control and timing of balance reactions for safe gait attempt             Bed mobility  Rolling to Left: Independent  Rolling to Right: Independent  Supine to Sit: Independent  Sit to Supine: Independent  Scooting: Independent    Transfers  Sit to Stand: 2 Person Assistance;Maximum Assistance  Stand to sit: 2 Person Assistance;Maximum Assistance  Bed to Chair: Stand by assistance;Minimal assistance;2 Person Assistance;Maximum assistance(multiple trials with pt demonstrating improved ability and control with each successive trial - trials in both direction with good maintenance of NWB following instruction)  Comment: with pivot transfer pt demonstrated improving ability with last transfer requiring SBA only - good ability to maintain NWB and control balance    Ambulation  Ambulation?: No(pt able to stand with +2 min assist at last trial however unable to safely anterior weight shift to achieve step. Pt able to stand approx 2 min with good left knee stability and maintenance of NWB)              Activity Tolerance  Activity Tolerance: Patient Tolerated treatment well  Activity Tolerance: O2 sat maintained over 92% throughout session          PT Education  PT Education: Disease Specific Education;General Safety;Plan of Care;PT Role;Goals;Weight-bearing Education;Transfer Training;Equipment  Patient Education: pt learns quickly however does need continued practice to improve consistency    ASSESSMENT:   Body structures, Functions, Activity limitations: Decreased functional mobility ; Decreased balance;Decreased safe awareness;Decreased strength;Decreased endurance  Decision Making: Low Complexity  History: med  Exam: low  Clinical Presentation: low    Patient Education: pt learns quickly however does need continued practice to improve consistency  Barriers to Learning: none    DISCHARGE RECOMMENDATIONS:  Discharge Recommendations: Continue to assess pending progress    Assessment: Pt presents with right ankle fracture without surgical repair. He experienced significant medical issues necessitating ICU stay prior to this session. pt has previous back and neck injuries with resultant left ankle weakness. Pt is now [address] on the RLE. He was indep prior to this fracture however is requiring assistance with all mobility at this time.  Pt is in need of PT at this level of care prior to safe return to home  REQUIRES PT FOLLOW UP: Yes      PLAN OF CARE:  Plan  Times per week: 5-7  Times per day: Twice a day  Current Treatment Recommendations: Transfer Training, Endurance Training, Neuromuscular Re-education, Gait

## 2020-01-23 VITALS
BODY MASS INDEX: 37.28 KG/M2 | HEIGHT: 71 IN | TEMPERATURE: 99 F | SYSTOLIC BLOOD PRESSURE: 151 MMHG | WEIGHT: 266.32 LBS | DIASTOLIC BLOOD PRESSURE: 92 MMHG | RESPIRATION RATE: 16 BRPM | OXYGEN SATURATION: 100 % | HEART RATE: 86 BPM

## 2020-01-23 LAB
ALBUMIN SERPL-MCNC: 2.6 G/DL (ref 3.5–4.6)
ANION GAP SERPL CALCULATED.3IONS-SCNC: 13 MEQ/L (ref 9–15)
BUN BLDV-MCNC: 7 MG/DL (ref 6–20)
CALCIUM SERPL-MCNC: 8.4 MG/DL (ref 8.5–9.9)
CHLORIDE BLD-SCNC: 102 MEQ/L (ref 95–107)
CO2: 21 MEQ/L (ref 20–31)
CREAT SERPL-MCNC: 0.73 MG/DL (ref 0.7–1.2)
EKG ATRIAL RATE: 53 BPM
EKG ATRIAL RATE: 68 BPM
EKG ATRIAL RATE: 68 BPM
EKG ATRIAL RATE: 69 BPM
EKG ATRIAL RATE: 73 BPM
EKG ATRIAL RATE: 76 BPM
EKG P AXIS: 118 DEGREES
EKG P AXIS: 49 DEGREES
EKG P AXIS: 54 DEGREES
EKG P AXIS: 56 DEGREES
EKG P AXIS: 56 DEGREES
EKG P AXIS: 59 DEGREES
EKG P-R INTERVAL: 150 MS
EKG P-R INTERVAL: 150 MS
EKG P-R INTERVAL: 156 MS
EKG P-R INTERVAL: 158 MS
EKG P-R INTERVAL: 162 MS
EKG P-R INTERVAL: 164 MS
EKG Q-T INTERVAL: 358 MS
EKG Q-T INTERVAL: 382 MS
EKG Q-T INTERVAL: 382 MS
EKG Q-T INTERVAL: 386 MS
EKG Q-T INTERVAL: 394 MS
EKG Q-T INTERVAL: 416 MS
EKG QRS DURATION: 100 MS
EKG QRS DURATION: 84 MS
EKG QRS DURATION: 88 MS
EKG QRS DURATION: 88 MS
EKG QRS DURATION: 90 MS
EKG QRS DURATION: 94 MS
EKG QTC CALCULATION (BAZETT): 390 MS
EKG QTC CALCULATION (BAZETT): 402 MS
EKG QTC CALCULATION (BAZETT): 406 MS
EKG QTC CALCULATION (BAZETT): 413 MS
EKG QTC CALCULATION (BAZETT): 418 MS
EKG QTC CALCULATION (BAZETT): 420 MS
EKG R AXIS: 157 DEGREES
EKG R AXIS: 25 DEGREES
EKG R AXIS: 35 DEGREES
EKG R AXIS: 39 DEGREES
EKG R AXIS: 39 DEGREES
EKG R AXIS: 43 DEGREES
EKG T AXIS: 166 DEGREES
EKG T AXIS: 22 DEGREES
EKG T AXIS: 29 DEGREES
EKG T AXIS: 35 DEGREES
EKG T AXIS: 41 DEGREES
EKG T AXIS: 44 DEGREES
EKG VENTRICULAR RATE: 53 BPM
EKG VENTRICULAR RATE: 68 BPM
EKG VENTRICULAR RATE: 68 BPM
EKG VENTRICULAR RATE: 69 BPM
EKG VENTRICULAR RATE: 73 BPM
EKG VENTRICULAR RATE: 76 BPM
GFR AFRICAN AMERICAN: >60
GFR NON-AFRICAN AMERICAN: >60
GLUCOSE BLD-MCNC: 86 MG/DL (ref 70–99)
HCT VFR BLD CALC: 39.8 % (ref 42–52)
HEMOGLOBIN: 13.1 G/DL (ref 14–18)
MAGNESIUM: 2.3 MG/DL (ref 1.7–2.4)
MCH RBC QN AUTO: 30.7 PG (ref 27–31.3)
MCHC RBC AUTO-ENTMCNC: 32.8 % (ref 33–37)
MCV RBC AUTO: 93.7 FL (ref 80–100)
PDW BLD-RTO: 15.6 % (ref 11.5–14.5)
PHOSPHORUS: 4.3 MG/DL (ref 2.3–4.8)
PLATELET # BLD: 321 K/UL (ref 130–400)
POTASSIUM SERPL-SCNC: 4.1 MEQ/L (ref 3.4–4.9)
RBC # BLD: 4.25 M/UL (ref 4.7–6.1)
SODIUM BLD-SCNC: 136 MEQ/L (ref 135–144)
WBC # BLD: 7.4 K/UL (ref 4.8–10.8)

## 2020-01-23 PROCEDURE — 6370000000 HC RX 637 (ALT 250 FOR IP): Performed by: INTERNAL MEDICINE

## 2020-01-23 PROCEDURE — 83735 ASSAY OF MAGNESIUM: CPT

## 2020-01-23 PROCEDURE — 6360000002 HC RX W HCPCS: Performed by: INTERNAL MEDICINE

## 2020-01-23 PROCEDURE — 80069 RENAL FUNCTION PANEL: CPT

## 2020-01-23 PROCEDURE — 85027 COMPLETE CBC AUTOMATED: CPT

## 2020-01-23 PROCEDURE — 97535 SELF CARE MNGMENT TRAINING: CPT

## 2020-01-23 PROCEDURE — 94761 N-INVAS EAR/PLS OXIMETRY MLT: CPT

## 2020-01-23 PROCEDURE — 99232 SBSQ HOSP IP/OBS MODERATE 35: CPT | Performed by: INTERNAL MEDICINE

## 2020-01-23 PROCEDURE — 99232 SBSQ HOSP IP/OBS MODERATE 35: CPT | Performed by: PSYCHIATRY & NEUROLOGY

## 2020-01-23 PROCEDURE — 36415 COLL VENOUS BLD VENIPUNCTURE: CPT

## 2020-01-23 PROCEDURE — 94640 AIRWAY INHALATION TREATMENT: CPT

## 2020-01-23 PROCEDURE — 2580000003 HC RX 258: Performed by: INTERNAL MEDICINE

## 2020-01-23 PROCEDURE — 93005 ELECTROCARDIOGRAM TRACING: CPT | Performed by: INTERNAL MEDICINE

## 2020-01-23 RX ORDER — LOSARTAN POTASSIUM 50 MG/1
50 TABLET ORAL DAILY
Status: DISCONTINUED | OUTPATIENT
Start: 2020-01-24 | End: 2020-01-23 | Stop reason: HOSPADM

## 2020-01-23 RX ORDER — LOSARTAN POTASSIUM 50 MG/1
50 TABLET ORAL DAILY
Qty: 30 TABLET | Refills: 3 | Status: ON HOLD | OUTPATIENT
Start: 2020-01-24 | End: 2021-10-07 | Stop reason: ALTCHOICE

## 2020-01-23 RX ORDER — LOSARTAN POTASSIUM 25 MG/1
25 TABLET ORAL DAILY
Qty: 30 TABLET | Refills: 3 | Status: ON HOLD | OUTPATIENT
Start: 2020-01-24 | End: 2021-10-07 | Stop reason: ALTCHOICE

## 2020-01-23 RX ORDER — AMLODIPINE BESYLATE 10 MG/1
10 TABLET ORAL DAILY
Qty: 30 TABLET | Refills: 3 | Status: ON HOLD | OUTPATIENT
Start: 2020-01-24 | End: 2021-10-07

## 2020-01-23 RX ADMIN — IPRATROPIUM BROMIDE AND ALBUTEROL SULFATE 1 AMPULE: .5; 3 SOLUTION RESPIRATORY (INHALATION) at 07:47

## 2020-01-23 RX ADMIN — PREGABALIN 300 MG: 150 CAPSULE ORAL at 09:34

## 2020-01-23 RX ADMIN — Medication 10 ML: at 09:34

## 2020-01-23 RX ADMIN — LOSARTAN POTASSIUM 25 MG: 25 TABLET ORAL at 09:35

## 2020-01-23 RX ADMIN — BUPRENORPHINE AND NALOXONE 1 TABLET: 8; 2 TABLET SUBLINGUAL at 09:34

## 2020-01-23 RX ADMIN — AMLODIPINE BESYLATE 10 MG: 10 TABLET ORAL at 09:34

## 2020-01-23 RX ADMIN — ENOXAPARIN SODIUM 40 MG: 40 INJECTION SUBCUTANEOUS at 09:35

## 2020-01-23 RX ADMIN — IPRATROPIUM BROMIDE AND ALBUTEROL SULFATE 1 AMPULE: .5; 3 SOLUTION RESPIRATORY (INHALATION) at 13:20

## 2020-01-23 RX ADMIN — KETOROLAC TROMETHAMINE 30 MG: 30 INJECTION, SOLUTION INTRAMUSCULAR; INTRAVENOUS at 09:40

## 2020-01-23 ASSESSMENT — ENCOUNTER SYMPTOMS
CHEST TIGHTNESS: 0
COLOR CHANGE: 0
NAUSEA: 0
ABDOMINAL PAIN: 0
TROUBLE SWALLOWING: 0
WHEEZING: 0
SHORTNESS OF BREATH: 0
VOMITING: 0
COUGH: 0
ABDOMINAL DISTENTION: 0

## 2020-01-23 ASSESSMENT — PAIN SCALES - GENERAL
PAINLEVEL_OUTOF10: 8
PAINLEVEL_OUTOF10: 8
PAINLEVEL_OUTOF10: 0
PAINLEVEL_OUTOF10: 0
PAINLEVEL_OUTOF10: 8

## 2020-01-23 ASSESSMENT — PAIN DESCRIPTION - LOCATION: LOCATION: ANKLE

## 2020-01-23 ASSESSMENT — PAIN DESCRIPTION - PAIN TYPE: TYPE: ACUTE PAIN

## 2020-01-23 ASSESSMENT — PAIN DESCRIPTION - PROGRESSION: CLINICAL_PROGRESSION: NOT CHANGED

## 2020-01-23 ASSESSMENT — PAIN DESCRIPTION - ORIENTATION: ORIENTATION: RIGHT

## 2020-01-23 ASSESSMENT — PAIN DESCRIPTION - ONSET: ONSET: ON-GOING

## 2020-01-23 ASSESSMENT — PAIN DESCRIPTION - FREQUENCY: FREQUENCY: CONTINUOUS

## 2020-01-23 NOTE — PROGRESS NOTES
[facility] Neurology Daily Progress Note  Name: Brett Galeas  Age: 48 y.o. Gender: male  CodeStatus: Full Code  Allergies: No Known Allergies    Chief Complaint:Foot Injury (right foot injury sustained  last night)    Primary Care Provider: Galo Lewis MD  InpatientTreatment Team: Treatment Team: Attending Provider: Zack Adorno MD; Consulting Physician: Alina Doe DO; Consulting Physician: Nellie Lyn MD; Consulting Physician: Nain Mena MD; Utilization Reviewer: Lexis Garcia RN; Consulting Physician: Mello Davidson DO; Consulting Physician: Flo Swift MD; : Cj Greco RN  Admission Date: 1/18/2020      HPI Pt seen and examined for neuro follow up for encephalopathy r/t accidental drug overdose and acute resp failure s/p intubation. Currently A&O x3, NAD, cooperative. No focal deficits. No seizure activity. .  NO Headache, double vision, blurry vision, difficulty with speech, difficulty with swallowing, weakness, numbness, pain, nausea, vomiting, choking, neck pain, dizziness    Vitals:    01/23/20 0747   BP:    Pulse:    Resp: 16   Temp:    SpO2: 93%      Review of Systems   Constitutional: Negative for appetite change, chills, fatigue and fever. HENT: Negative for hearing loss and trouble swallowing. Eyes: Negative for visual disturbance. Respiratory: Negative for cough, chest tightness, shortness of breath and wheezing. Cardiovascular: Negative for chest pain, palpitations and leg swelling. Gastrointestinal: Negative for abdominal distention, abdominal pain, nausea and vomiting. Musculoskeletal: Negative for gait problem. Skin: Negative for color change and rash. Neurological: Negative for dizziness, tremors, seizures, syncope, facial asymmetry, speech difficulty, weakness, light-headedness, numbness and headaches. Psychiatric/Behavioral: Negative for agitation, confusion and hallucinations. The patient is not nervous/anxious.       Physical for input(s): INR in the last 72 hours. No results for input(s): Rosy Rik in the last 72 hours. Urinalysis:   Lab Results   Component Value Date    NITRU Negative 02/03/2019    WBCUA 0-2 02/03/2019    BACTERIA Negative 02/03/2019    RBCUA 6-10 02/03/2019    BLOODU TRACE 02/03/2019    SPECGRAV 1.015 02/03/2019    GLUCOSEU Negative 02/03/2019       Radiology:   Most recent    EEG No procedure found. MRI of Brain No results found for this or any previous visit. No results found for this or any previous visit. MRA of the Head and Neck: No results found for this or any previous visit. No results found for this or any previous visit. No results found for this or any previous visit. CT of the Head:   Results for orders placed during the hospital encounter of 01/18/20   CT Head WO Contrast    Narrative CT HEAD WO CONTRAST : 1/18/2020    CLINICAL HISTORY:  change in mental status . COMPARISON: 10/29/2019. TECHNIQUE: Spiral unenhanced images were obtained of the head, with routine multiplanar reconstructions performed. All CT scans at this facility use dose modulation, iterative reconstruction, and/or weight based dosing when appropriate to reduce radiation dose to as low as reasonably achievable. FINDINGS:    There is no intracranial hemorrhage, mass effect, midline shift, extra-axial collection, evidence of hydrocephalus, recent ischemic infarct, or skull fracture identified. There is no significant atrophy or white matter changes, for age. Moderate mucosal thickening within the paranasal sinuses again noted. The mastoid air cells are clear. Impression NO ACUTE INTRACRANIAL PROCESS, OR SIGNIFICANT CHANGE FROM 10/29/2019 IDENTIFIED. No results found for this or any previous visit. No results found for this or any previous visit. Carotid duplex: No results found for this or any previous visit. No results found for this or any previous visit. No results found for this or any previous visit. Echo No results found for this or any previous visit. Assessment/Plan:  Encephalopathy secondary to accidental drug overdose, resp failure , hypercarbia and hypotension, resolved  CT head neg acute findings  Acute resp failure S/p intubation, cont Bipap  Has sleep apnea and not using machine, agreeable to be compliant  Nondisplaced right tibial fracture, nonoperative  Hx drug abuse on suboxone and lyrica, pain mangement consulted  OK to DC from neuro standpoint     [facility] CARLOS Lomas MD, [address], American Board of Psychiatry & Neurology  Board Certified in Vascular Neurology  Board Certified in Neuromuscular Medicine  Certified in Neurorehabilitation           Collaborating physicians: Dr Brii Lomas    Electronically signed by PEGGY Mcdonnell CNP on 1/23/2020 at 10:17 AM

## 2020-01-23 NOTE — DISCHARGE INSTR - COC
initial encounter S82.891A    Sinus pause I45.5       Isolation/Infection:   Isolation          No Isolation        Patient Infection Status     None to display          Nurse Assessment:  Last Vital Signs: BP (!) 170/90   Pulse 87   Temp 98.9 °F (37.2 °C) (Oral)   Resp 16   Ht 5' 11\" (1.803 m)   Wt 266 lb 5.1 oz (120.8 kg)   SpO2 93%   BMI 37.14 kg/m²     Last documented pain score (0-10 scale): Pain Level: 8  Last Weight:   Wt Readings from Last 1 Encounters:   01/20/20 266 lb 5.1 oz (120.8 kg)     Mental Status:  {IP PT MENTAL STATUS:20030}    IV Access:  { MONIE IV ACCESS:005456981}    Nursing Mobility/ADLs:  Walking   {P DME LJBW:274937081}  Transfer  {P DME XELW:015051910}  Bathing  {[facility] DME ORAT:374668833}  Dressing  {[facility] DME DNLS:004295465}  Toileting  {P DME PUDT:232121179}  Feeding  {[facility] DME MJFZ:838255791}  Med Admin  {[facility] DME QDPJ:557460909}  Med Delivery   { MONIE MED Delivery:252791450}    Wound Care Documentation and Therapy:        Elimination:  Continence:   · Bowel: {YES / BW:74175}  · Bladder: {YES / IS:56477}  Urinary Catheter: {Urinary Catheter:966823176}   Colostomy/Ileostomy/Ileal Conduit: {YES / TQ:98678}       Date of Last BM: ***    Intake/Output Summary (Last 24 hours) at 1/23/2020 1536  Last data filed at 1/23/2020 0400  Gross per 24 hour   Intake 640 ml   Output 900 ml   Net -260 ml     I/O last 3 completed shifts:   In: 0 [P.O.:640]  Out: 900 [Urine:900]    Safety Concerns:     [employer] Safety Concerns:376911549}    Impairments/Disabilities:      [employer] Impairments/Disabilities:165982176}    Nutrition Therapy:  Current Nutrition Therapy:   [employer] Diet List:649608280}    Routes of Feeding: {[facility] DME Other Feedings:087783340}  Liquids: {Slp liquid thickness:59578}  Daily Fluid Restriction: {CHP DME Yes amt example:651833595}  Last Modified Barium Swallow with Video (Video Swallowing Test): {Done Not Done KXAR:351537565}    Treatments at the Time of Hospital Discharge: Respiratory Treatments: ***  Oxygen Therapy:  {Therapy; copd oxygen:43922}  Ventilator:    {MH CC Vent RLYD:708689145}    Rehab Therapies: {THERAPEUTIC INTERVENTION:5130509516}  Weight Bearing Status/Restrictions: [employer] [employer] Weight Bearin}  Other Medical Equipment (for information only, NOT a DME order):  {EQUIPMENT:492668781}  Other Treatments: ***    Patient's personal belongings (please select all that are sent with patient):  {P DME Belongings:212139112}    RN SIGNATURE:  {Esignature:117918908}    CASE MANAGEMENT/SOCIAL WORK SECTION    Inpatient Status Date: ***    Readmission Risk Assessment Score:  Readmission Risk              Risk of Unplanned Readmission:        20           Discharging to Facility/ Agency   · Name: [facility]  · Address:  · Phone: [phone]  · Fax: [phone]    Dialysis Facility (if applicable)   · Name:  · Address:  · Dialysis Schedule:  · Phone:  · Fax:    / signature: Electronically signed by Luis Green RN on 20 at 3:36 PM    PHYSICIAN SECTION    Prognosis: {Prognosis:3159658153}    Condition at Discharge: [address] Patient Condition:199662219}    Rehab Potential (if transferring to Rehab): {Prognosis:0658175253}    Recommended Labs or Other Treatments After Discharge: ***    Physician Certification: I certify the above information and transfer of Beatriz Skelton  is necessary for the continuing treatment of the diagnosis listed and that he requires {Admit to Appropriate Level of Care:68801} for {GREATER/LESS:718624707} 30 days.      Update Admission H&P: {CHP DME Changes in AMKBN:432669637}    PHYSICIAN SIGNATURE:  {Esignature:174910672}

## 2020-01-23 NOTE — PROGRESS NOTES
[facility] OCCUPATIONAL THERAPY MED SURG TREATMENT NOTE     Date: 2020  Patient Name: Aron Conteh        MRN: [id number]  Account: [de-identified]   : 1965  (48 y.o.)  Room: [place] [address]    Chart Review:  Diagnosis:  The primary encounter diagnosis was Encephalopathy acute. Diagnoses of Drug overdose, undetermined intent, initial encounter, Aspiration pneumonia of right lower lobe, unspecified aspiration pneumonia type ([facility] [address] Acute respiratory failure with hypercapnia ([facility] [address] Closed fracture of distal end of right tibia, unspecified fracture morphology, initial encounter, Chronic obstructive pulmonary disease, unspecified COPD type ([facility] [address] Sleep apnea, unspecified type, Closed right ankle fracture, initial encounter, and Right foot pain were also pertinent to this visit. Restrictions:    Restrictions/Precautions  Restrictions/Precautions: Weight Bearing, Fall Risk  NWB: RLE   WBAT: LLE     Subjective:  Patient states: \"I think I got this. \"   Pain:  Start of tx:  Pre Treatment Pain Screening  Pain at present: 8  Scale Used: Numeric Score  Intervention List: Patient able to continue with treatment    End of tx:  Pain Assessment  Patient Currently in Pain: Yes  Pain Assessment: 0-10  Pain Level: 8  Pain Type: Acute pain  Pain Location: Ankle  Pain Orientation: Right  Pain Frequency: Continuous  Clinical Progression: Not changed    Objective:    ADL:  ADL  LE Dressing: Supervision (To don/doff left sock and don/doff pants past bilateral feet. Pt declined to don pants further reporting that he \"does not want to mess\" with getting pants over leg dressing.)     Discussed patient's home environment and plan for overcoming obstacles.  Pt reports that he feels comfortable and safe to go home at this time and did not have and questions, comments, or concerns for therapist.     Cognition:  Cognition  Overall Cognitive Status: WFL    Bed Mobility  Bed mobility  Rolling to Left: Independent  Rolling to Right:

## 2020-01-23 NOTE — PROGRESS NOTES
Patient discharged home. Discharge instructions, medications and med changes, Rx's, appointments reviewed with patient and verbalized understanding. Patient states that he understands his new medications and changes made to his previous orders. Patient states that he is aware of his appointments and will follow up as advised by physicians. IV removed; tele removed; no complications .   Electronically signed by Maedline Dawson RN on 1/23/2020 at 4:33 PM

## 2020-01-23 NOTE — PROGRESS NOTES
Physical Therapy Missed Treatment   Facility/Department: [facility] MED SURG N228/N228-01    NAME: Randell Schumacher    : 1965 (48 y.o.)  MRN: [id number]    Account: [de-identified]  Gender: male    Chart reviewed, attempted PT at 56. Patient unavailable 2° to:    [] Hold per nsg request    [x] Pt declined pt states he is prepared for home going and states he should be leaving soon, declines to participate at this time. [x] Nsg notified   [] Other notified    [] Pt. . off floor for test/procedure. [] Pt. Unavailable       Will attempt PT treatment again at earliest convenience.       Electronically signed by Vanesa Castillo PTA on 20 at 1:56 PM

## 2020-01-23 NOTE — PROGRESS NOTES
Hospitalist Progress Note      PCP: Noemí Campa MD    Date of Admission: 1/18/2020    Chief Complaint:  No acute events, afebrile, stable HD    Medications:  Reviewed    Infusion Medications     Scheduled Medications    acetaminophen  1,000 mg Oral TID    buprenorphine-naloxone  1 tablet Sublingual BID    escitalopram  10 mg Oral Nightly    mirtazapine  15 mg Oral Nightly    pregabalin  300 mg Oral BID    amLODIPine  10 mg Oral Daily    losartan  25 mg Oral Daily    ipratropium-albuterol  1 ampule Inhalation TID    sodium chloride flush  10 mL Intravenous 2 times per day    enoxaparin  40 mg Subcutaneous Daily     PRN Meds: tiZANidine, ketorolac, albuterol, hydrALAZINE, sodium chloride flush, magnesium hydroxide      Intake/Output Summary (Last 24 hours) at 1/23/2020 0935  Last data filed at 1/23/2020 0400  Gross per 24 hour   Intake 640 ml   Output 900 ml   Net -260 ml       Exam:    BP (!) 170/90   Pulse 87   Temp 98.9 °F (37.2 °C) (Oral)   Resp 16   Ht 5' 11\" (1.803 m)   Wt 266 lb 5.1 oz (120.8 kg)   SpO2 93%   BMI 37.14 kg/m²     General appearance: awake and alert,  Respiratory:  Clear to auscultation, bilaterally on anterior chest.  Cardiovascular: Regular rate and rhythm with normal S1/S2 . Abdomen: Soft, active bowel sounds. Musculoskeletal: right LE wrapped with splint in place. Labs:   Recent Labs     01/21/20  0547 01/22/20  0608 01/23/20  0550   WBC 9.2 7.5 7.4   HGB 12.6* 13.0* 13.1*   HCT 37.3* 39.5* 39.8*    302 321     Recent Labs     01/21/20  0547 01/22/20  0608 01/23/20  0544    140 136   K 3.9 3.9 4.1    99 102   CO2 27 28 21   BUN 16 10 7   CREATININE 0.82 0.84 0.73   CALCIUM 8.7 8.7 8.4*   PHOS 4.1 5.2* 4.3     Recent Labs     01/20/20  1049   AST 12   ALT 12   BILITOT 0.3   ALKPHOS 75     No results for input(s): INR in the last 72 hours. No results for input(s): Joycelyn Ales in the last 72 hours.     Urinalysis:      Lab Results   Component ATELECTASIS. BRONCHOPNEUMONIA SHOULD BE EXCLUDED CLINICALLY. CT ANKLE RIGHT WO CONTRAST   Final Result      NONDISPLACED VERTICALLY ORIENTED MEDIAL MALLEOLAR/DISTAL TIBIAL FRACTURE AS NOTED. CT 3D RECONSTRUCTION   Final Result      NONDISPLACED VERTICALLY ORIENTED MEDIAL MALLEOLAR/DISTAL TIBIAL FRACTURE AS NOTED. XR ANKLE RIGHT (MIN 3 VIEWS)   Final Result      NONDISPLACED VERTICALLY ORIENTED MEDIAL MALLEOLAR/DISTAL TIBIAL FRACTURE AS NOTED. XR FOOT RIGHT (MIN 3 VIEWS)   Final Result      NONDISPLACED VERTICALLY ORIENTED MEDIAL MALLEOLAR/DISTAL TIBIAL FRACTURE AS NOTED.                     Assessment/Plan:    53-year-old male with a past medical history of heroin use, hypertension, chronic pain, who presented with right ankle pain    Nondisplaced right distal tibia fracture  - nonoperative management per Ortho  - resumed Buprenorphine and Lyrica  - IV Toradol as needed  - started on scheduled Tylenol and Tizanidine PRN by pain management     Acute hypercapnic/hypoxemic respiratory failure  - due to underlying RENETTA, OSH, possible COPD  - requiring intubation and mechanical ventilation  - treated empirically with IV solumedrol and IV zosyn for possible PNA  - extubated on 1/20  - transferred out of ICU  - improved, Ok to d/c per pulmonology service    Toxic-metabolic encephalopathy  - due to accidental drug overdose with Lyrica and hypercapnia  - CT head was negative for acute findings  - improved  - neurology following    HTN  - was initially hypotensive on admission, now hypertensive  - BP better controlled  - started on Norvasc and losartan  - was on Toprol at home that was stopped due to sinus pause  - Hydralazine PRN  - monitor BP closely    DIOGENES  - resolved    Leukocytosis  - resolved    Sinus pause  - noted to have a 6 second pause on telemetry on 1/20 per nursing report  - likely related to RENETTA  - TTE showed preserved LVEF with no significant valve disease  - avoid negative chronotropic meds  - cardiology following      Disposition - home with Giovana Pham today        Electronically signed by Sandro Santiago MD on 1/23/2020 at 9:35 AM

## 2020-01-23 NOTE — PROGRESS NOTES
7p-7a Patient resting quietly. Medicated for pain as needed. Dr Nabila Cox visit and orders written. Right ankle maintained in splint. Wore Bipap for approx 4 hours.  Voiding in urinal without difficulty

## 2020-01-23 NOTE — CARE COORDINATION
Faxed all info to [employer] Awaiting confirmation that wheelchair orders received to determine when wheelchair available to patient. Called [facility] referral to [facility]. Patient did not have a preference to agency. Kannan Hu notified patient states his PCP is Zina Cole and that I spoke to patient about need to follow-up with his PCP to have Mylenecathryn Pham work with him in the home.

## 2020-01-23 NOTE — CARE COORDINATION
Re-faxed scripts for wheelchair and walker. Notified patient he was set up with [facility]. Clarified patient's correct phone number, notified Kisha Yung @ [place] [phone] of different number. Nurse provided patient with walker.

## 2020-01-23 NOTE — DISCHARGE SUMMARY
Hospital Medicine Discharge Summary    Ravin Peng  :  1965  MRN:  [id number]    Admit date:  2020  Discharge date:  2020    Admitting Physician:  Elena Harris DO  Primary Care Physician:  Ela Portillo MD      Discharge Diagnoses: Active Problems:    Acute encephalopathy    Accidental drug overdose    Chronic back pain    Herniation of lumbar intervertebral disc without myelopathy    Lumbar canal stenosis    Chronic pain syndrome    Hypotension due to hypovolemia    Right foot pain    Closed right ankle fracture, initial encounter  Resolved Problems:    * No resolved hospital problems.  *      Hospital Course:   Ravin Peng is a 48 y.o. male that was admitted and treated at [facility] for the following medical issues:     Nondisplaced right distal tibia fracture  - nonoperative management per Ortho  - resumed Buprenorphine and Lyrica  - sent home on scheduled Tylenol, Tizanidine and toradol PRN by pain management     Acute hypercapnic/hypoxemic respiratory failure  - due to underlying RENETTA, OSH, possible COPD  - requiring intubation and mechanical ventilation  - treated empirically with IV solumedrol and IV zosyn for possible PNA  - extubated on   - transferred out of ICU  - improved, Ok to d/c per pulmonology service    Toxic-metabolic encephalopathy  - due to accidental drug overdose with Lyrica and hypercapnia  - CT head was negative for acute findings  - resolved    HTN  - was initially hypotensive on admission, now hypertensive  - BP better controlled  - started on Norvasc and losartan  - on Toprol at home that was stopped due to sinus pause    Sinus pause  - noted to have a 6 second pause on telemetry on  per nursing report  - likely related to RENETTA  - TTE showed preserved LVEF with no significant valve disease  - avoid negative chronotropic meds  - f/u with cardiology as outpatient    After study the underlying etiology of the acute hypercapnic respiratory failure was due to RENETTA, OSH, possible COPD       Disposition - home with [place] [id number]         Patient was seen by the following consultants while admitted to [facility]:   Consults:  IP CONSULT TO HOSPITALIST  IP CONSULT TO NEUROLOGY  IP CONSULT TO ORTHOPEDIC SURGERY  IP CONSULT TO CRITICAL CARE  IP CONSULT TO CARDIOLOGY  IP CONSULT TO PAIN MANAGEMENT  IP CONSULT TO HOME CARE NEEDS    Significant Diagnostic Studies:    Xr Chest (single View Frontal)    Result Date: 1/19/2020  XR CHEST (SINGLE VIEW FRONTAL) : 1/19/2020 CLINICAL HISTORY:  Tube placement . COMPARISON: Earlier 1/19/2020. TECHNIQUE: An upright portable AP radiograph of the chest was obtained at approximately 1:57 PM. FINDINGS: An endotracheal tube has been mildly withdrawn with its tip approximately 4 cm above the peggy, in good apparent position. An orogastric tube remains in good apparent position with its tip coiled overlying the fundus of the stomach. A shallow inspiration is present with stable mild to moderate bibasilar infiltrate/consolidations. There is no cardiomegaly, significant pleural effusion, vascular congestion, pneumothorax, or displaced fractures identified. MILD WITHDRAWAL OF AN ENDOTRACHEAL TUBE IN GOOD APPARENT POSITION. OTHERWISE, STABLE CHEST FROM EARLIER 1/19/2020. Xr Chest (single View Frontal)    Result Date: 1/19/2020  XR CHEST (SINGLE VIEW FRONTAL) : 1/19/2020 8:30 AM CLINICAL HISTORY:  ET tube placement . COMPARISON: None available. A portable upright AP radiograph of the chest was obtained. FINDINGS: An endotracheal tube has been mildly withdrawn with its tip approximately 1 cm above the peggy. An orogastric tube is again noted. A poor inspiratory volume is present with improving mild to moderate bibasilar infiltrates. There is no cardiomegaly, significant pleural effusion, vascular congestion, pneumothorax, or displaced fractures identified.      MILD WITHDRAWAL OF AN ENDOTRACHEAL TUBE, WHICH IS STILL SOMEWHAT LOW IN POSITION. IMPROVING PULMONARY INFILTRATES WITH MILD TO MODERATE RESIDUAL BIBASILAR OPACITIES. OTHERWISE, STABLE CHEST FROM 1/18/2020. Xr Ankle Right (min 3 Views)    Result Date: 1/18/2020  XR FOOT RIGHT (MIN 3 VIEWS), XR ANKLE RIGHT (MIN 3 VIEWS) : 1/18/2020 CLINICAL HISTORY: Pain after injury. COMPARISON: None available. TECHNIQUE: AP, lateral and oblique radiographs of the right foot, and AP, lateral and oblique radiographs of the right ankle were obtained. FINDINGS: A vertically oriented nondisplaced oblique fracture of the medial ankle mortise extends into the distal tibial metadiaphysis. Several small old avulsion fracture fragments are noted inferior to both malleoli; as well as soft tissue swelling about the ankle. There is no other fracture, radiodense foreign bodies, pathologic calcifications, or worrisome bone destruction identified. The visualized joint spaces and ankle mortise are intact. Mild to moderate degenerative changes are present of the first MTP joint. NONDISPLACED VERTICALLY ORIENTED MEDIAL MALLEOLAR/DISTAL TIBIAL FRACTURE AS NOTED. Xr Foot Right (min 3 Views)    Result Date: 1/18/2020  XR FOOT RIGHT (MIN 3 VIEWS), XR ANKLE RIGHT (MIN 3 VIEWS) : 1/18/2020 CLINICAL HISTORY: Pain after injury. COMPARISON: None available. TECHNIQUE: AP, lateral and oblique radiographs of the right foot, and AP, lateral and oblique radiographs of the right ankle were obtained. FINDINGS: A vertically oriented nondisplaced oblique fracture of the medial ankle mortise extends into the distal tibial metadiaphysis. Several small old avulsion fracture fragments are noted inferior to both malleoli; as well as soft tissue swelling about the ankle. There is no other fracture, radiodense foreign bodies, pathologic calcifications, or worrisome bone destruction identified. The visualized joint spaces and ankle mortise are intact.  Mild to moderate degenerative changes are present of the first MTP congestion, pneumothorax, or displaced fractures identified. ENDOTRACHEAL TUBE TIP APPROXIMATELY 3 CM ABOVE THE GLADIS. SIGNIFICANTLY POOR INSPIRATION WITH MILDLY WORSENING MODERATE BIBASILAR INFILTRATE/ATELECTASIS. Xr Chest Portable    Result Date: 1/18/2020  XR CHEST PORTABLE : 1/18/2020 CLINICAL HISTORY:  cough . COMPARISON: 1/7/2020. A portable upright AP radiograph of the chest was obtained. FINDINGS: A poor inspiratory volume is present with mild probable bibasilar atelectasis. Bronchopneumonia should be excluded clinically. There is no cardiomegaly, significant pleural effusion, vascular congestion, pneumothorax, or displaced fractures identified. POOR INSPIRATION WITH MILD PROBABLE BIBASILAR ATELECTASIS. BRONCHOPNEUMONIA SHOULD BE EXCLUDED CLINICALLY. Ct 3d Reconstruction    Result Date: 1/18/2020  CT ANKLE RIGHT WO CONTRAST, CT 3D RECONSTRUCTION : 1/18/2020 CLINICAL HISTORY: pain . COMPARISON: Right foot and ankle radiographs from earlier 1/18/2020. TECHNIQUE: Spiral unenhanced imaging of the right ankle was performed, with routine multiplanar reconstructions and volume rendered images obtained on a 3-D workstation. All CT scans at this facility use dose modulation, iterative reconstruction, and/or weight based dosing when appropriate to reduce radiation dose to as low as reasonably achievable. FINDINGS: A vertically oriented nondisplaced oblique fracture of the medial ankle mortise extends into the distal tibial metadiaphysis. Several small old avulsion fracture fragments are noted inferior to both malleoli; as well as soft tissue swelling about the ankle. There is no other fracture, radiodense foreign bodies, pathologic calcifications, or worrisome bone destruction identified. The visualized joint spaces and ankle mortise are intact. NONDISPLACED VERTICALLY ORIENTED MEDIAL MALLEOLAR/DISTAL TIBIAL FRACTURE AS NOTED.        Discharge Medications:       Trnoemie Ottoniel   Home Medication Instructions KZL:[id number]    Printed on:01/23/20 0957   Medication Information                      acetaminophen (TYLENOL) 500 MG tablet  Take 1 tablet by mouth every 6 hours as needed for Pain             albuterol sulfate HFA (PROAIR HFA) 108 (90 Base) MCG/ACT inhaler  Use every 4 hours while awake for 7-10 days then PRN wheezing  Dispense with SPACER and Instruct on use. May sub Ventolin or Proventil as needed per [employer]. amLODIPine (NORVASC) 10 MG tablet  Take 1 tablet by mouth daily             Buprenorphine HCl-Naloxone HCl (SUBOXONE) 8-2 MG FILM  Place under the tongue 2 times daily. escitalopram (LEXAPRO) 10 MG tablet  Take 10 mg by mouth nightly             ketorolac (TORADOL) 10 MG tablet  Take 1 tablet by mouth every 6 hours as needed for Pain             losartan (COZAAR) 25 MG tablet  Take 1 tablet by mouth daily             pregabalin (LYRICA) 150 MG capsule  Take 300 mg by mouth 2 times daily. tiZANidine (ZANAFLEX) 4 MG tablet  Take 1 tablet by mouth 3 times daily as needed (mucle spasm)                 Disposition:   Discharged to Home. Any [facility] needs that were indicated and/or required as been addressed and set up by Social Work. Condition at discharge: Pt was medically stable at the time of discharge. Significant improvement in clinical condition compared to initial condition at presentation to hospital    Activity: activity as tolerated, fall precautions. Total time taken for discharging this patient: 40 minutes. Greater than 70% of time was spent focused exclusively on this patient. Time was taken to review chart, discuss plans with consultants, reconciling medications, discussing plan answering questions with patient.      Phyllis Sandoval  1/23/2020, 9:57 AM  ----------------------------------------------------------------------------------------------------------------------    Juliano Dos Santos,     Please return to ER or call 911 if you develop any significant signs or symptoms.     I may not have addressed all of your medical illnesses or the abnormal blood work or imaging therefore please ask your PCP, Bebo Campbell MD ,  to obtain [facility] record to follow up on all of the abnormal labs, imaging and findings that I have and have not addressed during your hospitalization.      Discharging you from the hospital does not mean that your medical care ends here and now. You may still need additional work up, investigation, monitoring, and treatment to be handled from this point on by outside providers including your PCP, Bebo Campbell MD , Specialists and other healthcare providers.      Please review your list of discharge medications prior to resuming medications you might still have at home, as the medications you need to be taking, dosages or how often you must take them may have changed. For medication questions, contact your retail pharmacy and your PCP, Bebo Campbell MD .     ** I STRONGLY RECOMMEND that you follow up with Bebo Campbell MD within 3 to 5 days for a post hospitalization evaluation. This specific office visit is covered by your insurance, and is not the same as your annual doctor visit/ check up. This office visit is important, as it may prevent need for repeat and/or future hospitalizations. **    Your medical team at [facility] ([facility] [place] appreciates the opportunity to work with you to get well!     Sincerely,  Fabiana Mclean

## 2020-01-23 NOTE — PROGRESS NOTES
Physical Therapy Med Surg Daily Treatment Note  Facility/Department: [facility]  NEURO  Room: 28/N228[facility]       NAME: Brett Galeas  : 1965 (48 y.o.)  MRN: [id number]  CODE STATUS: Full Code    Date of Service: 2020    Patient Diagnosis(es): Hypotension due to hypovolemia [I95.89, E86.1]  Hypotension due to hypovolemia [I95.89, E86.1]   Chief Complaint   Patient presents with    Foot Injury     right foot injury sustained  last night     Patient Active Problem List    Diagnosis Date Noted    Right foot pain 2020    Closed right ankle fracture, initial encounter 2020    Hypotension due to hypovolemia 2020    Acute respiratory failure with hypercapnia ([address] 10/29/2019    Accidental overdose of [address] ([facility] [address] 10/28/2019    Chronic pain syndrome     Aspiration into airway     Change in mental status 2019    Non-traumatic rhabdomyolysis 2019    Leukocytosis 2019    Anginal chest pain at rest ([address] 2018    Chronic back pain 2016    Fatty infiltration of liver 2016    Acid reflux 2016    Acute encephalopathy 2016    Accidental drug overdose 2016    Current smoker 2013    Lumbar canal stenosis 10/15/2012    Herniation of lumbar intervertebral disc without myelopathy 2012        Past Medical History:   Diagnosis Date    Aspiration into airway     Chronic pain     in neck & back, in pain management    Heroin use     Hypertension     Lumbar radiculopathy, chronic     Metabolic encephalopathy     medication induced    Non-traumatic rhabdomyolysis     Obesity     Pneumonia      Past Surgical History:   Procedure Laterality Date    BACK SURGERY      x2    NECK SURGERY      x2         Restrictions  Restrictions/Precautions: Weight Bearing; Fall Risk  Lower Extremity Weight Bearing Restrictions  Right Lower Extremity Weight Bearing: Non Weight Bearing  Left Lower Extremity Weight Bearing: Weight Bearing As Tolerated    SUBJECTIVE   General  Chart Reviewed: Yes  Family / Caregiver Present: No  Subjective  Subjective: \"I feel pretty good about the stuff we practiced yesterday. \"    Pre-Session Pain Report  Pre Treatment Pain Screening  Pain at present: 0  Scale Used: Numeric Score  Intervention List: Patient able to continue with treatment  Pain Screening  Patient Currently in Pain: No       Post-Session Pain Report  Pain Assessment  Pain Assessment: 0-10  Pain Level: 0         OBJECTIVE        Bed mobility  Supine to Sit: Independent    Transfers  Sit to Stand: Stand by assistance  Stand to sit: Stand by assistance  Bed to Chair: Stand by assistance  Comment: vc's and t'c for hand placement and safety vc's to slow down for safety. pt is slightly impulsive. Ambulation  Ambulation?: Yes  Ambulation 1  Surface: level tile  Device: Rolling Walker  Assistance: Contact guard assistance  Quality of Gait: small pendulum steps; demo for proper technique and less jarring  Distance: 5' x2   Comments: good bracing on [employer] and follow through on pendulum step. slightly impulsive vc's to slow down for safety         Wheelchair Activities  Wheelchair Size: 18\"  Wheelchair Type: Standard  Pressure Relief Type: Self Adjusts  Level of Assistance for pressure relief activities: Independent  Wheelchair Parts Management: Yes  Left Leg Rest Level of Assistance: Supervision  Right Leg Rest Level of Assistance: Supervision  Left Brakes Level of Assistance: Supervision  Right Brakes Level of Assistance: Supervision  Propulsion: Yes  Propulsion 1  Propulsion: Manual  Level: Level Tile  Level of Assistance: Modified independent  Description/ Details: Pt able to manually propel himself, turns, backup, don/doff leg rests  Distance: 150' and in tight spaces of restroom.                                 Activity Tolerance  Activity Tolerance: Patient Tolerated treatment well          ASSESSMENT   Assessment: pt with good carryover of cues from yesterdays sessions. pt with improved independence with his mobility and WC management. Pt states he is confident in his and his roommates ability to ascend/descend the entry step at his home. Discharge Recommendations:  Continue to assess pending progress    Goals  Short term goals  Short term goal 1: indep pivot transfer  Short term goal 2:  indep w/c mobility 50 feet and set up for transfers  Short term goal 3: determine safest method for one step home entry  Short term goal 4: min assist +1 sit to stand to ww  Short term goal 5: pt able to stand with ww 2 min and participate in weight shifting or gait tasks maintaining NWB  Patient Goals   Patient goals : to return home    PLAN    Safety Devices  Type of devices: All fall risk precautions in place;Call light within reach; Chair alarm in place; Left in chair     AMPAC (6 CLICK) BASIC MOBILITY  AM-PAC Inpatient Mobility Raw Score : 17      Therapy Time   Individual   Time In 0849   Time Out 0905   Minutes 16      BM/Trsf: 10  Gait: 6        Joseph Single, PTA, 01/23/20 at 9:11 AM

## 2020-01-23 NOTE — PROGRESS NOTES
[facility] DO   Stopped at 01/20/20 1315    0.9 % sodium chloride bolus  1,000 mL Intravenous Once [facility] DO   Stopped at 01/19/20 2345            ALLERGIES: Patient has no known allergies.     Review of Systems   Constitutional: Positive for fatigue. Negative for chills and fever. HENT: Negative. Eyes: Negative. Respiratory: Positive for shortness of breath. Negative for wheezing. Cardiovascular: Negative for chest pain, palpitations and leg swelling. Gastrointestinal: Negative. Negative for abdominal pain, nausea and vomiting. Endocrine: Negative. Genitourinary: Negative. Musculoskeletal: Negative. Skin: Negative. Negative for rash. Allergic/Immunologic: Negative. Neurological: Positive for weakness. Negative for dizziness and headaches. Psychiatric/Behavioral: Negative.             VITALS:  Blood pressure (!) 168/84, pulse 82, temperature 97.8 °F (36.6 °C), temperature source Oral, resp. rate 14, height 5' 11\" (1.803 m), weight 266 lb 5.1 oz (120.8 kg), SpO2 99 %. Body mass index is 37.14 kg/m².     Physical Exam   Constitutional: He is oriented to person, place, and time. He appears well-developed and well-nourished. HENT:   Head: Normocephalic and atraumatic. Eyes: Pupils are equal, round, and reactive to light. Neck: Normal range of motion. Neck supple. No JVD present. No tracheal deviation present. No thyromegaly present. Cardiovascular: Normal rate, regular rhythm, normal heart sounds and intact distal pulses. PMI is not displaced. Exam reveals no gallop, no S3, no distant heart sounds and no friction rub. No murmur heard. Pulmonary/Chest: No respiratory distress. He has no wheezes. He has no rales. He exhibits no tenderness. Abdominal: Soft. Bowel sounds are normal. He exhibits no distension and no mass. There is no tenderness. There is no rebound and no guarding. Musculoskeletal:         General: No edema.    Neurological: He is Sensitivity 121.3 (H) 0.0 - 5.0 mg/L   PROCALCITONIN     Collection Time: 01/20/20 10:49 AM   Result Value Ref Range     Procalcitonin 0.14 0.00 - 0.15 ng/mL   POCT Arterial     Collection Time: 01/20/20 11:41 AM   Result Value Ref Range     POC Sodium 139 136 - 145 mEq/L     POC Potassium 4.7 3.5 - 5.1 mEq/L     POC Chloride 106 99 - 110 mEq/L     POC Glucose 70 60 - 115 mg/dl     POC Creatinine 0.4 (L) 0.9 - 1.3 mg/dL     GFR Non- >60 >60     GFR  >60 >60     Calcium, Ion 1.14 1.12 - 1.32 mmol/L     pH, Arterial 7.701 (HH) 7.350 - 7.450     pCO2, Arterial 22 (L) 35 - 45 mm Hg     pO2, Arterial 162 (HH) 75 - 108 mm Hg     HCO3, Arterial 27.4 21.0 - 29.0 mmol/L     Base Excess, Arterial 8 (H) -3 - 3     O2 Sat, Arterial 100 (HH) 93 - 100 %     TCO2, Arterial 28 22 - 29     Lactate 0.74 0.40 - 2.00 mmol/L     POC Hematocrit 34 (L) 41 - 53 %     Hemoglobin 11.7 (L) 13.5 - 17.5 gm/dL     FIO2 40.000       Sample Type ART       Performed on SEE BELOW     POCT Arterial     Collection Time: 01/21/20  5:39 AM   Result Value Ref Range     POC Sodium 138 136 - 145 mEq/L     POC Potassium 3.8 3.5 - 5.1 mEq/L     POC Chloride 101 99 - 110 mEq/L     POC Glucose 83 60 - 115 mg/dl     POC Creatinine 0.7 (L) 0.9 - 1.3 mg/dL     GFR Non- >60 >60     GFR  >60 >60     Calcium, Ion 1.18 1.12 - 1.32 mmol/L     pH, Arterial 7.432 7.350 - 7.450     pCO2, Arterial 42 35 - 45 mm Hg     pO2, Arterial 87 (HH) 75 - 108 mm Hg     HCO3, Arterial 28.0 21.0 - 29.0 mmol/L     Base Excess, Arterial 4 (H) -3 - 3     O2 Sat, Arterial 97 (HH) 93 - 100 %     TCO2, Arterial 29 22 - 29     Lactate 0.63 0.40 - 2.00 mmol/L     POC Hematocrit 37 (L) 41 - 53 %     Hemoglobin 12.5 (L) 13.5 - 17.5 gm/dL     FIO2 5.000       Sample Type ART       Performed on SEE BELOW     CBC     Collection Time: 01/21/20  5:47 AM   Result Value Ref Range     WBC 9.2 4.8 - 10.8 K/uL     RBC 3.99 (L) 4.70 - 6.10

## 2020-07-03 NOTE — PROGRESS NOTES
Left message with patients mother to move her July 6 nurse pre op to July 9.    01/20/20  1141   WBC 16.5*  --  10.6  --    HGB 14.0   < > 12.1* 11.7*   HCT 41.0*  --  35.7*  --    MCV 92.0  --  92.3  --      --  279  --     < > = values in this interval not displayed. Recent Labs     01/18/20  1645  01/19/20  0518  01/20/20  1049 01/20/20  1141     --  138  --  139  --    K 3.8  --  4.8  --  3.8  --    CL 92*  --  100  --  99  --    CO2 30  --  29  --  29  --    BUN 10  --  15  --  19  --    CREATININE 1.92*   < > 1.37*   < > 0.85 0.4*   GLUCOSE 120*  --  145*  --  83  --    CALCIUM 8.6  --  8.1*  --  8.5  --    PROT 6.9  --   --   --  6.0*  --    LABALBU 3.5  --   --   --  2.8*  --    BILITOT 0.3  --   --   --  0.3  --    ALKPHOS 103  --   --   --  75  --    AST 15  --   --   --  12  --    ALT 11  --   --   --  12  --    LABGLOM 36.8*   < > 54.4*   < > >60.0 >60   GFRAA 44.6*   < > >60.0   < > >60.0 >60   GLOB 3.4  --   --   --  3.2  --     < > = values in this interval not displayed.        MV Settings:     Vent Mode: AC/VC  Vt Ordered: 450 mL  Rate Set: 18 bmp  FiO2 : 40 %  PEEP/CPAP: 6  Pressure Support: 10 cmH20  Peak Inspiratory Pressure: 15 cmH2O  Mean Airway Pressure: 10 cmH20  I:E Ratio: 1:2.40    Recent Labs     01/20/20  1141   PHART 7.701*   [id number] 22*   PO2ART 162*   [id number] 27.4   BEART 8*   [id number] 100*       O2 Device: Nasal cannula  O2 Flow Rate (L/min): 4 L/min    DIET GENERAL;     MEDICATIONS during current hospitalization:    Continuous Infusions:   propofol Stopped (01/20/20 1200)    norepinephrine Stopped (01/19/20 1215)    sodium chloride 125 mL/hr at 01/20/20 1018    sodium chloride      dexmedetomidine (PRECEDEX) IV infusion Stopped (01/20/20 1315)       Scheduled Meds:   ipratropium-albuterol  1 ampule Inhalation TID    potassium chloride  20 mEq Oral Once    magnesium oxide  400 mg Oral Once    sodium chloride flush  10 mL Intravenous 2 times per day    enoxaparin  40 mg Subcutaneous Daily    piperacillin-tazobactam  3.375 g Intravenous Q8H    methylPREDNISolone  40 mg Intravenous Daily    sodium chloride  1,000 mL Intravenous Once       PRN Meds:albuterol, hydrALAZINE, sodium chloride flush, magnesium hydroxide, albuterol    Radiology  Chest x-rays were reviewed since admission and showed low volumes for the most part with increased interstitial markings suggestive of component of edema versus micro atelectatic changes            IMPRESSION AND SUGGESTION:  1. Acute on chronic hypercapnic and hypoxic respiratory failure requiring intubation  2. Patient has evidence of chronic hypercapnic respiratory failure, very difficult airway for intubation, the combination suggest underlying obstructive sleep apnea with obesity hypoventilation  3. History of drug abuse in the past but negative drug screen  4. Recent fall and ankle fracture, followed by orthopedic surgery no intervention recommended at this time  5. Hypertension, rule out hypertensive crisis causing pulmonary edema, rule out underlying cardiac etiology of his respiratory failure, will obtain an echocardiogram for further evaluation  Again, patient was extubated after observation and weaning trials as detailed above, will initiate BiPAP therapy at night and as needed, oxygen to titrate down as tolerated, obtain an echocardiogram, switch to nebulized bronchodilators to help mobilizing secretions, diuresis if needed, monitor hemodynamic status, introduce oral intake and advance as tolerated, physical therapy and increase activity as tolerated  I spent 36 minutes providing critical care. This time is excluding time spent performing procedures.       Electronically signed by Anayeli Payan MD, [facility] on 1/20/2020 at 4:11 PM

## 2020-09-17 RX ORDER — LOSARTAN POTASSIUM 50 MG/1
TABLET ORAL
Qty: 30 TABLET | Refills: 5 | OUTPATIENT
Start: 2020-09-17

## 2020-09-23 ENCOUNTER — HOSPITAL ENCOUNTER (EMERGENCY)
Age: 55
Discharge: HOME | End: 2020-09-23
Payer: MEDICARE

## 2020-09-23 ENCOUNTER — APPOINTMENT (OUTPATIENT)
Dept: ULTRASOUND IMAGING | Age: 55
End: 2020-09-23
Payer: MEDICARE

## 2020-09-23 ENCOUNTER — APPOINTMENT (OUTPATIENT)
Dept: GENERAL RADIOLOGY | Age: 55
End: 2020-09-23
Payer: MEDICARE

## 2020-09-23 VITALS
HEIGHT: 71 IN | RESPIRATION RATE: 18 BRPM | DIASTOLIC BLOOD PRESSURE: 83 MMHG | OXYGEN SATURATION: 96 % | BODY MASS INDEX: 36.4 KG/M2 | HEART RATE: 99 BPM | WEIGHT: 260 LBS | SYSTOLIC BLOOD PRESSURE: 126 MMHG | TEMPERATURE: 97.9 F

## 2020-09-23 PROCEDURE — 99283 EMERGENCY DEPT VISIT LOW MDM: CPT

## 2020-09-23 PROCEDURE — 96372 THER/PROPH/DIAG INJ SC/IM: CPT

## 2020-09-23 PROCEDURE — 73630 X-RAY EXAM OF FOOT: CPT

## 2020-09-23 PROCEDURE — 6360000002 HC RX W HCPCS: Performed by: PHYSICIAN ASSISTANT

## 2020-09-23 PROCEDURE — 93971 EXTREMITY STUDY: CPT

## 2020-09-23 PROCEDURE — 73502 X-RAY EXAM HIP UNI 2-3 VIEWS: CPT

## 2020-09-23 RX ORDER — KETOROLAC TROMETHAMINE 30 MG/ML
60 INJECTION, SOLUTION INTRAMUSCULAR; INTRAVENOUS ONCE
Status: COMPLETED | OUTPATIENT
Start: 2020-09-23 | End: 2020-09-23

## 2020-09-23 RX ADMIN — KETOROLAC TROMETHAMINE 60 MG: 30 INJECTION, SOLUTION INTRAMUSCULAR; INTRAVENOUS at 20:40

## 2020-09-23 ASSESSMENT — PAIN DESCRIPTION - ORIENTATION: ORIENTATION: RIGHT;LEFT

## 2020-09-23 ASSESSMENT — PAIN DESCRIPTION - LOCATION: LOCATION: LEG

## 2020-09-23 ASSESSMENT — ENCOUNTER SYMPTOMS
COLOR CHANGE: 0
APNEA: 0
EYE PAIN: 0
TROUBLE SWALLOWING: 0
SHORTNESS OF BREATH: 0
ALLERGIC/IMMUNOLOGIC NEGATIVE: 1
ABDOMINAL PAIN: 0

## 2020-09-23 ASSESSMENT — PAIN SCALES - GENERAL
PAINLEVEL_OUTOF10: 10
PAINLEVEL_OUTOF10: 9

## 2020-09-23 NOTE — ED PROVIDER NOTES
[address] [facility] ED  eMERGENCYdEPARTMENT eNCOUnter      Pt Name: Charli Mart  MRN: [id number]  [place] 1965  Date of evaluation: 9/23/2020  Provider:Chay Weiner PA-C    CHIEF COMPLAINT       Chief Complaint   Patient presents with    Leg Swelling     BLE         HISTORY OF PRESENT ILLNESS  (Location/Symptom, Timing/Onset, Context/Setting, Quality, Duration, Modifying Factors, Severity.)   Charli Mart is a 50 y.o. male who presents to the emergency department with complaints of right hip pain and right foot pain following a fall at home last night. Patient states that he has had recurrent episodes of lower extremity edema over the past 1-1/2 months but denies any concerns of this, stating that he is already being worked up for the swelling. Patient states that his feet occasionally \"give out on him\" and his left foot gave out causing him to fall onto the right hip and foot. Patient states he had to use a walker to get around today due to the pain. Patient also states that he had a blister to the lateral aspect of the right knee which popped and had a clear discharge from it since that occurred. Patient denies any chest pain, palpitations or shortness of breath. Patient denies any abdominal pain    HPI    Nursing Notes were reviewed and I agree. REVIEW OF SYSTEMS    (2-9 systems for level 4, 10 or more for level 5)     Review of Systems   Constitutional: Negative for diaphoresis and fever. HENT: Negative for hearing loss and trouble swallowing. Eyes: Negative for pain. Respiratory: Negative for apnea and shortness of breath. Cardiovascular: Negative for chest pain. Gastrointestinal: Negative for abdominal pain. Endocrine: Negative. Genitourinary: Negative for hematuria. Musculoskeletal: Positive for joint swelling. Negative for neck pain and neck stiffness. Skin: Positive for wound. Negative for color change. Allergic/Immunologic: Negative.     Neurological: Negative for dizziness and numbness. Hematological: Negative. Psychiatric/Behavioral: Negative. All other systems reviewed and are negative. Except as noted above the remainder of the review of systems was reviewed and negative. PAST MEDICAL HISTORY     Past Medical History:   Diagnosis Date    Aspiration into airway     Chronic pain     in neck & back, in pain management    Heroin use     Hypertension     Lumbar radiculopathy, chronic     Metabolic encephalopathy     medication induced    Non-traumatic rhabdomyolysis     Obesity     Pneumonia          SURGICAL HISTORY       Past Surgical History:   Procedure Laterality Date    BACK SURGERY      x2    NECK SURGERY      x2         CURRENT MEDICATIONS       Previous Medications    ACETAMINOPHEN (TYLENOL) 500 MG TABLET    Take 1 tablet by mouth every 6 hours as needed for Pain    ALBUTEROL SULFATE HFA (PROAIR HFA) 108 (90 BASE) MCG/ACT INHALER    Use every 4 hours while awake for 7-10 days then PRN wheezing  Dispense with SPACER and Instruct on use. May sub Ventolin or Proventil as needed per [employer]. AMLODIPINE (NORVASC) 10 MG TABLET    Take 1 tablet by mouth daily    BUPRENORPHINE HCL-NALOXONE HCL (SUBOXONE) 8-2 MG FILM    Place under the tongue 2 times daily. ESCITALOPRAM (LEXAPRO) 10 MG TABLET    Take 10 mg by mouth nightly    KETOROLAC (TORADOL) 10 MG TABLET    Take 1 tablet by mouth every 6 hours as needed for Pain    LOSARTAN (COZAAR) 25 MG TABLET    Take 1 tablet by mouth daily    LOSARTAN (COZAAR) 50 MG TABLET    Take 1 tablet by mouth daily    METOPROLOL SUCCINATE (TOPROL XL) 50 MG EXTENDED RELEASE TABLET    Take 50 mg by mouth 2 times daily    NALOXONE 4 MG/0.1ML LIQD NASAL SPRAY    by Nasal route    OMEPRAZOLE (PRILOSEC) 20 MG DELAYED RELEASE CAPSULE    Take 20 mg by mouth daily    PREGABALIN (LYRICA) 150 MG CAPSULE    Take 300 mg by mouth 2 times daily.      VITAMIN D (CHOLECALCIFEROL) 33329 UNIT CAPS    Take by mouth       ALLERGIES     Patient has no known allergies. FAMILY HISTORY     History reviewed. No pertinent family history. SOCIAL HISTORY       Social History     Socioeconomic History    Marital status:      Spouse name: None    Number of children: None    Years of education: None    Highest education level: None   Occupational History    None   Social Needs    Financial resource strain: None    Food insecurity     Worry: None     Inability: None    Transportation needs     Medical: None     Non-medical: None   Tobacco Use    Smoking status: Current Every Day Smoker     Packs/day: 1.00     Years: 15.00     Pack years: 15.00     Types: Cigarettes    Smokeless tobacco: Never Used   Substance and Sexual Activity    Alcohol use: No    Drug use: Not Currently     Types: Opiates     Sexual activity: Yes     Partners: Female   Lifestyle    Physical activity     Days per week: None     Minutes per session: None    Stress: None   Relationships    Social connections     Talks on phone: None     Gets together: None     Attends Sabianism service: None     Active member of club or organization: None     Attends meetings of clubs or organizations: None     Relationship status: None    Intimate partner violence     Fear of current or ex partner: None     Emotionally abused: None     Physically abused: None     Forced sexual activity: None   Other Topics Concern    None   Social History Narrative    None       SCREENINGS           PHYSICAL EXAM    (up to 7 forlevel 4, 8 or more for level 5)     ED Triage Vitals [09/23/20 1813]   BP Temp Temp Source Pulse Resp SpO2 Height Weight   126/83 97.9 °F (36.6 °C) Temporal 99 18 96 % 5' 11\" (1.803 m) 260 lb (117.9 kg)       Physical Exam  Vitals signs and nursing note reviewed. Constitutional:       General: He is not in acute distress. Appearance: He is well-developed. He is not diaphoretic. HENT:      Head: Normocephalic and atraumatic. Mouth/Throat:      Pharynx: No oropharyngeal exudate. Eyes:      General: No scleral icterus. Conjunctiva/sclera: Conjunctivae normal.      Pupils: Pupils are equal, round, and reactive to light. Neck:      Musculoskeletal: Normal range of motion and neck supple. Trachea: No tracheal deviation. Cardiovascular:      Rate and Rhythm: Normal rate. Heart sounds: Normal heart sounds. Pulmonary:      Effort: Pulmonary effort is normal. No respiratory distress. Breath sounds: Normal breath sounds. Abdominal:      General: Bowel sounds are normal. There is no distension. Palpations: Abdomen is soft. Musculoskeletal: Normal range of motion. Right hip: He exhibits tenderness. Legs:       Right foot: Tenderness present. No bony tenderness or deformity. Skin:     General: Skin is warm and dry. Findings: No erythema or rash. Neurological:      Mental Status: He is alert and oriented to person, place, and time. Cranial Nerves: No cranial nerve deficit. Motor: No abnormal muscle tone. Psychiatric:         Behavior: Behavior normal.         Thought Content: Thought content normal.         Judgment: Judgment normal.           DIAGNOSTIC RESULTS     RADIOLOGY:   Non-plain film images such as CT, Ultrasound and MRI are read by the radiologist. Plain radiographic images are visualized and preliminarilyinterpreted by Carolina Ya PA-C with the below findings:        Interpretation per the Radiologist below, if available at the time of this note:    XR HIP 2-3 VW W PELVIS RIGHT    (Results Pending)   XR FOOT RIGHT (MIN 3 VIEWS)    (Results Pending)   US DUP LOWER EXTREMITY RIGHT PATRICK    (Results Pending)       LABS:  Labs Reviewed - No data to display    All other labs were within normal range or not returnedas of this dictation.     EMERGENCYDEPARTMENT COURSE and DIFFERENTIAL DIAGNOSIS/MDM:   Vitals:    Vitals:    09/23/20 1813   BP: 126/83   Pulse: 99   Resp: 18

## 2020-09-23 NOTE — ED TRIAGE NOTES
Pt to ER with c/o BLE swelling x a month and a half, pt also states he fell in his kitchen last night and his right hip and foot hurts, pt states pain is 9/10, pt a&ox4, resp even and unlabored

## 2020-09-24 NOTE — ED NOTES
Post-op shoe to patients rt. Foot per order. Toradol IM given. Tolerated well. D/C instructions explained to patient who voices understanding. Patient to ED [facility] area via wheelchair. Thankful for care received.      Ashley Todd RN  09/23/20 3598

## 2021-01-25 ENCOUNTER — HOSPITAL ENCOUNTER (EMERGENCY)
Age: 56
Discharge: HOME | End: 2021-01-26
Attending: EMERGENCY MEDICINE
Payer: MEDICARE

## 2021-01-25 DIAGNOSIS — J32.9 CHRONIC SINUSITIS, UNSPECIFIED LOCATION: Primary | ICD-10-CM

## 2021-01-25 LAB
BASOPHILS ABSOLUTE: 0.1 K/UL (ref 0–0.2)
BASOPHILS RELATIVE PERCENT: 0.8 %
EKG ATRIAL RATE: 81 BPM
EKG P AXIS: 23 DEGREES
EKG P-R INTERVAL: 162 MS
EKG Q-T INTERVAL: 396 MS
EKG QRS DURATION: 96 MS
EKG QTC CALCULATION (BAZETT): 460 MS
EKG R AXIS: 38 DEGREES
EKG T AXIS: 17 DEGREES
EKG VENTRICULAR RATE: 81 BPM
EOSINOPHILS ABSOLUTE: 0.2 K/UL (ref 0–0.7)
EOSINOPHILS RELATIVE PERCENT: 2.4 %
HCT VFR BLD CALC: 38.7 % (ref 42–52)
HEMOGLOBIN: 12.8 G/DL (ref 14–18)
LYMPHOCYTES ABSOLUTE: 3.2 K/UL (ref 1–4.8)
LYMPHOCYTES RELATIVE PERCENT: 31.4 %
MCH RBC QN AUTO: 31.2 PG (ref 27–31.3)
MCHC RBC AUTO-ENTMCNC: 33 % (ref 33–37)
MCV RBC AUTO: 94.7 FL (ref 80–100)
MONOCYTES ABSOLUTE: 0.5 K/UL (ref 0.2–0.8)
MONOCYTES RELATIVE PERCENT: 5.3 %
NEUTROPHILS ABSOLUTE: 6.1 K/UL (ref 1.4–6.5)
NEUTROPHILS RELATIVE PERCENT: 60.1 %
PDW BLD-RTO: 16.1 % (ref 11.5–14.5)
PLATELET # BLD: 237 K/UL (ref 130–400)
RBC # BLD: 4.09 M/UL (ref 4.7–6.1)
WBC # BLD: 10.1 K/UL (ref 4.8–10.8)

## 2021-01-25 PROCEDURE — G0480 DRUG TEST DEF 1-7 CLASSES: HCPCS

## 2021-01-25 PROCEDURE — 36415 COLL VENOUS BLD VENIPUNCTURE: CPT

## 2021-01-25 PROCEDURE — 93005 ELECTROCARDIOGRAM TRACING: CPT | Performed by: EMERGENCY MEDICINE

## 2021-01-25 PROCEDURE — 80053 COMPREHEN METABOLIC PANEL: CPT

## 2021-01-25 PROCEDURE — 84484 ASSAY OF TROPONIN QUANT: CPT

## 2021-01-25 PROCEDURE — 85025 COMPLETE CBC W/AUTO DIFF WBC: CPT

## 2021-01-25 PROCEDURE — 99284 EMERGENCY DEPT VISIT MOD MDM: CPT

## 2021-01-25 RX ORDER — 0.9 % SODIUM CHLORIDE 0.9 %
1000 INTRAVENOUS SOLUTION INTRAVENOUS ONCE
Status: COMPLETED | OUTPATIENT
Start: 2021-01-25 | End: 2021-01-26

## 2021-01-25 RX ORDER — 0.9 % SODIUM CHLORIDE 0.9 %
1000 INTRAVENOUS SOLUTION INTRAVENOUS ONCE
Status: DISCONTINUED | OUTPATIENT
Start: 2021-01-25 | End: 2021-01-26 | Stop reason: HOSPADM

## 2021-01-25 ASSESSMENT — PAIN DESCRIPTION - LOCATION: LOCATION: BACK

## 2021-01-25 ASSESSMENT — PAIN SCALES - GENERAL: PAINLEVEL_OUTOF10: 9

## 2021-01-25 ASSESSMENT — PAIN DESCRIPTION - PAIN TYPE: TYPE: ACUTE PAIN;CHRONIC PAIN

## 2021-01-26 ENCOUNTER — APPOINTMENT (OUTPATIENT)
Dept: CT IMAGING | Age: 56
End: 2021-01-26
Payer: MEDICARE

## 2021-01-26 VITALS
HEART RATE: 81 BPM | TEMPERATURE: 98.3 F | OXYGEN SATURATION: 90 % | BODY MASS INDEX: 36.4 KG/M2 | HEIGHT: 71 IN | DIASTOLIC BLOOD PRESSURE: 69 MMHG | WEIGHT: 260 LBS | SYSTOLIC BLOOD PRESSURE: 112 MMHG | RESPIRATION RATE: 13 BRPM

## 2021-01-26 LAB
ACETAMINOPHEN LEVEL: <5 UG/ML (ref 10–30)
ALBUMIN SERPL-MCNC: 4.1 G/DL (ref 3.5–4.6)
ALP BLD-CCNC: 126 U/L (ref 35–104)
ALT SERPL-CCNC: 10 U/L (ref 0–41)
AMMONIA: 46 UMOL/L (ref 16–60)
AMPHETAMINE SCREEN, URINE: ABNORMAL
ANION GAP SERPL CALCULATED.3IONS-SCNC: 11 MEQ/L (ref 9–15)
AST SERPL-CCNC: 15 U/L (ref 0–40)
BARBITURATE SCREEN URINE: POSITIVE
BENZODIAZEPINE SCREEN, URINE: ABNORMAL
BILIRUB SERPL-MCNC: 0.3 MG/DL (ref 0.2–0.7)
BILIRUBIN URINE: NEGATIVE
BLOOD, URINE: NEGATIVE
BUN BLDV-MCNC: 6 MG/DL (ref 6–20)
CALCIUM SERPL-MCNC: 8.8 MG/DL (ref 8.5–9.9)
CANNABINOID SCREEN URINE: ABNORMAL
CHLORIDE BLD-SCNC: 95 MEQ/L (ref 95–107)
CLARITY: CLEAR
CO2: 30 MEQ/L (ref 20–31)
COCAINE METABOLITE SCREEN URINE: ABNORMAL
COLOR: YELLOW
CREAT SERPL-MCNC: 0.87 MG/DL (ref 0.7–1.2)
ETHANOL PERCENT: NORMAL G/DL
ETHANOL: <10 MG/DL (ref 0–0.08)
GFR AFRICAN AMERICAN: >60
GFR NON-AFRICAN AMERICAN: >60
GLOBULIN: 4 G/DL (ref 2.3–3.5)
GLUCOSE BLD-MCNC: 77 MG/DL (ref 70–99)
GLUCOSE URINE: NEGATIVE MG/DL
KETONES, URINE: NEGATIVE MG/DL
LEUKOCYTE ESTERASE, URINE: NEGATIVE
Lab: ABNORMAL
METHADONE SCREEN, URINE: ABNORMAL
NITRITE, URINE: NEGATIVE
OPIATE SCREEN URINE: ABNORMAL
PH UA: 7 (ref 5–9)
PHENCYCLIDINE SCREEN URINE: ABNORMAL
POTASSIUM REFLEX MAGNESIUM: 4 MEQ/L (ref 3.4–4.9)
PROPOXYPHENE SCREEN, URINE: ABNORMAL
PROTEIN UA: NEGATIVE MG/DL
SALICYLATE, SERUM: <0.3 MG/DL (ref 15–30)
SODIUM BLD-SCNC: 136 MEQ/L (ref 135–144)
SPECIFIC GRAVITY UA: 1 (ref 1–1.03)
TOTAL PROTEIN: 8.1 G/DL (ref 6.3–8)
TROPONIN: <0.01 NG/ML (ref 0–0.01)
UR OXYCODONE RAPID SCREEN: ABNORMAL
UROBILINOGEN, URINE: 0.2 E.U./DL

## 2021-01-26 PROCEDURE — 82140 ASSAY OF AMMONIA: CPT

## 2021-01-26 PROCEDURE — 36415 COLL VENOUS BLD VENIPUNCTURE: CPT

## 2021-01-26 PROCEDURE — 70450 CT HEAD/BRAIN W/O DYE: CPT

## 2021-01-26 PROCEDURE — 81003 URINALYSIS AUTO W/O SCOPE: CPT

## 2021-01-26 PROCEDURE — 2580000003 HC RX 258: Performed by: EMERGENCY MEDICINE

## 2021-01-26 PROCEDURE — 72125 CT NECK SPINE W/O DYE: CPT

## 2021-01-26 PROCEDURE — 93010 ELECTROCARDIOGRAM REPORT: CPT | Performed by: INTERNAL MEDICINE

## 2021-01-26 PROCEDURE — 80306 DRUG TEST PRSMV INSTRMNT: CPT

## 2021-01-26 RX ORDER — AMOXICILLIN AND CLAVULANATE POTASSIUM 875; 125 MG/1; MG/1
1 TABLET, FILM COATED ORAL 2 TIMES DAILY
Qty: 20 TABLET | Refills: 0 | Status: SHIPPED | OUTPATIENT
Start: 2021-01-26 | End: 2021-02-05

## 2021-01-26 RX ADMIN — SODIUM CHLORIDE 2000 ML: 9 INJECTION, SOLUTION INTRAVENOUS at 00:45

## 2021-01-26 NOTE — ED NOTES
Wife called back and stated sending a \"car service\" for the pt and will not be picking up.  Pt updated and escorted to the waiting room     Oksana Vera [facility]  01/26/21 9058

## 2021-01-26 NOTE — ED PROVIDER NOTES
eMERGENCY dEPARTMENT eNCOUnter      CHIEF COMPLAINT    Chief Complaint   Patient presents with    Altered Mental Status       HPI    Marylee Galt is a 50 y.o. male who presentsto ED from home  By EMS  With complaint of confusion  Onset just prior to arrival intensity of symptoms moderate  Patient took an extra dose of his pain medication which was Lyrica and Phenergan. After that the patient was agitated. Patient was also hallucinating per family. He was wrestling in the basement with a friend and has an abrasion of the neck and right back  Patient denies any chest pain ,shortness of breath, headache. Patient denies any alcohol consumption or drug use.    Patient is able to ambulate    PAST MEDICAL HISTORY    Past Medical History:   Diagnosis Date    Aspiration into airway     Chronic pain     in neck & back, in pain management    Heroin use     Hypertension     Lumbar radiculopathy, chronic     Metabolic encephalopathy     medication induced    Non-traumatic rhabdomyolysis     Obesity     Pneumonia        SURGICAL HISTORY    Past Surgical History:   Procedure Laterality Date    BACK SURGERY      x2    NECK SURGERY      x2       CURRENT MEDICATIONS    Current Outpatient Rx   Medication Sig Dispense Refill    vitamin D (CHOLECALCIFEROL) 20062 UNIT CAPS Take by mouth      omeprazole (PRILOSEC) 20 MG delayed release capsule Take 20 mg by mouth daily      metoprolol succinate (TOPROL XL) 50 MG extended release tablet Take 50 mg by mouth 2 times daily      naloxone 4 MG/0.1ML LIQD nasal spray by Nasal route      amLODIPine (NORVASC) 10 MG tablet Take 1 tablet by mouth daily 30 tablet 3    losartan (COZAAR) 25 MG tablet Take 1 tablet by mouth daily 30 tablet 3    losartan (COZAAR) 50 MG tablet Take 1 tablet by mouth daily 30 tablet 3    acetaminophen (TYLENOL) 500 MG tablet Take 1 tablet by mouth every 6 hours as needed for Pain 120 tablet 3  ketorolac (TORADOL) 10 MG tablet Take 1 tablet by mouth every 6 hours as needed for Pain 20 tablet 0    albuterol sulfate HFA (PROAIR HFA) 108 (90 Base) MCG/ACT inhaler Use every 4 hours while awake for 7-10 days then PRN wheezing  Dispense with SPACER and Instruct on use. May sub Ventolin or Proventil as needed per [facility]. 1 Inhaler 0    escitalopram (LEXAPRO) 10 MG tablet Take 10 mg by mouth nightly      pregabalin (LYRICA) 150 MG capsule Take 300 mg by mouth 2 times daily.  Buprenorphine HCl-Naloxone HCl (SUBOXONE) 8-2 MG FILM Place under the tongue 2 times daily. ALLERGIES    No Known Allergies    FAMILY HISTORY    History reviewed. No pertinent family history.     SOCIAL HISTORY    Social History     Socioeconomic History    Marital status:      Spouse name: None    Number of children: None    Years of education: None    Highest education level: None   Occupational History    None   Social Needs    Financial resource strain: None    Food insecurity     Worry: None     Inability: None    Transportation needs     Medical: None     Non-medical: None   Tobacco Use    Smoking status: Current Every Day Smoker     Packs/day: 1.00     Years: 15.00     Pack years: 15.00     Types: Cigarettes    Smokeless tobacco: Never Used   Substance and Sexual Activity    Alcohol use: No    Drug use: Not Currently     Types: Opiates     Sexual activity: Yes     Partners: Female   Lifestyle    Physical activity     Days per week: None     Minutes per session: None    Stress: None   Relationships    Social connections     Talks on phone: None     Gets together: None     Attends Taoism service: None     Active member of club or organization: None     Attends meetings of clubs or organizations: None     Relationship status: None    Intimate partner violence     Fear of current or ex partner: None     Emotionally abused: None     Physically abused: None     Forced sexual activity: None Other Topics Concern    None   Social History Narrative    None       REVIEW OF SYSTEMS    Constitutional:  Denies fever, chills, weight loss or weakness   Eyes:  Denies photophobia or discharge   HENT:  Denies sore throat or ear pain   Respiratory:  Denies cough or shortness of breath   Cardiovascular:  Denies chest pain, palpitations or swelling   GI:  Denies abdominal pain, nausea, vomiting, or diarrhea   Musculoskeletal:  Denies back pain   Skin:  Denies rash   Neurologic:  Denies headache, focal weakness or sensory changes   Endocrine:  Denies polyuria or polydypsia   Lymphatic:  Denies swollen glands   Psychiatric:  Denies depression, suicidal ideation or homicidal ideation   All systems negative except as marked. PHYSICAL EXAM    VITAL SIGNS: BP (!) 102/59   Pulse 78   Resp 13   Ht 5' 11\" (1.803 m)   Wt 260 lb (117.9 kg)   SpO2 91%   BMI 36.26 kg/m²    Constitutional:  Well developed, Well nourished, No acute distress, Non-toxic appearance. HENT:  Normocephalic, Atraumatic, Bilateral external ears normal, Oropharynx moist, No oral exudates, Nose normal. Neck- Normal range of motion, No midline C-spine tenderness, Supple, No stridor. Abrasion on the back of the neck. Eyes:  PERRL, EOMI, Conjunctiva normal, No discharge. Respiratory:  Normal breath sounds, No respiratory distress, No wheezing, No chest tenderness. Cardiovascular:  Normal heart rate, Normal rhythm, No murmurs, No rubs, No gallops. GI:  Bowel sounds normal, Soft, No tenderness, No masses, No pulsatile masses. :  No CVA tenderness. Musculoskeletal:  Intact distal pulses, No edema, No tenderness, No cyanosis, No clubbing. Good range of motion in all major joints. No tenderness to palpation or major deformities noted. Back- No tenderness. Integument:  Warm, Dry, No erythema, No rash. Lymphatic:  No lymphadenopathy noted. Neurologic: NIH = 0, alert & oriented x2 , Normal motor function, Normal sensory function, No focal deficits noted. Reflexes normal.  Psychiatric:  Affect normal, Judgment normal, Mood normal.     EKG    NSR, HR 81  , Normal Axis, No ST- T wave changes, QTc 460    RADIOLOGY    CT HEAD WO CONTRAST    (Results Pending)   CT CERVICAL SPINE WO CONTRAST    (Results Pending)       REEVALUATION   Patient was updated the results of labs and Radiology. Patient is AOx3. Patient is ambulating. He wants to go home. Labs  Labs Reviewed   CBC WITH AUTO DIFFERENTIAL - Abnormal; Notable for the following components:       Result Value    RBC 4.09 (*)     Hemoglobin 12.8 (*)     Hematocrit 38.7 (*)     RDW 16.1 (*)     All other components within normal limits   COMPREHENSIVE METABOLIC PANEL W/ REFLEX TO MG FOR LOW K - Abnormal; Notable for the following components: Total Protein 8.1 (*)     Alkaline Phosphatase 126 (*)     Globulin 4.0 (*)     All other components within normal limits   SALICYLATE LEVEL - Abnormal; Notable for the following components:    Salicylate, Serum <7.3 (*)     All other components within normal limits   ACETAMINOPHEN LEVEL - Abnormal; Notable for the following components:    Acetaminophen Level <5 (*)     All other components within normal limits   TROPONIN   ETHANOL   URINALYSIS   AMMONIA   URINE DRUG SCREEN, COMPREHENSIVE             Summation      Patient Course:     ED Medications administered this visit:    Medications   0.9 % sodium chloride bolus (has no administration in time range)   0.9 % sodium chloride bolus (2,000 mLs Intravenous New Bag 1/26/21 0045)       New Prescriptions from this visit:    New Prescriptions    No medications on file       Follow-up:  Alicia Torres MD  [address] [place], [place] [id number] [phone]    Call in 1 day          Final Impression:   1.  Chronic sinusitis, unspecified location (Please note that portions of this note were completed with a voice recognition program.  Efforts were made to edit the dictations but occasionally words are mis-transcribed.)          Sapna Medina MD  01/26/21 8613

## 2021-01-26 NOTE — ED TRIAGE NOTES
Pt presents via EMS for change in mental status. Per EMS, they were called for possible drug overdose. Pt arrives alert and oriented x 4, speech is slurred. Pt admits to drinking alcohol tonight and \"possibly taking 2 or 3 extra Lyrica pills\". Pt has abrasions noted to forehead, left shoulder and multiple on his back. Pt denies falling.   Pt states he was \"wrestling with a friend in his basement\"

## 2021-01-26 NOTE — ED NOTES
Written and verbal d/c instructions given. Reviewed medications and f/u care. Verbalized understanding. Awaiting wife to arrive for pickup.       [facility], RN  01/26/21 1533

## 2021-01-26 NOTE — ED NOTES
Bed: 15  Expected date:   Expected time:   Means of arrival:   Comments:  49/M, AMS/Possible OD  114/48, NSR, 18, 78, 100%RA  Patient pulled out Miguel Jang RN  01/25/21 2817

## 2021-10-06 ENCOUNTER — HOSPITAL ENCOUNTER (INPATIENT)
Age: 56
LOS: 3 days | Discharge: HOME | DRG: 917 | End: 2021-10-10
Attending: INTERNAL MEDICINE | Admitting: INTERNAL MEDICINE
Payer: MEDICARE

## 2021-10-06 DIAGNOSIS — R33.9 URINARY RETENTION: ICD-10-CM

## 2021-10-06 DIAGNOSIS — N17.9 AKI (ACUTE KIDNEY INJURY) (HCC): ICD-10-CM

## 2021-10-06 DIAGNOSIS — F19.10 POLYSUBSTANCE ABUSE (HCC): ICD-10-CM

## 2021-10-06 DIAGNOSIS — M62.82 NON-TRAUMATIC RHABDOMYOLYSIS: Primary | ICD-10-CM

## 2021-10-06 LAB
BASOPHILS ABSOLUTE: 0.1 K/UL (ref 0–0.2)
BASOPHILS RELATIVE PERCENT: 0.5 %
EOSINOPHILS ABSOLUTE: 0.1 K/UL (ref 0–0.7)
EOSINOPHILS RELATIVE PERCENT: 0.6 %
HCT VFR BLD CALC: 38.7 % (ref 42–52)
HEMOGLOBIN: 13.1 G/DL (ref 14–18)
LYMPHOCYTES ABSOLUTE: 1.8 K/UL (ref 1–4.8)
LYMPHOCYTES RELATIVE PERCENT: 10 %
MCH RBC QN AUTO: 30.4 PG (ref 27–31.3)
MCHC RBC AUTO-ENTMCNC: 33.8 % (ref 33–37)
MCV RBC AUTO: 89.8 FL (ref 80–100)
MONOCYTES ABSOLUTE: 0.9 K/UL (ref 0.2–0.8)
MONOCYTES RELATIVE PERCENT: 5.3 %
NEUTROPHILS ABSOLUTE: 14.6 K/UL (ref 1.4–6.5)
NEUTROPHILS RELATIVE PERCENT: 83.6 %
PDW BLD-RTO: 16.8 % (ref 11.5–14.5)
PLATELET # BLD: 255 K/UL (ref 130–400)
RBC # BLD: 4.31 M/UL (ref 4.7–6.1)
WBC # BLD: 17.5 K/UL (ref 4.8–10.8)

## 2021-10-06 PROCEDURE — 83735 ASSAY OF MAGNESIUM: CPT

## 2021-10-06 PROCEDURE — 80061 LIPID PANEL: CPT

## 2021-10-06 PROCEDURE — 84443 ASSAY THYROID STIM HORMONE: CPT

## 2021-10-06 PROCEDURE — 80053 COMPREHEN METABOLIC PANEL: CPT

## 2021-10-06 PROCEDURE — 82077 ASSAY SPEC XCP UR&BREATH IA: CPT

## 2021-10-06 PROCEDURE — 85025 COMPLETE CBC W/AUTO DIFF WBC: CPT

## 2021-10-06 PROCEDURE — 80307 DRUG TEST PRSMV CHEM ANLYZR: CPT

## 2021-10-06 PROCEDURE — 80179 DRUG ASSAY SALICYLATE: CPT

## 2021-10-06 PROCEDURE — 83930 ASSAY OF BLOOD OSMOLALITY: CPT

## 2021-10-06 PROCEDURE — 99285 EMERGENCY DEPT VISIT HI MDM: CPT

## 2021-10-06 PROCEDURE — 80143 DRUG ASSAY ACETAMINOPHEN: CPT

## 2021-10-06 PROCEDURE — 81003 URINALYSIS AUTO W/O SCOPE: CPT

## 2021-10-06 PROCEDURE — 82550 ASSAY OF CK (CPK): CPT

## 2021-10-06 PROCEDURE — 36415 COLL VENOUS BLD VENIPUNCTURE: CPT

## 2021-10-06 PROCEDURE — 82553 CREATINE MB FRACTION: CPT

## 2021-10-07 ENCOUNTER — APPOINTMENT (OUTPATIENT)
Dept: GENERAL RADIOLOGY | Age: 56
DRG: 917 | End: 2021-10-07
Payer: MEDICARE

## 2021-10-07 ENCOUNTER — APPOINTMENT (OUTPATIENT)
Dept: CT IMAGING | Age: 56
DRG: 917 | End: 2021-10-07
Payer: MEDICARE

## 2021-10-07 PROBLEM — F19.20 DRUG ADDICTION (HCC): Status: ACTIVE | Noted: 2021-10-07

## 2021-10-07 PROBLEM — M62.82 RHABDOMYOLYSIS: Status: ACTIVE | Noted: 2021-10-07

## 2021-10-07 PROBLEM — G47.33 OSA (OBSTRUCTIVE SLEEP APNEA): Status: ACTIVE | Noted: 2021-10-07

## 2021-10-07 PROBLEM — K21.9 GERD (GASTROESOPHAGEAL REFLUX DISEASE): Status: ACTIVE | Noted: 2021-10-07

## 2021-10-07 LAB
ACETAMINOPHEN LEVEL: <5 UG/ML (ref 10–30)
ALBUMIN SERPL-MCNC: 3.9 G/DL (ref 3.5–4.6)
ALP BLD-CCNC: 99 U/L (ref 35–104)
ALT SERPL-CCNC: 14 U/L (ref 0–41)
AMPHETAMINE SCREEN, URINE: ABNORMAL
ANION GAP SERPL CALCULATED.3IONS-SCNC: 12 MEQ/L (ref 9–15)
AST SERPL-CCNC: 48 U/L (ref 0–40)
BARBITURATE SCREEN URINE: ABNORMAL
BASE EXCESS ARTERIAL: 6 (ref -3–3)
BENZODIAZEPINE SCREEN, URINE: POSITIVE
BILIRUB SERPL-MCNC: 0.4 MG/DL (ref 0.2–0.7)
BILIRUBIN URINE: NEGATIVE
BLOOD, URINE: NEGATIVE
BUN BLDV-MCNC: 15 MG/DL (ref 6–20)
CALCIUM IONIZED: 1.16 MMOL/L (ref 1.12–1.32)
CALCIUM SERPL-MCNC: 8.7 MG/DL (ref 8.5–9.9)
CANNABINOID SCREEN URINE: ABNORMAL
CHLORIDE BLD-SCNC: 90 MEQ/L (ref 95–107)
CHOLESTEROL, TOTAL: 159 MG/DL (ref 0–199)
CK MB: 53 NG/ML (ref 0–6.7)
CLARITY: CLEAR
CO2: 28 MEQ/L (ref 20–31)
COCAINE METABOLITE SCREEN URINE: ABNORMAL
COLOR: YELLOW
CREAT SERPL-MCNC: 1.65 MG/DL (ref 0.7–1.2)
CREATINE KINASE-MB INDEX: 0.4 % (ref 0–3.5)
EKG ATRIAL RATE: 92 BPM
EKG P AXIS: 48 DEGREES
EKG P-R INTERVAL: 162 MS
EKG Q-T INTERVAL: 352 MS
EKG QRS DURATION: 94 MS
EKG QTC CALCULATION (BAZETT): 435 MS
EKG R AXIS: 33 DEGREES
EKG T AXIS: 39 DEGREES
EKG VENTRICULAR RATE: 92 BPM
ETHANOL PERCENT: NORMAL G/DL
ETHANOL: <10 MG/DL (ref 0–0.08)
GFR AFRICAN AMERICAN: 52.8
GFR AFRICAN AMERICAN: 59
GFR NON-AFRICAN AMERICAN: 43.6
GFR NON-AFRICAN AMERICAN: 49
GLOBULIN: 3.2 G/DL (ref 2.3–3.5)
GLUCOSE BLD-MCNC: 139 MG/DL (ref 70–99)
GLUCOSE BLD-MCNC: 140 MG/DL (ref 60–115)
GLUCOSE URINE: NEGATIVE MG/DL
HCO3 ARTERIAL: 32 MMOL/L (ref 21–29)
HDLC SERPL-MCNC: 32 MG/DL (ref 40–59)
HEMOGLOBIN: 13.7 GM/DL (ref 13.5–17.5)
KETONES, URINE: NEGATIVE MG/DL
LACTATE: 1.14 MMOL/L (ref 0.4–2)
LDL CHOLESTEROL CALCULATED: 82 MG/DL (ref 0–129)
LEUKOCYTE ESTERASE, URINE: NEGATIVE
Lab: ABNORMAL
MAGNESIUM: 1.7 MG/DL (ref 1.7–2.4)
METHADONE SCREEN, URINE: ABNORMAL
NITRITE, URINE: NEGATIVE
O2 SAT, ARTERIAL: 86 % (ref 93–100)
OPIATE SCREEN URINE: ABNORMAL
OXYCODONE URINE: ABNORMAL
PCO2 ARTERIAL: 60 MM HG (ref 35–45)
PERFORMED ON: ABNORMAL
PH ARTERIAL: 7.33 (ref 7.35–7.45)
PH UA: 5 (ref 5–9)
PHENCYCLIDINE SCREEN URINE: ABNORMAL
PO2 ARTERIAL: 56 MM HG (ref 75–108)
POC CHLORIDE: 94 MEQ/L (ref 99–110)
POC CREATININE: 1.5 MG/DL (ref 0.9–1.3)
POC FIO2: 3
POC HEMATOCRIT: 40 % (ref 41–53)
POC POTASSIUM: 4 MEQ/L (ref 3.5–5.1)
POC SAMPLE TYPE: ABNORMAL
POC SODIUM: 133 MEQ/L (ref 136–145)
POTASSIUM SERPL-SCNC: 4.3 MEQ/L (ref 3.4–4.9)
PROPOXYPHENE SCREEN: ABNORMAL
PROTEIN UA: NEGATIVE MG/DL
SALICYLATE, SERUM: <0.3 MG/DL (ref 15–30)
SARS-COV-2, NAAT: NOT DETECTED
SODIUM BLD-SCNC: 130 MEQ/L (ref 135–144)
SPECIFIC GRAVITY UA: 1.01 (ref 1–1.03)
TCO2 ARTERIAL: 34 (ref 22–29)
TOTAL CK: ABNORMAL U/L (ref 0–190)
TOTAL PROTEIN: 7.1 G/DL (ref 6.3–8)
TRIGL SERPL-MCNC: 227 MG/DL (ref 0–150)
TSH SERPL DL<=0.05 MIU/L-ACNC: 1.11 UIU/ML (ref 0.44–3.86)
URINE REFLEX TO CULTURE: NORMAL
UROBILINOGEN, URINE: 0.2 E.U./DL

## 2021-10-07 PROCEDURE — 2060000000 HC ICU INTERMEDIATE R&B

## 2021-10-07 PROCEDURE — 2580000003 HC RX 258: Performed by: NURSE PRACTITIONER

## 2021-10-07 PROCEDURE — 83605 ASSAY OF LACTIC ACID: CPT

## 2021-10-07 PROCEDURE — 93005 ELECTROCARDIOGRAM TRACING: CPT | Performed by: PERSONAL EMERGENCY RESPONSE ATTENDANT

## 2021-10-07 PROCEDURE — 87635 SARS-COV-2 COVID-19 AMP PRB: CPT

## 2021-10-07 PROCEDURE — 85014 HEMATOCRIT: CPT

## 2021-10-07 PROCEDURE — 2580000003 HC RX 258: Performed by: PERSONAL EMERGENCY RESPONSE ATTENDANT

## 2021-10-07 PROCEDURE — 6360000002 HC RX W HCPCS: Performed by: INTERNAL MEDICINE

## 2021-10-07 PROCEDURE — 6370000000 HC RX 637 (ALT 250 FOR IP): Performed by: INTERNAL MEDICINE

## 2021-10-07 PROCEDURE — 84132 ASSAY OF SERUM POTASSIUM: CPT

## 2021-10-07 PROCEDURE — 82565 ASSAY OF CREATININE: CPT

## 2021-10-07 PROCEDURE — 94664 DEMO&/EVAL PT USE INHALER: CPT

## 2021-10-07 PROCEDURE — 36600 WITHDRAWAL OF ARTERIAL BLOOD: CPT

## 2021-10-07 PROCEDURE — 6360000002 HC RX W HCPCS: Performed by: NURSE PRACTITIONER

## 2021-10-07 PROCEDURE — 94761 N-INVAS EAR/PLS OXIMETRY MLT: CPT

## 2021-10-07 PROCEDURE — 71045 X-RAY EXAM CHEST 1 VIEW: CPT

## 2021-10-07 PROCEDURE — 93010 ELECTROCARDIOGRAM REPORT: CPT | Performed by: INTERNAL MEDICINE

## 2021-10-07 PROCEDURE — 82803 BLOOD GASES ANY COMBINATION: CPT

## 2021-10-07 PROCEDURE — 82435 ASSAY OF BLOOD CHLORIDE: CPT

## 2021-10-07 PROCEDURE — 84295 ASSAY OF SERUM SODIUM: CPT

## 2021-10-07 PROCEDURE — 82330 ASSAY OF CALCIUM: CPT

## 2021-10-07 PROCEDURE — 71250 CT THORAX DX C-: CPT

## 2021-10-07 RX ORDER — ONDANSETRON 2 MG/ML
4 INJECTION INTRAMUSCULAR; INTRAVENOUS EVERY 6 HOURS PRN
Status: DISCONTINUED | OUTPATIENT
Start: 2021-10-07 | End: 2021-10-10 | Stop reason: HOSPADM

## 2021-10-07 RX ORDER — ACETAMINOPHEN 650 MG/1
650 SUPPOSITORY RECTAL EVERY 6 HOURS PRN
Status: DISCONTINUED | OUTPATIENT
Start: 2021-10-07 | End: 2021-10-10 | Stop reason: HOSPADM

## 2021-10-07 RX ORDER — ONDANSETRON 4 MG/1
4 TABLET, ORALLY DISINTEGRATING ORAL EVERY 8 HOURS PRN
Status: DISCONTINUED | OUTPATIENT
Start: 2021-10-07 | End: 2021-10-10 | Stop reason: HOSPADM

## 2021-10-07 RX ORDER — BUPRENORPHINE HYDROCHLORIDE AND NALOXONE HYDROCHLORIDE DIHYDRATE 8; 2 MG/1; MG/1
1 TABLET SUBLINGUAL 2 TIMES DAILY
Status: DISCONTINUED | OUTPATIENT
Start: 2021-10-07 | End: 2021-10-10 | Stop reason: HOSPADM

## 2021-10-07 RX ORDER — PROMETHAZINE HYDROCHLORIDE 25 MG/1
25 TABLET ORAL EVERY 6 HOURS PRN
COMMUNITY
Start: 2021-09-28

## 2021-10-07 RX ORDER — POLYETHYLENE GLYCOL 3350 17 G/17G
17 POWDER, FOR SOLUTION ORAL DAILY PRN
Status: DISCONTINUED | OUTPATIENT
Start: 2021-10-07 | End: 2021-10-10 | Stop reason: HOSPADM

## 2021-10-07 RX ORDER — ATORVASTATIN CALCIUM 10 MG/1
10 TABLET, FILM COATED ORAL DAILY
Status: DISCONTINUED | OUTPATIENT
Start: 2021-10-07 | End: 2021-10-10 | Stop reason: HOSPADM

## 2021-10-07 RX ORDER — 0.9 % SODIUM CHLORIDE 0.9 %
2000 INTRAVENOUS SOLUTION INTRAVENOUS ONCE
Status: COMPLETED | OUTPATIENT
Start: 2021-10-07 | End: 2021-10-07

## 2021-10-07 RX ORDER — ATORVASTATIN CALCIUM 10 MG/1
10 TABLET, FILM COATED ORAL DAILY
COMMUNITY
Start: 2021-06-08

## 2021-10-07 RX ORDER — ALBUTEROL SULFATE 90 UG/1
1 AEROSOL, METERED RESPIRATORY (INHALATION) EVERY 4 HOURS PRN
Status: DISCONTINUED | OUTPATIENT
Start: 2021-10-07 | End: 2021-10-10 | Stop reason: HOSPADM

## 2021-10-07 RX ORDER — SODIUM CHLORIDE 9 MG/ML
INJECTION, SOLUTION INTRAVENOUS CONTINUOUS
Status: DISCONTINUED | OUTPATIENT
Start: 2021-10-07 | End: 2021-10-10

## 2021-10-07 RX ORDER — IPRATROPIUM BROMIDE AND ALBUTEROL SULFATE 2.5; .5 MG/3ML; MG/3ML
1 SOLUTION RESPIRATORY (INHALATION) EVERY 4 HOURS PRN
Status: DISCONTINUED | OUTPATIENT
Start: 2021-10-07 | End: 2021-10-10 | Stop reason: HOSPADM

## 2021-10-07 RX ORDER — SODIUM CHLORIDE 0.9 % (FLUSH) 0.9 %
5-40 SYRINGE (ML) INJECTION PRN
Status: DISCONTINUED | OUTPATIENT
Start: 2021-10-07 | End: 2021-10-10 | Stop reason: HOSPADM

## 2021-10-07 RX ORDER — SODIUM CHLORIDE 0.9 % (FLUSH) 0.9 %
5-40 SYRINGE (ML) INJECTION EVERY 12 HOURS SCHEDULED
Status: DISCONTINUED | OUTPATIENT
Start: 2021-10-07 | End: 2021-10-10 | Stop reason: HOSPADM

## 2021-10-07 RX ORDER — ESCITALOPRAM OXALATE 10 MG/1
10 TABLET ORAL NIGHTLY
Status: DISCONTINUED | OUTPATIENT
Start: 2021-10-07 | End: 2021-10-10 | Stop reason: HOSPADM

## 2021-10-07 RX ORDER — PANTOPRAZOLE SODIUM 40 MG/1
40 TABLET, DELAYED RELEASE ORAL
Status: DISCONTINUED | OUTPATIENT
Start: 2021-10-08 | End: 2021-10-10 | Stop reason: HOSPADM

## 2021-10-07 RX ORDER — PREGABALIN 150 MG/1
300 CAPSULE ORAL 2 TIMES DAILY
Status: DISCONTINUED | OUTPATIENT
Start: 2021-10-07 | End: 2021-10-10 | Stop reason: HOSPADM

## 2021-10-07 RX ORDER — OMEPRAZOLE 20 MG/1
20 CAPSULE, DELAYED RELEASE ORAL DAILY
Status: DISCONTINUED | OUTPATIENT
Start: 2021-10-07 | End: 2021-10-07 | Stop reason: CLARIF

## 2021-10-07 RX ORDER — ACETAMINOPHEN 325 MG/1
650 TABLET ORAL EVERY 6 HOURS PRN
Status: DISCONTINUED | OUTPATIENT
Start: 2021-10-07 | End: 2021-10-10 | Stop reason: HOSPADM

## 2021-10-07 RX ORDER — SODIUM CHLORIDE 9 MG/ML
25 INJECTION, SOLUTION INTRAVENOUS PRN
Status: DISCONTINUED | OUTPATIENT
Start: 2021-10-07 | End: 2021-10-10 | Stop reason: HOSPADM

## 2021-10-07 RX ADMIN — ENOXAPARIN SODIUM 40 MG: 40 INJECTION SUBCUTANEOUS at 10:50

## 2021-10-07 RX ADMIN — ESCITALOPRAM OXALATE 10 MG: 10 TABLET ORAL at 21:34

## 2021-10-07 RX ADMIN — ATORVASTATIN CALCIUM 10 MG: 10 TABLET, FILM COATED ORAL at 21:33

## 2021-10-07 RX ADMIN — SODIUM CHLORIDE: 9 INJECTION, SOLUTION INTRAVENOUS at 21:34

## 2021-10-07 RX ADMIN — SODIUM CHLORIDE 2000 ML: 9 INJECTION, SOLUTION INTRAVENOUS at 00:55

## 2021-10-07 RX ADMIN — PREGABALIN 300 MG: 150 CAPSULE ORAL at 16:03

## 2021-10-07 RX ADMIN — SODIUM CHLORIDE 1500 MG: 900 INJECTION INTRAVENOUS at 21:35

## 2021-10-07 RX ADMIN — SODIUM CHLORIDE: 9 INJECTION, SOLUTION INTRAVENOUS at 10:49

## 2021-10-07 RX ADMIN — SODIUM CHLORIDE 1500 MG: 900 INJECTION INTRAVENOUS at 16:09

## 2021-10-07 RX ADMIN — BUPRENORPHINE HYDROCHLORIDE AND NALOXONE HYDROCHLORIDE DIHYDRATE 1 TABLET: 8; 2 TABLET SUBLINGUAL at 16:03

## 2021-10-07 RX ADMIN — SODIUM CHLORIDE 1500 MG: 900 INJECTION INTRAVENOUS at 10:49

## 2021-10-07 ASSESSMENT — ENCOUNTER SYMPTOMS
WHEEZING: 0
PHOTOPHOBIA: 0
VOMITING: 0
DIARRHEA: 0
COUGH: 0
SHORTNESS OF BREATH: 0
RHINORRHEA: 0
BLOOD IN STOOL: 0
ABDOMINAL PAIN: 0
BACK PAIN: 0
COLOR CHANGE: 0
NAUSEA: 0
VOMITING: 1
SORE THROAT: 0

## 2021-10-07 NOTE — H&P
[place] [address] MEDICINE    HISTORY AND PHYSICAL EXAM    PATIENT NAME:  Bobbi Gamez    MRN:  [id number]  SERVICE DATE:  10/7/2021   SERVICE TIME:  2:08 AM    Primary Care Physician: Preet Adam MD         SUBJECTIVE  CHIEF COMPLAINT: Overdose      HPI: Patient be admitted for rhabdomyolysis 2/2 overdose. Patient has a past medical history of drug addiction and overdose on Suboxone, Lyrica, and Phenergan. Patient was brought in by EMS after wife came home and found patient laying in his vomit facedown on the ground. Patient was given 8 mg intranasal Narcan and became more arousable and his breathing improved. Patient was found to be hypoxic between 78 to 82% on arrival on room air in the ED. Patient is currently arousable to pain and verbal stimuli and states his name, the year, and is able to tell me he is at [facility].  Patient denies any pain, shortness of breath, SI/HI. Patient denies intentional overdose. Patient does not state what he took or how much he took of medications. Patient will give 1 or 2 word answers and fall back asleep. Wife told ER staff that the last time she saw him was around 7 PM and he was drowsy at that time. Patient is on Suboxone due to past drug addiction. Patient also has a past medical history of hypertension, GERD, and RENETTA on BiPAP at home. PAST MEDICAL HISTORY:    Past Medical History:   Diagnosis Date    Aspiration into airway     Chronic pain     in neck & back, in pain management    Heroin use     Hypertension     Lumbar radiculopathy, chronic     Metabolic encephalopathy     medication induced    Non-traumatic rhabdomyolysis     Obesity     Pneumonia      PAST SURGICAL HISTORY:    Past Surgical History:   Procedure Laterality Date    BACK SURGERY      x2    NECK SURGERY      x2     FAMILY HISTORY:  History reviewed. No pertinent family history.   SOCIAL HISTORY:    Social History     Socioeconomic History    Marital status:  Spouse name: Not on file    Number of children: Not on file    Years of education: Not on file    Highest education level: Not on file   Occupational History    Not on file   Tobacco Use    Smoking status: Current Every Day Smoker     Packs/day: 1.00     Years: 15.00     Pack years: 15.00     Types: Cigarettes    Smokeless tobacco: Never Used   Vaping Use    Vaping Use: Never used   Substance and Sexual Activity    Alcohol use: No    Drug use: Not Currently     Types: Opiates     Sexual activity: Yes     Partners: Female   Other Topics Concern    Not on file   Social History Narrative    Not on file     Social Determinants of Health     Financial Resource Strain:     Difficulty of Paying Living Expenses:    Food Insecurity:     Worried About Running Out of Food in the Last Year:     [address] in the Last Year:    Transportation Needs:     Lack of Transportation (Medical):  Lack of Transportation (Non-Medical):    Physical Activity:     Days of Exercise per Week:     Minutes of Exercise per Session:    Stress:     Feeling of Stress :    Social Connections:     Frequency of Communication with Friends and Family:     Frequency of Social Gatherings with Friends and Family:     Attends Judaism Services:     Active Member of Clubs or Organizations:     Attends Club or Organization Meetings:     Marital Status:    Intimate Partner Violence:     Fear of Current or Ex-Partner:     Emotionally Abused:     Physically Abused:     Sexually Abused:      MEDICATIONS:   Prior to Admission medications    Medication Sig Start Date End Date Taking?  Authorizing Provider   vitamin D (CHOLECALCIFEROL) 21780 UNIT CAPS Take by mouth 1/31/20   Historical Provider, MD   omeprazole (PRILOSEC) 20 MG delayed release capsule Take 20 mg by mouth daily 11/12/19   Historical Provider, MD   metoprolol succinate (TOPROL XL) 50 MG extended release tablet Take 50 mg by mouth 2 times daily 11/12/19   Historical Allergic/Immunologic: Negative for immunocompromised state. Neurological: Negative for dizziness and headaches. Psychiatric/Behavioral: Negative for behavioral problems, confusion, self-injury and suicidal ideas. OBJECTIVE  PHYSICAL EXAM: BP (!) 94/58   Pulse 84   Temp 98.5 °F (36.9 °C) (Oral)   Resp 13   Ht 5' 11\" (1.803 m)   Wt 260 lb (117.9 kg)   SpO2 91%   BMI 36.26 kg/m²     Physical Exam  Vitals and nursing note reviewed. Constitutional:       General: He is not in acute distress. Appearance: He is well-developed. He is ill-appearing. HENT:      Head: Normocephalic and atraumatic. Nose: Nose normal.      Mouth/Throat:      Mouth: Mucous membranes are dry. Eyes:      Pupils: Pupils are equal, round, and reactive to light. Cardiovascular:      Rate and Rhythm: Normal rate and regular rhythm. Pulses: Normal pulses. Heart sounds: Normal heart sounds. Pulmonary:      Effort: Pulmonary effort is normal. No respiratory distress. Breath sounds: Rhonchi present. No wheezing or rales. Abdominal:      General: Bowel sounds are normal.      Palpations: Abdomen is soft. There is no mass. Tenderness: There is no abdominal tenderness. Musculoskeletal:         General: Normal range of motion. Cervical back: Normal range of motion. Right lower leg: No edema. Left lower leg: No edema. Lymphadenopathy:      Cervical: No cervical adenopathy. Skin:     General: Skin is warm and dry. Capillary Refill: Capillary refill takes less than 2 seconds. Neurological:      Mental Status: He is alert and oriented to person, place, and time. Deep Tendon Reflexes: Reflexes normal.           DATA:     Diagnostic tests reviewed for today's visit:    Most recent labs and imaging results reviewed.      LABS:    Recent Results (from the past 24 hour(s))   Acetaminophen Level    Collection Time: 10/06/21 11:30 PM   Result Value Ref Range    Acetaminophen Magnesium 1.7 1.7 - 2.4 mg/dL   Salicylate    Collection Time: 10/06/21 11:30 PM   Result Value Ref Range    Salicylate, Serum <5.8 (L) 15.0 - 30.0 mg/dL   TSH without Reflex    Collection Time: 10/06/21 11:30 PM   Result Value Ref Range    TSH 1.110 0.440 - 3.860 uIU/mL   Urine Drug Screen    Collection Time: 10/06/21 11:30 PM   Result Value Ref Range    Amphetamine Screen, Urine Neg Negative <1000 ng/mL    Barbiturate Screen, Ur Neg Negative < 200 ng/mL    Benzodiazepine Screen, Urine POSITIVE (A) Negative < 200 ng/mL    Cannabinoid Scrn, Ur Neg Negative < 50 ng/mL    Cocaine Metabolite Screen, Urine Neg Negative < 300 ng/mL    Opiate Scrn, Ur Neg Negative < 300 ng/mL    PCP Screen, Urine Neg Negative < 25 ng/mL    Methadone Screen, Urine Neg Negative <300 ng/mL    Propoxyphene Scrn, Ur Neg Negative <300 ng/mL    Oxycodone Urine Neg Negative <100 ng/mL    Drug Screen Comment: see below    Urinalysis Reflex to Culture    Collection Time: 10/06/21 11:30 PM    Specimen: Urine, straight catheter   Result Value Ref Range    Color, UA Yellow Straw/Yellow    Clarity, UA Clear Clear    Glucose, Ur Negative Negative mg/dL    Bilirubin Urine Negative Negative    Ketones, Urine Negative Negative mg/dL    Specific Gravity, UA 1.009 1.005 - 1.030    Blood, Urine Negative Negative    pH, UA 5.0 5.0 - 9.0    Protein, UA Negative Negative mg/dL    Urobilinogen, Urine 0.2 <2.0 E.U./dL    Nitrite, Urine Negative Negative    Leukocyte Esterase, Urine Negative Negative    Urine Reflex to Culture Not Indicated    CKMB & RELATIVE PERCENT    Collection Time: 10/06/21 11:30 PM   Result Value Ref Range    CK-MB 53.0 (H) 0.0 - 6.7 ng/mL    CK-MB Index 0.4 0.0 - 3.5 %   COVID-19, Rapid    Collection Time: 10/07/21 12:04 AM    Specimen: Nasopharyngeal Swab   Result Value Ref Range    SARS-CoV-2, NAAT Not Detected Not Detected   EKG 12 Lead    Collection Time: 10/07/21 12:25 AM   Result Value Ref Range    Ventricular Rate 92 BPM    Atrial agents. Hyponatremia: Sodium 130. Patient received 2 L normal saline in ED. We will continue IV hydration. We will get urine electrolytes and osmolality. We will monitor sodium with CMP daily. If needed we will consult nephrology. Active Problems:  Chronic pain syndrome: Patient on Lyrica. We will hold medication at this time. We will consult pain management. GERD: Patient on PPI. We will resume home meds  RENETTA: Patient wears supposed to wear CPAP. We will place patient on CPAP at night  Hx of drug addiction: Patient on Suboxone. We will consult pain management.       Plan of care discussed with: patient    SIGNATURE: PEGGY Magallanes - CNP  DATE: October 7, 2021  TIME: 2:08 AM     Dr. Hazel Almaguer MD - supervising physician

## 2021-10-07 NOTE — PROGRESS NOTES
Physician Progress Note      PATIENT:               Carnell Aase  CSN #:                  [id number]  :                       1965  ADMIT DATE:       10/6/2021 10:38 PM  DISCH DATE:  RESPONDING  PROVIDER #:        Adia MARTINEZ DO          QUERY TEXT:    Pt admitted with rhabdomyolysis, an overdose and has respiratory failure   documented. If possible, please document in progress notes and discharge   summary further specificity regarding the type and acuity of respiratory   failure: The medical record reflects the following:  Risk Factors: drug addiction  OD Suboxone, Lyrica, and Phenergan  HTN  GERD    RENETTA/BiPAP at home  Clinical Indicators:  AB.333/60/56/32.0/34/6/86  H&P: Respiratory failure with hypoxia: Likely 2/2 aspiration pna. Treatment:  Narcan  ABG  CXR   CT ordered    [email or web address]  Options provided:  -- Acute respiratory failure with hypoxia  -- Acute respiratory failure with both hypoxia and hypercapnia  -- Acute respiratory failure with hypercapnia  -- Other - I will add my own diagnosis  -- Disagree - Not applicable / Not valid  -- Disagree - Clinically unable to determine / Unknown  -- Refer to Clinical Documentation Reviewer    PROVIDER RESPONSE TEXT:    This patient is in acute respiratory failure with hypoxia.     Query created by: Akanksha Garcia on 10/7/2021 10:30 AM      Electronically signed by:  Raad Peacock DO 10/7/2021 12:28 PM

## 2021-10-07 NOTE — ED PROVIDER NOTES
Chronic pain     in neck & back, in pain management    Heroin use     Hypertension     Lumbar radiculopathy, chronic     Metabolic encephalopathy     medication induced    Non-traumatic rhabdomyolysis     Obesity     Pneumonia          SURGICAL HISTORY       Past Surgical History:   Procedure Laterality Date    BACK SURGERY      x2    NECK SURGERY      x2         CURRENT MEDICATIONS       Previous Medications    ACETAMINOPHEN (TYLENOL) 500 MG TABLET    Take 1 tablet by mouth every 6 hours as needed for Pain    ALBUTEROL SULFATE HFA (PROAIR HFA) 108 (90 BASE) MCG/ACT INHALER    Use every 4 hours while awake for 7-10 days then PRN wheezing  Dispense with SPACER and Instruct on use. May sub Ventolin or Proventil as needed per [employer]. AMLODIPINE (NORVASC) 10 MG TABLET    Take 1 tablet by mouth daily    BUPRENORPHINE HCL-NALOXONE HCL (SUBOXONE) 8-2 MG FILM    Place under the tongue 2 times daily. ESCITALOPRAM (LEXAPRO) 10 MG TABLET    Take 10 mg by mouth nightly    KETOROLAC (TORADOL) 10 MG TABLET    Take 1 tablet by mouth every 6 hours as needed for Pain    LOSARTAN (COZAAR) 25 MG TABLET    Take 1 tablet by mouth daily    LOSARTAN (COZAAR) 50 MG TABLET    Take 1 tablet by mouth daily    METOPROLOL SUCCINATE (TOPROL XL) 50 MG EXTENDED RELEASE TABLET    Take 50 mg by mouth 2 times daily    NALOXONE 4 MG/0.1ML LIQD NASAL SPRAY    by Nasal route    OMEPRAZOLE (PRILOSEC) 20 MG DELAYED RELEASE CAPSULE    Take 20 mg by mouth daily    PREGABALIN (LYRICA) 150 MG CAPSULE    Take 300 mg by mouth 2 times daily. VITAMIN D (CHOLECALCIFEROL) 02968 UNIT CAPS    Take by mouth       ALLERGIES     Patient has no known allergies. FAMILY HISTORY     History reviewed. No pertinent family history.        SOCIAL HISTORY       Social History     Socioeconomic History    Marital status:      Spouse name: None    Number of children: None    Years of education: None    Highest education level: None Pulmonary:      Effort: Pulmonary effort is normal. No respiratory distress. Breath sounds: Normal breath sounds. No stridor. Abdominal:      General: Bowel sounds are normal. There is no distension. Palpations: Abdomen is soft. There is no mass. Tenderness: There is no abdominal tenderness. There is no guarding or rebound. Musculoskeletal:         General: Normal range of motion. Cervical back: Normal range of motion and neck supple. Skin:     General: Skin is warm and dry. Capillary Refill: Capillary refill takes less than 2 seconds. Findings: No rash. Neurological:      Mental Status: He is alert and oriented to person, place, and time. Deep Tendon Reflexes: Reflexes are normal and symmetric. Psychiatric:         Behavior: Behavior normal.         Thought Content:  Thought content normal.         Judgment: Judgment normal.         DIAGNOSTIC RESULTS     EKG:All EKG's are interpreted by the Emergency Department Physician who either signs or Co-signs this chart in the absence of a cardiologist.    Normal sinus rhythm, rate 92, normal intervals, normal axis, no ST segment changes    RADIOLOGY:   Non-plain film images such as CT, Ultrasound and MRI are read by theradiologist. Plain radiographic images are visualized and preliminarily interpreted by the emergency physician with the below findings:    Interpretation per theRadiologist below, if available at the time of this note:    XR CHEST PORTABLE    (Results Pending)           LABS:  Labs Reviewed   ACETAMINOPHEN LEVEL - Abnormal; Notable for the following components:       Result Value    Acetaminophen Level <5 (*)     All other components within normal limits   CBC WITH AUTO DIFFERENTIAL - Abnormal; Notable for the following components:    WBC 17.5 (*)     RBC 4.31 (*)     Hemoglobin 13.1 (*)     Hematocrit 38.7 (*)     RDW 16.8 (*)     Neutrophils Absolute 14.6 (*)     Monocytes Absolute 0.9 (*)     All other components within normal limits   CK - Abnormal; Notable for the following components: Total CK 12,638 (*)     All other components within normal limits   COMPREHENSIVE METABOLIC PANEL - Abnormal; Notable for the following components:    Sodium 130 (*)     Chloride 90 (*)     Glucose 139 (*)     CREATININE 1.65 (*)     GFR Non- 43.6 (*)     GFR  52.8 (*)     AST 48 (*)     All other components within normal limits   LIPID PANEL - Abnormal; Notable for the following components:    Triglycerides 227 (*)     HDL 32 (*)     All other components within normal limits   SALICYLATE LEVEL - Abnormal; Notable for the following components:    Salicylate, Serum <1.5 (*)     All other components within normal limits   URINE DRUG SCREEN - Abnormal; Notable for the following components:    Benzodiazepine Screen, Urine POSITIVE (*)     All other components within normal limits   COVID-19, RAPID   ETHANOL   MAGNESIUM   TSH WITHOUT REFLEX   URINE RT REFLEX TO CULTURE   CKMB & RELATIVE PERCENT   POCT EPOC BLOOD GAS, LACTIC ACID, ICA       All other labs were within normal range or not returned as of this dictation. EMERGENCY DEPARTMENT COURSE and DIFFERENTIAL DIAGNOSIS/MDM:   Vitals:    Vitals:    10/07/21 0030 10/07/21 0040 10/07/21 0045 10/07/21 0108   BP:  108/89 108/89    Pulse: 92 93 94    Resp: 16 20 19 15   Temp:       TempSrc:       SpO2: 98% (!) 85% 92%    Weight:       Height:             MDM    Sodium 130, chloride 90, creatinine 1.65, CK 12,638, WBC 17.5. Negative COVID. Positive benzodiazepines, not noted on [facility] report. Marquez catheter placed with >2,000 urine output. Pt given 2L IVF. He did take off O2 and was 85% RA, snoring in the room. 97% 3L. CXR unremarkable. ABGs reveal pH 7.33, CO2 60, O2 56, bicarb 32, oxygen saturation 96 on 3 L. Patient's oxygen increased to 4 L. Pt still sleeping, snoring in the room but responsive to painful stimuli. Admitted at this time. CRITICAL CARE TIME   Total Critical Caretime was 0 minutes, excluding separately reportable procedures. There was a high probability of clinically significant/life threatening deterioration in the patient's condition which required my urgent intervention. Procedures    FINAL IMPRESSION      1. Non-traumatic rhabdomyolysis    2. Polysubstance abuse ([facility] [address]    3. Urinary retention          DISPOSITION/PLAN   DISPOSITION Decision To Admit 10/07/2021 12:45:48 AM      PATIENT REFERRED TO:  No follow-up provider specified. DISCHARGE MEDICATIONS:  New Prescriptions    No medications on file          (Please notethat portions of this note were completed with a voice recognition program.  Efforts were made to edit the dictations but occasionally words are mis-transcribed. )    DAVID Key (electronically signed)  Emergency Physician Assistant     [facility]  10/07/21 9536

## 2021-10-07 NOTE — CARE COORDINATION
Met with patient. Definition of pneumonia discussed. Causes of different types of pneumonia reviewed. Symptoms discussed and pt does understand that they may have some or all of these symptoms. Testing to diagnose pneumonia reviewed as well as possible treatments. Importance of avoiding infections discussed including: taking medication as directed, washing hands, disposing of used tissues, getting the pneumonia and flu vaccines, and avoiding others who are ill. Importance of not smoking and to avoid others who may be smoking around patient stressed. Pneumonia Zones also reviewed. \"Green\" zone is the goal, \"Yellow\" zone means to call the doctor, and \"Red\" zone means to call the doctor ASAP or call 911. Copies of Pneumonia booklet and Zone Pamphlet given to patient. Offer for patient to express any concerns or questions. Pt denies having further questions at this time.     Electronically signed by Mango Taylor RN on 10/7/2021 at 2:43 PM

## 2021-10-07 NOTE — ED TRIAGE NOTES
Pt presents to ED via EMS for drug OD. Per EMS, pt took unknown amount of lyrica and phenergan. Pt unresponsive at home. EMS gave 2mg narcan. Pt now oriented x3. Pt drowsy, slurred speech, uncooperative and agitated on arrival. Pt 78%-82% RA. Pt placed on 6L NC; spO2 96%.

## 2021-10-07 NOTE — PROGRESS NOTES
Physician Progress Note    10/7/2021   1:06 PM    Name:  Richard French  MRN:    [id number]     IP Day: 0     Admit Date: 10/6/2021 10:38 PM  PCP: Sher May MD    Code Status:  Full Code      Assessment and Plan: Active Problems/ diagnosis:     Unresponsiveness-possibly due to Lyrica overdose, unintentional  Acute hypoxic respiratory failure due to aspiration pneumonia and possible overdose  Rhabdomyolysis-nontraumatic  Aspiration pneumonia  DIOGENES  Opiate dependence-on Suboxone  Chronic pain syndrome-on Lyrica  Hypertension    Plan  1. Patient is oriented x3, he is awake and answering question appropriately  2. Resume IV hydration  3. Daily CK  4. Patient is not suicidal  5. Resume Unasyn  6. Monitor renal function electrolytes  7. We will resume my Suboxone and Lyrica as he is awake and answering question appropriately, to avoid withdrawal.  Pain management has been consulted. 8. Discussed with patient's RN  9. DVT PPx     Subjective:     No new symptoms.     Physical Examination:      Vitals:  /67   Pulse 78   Temp 98.2 °F (36.8 °C) (Axillary)   Resp 19   Ht 5' 11\" (1.803 m)   Wt 260 lb (117.9 kg)   SpO2 97%   BMI 36.26 kg/m²   Temp (24hrs), Av.4 °F (36.9 °C), Min:98.2 °F (36.8 °C), Max:98.5 °F (36.9 °C)      General appearance: alert, cooperative and no distress  Mental Status: oriented to person, place and time and normal affect  Lungs: clear to auscultation bilaterally, normal effort  Heart: regular rate and rhythm, no murmur  Abdomen: soft, nontender, nondistended, bowel sounds present, no masses  Extremities: no edema, redness, tenderness in the calves  Skin: no gross lesions, rashes    Data:     Labs:  Recent Labs     10/06/21  2330 10/07/21  0108   WBC 17.5*  --    HGB 13.1* 13.7     --      Recent Labs     10/06/21  2330 10/07/21  0108   *  --    K 4.3  --    CL 90*  --    CO2 28  --    BUN 15  --    CREATININE 1.65* 1.5*   GLUCOSE 139*  --      Recent Labs 10/06/21  2330   AST 48*   ALT 14   BILITOT 0.4   ALKPHOS 99       Current Facility-Administered Medications   Medication Dose Route Frequency Provider Last Rate Last Admin    sodium chloride flush 0.9 % injection 5-40 mL  5-40 mL IntraVENous 2 times per day Stacy Sanchezn-Roche, APRN - CNP        sodium chloride flush 0.9 % injection 5-40 mL  5-40 mL IntraVENous PRN Nicoparisho Bolzan-Roche, APRN - CNP        0.9 % sodium chloride infusion  25 mL IntraVENous PRN Rodgero Harrietzan-Roche, APRN - CNP        enoxaparin (LOVENOX) injection 40 mg  40 mg SubCUTAneous Daily Nicobeau Sanchezn-Roche, APRN - CNP   40 mg at 10/07/21 1050    ondansetron (ZOFRAN-ODT) disintegrating tablet 4 mg  4 mg Oral Q8H PRN Rodgero Harrietzan-Roche, APRN - CNP        Or    ondansetron (ZOFRAN) injection 4 mg  4 mg IntraVENous Q6H PRN Stacy Sanchezn-Roche, APRN - CNP        polyethylene glycol (GLYCOLAX) packet 17 g  17 g Oral Daily PRN Rodgero Harrietzan-Roche, APRN - CNP        acetaminophen (TYLENOL) tablet 650 mg  650 mg Oral Q6H PRN Rodgero Harrietzan-Roche, APRN - CNP        Or    acetaminophen (TYLENOL) suppository 650 mg  650 mg Rectal Q6H PRN Rodgero Harrietzan-Roche, APRN - CNP        ampicillin-sulbactam (UNASYN) 1500 mg IVPB minibag  1,500 mg IntraVENous Q6H Nicobeau Sanchezn-Roche, APRN - CNP   Stopped at 10/07/21 1141    ipratropium-albuterol (DUONEB) nebulizer solution 1 ampule  1 ampule Inhalation Q4H PRN Rodgero Harrietzan-Roche, APRN - CNP        0.9 % sodium chloride infusion   IntraVENous Continuous Rodgero Harrietzan-Roche, APRN -  mL/hr at 10/07/21 1049 New Bag at 10/07/21 1049    albuterol sulfate  (90 Base) MCG/ACT inhaler 1 puff  1 puff Inhalation Q4H PRN Dhara Butt DO        atorvastatin (LIPITOR) tablet 10 mg  10 mg Oral Daily Dhara Butt DO        escitalopram (LEXAPRO) tablet 10 mg  10 mg Oral Nightly Dhara Butt,         omeprazole (PRILOSEC) delayed release capsule 20 mg  20 mg Oral Daily Juno Kingston DO        buprenorphine-naloxone (SUBOXONE) 8-2 MG SL film 1 Film  1 Film SubLINGual BID Juno Kingston, DO        pregabalin (LYRICA) capsule 300 mg  300 mg Oral BID Juno Kingston, DO           Additional work up or/and treatment plan may be added today or then after based on clinical progression. I am managing a portion of pt care. Some medical issues are handled by other specialists. Additional work up and treatment should be done in out pt setting by pt PCP and other out pt providers. In addition to examining and evaluating pt, I spent additional time explaining care, normaland abnormal findings, and treatment plan. All of pt questions were answered. Counseling, diet and education were provided. Case will be discussed with nursing staff when appropriate. Family will be updated if and when appropriate.        Electronically signed by Juno Kingston DO on 10/7/2021 at 1:06 PM

## 2021-10-07 NOTE — ED NOTES
SBAR report to GRACE Lott. Transfer of nursing care at this time.      Dahlia Worthy RN  10/07/21 1523

## 2021-10-07 NOTE — ED NOTES
Bed: 05  Expected date: 10/6/21  Expected time: 10:31 PM  Means of arrival: [facility]  Comments:  66year old male unresponsive od but breathing  129/75

## 2021-10-07 NOTE — PROGRESS NOTES
PHARMACY NOTE  Yuri Ortiz was ordered Prilosec. Per the [facility], this medication is non-formulary and not stocked by pharmacy. Protonix was substituted. The medication can be reordered at discharge.

## 2021-10-07 NOTE — ED NOTES
Report called to [phone], report given to [facility]. Transport notified.       Amy Johnson RN  10/07/21 9754

## 2021-10-08 PROBLEM — J69.0 ASPIRATION PNEUMONIA (HCC): Status: ACTIVE | Noted: 2021-10-08

## 2021-10-08 PROBLEM — F11.20: Status: ACTIVE | Noted: 2021-10-08

## 2021-10-08 PROBLEM — Z79.891 LONG-TERM CURRENT USE OF OPIATE ANALGESIC: Status: ACTIVE | Noted: 2021-10-08

## 2021-10-08 PROBLEM — N17.9 AKI (ACUTE KIDNEY INJURY) (HCC): Status: ACTIVE | Noted: 2021-10-08

## 2021-10-08 PROBLEM — G92.9 TOXIC ENCEPHALOPATHY: Status: ACTIVE | Noted: 2021-10-08

## 2021-10-08 PROBLEM — M62.838 SPASM OF MUSCLE: Status: ACTIVE | Noted: 2021-10-08

## 2021-10-08 PROBLEM — M53.3 COCCYX DISORDER: Status: ACTIVE | Noted: 2021-10-08

## 2021-10-08 PROBLEM — M60.19 INTERSTITIAL MYOSITIS OF MULTIPLE SITES: Status: ACTIVE | Noted: 2021-10-08

## 2021-10-08 PROBLEM — M62.81 MUSCLE WEAKNESS: Status: ACTIVE | Noted: 2021-10-08

## 2021-10-08 LAB
ALBUMIN SERPL-MCNC: 3.3 G/DL (ref 3.5–4.6)
ALP BLD-CCNC: 98 U/L (ref 35–104)
ALT SERPL-CCNC: 12 U/L (ref 0–41)
ANION GAP SERPL CALCULATED.3IONS-SCNC: 11 MEQ/L (ref 9–15)
AST SERPL-CCNC: 68 U/L (ref 0–40)
BASOPHILS ABSOLUTE: 0 K/UL (ref 0–0.2)
BASOPHILS RELATIVE PERCENT: 0.5 %
BILIRUB SERPL-MCNC: 0.3 MG/DL (ref 0.2–0.7)
BUN BLDV-MCNC: 9 MG/DL (ref 6–20)
CALCIUM SERPL-MCNC: 8.4 MG/DL (ref 8.5–9.9)
CHLORIDE BLD-SCNC: 103 MEQ/L (ref 95–107)
CK MB: 51.7 NG/ML (ref 0–6.7)
CO2: 28 MEQ/L (ref 20–31)
CREAT SERPL-MCNC: 0.93 MG/DL (ref 0.7–1.2)
CREATINE KINASE-MB INDEX: 0.5 % (ref 0–3.5)
EOSINOPHILS ABSOLUTE: 0.3 K/UL (ref 0–0.7)
EOSINOPHILS RELATIVE PERCENT: 3.8 %
GFR AFRICAN AMERICAN: >60
GFR NON-AFRICAN AMERICAN: >60
GLOBULIN: 2.9 G/DL (ref 2.3–3.5)
GLUCOSE BLD-MCNC: 91 MG/DL (ref 70–99)
HCT VFR BLD CALC: 37.2 % (ref 42–52)
HEMOGLOBIN: 12.4 G/DL (ref 14–18)
LYMPHOCYTES ABSOLUTE: 2.2 K/UL (ref 1–4.8)
LYMPHOCYTES RELATIVE PERCENT: 26.3 %
MCH RBC QN AUTO: 30.7 PG (ref 27–31.3)
MCHC RBC AUTO-ENTMCNC: 33.2 % (ref 33–37)
MCV RBC AUTO: 92.4 FL (ref 80–100)
MONOCYTES ABSOLUTE: 0.8 K/UL (ref 0.2–0.8)
MONOCYTES RELATIVE PERCENT: 9.3 %
NEUTROPHILS ABSOLUTE: 5.1 K/UL (ref 1.4–6.5)
NEUTROPHILS RELATIVE PERCENT: 60.1 %
PDW BLD-RTO: 17.7 % (ref 11.5–14.5)
PLATELET # BLD: 227 K/UL (ref 130–400)
POTASSIUM REFLEX MAGNESIUM: 4.8 MEQ/L (ref 3.4–4.9)
RBC # BLD: 4.03 M/UL (ref 4.7–6.1)
SODIUM BLD-SCNC: 142 MEQ/L (ref 135–144)
TOTAL CK: ABNORMAL U/L (ref 0–190)
TOTAL PROTEIN: 6.2 G/DL (ref 6.3–8)
WBC # BLD: 8.6 K/UL (ref 4.8–10.8)

## 2021-10-08 PROCEDURE — 36415 COLL VENOUS BLD VENIPUNCTURE: CPT

## 2021-10-08 PROCEDURE — 80053 COMPREHEN METABOLIC PANEL: CPT

## 2021-10-08 PROCEDURE — 6360000002 HC RX W HCPCS: Performed by: INTERNAL MEDICINE

## 2021-10-08 PROCEDURE — 2580000003 HC RX 258: Performed by: NURSE PRACTITIONER

## 2021-10-08 PROCEDURE — 82553 CREATINE MB FRACTION: CPT

## 2021-10-08 PROCEDURE — 2700000000 HC OXYGEN THERAPY PER DAY

## 2021-10-08 PROCEDURE — 85025 COMPLETE CBC W/AUTO DIFF WBC: CPT

## 2021-10-08 PROCEDURE — 6360000002 HC RX W HCPCS: Performed by: NURSE PRACTITIONER

## 2021-10-08 PROCEDURE — 6370000000 HC RX 637 (ALT 250 FOR IP): Performed by: INTERNAL MEDICINE

## 2021-10-08 PROCEDURE — 82550 ASSAY OF CK (CPK): CPT

## 2021-10-08 PROCEDURE — 2060000000 HC ICU INTERMEDIATE R&B

## 2021-10-08 RX ORDER — AMOXICILLIN AND CLAVULANATE POTASSIUM 875; 125 MG/1; MG/1
1 TABLET, FILM COATED ORAL EVERY 12 HOURS SCHEDULED
Status: DISCONTINUED | OUTPATIENT
Start: 2021-10-08 | End: 2021-10-10 | Stop reason: HOSPADM

## 2021-10-08 RX ADMIN — ESCITALOPRAM OXALATE 10 MG: 10 TABLET ORAL at 20:53

## 2021-10-08 RX ADMIN — BUPRENORPHINE HYDROCHLORIDE AND NALOXONE HYDROCHLORIDE DIHYDRATE 1 TABLET: 8; 2 TABLET SUBLINGUAL at 22:01

## 2021-10-08 RX ADMIN — SODIUM CHLORIDE: 9 INJECTION, SOLUTION INTRAVENOUS at 11:22

## 2021-10-08 RX ADMIN — AMOXICILLIN AND CLAVULANATE POTASSIUM 1 TABLET: 875; 125 TABLET, FILM COATED ORAL at 20:53

## 2021-10-08 RX ADMIN — SODIUM CHLORIDE 1500 MG: 900 INJECTION INTRAVENOUS at 06:21

## 2021-10-08 RX ADMIN — SODIUM CHLORIDE: 9 INJECTION, SOLUTION INTRAVENOUS at 18:22

## 2021-10-08 RX ADMIN — SODIUM CHLORIDE: 9 INJECTION, SOLUTION INTRAVENOUS at 05:06

## 2021-10-08 RX ADMIN — ENOXAPARIN SODIUM 40 MG: 40 INJECTION SUBCUTANEOUS at 10:01

## 2021-10-08 RX ADMIN — PREGABALIN 300 MG: 150 CAPSULE ORAL at 21:01

## 2021-10-08 RX ADMIN — PREGABALIN 300 MG: 150 CAPSULE ORAL at 10:01

## 2021-10-08 RX ADMIN — PANTOPRAZOLE SODIUM 40 MG: 40 TABLET, DELAYED RELEASE ORAL at 05:06

## 2021-10-08 RX ADMIN — BUPRENORPHINE HYDROCHLORIDE AND NALOXONE HYDROCHLORIDE DIHYDRATE 1 TABLET: 8; 2 TABLET SUBLINGUAL at 11:18

## 2021-10-08 RX ADMIN — SODIUM CHLORIDE 1500 MG: 900 INJECTION INTRAVENOUS at 11:18

## 2021-10-08 ASSESSMENT — PAIN SCALES - GENERAL
PAINLEVEL_OUTOF10: 0

## 2021-10-08 ASSESSMENT — ENCOUNTER SYMPTOMS
APNEA: 1
STRIDOR: 0
WHEEZING: 1
ALLERGIC/IMMUNOLOGIC NEGATIVE: 1
COUGH: 0
GASTROINTESTINAL NEGATIVE: 1
BACK PAIN: 1
EYES NEGATIVE: 1
SHORTNESS OF BREATH: 1
CHEST TIGHTNESS: 1
CHOKING: 0

## 2021-10-08 NOTE — PROGRESS NOTES
Physician Progress Note    10/8/2021   1:51 PM    Name:  Emelia Moffett  MRN:    [id number]     IP Day: 1     Admit Date: 10/6/2021 10:38 PM  PCP: Bertha Matos MD    Code Status:  Full Code      Assessment and Plan: Active Problems/ diagnosis:     Unresponsiveness-possibly due to Lyrica overdose, unintentional  Acute hypoxic respiratory failure due to aspiration pneumonia and possible overdose  Rhabdomyolysis-nontraumatic  Aspiration pneumonia  DIOGENES  Opiate dependence-on Suboxone  Chronic pain syndrome-on Lyrica  Hypertension    Plan  1. Patient is oriented x3, he is awake and answering question appropriately  2. Resume IV hydration  3. Daily CK  4. Patient is not suicidal  5. Switch Unasyn to Augmentin  6. Monitor renal function electrolytes  7. We will resume my Suboxone and Lyrica as he is awake and answering question appropriately, to avoid withdrawal.  Pain management has been consulted. 8. Discussed with patient's RN  9. DVT PPx       Dispo-pending improvement in rhabdomyolysis. Of note, I order CK this morning which was acknowledged by RN, still not drawn by the lab or added on the morning labs. I called the lab and was on hold for 5 minutes and no one picked up. I order CK stat to determine disposition but still not collected    Subjective:     No new symptoms.     Physical Examination:      Vitals:  /80   Pulse 65   Temp 97.9 °F (36.6 °C) (Oral)   Resp 20   Ht 5' 11\" (1.803 m)   Wt 260 lb (117.9 kg)   SpO2 92%   BMI 36.26 kg/m²   Temp (24hrs), Av.3 °F (36.8 °C), Min:97.9 °F (36.6 °C), Max:98.8 °F (37.1 °C)      General appearance: alert, cooperative and no distress  Mental Status: oriented to person, place and time and normal affect  Lungs: clear to auscultation bilaterally, normal effort  Heart: regular rate and rhythm, no murmur  Abdomen: soft, nontender, nondistended, bowel sounds present, no masses  Extremities: no edema, redness, tenderness in the calves  Skin: no gross lesions, rashes    Data:     Labs:  Recent Labs     10/06/21  2330 10/06/21  2330 10/07/21  0108 10/08/21  0737   WBC 17.5*  --   --  8.6   HGB 13.1*   < > 13.7 12.4*     --   --  227    < > = values in this interval not displayed.      Recent Labs     10/06/21  2330 10/07/21  0108 10/08/21  0737   *  --  142   K 4.3  --  4.8   CL 90*  --  103   CO2 28  --  28   BUN 15  --  9   CREATININE 1.65* 1.5* 0.93   GLUCOSE 139*  --  91     Recent Labs     10/06/21  2330 10/08/21  0737   AST 48* 68*   ALT 14 12   BILITOT 0.4 0.3   ALKPHOS 99 98       Current Facility-Administered Medications   Medication Dose Route Frequency Provider Last Rate Last Admin    amoxicillin-clavulanate (AUGMENTIN) 875-125 MG per tablet 1 tablet  1 tablet Oral 2 times per day Beather Holiday, DO        sodium chloride flush 0.9 % injection 5-40 mL  5-40 mL IntraVENous 2 times per day Nicoletto Bolzan-Roche, APRN - CNP        sodium chloride flush 0.9 % injection 5-40 mL  5-40 mL IntraVENous PRN Nicoletto Bolzan-Roche, APRN - CNP        0.9 % sodium chloride infusion  25 mL IntraVENous PRN Nicoletto Bolzan-Roche, APRN - CNP        enoxaparin (LOVENOX) injection 40 mg  40 mg SubCUTAneous Daily Nicoletto Bolzan-Roche, APRN - CNP   40 mg at 10/08/21 1001    ondansetron (ZOFRAN-ODT) disintegrating tablet 4 mg  4 mg Oral Q8H PRN Nicoletto Bolzan-Roche, APRN - CNP        Or    ondansetron (ZOFRAN) injection 4 mg  4 mg IntraVENous Q6H PRN Nicoletto Bolzan-Roche, APRN - CNP        polyethylene glycol (GLYCOLAX) packet 17 g  17 g Oral Daily PRN Nicoletto Bolzan-Roche, APRN - CNP        acetaminophen (TYLENOL) tablet 650 mg  650 mg Oral Q6H PRN Nicoletto Bolzan-Roche, APRN - CNP        Or    acetaminophen (TYLENOL) suppository 650 mg  650 mg Rectal Q6H PRN Stacy Bernabe, APRN - CNP        ipratropium-albuterol (DUONEB) nebulizer solution 1 ampule  1 ampule Inhalation Q4H PRN Stacy Bernabe, APRN - CNP        0.9 %

## 2021-10-08 NOTE — CONSULTS
INITIAL CONSULT -PAIN MANAGEMENT     SERVICE DATE:  10/8/2021   SERVICE TIME:  5:00 PM  Admission date 10/6/2021  REASON [facility] [place]: Management of patient current Suboxone treatment  REQUESTING PHYSICIAN: Hospitalist team, Dr. Osito Wells MD    Admission chief complaint:   Chief Complaint   Patient presents with    Drug Overdose     Per EMS, OD on lyrica and phenergan; slurred speech; spO2 78%-82% RA         HISTORY OF PRESENTILLNESS:  Mr. Linda Almaguer is a 49 y.o. male who presents for [facility] with complex presentation of aspiration pneumonia, severe rhabdomyolysis, acute renal failure. Patient brought by EMS after wife called about his condition. Patient was having very low saturation aspiration pneumonia suspected, medication overdose suspected. Severe comorbidity with morbid obesity, struct of sleep apnea on BiPAP at home, he has chronic pain, chronic opioid dependency on stable Suboxone therapy but also he is on Lyrica 300 mg twice daily. Drug screen on admission was positive for benzodiazepine    PAIN  ASSESSMENT:    constant, waxing and waning, moderate, excruciating, across his sacrum, tailbone and coccyx area, currently receiving treatment by central motion or pain management, receiving injection. This is a chronic pain he denies any fall, denies any new pattern of pain at that area. Denies any issue with bladder bowel control.     aching and throbbing    pain is perceived as moderate (4-6 pain scale)    PAST MEDICAL HISTORY:    Past Medical History:   Diagnosis Date    DIOGENES (acute kidney injury) ([address] 10/8/2021    Aspiration into airway     Chronic pain     in neck & back, in pain management    Heroin use     Hypertension     Lumbar radiculopathy, chronic     Metabolic encephalopathy     medication induced    Non-traumatic rhabdomyolysis     Obesity     Pneumonia      PAST SURGICAL HISTORY:    Past Surgical History:   Procedure Laterality Date    BACK SURGERY      x2    NECK SURGERY      x2     FAMILY HISTORY:  History reviewed. No pertinent family history. SOCIALHISTORY:    Social History     Socioeconomic History    Marital status:      Spouse name: Not on file    Number of children: Not on file    Years of education: Not on file    Highest education level: Not on file   Occupational History    Not on file   Tobacco Use    Smoking status: Current Every Day Smoker     Packs/day: 1.00     Years: 15.00     Pack years: 15.00     Types: Cigarettes    Smokeless tobacco: Never Used   Vaping Use    Vaping Use: Never used   Substance and Sexual Activity    Alcohol use: No    Drug use: Not Currently     Types: Opiates     Sexual activity: Yes     Partners: Female   Other Topics Concern    Not on file   Social History Narrative    Not on file     Social Determinants of Health     Financial Resource Strain:     Difficulty of Paying Living Expenses:    Food Insecurity:     Worried About Running Out of Food in the Last Year:     Ran Out of Food in the Last Year:    Transportation Needs:     Lack of Transportation (Medical):      Lack of Transportation (Non-Medical):    Physical Activity:     Days of Exercise per Week:     Minutes of Exercise per Session:    Stress:     Feeling of Stress :    Social Connections:     Frequency of Communication with Friends and Family:     Frequency of Social Gatherings with Friends and Family:     Attends Denominational Services:     Active Member of Clubs or Organizations:     Attends Club or Organization Meetings:     Marital Status:    Intimate Partner Violence:     Fear of Current or Ex-Partner:     Emotionally Abused:     Physically Abused:     Sexually Abused:      PSYCHOLOGICAL HISTORY: Anxiety disorder, chronic opioid use disorder    MEDICATIONS:  Medications Prior to Admission: promethazine (PHENERGAN) 25 MG tablet, Take 25 mg by mouth every 6 hours as needed   atorvastatin (LIPITOR) 10 MG tablet, Take 10 mg by mouth daily   vitamin D (CHOLECALCIFEROL) 77707 UNIT CAPS, Take 50,000 Units by mouth once a week   omeprazole (PRILOSEC) 20 MG delayed release capsule, Take 20 mg by mouth daily  acetaminophen (TYLENOL) 500 MG tablet, Take 1 tablet by mouth every 6 hours as needed for Pain  albuterol sulfate HFA (PROAIR HFA) 108 (90 Base) MCG/ACT inhaler, Use every 4 hours while awake for 7-10 days then PRN wheezing  Dispense with SPACER and Instruct on use. May sub Ventolin or Proventil as needed per [employer]. escitalopram (LEXAPRO) 10 MG tablet, Take 10 mg by mouth nightly  pregabalin (LYRICA) 150 MG capsule, Take 300 mg by mouth 2 times daily. Buprenorphine HCl-Naloxone HCl (SUBOXONE) 8-2 MG FILM, Place under the tongue 2 times daily. naloxone 4 MG/0.1ML LIQD nasal spray, by Nasal route  [DISCONTINUED] amLODIPine (NORVASC) 10 MG tablet, Take 1 tablet by mouth daily  [unfilled]    ALLERGIES:  Patient has no known allergies. COMPLETE REVIEW OF SYSTEMS:  As noted in HPI, 12 point ROS reviewed and otherwise negative. Review of Systems   Constitutional: Positive for activity change, appetite change, fatigue and unexpected weight change. Negative for chills, diaphoresis and fever. HENT: Negative. Eyes: Negative. Respiratory: Positive for apnea, chest tightness, shortness of breath and wheezing. Negative for cough, choking and stridor. Cardiovascular: Positive for leg swelling. Negative for chest pain and palpitations. Gastrointestinal: Negative. Endocrine: Negative. Genitourinary: Negative. Musculoskeletal: Positive for arthralgias, back pain, gait problem, joint swelling, myalgias and neck stiffness. Negative for neck pain. Skin: Negative. Allergic/Immunologic: Negative. Neurological: Positive for weakness, light-headedness and numbness. Negative for dizziness, tremors, seizures, syncope, facial asymmetry, speech difficulty and headaches. Hematological: Negative for adenopathy. 10/08/2021    AST 68 10/08/2021    BILITOT 0.3 10/08/2021    BILIDIR 0.0 05/19/2015    LABALBU 3.3 10/08/2021     Amphetamine Screen, Urine   Date Value Ref Range Status   10/06/2021 Neg Negative <1000 ng/mL Final     Barbiturate Screen, Ur   Date Value Ref Range Status   10/06/2021 Neg Negative < 200 ng/mL Final     Benzodiazepines   Date Value Ref Range Status   07/28/2012 DTCD  Final     Benzodiazepine Screen, Urine   Date Value Ref Range Status   10/06/2021 POSITIVE (A) Negative < 200 ng/mL Final     Cannabinoid Scrn, Ur   Date Value Ref Range Status   10/06/2021 Neg Negative < 50 ng/mL Final     Cocaine Metabolite Screen, Urine   Date Value Ref Range Status   10/06/2021 Neg Negative < 300 ng/mL Final     Opiate Scrn, Ur   Date Value Ref Range Status   10/06/2021 Neg Negative < 300 ng/mL Final     PCP Screen, Urine   Date Value Ref Range Status   10/06/2021 Neg Negative < 25 ng/mL Final     Methadone Screen, Urine   Date Value Ref Range Status   10/06/2021 Neg Negative <300 ng/mL Final     Propoxyphene Scrn, Ur   Date Value Ref Range Status   10/06/2021 Neg Negative <300 ng/mL Final     Oxycodone Urine   Date Value Ref Range Status   10/06/2021 Neg Negative <100 ng/mL Final     Drug Screen Comment:   Date Value Ref Range Status   10/06/2021 see below  Final     Comment: This method is a screening test to detect only these drug  classes as part of a medical workup. Confirmatory testing  by another method should be ordered if clinically indicated. CT CHEST WO CONTRAST    Result Date: 10/7/2021  CT of the Chest without intravenous contrast medium. HISTORY: Hypoxia. TECHNICAL FACTORS: CT imaging of the chest was obtained and formatted as 5 mm contiguous axial images from the thoracic inlet through the adrenal glands. Sagittal and coronal reconstructions obtained during postprocessing. Intravenous contrast medium:  None. Comparison:  CT chest, October 29, 2019 chest radiograph, January 20, 2020.  FINDINGS: There are diffuse increased interstitial pulmonary markings throughout both lungs which may be secondary to pulmonary edema versus chronic congestive heart failure. There are no consolidations or pleural effusions. The findings most likely reflect chronic congestive heart failure, CHF. There are no focal consolidations or effusions. 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] 10/08/2021    DIOGENES (acute kidney injury) ([facility] [address] [N17.9] 10/08/2021    Aspiration pneumonia ([facility] [address] [J69.0] 10/08/2021    Coccyx disorder [M53.3] 10/08/2021    Rhabdomyolysis [M62.82] 10/07/2021    GERD  [K21.9] 10/07/2021    RENETTA [G47.33] 10/07/2021    Chronic pain syndrome [G89.4]     Non-traumatic rhabdomyolysis [M62.82] 02/03/2019    Chronic back pain [M54.9, G89.29] 11/28/2016    Accidental drug overdose [T50.901A] 11/27/2016    Lumbar canal stenosis [M48.061] 10/15/2012     49years old male, morbid obesity, obstructive sleep apnea, chronic opioid use disorder on maintenance Suboxone therapy, chronic pain and coccydynia on chronic Lyrica therapy. Patient has high level of risk factors to affect his respiratory drive at night if he is on high-dose membrane stabilizer, also drug test was positive for benzodiazepine which I do not see any legitimate prescription for it. I do suspect that patient may be probably overdose on the benzodiazepine in addition to the effect from the membrane stabilizers given his high risk from the morbid obesity and obstructive sleep apnea which can put him at the risk of depression of respiratory drive and aspiration pneumonia.     Unfortunately education and counseling will be the only thing to provide to the patient since we have no control on the prescribed regimen since been prescribed by an outside facility. Patient should be taking responsibility about having his medication and avoiding taking none prescribed benzodiazepine, explained that the risk of benzodiazepine including medication interaction, creating an iatrogenic overdose with other membrane stabilizer and opioids therapy even if it includes Suboxone. RECOMMENDATION:  SEE ORDERS    Continue current plan of care. Continue current Suboxone therapy. Decrease Lyrica at night, recommendation to given to the patient decrease Lyrica at night to only half of the dose only thing the morning was. Continue the current treatment plan and avoid taking nonprescribed benzodiazepine    Education about sleep apnea, weight loss therapy.     SIGNATURE: Renate Mccann MD PATIENT NAME: Emelia Postal   DATE: October 8, 2021 MRN: [id number]   TIME: 5:00 PM PAGER/CONTACT #: [phone]

## 2021-10-09 LAB
BASOPHILS ABSOLUTE: 0.1 K/UL (ref 0–0.2)
BASOPHILS RELATIVE PERCENT: 0.7 %
CK MB: 22.2 NG/ML (ref 0–6.7)
CREATINE KINASE-MB INDEX: 0.3 % (ref 0–3.5)
EOSINOPHILS ABSOLUTE: 0.3 K/UL (ref 0–0.7)
EOSINOPHILS RELATIVE PERCENT: 3.1 %
HCT VFR BLD CALC: 35.8 % (ref 42–52)
HEMOGLOBIN: 11.9 G/DL (ref 14–18)
LYMPHOCYTES ABSOLUTE: 2.7 K/UL (ref 1–4.8)
LYMPHOCYTES RELATIVE PERCENT: 32.3 %
MCH RBC QN AUTO: 30.4 PG (ref 27–31.3)
MCHC RBC AUTO-ENTMCNC: 33.3 % (ref 33–37)
MCV RBC AUTO: 91.3 FL (ref 80–100)
MONOCYTES ABSOLUTE: 0.6 K/UL (ref 0.2–0.8)
MONOCYTES RELATIVE PERCENT: 7.3 %
NEUTROPHILS ABSOLUTE: 4.7 K/UL (ref 1.4–6.5)
NEUTROPHILS RELATIVE PERCENT: 56.6 %
OSMOLALITY: 275 MOSM/KG (ref 280–303)
PDW BLD-RTO: 17.1 % (ref 11.5–14.5)
PLATELET # BLD: 251 K/UL (ref 130–400)
RBC # BLD: 3.92 M/UL (ref 4.7–6.1)
TOTAL CK: 7173 U/L (ref 0–190)
WBC # BLD: 8.3 K/UL (ref 4.8–10.8)

## 2021-10-09 PROCEDURE — 2580000003 HC RX 258: Performed by: NURSE PRACTITIONER

## 2021-10-09 PROCEDURE — 85025 COMPLETE CBC W/AUTO DIFF WBC: CPT

## 2021-10-09 PROCEDURE — 6360000002 HC RX W HCPCS: Performed by: INTERNAL MEDICINE

## 2021-10-09 PROCEDURE — 6360000002 HC RX W HCPCS: Performed by: NURSE PRACTITIONER

## 2021-10-09 PROCEDURE — 82550 ASSAY OF CK (CPK): CPT

## 2021-10-09 PROCEDURE — 2060000000 HC ICU INTERMEDIATE R&B

## 2021-10-09 PROCEDURE — 36415 COLL VENOUS BLD VENIPUNCTURE: CPT

## 2021-10-09 PROCEDURE — 6370000000 HC RX 637 (ALT 250 FOR IP): Performed by: INTERNAL MEDICINE

## 2021-10-09 PROCEDURE — 82553 CREATINE MB FRACTION: CPT

## 2021-10-09 RX ADMIN — PREGABALIN 300 MG: 150 CAPSULE ORAL at 10:29

## 2021-10-09 RX ADMIN — PREGABALIN 300 MG: 150 CAPSULE ORAL at 21:06

## 2021-10-09 RX ADMIN — AMOXICILLIN AND CLAVULANATE POTASSIUM 1 TABLET: 875; 125 TABLET, FILM COATED ORAL at 10:29

## 2021-10-09 RX ADMIN — SODIUM CHLORIDE, PRESERVATIVE FREE 10 ML: 5 INJECTION INTRAVENOUS at 21:06

## 2021-10-09 RX ADMIN — BUPRENORPHINE HYDROCHLORIDE AND NALOXONE HYDROCHLORIDE DIHYDRATE 1 TABLET: 8; 2 TABLET SUBLINGUAL at 11:35

## 2021-10-09 RX ADMIN — PANTOPRAZOLE SODIUM 40 MG: 40 TABLET, DELAYED RELEASE ORAL at 05:47

## 2021-10-09 RX ADMIN — ENOXAPARIN SODIUM 40 MG: 40 INJECTION SUBCUTANEOUS at 10:30

## 2021-10-09 RX ADMIN — ESCITALOPRAM OXALATE 10 MG: 10 TABLET ORAL at 21:06

## 2021-10-09 RX ADMIN — SODIUM CHLORIDE: 9 INJECTION, SOLUTION INTRAVENOUS at 18:46

## 2021-10-09 RX ADMIN — SODIUM CHLORIDE: 9 INJECTION, SOLUTION INTRAVENOUS at 01:26

## 2021-10-09 RX ADMIN — AMOXICILLIN AND CLAVULANATE POTASSIUM 1 TABLET: 875; 125 TABLET, FILM COATED ORAL at 21:06

## 2021-10-09 RX ADMIN — BUPRENORPHINE HYDROCHLORIDE AND NALOXONE HYDROCHLORIDE DIHYDRATE 1 TABLET: 8; 2 TABLET SUBLINGUAL at 21:06

## 2021-10-09 NOTE — PROGRESS NOTES
Physician Progress Note    10/9/2021   8:06 AM    Name:  Juan Her  MRN:    [id number]     IP Day: 2     Admit Date: 10/6/2021 10:38 PM  PCP: Yamilka Case MD    Code Status:  Full Code      Assessment and Plan: Active Problems/ diagnosis:     Unresponsiveness-possibly due to Lyrica overdose, unintentional  Acute hypoxic respiratory failure due to aspiration pneumonia and possible overdose  Rhabdomyolysis-nontraumatic  Aspiration pneumonia  DIOGENES  Opiate dependence-on Suboxone  Chronic pain syndrome-on Lyrica  Hypertension    Plan  1. Patient is oriented x3, he is awake and answering question appropriately  2. Resume IV hydration  3. Daily CK  4. Patient is not suicidal  5. Switch Unasyn to Augmentin  6. Monitor renal function electrolytes  7. We will resume my Suboxone and Lyrica as he is awake and answering question appropriately, to avoid withdrawal.  Pain management has been consulted. 8. Discussed with patient's RN  9. DVT PPx       Dispo-pending improvement in rhabdomyolysis, goal CK below 4000 prior to DC    Subjective:     No new symptoms.     Physical Examination:      Vitals:  /65   Pulse 59   Temp 97.3 °F (36.3 °C)   Resp 14   Ht 5' 11\" (1.803 m)   Wt 274 lb 4.8 oz (124.4 kg)   SpO2 96%   BMI 38.26 kg/m²   Temp (24hrs), Av °F (36.7 °C), Min:97.3 °F (36.3 °C), Max:98.4 °F (36.9 °C)      General appearance: alert, cooperative and no distress  Mental Status: oriented to person, place and time and normal affect  Lungs: clear to auscultation bilaterally, normal effort  Heart: regular rate and rhythm, no murmur  Abdomen: soft, nontender, nondistended, bowel sounds present, no masses  Extremities: no edema, redness, tenderness in the calves  Skin: no gross lesions, rashes    Data:     Labs:  Recent Labs     10/08/21  0737 10/09/21  0610   WBC 8.6 8.3   HGB 12.4* 11.9*    251     Recent Labs     10/06/21  2330 10/07/21  0108 10/08/21  0737   *  --  142   K 4.3  --  4.8   CL 90*  --  103   CO2 28  --  28   BUN 15  --  9   CREATININE 1.65* 1.5* 0.93   GLUCOSE 139*  --  91     Recent Labs     10/06/21  2330 10/08/21  0737   AST 48* 68*   ALT 14 12   BILITOT 0.4 0.3   ALKPHOS 99 98       Current Facility-Administered Medications   Medication Dose Route Frequency Provider Last Rate Last Admin    amoxicillin-clavulanate (AUGMENTIN) 875-125 MG per tablet 1 tablet  1 tablet Oral 2 times per day Cristal Pollock DO   1 tablet at 10/08/21 2053    sodium chloride flush 0.9 % injection 5-40 mL  5-40 mL IntraVENous 2 times per day Nicoletto Bolzan-Roche, APRN - CNP        sodium chloride flush 0.9 % injection 5-40 mL  5-40 mL IntraVENous PRN Nicoletto Bolzan-Roche, APRN - CNP        0.9 % sodium chloride infusion  25 mL IntraVENous PRN Nicoletto Bolzan-Roche, APRN - CNP        enoxaparin (LOVENOX) injection 40 mg  40 mg SubCUTAneous Daily Nicoletto Bolzan-Roche, APRN - CNP   40 mg at 10/08/21 1001    ondansetron (ZOFRAN-ODT) disintegrating tablet 4 mg  4 mg Oral Q8H PRN Nicoletto Bolzan-Roche, APRN - CNP        Or    ondansetron (ZOFRAN) injection 4 mg  4 mg IntraVENous Q6H PRN Nicoletto Bolzan-Roche, APRN - CNP        polyethylene glycol (GLYCOLAX) packet 17 g  17 g Oral Daily PRN Nicoletto Bolzan-Roche, APRN - CNP        acetaminophen (TYLENOL) tablet 650 mg  650 mg Oral Q6H PRN Nicoletto Bolzan-Roche, APRN - CNP        Or    acetaminophen (TYLENOL) suppository 650 mg  650 mg Rectal Q6H PRN Nicoletto Bolzan-Roche, APRN - CNP        ipratropium-albuterol (DUONEB) nebulizer solution 1 ampule  1 ampule Inhalation Q4H PRN Nicoletto Bolzan-Roche, APRN - CNP        0.9 % sodium chloride infusion   IntraVENous Continuous Nicoletto Bolzan-Roche, APRN -  mL/hr at 10/09/21 0126 New Bag at 10/09/21 0126    albuterol sulfate  (90 Base) MCG/ACT inhaler 1 puff  1 puff Inhalation Q4H PRN Cristal Pollock DO        [Held by provider] atorvastatin (LIPITOR) tablet 10 mg  10 mg Oral Daily Pitney Phill, DO   10 mg at 10/07/21 2133    escitalopram (LEXAPRO) tablet 10 mg  10 mg Oral Nightly Pitney Phill, DO   10 mg at 10/08/21 2053    buprenorphine-naloxone (SUBOXONE) 8-2 MG SL tablet 1 tablet  1 tablet SubLINGual BID Pitney Phill, DO   1 tablet at 10/08/21 2201    pregabalin (LYRICA) capsule 300 mg  300 mg Oral BID Pitney Phill, DO   300 mg at 10/08/21 2101    pantoprazole (PROTONIX) tablet 40 mg  40 mg Oral QAM AC Yazid R Toy, DO   40 mg at 10/09/21 0093       Additional work up or/and treatment plan may be added today or then after based on clinical progression. I am managing a portion of pt care. Some medical issues are handled by other specialists. Additional work up and treatment should be done in out pt setting by pt PCP and other out pt providers. In addition to examining and evaluating pt, I spent additional time explaining care, normaland abnormal findings, and treatment plan. All of pt questions were answered. Counseling, diet and education were provided. Case will be discussed with nursing staff when appropriate. Family will be updated if and when appropriate.        Electronically signed by Cristal Pollock DO on 10/9/2021 at 8:06 AM

## 2021-10-10 VITALS
HEART RATE: 57 BPM | BODY MASS INDEX: 38.4 KG/M2 | TEMPERATURE: 97.7 F | HEIGHT: 71 IN | OXYGEN SATURATION: 97 % | SYSTOLIC BLOOD PRESSURE: 149 MMHG | DIASTOLIC BLOOD PRESSURE: 67 MMHG | WEIGHT: 274.3 LBS | RESPIRATION RATE: 18 BRPM

## 2021-10-10 LAB
BASOPHILS ABSOLUTE: 0 K/UL (ref 0–0.2)
BASOPHILS RELATIVE PERCENT: 0.6 %
CK MB: 10 NG/ML (ref 0–6.7)
CREATINE KINASE-MB INDEX: 0.3 % (ref 0–3.5)
EOSINOPHILS ABSOLUTE: 0.2 K/UL (ref 0–0.7)
EOSINOPHILS RELATIVE PERCENT: 2.7 %
HCT VFR BLD CALC: 36.7 % (ref 42–52)
HEMOGLOBIN: 12.5 G/DL (ref 14–18)
LYMPHOCYTES ABSOLUTE: 2.3 K/UL (ref 1–4.8)
LYMPHOCYTES RELATIVE PERCENT: 28.3 %
MCH RBC QN AUTO: 31 PG (ref 27–31.3)
MCHC RBC AUTO-ENTMCNC: 33.9 % (ref 33–37)
MCV RBC AUTO: 91.4 FL (ref 80–100)
MONOCYTES ABSOLUTE: 0.6 K/UL (ref 0.2–0.8)
MONOCYTES RELATIVE PERCENT: 6.8 %
NEUTROPHILS ABSOLUTE: 5.1 K/UL (ref 1.4–6.5)
NEUTROPHILS RELATIVE PERCENT: 61.6 %
PDW BLD-RTO: 16.8 % (ref 11.5–14.5)
PLATELET # BLD: 252 K/UL (ref 130–400)
RBC # BLD: 4.02 M/UL (ref 4.7–6.1)
TOTAL CK: 3683 U/L (ref 0–190)
WBC # BLD: 8.2 K/UL (ref 4.8–10.8)

## 2021-10-10 PROCEDURE — 6360000002 HC RX W HCPCS: Performed by: INTERNAL MEDICINE

## 2021-10-10 PROCEDURE — 82550 ASSAY OF CK (CPK): CPT

## 2021-10-10 PROCEDURE — 36415 COLL VENOUS BLD VENIPUNCTURE: CPT

## 2021-10-10 PROCEDURE — 85025 COMPLETE CBC W/AUTO DIFF WBC: CPT

## 2021-10-10 PROCEDURE — 6370000000 HC RX 637 (ALT 250 FOR IP): Performed by: INTERNAL MEDICINE

## 2021-10-10 PROCEDURE — 82553 CREATINE MB FRACTION: CPT

## 2021-10-10 RX ORDER — AMOXICILLIN AND CLAVULANATE POTASSIUM 875; 125 MG/1; MG/1
1 TABLET, FILM COATED ORAL EVERY 12 HOURS SCHEDULED
Qty: 10 TABLET | Refills: 0 | Status: SHIPPED | OUTPATIENT
Start: 2021-10-10 | End: 2021-10-15

## 2021-10-10 RX ADMIN — PREGABALIN 300 MG: 150 CAPSULE ORAL at 10:22

## 2021-10-10 RX ADMIN — PANTOPRAZOLE SODIUM 40 MG: 40 TABLET, DELAYED RELEASE ORAL at 06:07

## 2021-10-10 RX ADMIN — BUPRENORPHINE HYDROCHLORIDE AND NALOXONE HYDROCHLORIDE DIHYDRATE 1 TABLET: 8; 2 TABLET SUBLINGUAL at 10:07

## 2021-10-10 RX ADMIN — AMOXICILLIN AND CLAVULANATE POTASSIUM 1 TABLET: 875; 125 TABLET, FILM COATED ORAL at 10:22

## 2021-10-10 NOTE — CARE COORDINATION
MET WITH PATIENT, PLAN FOR DC HOME TODAY. PT DENIES HOME NEEDS. SECOND IMM REVIEWED AND VERBAL CONSENT GIVEN.

## 2021-10-10 NOTE — DISCHARGE SUMMARY
Hospital Medicine Discharge Summary    [facility]  :  1965  MRN:  [id number]    Admit date:  10/6/2021  Discharge date:  10/10/2021    Admitting Physician:  Denis Khan MD  Primary Care Physician:  Alejandra Stover MD      Discharge Diagnoses:      Unresponsiveness-benzodiazepine overdose. Patient is also on Lyrica, Phenergan and Suboxone. He is not prescribed benzodiazepine. Acute hypoxic respiratory failure due to aspiration pneumonia and possible overdose  Rhabdomyolysis-nontraumatic  Aspiration pneumonia  DIOGENES  Opiate dependence-on Suboxone  Chronic pain syndrome-on Lyrica  Hypertension    Chief Complaint   Patient presents with    Drug Overdose     Per EMS, OD on lyrica and phenergan; slurred speech; spO2 78%-82% RA     Hospital Course:     Patient is a 79-year-old male who was found down and brought in for acute hypoxic respiratory failure due to aspiration pneumonia. He was found to have positive benzo in his system. He is not prescribed benzodiazepine. He is on Suboxone and Lyrica. He was found to have aspiration pneumonia and was treated with IV antibiotic. He improved. He was on room air prior to discharge. He was switched to Augmentin. Of note, he had rhabdomyolysis, nontraumatic, improved with IV hydration. Patient was seen by pain management due to chronic pain syndrome on Lyrica. His medications were continued. Exam on discharge:   BP (!) 149/67   Pulse 57   Temp 97.7 °F (36.5 °C)   Resp 18   Ht 5' 11\" (1.803 m)   Wt 274 lb 4.8 oz (124.4 kg)   SpO2 97%   BMI 38.26 kg/m²   General appearance: No apparent distress, appears stated age and cooperative. HEENT: Pupils equal, round, and reactive to light. Conjunctivae/corneas clear. Neck: Supple, with full range of motion. No jugular venous distention. Trachea midline. Respiratory:  Normal respiratory effort. Clear to auscultation, bilaterally without Rales/Wheezes/Rhonchi.   Cardiovascular: Regular rate and rhythm with normal S1/S2 without murmurs, rubs or gallops. Abdomen: Soft, non-tender, non-distended with normal bowel sounds. Musculoskeletal: No clubbing, cyanosis or edema bilaterally. Full range of motion without deformity. Skin: Skin color, texture, turgor normal.  No rashes or lesions. Neuro: Non Focal. Symetrical motor and tone. Nl Comprehension, Alert,awake and oriented. NL CN. Symetrical tone and reflexes. Psychiatric: Alert and oriented, thought content appropriate, normal insight  Capillary Refill: Brisk,< 3 seconds   Peripheral Pulses: +2 palpable, equal bilaterally     Patient was seen by the following consultants while admitted to [facility]:   Consults:  [address] TO PAIN MANAGEMENT    Significant Diagnostic Studies:    Refer to chart     Please refer to chart if no studies are shown here    CT CHEST WO CONTRAST    Result Date: 10/7/2021  CT of the Chest without intravenous contrast medium. HISTORY: Hypoxia. TECHNICAL FACTORS: CT imaging of the chest was obtained and formatted as 5 mm contiguous axial images from the thoracic inlet through the adrenal glands. Sagittal and coronal reconstructions obtained during postprocessing. Intravenous contrast medium:  None. Comparison:  CT chest, October 29, 2019 chest radiograph, January 20, 2020. FINDINGS: Right lung: No nodules, masses, pleural effusion, pneumothorax. Dependent subsegmental consolidation posterior segment right upper lobe marginated by major fissure. Faint foci of groundglass opacity, within right middle lobe and right lower lung. Left lung: No nodules, masses, pleural effusion, pneumothorax. Subsegmental atelectatic change posterior left upper lobe marginated by major fissure. Groundglass opacity, left upper lobe and left lung apex, as well as foci within the lingula and left lower lobe. Lymph nodes: No hilar, mediastinal, or axillary lymph node enlargement. Thoracic aorta: Normal in course and caliber. Cardiac Size: Normal. Pericardial effusion: None. Upper abdomen:Limited imaging upper abdomen shows gallbladder surgically absent. Limited imaging right kidney shows moderate pelvocaliectasis helpful. Stomach distended with air and dependent debris. Musculoskeletal:No osteoblastic, and no osteolytic lesions. Diffuse disc space narrowing with small anterior osteophytes, thoracic spine, and small posterior osteophytes upper thoracic spine. Bilateral groundglass opacities as discussed. These findings are nonspecific and may be seen in patients with a variety of infectious and inflammatory etiologies. Cholecystectomy. Limited imaging upper pole right kidney suggests moderate hydronephrosis. All CT scans at this facility use dose modulation, iterative reconstruction, and/or weight based dosing when appropriate to reduce radiation dose to as low as reasonably achievable. XR CHEST PORTABLE    Result Date: 10/7/2021  XR CHEST PORTABLE: 10/7/2021 12:40 AM CLINICAL HISTORY:  aspiration PNA . COMPARISON: None available. TECHNIQUE: A portable upright AP radiograph of the chest was obtained. FINDINGS: The cardiac silhouette is at the upper limits of normal which may be secondary to cardiomegaly versus pericardial effusion. The aorta is ectatic which most commonly correlates with chronic hypertension. There are diffuse increased interstitial pulmonary markings throughout both lungs which may be secondary to pulmonary edema versus chronic congestive heart failure. There are no consolidations or pleural effusions. The findings most likely reflect chronic congestive heart failure, CHF. There are no focal consolidations or effusions.         Discharge Medications:       Yael Hanna   Home Medication Instructions OVN:[id number]    Printed on:10/10/21 3920   Medication Information                      acetaminophen (TYLENOL) 500 MG tablet  Take 1 tablet by mouth every 6 hours as needed for Pain             albuterol sulfate HFA (PROAIR HFA) 108 (90 Base) MCG/ACT inhaler  Use every 4 hours while awake for 7-10 days then PRN wheezing  Dispense with SPACER and Instruct on use. May sub Ventolin or Proventil as needed per [employer]. amoxicillin-clavulanate (AUGMENTIN) 875-125 MG per tablet  Take 1 tablet by mouth every 12 hours for 5 days             atorvastatin (LIPITOR) 10 MG tablet  Take 10 mg by mouth daily              Buprenorphine HCl-Naloxone HCl (SUBOXONE) 8-2 MG FILM  Place under the tongue 2 times daily. escitalopram (LEXAPRO) 10 MG tablet  Take 10 mg by mouth nightly             naloxone 4 MG/0.1ML LIQD nasal spray  by Nasal route             omeprazole (PRILOSEC) 20 MG delayed release capsule  Take 20 mg by mouth daily             pregabalin (LYRICA) 150 MG capsule  Take 300 mg by mouth 2 times daily. promethazine (PHENERGAN) 25 MG tablet  Take 25 mg by mouth every 6 hours as needed              vitamin D (CHOLECALCIFEROL) 44481 UNIT CAPS  Take 50,000 Units by mouth once a week                  Disposition:   If discharged to Home, Any [facility] needs that were indicated and/or required as been addressed and set up by Social Work. Condition at discharge: good     Activity: activity as tolerated    Total time taken for discharging this patient: 40 minutes. Greater than 70% of time was spent focused exclusively on this patient. Time was taken to review chart, discuss plans with consultants, reconciling medications, discussing plan answering questions with patient.      Maureen Arriaga DO  10/10/2021, 9:48 AM  ----------------------------------------------------------------------------------------------------------------------    Bobbi Gamez

## 2021-10-10 NOTE — FLOWSHEET NOTE
Nurse called the pts wife as she had called the unit several times the past couple days. The pt did give the nurse permission to give her info.

## 2021-10-11 NOTE — PROGRESS NOTES
MADE FOLLOW UP APPT WITH 1ST AVAILABLE DR FOR HIS PCP AT [facility] his actual Dr is Dr Marquis Umaña, she is no longer with [facility]. Dr. Minor Altman 10/12/21 at 2:20pm at the [address] [place] [address] office.  Left message with patient at [phone] Electronically signed by Lisa Pearson on 10/11/2021 at 9:58 AM

## 2022-10-25 ENCOUNTER — HOSPITAL ENCOUNTER (EMERGENCY)
Age: 57
Discharge: HOME | End: 2022-10-25
Payer: MEDICARE

## 2022-10-25 ENCOUNTER — APPOINTMENT (OUTPATIENT)
Dept: GENERAL RADIOLOGY | Age: 57
End: 2022-10-25
Payer: MEDICARE

## 2022-10-25 VITALS
HEART RATE: 106 BPM | DIASTOLIC BLOOD PRESSURE: 75 MMHG | SYSTOLIC BLOOD PRESSURE: 103 MMHG | RESPIRATION RATE: 18 BRPM | TEMPERATURE: 98.6 F | BODY MASS INDEX: 30.8 KG/M2 | OXYGEN SATURATION: 96 % | HEIGHT: 71 IN | WEIGHT: 220 LBS

## 2022-10-25 DIAGNOSIS — M25.461 KNEE EFFUSION, RIGHT: Primary | ICD-10-CM

## 2022-10-25 DIAGNOSIS — M25.561 RIGHT KNEE PAIN, UNSPECIFIED CHRONICITY: ICD-10-CM

## 2022-10-25 DIAGNOSIS — M17.11 OSTEOARTHRITIS OF RIGHT KNEE, UNSPECIFIED OSTEOARTHRITIS TYPE: ICD-10-CM

## 2022-10-25 PROCEDURE — 73560 X-RAY EXAM OF KNEE 1 OR 2: CPT

## 2022-10-25 PROCEDURE — 99284 EMERGENCY DEPT VISIT MOD MDM: CPT

## 2022-10-25 PROCEDURE — 6360000002 HC RX W HCPCS: Performed by: STUDENT IN AN ORGANIZED HEALTH CARE EDUCATION/TRAINING PROGRAM

## 2022-10-25 PROCEDURE — 96372 THER/PROPH/DIAG INJ SC/IM: CPT

## 2022-10-25 RX ORDER — KETOROLAC TROMETHAMINE 30 MG/ML
30 INJECTION, SOLUTION INTRAMUSCULAR; INTRAVENOUS ONCE
Status: COMPLETED | OUTPATIENT
Start: 2022-10-25 | End: 2022-10-25

## 2022-10-25 RX ADMIN — KETOROLAC TROMETHAMINE 30 MG: 30 INJECTION, SOLUTION INTRAMUSCULAR at 17:28

## 2022-10-25 ASSESSMENT — PAIN DESCRIPTION - PAIN TYPE: TYPE: ACUTE PAIN

## 2022-10-25 ASSESSMENT — ENCOUNTER SYMPTOMS
ABDOMINAL PAIN: 0
NAUSEA: 0
PHOTOPHOBIA: 0
COUGH: 0
VOMITING: 0
WHEEZING: 0
SHORTNESS OF BREATH: 0

## 2022-10-25 ASSESSMENT — PAIN - FUNCTIONAL ASSESSMENT: PAIN_FUNCTIONAL_ASSESSMENT: 0-10

## 2022-10-25 ASSESSMENT — PAIN SCALES - GENERAL: PAINLEVEL_OUTOF10: 9

## 2022-10-25 ASSESSMENT — PAIN DESCRIPTION - ONSET: ONSET: ON-GOING

## 2022-10-25 ASSESSMENT — PAIN DESCRIPTION - DESCRIPTORS: DESCRIPTORS: THROBBING;STABBING

## 2022-10-25 ASSESSMENT — PAIN DESCRIPTION - ORIENTATION: ORIENTATION: RIGHT

## 2022-10-25 ASSESSMENT — PAIN DESCRIPTION - LOCATION: LOCATION: KNEE

## 2022-10-25 ASSESSMENT — PAIN DESCRIPTION - FREQUENCY: FREQUENCY: CONTINUOUS

## 2022-10-25 NOTE — ED PROVIDER NOTES
[address] [facility] ED  EMERGENCY DEPARTMENT ENCOUNTER      Pt Name: Walker Bojorquez MRN: [id number]  Birthdate 1965  Date of evaluation: 10/25/2022  Provider: [employer], [address] [place] [address]        Chief Complaint   Patient presents with    Knee Pain     Right knee pain. No injury, woke up Friday with knee pain and swelling. HISTORY OF PRESENT ILLNESS   (Location/Symptom, Timing/Onset, Context/Setting, Quality, Duration, Modifying Factors, Severity)  Note limiting factors. Walker Bojorquez is a 56 y.o. male who presents to the emergency department for evaluation of right knee pain. Pain gradual onset, constant moderate rated 9 out of 10. Began Friday and gradually worsening. Pain worsens with movement palpation and ambulation. Radiates behind knee. Difficulty bearing weight secondary to pain. No recent injuries or falls. Denies history of similar pain. Has not tried anything for symptoms at home. Some associated swelling. Denies fever chills numbness tingling weakness leg pain or leg swelling overlying redness warmth. HPI    Nursing Notes were reviewed. REVIEW OF SYSTEMS    (2-9 systems for level 4, 10 or more for level 5)     Review of Systems   Constitutional:  Negative for chills and fever. HENT:  Negative for congestion. Eyes:  Negative for photophobia. Respiratory:  Negative for cough, shortness of breath and wheezing. Cardiovascular:  Negative for chest pain and palpitations. Gastrointestinal:  Negative for abdominal pain, nausea and vomiting. Genitourinary:  Negative for dysuria, frequency and hematuria. Musculoskeletal:  Positive for arthralgias. Negative for myalgias. Allergic/Immunologic: Negative for immunocompromised state. Neurological:  Negative for dizziness, weakness and headaches. All other systems reviewed and are negative. Except as noted above the remainder of the review of systems was reviewed and negative.        PAST MEDICAL HISTORY     Past Medical History:   Diagnosis Date    DIOGENES (acute kidney injury) ([facility] [address] 10/8/2021    Aspiration into airway     Chronic pain     in neck & back, in pain management    Heroin use     Hypertension     Lumbar radiculopathy, chronic     Metabolic encephalopathy     medication induced    Non-traumatic rhabdomyolysis     Obesity     Pneumonia          SURGICAL HISTORY       Past Surgical History:   Procedure Laterality Date    BACK SURGERY      x2    NECK SURGERY      x2         CURRENT MEDICATIONS       Previous Medications    ACETAMINOPHEN (TYLENOL) 500 MG TABLET    Take 1 tablet by mouth every 6 hours as needed for Pain    ALBUTEROL SULFATE HFA (PROAIR HFA) 108 (90 BASE) MCG/ACT INHALER    Use every 4 hours while awake for 7-10 days then PRN wheezing  Dispense with SPACER and Instruct on use. May sub Ventolin or Proventil as needed per [employer]. ATORVASTATIN (LIPITOR) 10 MG TABLET    Take 10 mg by mouth daily     BUPRENORPHINE HCL-NALOXONE HCL (SUBOXONE) 8-2 MG FILM    Place under the tongue 2 times daily. ESCITALOPRAM (LEXAPRO) 10 MG TABLET    Take 10 mg by mouth nightly    NALOXONE 4 MG/0.1ML LIQD NASAL SPRAY    by Nasal route    OMEPRAZOLE (PRILOSEC) 20 MG DELAYED RELEASE CAPSULE    Take 20 mg by mouth daily    PREGABALIN (LYRICA) 150 MG CAPSULE    Take 300 mg by mouth 2 times daily. PROMETHAZINE (PHENERGAN) 25 MG TABLET    Take 25 mg by mouth every 6 hours as needed     VITAMIN D (CHOLECALCIFEROL) 60787 UNIT CAPS    Take 50,000 Units by mouth once a week        ALLERGIES     Patient has no known allergies. FAMILY HISTORY     No family history on file.        SOCIAL HISTORY       Social History     Socioeconomic History    Marital status:    Tobacco Use    Smoking status: Every Day     Packs/day: 1.00     Years: 15.00     Pack years: 15.00     Types: Cigarettes    Smokeless tobacco: Never   Vaping Use    Vaping Use: Never used   Substance and Sexual Activity    Alcohol use: No    Drug use: Not Currently     Types: Opiates     Sexual activity: Yes     Partners: Female       SCREENINGS         [place] Coma Scale  Eye Opening: Spontaneous  Best Verbal Response: Oriented  Best Motor Response: Obeys commands  Ani Coma Scale Score: 15                     CIWA Assessment  BP: 103/75  Heart Rate: (!) 106                 PHYSICAL EXAM    (up to 7 for level 4, 8 or more for level 5)     ED Triage Vitals [10/25/22 1653]   BP Temp Temp Source Heart Rate Resp SpO2 Height Weight   103/75 98.6 °F (37 °C) Temporal (!) 106 18 96 % 5' 11\" (1.803 m) 220 lb (99.8 kg)       Physical Exam  Constitutional:       General: He is not in acute distress. Appearance: He is well-developed. He is not ill-appearing, toxic-appearing or diaphoretic. HENT:      Head: Normocephalic and atraumatic. Nose: Nose normal.      Mouth/Throat:      Mouth: Mucous membranes are moist.   Eyes:      Pupils: Pupils are equal, round, and reactive to light. Cardiovascular:      Rate and Rhythm: Normal rate and regular rhythm. Heart sounds: No murmur heard. No friction rub. No gallop. Pulmonary:      Effort: Pulmonary effort is normal.      Breath sounds: Normal breath sounds. No wheezing, rhonchi or rales. Abdominal:      General: Bowel sounds are normal. There is no distension. Palpations: Abdomen is soft. Tenderness: There is no abdominal tenderness. Musculoskeletal:         General: No swelling. Cervical back: Normal range of motion. Right knee: Swelling and effusion present. No deformity, erythema, ecchymosis or bony tenderness. Normal range of motion. Tenderness present over the lateral joint line. Left knee: Normal.      Comments: R knee: No overlying erythema warmth. Patient has full range of motion however with pain. Strength 5 out of 5. Effusion noted. The right lower extremity is neurovascularly intact. There is no leg swelling. Homans negative.    Skin: General: Skin is warm and dry. Capillary Refill: Capillary refill takes less than 2 seconds. Neurological:      General: No focal deficit present. Mental Status: He is alert and oriented to person, place, and time. DIAGNOSTIC RESULTS     EKG: All EKG's are interpreted by the Emergency Department Physician who either signs or Co-signs this chart in the absence of a cardiologist.        RADIOLOGY:   Non-plain film images such as CT, Ultrasound and MRI are read by the radiologist. Plain radiographic images are visualized and preliminarily interpreted by the emergency physician with the below findings:        Interpretation per the Radiologist below, if available at the time of this note:    XR KNEE RIGHT (1-2 VIEWS)   Final Result   No acute abnormality of the knee. Joint effusion. ED BEDSIDE ULTRASOUND:   Performed by ED Physician - none    LABS:  Labs Reviewed - No data to display    All other labs were within normal range or not returned as of this dictation. EMERGENCY DEPARTMENT COURSE and DIFFERENTIAL DIAGNOSIS/MDM:   Vitals:    Vitals:    10/25/22 1653   BP: 103/75   Pulse: (!) 106   Resp: 18   Temp: 98.6 °F (37 °C)   TempSrc: Temporal   SpO2: 96%   Weight: 220 lb (99.8 kg)   Height: 5' 11\" (1.803 m)       MDM    Patient presents with right knee pain. Afebrile hemodynamically stable. Given IM Toradol in the ED. X-ray shows arthritic changes and suprapatellar effusion. Less concern for septic joint. Less concern for DVT. Stable for discharge. Given a knee brace and referral to orthopedics for follow-up. Return to the ED for worsening symptoms given warning signs for she should return. REASSESSMENT          CRITICAL CARE TIME   Total Critical Care time was 0 minutes, excluding separately reportable procedures. There was a high probability of clinically significant/life threatening deterioration in the patient's condition which required my urgent intervention. CONSULTS:  None    PROCEDURES:  Unless otherwise noted below, none     Procedures      FINAL IMPRESSION      1. Knee effusion, right    2. Right knee pain, unspecified chronicity    3. Osteoarthritis of right knee, unspecified osteoarthritis type          DISPOSITION/PLAN   DISPOSITION Discharge - Pending Orders Complete 10/25/2022 05:38:32 PM      PATIENT REFERRED TO:  Tricia Longo MD  [address] [place] [address]  [phone]    Schedule an appointment as soon as possible for a visit   As needed, If symptoms worsen    Angelica Kaur  [address]  Schedule an appointment as soon as possible for a visit in 1 day  [address] ED  [address] [address]  [phone]  Go to   As needed, If symptoms worsen    DISCHARGE MEDICATIONS:  New Prescriptions    No medications on file     Controlled Substances Monitoring:     No flowsheet data found.     (Please note that portions of this note were completed with a voice recognition program.  Efforts were made to edit the dictations but occasionally words are mis-transcribed.)    [facility] NINOSKA PIERRE (electronically signed)             [facility] CHAVEZWhite Mountain Regional Medical CenterVIRGINIE PIERRE PA-C  10/25/22 2024

## 2022-11-04 ENCOUNTER — OFFICE VISIT (OUTPATIENT)
Dept: ORTHOPEDIC SURGERY | Age: 57
End: 2022-11-04
Payer: MEDICARE

## 2022-11-04 VITALS
WEIGHT: 220 LBS | OXYGEN SATURATION: 95 % | HEIGHT: 71 IN | BODY MASS INDEX: 30.8 KG/M2 | HEART RATE: 91 BPM | TEMPERATURE: 97.8 F

## 2022-11-04 DIAGNOSIS — M25.561 ACUTE PAIN OF RIGHT KNEE: ICD-10-CM

## 2022-11-04 DIAGNOSIS — M25.561 ACUTE PAIN OF RIGHT KNEE: Primary | ICD-10-CM

## 2022-11-04 LAB
APPEARANCE FLUID: NORMAL
CELL COUNT FLUID TYPE: NORMAL
CLOT EVALUATION: NORMAL
COLOR FLUID: NORMAL
LYMPHOCYTES, BODY FLUID: 10 %
MONOCYTE, FLUID: 8 %
NEUTROPHIL, FLUID: 82 %
NUCLEATED CELLS FLUID: 545 /CUMM
NUMBER OF CELLS COUNTED FLUID: 100
RBC FLUID: 1404 /CUMM
REASON FOR REJECTION: NORMAL
REJECTED TEST: NORMAL

## 2022-11-04 PROCEDURE — 20610 DRAIN/INJ JOINT/BURSA W/O US: CPT | Performed by: ORTHOPAEDIC SURGERY

## 2022-11-04 PROCEDURE — 99204 OFFICE O/P NEW MOD 45 MIN: CPT | Performed by: ORTHOPAEDIC SURGERY

## 2022-11-04 RX ORDER — LIDOCAINE HYDROCHLORIDE 10 MG/ML
8 INJECTION, SOLUTION INFILTRATION; PERINEURAL ONCE
Status: COMPLETED | OUTPATIENT
Start: 2022-11-04 | End: 2022-11-04

## 2022-11-04 RX ORDER — MELOXICAM 15 MG/1
15 TABLET ORAL DAILY
Qty: 30 TABLET | Refills: 1 | Status: SHIPPED | OUTPATIENT
Start: 2022-11-04 | End: 2022-12-04

## 2022-11-04 RX ORDER — TRIAMCINOLONE ACETONIDE 40 MG/ML
80 INJECTION, SUSPENSION INTRA-ARTICULAR; INTRAMUSCULAR ONCE
Status: COMPLETED | OUTPATIENT
Start: 2022-11-04 | End: 2022-11-04

## 2022-11-04 RX ADMIN — LIDOCAINE HYDROCHLORIDE 8 ML: 10 INJECTION, SOLUTION INFILTRATION; PERINEURAL at 10:25

## 2022-11-04 RX ADMIN — TRIAMCINOLONE ACETONIDE 80 MG: 40 INJECTION, SUSPENSION INTRA-ARTICULAR; INTRAMUSCULAR at 10:27

## 2022-11-04 ASSESSMENT — ENCOUNTER SYMPTOMS
DIARRHEA: 0
EYE ITCHING: 0
EYE PAIN: 0
CONSTIPATION: 0
EYE DISCHARGE: 0
ABDOMINAL PAIN: 0
SHORTNESS OF BREATH: 0
COUGH: 0

## 2022-11-04 NOTE — PROGRESS NOTES
A timeout was performed immediately prior to the start of the injection procedure and included the correct patient (two identifiers), correct procedure and correct site(s). Procedure consent and allergies were also verified. Patient's name, date of birth, and allergies have been confirmed. Patient is aware that injection is to be given in Right knee. He/she is aware that they will be seeing    and the injection will be given by him.

## 2022-11-04 NOTE — PROGRESS NOTES
Patient ID:  Xiomy Powell. is a 56 y.o. male who presents today for evaluation of right knee pain. Injury: no - woke up 10 days ago and knee was swollen. Metal Allergy: no    Location: right  knee pain, located on the global aspect of the knee  Pain: yes; 7 on a scale of 1 to 10  Onset: sudden  Duration: 10 days  Frequency:  occurs constantly  Quality: aching, boring, and pressure   Swelling: patient notes continuous swelling of the joint  Aggravating factors: weight bearing activity, standing, and walking  Alleviating factors: removing weight from leg and rest  Mechanical symptoms: instability  Radiation: no    Activities: walking independently  Restriction:  decreased ambulatory tolerance and has fallen due to gib=ving way  Progression:  worsening    Previous treatment:  none  NSAIDs:  none  PT:  none    Medications:    Current Outpatient Medications on File Prior to Visit   Medication Sig Dispense Refill    pregabalin (LYRICA) 150 MG capsule Take 300 mg by mouth 2 times daily. buprenorphine-naloxone (SUBOXONE) 8-2 MG FILM SL film Place under the tongue 2 times daily.        promethazine (PHENERGAN) 25 MG tablet Take 25 mg by mouth every 6 hours as needed  (Patient not taking: Reported on 11/4/2022)      atorvastatin (LIPITOR) 10 MG tablet Take 10 mg by mouth daily  (Patient not taking: Reported on 11/4/2022)      vitamin D (CHOLECALCIFEROL) 14221 UNIT CAPS Take 50,000 Units by mouth once a week  (Patient not taking: Reported on 11/4/2022)      omeprazole (PRILOSEC) 20 MG delayed release capsule Take 20 mg by mouth daily (Patient not taking: Reported on 11/4/2022)      naloxone 4 MG/0.1ML LIQD nasal spray by Nasal route (Patient not taking: Reported on 11/4/2022)      acetaminophen (TYLENOL) 500 MG tablet Take 1 tablet by mouth every 6 hours as needed for Pain (Patient not taking: Reported on 11/4/2022) 120 tablet 3    albuterol sulfate HFA (PROAIR HFA) 108 (90 Base) MCG/ACT inhaler Use every 4 hours while awake for 7-10 days then PRN wheezing  Dispense with SPACER and Instruct on use. May sub Ventolin or Proventil as needed per [employer]. (Patient not taking: Reported on 11/4/2022) 1 Inhaler 0    escitalopram (LEXAPRO) 10 MG tablet Take 10 mg by mouth nightly (Patient not taking: Reported on 11/4/2022)       No current facility-administered medications on file prior to visit.        Allergies:    No Known Allergies    Past Medical History:    Past Medical History:   Diagnosis Date    DIOGENES (acute kidney injury) ([facility] [address] 10/8/2021    Aspiration into airway     Chronic pain     in neck & back, in pain management    Heroin use     Hypertension     Lumbar radiculopathy, chronic     Metabolic encephalopathy     medication induced    Non-traumatic rhabdomyolysis     Obesity     Pneumonia        Past Surgical History:    Past Surgical History:   Procedure Laterality Date    BACK SURGERY      x2    NECK SURGERY      x2       Social History:    Social History     Socioeconomic History    Marital status:      Spouse name: Not on file    Number of children: Not on file    Years of education: Not on file    Highest education level: Not on file   Occupational History    Not on file   Tobacco Use    Smoking status: Every Day     Packs/day: 1.00     Years: 15.00     Pack years: 15.00     Types: Cigarettes    Smokeless tobacco: Never   Vaping Use    Vaping Use: Never used   Substance and Sexual Activity    Alcohol use: No    Drug use: Not Currently     Types: Opiates     Sexual activity: Yes     Partners: Female   Other Topics Concern    Not on file   Social History Narrative    Not on file     Social Determinants of Health     Financial Resource Strain: Not on file   Food Insecurity: Not on file   Transportation Needs: Not on file   Physical Activity: Not on file   Stress: Not on file   Social Connections: Not on file   Intimate Partner Violence: Not on file   Housing Stability: Not on file       Family History:    No family history on file. Occupation:  on disability   - Occupational requirements:  none    Workers Compensation:  Have you missed work for this issue?  no  Is this issue being addressed under a worker's comp claim? no    Review of Systems:    Review of Systems   Constitutional:  Negative for activity change, appetite change and chills. HENT:  Negative for congestion, ear pain and hearing loss. Eyes:  Negative for pain, discharge and itching. Respiratory:  Negative for cough and shortness of breath. Cardiovascular:  Negative for chest pain and leg swelling. Gastrointestinal:  Negative for abdominal pain, constipation and diarrhea. Endocrine: Negative for cold intolerance, heat intolerance and polydipsia. Genitourinary:  Negative for difficulty urinating, flank pain and frequency. Skin:  Negative for rash and wound. Allergic/Immunologic: Negative for environmental allergies and food allergies. Neurological:  Negative for dizziness, seizures and syncope. Physical Exam:    Examination of the right knee    Gait:  antalgic gait affecting right  Inspection:  neutral  Swelling:  none  Erythema:  none  Ecchymosis:  none  Effusion:  3+  Palpation:  distal lateral femoral condyle and lateral joint line  Extension:  5  Flexion:  90  Strength:  5  Varus/Valgus Instability:  none  Anterior/Posterior Instability:  none  Filiberto:  positive  Thessaly:  positive  Modified Apley:  negative  Lachman:  negative  Patellar compression:  negative  Neurological/Vascular:  Sensation grossly intact. Dorsalis pedis palpable and 2+    Radiographs:  Radiographs of the right knee were personally reviewed, and they revealed:  FINDINGS:   No evidence of acute fracture or dislocation. No focal osseous lesion. There is suprapatellar soft tissue swelling. Impression   No acute abnormality of the knee. Joint effusion. MRI:  none    Diagnosis:   Diagnosis Orders   1.  Acute pain of right knee  Cell Count with Differential, Body Fluid    Crystals, Body Fluid    Culture, Body Fluid    OK ARTHROCENTESIS ASPIR&/INJ MAJOR JT/BURSA W/O US    Crutches          Plan:    The patient has a significant effusion in the knee and pain. He has a history of gout in his great toe on the same side. My working diagnosis is that this is an acute flare of gout. We discussed this. Treatment options were discussed. After having discussion with the patient, together we decided to aspirate the knee and administer an intra-articular steroid injection. Additionally I Saddie High start him on meloxicam.  Today is Friday. I have asked him to let me know if he is having pain or after the weekend, on Monday, and if so, we may need to proceed with an MRI due to the fact that his radiographs are fairly benign. He was on board with the plan. Right knee was prepped with Betadine and draped in sterile fashion and the right knee was aspirated of 45 cc of slightly cloudy synovial fluid. The patient then received an intra-articular steroid injection in the right knee consisting of 8 cc of 2% lidocaine without epinephrine and 2 cc of 40 mg/mL Kenalog. He tolerated the injection well. After 5 minutes, he noted near complete resolution of his symptoms. We are going to give him crutches because the pain will return after the local anesthetic wears off. As noted above, he will let me know how he is doing after the weekend if he continues to have pain. I will monitor labs.     Orders Placed This Encounter   Procedures    Culture, Body Fluid     Right knee synovial fluid     Standing Status:   Future     Standing Expiration Date:   11/4/2023    Cell Count with Differential, Body Fluid     Right knee synovial fluid     Standing Status:   Future     Standing Expiration Date:   11/4/2023     Order Specific Question:   Specify Specimen Type     Answer:   Fluid    Crystals, Body Fluid     Right knee synovial fluid     Standing Status:   Future Standing Expiration Date:   11/4/2023    Crutches    MI ARTHROCENTESIS ASPIR&/INJ MAJOR JT/BURSA W/O US       Orders Placed This Encounter   Medications    lidocaine 1 % injection 8 mL    triamcinolone acetonide (KENALOG-40) injection 80 mg    meloxicam (MOBIC) 15 MG tablet     Sig: Take 1 tablet by mouth daily     Dispense:  30 tablet     Refill:  1       No follow-ups on file.     Trudi Nieto MD

## 2022-11-07 LAB
CRYSTALS, FLUID: POSITIVE
SPECIMEN TYPE: ABNORMAL

## 2022-11-12 LAB — BODY FLUID CULTURE, STERILE: NORMAL

## 2022-11-21 ENCOUNTER — HOSPITAL ENCOUNTER (OUTPATIENT)
Age: 57
Setting detail: OBSERVATION
Discharge: LEFT BEFORE DISCHARGE | End: 2022-11-22
Attending: INTERNAL MEDICINE | Admitting: INTERNAL MEDICINE
Payer: MEDICARE

## 2022-11-21 ENCOUNTER — APPOINTMENT (OUTPATIENT)
Dept: CT IMAGING | Age: 57
End: 2022-11-21
Payer: MEDICARE

## 2022-11-21 ENCOUNTER — APPOINTMENT (OUTPATIENT)
Dept: GENERAL RADIOLOGY | Age: 57
End: 2022-11-21
Payer: MEDICARE

## 2022-11-21 DIAGNOSIS — F14.10 COCAINE ABUSE (HCC): ICD-10-CM

## 2022-11-21 DIAGNOSIS — J96.02 ACUTE RESPIRATORY FAILURE WITH HYPOXIA AND HYPERCAPNIA (HCC): ICD-10-CM

## 2022-11-21 DIAGNOSIS — J96.01 ACUTE RESPIRATORY FAILURE WITH HYPOXIA AND HYPERCAPNIA (HCC): ICD-10-CM

## 2022-11-21 DIAGNOSIS — R41.82 ALTERED MENTAL STATUS, UNSPECIFIED ALTERED MENTAL STATUS TYPE: Primary | ICD-10-CM

## 2022-11-21 DIAGNOSIS — F15.10 AMPHETAMINE ABUSE (HCC): ICD-10-CM

## 2022-11-21 LAB
ACETAMINOPHEN LEVEL: <5 UG/ML (ref 10–30)
ALBUMIN SERPL-MCNC: 4.2 G/DL (ref 3.5–4.6)
ALP BLD-CCNC: 116 U/L (ref 35–104)
ALT SERPL-CCNC: 15 U/L (ref 0–41)
AMPHETAMINE SCREEN, URINE: POSITIVE
ANION GAP SERPL CALCULATED.3IONS-SCNC: 11 MEQ/L (ref 9–15)
APTT: 30.6 SEC (ref 24.4–36.8)
AST SERPL-CCNC: 17 U/L (ref 0–40)
BARBITURATE SCREEN URINE: ABNORMAL
BASE EXCESS ARTERIAL: 6 (ref -3–3)
BASE EXCESS ARTERIAL: 6 (ref -3–3)
BASOPHILS ABSOLUTE: 0 K/UL (ref 0–0.2)
BASOPHILS RELATIVE PERCENT: 0.4 %
BENZODIAZEPINE SCREEN, URINE: ABNORMAL
BILIRUB SERPL-MCNC: 0.3 MG/DL (ref 0.2–0.7)
BILIRUBIN URINE: NEGATIVE
BLOOD, URINE: NEGATIVE
BUN BLDV-MCNC: 7 MG/DL (ref 6–20)
CALCIUM IONIZED: 1.21 MMOL/L (ref 1.12–1.32)
CALCIUM IONIZED: 1.22 MMOL/L (ref 1.12–1.32)
CALCIUM SERPL-MCNC: 9.7 MG/DL (ref 8.5–9.9)
CANNABINOID SCREEN URINE: ABNORMAL
CHLORIDE BLD-SCNC: 96 MEQ/L (ref 95–107)
CHOLESTEROL, TOTAL: 169 MG/DL (ref 0–199)
CLARITY: CLEAR
CO2: 34 MEQ/L (ref 20–31)
COCAINE METABOLITE SCREEN URINE: POSITIVE
COLOR: YELLOW
CREAT SERPL-MCNC: 0.9 MG/DL (ref 0.7–1.2)
EOSINOPHILS ABSOLUTE: 0.2 K/UL (ref 0–0.7)
EOSINOPHILS RELATIVE PERCENT: 1.8 %
ETHANOL PERCENT: NORMAL G/DL
ETHANOL: <10 MG/DL (ref 0–0.08)
FENTANYL SCREEN, URINE: ABNORMAL
GFR SERPL CREATININE-BSD FRML MDRD: >60 ML/MIN/{1.73_M2}
GLOBULIN: 3.4 G/DL (ref 2.3–3.5)
GLUCOSE BLD-MCNC: 92 MG/DL (ref 70–99)
GLUCOSE BLD-MCNC: 93 MG/DL (ref 70–99)
GLUCOSE BLD-MCNC: 95 MG/DL (ref 70–99)
GLUCOSE URINE: NEGATIVE MG/DL
HCO3 ARTERIAL: 30.9 MMOL/L (ref 21–29)
HCO3 ARTERIAL: 31.4 MMOL/L (ref 21–29)
HCT VFR BLD CALC: 46.2 % (ref 42–52)
HDLC SERPL-MCNC: 26 MG/DL (ref 40–59)
HEMOGLOBIN: 15.2 G/DL (ref 14–18)
HEMOGLOBIN: 15.8 GM/DL (ref 13.5–17.5)
HEMOGLOBIN: 16.4 GM/DL (ref 13.5–17.5)
INR BLD: 1
KETONES, URINE: NEGATIVE MG/DL
LACTATE: 0.82 MMOL/L (ref 0.4–2)
LACTATE: 0.84 MMOL/L (ref 0.4–2)
LACTIC ACID: 1.8 MMOL/L (ref 0.5–2.2)
LDL CHOLESTEROL CALCULATED: 86 MG/DL (ref 0–129)
LEUKOCYTE ESTERASE, URINE: NEGATIVE
LYMPHOCYTES ABSOLUTE: 2 K/UL (ref 1–4.8)
LYMPHOCYTES RELATIVE PERCENT: 22.8 %
Lab: ABNORMAL
MAGNESIUM: 2 MG/DL (ref 1.7–2.4)
MCH RBC QN AUTO: 31.5 PG (ref 27–31.3)
MCHC RBC AUTO-ENTMCNC: 33 % (ref 33–37)
MCV RBC AUTO: 95.4 FL (ref 79–92.2)
METHADONE SCREEN, URINE: ABNORMAL
MONOCYTES ABSOLUTE: 0.6 K/UL (ref 0.2–0.8)
MONOCYTES RELATIVE PERCENT: 7 %
NEUTROPHILS ABSOLUTE: 5.8 K/UL (ref 1.4–6.5)
NEUTROPHILS RELATIVE PERCENT: 68 %
NITRITE, URINE: NEGATIVE
O2 SAT, ARTERIAL: 88 % (ref 93–100)
O2 SAT, ARTERIAL: 94 % (ref 93–100)
OPIATE SCREEN URINE: ABNORMAL
OXYCODONE URINE: ABNORMAL
PCO2 ARTERIAL: 48 MM HG (ref 35–45)
PCO2 ARTERIAL: 52 MM HG (ref 35–45)
PDW BLD-RTO: 16.2 % (ref 11.5–14.5)
PERFORMED ON: ABNORMAL
PERFORMED ON: ABNORMAL
PH ARTERIAL: 7.39 (ref 7.35–7.45)
PH ARTERIAL: 7.42 (ref 7.35–7.45)
PH UA: 6 (ref 5–9)
PHENCYCLIDINE SCREEN URINE: ABNORMAL
PLATELET # BLD: 246 K/UL (ref 130–400)
PO2 ARTERIAL: 57 MM HG (ref 75–108)
PO2 ARTERIAL: 71 MM HG (ref 75–108)
POC CHLORIDE: 101 MEQ/L (ref 99–110)
POC CHLORIDE: 101 MEQ/L (ref 99–110)
POC CREATININE: 0.7 MG/DL (ref 0.8–1.3)
POC CREATININE: 0.8 MG/DL (ref 0.8–1.3)
POC FIO2: 2
POC FIO2: 2
POC HEMATOCRIT: 46 % (ref 41–53)
POC HEMATOCRIT: 48 % (ref 41–53)
POC POTASSIUM: 2.9 MEQ/L (ref 3.5–5.1)
POC POTASSIUM: 3.1 MEQ/L (ref 3.5–5.1)
POC SAMPLE TYPE: ABNORMAL
POC SAMPLE TYPE: ABNORMAL
POC SODIUM: 141 MEQ/L (ref 136–145)
POC SODIUM: 143 MEQ/L (ref 136–145)
POTASSIUM SERPL-SCNC: 3 MEQ/L (ref 3.4–4.9)
PROPOXYPHENE SCREEN: ABNORMAL
PROTEIN UA: NEGATIVE MG/DL
PROTHROMBIN TIME: 13.1 SEC (ref 12.3–14.9)
RBC # BLD: 4.84 M/UL (ref 4.7–6.1)
SALICYLATE, SERUM: <0.3 MG/DL (ref 15–30)
SARS-COV-2, NAAT: NOT DETECTED
SODIUM BLD-SCNC: 141 MEQ/L (ref 135–144)
SPECIFIC GRAVITY UA: 1.01 (ref 1–1.03)
TCO2 ARTERIAL: 32 MMOL/L (ref 21–32)
TCO2 ARTERIAL: 33 MMOL/L (ref 21–32)
TOTAL CK: 101 U/L (ref 0–190)
TOTAL PROTEIN: 7.6 G/DL (ref 6.3–8)
TRIGL SERPL-MCNC: 287 MG/DL (ref 0–150)
TROPONIN: <0.01 NG/ML (ref 0–0.01)
TSH REFLEX: 2.27 UIU/ML (ref 0.44–3.86)
URINE REFLEX TO CULTURE: NORMAL
UROBILINOGEN, URINE: 0.2 E.U./DL
WBC # BLD: 8.6 K/UL (ref 4.8–10.8)

## 2022-11-21 PROCEDURE — 93005 ELECTROCARDIOGRAM TRACING: CPT

## 2022-11-21 PROCEDURE — 84484 ASSAY OF TROPONIN QUANT: CPT

## 2022-11-21 PROCEDURE — 82435 ASSAY OF BLOOD CHLORIDE: CPT

## 2022-11-21 PROCEDURE — 70450 CT HEAD/BRAIN W/O DYE: CPT

## 2022-11-21 PROCEDURE — 84132 ASSAY OF SERUM POTASSIUM: CPT

## 2022-11-21 PROCEDURE — 82550 ASSAY OF CK (CPK): CPT

## 2022-11-21 PROCEDURE — 82803 BLOOD GASES ANY COMBINATION: CPT

## 2022-11-21 PROCEDURE — P9612 CATHETERIZE FOR URINE SPEC: HCPCS

## 2022-11-21 PROCEDURE — 85014 HEMATOCRIT: CPT

## 2022-11-21 PROCEDURE — 96365 THER/PROPH/DIAG IV INF INIT: CPT

## 2022-11-21 PROCEDURE — 84443 ASSAY THYROID STIM HORMONE: CPT

## 2022-11-21 PROCEDURE — 36600 WITHDRAWAL OF ARTERIAL BLOOD: CPT

## 2022-11-21 PROCEDURE — 84295 ASSAY OF SERUM SODIUM: CPT

## 2022-11-21 PROCEDURE — 83605 ASSAY OF LACTIC ACID: CPT

## 2022-11-21 PROCEDURE — 82330 ASSAY OF CALCIUM: CPT

## 2022-11-21 PROCEDURE — 80061 LIPID PANEL: CPT

## 2022-11-21 PROCEDURE — 82565 ASSAY OF CREATININE: CPT

## 2022-11-21 PROCEDURE — 80143 DRUG ASSAY ACETAMINOPHEN: CPT

## 2022-11-21 PROCEDURE — 80307 DRUG TEST PRSMV CHEM ANLYZR: CPT

## 2022-11-21 PROCEDURE — 85730 THROMBOPLASTIN TIME PARTIAL: CPT

## 2022-11-21 PROCEDURE — 85025 COMPLETE CBC W/AUTO DIFF WBC: CPT

## 2022-11-21 PROCEDURE — 71045 X-RAY EXAM CHEST 1 VIEW: CPT

## 2022-11-21 PROCEDURE — 36415 COLL VENOUS BLD VENIPUNCTURE: CPT

## 2022-11-21 PROCEDURE — 87635 SARS-COV-2 COVID-19 AMP PRB: CPT

## 2022-11-21 PROCEDURE — 96361 HYDRATE IV INFUSION ADD-ON: CPT

## 2022-11-21 PROCEDURE — 80053 COMPREHEN METABOLIC PANEL: CPT

## 2022-11-21 PROCEDURE — 85610 PROTHROMBIN TIME: CPT

## 2022-11-21 PROCEDURE — 81003 URINALYSIS AUTO W/O SCOPE: CPT

## 2022-11-21 PROCEDURE — 6360000002 HC RX W HCPCS

## 2022-11-21 PROCEDURE — 83735 ASSAY OF MAGNESIUM: CPT

## 2022-11-21 PROCEDURE — 82077 ASSAY SPEC XCP UR&BREATH IA: CPT

## 2022-11-21 PROCEDURE — 84145 PROCALCITONIN (PCT): CPT

## 2022-11-21 PROCEDURE — 80179 DRUG ASSAY SALICYLATE: CPT

## 2022-11-21 PROCEDURE — 99285 EMERGENCY DEPT VISIT HI MDM: CPT

## 2022-11-21 PROCEDURE — 2580000003 HC RX 258

## 2022-11-21 RX ORDER — 0.9 % SODIUM CHLORIDE 0.9 %
500 INTRAVENOUS SOLUTION INTRAVENOUS ONCE
Status: COMPLETED | OUTPATIENT
Start: 2022-11-21 | End: 2022-11-21

## 2022-11-21 RX ORDER — POTASSIUM CHLORIDE 7.45 MG/ML
10 INJECTION INTRAVENOUS ONCE
Status: COMPLETED | OUTPATIENT
Start: 2022-11-21 | End: 2022-11-21

## 2022-11-21 RX ADMIN — SODIUM CHLORIDE 500 ML: 9 INJECTION, SOLUTION INTRAVENOUS at 18:45

## 2022-11-21 RX ADMIN — POTASSIUM CHLORIDE 10 MEQ: 7.46 INJECTION, SOLUTION INTRAVENOUS at 20:55

## 2022-11-21 NOTE — ED NOTES
Patient arrived to ER via EMS for drug overdose. According to family he took an unknown amount of Lyrica, suboxone, and phenergan. Last known well was 2 hours prior to arrival. Family for him unresponsive. EMS gave him narcan with no response. Upon arrival to ER patient is alert and follows commands but speech is incomprehensible. VSS. Resp even and unlabored. Skin warm, dry and intact.        Lea Rodrigues RN  11/21/22 9103

## 2022-11-21 NOTE — ED PROVIDER NOTES
[facility] ED  eMERGENCY dEPARTMENT eNCOUnter      Pt Name: Karis Aguilera MRN: [id number]  Birthdate 1965  Date of evaluation: 11/21/2022  Provider: DAVID Hoyt        HISTORY OF PRESENT ILLNESS    Karis Aguilera is a 56 y.o. male per chart review has ah/o polysubstance abuse, drug overdose, aspiration, tobacco abuse, encephalopathy, GERD, rhabdomyolysis, RENETTA. Patient presents to the emergency department with altered mentation. Presents via EMS. A family friend that was at the home with the patient called EMS. Reportedly the last time anyone saw him to be normal was approximately 2 hours prior to arrival.  There is concern for overdose. Patient has history of polysubstance abuse as well as overdose. Family friend reports potential Lyrica overdose. There was no Lyrica bottle in sight per EMS, there was Suboxone and Cialis at the bedside. Patient is prescribed Suboxone as well as Lyrica 150 mg but no Cialis prescriptions. Patient's initial GCS is 12. He is able to move all extremities and follow commands. Mumbling responses. Presents on 4 L nasal cannula. No baseline o2 requirement. Reported mentation baseline is fully alert and oriented. Throughout several reassessments patient becomes increasingly more appropriate in the ED, he is able to verbalize fully oriented responses, he states that he did not take any extra medications other than what he is prescribed today. Now A&Ox4 GCS 15. He states that his wife keeps his medications \"locked up. \" Patient states he knows he is at the hospital and he believes he is here because \"something didn't agree with me. \" He states the last thing he remembers is he took 1 suboxone and it may not have \"agreed\" with him. I asked about the Cialis that EMS saw at bedside and patient initially states he does not take Cialis and he is unsure what that is.   On further questioning patient admits that he did just start taking Cialis over the past several weeks, and he knows that it is for erectile dysfunction. He states he only took 1 pill of Cialis today. He is unsure the milligram dosage. He is not prescribed this on chart review. I spoke directly with several of patient's family members on the phone who states that patient \"definitely\" took too many medications today that are contributing to his status. They report history of similar episodes with extra medication usage. Deny known suicidal intent. Patient is denying suicidal ideation/intent/self harm to me. REVIEW OF SYSTEMS       Review of Systems   Unable to perform ROS: Mental status change     Except as noted above the remainder of the review of systems was reviewed and negative. PAST MEDICAL HISTORY     Past Medical History:   Diagnosis Date    DIOGENES (acute kidney injury) ([facility] [address] 10/8/2021    Aspiration into airway     Chronic pain     in neck & back, in pain management    Heroin use     Hypertension     Lumbar radiculopathy, chronic     Metabolic encephalopathy     medication induced    Non-traumatic rhabdomyolysis     Obesity     Pneumonia          SURGICAL HISTORY       Past Surgical History:   Procedure Laterality Date    BACK SURGERY      x2    NECK SURGERY      x2         CURRENT MEDICATIONS       Previous Medications    ACETAMINOPHEN (TYLENOL) 500 MG TABLET    Take 1 tablet by mouth every 6 hours as needed for Pain    ALBUTEROL SULFATE HFA (PROAIR HFA) 108 (90 BASE) MCG/ACT INHALER    Use every 4 hours while awake for 7-10 days then PRN wheezing  Dispense with SPACER and Instruct on use. May sub Ventolin or Proventil as needed per [employer]. ATORVASTATIN (LIPITOR) 10 MG TABLET    Take 10 mg by mouth daily     BUPRENORPHINE-NALOXONE (SUBOXONE) 8-2 MG FILM SL FILM    Place under the tongue 2 times daily.      ESCITALOPRAM (LEXAPRO) 10 MG TABLET    Take 10 mg by mouth nightly    MELOXICAM (MOBIC) 15 MG TABLET    Take 1 tablet by mouth daily    NALOXONE 4 MG/0.1ML LIQD NASAL SPRAY    by Nasal route    OMEPRAZOLE (PRILOSEC) 20 MG DELAYED RELEASE CAPSULE    Take 20 mg by mouth daily    PREGABALIN (LYRICA) 150 MG CAPSULE    Take 300 mg by mouth 2 times daily. PROMETHAZINE (PHENERGAN) 25 MG TABLET    Take 25 mg by mouth every 6 hours as needed     VITAMIN D (CHOLECALCIFEROL) 96388 UNIT CAPS    Take 50,000 Units by mouth once a week        ALLERGIES     Patient has no known allergies. FAMILY HISTORY     No family history on file. SOCIAL HISTORY       Social History     Socioeconomic History    Marital status:    Tobacco Use    Smoking status: Every Day     Packs/day: 1.00     Years: 15.00     Pack years: 15.00     Types: Cigarettes    Smokeless tobacco: Never   Vaping Use    Vaping Use: Never used   Substance and Sexual Activity    Alcohol use: No    Drug use: Not Currently     Types: Opiates     Sexual activity: Yes     Partners: Female         PHYSICAL EXAM        ED Triage Vitals [11/21/22 1826]   BP Temp Temp src Heart Rate Resp SpO2 Height Weight   -- -- -- 82 18 96 % -- 220 lb (99.8 kg)       Physical Exam  Constitutional:       General: He is in acute distress. Appearance: He is ill-appearing. HENT:      Head: Normocephalic and atraumatic. Right Ear: External ear normal.      Left Ear: External ear normal.      Nose: Nose normal.      Mouth/Throat:      Mouth: Mucous membranes are moist.      Pharynx: Oropharynx is clear. Eyes:      Extraocular Movements: Extraocular movements intact. Pupils: Pupils are equal, round, and reactive to light. Pupils are equal.      Comments: 3 mm pupils equal round reactive bilaterally   Cardiovascular:      Rate and Rhythm: Normal rate and regular rhythm. Pulses:           Radial pulses are 2+ on the right side and 2+ on the left side. Dorsalis pedis pulses are 2+ on the right side and 2+ on the left side. Posterior tibial pulses are 2+ on the right side and 2+ on the left side. Pulmonary:      Effort: Pulmonary effort is normal. No respiratory distress. Breath sounds: Normal breath sounds. Abdominal:      General: Bowel sounds are normal. There is no distension. Palpations: Abdomen is soft. There is no mass. Tenderness: There is no abdominal tenderness. There is no guarding or rebound. Hernia: No hernia is present. Musculoskeletal:         General: Normal range of motion. Cervical back: Normal range of motion. Skin:     General: Skin is warm. Neurological:      Mental Status: He is lethargic. GCS: GCS eye subscore is 4. GCS verbal subscore is 2. GCS motor subscore is 6. Cranial Nerves: No facial asymmetry. Motor: Motor function is intact.    Psychiatric:         Mood and Affect: Mood normal.         Behavior: Behavior normal.         LABS:  Labs Reviewed   CBC WITH AUTO DIFFERENTIAL - Abnormal; Notable for the following components:       Result Value    MCV 95.4 (*)     MCH 31.5 (*)     RDW 16.2 (*)     All other components within normal limits   COMPREHENSIVE METABOLIC PANEL - Abnormal; Notable for the following components:    Potassium 3.0 (*)     CO2 34 (*)     Alkaline Phosphatase 116 (*)     All other components within normal limits    Narrative:     Telma Headley tel. [phone],  Chemistry results called to and read back by Lauren Vears, 11/21/2022  26:93, by SDWDV   SALICYLATE LEVEL - Abnormal; Notable for the following components:    Salicylate, Serum <0.0 (*)     All other components within normal limits   ACETAMINOPHEN LEVEL - Abnormal; Notable for the following components:    Acetaminophen Level <5 (*)     All other components within normal limits   URINE DRUG SCREEN - Abnormal; Notable for the following components:    Amphetamine Screen, Urine POSITIVE (*)     Cocaine Metabolite Screen, Urine POSITIVE (*)     All other components within normal limits   LIPID PANEL - Abnormal; Notable for the following components: Triglycerides 287 (*)     HDL 26 (*)     All other components within normal limits    Narrative:     Telma Headley tel. [phone],  Chemistry results called to and read back by Lauren Veras, 11/21/2022  19:59, by [address]   POCT ARTERIAL - Abnormal; Notable for the following components:    POC Potassium 2.9 (*)     pCO2, Arterial 52 (*)     pO2, Arterial 57 (*)     HCO3, Arterial 31.4 (*)     Base Excess, Arterial 6 (*)     O2 Sat, Arterial 88 (*)     TCO2, Arterial 33 (*)     All other components within normal limits   COVID-19, RAPID   ETHANOL   LACTIC ACID   MAGNESIUM    Narrative:     Telma Headley tel. [phone],  Chemistry results called to and read back by Lauren Veras, 11/21/2022  19:59, by [address]   TSH WITH REFLEX   CK    Narrative:     Telma Headley tel. [phone],  Chemistry results called to and read back by Lauren Veras, 11/21/2022  19:59, by FFYRE   TROPONIN   PROTIME-INR   APTT   PROCALCITONIN   POCT EPOC BLOOD GAS, LACTIC ACID, ICA     EKG NSR HR 80 regular intervals QT/Qtc 382/440 Qrs 106 no axis deviation    MDM:   Vitals:    Vitals:    11/21/22 2216 11/21/22 2245 11/21/22 2300 11/21/22 2316   BP: 132/80 120/82 (!) 120/98 127/84   Pulse: 71 75 86 78   Resp: 16 17 16    Temp:    98 °F (36.7 °C)   TempSrc:    Oral   SpO2: 100% 100% 99% 100%   Weight:           57-year-old male patient presents to the emergency department with altered mental status. Concern for drug overdose per family members that called EMS. Patient with history of polysubstance abuse, overdose. Patient presented GCS 12, moving all extremities equally and responding to commands, eyes open spontaneously, mumbling verbal responses. On 4 L nasal cannula. Temp 96.6 which normalizes during stay. VSS. ABG demonstrating acute respiratory failure with hypercapnia and hypoxia on 4 L nasal cannula. CO2 52. O2 57.  pH 7.388.  Repeat ABG done approximately 5 hrs later, on 2L NC, improvement in hypercapnia and hypoxia. Work-up remarkable for potassium 3.0 which was repleted with 10 mEq IV. Urine drug screen positive for cocaine and amphetamines likely contributing to his condition today. Patient is adamantly denying cocaine use but does admit to amphetamine use today. I spoke with family who endorses concern for substance use. Patient given IVF in the ED. Over time while in the ED patient becomes increasingly more alert, he becomes oriented x4, GCS 15 though he does still intermittently mumble his responses are oriented. Patient adamantly denies any suicidal ideation or intent this evening. Remaining work-up relatively unremarkable. CT head nonacute. EKG NSR no significant QTC or QRS prolongation. CK WNL. TSH within normal limits. Acetaminophen, salicylate, EtOH negative. CMP with chronically elevated alk phos. CBC WNL. No evidence of underlying infectious etiology contributing to condition. With patient still having lethargy while in the ED and not completely at baseline mentation, requiring 2 L nasal cannula, will admit. Dr. Nic corado admitting. CRITICAL CARE TIME   Total CriticalCare time was 13 minutes, excluding separately reportable procedures. There was a high probability of clinically significant/life threatening deterioration in the patient's condition which required my urgent intervention. PROCEDURES:  Unlessotherwise noted below, none      Procedures      FINAL IMPRESSION      1. Altered mental status, unspecified altered mental status type    2. Acute respiratory failure with hypoxia and hypercapnia (HCC)    3. Cocaine abuse ([facility] [address]    4.  Amphetamine abuse [facility])          DISPOSITION/PLAN   DISPOSITION Decision To Admit 11/21/2022 08:22:03 PM          DAVID [place] (electronically signed)  Attending Emergency Physician          Sayra [place]  11/22/22 0009

## 2022-11-21 NOTE — Clinical Note
Discharge Plan[de-identified] Other/[facility])   Telemetry/Cardiac Monitoring Required?: Yes

## 2022-11-22 VITALS
BODY MASS INDEX: 30.68 KG/M2 | SYSTOLIC BLOOD PRESSURE: 136 MMHG | HEART RATE: 68 BPM | DIASTOLIC BLOOD PRESSURE: 92 MMHG | WEIGHT: 220 LBS | OXYGEN SATURATION: 100 % | RESPIRATION RATE: 16 BRPM | TEMPERATURE: 98 F

## 2022-11-22 PROBLEM — R41.82 AMS (ALTERED MENTAL STATUS): Status: ACTIVE | Noted: 2022-11-22

## 2022-11-22 LAB
EKG ATRIAL RATE: 80 BPM
EKG P AXIS: 53 DEGREES
EKG P-R INTERVAL: 158 MS
EKG Q-T INTERVAL: 382 MS
EKG QRS DURATION: 106 MS
EKG QTC CALCULATION (BAZETT): 440 MS
EKG R AXIS: 24 DEGREES
EKG T AXIS: 57 DEGREES
EKG VENTRICULAR RATE: 80 BPM
PROCALCITONIN: 0.09 NG/ML (ref 0–0.15)

## 2022-11-22 PROCEDURE — G0378 HOSPITAL OBSERVATION PER HR: HCPCS

## 2022-11-22 ASSESSMENT — ENCOUNTER SYMPTOMS
NAUSEA: 0
SHORTNESS OF BREATH: 0
TROUBLE SWALLOWING: 0
SORE THROAT: 0
CONSTIPATION: 0
ABDOMINAL DISTENTION: 0
VOMITING: 0
RHINORRHEA: 0
DIARRHEA: 0
BACK PAIN: 0
PHOTOPHOBIA: 0
ABDOMINAL PAIN: 0
COUGH: 0
COLOR CHANGE: 0
CHEST TIGHTNESS: 0

## 2022-11-22 NOTE — H&P
[facility]  MEDICINE    HISTORY AND PHYSICAL EXAM    PATIENT NAME:  Jamel Flores MRN:  [id number]  SERVICE DATE:  11/22/2022   SERVICE TIME:  12:31 AM    Primary Care Physician: Darío Alvarado MD     SUBJECTIVE  CHIEF COMPLAINT:  Drug Overdose (Took an unknown amount of lyrica and suboxone. Last known well 2 hours prior to arrival )       HPI: Jamel Flores is a 56 y.o. male  has a past medical history of DIOGENES (acute kidney injury) ([facility] [address] (10/8/2021), Aspiration into airway, Chronic pain, Heroin use, Hypertension, Lumbar radiculopathy, chronic, Metabolic encephalopathy, Non-traumatic rhabdomyolysis, Obesity, and Pneumonia. that is being admitted for AMS (altered mental status). Nona Oconnell was interviewed while in Ed. He was sleeping when I entered the room. HE presented the the emergency department with a change in mental status. Per ED a family friend found him unresponsive at home. Family friend thinks it was a lyrica overdose but EMS reports suboxone and Cialis at the bedside. Patient is prescribed suboxone and lyrica but not Cialis. Per ED note patient recently admitted to taking Cialis. Patient admits to taking 5 benadryl and some over the counter pain medications. His last dose of suboxone was 11/21/22 in the am.  He is alert and oriented. He also states he took Lyrica on 11/21/22 in Am. He states he couldn't sleep at night so he took the bendryl. He states he has been taking benadryl every night for the last 3 days. He lives alone. He is taking Cialis. He states he takes that 2 times a day. He denies any other substances. He is denying use of cocaine, and denies the use of amphetamines. HPI  While in the emergency department, he was given 500 mL bolus, potassium. CXR negative, CT head showed frontal, ethmoid and maxillary sinus mucosal thickening. He was afebrile and on room air.  Labs are remarkable for potassium of 3.0, UDS positive for cocaine and meloxicam (MOBIC) 15 MG tablet Take 1 tablet by mouth daily 11/4/22 12/4/22  Kinjal Kinsey MD   promethazine (PHENERGAN) 25 MG tablet Take 25 mg by mouth every 6 hours as needed   Patient not taking: Reported on 11/4/2022 9/28/21   Historical Provider, MD   atorvastatin (LIPITOR) 10 MG tablet Take 10 mg by mouth daily   Patient not taking: Reported on 11/4/2022 6/8/21   Historical Provider, MD   vitamin D (CHOLECALCIFEROL) 82554 UNIT CAPS Take 50,000 Units by mouth once a week   Patient not taking: Reported on 11/4/2022 1/31/20   Historical Provider, MD   omeprazole (PRILOSEC) 20 MG delayed release capsule Take 20 mg by mouth daily  Patient not taking: Reported on 11/4/2022 11/12/19   Historical Provider, MD   naloxone 4 MG/0.1ML LIQD nasal spray by Nasal route  Patient not taking: Reported on 11/4/2022 1/27/20   Historical Provider, MD   acetaminophen (TYLENOL) 500 MG tablet Take 1 tablet by mouth every 6 hours as needed for Pain  Patient not taking: Reported on 11/4/2022 1/22/20   Mauricio Cardozo MD   albuterol sulfate HFA (PROAIR HFA) 108 (90 Base) MCG/ACT inhaler Use every 4 hours while awake for 7-10 days then PRN wheezing  Dispense with SPACER and Instruct on use. May sub Ventolin or Proventil as needed per [employer]. Patient not taking: Reported on 11/4/2022 11/9/19   DAVID Medina   escitalopram (LEXAPRO) 10 MG tablet Take 10 mg by mouth nightly  Patient not taking: Reported on 11/4/2022    Historical Provider, MD   pregabalin (LYRICA) 150 MG capsule Take 300 mg by mouth 2 times daily. Historical Provider, MD   buprenorphine-naloxone (SUBOXONE) 8-2 MG FILM SL film Place under the tongue 2 times daily. Historical Provider, MD       ALLERGIES: Patient has no known allergies. REVIEW OF SYSTEM:   Review of Systems   Constitutional:  Negative for chills, fatigue and fever. HENT:  Negative for congestion, rhinorrhea, sore throat and trouble swallowing.     Eyes:  Negative for photophobia and visual disturbance. Respiratory:  Negative for cough, chest tightness and shortness of breath. Cardiovascular:  Negative for chest pain, palpitations and leg swelling. Gastrointestinal:  Negative for abdominal distention, abdominal pain, constipation, diarrhea, nausea and vomiting. Genitourinary:  Negative for difficulty urinating, flank pain and hematuria. Musculoskeletal:  Negative for back pain and gait problem. Skin:  Negative for color change and wound. Neurological:  Negative for dizziness, syncope, speech difficulty, weakness, light-headedness, numbness and headaches. Psychiatric/Behavioral:  Negative for behavioral problems and confusion. OBJECTIVE  PHYSICAL EXAM: BP (!) 127/92   Pulse 74   Temp 98 °F (36.7 °C) (Oral)   Resp 16   Wt 220 lb (99.8 kg)   SpO2 100%   BMI 30.68 kg/m²   Physical Exam  Vitals and nursing note reviewed. Constitutional:       General: He is awake. He is not in acute distress. Appearance: Normal appearance. He is well-developed. He is obese. He is not ill-appearing, toxic-appearing or diaphoretic. HENT:      Head: Normocephalic and atraumatic. Nose: Nose normal. No congestion or rhinorrhea. Mouth/Throat:      Lips: Pink. Mouth: Mucous membranes are dry. Tongue: Tongue does not deviate from midline. Pharynx: Oropharynx is clear. No oropharyngeal exudate or posterior oropharyngeal erythema. Eyes:      General: Lids are normal. No visual field deficit or scleral icterus. Extraocular Movements: Extraocular movements intact. Right eye: No nystagmus. Left eye: No nystagmus. Conjunctiva/sclera: Conjunctivae normal.   Neck:      Thyroid: No thyromegaly. Vascular: No JVD. Trachea: Trachea and phonation normal.   Cardiovascular:      Rate and Rhythm: Normal rate and regular rhythm. Pulses: Normal pulses. Radial pulses are 2+ on the right side and 2+ on the left side.         Dorsalis pedis pulses are 2+ on the right side and 2+ on the left side. Heart sounds: Normal heart sounds, S1 normal and S2 normal. No murmur heard. Pulmonary:      Effort: Pulmonary effort is normal. No respiratory distress. Breath sounds: Normal air entry. No stridor or decreased air movement. Rales present. No decreased breath sounds, wheezing or rhonchi. Chest:      Chest wall: No tenderness. Abdominal:      General: Abdomen is flat. Bowel sounds are normal. There is no distension. Palpations: Abdomen is soft. Tenderness: There is no abdominal tenderness. There is no guarding. Negative signs include Dominguez's sign and McBurney's sign. Musculoskeletal:         General: No swelling, tenderness, deformity or signs of injury. Normal range of motion. Cervical back: Normal range of motion and neck supple. No rigidity or tenderness. Right lower leg: No edema. Left lower leg: No edema. Feet:      Right foot:      Skin integrity: Skin integrity normal.      Left foot:      Skin integrity: Skin integrity normal.   Skin:     General: Skin is warm and dry. Capillary Refill: Capillary refill takes less than 2 seconds. Coloration: Skin is not jaundiced or pale. Findings: No bruising, erythema, lesion or rash. Nails: There is no clubbing. Neurological:      General: No focal deficit present. Mental Status: He is oriented to person, place, and time. Mental status is at baseline. Sensory: Sensation is intact. Motor: Motor function is intact. No weakness. Psychiatric:         Attention and Perception: He is inattentive. Speech: Speech normal.         Behavior: Behavior is cooperative. Thought Content: Thought content normal.         Cognition and Memory: Cognition normal. He exhibits impaired recent memory. Neurologic Exam     Mental Status   Oriented to person, place, and time.    Speech: speech is normal      DATA:     Diagnostic tests reviewed for today's visit:    Most recent labs and imaging results reviewed.      LABS:    Recent Results (from the past 24 hour(s))   Lactic Acid    Collection Time: 11/21/22  6:30 PM   Result Value Ref Range    Lactic Acid 1.8 0.5 - 2.2 mmol/L   Urinalysis with Reflex to Culture    Collection Time: 11/21/22  6:30 PM    Specimen: Urine   Result Value Ref Range    Color, UA Yellow Straw/Yellow    Clarity, UA Clear Clear    Glucose, Ur Negative Negative mg/dL    Bilirubin Urine Negative Negative    Ketones, Urine Negative Negative mg/dL    Specific Gravity, UA 1.006 1.005 - 1.030    Blood, Urine Negative Negative    pH, UA 6.0 5.0 - 9.0    Protein, UA Negative Negative mg/dL    Urobilinogen, Urine 0.2 <2.0 E.U./dL    Nitrite, Urine Negative Negative    Leukocyte Esterase, Urine Negative Negative    Urine Reflex to Culture Not Indicated    URINE DRUG SCREEN    Collection Time: 11/21/22  6:30 PM   Result Value Ref Range    Amphetamine Screen, Urine POSITIVE (A) Negative <1000 ng/mL    Barbiturate Screen, Ur Neg Negative < 200 ng/mL    Benzodiazepine Screen, Urine Neg Negative < 200 ng/mL    Cannabinoid Scrn, Ur Neg Negative < 50 ng/mL    Cocaine Metabolite Screen, Urine POSITIVE (A) Negative < 300 ng/mL    Opiate Scrn, Ur Neg Negative < 300 ng/mL    PCP Screen, Urine Neg Negative < 25 ng/mL    Methadone Screen, Urine Neg Negative <300 ng/mL    Propoxyphene Scrn, Ur Neg Negative <300 ng/mL    Oxycodone Urine Neg Negative <100 ng/mL    FENTANYL SCREEN, URINE Neg Negative < 50 ng/mL    Drug Screen Comment: see below    CBC with Auto Differential    Collection Time: 11/21/22  6:45 PM   Result Value Ref Range    WBC 8.6 4.8 - 10.8 K/uL    RBC 4.84 4.70 - 6.10 M/uL    Hemoglobin 15.2 14.0 - 18.0 g/dL    Hematocrit 46.2 42.0 - 52.0 %    MCV 95.4 (H) 79.0 - 92.2 fL    MCH 31.5 (H) 27.0 - 31.3 pg    MCHC 33.0 33.0 - 37.0 %    RDW 16.2 (H) 11.5 - 14.5 %    Platelets 017 879 - 330 K/uL    Neutrophils % 68.0 %    Lymphocytes % 22.8 %    Monocytes % 7.0 %    Eosinophils % 1.8 %    Basophils % 0.4 %    Neutrophils Absolute 5.8 1.4 - 6.5 K/uL    Lymphocytes Absolute 2.0 1.0 - 4.8 K/uL    Monocytes Absolute 0.6 0.2 - 0.8 K/uL    Eosinophils Absolute 0.2 0.0 - 0.7 K/uL    Basophils Absolute 0.0 0.0 - 0.2 K/uL   CMP    Collection Time: 11/21/22  6:45 PM   Result Value Ref Range    Sodium 141 135 - 144 mEq/L    Potassium 3.0 (LL) 3.4 - 4.9 mEq/L    Chloride 96 95 - 107 mEq/L    CO2 34 (H) 20 - 31 mEq/L    Anion Gap 11 9 - 15 mEq/L    Glucose 95 70 - 99 mg/dL    BUN 7 6 - 20 mg/dL    Creatinine 0.90 0.70 - 1.20 mg/dL    Est, Glom Filt Rate >60.0 >60    Calcium 9.7 8.5 - 9.9 mg/dL    Total Protein 7.6 6.3 - 8.0 g/dL    Albumin 4.2 3.5 - 4.6 g/dL    Total Bilirubin 0.3 0.2 - 0.7 mg/dL    Alkaline Phosphatase 116 (H) 35 - 104 U/L    ALT 15 0 - 41 U/L    AST 17 0 - 40 U/L    Globulin 3.4 2.3 - 3.5 g/dL   ETOH    Collection Time: 11/21/22  6:45 PM   Result Value Ref Range    Ethanol Lvl <10 mg/dL    Ethanol percent Not indicated G/dL   Magnesium    Collection Time: 11/21/22  6:45 PM   Result Value Ref Range    Magnesium 2.0 1.7 - 2.4 mg/dL   Salicylate    Collection Time: 11/21/22  6:45 PM   Result Value Ref Range    Salicylate, Serum <1.5 (L) 15.0 - 30.0 mg/dL   Acetaminophen Level    Collection Time: 11/21/22  6:45 PM   Result Value Ref Range    Acetaminophen Level <5 (L) 10 - 30 ug/mL   TSH with Reflex    Collection Time: 11/21/22  6:45 PM   Result Value Ref Range    TSH 2.270 0.440 - 3.860 uIU/mL   Lipid Panel    Collection Time: 11/21/22  6:45 PM   Result Value Ref Range    Cholesterol, Total 169 0 - 199 mg/dL    Triglycerides 287 (H) 0 - 150 mg/dL    HDL 26 (L) 40 - 59 mg/dL    LDL Calculated 86 0 - 129 mg/dL   CK    Collection Time: 11/21/22  6:45 PM   Result Value Ref Range    Total  0 - 190 U/L   Troponin    Collection Time: 11/21/22  6:45 PM   Result Value Ref Range    Troponin <0.010 0.000 - 0.010 ng/mL   Protime-INR    Collection Time: 11/21/22  6:45 PM   Result Value Ref Range    Protime 13.1 12.3 - 14.9 sec    INR 1.0    APTT    Collection Time: 11/21/22  6:45 PM   Result Value Ref Range    aPTT 30.6 24.4 - 36.8 sec   EKG 12 Lead    Collection Time: 11/21/22  6:45 PM   Result Value Ref Range    Ventricular Rate 80 BPM    Atrial Rate 80 BPM    P-R Interval 158 ms    QRS Duration 106 ms    Q-T Interval 382 ms    QTc Calculation (Bazett) 440 ms    P Axis 53 degrees    R Axis 24 degrees    T Axis 57 degrees   COVID-19, Rapid    Collection Time: 11/21/22  7:18 PM    Specimen: Nasopharyngeal Swab   Result Value Ref Range    SARS-CoV-2, NAAT Not Detected Not Detected   POCT Arterial    Collection Time: 11/21/22  7:44 PM   Result Value Ref Range    POC Sodium 141 136 - 145 mEq/L    POC Potassium 2.9 (LL) 3.5 - 5.1 mEq/L    POC Chloride 101 99 - 110 mEq/L    POC Glucose 93 70 - 99 mg/dl    POC Creatinine 0.8 0.8 - 1.3 mg/dL    Est, Glom Filt Rate >60 >60    Calcium, Ionized 1.21 1.12 - 1.32 mmol/L    pH, Arterial 7.388 7.350 - 7.450    pCO2, Arterial 52 (H) 35 - 45 mm Hg    pO2, Arterial 57 (HH) 75 - 108 mm Hg    HCO3, Arterial 31.4 (H) 21.0 - 29.0 mmol/L    Base Excess, Arterial 6 (H) -3 - 3    O2 Sat, Arterial 88 (LL) 93 - 100 %    TCO2, Arterial 33 (H) 21 - 32 mmol/L    Lactate 0.84 0.40 - 2.00 mmol/L    POC Hematocrit 46 41 - 53 %    Hemoglobin 15.8 13.5 - 17.5 gm/dL    FIO2 2.000     Sample Type ART     Performed on SEE BELOW    POCT Arterial    Collection Time: 11/21/22 11:31 PM   Result Value Ref Range    POC Sodium 143 136 - 145 mEq/L    POC Potassium 3.1 (L) 3.5 - 5.1 mEq/L    POC Chloride 101 99 - 110 mEq/L    POC Glucose 92 70 - 99 mg/dl    POC Creatinine 0.7 (L) 0.8 - 1.3 mg/dL    Est, Glom Filt Rate >60 >60    Calcium, Ionized 1.22 1.12 - 1.32 mmol/L    pH, Arterial 7.416 7.350 - 7.450    pCO2, Arterial 48 (H) 35 - 45 mm Hg    pO2, Arterial 71 (HH) 75 - 108 mm Hg    HCO3, Arterial 30.9 (H) 21.0 - 29.0 mmol/L    Base Excess, Arterial 6 (H) -3 - 3    O2 Sat, Arterial 94 (HH) 93 - 100 %    TCO2, Arterial 32 21 - 32 mmol/L    Lactate 0.82 0.40 - 2.00 mmol/L    POC Hematocrit 48 41 - 53 %    Hemoglobin 16.4 13.5 - 17.5 gm/dL    FIO2 2.000     Sample Type ART     Performed on SEE BELOW        IMAGING:   CT HEAD WO CONTRAST    Result Date: 11/21/2022  No acute intracranial abnormality. Frontal, ethmoid and maxillary sinus mucosal thickening. XR CHEST PORTABLE    Result Date: 11/21/2022  No acute process. ASSESSMENT AND PLAN    Patient left AMA. He was 97% on Room air, alert and oriented x3. He denies thoughts of self harm, or harm to others. He states he took the benadryl to help him sleep. He did not intend to harm himself.       SIGNATURE: PEGGY Jones - CNP  DATE: November 22, 2022  TIME: 12:31 AM     Juany Martinez DO - supervising physician

## 2022-11-22 NOTE — ED NOTES
Per Jerry Larios no need to call poison control. Pt resting in bed, drowsy, skin w/d/pink, responds to commands, 0 c/o, 0 distress, sr on monitor.      Kavitha Shankar RN  11/21/22 [id number]

## 2022-11-22 NOTE — ED NOTES
Pt attempts to use urinal, pt refuses straight cath, pt a&ox4, skin w/d/pink, drowsy, skin w/d/pink, pt denies n&v, 0 pain, 0 distress.  2l nc remains on, ascencion Caldwell, RN  11/21/22 2122

## 2022-11-22 NOTE — ED NOTES
Pt used urinal, obt. Urine sample to lab. Urine noted yellow and clear.      Kavitha Shankar RN  11/21/22 0017

## 2022-11-22 NOTE — ED NOTES
Report called to [id number] [facility], [address] [place] [address]  11/22/22 0993

## 2023-02-26 PROBLEM — M47.816 LUMBAR SPONDYLOSIS: Status: ACTIVE | Noted: 2023-02-26

## 2023-02-26 PROBLEM — E78.2 MIXED HYPERLIPIDEMIA: Status: ACTIVE | Noted: 2023-02-26

## 2023-02-26 PROBLEM — Z87.39 H/O: GOUT: Status: ACTIVE | Noted: 2023-02-26

## 2023-02-26 PROBLEM — Z87.81 H/O FRACTURE OF ANKLE: Status: ACTIVE | Noted: 2023-02-26

## 2023-02-26 PROBLEM — N13.70 VESICOURETERAL-REFLUX, UNSPECIFIED: Status: ACTIVE | Noted: 2023-02-26

## 2023-02-26 PROBLEM — F41.9 ANXIETY: Status: ACTIVE | Noted: 2023-02-26

## 2023-02-26 PROBLEM — M96.1 LUMBAR POST-LAMINECTOMY SYNDROME: Status: ACTIVE | Noted: 2023-02-26

## 2023-02-26 PROBLEM — Z13.89 SCREENING FOR MULTIPLE CONDITIONS: Status: ACTIVE | Noted: 2023-02-26

## 2023-02-26 PROBLEM — S82.899A FRACTURE OF ANKLE: Status: ACTIVE | Noted: 2023-02-26

## 2023-02-26 PROBLEM — K76.0 FATTY LIVER: Status: ACTIVE | Noted: 2023-02-26

## 2023-02-26 PROBLEM — I10 HYPERTENSION, ESSENTIAL: Status: ACTIVE | Noted: 2023-02-26

## 2023-02-26 PROBLEM — F32.1 CURRENT MODERATE EPISODE OF MAJOR DEPRESSIVE DISORDER WITHOUT PRIOR EPISODE (MULTI): Status: ACTIVE | Noted: 2023-02-26

## 2023-02-26 PROBLEM — K21.9 CHRONIC GERD: Status: ACTIVE | Noted: 2023-02-26

## 2023-02-26 PROBLEM — E66.09 CLASS 2 OBESITY DUE TO EXCESS CALORIES WITH BODY MASS INDEX (BMI) OF 36.0 TO 36.9 IN ADULT: Status: ACTIVE | Noted: 2023-02-26

## 2023-02-26 PROBLEM — E53.8 VITAMIN B 12 DEFICIENCY: Status: ACTIVE | Noted: 2023-02-26

## 2023-02-26 PROBLEM — E66.812 OBESITY, CLASS II, BMI 35-39.9: Status: ACTIVE | Noted: 2023-02-26

## 2023-02-26 PROBLEM — Z79.899 MEDICATION MANAGEMENT: Status: ACTIVE | Noted: 2023-02-26

## 2023-02-26 PROBLEM — R73.01 IFG (IMPAIRED FASTING GLUCOSE): Status: ACTIVE | Noted: 2023-02-26

## 2023-02-26 PROBLEM — G47.33 OBSTRUCTIVE SLEEP APNEA: Status: ACTIVE | Noted: 2023-02-26

## 2023-02-26 PROBLEM — Z00.00 ROUTINE ADULT HEALTH MAINTENANCE: Chronic | Status: ACTIVE | Noted: 2023-02-26

## 2023-02-26 PROBLEM — M48.02 DEGENERATIVE CERVICAL SPINAL STENOSIS: Status: ACTIVE | Noted: 2023-02-26

## 2023-02-26 PROBLEM — I25.84 CORONARY ARTERY CALCIFICATION: Status: ACTIVE | Noted: 2023-02-26

## 2023-02-26 PROBLEM — I25.10 CORONARY ARTERY CALCIFICATION: Status: ACTIVE | Noted: 2023-02-26

## 2023-02-26 PROBLEM — E79.0 HYPERURICEMIA: Status: ACTIVE | Noted: 2023-02-26

## 2023-02-26 PROBLEM — E88.810 METABOLIC SYNDROME: Status: ACTIVE | Noted: 2023-02-26

## 2023-02-26 PROBLEM — D64.9 ANEMIA: Status: ACTIVE | Noted: 2023-02-26

## 2023-02-26 PROBLEM — F11.20 UNCOMPLICATED OPIOID DEPENDENCE (MULTI): Status: ACTIVE | Noted: 2023-02-26

## 2023-02-26 PROBLEM — Z71.89 CARDIAC RISK COUNSELING: Status: ACTIVE | Noted: 2023-02-26

## 2023-02-26 PROBLEM — Z71.89 ADVANCED DIRECTIVES, COUNSELING/DISCUSSION: Status: ACTIVE | Noted: 2023-02-26

## 2023-02-26 PROBLEM — E66.9 OBESITY, CLASS II, BMI 35-39.9: Status: ACTIVE | Noted: 2023-02-26

## 2023-02-26 PROBLEM — E55.9 VITAMIN D DEFICIENCY: Status: ACTIVE | Noted: 2023-02-26

## 2023-02-26 PROBLEM — Z00.00 ROUTINE ADULT HEALTH MAINTENANCE: Status: ACTIVE | Noted: 2023-02-26

## 2023-05-30 ENCOUNTER — TELEPHONE (OUTPATIENT)
Dept: PRIMARY CARE | Facility: CLINIC | Age: 58
End: 2023-05-30
Payer: COMMERCIAL

## 2023-05-30 NOTE — TELEPHONE ENCOUNTER
Lab orders are still in   Relayed to patient   He will have labs done.     He should still set up another CPE appt ,please assist with scheduling

## 2023-05-30 NOTE — TELEPHONE ENCOUNTER
Pt had an appointment in April and did not show.  Called on 5/30 to inquire about scheduling bloodwork that Dr. Francis recommended.  Please note new phone number given today.

## 2023-06-02 PROBLEM — E11.9 TYPE 2 DIABETES MELLITUS, WITHOUT LONG-TERM CURRENT USE OF INSULIN (MULTI): Status: ACTIVE | Noted: 2023-02-26

## 2023-06-02 LAB
ANION GAP IN SER/PLAS: 15 MMOL/L (ref 10–20)
BASOPHILS (10*3/UL) IN BLOOD BY AUTOMATED COUNT: 0.08 X10E9/L (ref 0–0.1)
BASOPHILS/100 LEUKOCYTES IN BLOOD BY AUTOMATED COUNT: 0.8 % (ref 0–2)
CALCIDIOL (25 OH VITAMIN D3) (NG/ML) IN SER/PLAS: 39 NG/ML
CALCIUM (MG/DL) IN SER/PLAS: 8.7 MG/DL (ref 8.6–10.3)
CARBON DIOXIDE, TOTAL (MMOL/L) IN SER/PLAS: 29 MMOL/L (ref 21–32)
CHLORIDE (MMOL/L) IN SER/PLAS: 95 MMOL/L (ref 98–107)
CHOLESTEROL (MG/DL) IN SER/PLAS: 158 MG/DL (ref 0–199)
CHOLESTEROL IN HDL (MG/DL) IN SER/PLAS: 20.6 MG/DL
CHOLESTEROL/HDL RATIO: 7.7
COBALAMIN (VITAMIN B12) (PG/ML) IN SER/PLAS: 340 PG/ML (ref 211–911)
CREATININE (MG/DL) IN SER/PLAS: 1.15 MG/DL (ref 0.5–1.3)
EOSINOPHILS (10*3/UL) IN BLOOD BY AUTOMATED COUNT: 0.13 X10E9/L (ref 0–0.7)
EOSINOPHILS/100 LEUKOCYTES IN BLOOD BY AUTOMATED COUNT: 1.3 % (ref 0–6)
ERYTHROCYTE DISTRIBUTION WIDTH (RATIO) BY AUTOMATED COUNT: 16.8 % (ref 11.5–14.5)
ERYTHROCYTE MEAN CORPUSCULAR HEMOGLOBIN CONCENTRATION (G/DL) BY AUTOMATED: 32.1 G/DL (ref 32–36)
ERYTHROCYTE MEAN CORPUSCULAR VOLUME (FL) BY AUTOMATED COUNT: 96 FL (ref 80–100)
ERYTHROCYTES (10*6/UL) IN BLOOD BY AUTOMATED COUNT: 4.75 X10E12/L (ref 4.5–5.9)
ESTIMATED AVERAGE GLUCOSE FOR HBA1C: 140 MG/DL
FASTING GLUCOSE (MG/DL) IN SER/PLAS: 124 MG/DL (ref 74–99)
GFR MALE: 75 ML/MIN/1.73M2
GLUCOSE (MG/DL) IN SER/PLAS: 132 MG/DL (ref 74–99)
HEMATOCRIT (%) IN BLOOD BY AUTOMATED COUNT: 45.5 % (ref 41–52)
HEMOGLOBIN (G/DL) IN BLOOD: 14.6 G/DL (ref 13.5–17.5)
HEMOGLOBIN A1C/HEMOGLOBIN TOTAL IN BLOOD: 6.5 %
IMMATURE GRANULOCYTES/100 LEUKOCYTES IN BLOOD BY AUTOMATED COUNT: 0.4 % (ref 0–0.9)
LDL: 76 MG/DL (ref 0–99)
LEUKOCYTES (10*3/UL) IN BLOOD BY AUTOMATED COUNT: 10.1 X10E9/L (ref 4.4–11.3)
LYMPHOCYTES (10*3/UL) IN BLOOD BY AUTOMATED COUNT: 2.05 X10E9/L (ref 1.2–4.8)
LYMPHOCYTES/100 LEUKOCYTES IN BLOOD BY AUTOMATED COUNT: 20.2 % (ref 13–44)
MONOCYTES (10*3/UL) IN BLOOD BY AUTOMATED COUNT: 0.76 X10E9/L (ref 0.1–1)
MONOCYTES/100 LEUKOCYTES IN BLOOD BY AUTOMATED COUNT: 7.5 % (ref 2–10)
NEUTROPHILS (10*3/UL) IN BLOOD BY AUTOMATED COUNT: 7.08 X10E9/L (ref 1.2–7.7)
NEUTROPHILS/100 LEUKOCYTES IN BLOOD BY AUTOMATED COUNT: 69.8 % (ref 40–80)
NON HDL CHOLESTEROL: 137 MG/DL
PLATELETS (10*3/UL) IN BLOOD AUTOMATED COUNT: 247 X10E9/L (ref 150–450)
POTASSIUM (MMOL/L) IN SER/PLAS: 4.1 MMOL/L (ref 3.5–5.3)
SODIUM (MMOL/L) IN SER/PLAS: 135 MMOL/L (ref 136–145)
TRIGLYCERIDE (MG/DL) IN SER/PLAS: 305 MG/DL (ref 0–149)
URATE (MG/DL) IN SER/PLAS: 10.9 MG/DL (ref 4–7.5)
UREA NITROGEN (MG/DL) IN SER/PLAS: 6 MG/DL (ref 6–23)
VLDL: 61 MG/DL (ref 0–40)

## 2023-06-07 ENCOUNTER — OFFICE VISIT (OUTPATIENT)
Dept: PRIMARY CARE | Facility: CLINIC | Age: 58
End: 2023-06-07
Payer: COMMERCIAL

## 2023-06-07 ENCOUNTER — DOCUMENTATION (OUTPATIENT)
Dept: PRIMARY CARE | Facility: CLINIC | Age: 58
End: 2023-06-07

## 2023-06-07 VITALS
DIASTOLIC BLOOD PRESSURE: 67 MMHG | SYSTOLIC BLOOD PRESSURE: 110 MMHG | TEMPERATURE: 97.9 F | HEART RATE: 89 BPM | BODY MASS INDEX: 40.94 KG/M2 | OXYGEN SATURATION: 91 % | HEIGHT: 70 IN | WEIGHT: 286 LBS

## 2023-06-07 DIAGNOSIS — E66.01 OBESITY, CLASS III, BMI 40-49.9 (MORBID OBESITY) (MULTI): ICD-10-CM

## 2023-06-07 DIAGNOSIS — E78.1 HIGH TRIGLYCERIDES: ICD-10-CM

## 2023-06-07 DIAGNOSIS — Z00.01 ENCOUNTER FOR WELL ADULT EXAM WITH ABNORMAL FINDINGS: Primary | ICD-10-CM

## 2023-06-07 DIAGNOSIS — E78.2 MIXED HYPERLIPIDEMIA: ICD-10-CM

## 2023-06-07 DIAGNOSIS — R79.81 LOW OXYGEN SATURATION: ICD-10-CM

## 2023-06-07 DIAGNOSIS — I10 HYPERTENSION, ESSENTIAL: ICD-10-CM

## 2023-06-07 DIAGNOSIS — E79.0 HYPERURICEMIA: ICD-10-CM

## 2023-06-07 DIAGNOSIS — E11.65 TYPE 2 DIABETES MELLITUS WITH HYPERGLYCEMIA, WITHOUT LONG-TERM CURRENT USE OF INSULIN (MULTI): ICD-10-CM

## 2023-06-07 DIAGNOSIS — M47.816 LUMBAR SPONDYLOSIS: ICD-10-CM

## 2023-06-07 DIAGNOSIS — F17.200 CURRENT SMOKER: ICD-10-CM

## 2023-06-07 DIAGNOSIS — Z71.89 CARDIAC RISK COUNSELING: ICD-10-CM

## 2023-06-07 PROBLEM — E66.813 OBESITY, CLASS III, BMI 40-49.9 (MORBID OBESITY): Status: ACTIVE | Noted: 2023-06-07

## 2023-06-07 PROBLEM — E66.09 CLASS 2 OBESITY DUE TO EXCESS CALORIES WITH BODY MASS INDEX (BMI) OF 36.0 TO 36.9 IN ADULT: Status: RESOLVED | Noted: 2023-02-26 | Resolved: 2023-06-07

## 2023-06-07 PROBLEM — E66.812 CLASS 2 OBESITY DUE TO EXCESS CALORIES WITH BODY MASS INDEX (BMI) OF 36.0 TO 36.9 IN ADULT: Status: RESOLVED | Noted: 2023-02-26 | Resolved: 2023-06-07

## 2023-06-07 PROCEDURE — 99396 PREV VISIT EST AGE 40-64: CPT | Performed by: NURSE PRACTITIONER

## 2023-06-07 PROCEDURE — 99417 PROLNG OP E/M EACH 15 MIN: CPT | Performed by: NURSE PRACTITIONER

## 2023-06-07 PROCEDURE — 3044F HG A1C LEVEL LT 7.0%: CPT | Performed by: NURSE PRACTITIONER

## 2023-06-07 PROCEDURE — 4004F PT TOBACCO SCREEN RCVD TLK: CPT | Performed by: NURSE PRACTITIONER

## 2023-06-07 PROCEDURE — 3074F SYST BP LT 130 MM HG: CPT | Performed by: NURSE PRACTITIONER

## 2023-06-07 PROCEDURE — 99214 OFFICE O/P EST MOD 30 MIN: CPT | Performed by: NURSE PRACTITIONER

## 2023-06-07 PROCEDURE — 3078F DIAST BP <80 MM HG: CPT | Performed by: NURSE PRACTITIONER

## 2023-06-07 RX ORDER — ATORVASTATIN CALCIUM 10 MG/1
1 TABLET, FILM COATED ORAL DAILY
COMMUNITY
Start: 2021-06-08 | End: 2023-06-07 | Stop reason: SDUPTHER

## 2023-06-07 RX ORDER — ALLOPURINOL 100 MG/1
TABLET ORAL
Status: ON HOLD | COMMUNITY
Start: 2023-01-10 | End: 2023-09-30 | Stop reason: ENTERED-IN-ERROR

## 2023-06-07 RX ORDER — METFORMIN HYDROCHLORIDE 500 MG/1
1000 TABLET ORAL
Qty: 120 TABLET | Refills: 11 | Status: SHIPPED | OUTPATIENT
Start: 2023-06-07 | End: 2024-04-16 | Stop reason: ALTCHOICE

## 2023-06-07 RX ORDER — TADALAFIL 20 MG/1
TABLET ORAL
COMMUNITY
Start: 2023-05-31 | End: 2023-10-05 | Stop reason: HOSPADM

## 2023-06-07 RX ORDER — ICOSAPENT ETHYL 1000 MG/1
2 CAPSULE ORAL
Qty: 360 CAPSULE | Refills: 3 | Status: SHIPPED | OUTPATIENT
Start: 2023-06-07 | End: 2024-01-12 | Stop reason: SDUPTHER

## 2023-06-07 RX ORDER — BUPRENORPHINE AND NALOXONE 8; 2 MG/1; MG/1
FILM, SOLUBLE BUCCAL; SUBLINGUAL
COMMUNITY
End: 2023-10-05 | Stop reason: HOSPADM

## 2023-06-07 RX ORDER — PREGABALIN 300 MG/1
300 CAPSULE ORAL 2 TIMES DAILY
COMMUNITY
End: 2023-06-07 | Stop reason: SDUPTHER

## 2023-06-07 RX ORDER — PROMETHAZINE HYDROCHLORIDE 25 MG/1
25 TABLET ORAL EVERY 6 HOURS PRN
COMMUNITY
Start: 2021-09-28 | End: 2023-10-05 | Stop reason: HOSPADM

## 2023-06-07 RX ORDER — ESCITALOPRAM OXALATE 20 MG/1
20 TABLET ORAL DAILY
COMMUNITY
Start: 2023-05-30 | End: 2024-04-16 | Stop reason: WASHOUT

## 2023-06-07 RX ORDER — ATORVASTATIN CALCIUM 10 MG/1
10 TABLET, FILM COATED ORAL DAILY
Qty: 90 TABLET | Refills: 3 | Status: SHIPPED | OUTPATIENT
Start: 2023-06-07 | End: 2023-10-20 | Stop reason: HOSPADM

## 2023-06-07 RX ORDER — PREGABALIN 300 MG/1
300 CAPSULE ORAL 2 TIMES DAILY
Qty: 180 CAPSULE | Refills: 0 | Status: SHIPPED | OUTPATIENT
Start: 2023-06-07 | End: 2023-08-30 | Stop reason: SDUPTHER

## 2023-06-07 RX ORDER — SEMAGLUTIDE 0.68 MG/ML
INJECTION, SOLUTION SUBCUTANEOUS
Qty: 3 ML | Refills: 1 | Status: SHIPPED | OUTPATIENT
Start: 2023-06-07 | End: 2023-08-09 | Stop reason: SDUPTHER

## 2023-06-07 ASSESSMENT — PATIENT HEALTH QUESTIONNAIRE - PHQ9
2. FEELING DOWN, DEPRESSED OR HOPELESS: NOT AT ALL
SUM OF ALL RESPONSES TO PHQ9 QUESTIONS 1 AND 2: 0
1. LITTLE INTEREST OR PLEASURE IN DOING THINGS: NOT AT ALL

## 2023-06-07 NOTE — PROGRESS NOTES
Subjective   Patient ID: Gm Thrasher is a 58 y.o. male who presents for CPE.    Has not been on allopurinol in last month   Uric acid is up on lab  Has not been on cholesterol med  Needs refill  Diabetes  Still smoking  Tried chantix  Gained weight  Doesn't feel SOB or CP  Has back pain with sciatica  Uses lyrica  Tolerates well        Review of Systems  ROS completely negative except what was mentioned in the HPI.  Problem List, surgical, social, and family histories which were reviewed and updated as necessary within the EMR. I also personally reviewed the notes, labs, and imaging that pertained to what was documented or discussed in the HPI.      Objective   Physical Exam  Constitutional:       Appearance: Normal appearance. He is obese.   HENT:      Head: Normocephalic and atraumatic.      Right Ear: Tympanic membrane, ear canal and external ear normal.      Left Ear: Tympanic membrane, ear canal and external ear normal.      Nose: Nose normal.      Mouth/Throat:      Lips: Pink.      Mouth: Mucous membranes are moist.      Dentition: Dental caries present.   Eyes:      Extraocular Movements: Extraocular movements intact.      Conjunctiva/sclera: Conjunctivae normal.      Pupils: Pupils are equal, round, and reactive to light.   Cardiovascular:      Rate and Rhythm: Normal rate and regular rhythm.      Pulses: Normal pulses.      Heart sounds: Normal heart sounds.   Pulmonary:      Effort: Accessory muscle usage present.      Breath sounds: Decreased air movement present.   Abdominal:      General: Abdomen is protuberant. Bowel sounds are normal.      Tenderness: There is no abdominal tenderness.   Musculoskeletal:         General: Normal range of motion.      Cervical back: Normal range of motion and neck supple.   Skin:     General: Skin is warm and dry.   Neurological:      General: No focal deficit present.      Mental Status: He is alert and oriented to person, place, and time. Mental status is at  "baseline.      Cranial Nerves: No cranial nerve deficit.      Sensory: No sensory deficit.      Motor: No weakness.      Coordination: Coordination normal.      Gait: Gait normal.      Deep Tendon Reflexes: Reflexes normal.   Psychiatric:         Mood and Affect: Mood normal.         Behavior: Behavior normal.         Thought Content: Thought content normal.         Judgment: Judgment normal.         /67 (BP Location: Left arm)   Pulse 89   Temp 36.6 °C (97.9 °F) (Temporal)   Ht 1.79 m (5' 10.47\")   Wt 130 kg (286 lb)   SpO2 91%   BMI 40.49 kg/m²     Assessment/Plan   Problem List Items Addressed This Visit       Cardiac risk counseling     Declined CINEMA  Restart statin  Add vascepa  Ozempic and metformin for diabetes  BP currently controlled  Smoking cessation program ordered         Current smoker     Smoking cessation program referral  Declined CT lung cancer screening         Relevant Orders    Referral to Tobacco Cessation Counseling    XR chest 2 views    Encounter for well adult exam with abnormal findings - Primary    High triglycerides     Added vascepa         Relevant Medications    Vascepa 1 gram capsule    Hypertension, essential     Not on medication  Currently controlled  monitor         Hyperuricemia     Stopped allopurinol, UA increased  Refilled  He will restart         Low oxygen saturation     Does not feel SOB today  Did have cigarette immediately prior to appt  Will get CXR  Declined CT lung          Relevant Orders    XR chest 2 views    Lumbar spondylosis     Lyrica  CSA/UDS 12/14/22  CSV/OARRS 6/7/23 completed today         Relevant Medications    pregabalin (Lyrica) 300 mg capsule    Mixed hyperlipidemia     Has been noncompliant with statin  He needs refilled, completed         Relevant Medications    atorvastatin (Lipitor) 10 mg tablet    Vascepa 1 gram capsule    Obesity, Class III, BMI 40-49.9 (morbid obesity) (CMS/HCC)     Declined nutritionist  Ozempic ordered for DM2   "       Relevant Medications    semaglutide (Ozempic) 0.25 mg or 0.5 mg (2 mg/3 mL) pen injector    Vascepa 1 gram capsule    Type 2 diabetes mellitus, without long-term current use of insulin (CMS/[facility])     Declined Nutritionist, DM education program, or CINEMA  Will start him on Metformin & Ozempic         Relevant Medications    semaglutide (Ozempic) 0.25 mg or 0.5 mg (2 mg/3 mL) pen injector    atorvastatin (Lipitor) 10 mg tablet    metFORMIN (Glucophage) 500 mg tablet     OARRS:  Janet Giraldo, APRN-CNP on 6/7/2023  9:37 AM  I have personally reviewed the OARRS report for Gm Thrasher. I have considered the risks of abuse, dependence, addiction and diversion and I believe that it is clinically appropriate for Gm Thrasher to be prescribed this medication    Is the patient prescribed a combination of a benzodiazepine and opioid?  No    Last Urine Drug Screen / ordered today: Yes  Recent Results (from the past 80780 hour(s))   OPIATE/OPIOID/BENZO PRESCRIPTION COMPLIANCE    Collection Time: 12/14/22 10:44 AM   Result Value Ref Range    DRUG SCREEN COMMENT URINE SEE BELOW     Creatine, Urine 106.5 mg/dL    Amphetamine Screen, Urine PRESUMPTIVE NEGATIVE NEGATIVE    Barbiturate Screen, Urine PRESUMPTIVE NEGATIVE NEGATIVE    Cannabinoid Screen, Urine PRESUMPTIVE NEGATIVE NEGATIVE    Cocaine Screen, Urine PRESUMPTIVE NEGATIVE NEGATIVE    PCP Screen, Urine PRESUMPTIVE NEGATIVE NEGATIVE    7-Aminoclonazepam <25 Cutoff <25 ng/mL    Alpha-Hydroxyalprazolam <25 Cutoff <25 ng/mL    Alpha-Hydroxymidazolam <25 Cutoff <25 ng/mL    Alprazolam <25 Cutoff <25 ng/mL    Chlordiazepoxide <25 Cutoff <25 ng/mL    Clonazepam <25 Cutoff <25 ng/mL    Diazepam <25 Cutoff <25 ng/mL    Lorazepam <25 Cutoff <25 ng/mL    Midazolam <25 Cutoff <25 ng/mL    Nordiazepam <25 Cutoff <25 ng/mL    Oxazepam <25 Cutoff <25 ng/mL    Temazepam <25 Cutoff <25 ng/mL    Zolpidem <25 Cutoff <25 ng/mL    Zolpidem Metabolite (ZCA) <25 Cutoff <25 ng/mL     6-Acetylmorphine <25 Cutoff <25 ng/mL    Codeine <50 Cutoff <50 ng/mL    Hydrocodone <25 Cutoff <25 ng/mL    Hydromorphone <25 Cutoff <25 ng/mL    Morphine Urine <50 Cutoff <50 ng/mL    Norhydrocodone <25 Cutoff <25 ng/mL    Noroxycodone <25 Cutoff <25 ng/mL    Oxycodone <25 Cutoff <25 ng/mL    Oxymorphone <25 Cutoff <25 ng/mL    Tramadol <50 Cutoff <50 ng/mL    O-Desmethyltramadol <50 Cutoff <50 ng/mL    Fentanyl <2.5 Cutoff<2.5 ng/mL    Norfentanyl <2.5 Cutoff<2.5 ng/mL    METHADONE CONFIRMATION,URINE <25 Cutoff <25 ng/mL    EDDP <25 Cutoff <25 ng/mL     Results are as expected.     Controlled Substance Agreement:  Date of the Last Agreement: 12/14/2022  Reviewed Controlled Substance Agreement including but not limited to the benefits, risks, and alternatives to treatment with a Controlled Substance medication(s).    Lyrica:  What is the patient's goal of therapy? Pain relief for sciatica in left leg  Is this being achieved with current treatment? yes    Pain Assessment:  Analgesia  What was your pain level on average during the past week?: 6  What was your pain level at its worst during the past week?: 9  What percentage of your pain has been relieved during the past week?: 75 %  Is the amount of pain relief you are now obtaining from your current pain relievers enough to make a real difference in your life?: Y  Query to Clinician: Is the patient's pain relief clinically significant?: Yes    Activities of Daily Living  Physical Functioning: Better  Family Relationships: Same  Social Relationships: Same  Mood: Same  Sleep Patterns: Same  Overall Functioning: Better    Adverse Events  Is patient experiencing any side effects from current pain relievers?: N  Patient's Overall Severity of Side Effects: None      Assessment  Is your overall impression that this patient is benefiting from opioid therapy?: No  Specific Analgesic Plan: Continue present regimen

## 2023-06-07 NOTE — ASSESSMENT & PLAN NOTE
Declined CINEMA  Restart statin  Add vascepa  Ozempic and metformin for diabetes  BP currently controlled  Smoking cessation program ordered

## 2023-06-07 NOTE — PATIENT INSTRUCTIONS
Smoking cessation program  Ozempic  0.25mg every week for 1 mo, then 0.5mg every week for 1 mo, then notify me before next refill.  Will increase from there if tolerating  Restart allopurinol  Restart lipitor  Start metformin twice daily for diabetes  Start Vascepa twice daily for high triglycerides

## 2023-06-07 NOTE — ASSESSMENT & PLAN NOTE
Does not feel SOB today  Did have cigarette immediately prior to appt  Will get CXR  Declined CT lung

## 2023-06-16 ENCOUNTER — PATIENT OUTREACH (OUTPATIENT)
Dept: PRIMARY CARE | Facility: CLINIC | Age: 58
End: 2023-06-16
Payer: COMMERCIAL

## 2023-06-16 DIAGNOSIS — E66.01 OBESITY, CLASS III, BMI 40-49.9 (MORBID OBESITY) (MULTI): ICD-10-CM

## 2023-06-16 DIAGNOSIS — E11.65 TYPE 2 DIABETES MELLITUS WITH HYPERGLYCEMIA, WITHOUT LONG-TERM CURRENT USE OF INSULIN (MULTI): ICD-10-CM

## 2023-06-16 NOTE — PROGRESS NOTES
Patient introduced to [facility] Program.    Verbal consent given to enroll.    My contact info was provided for any needs that may arise.    Patient is aware I will call to check in in the next week.      Problem: Medication Adherence  Goal: Adherence to Medication Regimen  Outcome: Progressing    Patient is doing very well on ozempic. Took his first dose on Saturday. His goal is to have surgery on his back so he'd like to lose weight first.

## 2023-06-22 ENCOUNTER — PATIENT OUTREACH (OUTPATIENT)
Dept: PRIMARY CARE | Facility: CLINIC | Age: 58
End: 2023-06-22
Payer: COMMERCIAL

## 2023-06-22 DIAGNOSIS — E11.65 TYPE 2 DIABETES MELLITUS WITH HYPERGLYCEMIA, WITHOUT LONG-TERM CURRENT USE OF INSULIN (MULTI): ICD-10-CM

## 2023-06-22 DIAGNOSIS — M47.816 LUMBAR SPONDYLOSIS: ICD-10-CM

## 2023-06-22 PROCEDURE — 99490 CHRNC CARE MGMT STAFF 1ST 20: CPT | Performed by: INTERNAL MEDICINE

## 2023-06-22 NOTE — PROGRESS NOTES
General assessment Comleted.  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  Will increase from there if tolerating  Restart allopurinol  Restart lipitor  Start metformin twice daily for diabetes  Start Vascepa twice daily for high triglycerides

## 2023-07-18 ENCOUNTER — PATIENT OUTREACH (OUTPATIENT)
Dept: PRIMARY CARE | Facility: CLINIC | Age: 58
End: 2023-07-18
Payer: COMMERCIAL

## 2023-07-18 DIAGNOSIS — E11.65 TYPE 2 DIABETES MELLITUS WITH HYPERGLYCEMIA, WITHOUT LONG-TERM CURRENT USE OF INSULIN (MULTI): ICD-10-CM

## 2023-07-18 DIAGNOSIS — M47.816 LUMBAR SPONDYLOSIS: ICD-10-CM

## 2023-07-18 PROCEDURE — 99490 CHRNC CARE MGMT STAFF 1ST 20: CPT | Performed by: INTERNAL MEDICINE

## 2023-07-18 NOTE — PROGRESS NOTES
Right Knee flared up  Needs to be drained  Dr. Mak will take care of it  Off of [address]  Did this for him last time  Going on Friday  Miserable with pain  Last time he drained it he said it was due to gout  3x bigger than Left knee    Increased Ozempic to 0.5mg weekly  No side effects at all  Appetite is much more controlled  Fast food, candy and desserts have lost their appeal  Trying to eat salad    Confirmed his follow up appointment with KIANA Giraldo  End of August  Due 9/2 for Hgb A1C repeat

## 2023-07-19 DIAGNOSIS — M17.11 PRIMARY OSTEOARTHRITIS OF RIGHT KNEE: Primary | ICD-10-CM

## 2023-07-20 ENCOUNTER — OFFICE VISIT (OUTPATIENT)
Dept: ORTHOPEDIC SURGERY | Age: 58
End: 2023-07-20

## 2023-07-20 VITALS
WEIGHT: 260 LBS | TEMPERATURE: 97.4 F | HEIGHT: 71 IN | SYSTOLIC BLOOD PRESSURE: 98 MMHG | OXYGEN SATURATION: 95 % | DIASTOLIC BLOOD PRESSURE: 68 MMHG | BODY MASS INDEX: 36.4 KG/M2 | RESPIRATION RATE: 16 BRPM | HEART RATE: 83 BPM

## 2023-07-20 DIAGNOSIS — M10.9 ACUTE GOUT OF RIGHT KNEE, UNSPECIFIED CAUSE: Primary | ICD-10-CM

## 2023-07-20 DIAGNOSIS — M10.9 ACUTE GOUT OF RIGHT KNEE, UNSPECIFIED CAUSE: ICD-10-CM

## 2023-07-20 LAB
APPEARANCE FLUID: NORMAL
CELL COUNT FLUID TYPE: NORMAL
CLOT EVALUATION: NORMAL
COLOR FLUID: YELLOW
FLUID PATH CONSULT: YES
LYMPHOCYTES, BODY FLUID: 1 %
MONOCYTE, FLUID: 7 %
NEUTROPHIL, FLUID: 93 %
NUCLEATED CELLS FLUID: 2385 /CUMM
NUMBER OF CELLS COUNTED FLUID: 100
RBC FLUID: 6141 /CUMM

## 2023-07-20 RX ORDER — TADALAFIL 20 MG/1
TABLET ORAL
COMMUNITY
Start: 2023-06-28

## 2023-07-20 RX ORDER — ICOSAPENT ETHYL 1000 MG/1
CAPSULE ORAL
COMMUNITY
Start: 2023-06-09

## 2023-07-20 RX ORDER — ESCITALOPRAM OXALATE 20 MG/1
20 TABLET ORAL DAILY
COMMUNITY
Start: 2023-06-27

## 2023-07-20 RX ORDER — PREGABALIN 300 MG/1
300 CAPSULE ORAL 2 TIMES DAILY
COMMUNITY
Start: 2023-06-10

## 2023-07-20 RX ORDER — TRIAMCINOLONE ACETONIDE 40 MG/ML
80 INJECTION, SUSPENSION INTRA-ARTICULAR; INTRAMUSCULAR ONCE
Status: COMPLETED | OUTPATIENT
Start: 2023-07-20 | End: 2023-07-20

## 2023-07-20 RX ORDER — LIDOCAINE HYDROCHLORIDE 10 MG/ML
8 INJECTION, SOLUTION INFILTRATION; PERINEURAL ONCE
Status: COMPLETED | OUTPATIENT
Start: 2023-07-20 | End: 2023-07-20

## 2023-07-20 RX ADMIN — LIDOCAINE HYDROCHLORIDE 8 ML: 10 INJECTION, SOLUTION INFILTRATION; PERINEURAL at 11:39

## 2023-07-20 RX ADMIN — TRIAMCINOLONE ACETONIDE 80 MG: 40 INJECTION, SUSPENSION INTRA-ARTICULAR; INTRAMUSCULAR at 11:40

## 2023-07-20 NOTE — PROGRESS NOTES
Patient ID:  Rip Morelos is a 66 y.o. male who presents today for follow up of right knee pain. He was seen in November when we aspirated his knee and gave him a steroid injection for gout. He is here today for 4-day flareup of pain and swelling in the knee. He believes that it is gout once again. Injury: no    Location: right  knee pain, located on the global aspect of the knee  Pain: yes; 8 on a scale of 1 to 10  Onset: sudden  Duration: 4 days  Frequency:  occurs constantly  Quality: aching and boring   Swelling: patient notes continuous swelling of the joint  Aggravating factors: weight bearing activity, standing, and walking  Alleviating factors: removing weight from leg and rest  Mechanical symptoms: none  Radiation: no    Activities: walking with a cane  Restriction:  decreased ambulatory tolerance  Progression:  worsening    Previous treatment:  none  NSAIDs:  none  PT:  none    Medications:    Current Outpatient Medications on File Prior to Visit   Medication Sig Dispense Refill    escitalopram (LEXAPRO) 20 MG tablet Take 1 tablet by mouth daily      VASCEPA 1 g CAPS capsule take 2 capsules by mouth twice a day with meals      metFORMIN (GLUCOPHAGE) 500 MG tablet take 2 tablets by mouth twice a day with meals      pregabalin (LYRICA) 300 MG capsule Take 1 capsule by mouth 2 times daily.       tadalafil (CIALIS) 20 MG tablet take 1 tablet by mouth 30 MINUTES to 1 hour prior to intercourse if needed      promethazine (PHENERGAN) 25 MG tablet Take 1 tablet by mouth every 6 hours as needed      atorvastatin (LIPITOR) 10 MG tablet Take 1 tablet by mouth daily      vitamin D (CHOLECALCIFEROL) 14882 UNIT CAPS Take 1 capsule by mouth once a week      omeprazole (PRILOSEC) 20 MG delayed release capsule Take 1 capsule by mouth daily      naloxone 4 MG/0.1ML LIQD nasal spray by Nasal route      acetaminophen (TYLENOL) 500 MG tablet Take 1 tablet by mouth every 6 hours as needed for Pain 120 tablet 3

## 2023-07-21 LAB — PATH CONSULT FLUID: NORMAL

## 2023-07-24 LAB
CRYSTALS, FLUID: POSITIVE
SPECIMEN TYPE: ABNORMAL

## 2023-07-26 LAB — BACTERIA FLD AEROBE CULT: NORMAL

## 2023-08-08 ENCOUNTER — PATIENT OUTREACH (OUTPATIENT)
Dept: PRIMARY CARE | Facility: CLINIC | Age: 58
End: 2023-08-08
Payer: COMMERCIAL

## 2023-08-08 DIAGNOSIS — E11.65 TYPE 2 DIABETES MELLITUS WITH HYPERGLYCEMIA, WITHOUT LONG-TERM CURRENT USE OF INSULIN (MULTI): ICD-10-CM

## 2023-08-08 DIAGNOSIS — M47.816 LUMBAR SPONDYLOSIS: ICD-10-CM

## 2023-08-08 PROCEDURE — 99490 CHRNC CARE MGMT STAFF 1ST 20: CPT | Performed by: INTERNAL MEDICINE

## 2023-08-08 NOTE — PROGRESS NOTES
Tried to do Ozempic by himself 2 weeks ago  Supposed to start 0.5mg dose  Usually Ling was helping him  He wanted to do it himself - thinks he messed up and either broke the pen  Or gave himself all of the medication  Has had a Headache since  He states the pen won't dial to a dose  Just makes a clicking noise    He can come to office tomorrow  I explained that we have a demo pen  I will show him how to properly use it  He will bring his medicine from home  So I can check it and verify it's empty or broken    Saw surgeon Dr. Amezcua  He is scheduled to get surgery

## 2023-08-08 NOTE — PROGRESS NOTES
Yes if he is coming in for nurse visit we can dispense  Just order as sample and log in book  Pt just has to have a visit of some sort  Can't just hand out med

## 2023-08-09 ENCOUNTER — DOCUMENTATION (OUTPATIENT)
Dept: PRIMARY CARE | Facility: CLINIC | Age: 58
End: 2023-08-09
Payer: COMMERCIAL

## 2023-08-09 ENCOUNTER — TELEPHONE (OUTPATIENT)
Dept: PRIMARY CARE | Facility: CLINIC | Age: 58
End: 2023-08-09
Payer: COMMERCIAL

## 2023-08-09 DIAGNOSIS — E66.01 OBESITY, CLASS III, BMI 40-49.9 (MORBID OBESITY) (MULTI): ICD-10-CM

## 2023-08-09 DIAGNOSIS — E11.65 TYPE 2 DIABETES MELLITUS WITH HYPERGLYCEMIA, WITHOUT LONG-TERM CURRENT USE OF INSULIN (MULTI): ICD-10-CM

## 2023-08-09 DIAGNOSIS — M47.816 LUMBAR SPONDYLOSIS: ICD-10-CM

## 2023-08-09 RX ORDER — SEMAGLUTIDE 0.68 MG/ML
INJECTION, SOLUTION SUBCUTANEOUS
Qty: 3 ML | Refills: 1 | Status: SHIPPED | OUTPATIENT
Start: 2023-08-09 | End: 2023-10-30 | Stop reason: DRUGHIGH

## 2023-08-09 NOTE — PROGRESS NOTES
Patient came to office.  Using Ozempic Demo pen I showed him how to properly give himself his medication.  He demonstrated understanding by priming pen, dialing to correct dose, and  how he would give himself the injection.  All questions answered.

## 2023-08-30 ENCOUNTER — OFFICE VISIT (OUTPATIENT)
Dept: PRIMARY CARE | Facility: CLINIC | Age: 58
End: 2023-08-30
Payer: COMMERCIAL

## 2023-08-30 VITALS
HEART RATE: 91 BPM | HEIGHT: 70 IN | OXYGEN SATURATION: 93 % | BODY MASS INDEX: 38.08 KG/M2 | TEMPERATURE: 96.4 F | SYSTOLIC BLOOD PRESSURE: 115 MMHG | DIASTOLIC BLOOD PRESSURE: 77 MMHG | WEIGHT: 266 LBS

## 2023-08-30 DIAGNOSIS — M47.816 LUMBAR SPONDYLOSIS: ICD-10-CM

## 2023-08-30 DIAGNOSIS — E66.01 CLASS 2 SEVERE OBESITY DUE TO EXCESS CALORIES WITH SERIOUS COMORBIDITY AND BODY MASS INDEX (BMI) OF 39.0 TO 39.9 IN ADULT (MULTI): ICD-10-CM

## 2023-08-30 DIAGNOSIS — F17.200 CURRENT SMOKER: ICD-10-CM

## 2023-08-30 DIAGNOSIS — E11.65 TYPE 2 DIABETES MELLITUS WITH HYPERGLYCEMIA, WITHOUT LONG-TERM CURRENT USE OF INSULIN (MULTI): Primary | ICD-10-CM

## 2023-08-30 PROBLEM — E66.812 CLASS 2 SEVERE OBESITY DUE TO EXCESS CALORIES WITH SERIOUS COMORBIDITY AND BODY MASS INDEX (BMI) OF 39.0 TO 39.9 IN ADULT: Status: ACTIVE | Noted: 2023-06-07

## 2023-08-30 PROCEDURE — 3078F DIAST BP <80 MM HG: CPT | Performed by: NURSE PRACTITIONER

## 2023-08-30 PROCEDURE — 3008F BODY MASS INDEX DOCD: CPT | Performed by: NURSE PRACTITIONER

## 2023-08-30 PROCEDURE — 99213 OFFICE O/P EST LOW 20 MIN: CPT | Performed by: NURSE PRACTITIONER

## 2023-08-30 PROCEDURE — 3074F SYST BP LT 130 MM HG: CPT | Performed by: NURSE PRACTITIONER

## 2023-08-30 PROCEDURE — 4004F PT TOBACCO SCREEN RCVD TLK: CPT | Performed by: NURSE PRACTITIONER

## 2023-08-30 PROCEDURE — 3044F HG A1C LEVEL LT 7.0%: CPT | Performed by: NURSE PRACTITIONER

## 2023-08-30 RX ORDER — PREGABALIN 300 MG/1
300 CAPSULE ORAL 2 TIMES DAILY
Qty: 180 CAPSULE | Refills: 0 | Status: SHIPPED | OUTPATIENT
Start: 2023-08-30 | End: 2024-01-12 | Stop reason: SDUPTHER

## 2023-08-30 NOTE — PROGRESS NOTES
Subjective   Patient ID: Gm Thrasher is a 58 y.o. male who presents for Medication (Follow up for ozempic/Lumbar Surgery 9/29/23/Dr. Vitaly Amezcua).    Second dose of 0.5mg given yesterday  Tolerating well  Lost 20 lbs  Having headaches  Think its from neck  Having back surgery  9/29/23  Can lyrica be increased?  Taking vascepa and statin  Tolerating  Cut back on smoking  Down from 2 packs to 1/2 pack per day  Just joined gym, has not started yet        Review of Systems  ROS completely negative except what was mentioned in the HPI.  Problem List, surgical, social, and family histories which were reviewed and updated as necessary within the EMR. I also personally reviewed the notes, labs, and imaging that pertained to what was documented or discussed in the HPI.      Objective   Physical Exam  Vitals and nursing note reviewed.   Constitutional:       General: He is not in acute distress.     Appearance: Normal appearance. He is not ill-appearing.   HENT:      Head: Normocephalic and atraumatic.      Right Ear: External ear normal.      Left Ear: External ear normal.      Nose: Nose normal.      Mouth/Throat:      Mouth: Mucous membranes are moist.   Eyes:      Extraocular Movements: Extraocular movements intact.      Conjunctiva/sclera: Conjunctivae normal.      Pupils: Pupils are equal, round, and reactive to light.   Cardiovascular:      Rate and Rhythm: Normal rate and regular rhythm.      Heart sounds: Normal heart sounds.   Pulmonary:      Effort: Pulmonary effort is normal.      Breath sounds: Normal breath sounds.   Musculoskeletal:         General: Normal range of motion.      Cervical back: Normal range of motion and neck supple.   Skin:     General: Skin is warm and dry.   Neurological:      General: No focal deficit present.      Mental Status: He is alert and oriented to person, place, and time. Mental status is at baseline.   Psychiatric:         Mood and Affect: Mood normal.         Behavior:  "Behavior normal.         Thought Content: Thought content normal.         Judgment: Judgment normal.         /77 (BP Location: Left arm)   Pulse 91   Temp 35.8 °C (96.4 °F) (Temporal)   Ht 1.778 m (5' 10\")   Wt 121 kg (266 lb)   SpO2 93%   BMI 38.17 kg/m²     Assessment/Plan    Problem List Items Addressed This Visit       Class 2 severe obesity due to excess calories with serious comorbidity and body mass index (BMI) of 39.0 to 39.9 in adult (CMS/[facility])    Current Assessment & Plan     On ozempic, has dropped 20 lbs so far  Joined gym but not started yet  Has cut back on smoking from 2 ppd to 1/2 ppd  Cut back on mountain dew  Attempting to eat healthier         Current smoker    Current Assessment & Plan     Has cut back from 2 ppd to 1/2 ppd         Lumbar spondylosis    Overview     He has progressed through conservative care including anti-inflammatory medications and Lyrica as well as injections with pain management though symptoms continue         Current Assessment & Plan     Lyrica refilled, at max dose  Has upcoming spinal surgery         Relevant Medications    pregabalin (Lyrica) 300 mg capsule    Type 2 diabetes mellitus, without long-term current use of insulin (CMS/[facility]) - Primary    Overview     5/23: Hba1c 6.5%    On Metformin & Ozempic         Current Assessment & Plan     On ozempic, finished 2nd week at 0.5mg  Weight dropped from 286 to 266  Tolerating well  Continue           "

## 2023-08-30 NOTE — ASSESSMENT & PLAN NOTE
On ozempic, has dropped 20 lbs so far  Joined gym but not started yet  Has cut back on smoking from 2 ppd to 1/2 ppd  Cut back on mountain dew  Attempting to eat healthier

## 2023-09-11 ENCOUNTER — PATIENT OUTREACH (OUTPATIENT)
Dept: PRIMARY CARE | Facility: CLINIC | Age: 58
End: 2023-09-11
Payer: COMMERCIAL

## 2023-09-11 DIAGNOSIS — E66.01 CLASS 2 SEVERE OBESITY DUE TO EXCESS CALORIES WITH SERIOUS COMORBIDITY AND BODY MASS INDEX (BMI) OF 39.0 TO 39.9 IN ADULT (MULTI): ICD-10-CM

## 2023-09-11 DIAGNOSIS — E11.65 TYPE 2 DIABETES MELLITUS WITH HYPERGLYCEMIA, WITHOUT LONG-TERM CURRENT USE OF INSULIN (MULTI): ICD-10-CM

## 2023-09-11 PROCEDURE — 99490 CHRNC CARE MGMT STAFF 1ST 20: CPT | Performed by: INTERNAL MEDICINE

## 2023-09-11 NOTE — PROGRESS NOTES
"Tolerating Ozempic  Typically eating only 1 meal per day  Enjoys fruit, eats hand sandwich  Appetite is very low  Checked BP at home - 117/72    Feels he is moving better    Surgery scheduled 9/29  3 herniated discs- getting spacers  Dr. Vitaly Cazares at [facility]  Has PAT on the 9/20    Chart Reviewed:  Office Visit Dr. Amezcua 8/3/23  Patient Discussion/Summary    The patient is a 58-year-old male who presents to clinic with a chief complaint of low back and left lower extremity pain. He states that his back pain is always present, but is much worse with standing and walking. He describes shooting pain down the lateral aspect of his left leg and the foot, which feels like \"lightning\". He also experiences intermittent left groin pain as well. He denies any right lower extremity pain, but states that both legs will give out on him and that he has suffered multiple falls. He has been using a walking stick to ambulate. He has undergone 2 prior lumbar surgeries. He has also had a cervical fusion. He states that he has had a left foot drop since his last lumbar surgery, which was approximately 25 years ago. Recent treatments include multiple injections, Lyrica, oral steroids and Suboxone. He has not had any recent physical therapy, but states that he has been doing a home exercise program. His MRI shows multilevel degenerative changes resulting in severe central stenosis at L3-4 and moderate central stenosis at L4-5. There is severe bilateral foraminal stenosis at L3-4, L4-5 and L5-S1. I discussed multiple treatment options with the patient, including further conservative care as well as treatment surgical approaches. I told him that to fully decompress the nerves and restore disc height, I would recommend a 3 level interbody fusion. I discussed proceeding with L3-4 and L4-5 XLIF followed by L3-5 laminectomies, L5-S1 TLIF and L3-S1 posterior instrumented fusion. I went over the risks associate with surgery, including " infection, bleeding, neurologic injury, spinal fluid leak, and the need for further procedures. All of the patient's questions were answered and he has elected to proceed with surgery.

## 2023-09-20 LAB
ABO GROUP (TYPE) IN BLOOD: NORMAL
ACTIVATED PARTIAL THROMBOPLASTIN TIME IN PPP BY COAGULATION ASSAY: 35 SEC (ref 27–38)
ALANINE AMINOTRANSFERASE (SGPT) (U/L) IN SER/PLAS: 15 U/L (ref 10–52)
ALBUMIN (G/DL) IN SER/PLAS: 3.7 G/DL (ref 3.4–5)
ALKALINE PHOSPHATASE (U/L) IN SER/PLAS: 82 U/L (ref 33–120)
ANION GAP IN SER/PLAS: 11 MMOL/L (ref 10–20)
ANTIBODY SCREEN: NORMAL
ASPARTATE AMINOTRANSFERASE (SGOT) (U/L) IN SER/PLAS: 24 U/L (ref 9–39)
BASOPHILS (10*3/UL) IN BLOOD BY AUTOMATED COUNT: 0.06 X10E9/L (ref 0–0.1)
BASOPHILS/100 LEUKOCYTES IN BLOOD BY AUTOMATED COUNT: 0.5 % (ref 0–2)
BILIRUBIN TOTAL (MG/DL) IN SER/PLAS: 0.5 MG/DL (ref 0–1.2)
CALCIUM (MG/DL) IN SER/PLAS: 9.1 MG/DL (ref 8.6–10.3)
CARBON DIOXIDE, TOTAL (MMOL/L) IN SER/PLAS: 31 MMOL/L (ref 21–32)
CHLORIDE (MMOL/L) IN SER/PLAS: 97 MMOL/L (ref 98–107)
CREATININE (MG/DL) IN SER/PLAS: 0.94 MG/DL (ref 0.5–1.3)
EOSINOPHILS (10*3/UL) IN BLOOD BY AUTOMATED COUNT: 0.4 X10E9/L (ref 0–0.7)
EOSINOPHILS/100 LEUKOCYTES IN BLOOD BY AUTOMATED COUNT: 3.4 % (ref 0–6)
ERYTHROCYTE DISTRIBUTION WIDTH (RATIO) BY AUTOMATED COUNT: 16.8 % (ref 11.5–14.5)
ERYTHROCYTE MEAN CORPUSCULAR HEMOGLOBIN CONCENTRATION (G/DL) BY AUTOMATED: 32.1 G/DL (ref 32–36)
ERYTHROCYTE MEAN CORPUSCULAR VOLUME (FL) BY AUTOMATED COUNT: 93 FL (ref 80–100)
ERYTHROCYTES (10*6/UL) IN BLOOD BY AUTOMATED COUNT: 5.08 X10E12/L (ref 4.5–5.9)
GFR MALE: >90 ML/MIN/1.73M2
GLUCOSE (MG/DL) IN SER/PLAS: 87 MG/DL (ref 74–99)
HEMATOCRIT (%) IN BLOOD BY AUTOMATED COUNT: 47 % (ref 41–52)
HEMOGLOBIN (G/DL) IN BLOOD: 15.1 G/DL (ref 13.5–17.5)
IMMATURE GRANULOCYTES/100 LEUKOCYTES IN BLOOD BY AUTOMATED COUNT: 0.5 % (ref 0–0.9)
INR IN PPP BY COAGULATION ASSAY: 1.1 (ref 0.9–1.1)
LEUKOCYTES (10*3/UL) IN BLOOD BY AUTOMATED COUNT: 11.6 X10E9/L (ref 4.4–11.3)
LYMPHOCYTES (10*3/UL) IN BLOOD BY AUTOMATED COUNT: 3.48 X10E9/L (ref 1.2–4.8)
LYMPHOCYTES/100 LEUKOCYTES IN BLOOD BY AUTOMATED COUNT: 30 % (ref 13–44)
MONOCYTES (10*3/UL) IN BLOOD BY AUTOMATED COUNT: 0.68 X10E9/L (ref 0.1–1)
MONOCYTES/100 LEUKOCYTES IN BLOOD BY AUTOMATED COUNT: 5.9 % (ref 2–10)
NEUTROPHILS (10*3/UL) IN BLOOD BY AUTOMATED COUNT: 6.93 X10E9/L (ref 1.2–7.7)
NEUTROPHILS/100 LEUKOCYTES IN BLOOD BY AUTOMATED COUNT: 59.7 % (ref 40–80)
NRBC (PER 100 WBCS) BY AUTOMATED COUNT: 0 /100 WBC (ref 0–0)
PLATELETS (10*3/UL) IN BLOOD AUTOMATED COUNT: 276 X10E9/L (ref 150–450)
POTASSIUM (MMOL/L) IN SER/PLAS: 4 MMOL/L (ref 3.5–5.3)
PROTEIN TOTAL: 7.8 G/DL (ref 6.4–8.2)
PROTHROMBIN TIME (PT) IN PPP BY COAGULATION ASSAY: 12 SEC (ref 9.8–12.8)
RBC, URINE: NORMAL /HPF (ref 0–5)
RH FACTOR: NORMAL
SODIUM (MMOL/L) IN SER/PLAS: 135 MMOL/L (ref 136–145)
SQUAMOUS EPITHELIAL CELLS, URINE: <1 /HPF
STAPH/MRSA SCREEN, CULTURE: NORMAL
UREA NITROGEN (MG/DL) IN SER/PLAS: 9 MG/DL (ref 6–23)
WBC, URINE: NORMAL /HPF (ref 0–5)

## 2023-09-29 ENCOUNTER — HOSPITAL ENCOUNTER (INPATIENT)
Dept: DATA CONVERSION | Facility: HOSPITAL | Age: 58
LOS: 6 days | Discharge: HOME | DRG: 453 | End: 2023-10-05
Attending: NEUROLOGICAL SURGERY | Admitting: NEUROLOGICAL SURGERY
Payer: COMMERCIAL

## 2023-09-29 DIAGNOSIS — E53.8 VITAMIN B 12 DEFICIENCY: ICD-10-CM

## 2023-09-29 DIAGNOSIS — G89.18 ACUTE POSTOPERATIVE PAIN: ICD-10-CM

## 2023-09-29 DIAGNOSIS — Z79.899 MEDICATION MANAGEMENT: ICD-10-CM

## 2023-09-29 DIAGNOSIS — M48.061 SPINAL STENOSIS, LUMBAR REGION WITHOUT NEUROGENIC CLAUDICATION: ICD-10-CM

## 2023-09-29 DIAGNOSIS — M47.816 SPONDYLOSIS WITHOUT MYELOPATHY OR RADICULOPATHY, LUMBAR REGION: Primary | ICD-10-CM

## 2023-09-29 DIAGNOSIS — M48.062 NEUROGENIC CLAUDICATION DUE TO LUMBAR SPINAL STENOSIS: ICD-10-CM

## 2023-09-29 LAB — POCT GLUCOSE: 109 MG/DL (ref 74–99)

## 2023-09-29 PROCEDURE — C1821 INTERSPINOUS IMPLANT: HCPCS

## 2023-09-29 PROCEDURE — 82947 ASSAY GLUCOSE BLOOD QUANT: CPT

## 2023-09-29 PROCEDURE — C1889 IMPLANT/INSERT DEVICE, NOC: HCPCS

## 2023-09-29 PROCEDURE — 01NB0ZZ RELEASE LUMBAR NERVE, OPEN APPROACH: ICD-10-PCS | Performed by: NEUROLOGICAL SURGERY

## 2023-09-29 PROCEDURE — P9045 ALBUMIN (HUMAN), 5%, 250 ML: HCPCS | Mod: JZ

## 2023-09-29 PROCEDURE — 00U20KZ SUPPLEMENT DURA MATER WITH NONAUTOLOGOUS TISSUE SUBSTITUTE, OPEN APPROACH: ICD-10-PCS | Performed by: NEUROLOGICAL SURGERY

## 2023-09-29 PROCEDURE — 0SG10A0 FUSION OF 2 OR MORE LUMBAR VERTEBRAL JOINTS WITH INTERBODY FUSION DEVICE, ANTERIOR APPROACH, ANTERIOR COLUMN, OPEN APPROACH: ICD-10-PCS | Performed by: NEUROLOGICAL SURGERY

## 2023-09-29 PROCEDURE — C1781 MESH (IMPLANTABLE): HCPCS

## 2023-09-29 PROCEDURE — C1713 ANCHOR/SCREW BN/BN,TIS/BN: HCPCS

## 2023-09-29 PROCEDURE — 0ST40ZZ RESECTION OF LUMBOSACRAL DISC, OPEN APPROACH: ICD-10-PCS | Performed by: NEUROLOGICAL SURGERY

## 2023-09-29 PROCEDURE — 76000 FLUOROSCOPY <1 HR PHYS/QHP: CPT

## 2023-09-29 PROCEDURE — 0SG30K1 FUSION OF LUMBOSACRAL JOINT WITH NONAUTOLOGOUS TISSUE SUBSTITUTE, POSTERIOR APPROACH, POSTERIOR COLUMN, OPEN APPROACH: ICD-10-PCS | Performed by: NEUROLOGICAL SURGERY

## 2023-09-29 PROCEDURE — 9990 CHARGE CONVERSION

## 2023-09-29 RX ORDER — ACETAMINOPHEN 325 MG/1
650 TABLET ORAL EVERY 6 HOURS
Status: DISCONTINUED | OUTPATIENT
Start: 2023-09-30 | End: 2023-10-05 | Stop reason: HOSPADM

## 2023-09-29 RX ORDER — CYCLOBENZAPRINE HCL 10 MG
10 TABLET ORAL 3 TIMES DAILY PRN
Status: DISCONTINUED | OUTPATIENT
Start: 2023-09-30 | End: 2023-09-30

## 2023-09-29 RX ORDER — IBUPROFEN 200 MG
1 TABLET ORAL DAILY
Status: DISCONTINUED | OUTPATIENT
Start: 2023-09-30 | End: 2023-10-05 | Stop reason: HOSPADM

## 2023-09-29 RX ORDER — PROMETHAZINE HYDROCHLORIDE 25 MG/1
25 TABLET ORAL EVERY 6 HOURS PRN
Status: DISCONTINUED | OUTPATIENT
Start: 2023-09-30 | End: 2023-10-03

## 2023-09-29 RX ORDER — POLYETHYLENE GLYCOL 3350 17 G/17G
17 POWDER, FOR SOLUTION ORAL 2 TIMES DAILY
Status: DISCONTINUED | OUTPATIENT
Start: 2023-09-30 | End: 2023-10-03

## 2023-09-29 RX ORDER — PREGABALIN 100 MG/1
300 CAPSULE ORAL 2 TIMES DAILY
Status: DISCONTINUED | OUTPATIENT
Start: 2023-09-30 | End: 2023-10-05 | Stop reason: HOSPADM

## 2023-09-29 RX ORDER — ONDANSETRON HYDROCHLORIDE 2 MG/ML
4 INJECTION, SOLUTION INTRAVENOUS EVERY 6 HOURS PRN
Status: DISCONTINUED | OUTPATIENT
Start: 2023-09-30 | End: 2023-10-05 | Stop reason: HOSPADM

## 2023-09-29 RX ORDER — ATORVASTATIN CALCIUM 10 MG/1
10 TABLET, FILM COATED ORAL DAILY
Status: DISCONTINUED | OUTPATIENT
Start: 2023-09-30 | End: 2023-10-05 | Stop reason: HOSPADM

## 2023-09-29 RX ORDER — DOCUSATE SODIUM 100 MG/1
100 CAPSULE, LIQUID FILLED ORAL 2 TIMES DAILY
Status: DISCONTINUED | OUTPATIENT
Start: 2023-09-30 | End: 2023-10-03

## 2023-09-29 RX ORDER — DIAZEPAM 5 MG/1
5 TABLET ORAL EVERY 6 HOURS PRN
Status: DISCONTINUED | OUTPATIENT
Start: 2023-09-30 | End: 2023-09-30

## 2023-09-30 PROBLEM — M48.062 NEUROGENIC CLAUDICATION DUE TO LUMBAR SPINAL STENOSIS: Status: ACTIVE | Noted: 2023-09-30

## 2023-09-30 PROBLEM — M47.816 SPONDYLOSIS WITHOUT MYELOPATHY OR RADICULOPATHY, LUMBAR REGION: Status: ACTIVE | Noted: 2023-09-30

## 2023-09-30 LAB
ANION GAP SERPL CALC-SCNC: 10 MMOL/L (ref 10–20)
ANION GAP SERPL CALC-SCNC: 9 MMOL/L (ref 10–20)
BUN SERPL-MCNC: 10 MG/DL (ref 6–23)
BUN SERPL-MCNC: 11 MG/DL (ref 6–23)
CALCIUM SERPL-MCNC: 8 MG/DL (ref 8.6–10.3)
CALCIUM SERPL-MCNC: 8.3 MG/DL (ref 8.6–10.3)
CHLORIDE SERPL-SCNC: 99 MMOL/L (ref 98–107)
CHLORIDE SERPL-SCNC: 99 MMOL/L (ref 98–107)
CO2 SERPL-SCNC: 32 MMOL/L (ref 21–32)
CO2 SERPL-SCNC: 34 MMOL/L (ref 21–32)
CREAT SERPL-MCNC: 0.8 MG/DL (ref 0.5–1.3)
CREAT SERPL-MCNC: 0.93 MG/DL (ref 0.5–1.3)
ERYTHROCYTE [DISTWIDTH] IN BLOOD BY AUTOMATED COUNT: 16.8 % (ref 11.5–14.5)
GFR SERPL CREATININE-BSD FRML MDRD: >90 ML/MIN/1.73M*2
GFR SERPL CREATININE-BSD FRML MDRD: >90 ML/MIN/1.73M*2
GLUCOSE BLD MANUAL STRIP-MCNC: 107 MG/DL (ref 74–99)
GLUCOSE BLD MANUAL STRIP-MCNC: 140 MG/DL (ref 74–99)
GLUCOSE BLD MANUAL STRIP-MCNC: 144 MG/DL (ref 74–99)
GLUCOSE SERPL-MCNC: 103 MG/DL (ref 74–99)
GLUCOSE SERPL-MCNC: 113 MG/DL (ref 74–99)
HCT VFR BLD AUTO: 37.3 % (ref 41–52)
HGB BLD-MCNC: 12 G/DL (ref 13.5–17.5)
MCH RBC QN AUTO: 30.1 PG (ref 26–34)
MCHC RBC AUTO-ENTMCNC: 32.2 G/DL (ref 32–36)
MCV RBC AUTO: 94 FL (ref 80–100)
NRBC BLD-RTO: 0 /100 WBCS (ref 0–0)
PLATELET # BLD AUTO: 266 X10*3/UL (ref 150–450)
PMV BLD AUTO: 9.5 FL (ref 7.5–11.5)
POTASSIUM SERPL-SCNC: 4 MMOL/L (ref 3.5–5.3)
POTASSIUM SERPL-SCNC: 4.3 MMOL/L (ref 3.5–5.3)
RBC # BLD AUTO: 3.99 X10*6/UL (ref 4.5–5.9)
SODIUM SERPL-SCNC: 137 MMOL/L (ref 136–145)
SODIUM SERPL-SCNC: 138 MMOL/L (ref 136–145)
WBC # BLD AUTO: 13.9 X10*3/UL (ref 4.4–11.3)

## 2023-09-30 PROCEDURE — 2500000001 HC RX 250 WO HCPCS SELF ADMINISTERED DRUGS (ALT 637 FOR MEDICARE OP): Performed by: STUDENT IN AN ORGANIZED HEALTH CARE EDUCATION/TRAINING PROGRAM

## 2023-09-30 PROCEDURE — 51702 INSERT TEMP BLADDER CATH: CPT

## 2023-09-30 PROCEDURE — 2500000004 HC RX 250 GENERAL PHARMACY W/ HCPCS (ALT 636 FOR OP/ED): Performed by: STUDENT IN AN ORGANIZED HEALTH CARE EDUCATION/TRAINING PROGRAM

## 2023-09-30 PROCEDURE — 99024 POSTOP FOLLOW-UP VISIT: CPT | Performed by: STUDENT IN AN ORGANIZED HEALTH CARE EDUCATION/TRAINING PROGRAM

## 2023-09-30 PROCEDURE — 76000 FLUOROSCOPY <1 HR PHYS/QHP: CPT

## 2023-09-30 PROCEDURE — 85027 COMPLETE CBC AUTOMATED: CPT | Performed by: STUDENT IN AN ORGANIZED HEALTH CARE EDUCATION/TRAINING PROGRAM

## 2023-09-30 PROCEDURE — 82947 ASSAY GLUCOSE BLOOD QUANT: CPT

## 2023-09-30 PROCEDURE — 1100000001 HC PRIVATE ROOM DAILY

## 2023-09-30 PROCEDURE — 80048 BASIC METABOLIC PNL TOTAL CA: CPT | Performed by: NEUROLOGICAL SURGERY

## 2023-09-30 PROCEDURE — S4991 NICOTINE PATCH NONLEGEND: HCPCS | Performed by: STUDENT IN AN ORGANIZED HEALTH CARE EDUCATION/TRAINING PROGRAM

## 2023-09-30 PROCEDURE — 9990 CHARGE CONVERSION

## 2023-09-30 PROCEDURE — 36415 COLL VENOUS BLD VENIPUNCTURE: CPT | Performed by: NEUROLOGICAL SURGERY

## 2023-09-30 PROCEDURE — 80048 BASIC METABOLIC PNL TOTAL CA: CPT | Performed by: STUDENT IN AN ORGANIZED HEALTH CARE EDUCATION/TRAINING PROGRAM

## 2023-09-30 PROCEDURE — 2500000002 HC RX 250 W HCPCS SELF ADMINISTERED DRUGS (ALT 637 FOR MEDICARE OP, ALT 636 FOR OP/ED): Performed by: NEUROLOGICAL SURGERY

## 2023-09-30 PROCEDURE — 2500000002 HC RX 250 W HCPCS SELF ADMINISTERED DRUGS (ALT 637 FOR MEDICARE OP, ALT 636 FOR OP/ED): Performed by: STUDENT IN AN ORGANIZED HEALTH CARE EDUCATION/TRAINING PROGRAM

## 2023-09-30 RX ORDER — ICOSAPENT ETHYL 1 G/1
2 CAPSULE ORAL
Status: DISCONTINUED | OUTPATIENT
Start: 2023-09-30 | End: 2023-10-05 | Stop reason: HOSPADM

## 2023-09-30 RX ORDER — HEPARIN SODIUM 5000 [USP'U]/ML
5000 INJECTION, SOLUTION INTRAVENOUS; SUBCUTANEOUS EVERY 8 HOURS
Status: DISCONTINUED | OUTPATIENT
Start: 2023-09-30 | End: 2023-10-03

## 2023-09-30 RX ORDER — DEXTROSE MONOHYDRATE 100 MG/ML
0.3 INJECTION, SOLUTION INTRAVENOUS ONCE AS NEEDED
Status: DISCONTINUED | OUTPATIENT
Start: 2023-09-30 | End: 2023-10-03

## 2023-09-30 RX ORDER — DEXTROSE 50 % IN WATER (D50W) INTRAVENOUS SYRINGE
25
Status: DISCONTINUED | OUTPATIENT
Start: 2023-09-30 | End: 2023-10-03

## 2023-09-30 RX ORDER — NALOXONE HYDROCHLORIDE 0.4 MG/ML
0.2 INJECTION, SOLUTION INTRAMUSCULAR; INTRAVENOUS; SUBCUTANEOUS AS NEEDED
Status: DISCONTINUED | OUTPATIENT
Start: 2023-09-30 | End: 2023-10-05 | Stop reason: HOSPADM

## 2023-09-30 RX ORDER — SODIUM CHLORIDE 9 MG/ML
75 INJECTION, SOLUTION INTRAVENOUS CONTINUOUS
Status: DISCONTINUED | OUTPATIENT
Start: 2023-09-30 | End: 2023-09-30

## 2023-09-30 RX ORDER — METFORMIN HYDROCHLORIDE 1000 MG/1
1000 TABLET ORAL
Status: DISCONTINUED | OUTPATIENT
Start: 2023-09-30 | End: 2023-09-30

## 2023-09-30 RX ORDER — HYDROMORPHONE HCL/0.9% NACL/PF 15 MG/30ML
PATIENT CONTROLLED ANALGESIA SYRINGE INTRAVENOUS CONTINUOUS
Status: DISCONTINUED | OUTPATIENT
Start: 2023-09-30 | End: 2023-09-30

## 2023-09-30 RX ORDER — CYCLOBENZAPRINE HCL 10 MG
10 TABLET ORAL 3 TIMES DAILY
Status: DISCONTINUED | OUTPATIENT
Start: 2023-09-30 | End: 2023-10-05 | Stop reason: HOSPADM

## 2023-09-30 RX ORDER — OXYCODONE HYDROCHLORIDE 10 MG/1
10 TABLET ORAL EVERY 4 HOURS PRN
Status: DISCONTINUED | OUTPATIENT
Start: 2023-09-30 | End: 2023-10-05 | Stop reason: HOSPADM

## 2023-09-30 RX ORDER — OXYCODONE HYDROCHLORIDE 5 MG/1
5 TABLET ORAL EVERY 4 HOURS PRN
Status: DISCONTINUED | OUTPATIENT
Start: 2023-09-30 | End: 2023-10-05 | Stop reason: HOSPADM

## 2023-09-30 RX ORDER — HYDROMORPHONE HYDROCHLORIDE 1 MG/ML
0.6 INJECTION, SOLUTION INTRAMUSCULAR; INTRAVENOUS; SUBCUTANEOUS
Status: DISCONTINUED | OUTPATIENT
Start: 2023-09-30 | End: 2023-09-30

## 2023-09-30 RX ORDER — HYDROMORPHONE HYDROCHLORIDE 1 MG/ML
1 INJECTION, SOLUTION INTRAMUSCULAR; INTRAVENOUS; SUBCUTANEOUS EVERY 5 MIN PRN
Status: DISCONTINUED | OUTPATIENT
Start: 2023-09-30 | End: 2023-09-30

## 2023-09-30 RX ADMIN — HEPARIN SODIUM 5000 UNITS: 5000 INJECTION INTRAVENOUS; SUBCUTANEOUS at 17:56

## 2023-09-30 RX ADMIN — PREGABALIN 300 MG: 150 CAPSULE ORAL at 21:18

## 2023-09-30 RX ADMIN — NICOTINE 1 PATCH: 14 PATCH, EXTENDED RELEASE TRANSDERMAL at 09:00

## 2023-09-30 RX ADMIN — Medication 2 L/MIN: at 09:00

## 2023-09-30 RX ADMIN — DIAZEPAM 5 MG: 5 TABLET ORAL at 02:35

## 2023-09-30 RX ADMIN — DOCUSATE SODIUM 100 MG: 100 CAPSULE, LIQUID FILLED ORAL at 21:18

## 2023-09-30 RX ADMIN — DOCUSATE SODIUM 100 MG: 100 CAPSULE, LIQUID FILLED ORAL at 09:00

## 2023-09-30 RX ADMIN — POLYETHYLENE GLYCOL 3350 17 G: 17 POWDER, FOR SOLUTION ORAL at 09:00

## 2023-09-30 RX ADMIN — ACETAMINOPHEN 325MG 650 MG: 325 TABLET ORAL at 17:15

## 2023-09-30 RX ADMIN — POLYETHYLENE GLYCOL 3350 17 G: 17 POWDER, FOR SOLUTION ORAL at 21:19

## 2023-09-30 RX ADMIN — CYCLOBENZAPRINE 10 MG: 10 TABLET, FILM COATED ORAL at 10:45

## 2023-09-30 RX ADMIN — OXYCODONE HYDROCHLORIDE 5 MG: 5 TABLET ORAL at 17:16

## 2023-09-30 RX ADMIN — PREGABALIN 300 MG: 150 CAPSULE ORAL at 09:00

## 2023-09-30 RX ADMIN — OXYCODONE HYDROCHLORIDE 10 MG: 10 TABLET ORAL at 21:18

## 2023-09-30 RX ADMIN — HEPARIN SODIUM 5000 UNITS: 5000 INJECTION INTRAVENOUS; SUBCUTANEOUS at 10:15

## 2023-09-30 RX ADMIN — ACETAMINOPHEN 325MG 650 MG: 325 TABLET ORAL at 12:00

## 2023-09-30 RX ADMIN — ATORVASTATIN CALCIUM 10 MG: 10 TABLET, FILM COATED ORAL at 09:00

## 2023-09-30 RX ADMIN — CYCLOBENZAPRINE 10 MG: 10 TABLET, FILM COATED ORAL at 19:37

## 2023-09-30 ASSESSMENT — PAIN SCALES - GENERAL
PAINLEVEL_OUTOF10: 5 - MODERATE PAIN
PAINLEVEL_OUTOF10: 10 - WORST POSSIBLE PAIN
PAINLEVEL_OUTOF10: 3
PAINLEVEL_OUTOF10: 3

## 2023-09-30 ASSESSMENT — PAIN - FUNCTIONAL ASSESSMENT
PAIN_FUNCTIONAL_ASSESSMENT: 0-10
PAIN_FUNCTIONAL_ASSESSMENT: 0-10

## 2023-09-30 ASSESSMENT — PAIN DESCRIPTION - DESCRIPTORS
DESCRIPTORS: ACHING;SORE
DESCRIPTORS: ACHING;DISCOMFORT

## 2023-09-30 NOTE — PROGRESS NOTES
Occupational Therapy                 Therapy Communication Note    Patient Name: Gm Thrasher  MRN: [id number]  Today's Date: 9/30/2023     Discipline: Occupational Therapy    Missed Visit Reason: Missed Visit Reason:  (Chart review completed. OT order received.  Patient lethargic. RN aware.  Will hold OT evaluation and complete as appropriate.)    Missed Time: Attempt

## 2023-09-30 NOTE — PROGRESS NOTES
Neurological Surgery    Daily Progress Note    History of Present Illness  Gm Thrasher is a 58 y.o. male seen in follow up for POD 1 s/p L3-4, L4-5 XLIF, L3-5 screws    Doing, trend labs  XR   PT/OT   Out of bed today  Void trial   Pain control  Supportive care      Physical Exam    General: Well developed, awake/alert/oriented x3, no distress, alert and cooperative  Skin: Warm and dry, no lesions, no rashes  ENMT: Mucous membranes moist, no apparent injury, no lesions seen  Head/Neck: Neck Supple, no apparent injury  Respiratory/Thorax: Normal breath sounds with good chest expansion, thorax symmetric  Cardiovascular: No pitting edema, no JVD    Motor Strength: 5/5 Throughout all extremities    Muscle Bulk: Normal and symmetric in all extremities    Posture:   -- Cervical: Normal  -- Thoracic: Normal  -- Lumbar : Normal  Paraspinal muscle spasm/tenderness absent.     Sensation: intact to light touch    Incision c/d/I     Last Recorded Vitals  Blood pressure 116/78, pulse 81, temperature 36.3 °C (97.3 °F), temperature source Temporal, resp. rate 18, SpO2 92 %.    Relevant Results  Scheduled medications  acetaminophen, 650 mg, oral, q6h  atorvastatin, 10 mg, oral, Daily  docusate sodium, 100 mg, oral, BID  flu vacc [id number] 6mos up (PF), 0.5 mL, intramuscular, Once  heparin (porcine), 5,000 Units, subcutaneous, q8h  nicotine, 1 patch, transdermal, Daily  polyethylene glycol, 17 g, oral, BID  pregabalin, 300 mg, oral, BID      Continuous medications  HYDROmorphone,   sodium chloride 0.9%, 75 mL/hr      PRN medications  PRN medications: benzocaine-menthol, cyclobenzaprine, diazePAM, HYDROmorphone, HYDROmorphone, HYDROmorphone, naloxone, ondansetron, promethazine, promethazine    Results for orders placed or performed during the hospital encounter of 09/29/23 (from the past 96 hour(s))   POCT GLUCOSE   Result Value Ref Range    POCT Glucose 109 (H) 74 - 99 mg/dL         Intake/Output Summary (Last 24 hours) at  9/30/2023 0841  Last data filed at 9/30/2023 0600  Gross per 24 hour   Intake --   Output 1225 ml   Net -1225 ml         Signature    Osei King M.D.  Director of Spine [place]  [facility]   of Neurological Surgery  [employer] [employer]  Office: [phone]  Fax: [phone]

## 2023-09-30 NOTE — PROGRESS NOTES
Physical Therapy                 Therapy Communication Note    Patient Name: Gm Thrasher  MRN: [id number]  Today's Date: 9/30/2023     Discipline: Physical Therapy    Missed Visit Reason: Missed Visit Reason:  (Attempted to see pt for PT eval.  Pt is lethargic.  Pt unable to keep his eyes open during subjective interview.  Will hold PT eval for now and attempt again later as appropriate.)    Missed Time: Attempt    Comment: Subjective information obtained: pt lives with his wife in a ranch style home, no stairs to enter, tub/shower with seat and grab bars.  Pt was independent with gait and ADL's prior to surgery.

## 2023-09-30 NOTE — DISCHARGE INSTR - OTHER ORDERS
Call Dr. Amezcua for any problems and/or concerns.   -Maintain spine precautions  -Log roll as instructed  -Walk as much as possible  -Avoid pushing, pulling, bending, twisting, and sitting/laying down in the same position for long periods of time  -No baths, hot tubs, or pools until cleared by physician; no lotions, creams, ointments over incision  -You may shower, do not soak or scrub incision, pat dry  -Continue the coughing and deep breathing exercises that you learned in the hospital  -Do not engage in sports, heavy work or lifting  -If you have a brace/collar-wear as instructed by physician and/or therapy, keep the brace/collar clean and dry, check the skin around the brace/collar every day and notify your physician of any concerns    Call  Right away for:  -fever above 100.4/shaking chills  -new pain, weakness, warmth, or numbness in your legs  -foot, ankle, or calf swelling that is not relieved by elevating your feet  -inability to urinate/move bowels  -a severe headache  -chest pain/shortness of breath-call 911  -difficulty swallowing (cervical surgery)    If your doctor has ordered compression stockings, wear as directed as they help to prevent blood clots and reduce swelling in your legs    Do not use any products that contain nicotine or tobacco, such as cigarettes, e-cigarettes, and chewing tobacco. These can delay bone healing after surgery.

## 2023-09-30 NOTE — H&P
History & Physical Reviewed:   I have reviewed the History and Physical dated:  20-Sep-2023   History and Physical reviewed and relevant findings noted. Patient examined to review pertinent physical  findings.: No significant changes   Home Medications Reviewed: no changes noted   Allergies Reviewed: no changes noted       ERAS (Enhanced Recovery After Surgery):  ·  ERAS Patient: no     Consent:   COVID-19 Consent:  ·  COVID-19 Risk Consent Surgeon has reviewed key risks related to the risk of maia COVID-19 and if they contract COVID-19 what the risks are.     Attestation:   Note Completion:  I am a:  Resident/Fellow   Attending Attestation I saw and evaluated the patient.  I personally obtained the key and critical portions of the history and physical exam or was physically present for key and  critical portions performed by the resident/fellow. I reviewed the resident/fellow?s documentation and discussed the patient with the resident/fellow.  I agree with the resident/fellow?s medical decision making as documented in the note.     I personally evaluated the patient on 29-Sep-2023         Electronic Signatures:  Vitaly Amezcua)  (Signed 29-Sep-2023 22:34)   Authored: Note Completion   Co-Signer: History & Physical Reviewed, ERAS, Consent, Note Completion  Jazmine Rios (Resident))  (Signed 28-Sep-2023 18:46)   Authored: History & Physical Reviewed, ERAS, Consent,  Note Completion      Last Updated: 29-Sep-2023 22:34 by Vitaly Amezcua)

## 2023-10-01 ENCOUNTER — APPOINTMENT (OUTPATIENT)
Dept: RADIOLOGY | Facility: HOSPITAL | Age: 58
DRG: 453 | End: 2023-10-01
Payer: COMMERCIAL

## 2023-10-01 LAB
ALBUMIN SERPL BCP-MCNC: 3.2 G/DL (ref 3.4–5)
ANION GAP SERPL CALC-SCNC: 13 MMOL/L (ref 10–20)
BUN SERPL-MCNC: 11 MG/DL (ref 6–23)
CALCIUM SERPL-MCNC: 8.5 MG/DL (ref 8.6–10.3)
CHLORIDE SERPL-SCNC: 99 MMOL/L (ref 98–107)
CO2 SERPL-SCNC: 28 MMOL/L (ref 21–32)
CREAT SERPL-MCNC: 0.76 MG/DL (ref 0.5–1.3)
ERYTHROCYTE [DISTWIDTH] IN BLOOD BY AUTOMATED COUNT: 17 % (ref 11.5–14.5)
GFR SERPL CREATININE-BSD FRML MDRD: >90 ML/MIN/1.73M*2
GLUCOSE BLD MANUAL STRIP-MCNC: 121 MG/DL (ref 74–99)
GLUCOSE BLD MANUAL STRIP-MCNC: 133 MG/DL (ref 74–99)
GLUCOSE BLD MANUAL STRIP-MCNC: 91 MG/DL (ref 74–99)
GLUCOSE SERPL-MCNC: 100 MG/DL (ref 74–99)
HCT VFR BLD AUTO: 35.8 % (ref 41–52)
HGB BLD-MCNC: 11.4 G/DL (ref 13.5–17.5)
MCH RBC QN AUTO: 29.8 PG (ref 26–34)
MCHC RBC AUTO-ENTMCNC: 31.8 G/DL (ref 32–36)
MCV RBC AUTO: 94 FL (ref 80–100)
NRBC BLD-RTO: 0 /100 WBCS (ref 0–0)
PHOSPHATE SERPL-MCNC: 2.5 MG/DL (ref 2.5–4.9)
PLATELET # BLD AUTO: 256 X10*3/UL (ref 150–450)
PMV BLD AUTO: 10 FL (ref 7.5–11.5)
POTASSIUM SERPL-SCNC: 3.8 MMOL/L (ref 3.5–5.3)
RBC # BLD AUTO: 3.83 X10*6/UL (ref 4.5–5.9)
SODIUM SERPL-SCNC: 136 MMOL/L (ref 136–145)
WBC # BLD AUTO: 13.8 X10*3/UL (ref 4.4–11.3)

## 2023-10-01 PROCEDURE — S4991 NICOTINE PATCH NONLEGEND: HCPCS | Performed by: STUDENT IN AN ORGANIZED HEALTH CARE EDUCATION/TRAINING PROGRAM

## 2023-10-01 PROCEDURE — 2500000004 HC RX 250 GENERAL PHARMACY W/ HCPCS (ALT 636 FOR OP/ED): Performed by: STUDENT IN AN ORGANIZED HEALTH CARE EDUCATION/TRAINING PROGRAM

## 2023-10-01 PROCEDURE — 99024 POSTOP FOLLOW-UP VISIT: CPT | Performed by: STUDENT IN AN ORGANIZED HEALTH CARE EDUCATION/TRAINING PROGRAM

## 2023-10-01 PROCEDURE — 2500000002 HC RX 250 W HCPCS SELF ADMINISTERED DRUGS (ALT 637 FOR MEDICARE OP, ALT 636 FOR OP/ED): Performed by: STUDENT IN AN ORGANIZED HEALTH CARE EDUCATION/TRAINING PROGRAM

## 2023-10-01 PROCEDURE — 36415 COLL VENOUS BLD VENIPUNCTURE: CPT | Performed by: STUDENT IN AN ORGANIZED HEALTH CARE EDUCATION/TRAINING PROGRAM

## 2023-10-01 PROCEDURE — 2500000001 HC RX 250 WO HCPCS SELF ADMINISTERED DRUGS (ALT 637 FOR MEDICARE OP): Performed by: STUDENT IN AN ORGANIZED HEALTH CARE EDUCATION/TRAINING PROGRAM

## 2023-10-01 PROCEDURE — 82947 ASSAY GLUCOSE BLOOD QUANT: CPT

## 2023-10-01 PROCEDURE — 97165 OT EVAL LOW COMPLEX 30 MIN: CPT | Mod: GO | Performed by: OCCUPATIONAL THERAPIST

## 2023-10-01 PROCEDURE — 1100000001 HC PRIVATE ROOM DAILY

## 2023-10-01 PROCEDURE — 97161 PT EVAL LOW COMPLEX 20 MIN: CPT | Mod: GP

## 2023-10-01 PROCEDURE — 72100 X-RAY EXAM L-S SPINE 2/3 VWS: CPT | Mod: FY

## 2023-10-01 PROCEDURE — 85027 COMPLETE CBC AUTOMATED: CPT | Performed by: STUDENT IN AN ORGANIZED HEALTH CARE EDUCATION/TRAINING PROGRAM

## 2023-10-01 PROCEDURE — 80069 RENAL FUNCTION PANEL: CPT | Performed by: NEUROLOGICAL SURGERY

## 2023-10-01 PROCEDURE — 72100 X-RAY EXAM L-S SPINE 2/3 VWS: CPT | Performed by: RADIOLOGY

## 2023-10-01 RX ORDER — TAMSULOSIN HYDROCHLORIDE 0.4 MG/1
0.4 CAPSULE ORAL DAILY
Status: DISCONTINUED | OUTPATIENT
Start: 2023-10-01 | End: 2023-10-05 | Stop reason: HOSPADM

## 2023-10-01 RX ORDER — KETOROLAC TROMETHAMINE 15 MG/ML
15 INJECTION, SOLUTION INTRAMUSCULAR; INTRAVENOUS EVERY 6 HOURS
Status: COMPLETED | OUTPATIENT
Start: 2023-10-01 | End: 2023-10-02

## 2023-10-01 RX ADMIN — KETOROLAC TROMETHAMINE 15 MG: 15 INJECTION INTRAMUSCULAR; INTRAVENOUS at 14:52

## 2023-10-01 RX ADMIN — ACETAMINOPHEN 325MG 650 MG: 325 TABLET ORAL at 17:26

## 2023-10-01 RX ADMIN — OXYCODONE HYDROCHLORIDE 10 MG: 10 TABLET ORAL at 05:18

## 2023-10-01 RX ADMIN — OXYCODONE HYDROCHLORIDE 10 MG: 10 TABLET ORAL at 23:18

## 2023-10-01 RX ADMIN — ICOSAPENT ETHYL 2 G: 1 CAPSULE ORAL at 17:26

## 2023-10-01 RX ADMIN — HEPARIN SODIUM 5000 UNITS: 5000 INJECTION INTRAVENOUS; SUBCUTANEOUS at 09:34

## 2023-10-01 RX ADMIN — HYDROMORPHONE HYDROCHLORIDE 0.2 MG: 0.2 INJECTION, SOLUTION INTRAMUSCULAR; INTRAVENOUS; SUBCUTANEOUS at 04:22

## 2023-10-01 RX ADMIN — KETOROLAC TROMETHAMINE 15 MG: 15 INJECTION INTRAMUSCULAR; INTRAVENOUS at 20:59

## 2023-10-01 RX ADMIN — NICOTINE 1 PATCH: 14 PATCH, EXTENDED RELEASE TRANSDERMAL at 08:16

## 2023-10-01 RX ADMIN — CYCLOBENZAPRINE 10 MG: 10 TABLET, FILM COATED ORAL at 20:58

## 2023-10-01 RX ADMIN — KETOROLAC TROMETHAMINE 15 MG: 15 INJECTION INTRAMUSCULAR; INTRAVENOUS at 09:31

## 2023-10-01 RX ADMIN — PREGABALIN 300 MG: 150 CAPSULE ORAL at 08:17

## 2023-10-01 RX ADMIN — TAMSULOSIN HYDROCHLORIDE 0.4 MG: 0.4 CAPSULE ORAL at 09:34

## 2023-10-01 RX ADMIN — ONDANSETRON 4 MG: 2 INJECTION INTRAMUSCULAR; INTRAVENOUS at 11:40

## 2023-10-01 RX ADMIN — DOCUSATE SODIUM 100 MG: 100 CAPSULE, LIQUID FILLED ORAL at 20:58

## 2023-10-01 RX ADMIN — CYCLOBENZAPRINE 10 MG: 10 TABLET, FILM COATED ORAL at 08:17

## 2023-10-01 RX ADMIN — ACETAMINOPHEN 325MG 650 MG: 325 TABLET ORAL at 05:18

## 2023-10-01 RX ADMIN — POLYETHYLENE GLYCOL 3350 17 G: 17 POWDER, FOR SOLUTION ORAL at 20:58

## 2023-10-01 RX ADMIN — OXYCODONE HYDROCHLORIDE 10 MG: 10 TABLET ORAL at 14:53

## 2023-10-01 RX ADMIN — ICOSAPENT ETHYL 2 G: 1 CAPSULE ORAL at 08:17

## 2023-10-01 RX ADMIN — OXYCODONE HYDROCHLORIDE 10 MG: 10 TABLET ORAL at 01:15

## 2023-10-01 RX ADMIN — HYDROMORPHONE HYDROCHLORIDE 0.2 MG: 0.2 INJECTION, SOLUTION INTRAMUSCULAR; INTRAVENOUS; SUBCUTANEOUS at 11:25

## 2023-10-01 RX ADMIN — ACETAMINOPHEN 325MG 650 MG: 325 TABLET ORAL at 11:26

## 2023-10-01 RX ADMIN — CYCLOBENZAPRINE 10 MG: 10 TABLET, FILM COATED ORAL at 14:52

## 2023-10-01 RX ADMIN — SODIUM CHLORIDE 1000 ML: 9 INJECTION, SOLUTION INTRAVENOUS at 09:35

## 2023-10-01 RX ADMIN — OXYCODONE HYDROCHLORIDE 10 MG: 10 TABLET ORAL at 09:33

## 2023-10-01 RX ADMIN — HEPARIN SODIUM 5000 UNITS: 5000 INJECTION INTRAVENOUS; SUBCUTANEOUS at 01:16

## 2023-10-01 RX ADMIN — HEPARIN SODIUM 5000 UNITS: 5000 INJECTION INTRAVENOUS; SUBCUTANEOUS at 17:27

## 2023-10-01 RX ADMIN — ATORVASTATIN CALCIUM 10 MG: 10 TABLET, FILM COATED ORAL at 08:17

## 2023-10-01 RX ADMIN — PREGABALIN 300 MG: 150 CAPSULE ORAL at 20:58

## 2023-10-01 ASSESSMENT — COGNITIVE AND FUNCTIONAL STATUS - GENERAL
MOVING TO AND FROM BED TO CHAIR: A LITTLE
DAILY ACTIVITIY SCORE: 21
DRESSING REGULAR LOWER BODY CLOTHING: A LITTLE
STANDING UP FROM CHAIR USING ARMS: A LITTLE
HELP NEEDED FOR BATHING: A LITTLE
HELP NEEDED FOR BATHING: A LITTLE
MOVING FROM LYING ON BACK TO SITTING ON SIDE OF FLAT BED WITH BEDRAILS: A LOT
MOBILITY SCORE: 12
CLIMB 3 TO 5 STEPS WITH RAILING: A LITTLE
TURNING FROM BACK TO SIDE WHILE IN FLAT BAD: A LOT
CLIMB 3 TO 5 STEPS WITH RAILING: TOTAL
MOBILITY SCORE: 18
TOILETING: A LITTLE
DRESSING REGULAR LOWER BODY CLOTHING: A LITTLE
TOILETING: A LITTLE
DAILY ACTIVITIY SCORE: 21
TURNING FROM BACK TO SIDE WHILE IN FLAT BAD: A LITTLE
WALKING IN HOSPITAL ROOM: TOTAL
MOVING TO AND FROM BED TO CHAIR: A LITTLE
MOVING FROM LYING ON BACK TO SITTING ON SIDE OF FLAT BED WITH BEDRAILS: A LITTLE
WALKING IN HOSPITAL ROOM: A LITTLE
STANDING UP FROM CHAIR USING ARMS: A LITTLE

## 2023-10-01 ASSESSMENT — PAIN SCALES - GENERAL
PAINLEVEL_OUTOF10: 9
PAINLEVEL_OUTOF10: 5 - MODERATE PAIN
PAINLEVEL_OUTOF10: 9
PAINLEVEL_OUTOF10: 8
PAINLEVEL_OUTOF10: 9
PAINLEVEL_OUTOF10: 8
PAINLEVEL_OUTOF10: 10 - WORST POSSIBLE PAIN
PAINLEVEL_OUTOF10: 8
PAINLEVEL_OUTOF10: 4
PAINLEVEL_OUTOF10: 8
PAINLEVEL_OUTOF10: 10 - WORST POSSIBLE PAIN
PAINLEVEL_OUTOF10: 8
PAINLEVEL_OUTOF10: 7

## 2023-10-01 ASSESSMENT — PAIN - FUNCTIONAL ASSESSMENT
PAIN_FUNCTIONAL_ASSESSMENT: 0-10

## 2023-10-01 ASSESSMENT — ACTIVITIES OF DAILY LIVING (ADL): ADL_ASSISTANCE: INDEPENDENT

## 2023-10-01 ASSESSMENT — PAIN DESCRIPTION - DESCRIPTORS
DESCRIPTORS: ACHING;DISCOMFORT
DESCRIPTORS: ACHING;DISCOMFORT

## 2023-10-01 NOTE — OP NOTE
PROCEDURE DETAILS    Preoperative Diagnosis:  lumbar spondyosis; degenerative lumbar stenosis; lumbar radiculopathy; neurogenic claudication  Postoperative Diagnosis:  lumbar spondyosis; degenerative lumbar stenosis; lumbar radiculopathy; neurogenic claudication  Surgeon: Vitaly Amezcua  Resident/Fellow/Other Assistant: Jazmine Rios    Procedure:  1. L3-4, L4-5 extreme lateral interbody fusion  2. L3-5 laminectomy, L5-S1 revision laminectomy, L5-S1 transforaminal lumbar interbody fusion, L3-S1 instrumented posterolateral fusion, repair of dural tear  Estimated Blood Loss: 600cc  Findings: Dense scarring adherent to thecal sac, 3 small dural tears at L5 repaired with duragen, duraseal  Specimens(s) Collected: no,     Complications: durotomy  Reason complication was inherent and unavoidable: dura at prior laminectomy site was firmly adherent to residual L5 lamina and facet joints  Drains and/or Catheters: 10 round        Operative Report:   History: Pt with prior L5-S1 laminectomy with residual left foot drop presented to us with severe low back pain and neurogenic claudication with LLE radiculopathy  and numbness. Given the severe stenosis and disc degeneration at L3-4 and L4-5 with favorable anatomy for XLIF, we planned a staged procedure with L3-4. L4-5 XLIF, followed by L3-5 laminectomies and revision L5-S1 laminectomies, L3-S1 posterolateral fusion,  given the need for central as well as neuroforaminal decompression requiring removal of the pars and potential for increased instability without fusion.    Consent: I discussed the risks of L3-4, L4-5 XLIF, L3-S1 laminectomies and posterolateral instrumented fusion with patient and wife at bedside. They understood that the risks include but are not limited to bleeding, infection, spinal fluid leak, nerve  injury resulting in weakness or numbness, particularly counseled as to the risk of hip flexion weakness related to stretch of the nerve during the XLIF  procedure, risk from anesthesia and from hospital stay. They understood and requested that we proceed.     DESCRIPTION OF THE OPERATION: The patient was brought to the operating room and timeout performed. Patient was placed under anesthesia by the anesthesia team. The patient was placed in the right lateral position and all pressure areas well padded, and  pot was taped to the bed with the left side up. Using lateral and AP fluoroscopy we confirmed our levels of interest and marked out an oblique incision over the L4-5 disc space.    The skin was then infiltrated with lidocaine with epinephrine and next began procedure by using a 15 blade.  We incised the skin along the incision and using Bovie electrocautery and blunt dissection exposed the oblique muscles and metzenbaum scissors  were used to bluntly dissect through the obliques into the retroperitoneal space.  Direct palpation was used to identify the psoas muscle and the initial docking dilator was attached to free running EMG and directly docked onto the spine in the lateral  position. AP and lateral fluoroscopy was used to accurately dock the dilator onto the posterior 1/2 of the disc space at L4-5. Free running EMG was used to accurate dock without compression of the nerves within the psoas. Next a k wire was inserted with  lateral fluoro into the disc space and sequential dilators and free running EMG was used to safely dilate the psoas muscle.    After dilation was performed the self retaining lateral retractor was inserted over the dilator with patient depth custom blades and placed onto the disc space of interest. The retractor was then secured to the bed and an EMG probe was then used to investigate  all quadrants to look for any nerve tissue near the retractor. Next the retractor was opened and light sources attached exposing the anterior 1/2 of the disc space. Next we performed a discectomy at L4-5 by first performing an annulotomy and a combination   of madalyn, curettes, pituitary rongeurs to perform the discectomy. This allowed us to then achieve segmental lordosis and correction of deformity by removal of the disc and the end plates were prepped for fusion. Sequential distractors were used to 10mm  in height. Then using AP and lateral fluoroscopy we inserted a cage 50mm in length, 10mm height. The position confirmed on fluoroscopy, and the interbody graft was then released from the  and the retractor was then relaxed. Total retractor time  through the psoas was <30 minutes. We repeated the process over the L3-4 disc space, inserted a 10mm x 50mm cage and confirmed positioning on fluoroscopy. We then removed the retractor after achieving hemostasis with floseal and bipolar electrocautery.     We then copiously irrigated the incisions and performed a multilayer closure with 0 vicryl, 2-0 vicryl, 4-0 monocryl and exofin glue.    Next patient was flipped prone onto a rubén table, all pressure areas were well padded. Her back was prepped and draped in a sterile fashion. A second timeout was performed. The levels of interest were identified using lateral fluoroscopy, and an incision  marked from the top of L3 spinous process to S1, encorporating the prior midline incision from previous L5-S1 laminectomy, and was infiltrated with lidocaine with epinephrine.    A combination of sharp and blunt electrocautery was used to expose spinous process, and lamina out to facet joint capsule and transverse processes from L2-3 facet joint to L5-S1 facet joint with care taken to not violate this capsule with the bovie at  L2-3. There was a prior Laminectomy defect at L5-S1 which was carefully exposed.  Once adequate exposure was obtained, a spinous process clamp with stealth star was placed on L2 spinous process, and O-arm was brought in for an intra-operative CT.    After O-arm spin was completed and good registration was confirmed, the right L4 and L5 screws as well as  left L4 screw were placed into the prior screw holes, using 7.5mm diameter screws. Next, bilateral L3, L4 and L5, and S1 screws were placed in usual  fashion, first using gear shift under navigated guidance to create a screw trajectory in the pedicle, followed by ball tip probe for confirmation that there was no breach in the pedicle, and then pedicle screws were placed  under navigated guidance. 7.5mm  x 50mm screws were used at L3, L4 and L5 levels, with 7.5 x 55mm screws used at S1.     Next, laminectomies were carried out from L3 through L5 using combination of Leksell rongeur, drill, curettes and kerasin rongeurs. Bilateral L3-4 foraminotomies were made with the drill and kerasins to ensure the exiting nerve roots were free. The prior  laminectomy defect was explored and the amadou lamina removed at the superior aspect of L5. The ligamentum flavum was noted to be hypertorphied and densely adherent to the dura throughout the length of exposure of the prior defect.       Bilateral osteotomies were made across the L5 pars to remove the inferior articulating processes of L5.  The superior articulating processes of S1 was removed on the right to expose the L5-S1 disc space. Discectomy was made at L5-S1 level in standard  fashion making annulotomy in the disc, and sequentially shaving disc material off of the endplates at both levels. Endplates were further prepared with curettes; once good disc preparation was completed, the disc space was packed with a allograft and  a titanium interbody was placed and sequentially expanded under fluoroscopy. Of note, during the revision laminectomy at L5-S1, 3 small linear durotomies were made at the L5 level, which were covered with duragen, surgicel, and duraseal without any further  CSF leak noted.     We then placed two titanium rods into tulipheads of screws. Set caps were used at all levels and were final tightened. Final fluroscopy shots were taken prior to final tightening  to confirm niyah above and below construct and good placement of all hardware.  Native bone was decorticated with particular care taken at the transverse processes, and fusion material using bone chips, putty, and autograft were packed in bilateral gutters.     Incision was copiously irrigated with Irricept, followed by antibiotic-impregnated saline. One 10-round drain was placed in the subfascial space and tunneled out superoaterally to the incision. Vancomycin powder was placed. Interrupted 0-vicryl suture  was used to close the muscle over the laminectomy defect, followed by another layer of interrupted 0-vicryl suture was used to close the entirety of the linear fascial defect, followed by 1-0 stratafix. Inverted interrupted 2-0 vicryls were used to approximate  the skin edges and 2-0 running locking Prolene suture.  The drain was secured with a 2-0 silk suture.     The patient was carefully flipped into supine position on patient cart and was extubated by the anesthesia team. Patient awoke at neurologic baseline.  Family was updated at end of procedure.                         Attestation:   Note Completion:  Attending Attestation I was present for key portions of the procedure and the procedure lasted longer than 5 minutes.    I am a: Resident/Fellow         Electronic Signatures:  Vitaly Amezcua (MD)  (Signed 29-Sep-2023 23:32)   Authored: Post-Operative Note, Chart Review, Note Completion   Co-Signer: Post-Operative Note, Chart Review, Note Completion  Jazmine Rios (Resident))  (Signed 29-Sep-2023 18:24)   Authored: Post-Operative Note, Chart Review, Note Completion      Last Updated: 29-Sep-2023 23:32 by Vitaly Amezcua)

## 2023-10-01 NOTE — PROGRESS NOTES
Physical Therapy    Physical Therapy Evaluation    Patient Name: Gm Thrasher  MRN: [id number]  Today's Date: 10/1/2023        Assessment/Plan   PT Assessment  PT Assessment Results: Decreased strength, Decreased endurance, Impaired balance, Decreased mobility, Decreased range of motion  Rehab Prognosis: Good  Evaluation/Treatment Tolerance: Patient limited by pain  Medical Staff Made Aware: Yes  End of Session Communication: Bedside nurse  Assessment Comment: Pt's functional limitations include bed mobility, transfers, gait and elevations. Pt would benefit from continued acute care PT during hospital LOS and upon dicsharge at a low level intensity.  End of Session Patient Position: Up in chair  IP OR SWING BED PT PLAN  Inpatient or Swing Bed: Inpatient  PT Plan  Treatment/Interventions: Bed mobility, Transfer training, Gait training, Stair training, Balance training, Strengthening, Therapeutic exercise, Therapeutic activity, Endurance training, Home exercise program  PT Plan: Skilled PT  PT Frequency: Daily  PT Discharge Recommendations: Low intensity level of continued care (HPT)  Equipment Recommended upon Discharge: Wheeled walker  PT Recommended Transfer Status: Assist x1, Assistive device    Subjective   General Visit Information:  General  Reason for Referral: S/p L3-4, L4-5, XLIF L3-5  Referred By: Dr. Seven MD  Past Medical History Relevant to Rehab: L foot drop, h/o ankle fracture, HTN, DM2, gout  Prior to Session Communication: Bedside nurse  Patient Position Received: Bed, 3 rail up  Home Living:  Home Living  Type of Home: House  Lives With: Spouse  Home Layout: One level  Home Access: Level entry  Bathroom Shower/Tub: Tub/shower unit  Bathroom Equipment: Grab bars in shower, Shower chair with back  Prior Level of Function:  Prior Function Per Pt/Caregiver Report  Level of [place]: Independent with ADLs and functional transfers, Independent with homemaking with ambulation  Receives Help From:  Family  ADL Assistance: Independent  Homemaking Assistance: Independent  Ambulatory Assistance: Needs assistance  Transfers: Assistive device (walking stick)  Gait: Independent  Precautions:  Precautions  Medical Precautions: Fall precautions  Post-Surgical Precautions: Spinal precautions  Vital Signs:  Vital Signs  Heart Rate: 90  SpO2: 99 %  BP: 113/80  Patient Position: Sitting (After bed>chair transfer. Nursing aware. BLE elevated.)    Objective   Pain:  Pain Assessment  Pain Assessment: 0-10  Pain Score: 9  Pain Type: Surgical pain  Pain Location: Back  Cognition:  Cognition  Overall Cognitive Status: Within Functional Limits    Functional Assessments:  Bed Mobility  Bed Mobility: No  Bed Mobility 1  Bed Mobility Comments 1: Educated on log roll technique.    Transfer 1  Transfer From 1: Bed to  Transfer to 1: Chair with arms  Technique 1: To right (amb transfer)  Transfer Device 1: Walker  Transfer Level of Assistance 1: Minimum assistance  Trials/Comments 1: Cues to sequence steps to chair on R. Cues for safety with FWW.  Transfers 2  Transfer From 2: Sit to  Transfer to 2: Stand  Technique 2: Sit to stand  Transfer Device 2: Walker  Transfer Level of Assistance 2: Minimum assistance  Trials/Comments 2: Cues for hand placement and anterior weight shift. Bed height raised.    Ambulation/Gait Training  Ambulation/Gait Training Performed: Yes  Ambulation/Gait Training 1  Surface 1: Level tile  Device 1: Rolling walker  Gait Support Devices:  (L foot drop)  Assistance 1: Contact guard  Quality of Gait 1:  (Step to pattern to approach chair on R. Low BP initially. Deferred additional gait 2/2 low BP today and increased LBP.)  Comments/Distance (ft) 1: 2'       Outcome Measures:  [facility] Basic Mobility  Turning from your back to your side while in a flat bed without using bedrails: A lot  Moving from lying on your back to sitting on the side of a flat bed without using bedrails: A lot  Moving to and from bed to chair  (including a wheelchair): A little  Standing up from a chair using your arms (e.g. wheelchair or bedside chair): A little  To walk in hospital room: Total  Climbing 3-5 steps with railing: Total  Basic Mobility - Total Score: 12          PT Problem       Bed mobility (Progressing)       Start:  10/01/23    Expected End:  10/15/23       Pt will perform supine<>sit with HOB flat, using log roll technique.          Transfers (Progressing)       Start:  10/01/23    Expected End:  10/15/23       Pt will perform all transfers with LRAD, JAYSHREE.         Gait (Progressing)       Start:  10/01/23    Expected End:  10/15/23       Pt will amb 150' with LRAD, JAYSHREE with reciprocal gait, L DF assist (AFO/wrap), equal step length and upright posture.         Back precautions (Progressing)       Start:  10/01/23    Expected End:  10/15/23       Pt will be independent in maintaining spinal precautions 3/3.                    Education Documentation  Precautions, taught by Davida Gabriel PT at 10/1/2023 10:04 AM.  Learner: Patient  Readiness: Acceptance  Method: Explanation, Demonstration, Handout  Response: Verbalizes Understanding  Comment: Back precautions, log roll technique, safety wiht FWW.    Mobility Training, taught by Davida Gabriel PT at 10/1/2023 10:04 AM.  Learner: Patient  Readiness: Acceptance  Method: Explanation, Demonstration, Handout  Response: Verbalizes Understanding  Comment: Back precautions, log roll technique, safety wiht FWW.    Education Comments  No comments found.

## 2023-10-01 NOTE — CARE PLAN
Problem: PT Problem  Goal: Bed mobility  Description: Pt will perform supine<>sit with HOB flat, using log roll technique.   Outcome: Progressing  Goal: Transfers  Description: Pt will perform all transfers with LRAD, JAYSHREE.  Outcome: Progressing  Goal: Gait  Description: Pt will amb 150' with LRAD, JAYSHREE with reciprocal gait, L DF assist (AFO/wrap), equal step length and upright posture.  Outcome: Progressing  Goal: Back precautions  Description: Pt will be independent in maintaining spinal precautions 3/3.  Outcome: Progressing

## 2023-10-01 NOTE — PROGRESS NOTES
Patient is s/p L3-L4 and L4-L5 XLIF with L5-S1 TLIF on 9/29/2023. Doing well overall. Continues to have some pain issues.     Exam  Awake  Ox3  Bue 4+  LLE 5  RLE HF3 KE4- foot drop  Drains in place    A&P  Floor  Upright xrays today  Cont pain control. Toradol added  Am labs ordered  Normal saline bolus this morning  Cont home meds  Scd sqh  Pt ot   Please page us if you have any questions

## 2023-10-01 NOTE — CARE PLAN
Problem: Balance  Goal: Goal 1 Pt will perform sink side ADLs with device WBAT Modified independent level.  Outcome: Progressing     Problem: Dressings Lower Extremities  Goal: STG - Patient will complete lower body dressing with modified independence with adaptive equipment use prn.   Outcome: Progressing     Problem: Dressing Upper Extremities independent level.   Goal: STG - Patient will dress upper body  Outcome: Progressing     Problem: Mobility Pt will transfer to chair or commode with device WBAT modified independent level.  Goal: Goal 1  Outcome: Progressing     Problem: Safety Pt will be independent with spinal precautions and log roll.   Goal: Goal 1  Outcome: Progressing     Problem: Toiletting Pt will be able to perform toileting supervised safely keeping spinal precautions.   Goal: STG - Patient will complete toiletting tasks with supervision safely keeping spinal precautions.   Outcome: Progressing     Problem: Transfers  Goal: STG - Patient will perform car transfer with contact guard assist safely.   Outcome: Progressing  Goal: STG - Patient will perform toilet transfer with supervision keeping spinal precautions.   Outcome: Progressing

## 2023-10-01 NOTE — PROGRESS NOTES
Occupational Therapy    Evaluation    Patient Name: Gm Thrasher  MRN: [id number]  Today's Date: 10/1/2023       Assessment  IP OT Assessment  Prognosis: Good  Evaluation/Treatment Tolerance: Patient tolerated treatment well  Medical Staff Made Aware: Yes  Plan:  Treatment Interventions: ADL retraining, UE strengthening/ROM, Neuromuscular reeducation, Patient/family training, Endurance training, Functional transfer training  OT Frequency: 5 times per week (Daily)  OT Discharge Recommendations: Low intensity level of continued care  Equipment Recommended upon Discharge:  (Reachers)  OT - OK to Discharge: Yes    Subjective   Current Problem:  1. Spondylosis without myelopathy or radiculopathy, lumbar region        2. Spinal stenosis, lumbar region without neurogenic claudication          General:  General  Reason for Referral: Elective surgery for spondylosis/ had surgery L3-4 L4-5 screws  Past Medical History Relevant to Rehab: Spondylosis,  Co-Treatment: PT  Prior to Session Communication: Bedside nurse  Patient Position Received: Bed, 3 rail up  Precautions:  UE Weight Bearing Status: Weight Bearing as Tolerated  Medical Precautions: Fall precautions  Post-Surgical Precautions: Spinal precautions  Vital Signs:  SpO2: 91 %  BP: 88/77 (113/80 in chair)  Patient Position: Sitting  Pain:  Pain Assessment  Pain Assessment: 0-10 (sitting)  Pain Score: 9    Objective   Cognition:  Overall Cognitive Status: Within Functional Limits           Home Living:  Type of Home: House  Lives With: Spouse, Significant other, Alone  Home Layout: One level  Home Access: No concerns  Bathroom Equipment: Shower chair with back, Grab bars in shower   Prior Function:  Level of [place]: Independent with ADLs and functional transfers, Needs assistance with ADLs  IADL History:  Homemaking Responsibilities: Yes  Meal Prep Responsibility: Secondary  Mode of Transportation: Car  ADL:  UE Dressing Assistance: Stand by  LE Dressing  Assistance: Minimal  Toileting Assistance with Device: Minimal  Activity Tolerance:  Endurance: Endurance does not limit participation in activity, Tolerates less than 10 min exercise with changes in vital signs  Bed Mobility/Transfers: Transfers  Transfer: Yes  Transfer 1  Transfer From 1: Sit to  Transfer to 1: Stand  Transfer Level of Assistance 1: Minimum assistance  Transfers 2  Transfer From 2: Bed to  Transfer to 2: Chair with arms  Technique 2: Stand pivot  Transfer Device 2: Walker  Transfer Level of Assistance 2: Minimum assistance  Modalities:     IADL's:   Homemaking Responsibilities: Yes  Meal Prep Responsibility: Secondary  Mode of Transportation: Car  Vision: Vision - Basic Assessment  Current Vision: No visual deficits  Sensation:  Light Touch: No apparent deficits  Strength:     Perception:  Inattention/Neglect: Appears intact  Coordination:  Movements are Fluid and Coordinated: Yes   Hand Function:  Hand Function  Gross Grasp: Functional  Extremities: RUE   RUE : Within Functional Limits, LUE   LUE: Within Functional Limits,  , and    Shoulder                 Cervical AROM WFL unless documented below     Shoulder AROM WFL unless documented below     Shoulder PROM WFL unless documented below     Cervical Myotomes (MMT) WFL unless documented below     Shoulder Strength WFL unless documented below     Scapular MMT WFL unless documented below     DTR WFL unless documented below     Shoulder Special Tests Negative unless documented below   , Elbow                 Elbow AROM WFL unless documented below     Elbow PROM WFL unless documented below     Elbow Strength WFL unless documented below      Strength WFL unless documented below     DTR WFL unless documented below     Elbow Special Tests Negative unless documented below   , and Wrist                 Wrist AROM WFL unless documented below     Wrist PROM WFL unless documented below     Wrist (MMT) WFL unless documented below      Strength WFL  unless documented below     DTR WFL unless documented below     Wrist Special Tests Negative unless documented below       Outcome Measures: [facility] Daily Activity  Putting on and taking off regular lower body clothing: A little  Bathing (including washing, rinsing, drying): A little  Putting on and taking off regular upper body clothing: None  Toileting, which includes using toilet, bedpan or urinal: A little  Taking care of personal grooming such as brushing teeth: None  Eating Meals: None  Daily Activity - Total Score: 21      Education Documentation  Handouts, taught by Newton Rosa OT at 10/1/2023 10:38 AM.  Learner: Patient  Readiness: Acceptance  Method: Explanation, Handout  Response: Verbalizes Understanding    Precautions, taught by Newton Rosa OT at 10/1/2023 10:38 AM.  Learner: Patient  Readiness: Acceptance  Method: Explanation, Handout  Response: Verbalizes Understanding    ADL Training, taught by Newton Rosa OT at 10/1/2023 10:38 AM.  Learner: Patient  Readiness: Acceptance  Method: Explanation, Handout  Response: Verbalizes Understanding    Education Comments  No comments found.      Goals:   Encounter Problems       Encounter Problems (Active)       Balance       LTG - Patient will demonstrate Intervention to enhance balance for safe completion of daily activities       Start:  09/30/23            LTG - Patient will maintain balance       Start:  09/30/23            LTG - Patient will maintain balance to allow for safe mobility       Start:  09/30/23            LTG - Patient will maintain standing and sitting balance to allow for completion of daily activities       Start:  09/30/23            LTG - Patient will tolerate standing       Start:  09/30/23            STG - Maintains dynamic standing balance with upper extremity support       Start:  09/30/23       INTERVENTIONS:  1. Practice standing with minimal support.  2. Educate patient about standing tolerance.  3. Educate patient about  independence with gait, transfers, and ADL's.  4. Educate patient about use of assistive device.  5. Educate patient about self-directed care.         STG - Maintains dynamic standing balance without upper extremity support       Start:  09/30/23       INTERVENTIONS:  1. Practice standing with minimal support.  2. Educate patient about standing tolerance.  3. Educate patient about independence with gait, transfers, and ADL's.  4. Educate patient about use of assistive device.  5. Educate patient about self-directed care.         STG - Maintains static standing balance with upper extremity support       Start:  09/30/23       INTERVENTIONS:  1. Practice standing with minimal support.  2. Educate patient about standing tolerance.  3. Educate patient about independence with gait, transfers, and ADL's.  4. Educate patient about use of assistive device.  5. Educate patient about self-directed care.         STG - Maintains static standing balance without upper extremity support       Start:  09/30/23       INTERVENTIONS:  1. Practice standing with minimal support.  2. Educate patient about maintaining total hip precautions while maintaining balance.  3. Educate patient about standing tolerance.  4. Educate patient about independence with gait, transfers, and ADL's.  5. Educate patient about use of assistive device.  6. Educate patient about self-directed care.         STG - Maintains static sitting balance with upper extremity support       Start:  09/30/23       INTERVENTIONS:  1. Practice sitting on the edge of a bed/mat with minimal support.  2. Educate patient about maintining total hip precautions while maintaining balance.  3. Educate patient about pressure relief.  4. Educate patient about use of assistive device.         STG - Maintains static sitting balance without upper extremity support       Start:  09/30/23       INTERVENTIONS:  1. Practice sitting on the edge of a bed/mat with minimal support.  2. Educate  patient about maintining total hip precautions while maintaining balance.  3. Educate patient about pressure relief.  4. Educate patient about use of assistive device.         STG - Maintains dynamic sitting balance with upper extremity support       Start:  09/30/23       INTERVENTIONS:  1. Practice sitting on the edge of a bed/mat with minimal support.  2. Educate patient about maintining total hip precautions while maintaining balance.  3. Educate patient about pressure relief.  4. Educate patient about use of assistive device.         STG - Maintains dynamic sitting balance without upper extremity support       Start:  09/30/23       INTERVENTIONS:  1. Practice sitting on the edge of a bed/mat with minimal support.  2. Educate patient about maintining total hip precautions while maintaining balance.  3. Educate patient about pressure relief.  4. Educate patient about use of assistive device.            Balance       Goal 1 Pt will perform sink side ADLs with device WBAT Modified independent level. (Progressing)       Start:  10/01/23    Expected End:  10/15/23               Compromised Skin Integrity       LTG - Patient will be free from infection       Start:  09/30/23            LTG - Patient will maintain/improve skin integrity through proper skin care techniques       Start:  09/30/23            LTG - Patient will demonstrate appropriate pressure relief techniques       Start:  09/30/23            LTG - Patient will demonstrate appropriate skin care techniques       Start:  09/30/23            LTG - Patient will be free from infection       Start:  09/30/23            STG - Patient demonstrates skin care/treatment/dressing change       Start:  09/30/23            STG - Patient will maintain good skin integrity       Start:  09/30/23            STG - Patient exhibits signs of wound healing.       Start:  09/30/23            STG - Patient demonstrates pressure reduction techniques       Start:  09/30/23             STG - Patient demonstrates preventative skin care measures       Start:  09/30/23               Dressing Upper Extremities independent level.        STG - Patient will dress upper body (Progressing)       Start:  10/01/23    Expected End:  10/15/23               Dressings Lower Extremities       STG - Patient will complete lower body dressing with modified independence with adaptive equipment use prn.  (Progressing)       Start:  10/01/23    Expected End:  10/15/23               Mobility Pt will transfer to chair or commode with device WBAT modified independent level.       Goal 1 (Progressing)       Start:  10/01/23    Expected End:  10/15/23               Safety       LTG - Patient will adhere to hip precautions during ADL's and transfers       Start:  09/30/23            LTG - Patient will demonstrate safety requirements appropriate to situation/environment       Start:  09/30/23            LTG - Patient will utilize safety techniques       Start:  09/30/23            STG - Patient locks brakes on wheelchair       Start:  09/30/23            STG - Patient uses call light consistently to request assistance with transfers       Start:  09/30/23            STG - Patient uses gait belt during all transfers       Start:  09/30/23               Safety Pt will be independent with spinal precautions and log roll.        Goal 1 (Progressing)       Start:  10/01/23    Expected End:  10/15/23               Toiletting Pt will be able to perform toileting supervised safely keeping spinal precautions.        STG - Patient will complete toiletting tasks with supervision safely keeping spinal precautions.  (Progressing)       Start:  10/01/23    Expected End:  10/15/23               Transfers       STG - Patient will perform car transfer with contact guard assist safely.  (Progressing)       Start:  10/01/23    Expected End:  10/15/23            STG - Patient will perform toilet transfer with supervision keeping spinal  precautions.  (Progressing)       Start:  10/01/23    Expected End:  10/15/23

## 2023-10-02 LAB — GLUCOSE BLD MANUAL STRIP-MCNC: 119 MG/DL (ref 74–99)

## 2023-10-02 PROCEDURE — 2500000001 HC RX 250 WO HCPCS SELF ADMINISTERED DRUGS (ALT 637 FOR MEDICARE OP): Performed by: STUDENT IN AN ORGANIZED HEALTH CARE EDUCATION/TRAINING PROGRAM

## 2023-10-02 PROCEDURE — 1100000001 HC PRIVATE ROOM DAILY

## 2023-10-02 PROCEDURE — S4991 NICOTINE PATCH NONLEGEND: HCPCS | Performed by: STUDENT IN AN ORGANIZED HEALTH CARE EDUCATION/TRAINING PROGRAM

## 2023-10-02 PROCEDURE — 82947 ASSAY GLUCOSE BLOOD QUANT: CPT

## 2023-10-02 PROCEDURE — 2500000004 HC RX 250 GENERAL PHARMACY W/ HCPCS (ALT 636 FOR OP/ED): Performed by: STUDENT IN AN ORGANIZED HEALTH CARE EDUCATION/TRAINING PROGRAM

## 2023-10-02 PROCEDURE — 2500000002 HC RX 250 W HCPCS SELF ADMINISTERED DRUGS (ALT 637 FOR MEDICARE OP, ALT 636 FOR OP/ED): Performed by: STUDENT IN AN ORGANIZED HEALTH CARE EDUCATION/TRAINING PROGRAM

## 2023-10-02 RX ADMIN — HEPARIN SODIUM 5000 UNITS: 5000 INJECTION INTRAVENOUS; SUBCUTANEOUS at 09:48

## 2023-10-02 RX ADMIN — HEPARIN SODIUM 5000 UNITS: 5000 INJECTION INTRAVENOUS; SUBCUTANEOUS at 17:55

## 2023-10-02 RX ADMIN — PREGABALIN 300 MG: 150 CAPSULE ORAL at 21:27

## 2023-10-02 RX ADMIN — HEPARIN SODIUM 5000 UNITS: 5000 INJECTION INTRAVENOUS; SUBCUTANEOUS at 03:06

## 2023-10-02 RX ADMIN — CYCLOBENZAPRINE 10 MG: 10 TABLET, FILM COATED ORAL at 16:26

## 2023-10-02 RX ADMIN — HYDROMORPHONE HYDROCHLORIDE 0.2 MG: 0.2 INJECTION, SOLUTION INTRAMUSCULAR; INTRAVENOUS; SUBCUTANEOUS at 12:52

## 2023-10-02 RX ADMIN — ATORVASTATIN CALCIUM 10 MG: 10 TABLET, FILM COATED ORAL at 09:42

## 2023-10-02 RX ADMIN — ACETAMINOPHEN 325MG 650 MG: 325 TABLET ORAL at 12:42

## 2023-10-02 RX ADMIN — CYCLOBENZAPRINE 10 MG: 10 TABLET, FILM COATED ORAL at 09:41

## 2023-10-02 RX ADMIN — ICOSAPENT ETHYL 2 G: 1 CAPSULE ORAL at 16:25

## 2023-10-02 RX ADMIN — OXYCODONE HYDROCHLORIDE 10 MG: 10 TABLET ORAL at 09:39

## 2023-10-02 RX ADMIN — OXYCODONE HYDROCHLORIDE 10 MG: 10 TABLET ORAL at 21:27

## 2023-10-02 RX ADMIN — DOCUSATE SODIUM 100 MG: 100 CAPSULE, LIQUID FILLED ORAL at 21:27

## 2023-10-02 RX ADMIN — NICOTINE 1 PATCH: 14 PATCH, EXTENDED RELEASE TRANSDERMAL at 09:35

## 2023-10-02 RX ADMIN — ONDANSETRON 4 MG: 2 INJECTION INTRAMUSCULAR; INTRAVENOUS at 12:41

## 2023-10-02 RX ADMIN — ACETAMINOPHEN 325MG 650 MG: 325 TABLET ORAL at 05:32

## 2023-10-02 RX ADMIN — ICOSAPENT ETHYL 2 G: 1 CAPSULE ORAL at 09:41

## 2023-10-02 RX ADMIN — PREGABALIN 300 MG: 150 CAPSULE ORAL at 09:41

## 2023-10-02 RX ADMIN — POLYETHYLENE GLYCOL 3350 17 G: 17 POWDER, FOR SOLUTION ORAL at 09:42

## 2023-10-02 RX ADMIN — TAMSULOSIN HYDROCHLORIDE 0.4 MG: 0.4 CAPSULE ORAL at 09:41

## 2023-10-02 RX ADMIN — OXYCODONE HYDROCHLORIDE 10 MG: 10 TABLET ORAL at 05:13

## 2023-10-02 RX ADMIN — DOCUSATE SODIUM 100 MG: 100 CAPSULE, LIQUID FILLED ORAL at 09:41

## 2023-10-02 RX ADMIN — ACETAMINOPHEN 325MG 650 MG: 325 TABLET ORAL at 17:55

## 2023-10-02 RX ADMIN — KETOROLAC TROMETHAMINE 15 MG: 15 INJECTION INTRAMUSCULAR; INTRAVENOUS at 03:06

## 2023-10-02 RX ADMIN — OXYCODONE HYDROCHLORIDE 10 MG: 10 TABLET ORAL at 17:54

## 2023-10-02 RX ADMIN — CYCLOBENZAPRINE 10 MG: 10 TABLET, FILM COATED ORAL at 21:26

## 2023-10-02 RX ADMIN — ONDANSETRON 4 MG: 2 INJECTION INTRAMUSCULAR; INTRAVENOUS at 04:58

## 2023-10-02 ASSESSMENT — PAIN - FUNCTIONAL ASSESSMENT
PAIN_FUNCTIONAL_ASSESSMENT: 0-10

## 2023-10-02 ASSESSMENT — PAIN SCALES - GENERAL
PAINLEVEL_OUTOF10: 6
PAINLEVEL_OUTOF10: 10 - WORST POSSIBLE PAIN
PAINLEVEL_OUTOF10: 7
PAINLEVEL_OUTOF10: 5 - MODERATE PAIN
PAINLEVEL_OUTOF10: 0 - NO PAIN
PAINLEVEL_OUTOF10: 3
PAINLEVEL_OUTOF10: 7
PAINLEVEL_OUTOF10: 0 - NO PAIN

## 2023-10-02 ASSESSMENT — PAIN DESCRIPTION - DESCRIPTORS: DESCRIPTORS: ACHING

## 2023-10-02 NOTE — CARE PLAN
Problem: Pain  Goal: My pain/discomfort is manageable  Outcome: Progressing     Problem: Safety  Goal: Patient will be injury free during hospitalization  Outcome: Progressing  Goal: I will remain free of falls  Outcome: Progressing     Problem: Daily Care  Goal: Daily care needs are met  Outcome: Progressing     Problem: Pain - Adult  Goal: Verbalizes/displays adequate comfort level or baseline comfort level  Outcome: Progressing       The patient's goals for the shift include pain control and a restful night sleep.

## 2023-10-02 NOTE — PROGRESS NOTES
Physical Therapy                 Therapy Communication Note    Patient Name: Gm Thrasher  MRN: [id number]  Today's Date: 10/2/2023     Discipline: Physical Therapy    Missed Visit Reason:  Patient declined    Missed Time: Attempt    Comment: Patient in bed this pm declined PT session will continue to follow

## 2023-10-02 NOTE — NURSING NOTE
4301 - Pt called asking for pain medication with a pain score 8 out of 10. Pt also complain of nausea and headache. Pt was administered PRN zofran and oxy 10mg. Dr. King notified of new findings. Pt currently laying in recliner w/ hemovac running to gravity.     Pt laying flat with hemovac below abdomen running to gravity per Dr. King.

## 2023-10-02 NOTE — PROGRESS NOTES
Jesus Thrasher is a 58 y.o. male on day 3 of admission presenting with Spondylosis without myelopathy or radiculopathy, lumbar region.    Subjective   Continues with mild surgical area ache/pain. Headache overnight, will monitor for correlation with csf leak. Toradol added, will avoid dilaudid           Last Recorded Vitals  Blood pressure 136/80, pulse 77, temperature 36.2 °C (97.2 °F), resp. rate 16, SpO2 92 %.  Intake/Output last 3 Shifts:  I/O last 3 completed shifts:  In: 1000 [IV Piggyback:1000]  Out: 3750 [Urine:3175; Other:450]    Relevant Results                XR lumbar spine 2-3 views    Result Date: 10/1/2023  Interpreted By:  Moe Mckeon, STUDY: XR LUMBAR SPINE 2-3 VIEWS;  10/1/2023 11:06 am   INDICATION: Signs/Symptoms:post op.   COMPARISON: 03/08/2012   ACCESSION NUMBER(S): [id number]   ORDERING CLINICIAN: SUBHA JUNE   FINDINGS: Multiple views of the  lumbar spine are obtained. Interval laminectomy changes and posterior pedicle screws fusing L3 through S1. Alignment appears to be intact. Multilevel discogenic degenerative changes.       Interval fusion of the lower lumbar spine..     Signed by: Moe Mckeon 10/1/2023 11:49 AM Dictation workstation:   [id number]    Electrocardiogram 12 Lead    Result Date: 9/23/2023  Normal sinus rhythm Normal ECG When compared with ECG of 26-JUN-2013 10:04, No significant change was found Confirmed by Fina Talavera ([id number]) on 9/23/2023 1:07:05 PM    XR chest 2 view    Result Date: 9/21/2023  Interpreted By:  GUSTAVO MONROE MD MRN: [id number] Patient Name: JESUS THRASHER  STUDY: TH CHEST 2 VIEW PA AND LAT;  9/20/2023 9:50 am  INDICATION: preop .  COMPARISON: None.  ACCESSION NUMBER(S): [id number]  ORDERING CLINICIAN: DEAN OGLESBY  FINDINGS: The lungs are clear without pleural effusion. Normal heart size, mediastinum, jay, and pulmonary vasculature.      No active disease in the chest.                 Assessment/Plan   Principal Problem:    Spondylosis without  myelopathy or radiculopathy, lumbar region    Patient is s/p L3-L4 and L4-L5 XLIF with L5-S1 TLIF on 9/29/2023. Doing well overall. Continues to have some pain issues.      Exam  Awake  Ox3  Bue 4+  LLE 5  RLE HF3 KE4- foot drop  Drains in place, monitoring hemovac drain output. 430 prior day. Set to gravity     A&P  Floor  Upright xrays with hardware in good position  Cont pain control. Toradol added  Trial of void today, bladder scan after  Am labs ordered  Cont home meds  Scd sqh  Pt ot        I spent 25 minutes in the professional and overall care of this patient.      Nicholas Paredes PA-C

## 2023-10-02 NOTE — PROGRESS NOTES
Pt had complaints of a headache throughout this shift rating it 10/10. Oxy and dilaudid given for pain management. Pt had episode of emesis this shift and providers notified. Zofran given for nausea. Nicholas Paredes notified neurosurgeon Seven but no further interventions were ordered. Pt awake and alert and no changes in orientation were identified by this nurse. Pt's drain had adequate amounts of output serosanguineous. Pt's cruz was removed and pt voided 275ml of abel urine. Safety maintained with nonskid footwear, bed alarms and use of call light.

## 2023-10-02 NOTE — PROGRESS NOTES
Occupational Therapy                 Therapy Communication Note    Patient Name: Gm Thrasher  MRN: [id number]  Today's Date: 10/2/2023     Discipline: Occupational Therapy    Missed Visit Reason:  Patient declined    Missed Time: Attempt    Comment: Patient refusing therapy due to pain despite encouragement, RN made aware, will follow up as time permits and pt is agreeable.

## 2023-10-02 NOTE — PROGRESS NOTES
Pt complaining of headache, this RN notified Dr. King, per Dr. King have pt lay back flat in bed and continue to keep hemovac to gravity, will do and continue to monitor pt.

## 2023-10-02 NOTE — NURSING NOTE
Pt still has complaints of headache rating it 10/10. Pt had an episode of emesis and provider Nicholas Paredes notified. Zofran given for nausea and tylenol given for pain. IVP dilauidid given for pain. Provider Milks at bedside at this time assessing pt. Awaiting further orders at this time. Safety maintained with nonskid footwear, bed alarms and use of call light.

## 2023-10-03 ENCOUNTER — APPOINTMENT (OUTPATIENT)
Dept: RADIOLOGY | Facility: HOSPITAL | Age: 58
DRG: 453 | End: 2023-10-03
Payer: COMMERCIAL

## 2023-10-03 PROBLEM — E78.00 HIGH BLOOD CHOLESTEROL LEVEL: Status: ACTIVE | Noted: 2023-10-03

## 2023-10-03 LAB
ALBUMIN SERPL BCP-MCNC: 3 G/DL (ref 3.4–5)
ANION GAP SERPL CALC-SCNC: 11 MMOL/L (ref 10–20)
BUN SERPL-MCNC: 14 MG/DL (ref 6–23)
CALCIUM SERPL-MCNC: 8.2 MG/DL (ref 8.6–10.3)
CHLORIDE SERPL-SCNC: 97 MMOL/L (ref 98–107)
CO2 SERPL-SCNC: 30 MMOL/L (ref 21–32)
CREAT SERPL-MCNC: 0.87 MG/DL (ref 0.5–1.3)
ERYTHROCYTE DISTRIBUTION WIDTH (RATIO) BY AUTOMATED COUNT: NORMAL
ERYTHROCYTE MEAN CORPUSCULAR HEMOGLOBIN CONCENTRATION (G/DL) BY AUTOMATED: NORMAL
ERYTHROCYTE MEAN CORPUSCULAR VOLUME (FL) BY AUTOMATED COUNT: NORMAL
ERYTHROCYTE [DISTWIDTH] IN BLOOD BY AUTOMATED COUNT: 16.3 % (ref 11.5–14.5)
ERYTHROCYTES (10*6/UL) IN BLOOD BY AUTOMATED COUNT: NORMAL
GFR SERPL CREATININE-BSD FRML MDRD: >90 ML/MIN/1.73M*2
GLUCOSE BLD MANUAL STRIP-MCNC: 78 MG/DL (ref 74–99)
GLUCOSE BLD MANUAL STRIP-MCNC: 97 MG/DL (ref 74–99)
GLUCOSE SERPL-MCNC: 69 MG/DL (ref 74–99)
HCT VFR BLD AUTO: 31.3 % (ref 41–52)
HEMATOCRIT (%) IN BLOOD BY AUTOMATED COUNT: NORMAL
HEMOGLOBIN (G/DL) IN BLOOD: NORMAL
HGB BLD-MCNC: 10.5 G/DL (ref 13.5–17.5)
INR PPP: 1.1 (ref 0.9–1.1)
LEUKOCYTES (10*3/UL) IN BLOOD BY AUTOMATED COUNT: NORMAL
MAGNESIUM SERPL-MCNC: 1.81 MG/DL (ref 1.6–2.4)
MCH RBC QN AUTO: 30.5 PG (ref 26–34)
MCHC RBC AUTO-ENTMCNC: 33.5 G/DL (ref 32–36)
MCV RBC AUTO: 91 FL (ref 80–100)
NRBC (PER 100 WBCS) BY AUTOMATED COUNT: NORMAL
NRBC BLD-RTO: 0 /100 WBCS (ref 0–0)
PHOSPHATE SERPL-MCNC: 3.7 MG/DL (ref 2.5–4.9)
PHOSPHATE SERPL-MCNC: 3.8 MG/DL (ref 2.5–4.9)
PLATELET # BLD AUTO: 259 X10*3/UL (ref 150–450)
PLATELETS (10*3/UL) IN BLOOD AUTOMATED COUNT: NORMAL
PMV BLD AUTO: 9.1 FL (ref 7.5–11.5)
POTASSIUM SERPL-SCNC: 3.7 MMOL/L (ref 3.5–5.3)
PROTHROMBIN TIME: 12.3 SECONDS (ref 9.8–12.8)
RBC # BLD AUTO: 3.44 X10*6/UL (ref 4.5–5.9)
SODIUM SERPL-SCNC: 134 MMOL/L (ref 136–145)
WBC # BLD AUTO: 9.6 X10*3/UL (ref 4.4–11.3)

## 2023-10-03 PROCEDURE — 82947 ASSAY GLUCOSE BLOOD QUANT: CPT

## 2023-10-03 PROCEDURE — 2500000001 HC RX 250 WO HCPCS SELF ADMINISTERED DRUGS (ALT 637 FOR MEDICARE OP): Performed by: PHYSICIAN ASSISTANT

## 2023-10-03 PROCEDURE — 80069 RENAL FUNCTION PANEL: CPT | Performed by: NURSE PRACTITIONER

## 2023-10-03 PROCEDURE — 2500000001 HC RX 250 WO HCPCS SELF ADMINISTERED DRUGS (ALT 637 FOR MEDICARE OP)

## 2023-10-03 PROCEDURE — 83735 ASSAY OF MAGNESIUM: CPT | Performed by: NURSE PRACTITIONER

## 2023-10-03 PROCEDURE — 36415 COLL VENOUS BLD VENIPUNCTURE: CPT | Performed by: NURSE PRACTITIONER

## 2023-10-03 PROCEDURE — 99291 CRITICAL CARE FIRST HOUR: CPT | Performed by: INTERNAL MEDICINE

## 2023-10-03 PROCEDURE — 2500000004 HC RX 250 GENERAL PHARMACY W/ HCPCS (ALT 636 FOR OP/ED): Performed by: STUDENT IN AN ORGANIZED HEALTH CARE EDUCATION/TRAINING PROGRAM

## 2023-10-03 PROCEDURE — 72131 CT LUMBAR SPINE W/O DYE: CPT | Performed by: RADIOLOGY

## 2023-10-03 PROCEDURE — 2500000001 HC RX 250 WO HCPCS SELF ADMINISTERED DRUGS (ALT 637 FOR MEDICARE OP): Performed by: STUDENT IN AN ORGANIZED HEALTH CARE EDUCATION/TRAINING PROGRAM

## 2023-10-03 PROCEDURE — 70450 CT HEAD/BRAIN W/O DYE: CPT | Performed by: RADIOLOGY

## 2023-10-03 PROCEDURE — 2500000005 HC RX 250 GENERAL PHARMACY W/O HCPCS

## 2023-10-03 PROCEDURE — 2500000004 HC RX 250 GENERAL PHARMACY W/ HCPCS (ALT 636 FOR OP/ED): Performed by: NEUROLOGICAL SURGERY

## 2023-10-03 PROCEDURE — 36415 COLL VENOUS BLD VENIPUNCTURE: CPT | Performed by: STUDENT IN AN ORGANIZED HEALTH CARE EDUCATION/TRAINING PROGRAM

## 2023-10-03 PROCEDURE — 2500000004 HC RX 250 GENERAL PHARMACY W/ HCPCS (ALT 636 FOR OP/ED)

## 2023-10-03 PROCEDURE — G1004 CDSM NDSC: HCPCS

## 2023-10-03 PROCEDURE — 84100 ASSAY OF PHOSPHORUS: CPT | Performed by: STUDENT IN AN ORGANIZED HEALTH CARE EDUCATION/TRAINING PROGRAM

## 2023-10-03 PROCEDURE — 1100000001 HC PRIVATE ROOM DAILY

## 2023-10-03 PROCEDURE — S4991 NICOTINE PATCH NONLEGEND: HCPCS | Performed by: STUDENT IN AN ORGANIZED HEALTH CARE EDUCATION/TRAINING PROGRAM

## 2023-10-03 PROCEDURE — 2500000002 HC RX 250 W HCPCS SELF ADMINISTERED DRUGS (ALT 637 FOR MEDICARE OP, ALT 636 FOR OP/ED): Performed by: STUDENT IN AN ORGANIZED HEALTH CARE EDUCATION/TRAINING PROGRAM

## 2023-10-03 PROCEDURE — 2500000004 HC RX 250 GENERAL PHARMACY W/ HCPCS (ALT 636 FOR OP/ED): Performed by: PHYSICIAN ASSISTANT

## 2023-10-03 PROCEDURE — 85610 PROTHROMBIN TIME: CPT | Performed by: NURSE PRACTITIONER

## 2023-10-03 PROCEDURE — 85027 COMPLETE CBC AUTOMATED: CPT | Performed by: NURSE PRACTITIONER

## 2023-10-03 RX ORDER — ESOMEPRAZOLE MAGNESIUM 40 MG/1
40 GRANULE, DELAYED RELEASE ORAL
Status: DISCONTINUED | OUTPATIENT
Start: 2023-10-04 | End: 2023-10-03

## 2023-10-03 RX ORDER — DEXTROSE MONOHYDRATE 100 MG/ML
100 INJECTION, SOLUTION INTRAVENOUS ONCE AS NEEDED
Status: DISCONTINUED | OUTPATIENT
Start: 2023-10-03 | End: 2023-10-04

## 2023-10-03 RX ORDER — SODIUM CHLORIDE 9 MG/ML
100 INJECTION, SOLUTION INTRAVENOUS CONTINUOUS
Status: DISCONTINUED | OUTPATIENT
Start: 2023-10-03 | End: 2023-10-03

## 2023-10-03 RX ORDER — LEVETIRACETAM 500 MG/5ML
500 INJECTION, SOLUTION, CONCENTRATE INTRAVENOUS EVERY 12 HOURS
Status: DISCONTINUED | OUTPATIENT
Start: 2023-10-03 | End: 2023-10-03 | Stop reason: WASHOUT

## 2023-10-03 RX ORDER — LEVETIRACETAM 5 MG/ML
500 INJECTION INTRAVASCULAR EVERY 12 HOURS
Status: DISCONTINUED | OUTPATIENT
Start: 2023-10-04 | End: 2023-10-05 | Stop reason: HOSPADM

## 2023-10-03 RX ORDER — PANTOPRAZOLE SODIUM 40 MG/10ML
40 INJECTION, POWDER, LYOPHILIZED, FOR SOLUTION INTRAVENOUS
Status: DISCONTINUED | OUTPATIENT
Start: 2023-10-04 | End: 2023-10-03

## 2023-10-03 RX ORDER — LEVETIRACETAM 10 MG/ML
1000 INJECTION INTRAVASCULAR ONCE
Status: COMPLETED | OUTPATIENT
Start: 2023-10-03 | End: 2023-10-03

## 2023-10-03 RX ORDER — PANTOPRAZOLE SODIUM 40 MG/1
40 TABLET, DELAYED RELEASE ORAL
Status: DISCONTINUED | OUTPATIENT
Start: 2023-10-04 | End: 2023-10-05 | Stop reason: HOSPADM

## 2023-10-03 RX ORDER — DEXTROSE 50 % IN WATER (D50W) INTRAVENOUS SYRINGE
25
Status: DISCONTINUED | OUTPATIENT
Start: 2023-10-03 | End: 2023-10-04

## 2023-10-03 RX ORDER — DOCUSATE SODIUM 100 MG/1
100 CAPSULE, LIQUID FILLED ORAL 2 TIMES DAILY PRN
Status: DISCONTINUED | OUTPATIENT
Start: 2023-10-03 | End: 2023-10-05 | Stop reason: HOSPADM

## 2023-10-03 RX ORDER — ENOXAPARIN SODIUM 100 MG/ML
40 INJECTION SUBCUTANEOUS DAILY
Status: DISCONTINUED | OUTPATIENT
Start: 2023-10-06 | End: 2023-10-05 | Stop reason: HOSPADM

## 2023-10-03 RX ORDER — INSULIN LISPRO 100 [IU]/ML
0-10 INJECTION, SOLUTION INTRAVENOUS; SUBCUTANEOUS EVERY 4 HOURS
Status: DISCONTINUED | OUTPATIENT
Start: 2023-10-03 | End: 2023-10-04

## 2023-10-03 RX ADMIN — CYCLOBENZAPRINE 10 MG: 10 TABLET, FILM COATED ORAL at 20:31

## 2023-10-03 RX ADMIN — ATORVASTATIN CALCIUM 10 MG: 10 TABLET, FILM COATED ORAL at 09:27

## 2023-10-03 RX ADMIN — CYCLOBENZAPRINE 10 MG: 10 TABLET, FILM COATED ORAL at 14:58

## 2023-10-03 RX ADMIN — LEVETIRACETAM 1000 MG: 10 INJECTION INTRAVENOUS at 17:45

## 2023-10-03 RX ADMIN — Medication 2 L/MIN: at 13:00

## 2023-10-03 RX ADMIN — HEPARIN SODIUM 5000 UNITS: 5000 INJECTION INTRAVENOUS; SUBCUTANEOUS at 02:06

## 2023-10-03 RX ADMIN — HEPARIN SODIUM 5000 UNITS: 5000 INJECTION INTRAVENOUS; SUBCUTANEOUS at 09:45

## 2023-10-03 RX ADMIN — ACETAMINOPHEN 325MG 650 MG: 325 TABLET ORAL at 06:17

## 2023-10-03 RX ADMIN — HYDROMORPHONE HYDROCHLORIDE 0.2 MG: 0.2 INJECTION, SOLUTION INTRAMUSCULAR; INTRAVENOUS; SUBCUTANEOUS at 03:51

## 2023-10-03 RX ADMIN — OXYCODONE HYDROCHLORIDE 10 MG: 10 TABLET ORAL at 10:19

## 2023-10-03 RX ADMIN — HYDROMORPHONE HYDROCHLORIDE 0.2 MG: 0.2 INJECTION, SOLUTION INTRAMUSCULAR; INTRAVENOUS; SUBCUTANEOUS at 12:05

## 2023-10-03 RX ADMIN — HYDROMORPHONE HYDROCHLORIDE 0.2 MG: 0.2 INJECTION, SOLUTION INTRAMUSCULAR; INTRAVENOUS; SUBCUTANEOUS at 20:31

## 2023-10-03 RX ADMIN — ICOSAPENT ETHYL 2 G: 1 CAPSULE ORAL at 17:45

## 2023-10-03 RX ADMIN — POLYETHYLENE GLYCOL 3350 17 G: 17 POWDER, FOR SOLUTION ORAL at 09:27

## 2023-10-03 RX ADMIN — OXYCODONE HYDROCHLORIDE 10 MG: 10 TABLET ORAL at 06:17

## 2023-10-03 RX ADMIN — HYDROMORPHONE HYDROCHLORIDE 0.2 MG: 0.2 INJECTION, SOLUTION INTRAMUSCULAR; INTRAVENOUS; SUBCUTANEOUS at 00:39

## 2023-10-03 RX ADMIN — DOCUSATE SODIUM 100 MG: 100 CAPSULE, LIQUID FILLED ORAL at 09:27

## 2023-10-03 RX ADMIN — ACETAMINOPHEN 325MG 650 MG: 325 TABLET ORAL at 23:07

## 2023-10-03 RX ADMIN — HYDROMORPHONE HYDROCHLORIDE 0.2 MG: 0.2 INJECTION, SOLUTION INTRAMUSCULAR; INTRAVENOUS; SUBCUTANEOUS at 07:33

## 2023-10-03 RX ADMIN — ONDANSETRON 4 MG: 2 INJECTION INTRAMUSCULAR; INTRAVENOUS at 06:34

## 2023-10-03 RX ADMIN — ACETAMINOPHEN 325MG 650 MG: 325 TABLET ORAL at 00:37

## 2023-10-03 RX ADMIN — PREGABALIN 300 MG: 150 CAPSULE ORAL at 09:27

## 2023-10-03 RX ADMIN — TAMSULOSIN HYDROCHLORIDE 0.4 MG: 0.4 CAPSULE ORAL at 09:27

## 2023-10-03 RX ADMIN — CYCLOBENZAPRINE 10 MG: 10 TABLET, FILM COATED ORAL at 09:27

## 2023-10-03 RX ADMIN — HYDROMORPHONE HYDROCHLORIDE 0.2 MG: 0.2 INJECTION, SOLUTION INTRAMUSCULAR; INTRAVENOUS; SUBCUTANEOUS at 15:39

## 2023-10-03 RX ADMIN — OXYCODONE HYDROCHLORIDE 10 MG: 10 TABLET ORAL at 14:58

## 2023-10-03 RX ADMIN — OXYCODONE HYDROCHLORIDE 10 MG: 10 TABLET ORAL at 18:14

## 2023-10-03 RX ADMIN — OXYCODONE HYDROCHLORIDE 10 MG: 10 TABLET ORAL at 02:06

## 2023-10-03 RX ADMIN — ACETAMINOPHEN 325MG 650 MG: 325 TABLET ORAL at 17:45

## 2023-10-03 RX ADMIN — PREGABALIN 300 MG: 150 CAPSULE ORAL at 20:31

## 2023-10-03 RX ADMIN — ACETAMINOPHEN 325MG 650 MG: 325 TABLET ORAL at 12:05

## 2023-10-03 RX ADMIN — OXYCODONE HYDROCHLORIDE 10 MG: 10 TABLET ORAL at 23:07

## 2023-10-03 RX ADMIN — ICOSAPENT ETHYL 2 G: 1 CAPSULE ORAL at 09:27

## 2023-10-03 RX ADMIN — NICOTINE 1 PATCH: 14 PATCH, EXTENDED RELEASE TRANSDERMAL at 09:27

## 2023-10-03 ASSESSMENT — PAIN SCALES - GENERAL
PAINLEVEL_OUTOF10: 10 - WORST POSSIBLE PAIN
PAINLEVEL_OUTOF10: 10 - WORST POSSIBLE PAIN
PAINLEVEL_OUTOF10: 7
PAINLEVEL_OUTOF10: 9
PAINLEVEL_OUTOF10: 7
PAINLEVEL_OUTOF10: 10 - WORST POSSIBLE PAIN
PAINLEVEL_OUTOF10: 7
PAINLEVEL_OUTOF10: 10 - WORST POSSIBLE PAIN
PAINLEVEL_OUTOF10: 7
PAINLEVEL_OUTOF10: 7
PAINLEVEL_OUTOF10: 10 - WORST POSSIBLE PAIN
PAINLEVEL_OUTOF10: 2
PAINLEVEL_OUTOF10: 7
PAINLEVEL_OUTOF10: 3
PAINLEVEL_OUTOF10: 5 - MODERATE PAIN

## 2023-10-03 ASSESSMENT — ENCOUNTER SYMPTOMS
TREMORS: 0
PHOTOPHOBIA: 0
DIAPHORESIS: 0
LIGHT-HEADEDNESS: 0
MYALGIAS: 1
FLANK PAIN: 0
WHEEZING: 0
AGITATION: 0
ACTIVITY CHANGE: 0
APPETITE CHANGE: 0
VOMITING: 0
CHEST TIGHTNESS: 0
APNEA: 0
ABDOMINAL PAIN: 1
ABDOMINAL DISTENTION: 0
SHORTNESS OF BREATH: 0
HEADACHES: 1
NAUSEA: 0
JOINT SWELLING: 0
CONFUSION: 0
RHINORRHEA: 0
COLOR CHANGE: 0
DYSURIA: 0
FREQUENCY: 0
ANAL BLEEDING: 0
COUGH: 0
PALPITATIONS: 0
DIZZINESS: 0
FEVER: 0
SORE THROAT: 0
FATIGUE: 0
CHILLS: 0

## 2023-10-03 ASSESSMENT — COGNITIVE AND FUNCTIONAL STATUS - GENERAL
TOILETING: A LITTLE
CLIMB 3 TO 5 STEPS WITH RAILING: A LOT
DRESSING REGULAR UPPER BODY CLOTHING: A LITTLE
HELP NEEDED FOR BATHING: A LOT
STANDING UP FROM CHAIR USING ARMS: A LOT
MOBILITY SCORE: 16
DAILY ACTIVITIY SCORE: 18
MOVING TO AND FROM BED TO CHAIR: A LOT
PERSONAL GROOMING: A LITTLE
DRESSING REGULAR LOWER BODY CLOTHING: A LITTLE
WALKING IN HOSPITAL ROOM: A LOT

## 2023-10-03 ASSESSMENT — PAIN - FUNCTIONAL ASSESSMENT
PAIN_FUNCTIONAL_ASSESSMENT: 0-10

## 2023-10-03 ASSESSMENT — PAIN SCALES - WONG BAKER
WONGBAKER_NUMERICALRESPONSE: HURTS WHOLE LOT
WONGBAKER_NUMERICALRESPONSE: HURTS WHOLE LOT
WONGBAKER_NUMERICALRESPONSE: HURTS WORST
WONGBAKER_NUMERICALRESPONSE: HURTS WORST

## 2023-10-03 ASSESSMENT — PAIN DESCRIPTION - DESCRIPTORS: DESCRIPTORS: ACHING

## 2023-10-03 NOTE — CARE PLAN
Problem: Pain  Goal: My pain/discomfort is manageable  Outcome: Progressing     Problem: Safety  Goal: Patient will be injury free during hospitalization  Outcome: Progressing     Problem: Safety  Goal: I will remain free of falls  Outcome: Progressing     Problem: Daily Care  Goal: Daily care needs are met  Outcome: Progressing     Problem: Pain - Adult  Goal: Verbalizes/displays adequate comfort level or baseline comfort level  Outcome: Progressing     Problem: Safety - Adult  Goal: Free from fall injury  Outcome: Progressing     Problem: Pain  Goal: Performs ADL's with improved pain control throughout shift  Outcome: Progressing     Problem: Pain  Goal: Free from opioid side effects throughout the shift  Outcome: Progressing     Problem: Pain  Goal: Free from acute confusion related to pain meds throughout the shift  Outcome: Progressing   The patient's goals for the shift include      The clinical goals for the shift include maintain pain control and get restful sleep    Problem: Pain  Goal: Walks with improved pain control throughout the shift  Outcome: Not Progressing  Goal: Participates in PT with improved pain control throughout the shift  Outcome: Not Progressing

## 2023-10-03 NOTE — PROGRESS NOTES
Reached out to Nicholas Paredes this morning due to concerns for this patient. Patient has had a headache for the last day rating it 10/10. Pt has had two episodes of emesis- once yesterday and once last night. Provider Milks at bedside at this time and will do a CT of head or spine. This nurse gave patient dilauidid for breakthrough pain. Patient awake and alert and laying flat in bed at this time.

## 2023-10-03 NOTE — NURSING NOTE
0646  Pt with continued c/o headache throughout the night. Pt medicated per MAR. Pt developed nausea and had bout of emesis presently and was medicated with 4 mg  zofran ivp per MAR. Pt appears uncomfortable and is diaphoretic. Dr Amezcua was messaged at this time. Awaiting a reply at this time. Blood sugar also checked and is presently 97.

## 2023-10-03 NOTE — NURSING NOTE
Pt continued to c/o headache through out  the night. Pt encouraged to remain lying flat but was found sitting at bedside several times. Serosanguinous drainage noted in Hemovac.

## 2023-10-03 NOTE — CONSULTS
Reason For Consult  The performing schedule every neurochecks. Per neuro, the patient should have neurochecks every 1 hour, maintain blood pressure of less than 140, and resume home medication.  Patient is scheduled to have CTA of the head at 4 PM, and patient will receive 1 g of Keppra IV, and 500 mg Keppra twice daily for seizure prophylaxis.  We will order EKG and troponin level test.       History Of Present Illness  Gm Thrasher is a 58 y.o. male presenting with subarachnoid hemorrhage postop day 4 of extreme lateral interbody fusion surgery. Per Neuro, keep BP <140 and holding prophy. Dural tear occurred intraoperatively. L4 pedicles not of concern as bridged by fusion construct. Patient should avoid significant bending, lifting, twisting.      Past Medical History  He has a past medical history of Borderline diabetes, H/O chest x-ray, H/O CT scan, H/O CT scan of abdomen, H/O CT scan of brain, H/O CT scan of chest, H/O diagnostic ultrasound, H/O echocardiogram, H/O magnetic resonance imaging of cervical spine, H/O magnetic resonance imaging of lumbar spine, and High cholesterol.    He has no past medical history of Arthritis, Asthma, Cancer (CMS/[facility]), CHF (congestive heart failure) (CMS/[facility]), or COPD (chronic obstructive pulmonary disease) (CMS/[facility]).    Surgical History  He has a past surgical history that includes Kidney surgery (09/12/2013); Other surgical history (01/28/2020); Other surgical history (01/28/2020); Other surgical history (01/28/2020); Other surgical history (01/28/2020); Other surgical history (01/28/2020); Cardiac catheterization; Cervical laminectomy; Esophagogastroduodenoscopy; Lumbar laminectomy; and Lumbar fusion (09/30/2023).     Social History  He reports that he has been smoking cigarettes. He has never used smokeless tobacco. He reports that he does not currently use alcohol. He reports that he does not use drugs.    Family History  Family History   Problem Relation Name Age of  Onset    No Known Problems Mother          F: ETOHIsm, DM, COPD M: Lung CA, CAD B: CA metastatic lung () S; ETOH ism S:        Allergies  Metoprolol and Mirtazapine    Review of Systems  Review of Systems   Constitutional:  Negative for activity change, appetite change, chills, diaphoresis, fatigue and fever.   HENT:  Negative for congestion, dental problem, hearing loss, mouth sores, rhinorrhea, sneezing and sore throat.    Eyes:  Negative for photophobia and visual disturbance.   Respiratory:  Negative for apnea, cough, chest tightness, shortness of breath and wheezing.    Cardiovascular:  Negative for chest pain and palpitations.   Gastrointestinal:  Positive for abdominal pain. Negative for abdominal distention, anal bleeding, nausea and vomiting.   Endocrine: Negative for heat intolerance and polyuria.   Genitourinary:  Positive for testicular pain (Left testicular pain). Negative for dysuria, flank pain, frequency and urgency.   Musculoskeletal:  Positive for gait problem and myalgias. Negative for joint swelling.   Skin:  Negative for color change, pallor and rash.   Allergic/Immunologic: Negative for environmental allergies and food allergies.   Neurological:  Positive for headaches. Negative for dizziness, tremors and light-headedness.   Psychiatric/Behavioral:  Negative for agitation and confusion.         Physical Exam  Constitutional:       Appearance: Normal appearance.   HENT:      Head: Normocephalic and atraumatic.      Nose: No congestion or rhinorrhea.      Mouth/Throat:      Mouth: Mucous membranes are moist.   Eyes:      Extraocular Movements: Extraocular movements intact.   Cardiovascular:      Rate and Rhythm: Normal rate and regular rhythm.      Pulses: Normal pulses.      Heart sounds: Normal heart sounds. No murmur heard.     No friction rub.   Pulmonary:      Effort: Pulmonary effort is normal. No respiratory distress.      Breath sounds: No wheezing or rhonchi.   Chest:      Chest  wall: No tenderness.   Abdominal:      General: Abdomen is flat. Bowel sounds are normal. There is no distension.      Palpations: Abdomen is soft. There is no mass.      Tenderness: There is no abdominal tenderness. There is no right CVA tenderness, left CVA tenderness, guarding or rebound.   Musculoskeletal:      Cervical back: No rigidity or tenderness.   Neurological:      Mental Status: He is alert and oriented to person, place, and time.   Psychiatric:         Mood and Affect: Mood normal.         Speech: Speech normal.           Last Recorded Vitals  Blood pressure 122/60, pulse 74, temperature 36.9 °C (98.4 °F), resp. rate (!) 30, SpO2 93 %.    Relevant Results        Results for orders placed or performed during the hospital encounter of 09/29/23 (from the past 96 hour(s))   Basic Metabolic Panel   Result Value Ref Range    Glucose 103 (H) 74 - 99 mg/dL    Sodium 137 136 - 145 mmol/L    Potassium 4.0 3.5 - 5.3 mmol/L    Chloride 99 98 - 107 mmol/L    Bicarbonate 32 21 - 32 mmol/L    Anion Gap 10 10 - 20 mmol/L    Urea Nitrogen 10 6 - 23 mg/dL    Creatinine 0.80 0.50 - 1.30 mg/dL    eGFR >90 >60 mL/min/1.73m*2    Calcium 8.0 (L) 8.6 - 10.3 mg/dL   POCT GLUCOSE   Result Value Ref Range    POCT Glucose 144 (H) 74 - 99 mg/dL   POCT GLUCOSE   Result Value Ref Range    POCT Glucose 140 (H) 74 - 99 mg/dL   CBC   Result Value Ref Range    WBC 13.9 (H) 4.4 - 11.3 x10*3/uL    nRBC 0.0 0.0 - 0.0 /100 WBCs    RBC 3.99 (L) 4.50 - 5.90 x10*6/uL    Hemoglobin 12.0 (L) 13.5 - 17.5 g/dL    Hematocrit 37.3 (L) 41.0 - 52.0 %    MCV 94 80 - 100 fL    MCH 30.1 26.0 - 34.0 pg    MCHC 32.2 32.0 - 36.0 g/dL    RDW 16.8 (H) 11.5 - 14.5 %    Platelets 266 150 - 450 x10*3/uL    MPV 9.5 7.5 - 11.5 fL   Basic Metabolic Panel   Result Value Ref Range    Glucose 113 (H) 74 - 99 mg/dL    Sodium 138 136 - 145 mmol/L    Potassium 4.3 3.5 - 5.3 mmol/L    Chloride 99 98 - 107 mmol/L    Bicarbonate 34 (H) 21 - 32 mmol/L    Anion Gap 9 (L) 10 -  20 mmol/L    Urea Nitrogen 11 6 - 23 mg/dL    Creatinine 0.93 0.50 - 1.30 mg/dL    eGFR >90 >60 mL/min/1.73m*2    Calcium 8.3 (L) 8.6 - 10.3 mg/dL   POCT GLUCOSE   Result Value Ref Range    POCT Glucose 107 (H) 74 - 99 mg/dL   POCT GLUCOSE   Result Value Ref Range    POCT Glucose 91 74 - 99 mg/dL   CBC   Result Value Ref Range    WBC 13.8 (H) 4.4 - 11.3 x10*3/uL    nRBC 0.0 0.0 - 0.0 /100 WBCs    RBC 3.83 (L) 4.50 - 5.90 x10*6/uL    Hemoglobin 11.4 (L) 13.5 - 17.5 g/dL    Hematocrit 35.8 (L) 41.0 - 52.0 %    MCV 94 80 - 100 fL    MCH 29.8 26.0 - 34.0 pg    MCHC 31.8 (L) 32.0 - 36.0 g/dL    RDW 17.0 (H) 11.5 - 14.5 %    Platelets 256 150 - 450 x10*3/uL    MPV 10.0 7.5 - 11.5 fL   Renal Function Panel   Result Value Ref Range    Glucose 100 (H) 74 - 99 mg/dL    Sodium 136 136 - 145 mmol/L    Potassium 3.8 3.5 - 5.3 mmol/L    Chloride 99 98 - 107 mmol/L    Bicarbonate 28 21 - 32 mmol/L    Anion Gap 13 10 - 20 mmol/L    Urea Nitrogen 11 6 - 23 mg/dL    Creatinine 0.76 0.50 - 1.30 mg/dL    eGFR >90 >60 mL/min/1.73m*2    Calcium 8.5 (L) 8.6 - 10.3 mg/dL    Phosphorus 2.5 2.5 - 4.9 mg/dL    Albumin 3.2 (L) 3.4 - 5.0 g/dL   POCT GLUCOSE   Result Value Ref Range    POCT Glucose 121 (H) 74 - 99 mg/dL   POCT GLUCOSE   Result Value Ref Range    POCT Glucose 133 (H) 74 - 99 mg/dL   POCT GLUCOSE   Result Value Ref Range    POCT Glucose 119 (H) 74 - 99 mg/dL   CBC   Result Value Ref Range    WBC CANCELED     nRBC CANCELED     RBC CANCELED     Hemoglobin CANCELED     Hematocrit CANCELED     MCV CANCELED     MCHC CANCELED     Platelets CANCELED     RDW CANCELED    POCT GLUCOSE   Result Value Ref Range    POCT Glucose 97 74 - 99 mg/dL          CT head wo IV contrast    Result Date: 10/3/2023  Interpreted By:  Trenton Moura, STUDY: CT HEAD WO IV CONTRAST;  10/3/2023 9:21 am   INDICATION: Signs/Symptoms:position headache with emesis postop.   COMPARISON: 10/18/2014   ACCESSION NUMBER(S): [id number]   ORDERING CLINICIAN: KATIUSKA COMBS    TECHNIQUE: Sequential trans axial images were obtained  .   FINDINGS: INTRACRANIAL:   CORTICAL SULCI AND EXTRA-AXIAL SPACES:  There is faint increased attenuation within sulci of the right cerebral hemisphere, best seen at the apex on image 28 of series 201, frontal lobe on image 27 and temporal lobe on image 18 indicating subarachnoid hemorrhage. Focal attenuation at the right parietal lobe on image 25 is also probably within a deep sulcus, although may be inter parenchymal. There is also slight increased attenuation along the tentorium greater on the right, and right cerebellar sulci indicating additional hemorrhage. Somewhat more discrete right cerebellar attenuation on sagittal image 47 of series 205 is also probably within a deep sulcus, although intraparenchymal blood is not excluded.   VENTRICULAR SYSTEM:  There is attenuation within the 3rd ventricle, at the floor of the lateral ventricles greater on the left, and with a small foci of the 4th ventricle indicating intraventricular blood.   CEREBRAL PARENCHYMA:  Unremarkable without significant degenerative change.There is no evidence of definite subacute infarction, intracranial hemorrhage or mass.   EXTRACRANIAL: Mucosal thickening and mucous retention cysts at the right maxillary air cell. The calvarium is intact.       Subarachnoid hemorrhage at the right convexity, posterior fossa on the right and along the tentorium. More focal attenuation at the right parietal apex and right cerebellum is probably subarachnoid blood within deeper sulci, although a parenchymal hemorrhage is not excluded. Intraventricular blood is also present.   MACRO: Information as given in the impression was communicated with secure chat.   Signed by: Trenton Moura 10/3/2023 10:39 AM Dictation workstation:   [id number]    CT lumbar spine wo IV contrast    Result Date: 10/3/2023  Interpreted By:  Trenton Moura, STUDY: CT LUMBAR SPINE WO IV CONTRAST  10/3/2023 9:21 am   INDICATION:  Signs/Symptoms:positional headache with emesis, dural tear intraop   COMPARISON: None.   ACCESSION NUMBER(S): [id number]   ORDERING CLINICIAN: KATIUSKA COMBS   TECHNIQUE: Transaxial helical scans with orthogonal reconstructions  .   FINDINGS: OSSEOUS STRUCTURES: Intact.   ALIGNMENT: Within normal limits in the AP plane without significant scoliosis.   LOWER THORACIC SPINE: Mild spondylosis, with mild disc space narrowing at the T11-12 level with mild anterior marginal osteophyte formation and Schmorl's node.   T12-L1: Moderate narrowing of the disc interspace with mild-to-moderate marginal osteophyte formation. There is also mild approximate 4 mm posterior osteophytic lipping-disc complex, with mild canal stenosis.   L1-2: Moderate narrowing of the disc interspace with mild anterior disc bulge and mild-to-moderate marginal osteophyte formation. There is also mild-to-moderate 5 mm posterior osteophytic lipping-disc complex with mild canal stenosis.   L2-3: Maintained.   L3-4: Status post expander disc prosthesis and laminectomy. There are also L4 and L5 laminectomies, with transfixation with posterior rods and L3 through S1 transpedicular screws without hardware loosening. This however does result in beam hardening limiting image detail at these levels. There are scattered foci of air within the spinal canal at these levels, presumably postoperative and best seen left paramedially at the L3-4 level there is posterior soft tissue fullness probably postoperative edema and possibly hematoma with postoperative emphysema. Several flecks of air also noted within the left psoas and quadratus lumborum, which are also thickened indicating hematoma.. There also appears to be moderate 6 mm posterior central disc protrusion. There is mild apophyseal hypertrophy with mild foraminal impingement. There is also moderate anterior marginal osteophyte formation and anterior disc bulge.   L4-5: There is moderate narrowing of the disc  interspace, but also an expanded disc prosthesis, and again laminectomy. There is moderate anterior marginal osteophyte formation and anterior disc bulge. Is mild-to-moderate hypertrophy of the apophyseal joints but without significant foraminal impingement. There is slight cortical step-off at the inferior margin of the right L4 pedicle on image 41 of series 210 questionable for a fracture.   L5-S1: There moderate-to-marked narrowing of the disc interspace with a moderate-to-marked retrolisthesis, but there also been placement of a disc prosthesis with laminectomy. There is fullness and increased attenuation at the spinal canal at the L5 level, greater right paramedially probably due to nonspecific hematoma, which may be epidural, subdural, subarachnoid or combination of such. Measuring approximately 3.7 cm on image 107 of series 206. There has also been a right laminotomy. There is moderate anterior marginal osteophyte formation and mild posterior osteophytic lipping-disc complex. There is mild left apophyseal hypertrophy with vacuum phenomena, with mild foraminal impingement..   ADDITIONAL FINDINGS: Moderate right hydronephrosis, incompletely evaluated on this exam, but may be due to distal ureteral stricture, as the distal ureter does not appear dilated. There also appears to be partially included moderately distended urinary bladder. There is marked right renal cortical atrophy also with hydronephrosis.       Questionable fracture of the left L4 pedicle. Probable hematoma within the spinal canal at the L5 level. Presumed postoperative soft tissue emphysema and hematoma including the left psoas and quadratus lumborum muscles. Otherwise satisfactory postop appearance.   MACRO: Trenton Moura discussed the significance and urgency of this critical finding secure chat with  KATIUSKA COMBS on 10/3/2023 at 10:25 am. (**-RCF-**) Findings:  See findings.     Signed by: Trenton Moura 10/3/2023 10:26 AM Dictation workstation:    [id number]    XR lumbar spine 2-3 views    Result Date: 10/1/2023  Interpreted By:  Moe Mckeon, STUDY: XR LUMBAR SPINE 2-3 VIEWS;  10/1/2023 11:06 am   INDICATION: Signs/Symptoms:post op.   COMPARISON: 03/08/2012   ACCESSION NUMBER(S): [id number]   ORDERING CLINICIAN: SUBHA JUNE   FINDINGS: Multiple views of the  lumbar spine are obtained. Interval laminectomy changes and posterior pedicle screws fusing L3 through S1. Alignment appears to be intact. Multilevel discogenic degenerative changes.       Interval fusion of the lower lumbar spine..     Signed by: Moe Mckeon 10/1/2023 11:49 AM Dictation workstation:   [id number]          Assessment/Plan     #Subarachnoid hemorrhage  #Spondylosis without myelopathy or radiculopathy, lumbar region      Plan:    Neurology:  -Patient found to have traumatic subdural hematoma on CT scan  -Every hour neurochecks  -Repeat head CT at 4:00 this .m.  - Monitor for signs of reversible bleeding  -CAM bundle  -ABCDEF bundle  -Neurology following the patient  -Appreciate neurology recommendations  -Continue to monitor patient's neurological status      Cardiology:     -No coronary complaints at this time  -Continue to monitor patient on telemetry     Respiratory:     -Patient is saturating well on 2L nasal cannula at this time  -CT negative for pulmonary embolism  -Continue to monitor pulse oximetry and maintain SPO2 greater than 92%      GI:     -Bowel regimen with protonix  -Diet: NPO. until cleared for repeat head CT  -Advance diet as appropriate    :     -kidney function is intact  -No urinary or renal complaints at this time  -Continue to monitor I's and O's  -Continue with daily RFP's     Heme:     -Hemoglobin and hematocrit last checked were 11.4/35.8, no need for transfusion at this time  -Chemical DVT prophylaxis held due to patient has active   -Continue to monitor daily CBC, monitor for signs of blood loss or acute anemia     Endo:      -No known endocrinology  issues  -ICU electrolyte replacement protocol ordered  -We will continue to monitor electrolyte levels on RFP/CMP     ID:  -Patient's WBC last checked was: 13.8  -No signs of infectious disease on admission  -Monitor for changes in WBC or development of infectious processes, will follow cultures and lactate     DVT prop: Patient has a traumatic subarachnoid hemorrhage, holding chemical DVT prophylaxis for now  GI prop: Protonix  Diet:.  N.p.o. until cleared for CT  Lines: PIV     Disposition: Patient to remain in ICU for every 1 hour neurochecks.      Florian Ash MD  Emergency Medicine, PGY-1    I saw and evaluated the patient. I personally obtained the key and critical portions of the history and physical exam or was physically present for key and critical portions performed by the resident/fellow. I reviewed the resident/fellow's documentation and discussed the patient with the resident/fellow. I agree with the resident/fellow's medical decision making as documented in the note.    Moy Cohen MD

## 2023-10-03 NOTE — PROGRESS NOTES
Gm Thrasher is a 58 y.o. male on day 4 of admission presenting with Spondylosis without myelopathy or radiculopathy, lumbar region.        Objective     Physical Exam    Last Recorded Vitals  Blood pressure 129/74, pulse 80, temperature 36.3 °C (97.3 °F), temperature source Temporal, resp. rate 16, SpO2 97 %.  Intake/Output last 3 Shifts:  I/O last 3 completed shifts:  In: -   Out: 2655 [Urine:2000; Other:480]    Relevant Results               Gm Thrasher is a 58 y.o. male on day 4 of admission presenting with Spondylosis without myelopathy or radiculopathy, lumbar region.    Subjective   Continues with mild surgical area ache/pain. Headache overnight, will monitor for correlation with csf leak. Toradol added, will avoid dilaudid           Last Recorded Vitals  Blood pressure 129/74, pulse 80, temperature 36.3 °C (97.3 °F), temperature source Temporal, resp. rate 16, SpO2 97 %.  Intake/Output last 3 Shifts:  I/O last 3 completed shifts:  In: -   Out: 2655 [Urine:2000; Other:480]    Relevant Results               Gm Thrasher is a 58 y.o. male on day 4 of admission presenting with Spondylosis without myelopathy or radiculopathy, lumbar region.    Subjective   Continues with mild surgical area ache/pain. Headache overnight, will monitor for correlation with csf leak. Toradol added, will avoid dilaudid           Last Recorded Vitals  Blood pressure 129/74, pulse 80, temperature 36.3 °C (97.3 °F), temperature source Temporal, resp. rate 16, SpO2 97 %.  Intake/Output last 3 Shifts:  I/O last 3 completed shifts:  In: -   Out: 2655 [Urine:2000; Other:480]    Relevant Results                XR lumbar spine 2-3 views    Result Date: 10/1/2023  Interpreted By:  Moe Mckeon, STUDY: XR LUMBAR SPINE 2-3 VIEWS;  10/1/2023 11:06 am   INDICATION: Signs/Symptoms:post op.   COMPARISON: 03/08/2012   ACCESSION NUMBER(S): [id number]   ORDERING CLINICIAN: SUBHA JUNE   FINDINGS: Multiple views of the  lumbar spine are  obtained. Interval laminectomy changes and posterior pedicle screws fusing L3 through S1. Alignment appears to be intact. Multilevel discogenic degenerative changes.       Interval fusion of the lower lumbar spine..     Signed by: Moe Mckeon 10/1/2023 11:49 AM Dictation workstation:   [id number]               Assessment/Plan   Principal Problem:    Spondylosis without myelopathy or radiculopathy, lumbar region    Patient is s/p L3-L4 and L4-L5 XLIF with L5-S1 TLIF on 9/29/2023. Doing well overall. Continues to have some pain issues.      Exam  Awake  Ox3  Bue 5/5  LLE 5  RLE HF4 KE4- foot drop  Drains in place, monitoring hemovac drain output. Becoming serosanguinous      A&P  Positional headache, 10/10 when sits/stands. CT head, CT lumbar spine   Floor  Upright xrays with hardware in good position  Cont pain control. Toradol added  Voiding on own with no complications    Cont home meds  Scd sqh  Pt ot        I spent 25 minutes in the professional and overall care of this patient.      Nicholas Paredes PA-C

## 2023-10-03 NOTE — PROGRESS NOTES
Physical Therapy                 Therapy Communication Note    Patient Name: Gm Thrasher  MRN: [id number]  Today's Date: 10/3/2023     Discipline: Physical Therapy    Missed Visit Reason:      Missed Time: Attempt    Comment: Patient PT on hold this am will continue to follow and see as patient is appropriate

## 2023-10-03 NOTE — NURSING NOTE
CT resulted showing subarachnoid hemorrhage at the right convexity, posterior fossa on the right and along the tentorium. Provider Nicholas Paredes came and saw this pt. Q2 neuros initiated by verbal order from provider. Pt awake and alert. Neuro assessment stable as documented. Heparin dc'd and drain clamped by provider. Q2 neuros until repeat CT 6 hours after original CT

## 2023-10-04 LAB
ALBUMIN SERPL BCP-MCNC: 3.1 G/DL (ref 3.4–5)
ALP SERPL-CCNC: 67 U/L (ref 33–120)
ALT SERPL W P-5'-P-CCNC: 9 U/L (ref 10–52)
ANION GAP IN SER/PLAS: NORMAL
ANION GAP SERPL CALC-SCNC: 13 MMOL/L (ref 10–20)
AST SERPL W P-5'-P-CCNC: 26 U/L (ref 9–39)
BILIRUB SERPL-MCNC: 0.6 MG/DL (ref 0–1.2)
BUN SERPL-MCNC: 13 MG/DL (ref 6–23)
CALCIUM (MG/DL) IN SER/PLAS: NORMAL
CALCIUM SERPL-MCNC: 8.3 MG/DL (ref 8.6–10.3)
CARBON DIOXIDE, TOTAL (MMOL/L) IN SER/PLAS: NORMAL
CHLORIDE (MMOL/L) IN SER/PLAS: NORMAL
CHLORIDE SERPL-SCNC: 98 MMOL/L (ref 98–107)
CO2 SERPL-SCNC: 27 MMOL/L (ref 21–32)
CREAT SERPL-MCNC: 0.81 MG/DL (ref 0.5–1.3)
CREATININE (MG/DL) IN SER/PLAS: NORMAL
ERYTHROCYTE [DISTWIDTH] IN BLOOD BY AUTOMATED COUNT: 16 % (ref 11.5–14.5)
GFR FEMALE: NORMAL
GFR MALE: NORMAL
GFR SERPL CREATININE-BSD FRML MDRD: >90 ML/MIN/1.73M*2
GLOMERULAR FILTRATION RATE-AFRICAN AMERICAN: NORMAL ML/MIN/1.73M2
GLOMERULAR FILTRATION RATE-NON AFRICAN AMERICAN: NORMAL ML/MIN/1.73M2
GLUCOSE (MG/DL) IN SER/PLAS: NORMAL
GLUCOSE BLD MANUAL STRIP-MCNC: 104 MG/DL (ref 74–99)
GLUCOSE BLD MANUAL STRIP-MCNC: 80 MG/DL (ref 74–99)
GLUCOSE BLD MANUAL STRIP-MCNC: 84 MG/DL (ref 74–99)
GLUCOSE BLD MANUAL STRIP-MCNC: 84 MG/DL (ref 74–99)
GLUCOSE SERPL-MCNC: 67 MG/DL (ref 74–99)
HCT VFR BLD AUTO: 33.2 % (ref 41–52)
HGB BLD-MCNC: 10.7 G/DL (ref 13.5–17.5)
MAGNESIUM SERPL-MCNC: 1.92 MG/DL (ref 1.6–2.4)
MCH RBC QN AUTO: 29.8 PG (ref 26–34)
MCHC RBC AUTO-ENTMCNC: 32.2 G/DL (ref 32–36)
MCV RBC AUTO: 93 FL (ref 80–100)
NRBC BLD-RTO: 0 /100 WBCS (ref 0–0)
PHOSPHATE SERPL-MCNC: 4.1 MG/DL (ref 2.5–4.9)
PLATELET # BLD AUTO: 281 X10*3/UL (ref 150–450)
PMV BLD AUTO: 9.8 FL (ref 7.5–11.5)
POTASSIUM (MMOL/L) IN SER/PLAS: NORMAL
POTASSIUM SERPL-SCNC: 4.1 MMOL/L (ref 3.5–5.3)
PROT SERPL-MCNC: 6.3 G/DL (ref 6.4–8.2)
RBC # BLD AUTO: 3.59 X10*6/UL (ref 4.5–5.9)
SODIUM (MMOL/L) IN SER/PLAS: NORMAL
SODIUM SERPL-SCNC: 134 MMOL/L (ref 136–145)
UREA NITROGEN (MG/DL) IN SER/PLAS: NORMAL
WBC # BLD AUTO: 10.6 X10*3/UL (ref 4.4–11.3)

## 2023-10-04 PROCEDURE — 2500000001 HC RX 250 WO HCPCS SELF ADMINISTERED DRUGS (ALT 637 FOR MEDICARE OP)

## 2023-10-04 PROCEDURE — 83735 ASSAY OF MAGNESIUM: CPT | Performed by: STUDENT IN AN ORGANIZED HEALTH CARE EDUCATION/TRAINING PROGRAM

## 2023-10-04 PROCEDURE — 2500000004 HC RX 250 GENERAL PHARMACY W/ HCPCS (ALT 636 FOR OP/ED): Performed by: INTERNAL MEDICINE

## 2023-10-04 PROCEDURE — 2500000001 HC RX 250 WO HCPCS SELF ADMINISTERED DRUGS (ALT 637 FOR MEDICARE OP): Performed by: INTERNAL MEDICINE

## 2023-10-04 PROCEDURE — 99291 CRITICAL CARE FIRST HOUR: CPT | Performed by: INTERNAL MEDICINE

## 2023-10-04 PROCEDURE — 2500000005 HC RX 250 GENERAL PHARMACY W/O HCPCS

## 2023-10-04 PROCEDURE — 2500000004 HC RX 250 GENERAL PHARMACY W/ HCPCS (ALT 636 FOR OP/ED)

## 2023-10-04 PROCEDURE — 36415 COLL VENOUS BLD VENIPUNCTURE: CPT | Performed by: STUDENT IN AN ORGANIZED HEALTH CARE EDUCATION/TRAINING PROGRAM

## 2023-10-04 PROCEDURE — 85027 COMPLETE CBC AUTOMATED: CPT | Performed by: STUDENT IN AN ORGANIZED HEALTH CARE EDUCATION/TRAINING PROGRAM

## 2023-10-04 PROCEDURE — 2500000005 HC RX 250 GENERAL PHARMACY W/O HCPCS: Performed by: PHYSICIAN ASSISTANT

## 2023-10-04 PROCEDURE — 2500000004 HC RX 250 GENERAL PHARMACY W/ HCPCS (ALT 636 FOR OP/ED): Performed by: STUDENT IN AN ORGANIZED HEALTH CARE EDUCATION/TRAINING PROGRAM

## 2023-10-04 PROCEDURE — 1100000001 HC PRIVATE ROOM DAILY

## 2023-10-04 PROCEDURE — S4991 NICOTINE PATCH NONLEGEND: HCPCS

## 2023-10-04 PROCEDURE — 84100 ASSAY OF PHOSPHORUS: CPT | Performed by: STUDENT IN AN ORGANIZED HEALTH CARE EDUCATION/TRAINING PROGRAM

## 2023-10-04 PROCEDURE — 2500000002 HC RX 250 W HCPCS SELF ADMINISTERED DRUGS (ALT 637 FOR MEDICARE OP, ALT 636 FOR OP/ED)

## 2023-10-04 PROCEDURE — 84075 ASSAY ALKALINE PHOSPHATASE: CPT | Performed by: STUDENT IN AN ORGANIZED HEALTH CARE EDUCATION/TRAINING PROGRAM

## 2023-10-04 PROCEDURE — 2500000004 HC RX 250 GENERAL PHARMACY W/ HCPCS (ALT 636 FOR OP/ED): Performed by: PHYSICIAN ASSISTANT

## 2023-10-04 PROCEDURE — 82947 ASSAY GLUCOSE BLOOD QUANT: CPT

## 2023-10-04 RX ORDER — ESCITALOPRAM OXALATE 20 MG/1
20 TABLET ORAL DAILY
Status: DISCONTINUED | OUTPATIENT
Start: 2023-10-04 | End: 2023-10-05 | Stop reason: HOSPADM

## 2023-10-04 RX ORDER — POLYETHYLENE GLYCOL 3350 17 G/17G
17 POWDER, FOR SOLUTION ORAL 2 TIMES DAILY
Status: DISCONTINUED | OUTPATIENT
Start: 2023-10-04 | End: 2023-10-05 | Stop reason: HOSPADM

## 2023-10-04 RX ORDER — POLYETHYLENE GLYCOL 3350 17 G/17G
17 POWDER, FOR SOLUTION ORAL DAILY
Status: DISCONTINUED | OUTPATIENT
Start: 2023-10-04 | End: 2023-10-04

## 2023-10-04 RX ORDER — SENNOSIDES 8.6 MG/1
1 TABLET ORAL 2 TIMES DAILY
Status: DISCONTINUED | OUTPATIENT
Start: 2023-10-04 | End: 2023-10-05 | Stop reason: HOSPADM

## 2023-10-04 RX ORDER — POLYETHYLENE GLYCOL 3350 17 G/17G
17 POWDER, FOR SOLUTION ORAL DAILY
Status: CANCELLED | OUTPATIENT
Start: 2023-10-04

## 2023-10-04 RX ORDER — GLYCERIN 1 G/1
1 SUPPOSITORY RECTAL ONCE
Status: DISCONTINUED | OUTPATIENT
Start: 2023-10-04 | End: 2023-10-05 | Stop reason: HOSPADM

## 2023-10-04 RX ORDER — LIDOCAINE 560 MG/1
1 PATCH PERCUTANEOUS; TOPICAL; TRANSDERMAL DAILY
Status: DISCONTINUED | OUTPATIENT
Start: 2023-10-04 | End: 2023-10-05 | Stop reason: HOSPADM

## 2023-10-04 RX ADMIN — OXYCODONE HYDROCHLORIDE 10 MG: 10 TABLET ORAL at 12:50

## 2023-10-04 RX ADMIN — ACETAMINOPHEN 325MG 650 MG: 325 TABLET ORAL at 17:09

## 2023-10-04 RX ADMIN — PANTOPRAZOLE SODIUM 40 MG: 40 TABLET, DELAYED RELEASE ORAL at 09:47

## 2023-10-04 RX ADMIN — ICOSAPENT ETHYL 2 G: 1 CAPSULE ORAL at 09:56

## 2023-10-04 RX ADMIN — TAMSULOSIN HYDROCHLORIDE 0.4 MG: 0.4 CAPSULE ORAL at 09:42

## 2023-10-04 RX ADMIN — LIDOCAINE 1 PATCH: 4 PATCH TOPICAL at 09:41

## 2023-10-04 RX ADMIN — PREGABALIN 300 MG: 150 CAPSULE ORAL at 20:58

## 2023-10-04 RX ADMIN — ACETAMINOPHEN 325MG 650 MG: 325 TABLET ORAL at 12:50

## 2023-10-04 RX ADMIN — SENNOSIDES 8.6 MG: 8.6 TABLET, FILM COATED ORAL at 12:51

## 2023-10-04 RX ADMIN — CYCLOBENZAPRINE 10 MG: 10 TABLET, FILM COATED ORAL at 14:13

## 2023-10-04 RX ADMIN — NICOTINE 1 PATCH: 14 PATCH, EXTENDED RELEASE TRANSDERMAL at 09:00

## 2023-10-04 RX ADMIN — POLYETHYLENE GLYCOL 3350 17 G: 17 POWDER, FOR SOLUTION ORAL at 09:45

## 2023-10-04 RX ADMIN — ACETAMINOPHEN 325MG 650 MG: 325 TABLET ORAL at 06:54

## 2023-10-04 RX ADMIN — CYCLOBENZAPRINE 10 MG: 10 TABLET, FILM COATED ORAL at 20:58

## 2023-10-04 RX ADMIN — LEVETIRACETAM 500 MG: 5 INJECTION INTRAVENOUS at 17:10

## 2023-10-04 RX ADMIN — ATORVASTATIN CALCIUM 10 MG: 10 TABLET, FILM COATED ORAL at 09:42

## 2023-10-04 RX ADMIN — LEVETIRACETAM 500 MG: 5 INJECTION INTRAVENOUS at 06:54

## 2023-10-04 RX ADMIN — OXYCODONE HYDROCHLORIDE 10 MG: 10 TABLET ORAL at 17:08

## 2023-10-04 RX ADMIN — CYCLOBENZAPRINE 10 MG: 10 TABLET, FILM COATED ORAL at 09:42

## 2023-10-04 RX ADMIN — HYDROMORPHONE HYDROCHLORIDE 0.2 MG: 0.2 INJECTION, SOLUTION INTRAMUSCULAR; INTRAVENOUS; SUBCUTANEOUS at 14:13

## 2023-10-04 RX ADMIN — OXYCODONE HYDROCHLORIDE 10 MG: 10 TABLET ORAL at 21:25

## 2023-10-04 RX ADMIN — SENNOSIDES 8.6 MG: 8.6 TABLET, FILM COATED ORAL at 20:58

## 2023-10-04 RX ADMIN — HYDROMORPHONE HYDROCHLORIDE 0.2 MG: 0.2 INJECTION, SOLUTION INTRAMUSCULAR; INTRAVENOUS; SUBCUTANEOUS at 18:48

## 2023-10-04 RX ADMIN — OXYCODONE HYDROCHLORIDE 10 MG: 10 TABLET ORAL at 08:47

## 2023-10-04 RX ADMIN — POLYETHYLENE GLYCOL 3350 17 G: 17 POWDER, FOR SOLUTION ORAL at 20:57

## 2023-10-04 RX ADMIN — Medication: at 08:00

## 2023-10-04 RX ADMIN — HYDROMORPHONE HYDROCHLORIDE 0.2 MG: 0.2 INJECTION, SOLUTION INTRAMUSCULAR; INTRAVENOUS; SUBCUTANEOUS at 09:45

## 2023-10-04 RX ADMIN — DOCUSATE SODIUM 100 MG: 100 CAPSULE, LIQUID FILLED ORAL at 09:42

## 2023-10-04 RX ADMIN — ESCITALOPRAM OXALATE 20 MG: 20 TABLET, FILM COATED ORAL at 12:49

## 2023-10-04 RX ADMIN — HYDROMORPHONE HYDROCHLORIDE 0.2 MG: 0.2 INJECTION, SOLUTION INTRAMUSCULAR; INTRAVENOUS; SUBCUTANEOUS at 02:31

## 2023-10-04 RX ADMIN — ICOSAPENT ETHYL 2 G: 1 CAPSULE ORAL at 17:09

## 2023-10-04 RX ADMIN — PREGABALIN 300 MG: 150 CAPSULE ORAL at 09:43

## 2023-10-04 ASSESSMENT — COGNITIVE AND FUNCTIONAL STATUS - GENERAL
PERSONAL GROOMING: A LITTLE
CLIMB 3 TO 5 STEPS WITH RAILING: A LITTLE
DAILY ACTIVITIY SCORE: 18
TOILETING: A LITTLE
MOBILITY SCORE: 22
WALKING IN HOSPITAL ROOM: A LITTLE
HELP NEEDED FOR BATHING: A LOT
DRESSING REGULAR UPPER BODY CLOTHING: A LOT

## 2023-10-04 ASSESSMENT — PAIN - FUNCTIONAL ASSESSMENT
PAIN_FUNCTIONAL_ASSESSMENT: 0-10

## 2023-10-04 ASSESSMENT — PAIN SCALES - GENERAL
PAINLEVEL_OUTOF10: 9
PAINLEVEL_OUTOF10: 10 - WORST POSSIBLE PAIN
PAINLEVEL_OUTOF10: 9
PAINLEVEL_OUTOF10: 10 - WORST POSSIBLE PAIN
PAINLEVEL_OUTOF10: 8
PAINLEVEL_OUTOF10: 9
PAINLEVEL_OUTOF10: 7
PAINLEVEL_OUTOF10: 10 - WORST POSSIBLE PAIN

## 2023-10-04 NOTE — PROGRESS NOTES
Gm Thrasher is a 58 y.o. male on day 5 of admission presenting with Spondylosis without myelopathy or radiculopathy, lumbar region.    Subjective   The patient's headache has resolved. The patient states his main source of pain is in his back where his lumbar drain is placed.        Objective     Physical Exam  Constitutional:       Appearance: He is obese.   Eyes:      Conjunctiva/sclera: Conjunctivae normal.   Cardiovascular:      Pulses: Normal pulses.   Pulmonary:      Effort: Pulmonary effort is normal.   Abdominal:      General: There is distension.   Neurological:      Mental Status: He is alert and oriented to person, place, and time.         Last Recorded Vitals  Blood pressure 107/61, pulse 70, temperature 36.2 °C (97.2 °F), resp. rate 14, weight 118 kg (259 lb 4.2 oz), SpO2 93 %.  Intake/Output last 3 Shifts:  I/O last 3 completed shifts:  In: - (0 mL/kg)   Out: 3565 (30.3 mL/kg) [Urine:3200 (0.8 mL/kg/hr); Other:240]  Weight: 117.6 kg     Relevant Results                             Assessment/Plan   Principal Problem:    Spondylosis without myelopathy or radiculopathy, lumbar region  Active Problems:    High blood cholesterol level    #Subarachnoid hemorrhage  #Spondylosis without myelopathy or radiculopathy, lumbar region        Plan:     Neurology:  -Patient found to have traumatic subdural hematoma on CT scan, which has been stable on repeat CT scan.   -Every hour neurochecks. The patient is okay to go to the neuro stepdown floor.   - Monitor for signs of reversible bleeding  -CAM bundle  -ABCDEF bundle  -Neurology following the patient  -Appreciate neurology recommendations  -Continue to monitor patient's neurological status with Q1 neurochecks.      Cardiology:     -No coronary complaints at this time     Respiratory:     -Patient is saturating well on 2L nasal cannula at this time  -CT negative for pulmonary embolism  -Continue to monitor pulse oximetry and maintain SPO2 greater than 92%       GI:     -Bowel regimen with protonix  -Diet: Patient has progressed to regular diet.   -Monitor diet as appropriate. Monitor input and output.      :     -kidney function is intact  -No urinary or renal complaints at this time  -Continue to monitor I's and O's  -Continue with daily RFP's     Heme:     -No need for transfusion at this time  -Chemical DVT prophylaxis continued with Lovenox.   -Continue to monitor daily CBC, monitor for signs of blood loss or acute anemia     Endo:      -No known endocrinology issues  -ICU electrolyte replacement protocol ordered  -We will continue to monitor electrolyte levels on RFP/CMP     ID:  -Patient's WBC last checked was 13.8 which has dropped to 10.6.   -No signs of infectious disease on admission     DVT prop:   GI prop: Protonix  Diet:.  Regular  Lines: PIV     Disposition: Patient to remain in ICU for every 1 hour neurochecks.           Renee Jennings      I saw and evaluated the patient. I personally obtained the key and critical portions of the history and physical exam or was physically present for key and critical portions performed by the resident/fellow. I reviewed the resident/fellow's documentation and discussed the patient with the resident/fellow. I agree with the resident/fellow's medical decision making as documented in the note.    Moy Cohen MD

## 2023-10-04 NOTE — CARE PLAN
Problem: Pain  Goal: My pain/discomfort is manageable  Outcome: Progressing     Problem: Pain - Adult  Goal: Verbalizes/displays adequate comfort level or baseline comfort level  Outcome: Progressing     Problem: Discharge Planning  Goal: Discharge to home or other facility with appropriate resources  Outcome: Progressing     Problem: Chronic Conditions and Co-morbidities  Goal: Patient's chronic conditions and co-morbidity symptoms are monitored and maintained or improved  Outcome: Met     Problem: Diabetes  Goal: Achieve decreasing blood glucose levels by end of shift  Outcome: Progressing     Problem: Pain  Goal: Takes deep breaths with improved pain control throughout the shift  Outcome: Met   The patient's goals for the shift include      The clinical goals for the shift include Pt will have managable pain level this shift.    Over the shift, the patient did not make progress toward the following goals. Barriers to progression include motivation to learn. Recommendations to address these barriers include effective communication.

## 2023-10-04 NOTE — NURSING NOTE
Providers made aware of No BM, patient reports it has been 5 days.  See new orders.  PRN Colace administered as well as Miralax per order.

## 2023-10-04 NOTE — PROGRESS NOTES
Gm Thrasher is a 58 y.o. male on day 5 of admission presenting with Spondylosis without myelopathy or radiculopathy, lumbar region.    Subjective          Objective     Physical Exam    General: Well developed, awake/alert/oriented x3, no distress, alert and cooperative  Skin: Warm and dry, no lesions, no rashes  ENMT: Mucous membranes moist, no apparent injury, no lesions seen  Head/Neck: Neck Supple, no apparent injury  Respiratory/Thorax: Normal breath sounds with good chest expansion, thorax symmetric  Cardiovascular: No pitting edema, no JVD      Last Recorded Vitals  Blood pressure 130/59, pulse 82, temperature 36 °C (96.8 °F), resp. rate 10, weight 118 kg (259 lb 4.2 oz), SpO2 96 %.  Intake/Output last 3 Shifts:  I/O last 3 completed shifts:  In: - (0 mL/kg)   Out: 3565 (30.3 mL/kg) [Urine:3200 (0.8 mL/kg/hr); Other:240]  Weight: 117.6 kg     Relevant Results              CT head wo IV contrast    Result Date: 10/3/2023  Interpreted By:  Lissy Reid, STUDY: CT HEAD WO IV CONTRAST;  10/3/2023 3:54 pm   INDICATION: Signs/Symptoms:subarachnoid surveillance.   COMPARISON: CT head from 10/03/2023   ACCESSION NUMBER(S): [id number]   ORDERING CLINICIAN: DORIS DAILEY   TECHNIQUE: Noncontrast axial CT scan of head was performed. Angled reformats in brain and bone windows were generated. The images were reviewed in bone, brain, blood and soft tissue windows.   FINDINGS: CSF Spaces: Note is again made of a small amount of subarachnoid hemorrhage within the right frontal convexity. The overall appearance is unchanged as compared to prior study. There is minimal hyperdense attenuation within the left occipital horn which may reflect a small amount of hemorrhage, new since prior study. Diffuse subdural hemorrhage is also noted along the tentorium as well as overlying right cerebellar hemisphere. There is mild mass effect on the 4th ventricle as before. There is no hydrocephalus.   Parenchyma:  There is a small  hemorrhagic focus within the right parietal lobe measuring 9 x 4 mm with mild surrounding edema. Remainder of the grey-white differentiation unchanged. There is no mass effect or midline shift.   Calvarium: The calvarium is unremarkable.   Paranasal sinuses and mastoids: Mild mucosal thickening is seen within right maxillary sinus and few ethmoid air cells. Bilateral mastoids are clear.       Stable right frontal subarachnoid hemorrhage. Stable small suspected right parietal lobe intraparenchymal hemorrhage with mild surrounding edema.   Minimal suspicious left lateral ventricular hemorrhage, new since prior study. Attention on follow-up imaging is recommended.   Stable diffuse subarachnoid hemorrhage overlying right cerebellar hemisphere. Stable small suspected right subdural hemorrhage in the posterior fossa.   Signed by: Lissy Reid 10/3/2023 5:12 PM Dictation workstation:   [id number]    CT head wo IV contrast    Result Date: 10/3/2023  Interpreted By:  Trenton Moura, STUDY: CT HEAD WO IV CONTRAST;  10/3/2023 9:21 am   INDICATION: Signs/Symptoms:position headache with emesis postop.   COMPARISON: 10/18/2014   ACCESSION NUMBER(S): [id number]   ORDERING CLINICIAN: KATIUSKA COMBS   TECHNIQUE: Sequential trans axial images were obtained  .   FINDINGS: INTRACRANIAL:   CORTICAL SULCI AND EXTRA-AXIAL SPACES:  There is faint increased attenuation within sulci of the right cerebral hemisphere, best seen at the apex on image 28 of series 201, frontal lobe on image 27 and temporal lobe on image 18 indicating subarachnoid hemorrhage. Focal attenuation at the right parietal lobe on image 25 is also probably within a deep sulcus, although may be inter parenchymal. There is also slight increased attenuation along the tentorium greater on the right, and right cerebellar sulci indicating additional hemorrhage. Somewhat more discrete right cerebellar attenuation on sagittal image 47 of series 205 is also probably within a deep  sulcus, although intraparenchymal blood is not excluded.   VENTRICULAR SYSTEM:  There is attenuation within the 3rd ventricle, at the floor of the lateral ventricles greater on the left, and with a small foci of the 4th ventricle indicating intraventricular blood.   CEREBRAL PARENCHYMA:  Unremarkable without significant degenerative change.There is no evidence of definite subacute infarction, intracranial hemorrhage or mass.   EXTRACRANIAL: Mucosal thickening and mucous retention cysts at the right maxillary air cell. The calvarium is intact.       Subarachnoid hemorrhage at the right convexity, posterior fossa on the right and along the tentorium. More focal attenuation at the right parietal apex and right cerebellum is probably subarachnoid blood within deeper sulci, although a parenchymal hemorrhage is not excluded. Intraventricular blood is also present.   MACRO: Information as given in the impression was communicated with secure chat.   Signed by: Trenton Moura 10/3/2023 10:39 AM Dictation workstation:   [id number]    CT lumbar spine wo IV contrast    Result Date: 10/3/2023  Interpreted By:  Trenton Moura, STUDY: CT LUMBAR SPINE WO IV CONTRAST  10/3/2023 9:21 am   INDICATION: Signs/Symptoms:positional headache with emesis, dural tear intraop   COMPARISON: None.   ACCESSION NUMBER(S): [id number]   ORDERING CLINICIAN: KATIUSKA COMBS   TECHNIQUE: Transaxial helical scans with orthogonal reconstructions  .   FINDINGS: OSSEOUS STRUCTURES: Intact.   ALIGNMENT: Within normal limits in the AP plane without significant scoliosis.   LOWER THORACIC SPINE: Mild spondylosis, with mild disc space narrowing at the T11-12 level with mild anterior marginal osteophyte formation and Schmorl's node.   T12-L1: Moderate narrowing of the disc interspace with mild-to-moderate marginal osteophyte formation. There is also mild approximate 4 mm posterior osteophytic lipping-disc complex, with mild canal stenosis.   L1-2: Moderate narrowing  of the disc interspace with mild anterior disc bulge and mild-to-moderate marginal osteophyte formation. There is also mild-to-moderate 5 mm posterior osteophytic lipping-disc complex with mild canal stenosis.   L2-3: Maintained.   L3-4: Status post expander disc prosthesis and laminectomy. There are also L4 and L5 laminectomies, with transfixation with posterior rods and L3 through S1 transpedicular screws without hardware loosening. This however does result in beam hardening limiting image detail at these levels. There are scattered foci of air within the spinal canal at these levels, presumably postoperative and best seen left paramedially at the L3-4 level there is posterior soft tissue fullness probably postoperative edema and possibly hematoma with postoperative emphysema. Several flecks of air also noted within the left psoas and quadratus lumborum, which are also thickened indicating hematoma.. There also appears to be moderate 6 mm posterior central disc protrusion. There is mild apophyseal hypertrophy with mild foraminal impingement. There is also moderate anterior marginal osteophyte formation and anterior disc bulge.   L4-5: There is moderate narrowing of the disc interspace, but also an expanded disc prosthesis, and again laminectomy. There is moderate anterior marginal osteophyte formation and anterior disc bulge. Is mild-to-moderate hypertrophy of the apophyseal joints but without significant foraminal impingement. There is slight cortical step-off at the inferior margin of the right L4 pedicle on image 41 of series 210 questionable for a fracture.   L5-S1: There moderate-to-marked narrowing of the disc interspace with a moderate-to-marked retrolisthesis, but there also been placement of a disc prosthesis with laminectomy. There is fullness and increased attenuation at the spinal canal at the L5 level, greater right paramedially probably due to nonspecific hematoma, which may be epidural, subdural,  subarachnoid or combination of such. Measuring approximately 3.7 cm on image 107 of series 206. There has also been a right laminotomy. There is moderate anterior marginal osteophyte formation and mild posterior osteophytic lipping-disc complex. There is mild left apophyseal hypertrophy with vacuum phenomena, with mild foraminal impingement..   ADDITIONAL FINDINGS: Moderate right hydronephrosis, incompletely evaluated on this exam, but may be due to distal ureteral stricture, as the distal ureter does not appear dilated. There also appears to be partially included moderately distended urinary bladder. There is marked right renal cortical atrophy also with hydronephrosis.       Questionable fracture of the left L4 pedicle. Probable hematoma within the spinal canal at the L5 level. Presumed postoperative soft tissue emphysema and hematoma including the left psoas and quadratus lumborum muscles. Otherwise satisfactory postop appearance.   MACRO: Trenton Moura discussed the significance and urgency of this critical finding secure chat with  KATIUSKA COMBS on 10/3/2023 at 10:25 am. (**-RCF-**) Findings:  See findings.     Signed by: Trenton Moura 10/3/2023 10:26 AM Dictation workstation:   [id number]         Assessment/Plan   Principal Problem:    Spondylosis without myelopathy or radiculopathy, lumbar region    Patient is s/p L3-L4 and L4-L5 XLIF with L5-S1 TLIF on 9/29/2023. Doing well overall. Continues to have some pain issues.      Patient with significant headache prior day with emesis.  CT head obtained identified multiple locations of subarachnoid and subdural hemorrhage.  He was transferred to the ICU for further care including every hour neurochecks, maintenance of blood pressure.  This was stable on repeat head CT.  Attending neurosurgeon made aware.  This morning patient is awake, alert, cooperative.  His headache is greatly reduced including with positional changes.    Exam  Awake  Ox3  Bue 5/5  LLE 5  RLE HF4  KE4- foot drop  Drains in place, Hemovac clamped with no output      A&P  Positional headache, improved versus prior  Upright xrays with hardware in good position CT lumbar as expected and CT head stable hemorrhage on repeat  Continue every 4 hour neurochecks until discharge repeat head CT outpatient 2 weeks  Okay for neuro stepdown floor  Voiding on own with no complications  Cont home meds  Scd sqh  Pt ot on hold today       I spent 25 minutes in the professional and overall care of this patient.      SERGIO Espinoza PA-C

## 2023-10-05 ENCOUNTER — DOCUMENTATION (OUTPATIENT)
Dept: INPATIENT UNIT | Facility: HOSPITAL | Age: 58
End: 2023-10-05
Payer: COMMERCIAL

## 2023-10-05 ENCOUNTER — HOME HEALTH ADMISSION (OUTPATIENT)
Dept: HOME HEALTH SERVICES | Facility: HOME HEALTH | Age: 58
End: 2023-10-05
Payer: COMMERCIAL

## 2023-10-05 VITALS
OXYGEN SATURATION: 94 % | HEART RATE: 64 BPM | DIASTOLIC BLOOD PRESSURE: 69 MMHG | BODY MASS INDEX: 36.16 KG/M2 | SYSTOLIC BLOOD PRESSURE: 109 MMHG | WEIGHT: 251.99 LBS | RESPIRATION RATE: 14 BRPM | TEMPERATURE: 97.5 F

## 2023-10-05 PROBLEM — K76.0 FATTY LIVER: Status: RESOLVED | Noted: 2023-02-26 | Resolved: 2023-10-05

## 2023-10-05 PROBLEM — E78.2 MIXED HYPERLIPIDEMIA: Status: RESOLVED | Noted: 2023-02-26 | Resolved: 2023-10-05

## 2023-10-05 PROBLEM — Z87.81 H/O FRACTURE OF ANKLE: Status: RESOLVED | Noted: 2023-02-26 | Resolved: 2023-10-05

## 2023-10-05 PROBLEM — I25.84 CORONARY ARTERY CALCIFICATION: Status: RESOLVED | Noted: 2023-02-26 | Resolved: 2023-10-05

## 2023-10-05 PROBLEM — F32.1 CURRENT MODERATE EPISODE OF MAJOR DEPRESSIVE DISORDER WITHOUT PRIOR EPISODE (MULTI): Status: RESOLVED | Noted: 2023-02-26 | Resolved: 2023-10-05

## 2023-10-05 PROBLEM — E55.9 VITAMIN D DEFICIENCY: Status: RESOLVED | Noted: 2023-02-26 | Resolved: 2023-10-05

## 2023-10-05 PROBLEM — Z71.89 CARDIAC RISK COUNSELING: Status: RESOLVED | Noted: 2023-02-26 | Resolved: 2023-10-05

## 2023-10-05 PROBLEM — D64.9 ANEMIA: Status: RESOLVED | Noted: 2023-02-26 | Resolved: 2023-10-05

## 2023-10-05 PROBLEM — E66.812 CLASS 2 SEVERE OBESITY DUE TO EXCESS CALORIES WITH SERIOUS COMORBIDITY AND BODY MASS INDEX (BMI) OF 39.0 TO 39.9 IN ADULT: Status: RESOLVED | Noted: 2023-06-07 | Resolved: 2023-10-05

## 2023-10-05 PROBLEM — K21.9 CHRONIC GERD: Status: RESOLVED | Noted: 2023-02-26 | Resolved: 2023-10-05

## 2023-10-05 PROBLEM — Z87.39 H/O: GOUT: Status: RESOLVED | Noted: 2023-02-26 | Resolved: 2023-10-05

## 2023-10-05 PROBLEM — E53.8 VITAMIN B 12 DEFICIENCY: Status: RESOLVED | Noted: 2023-02-26 | Resolved: 2023-10-05

## 2023-10-05 PROBLEM — G47.33 OBSTRUCTIVE SLEEP APNEA: Status: RESOLVED | Noted: 2023-02-26 | Resolved: 2023-10-05

## 2023-10-05 PROBLEM — Z13.89 SCREENING FOR MULTIPLE CONDITIONS: Status: RESOLVED | Noted: 2023-02-26 | Resolved: 2023-10-05

## 2023-10-05 PROBLEM — Z00.00 ROUTINE ADULT HEALTH MAINTENANCE: Chronic | Status: RESOLVED | Noted: 2023-02-26 | Resolved: 2023-10-05

## 2023-10-05 PROBLEM — E66.01 CLASS 2 SEVERE OBESITY DUE TO EXCESS CALORIES WITH SERIOUS COMORBIDITY AND BODY MASS INDEX (BMI) OF 39.0 TO 39.9 IN ADULT (MULTI): Status: RESOLVED | Noted: 2023-06-07 | Resolved: 2023-10-05

## 2023-10-05 PROBLEM — Z00.01 ENCOUNTER FOR WELL ADULT EXAM WITH ABNORMAL FINDINGS: Status: RESOLVED | Noted: 2023-06-07 | Resolved: 2023-10-05

## 2023-10-05 PROBLEM — E79.0 HYPERURICEMIA: Status: RESOLVED | Noted: 2023-02-26 | Resolved: 2023-10-05

## 2023-10-05 PROBLEM — E88.810 METABOLIC SYNDROME: Status: RESOLVED | Noted: 2023-02-26 | Resolved: 2023-10-05

## 2023-10-05 PROBLEM — F41.9 ANXIETY: Status: RESOLVED | Noted: 2023-02-26 | Resolved: 2023-10-05

## 2023-10-05 PROBLEM — F11.20 OPIOID DEPENDENCE (MULTI): Status: RESOLVED | Noted: 2023-02-26 | Resolved: 2023-10-05

## 2023-10-05 PROBLEM — N13.70 VESICOURETERAL-REFLUX, UNSPECIFIED: Status: RESOLVED | Noted: 2023-02-26 | Resolved: 2023-10-05

## 2023-10-05 PROBLEM — Z79.899 MEDICATION MANAGEMENT: Status: RESOLVED | Noted: 2023-02-26 | Resolved: 2023-10-05

## 2023-10-05 PROBLEM — E11.9 TYPE 2 DIABETES MELLITUS, WITHOUT LONG-TERM CURRENT USE OF INSULIN (MULTI): Status: RESOLVED | Noted: 2023-02-26 | Resolved: 2023-10-05

## 2023-10-05 PROBLEM — M47.816 SPONDYLOSIS WITHOUT MYELOPATHY OR RADICULOPATHY, LUMBAR REGION: Status: RESOLVED | Noted: 2023-09-30 | Resolved: 2023-10-05

## 2023-10-05 PROBLEM — R79.81 LOW OXYGEN SATURATION: Status: RESOLVED | Noted: 2023-06-07 | Resolved: 2023-10-05

## 2023-10-05 PROBLEM — M48.02 DEGENERATIVE CERVICAL SPINAL STENOSIS: Chronic | Status: ACTIVE | Noted: 2023-02-26

## 2023-10-05 PROBLEM — E78.00 HIGH BLOOD CHOLESTEROL LEVEL: Status: RESOLVED | Noted: 2023-10-03 | Resolved: 2023-10-05

## 2023-10-05 PROBLEM — Z71.89 ADVANCED DIRECTIVES, COUNSELING/DISCUSSION: Status: RESOLVED | Noted: 2023-02-26 | Resolved: 2023-10-05

## 2023-10-05 PROBLEM — M48.02 DEGENERATIVE CERVICAL SPINAL STENOSIS: Chronic | Status: RESOLVED | Noted: 2023-02-26 | Resolved: 2023-10-05

## 2023-10-05 PROBLEM — I25.10 CORONARY ARTERY CALCIFICATION: Status: RESOLVED | Noted: 2023-02-26 | Resolved: 2023-10-05

## 2023-10-05 PROBLEM — F17.200 CURRENT SMOKER: Status: RESOLVED | Noted: 2023-06-07 | Resolved: 2023-10-05

## 2023-10-05 PROBLEM — I10 HYPERTENSION, ESSENTIAL: Status: RESOLVED | Noted: 2023-02-26 | Resolved: 2023-10-05

## 2023-10-05 PROBLEM — E78.1 HIGH TRIGLYCERIDES: Status: RESOLVED | Noted: 2023-06-07 | Resolved: 2023-10-05

## 2023-10-05 LAB
ALBUMIN SERPL BCP-MCNC: 3 G/DL (ref 3.4–5)
ALP SERPL-CCNC: 68 U/L (ref 33–120)
ALT SERPL W P-5'-P-CCNC: 9 U/L (ref 10–52)
ANION GAP SERPL CALC-SCNC: 12 MMOL/L (ref 10–20)
AST SERPL W P-5'-P-CCNC: 20 U/L (ref 9–39)
BASOPHILS # BLD AUTO: 0.06 X10*3/UL (ref 0–0.1)
BASOPHILS NFR BLD AUTO: 0.5 %
BILIRUB SERPL-MCNC: 0.6 MG/DL (ref 0–1.2)
BUN SERPL-MCNC: 12 MG/DL (ref 6–23)
CALCIUM SERPL-MCNC: 8.4 MG/DL (ref 8.6–10.3)
CHLORIDE SERPL-SCNC: 97 MMOL/L (ref 98–107)
CO2 SERPL-SCNC: 29 MMOL/L (ref 21–32)
CREAT SERPL-MCNC: 0.79 MG/DL (ref 0.5–1.3)
EOSINOPHIL # BLD AUTO: 0.25 X10*3/UL (ref 0–0.7)
EOSINOPHIL NFR BLD AUTO: 2.2 %
ERYTHROCYTE [DISTWIDTH] IN BLOOD BY AUTOMATED COUNT: 16 % (ref 11.5–14.5)
GFR SERPL CREATININE-BSD FRML MDRD: >90 ML/MIN/1.73M*2
GLUCOSE SERPL-MCNC: 82 MG/DL (ref 74–99)
HCT VFR BLD AUTO: 32.5 % (ref 41–52)
HGB BLD-MCNC: 10.5 G/DL (ref 13.5–17.5)
IMM GRANULOCYTES # BLD AUTO: 0.09 X10*3/UL (ref 0–0.7)
IMM GRANULOCYTES NFR BLD AUTO: 0.8 % (ref 0–0.9)
LYMPHOCYTES # BLD AUTO: 2.99 X10*3/UL (ref 1.2–4.8)
LYMPHOCYTES NFR BLD AUTO: 26.6 %
MAGNESIUM SERPL-MCNC: 1.93 MG/DL (ref 1.6–2.4)
MCH RBC QN AUTO: 29.6 PG (ref 26–34)
MCHC RBC AUTO-ENTMCNC: 32.3 G/DL (ref 32–36)
MCV RBC AUTO: 92 FL (ref 80–100)
MONOCYTES # BLD AUTO: 0.78 X10*3/UL (ref 0.1–1)
MONOCYTES NFR BLD AUTO: 6.9 %
NEUTROPHILS # BLD AUTO: 7.09 X10*3/UL (ref 1.2–7.7)
NEUTROPHILS NFR BLD AUTO: 63 %
NRBC BLD-RTO: 0 /100 WBCS (ref 0–0)
PHOSPHATE SERPL-MCNC: 4 MG/DL (ref 2.5–4.9)
PLATELET # BLD AUTO: 269 X10*3/UL (ref 150–450)
PMV BLD AUTO: 9.5 FL (ref 7.5–11.5)
POTASSIUM SERPL-SCNC: 3.8 MMOL/L (ref 3.5–5.3)
PROT SERPL-MCNC: 6.2 G/DL (ref 6.4–8.2)
RBC # BLD AUTO: 3.55 X10*6/UL (ref 4.5–5.9)
SODIUM SERPL-SCNC: 134 MMOL/L (ref 136–145)
WBC # BLD AUTO: 11.3 X10*3/UL (ref 4.4–11.3)

## 2023-10-05 PROCEDURE — 36415 COLL VENOUS BLD VENIPUNCTURE: CPT

## 2023-10-05 PROCEDURE — 2500000001 HC RX 250 WO HCPCS SELF ADMINISTERED DRUGS (ALT 637 FOR MEDICARE OP): Performed by: INTERNAL MEDICINE

## 2023-10-05 PROCEDURE — 2500000004 HC RX 250 GENERAL PHARMACY W/ HCPCS (ALT 636 FOR OP/ED)

## 2023-10-05 PROCEDURE — 97168 OT RE-EVAL EST PLAN CARE: CPT | Mod: GO

## 2023-10-05 PROCEDURE — 83735 ASSAY OF MAGNESIUM: CPT | Performed by: STUDENT IN AN ORGANIZED HEALTH CARE EDUCATION/TRAINING PROGRAM

## 2023-10-05 PROCEDURE — 2500000004 HC RX 250 GENERAL PHARMACY W/ HCPCS (ALT 636 FOR OP/ED): Performed by: PHYSICIAN ASSISTANT

## 2023-10-05 PROCEDURE — 84100 ASSAY OF PHOSPHORUS: CPT

## 2023-10-05 PROCEDURE — 2500000004 HC RX 250 GENERAL PHARMACY W/ HCPCS (ALT 636 FOR OP/ED): Performed by: INTERNAL MEDICINE

## 2023-10-05 PROCEDURE — 2500000001 HC RX 250 WO HCPCS SELF ADMINISTERED DRUGS (ALT 637 FOR MEDICARE OP)

## 2023-10-05 PROCEDURE — 36415 COLL VENOUS BLD VENIPUNCTURE: CPT | Performed by: STUDENT IN AN ORGANIZED HEALTH CARE EDUCATION/TRAINING PROGRAM

## 2023-10-05 PROCEDURE — S4991 NICOTINE PATCH NONLEGEND: HCPCS

## 2023-10-05 PROCEDURE — 2500000002 HC RX 250 W HCPCS SELF ADMINISTERED DRUGS (ALT 637 FOR MEDICARE OP, ALT 636 FOR OP/ED)

## 2023-10-05 PROCEDURE — 97112 NEUROMUSCULAR REEDUCATION: CPT | Mod: GO

## 2023-10-05 PROCEDURE — 85025 COMPLETE CBC W/AUTO DIFF WBC: CPT

## 2023-10-05 PROCEDURE — 97530 THERAPEUTIC ACTIVITIES: CPT | Mod: GP | Performed by: PHYSICAL THERAPIST

## 2023-10-05 PROCEDURE — 2500000004 HC RX 250 GENERAL PHARMACY W/ HCPCS (ALT 636 FOR OP/ED): Performed by: STUDENT IN AN ORGANIZED HEALTH CARE EDUCATION/TRAINING PROGRAM

## 2023-10-05 PROCEDURE — 97164 PT RE-EVAL EST PLAN CARE: CPT | Mod: GP | Performed by: PHYSICAL THERAPIST

## 2023-10-05 PROCEDURE — 80053 COMPREHEN METABOLIC PANEL: CPT | Performed by: STUDENT IN AN ORGANIZED HEALTH CARE EDUCATION/TRAINING PROGRAM

## 2023-10-05 PROCEDURE — 2500000005 HC RX 250 GENERAL PHARMACY W/O HCPCS: Performed by: PHYSICIAN ASSISTANT

## 2023-10-05 RX ORDER — DOCUSATE SODIUM 100 MG/1
100 CAPSULE, LIQUID FILLED ORAL 2 TIMES DAILY PRN
Qty: 60 CAPSULE | Refills: 0 | Status: SHIPPED | OUTPATIENT
Start: 2023-10-05 | End: 2023-10-30 | Stop reason: ALTCHOICE

## 2023-10-05 RX ORDER — PANTOPRAZOLE SODIUM 40 MG/1
40 TABLET, DELAYED RELEASE ORAL
Qty: 14 TABLET | Refills: 0 | Status: SHIPPED | OUTPATIENT
Start: 2023-10-06 | End: 2024-04-16 | Stop reason: WASHOUT

## 2023-10-05 RX ORDER — CYCLOBENZAPRINE HCL 10 MG
10 TABLET ORAL 3 TIMES DAILY
Qty: 90 TABLET | Refills: 0 | Status: SHIPPED | OUTPATIENT
Start: 2023-10-05 | End: 2023-11-09 | Stop reason: ALTCHOICE

## 2023-10-05 RX ORDER — TAMSULOSIN HYDROCHLORIDE 0.4 MG/1
0.4 CAPSULE ORAL DAILY
Qty: 14 CAPSULE | Refills: 0 | Status: SHIPPED | OUTPATIENT
Start: 2023-10-06 | End: 2023-10-20

## 2023-10-05 RX ORDER — OXYCODONE HYDROCHLORIDE 10 MG/1
10 TABLET ORAL EVERY 6 HOURS PRN
Qty: 20 TABLET | Refills: 0 | Status: SHIPPED | OUTPATIENT
Start: 2023-10-05 | End: 2023-10-20 | Stop reason: HOSPADM

## 2023-10-05 RX ORDER — LEVETIRACETAM 500 MG/1
500 TABLET ORAL 2 TIMES DAILY
Qty: 14 TABLET | Refills: 0 | Status: SHIPPED | OUTPATIENT
Start: 2023-10-05 | End: 2023-10-05 | Stop reason: SDUPTHER

## 2023-10-05 RX ORDER — LEVETIRACETAM 500 MG/1
500 TABLET ORAL 2 TIMES DAILY
Qty: 14 TABLET | Refills: 0 | Status: SHIPPED | OUTPATIENT
Start: 2023-10-05 | End: 2023-11-29 | Stop reason: HOSPADM

## 2023-10-05 RX ORDER — SENNOSIDES 8.6 MG/1
1 TABLET ORAL 2 TIMES DAILY
Qty: 28 TABLET | Refills: 0 | Status: SHIPPED | OUTPATIENT
Start: 2023-10-05 | End: 2023-10-20 | Stop reason: HOSPADM

## 2023-10-05 RX ORDER — NALOXONE HYDROCHLORIDE 0.4 MG/ML
0.2 INJECTION, SOLUTION INTRAMUSCULAR; INTRAVENOUS; SUBCUTANEOUS AS NEEDED
Qty: 1 ML | Status: SHIPPED | OUTPATIENT
Start: 2023-10-05 | End: 2023-10-20 | Stop reason: HOSPADM

## 2023-10-05 RX ADMIN — PREGABALIN 300 MG: 150 CAPSULE ORAL at 08:16

## 2023-10-05 RX ADMIN — SENNOSIDES 8.6 MG: 8.6 TABLET, FILM COATED ORAL at 08:16

## 2023-10-05 RX ADMIN — CYCLOBENZAPRINE 10 MG: 10 TABLET, FILM COATED ORAL at 08:16

## 2023-10-05 RX ADMIN — POLYETHYLENE GLYCOL 3350 17 G: 17 POWDER, FOR SOLUTION ORAL at 08:18

## 2023-10-05 RX ADMIN — OXYCODONE HYDROCHLORIDE 10 MG: 10 TABLET ORAL at 11:21

## 2023-10-05 RX ADMIN — ESCITALOPRAM OXALATE 20 MG: 20 TABLET, FILM COATED ORAL at 08:16

## 2023-10-05 RX ADMIN — LEVETIRACETAM 500 MG: 5 INJECTION INTRAVENOUS at 06:56

## 2023-10-05 RX ADMIN — ACETAMINOPHEN 325MG 650 MG: 325 TABLET ORAL at 11:20

## 2023-10-05 RX ADMIN — NICOTINE 1 PATCH: 14 PATCH, EXTENDED RELEASE TRANSDERMAL at 08:18

## 2023-10-05 RX ADMIN — ICOSAPENT ETHYL 2 G: 1 CAPSULE ORAL at 08:16

## 2023-10-05 RX ADMIN — LIDOCAINE 1 PATCH: 4 PATCH TOPICAL at 08:18

## 2023-10-05 RX ADMIN — ACETAMINOPHEN 325MG 650 MG: 325 TABLET ORAL at 01:25

## 2023-10-05 RX ADMIN — TAMSULOSIN HYDROCHLORIDE 0.4 MG: 0.4 CAPSULE ORAL at 08:16

## 2023-10-05 RX ADMIN — CYCLOBENZAPRINE 10 MG: 10 TABLET, FILM COATED ORAL at 15:21

## 2023-10-05 RX ADMIN — OXYCODONE HYDROCHLORIDE 10 MG: 10 TABLET ORAL at 03:21

## 2023-10-05 RX ADMIN — ATORVASTATIN CALCIUM 10 MG: 10 TABLET, FILM COATED ORAL at 08:16

## 2023-10-05 RX ADMIN — PANTOPRAZOLE SODIUM 40 MG: 40 TABLET, DELAYED RELEASE ORAL at 06:56

## 2023-10-05 RX ADMIN — OXYCODONE HYDROCHLORIDE 10 MG: 10 TABLET ORAL at 15:21

## 2023-10-05 RX ADMIN — OXYCODONE HYDROCHLORIDE 10 MG: 10 TABLET ORAL at 06:56

## 2023-10-05 RX ADMIN — HYDROMORPHONE HYDROCHLORIDE 0.2 MG: 0.2 INJECTION, SOLUTION INTRAMUSCULAR; INTRAVENOUS; SUBCUTANEOUS at 08:15

## 2023-10-05 RX ADMIN — ACETAMINOPHEN 325MG 650 MG: 325 TABLET ORAL at 06:56

## 2023-10-05 ASSESSMENT — PAIN SCALES - GENERAL
PAINLEVEL_OUTOF10: 9
PAINLEVEL_OUTOF10: 9
PAINLEVEL_OUTOF10: 8
PAINLEVEL_OUTOF10: 9
PAINLEVEL_OUTOF10: 9
PAINLEVEL_OUTOF10: 10 - WORST POSSIBLE PAIN
PAINLEVEL_OUTOF10: 10 - WORST POSSIBLE PAIN
PAINLEVEL_OUTOF10: 9

## 2023-10-05 ASSESSMENT — COGNITIVE AND FUNCTIONAL STATUS - GENERAL
MOVING TO AND FROM BED TO CHAIR: A LITTLE
DRESSING REGULAR LOWER BODY CLOTHING: A LOT
CLIMB 3 TO 5 STEPS WITH RAILING: TOTAL
DAILY ACTIVITIY SCORE: 12
MOVING FROM LYING ON BACK TO SITTING ON SIDE OF FLAT BED WITH BEDRAILS: A LITTLE
TURNING FROM BACK TO SIDE WHILE IN FLAT BAD: A LITTLE
DRESSING REGULAR UPPER BODY CLOTHING: A LOT
STANDING UP FROM CHAIR USING ARMS: A LITTLE
MOBILITY SCORE: 15
WALKING IN HOSPITAL ROOM: A LOT
HELP NEEDED FOR BATHING: A LOT
EATING MEALS: A LOT
TOILETING: A LOT
PERSONAL GROOMING: A LOT

## 2023-10-05 ASSESSMENT — ACTIVITIES OF DAILY LIVING (ADL): BATHING_ASSISTANCE: MAXIMAL

## 2023-10-05 ASSESSMENT — PAIN - FUNCTIONAL ASSESSMENT
PAIN_FUNCTIONAL_ASSESSMENT: 0-10
PAIN_FUNCTIONAL_ASSESSMENT: 0-10

## 2023-10-05 NOTE — CONSULTS
Nutrition Assessment Note  Assessment    Assessment: auto referral for length of stay     History Of Present Illness  Gm Thrasher is a 58 y.o. male admitted 9/29 from home s/p L3L4 and L4L5 XLIF with L5Si TLIF on 9/29. Post op pt with headaches; CT revealing SAH and SDH with transfer to ICU care 10/3 for close neurology care. Length of stay complicated by constipation with last BM 9/28 despite medicinal interventions.    Past Medical History   has a past medical history of Borderline diabetes, H/O chest x-ray, H/O CT scan, H/O CT scan of abdomen, H/O CT scan of brain, H/O CT scan of chest, H/O diagnostic ultrasound, H/O echocardiogram, H/O magnetic resonance imaging of cervical spine, H/O magnetic resonance imaging of lumbar spine, and High cholesterol.    He has no past medical history of Arthritis, Asthma, Cancer (CMS/[facility]), CHF (congestive heart failure) (CMS/[facility]), or COPD (chronic obstructive pulmonary disease) (CMS/[facility]).    Surgical History   has a past surgical history that includes Kidney surgery (09/12/2013); Other surgical history (01/28/2020); Other surgical history (01/28/2020); Other surgical history (01/28/2020); Other surgical history (01/28/2020); Other surgical history (01/28/2020); Cardiac catheterization; Cervical laminectomy; Esophagogastroduodenoscopy; Lumbar laminectomy; and Lumbar fusion (09/30/2023).     Last Recorded Vitals  Blood pressure 109/69, pulse 60, temperature 36.2 °C (97.2 °F), resp. rate 17, weight 114 kg (251 lb 15.8 oz), SpO2 94 %. Pain Assessment  Pain Assessment: 0-10  Pain Score: 9  Pain Type: Surgical pain  Pain Location: Back  Pain Orientation: Mid       Current Medications:    Current Facility-Administered Medications:     acetaminophen (Tylenol) tablet 650 mg, 650 mg, oral, q6h, Florian Ash MD, 650 mg at 10/05/23 1120    atorvastatin (Lipitor) tablet 10 mg, 10 mg, oral, Daily, Florian Ash MD, 10 mg at 10/05/23 0816    benzocaine-menthol (Cepastat Sore  Throat) 15-3.6 mg lozenge 1 lozenge, 1 lozenge, Mouth/Throat, q2h PRN, Florian Ash MD    cyclobenzaprine (Flexeril) tablet 10 mg, 10 mg, oral, TID, Florian Ash MD, 10 mg at 10/05/23 1521    docusate sodium (Colace) capsule 100 mg, 100 mg, oral, BID PRN, Florian Ash MD, 100 mg at 10/04/23 0942    [START ON 10/6/2023] enoxaparin (Lovenox) syringe 40 mg, 40 mg, subcutaneous, Daily, Donnie Mcclain DO    escitalopram (Lexapro) tablet 20 mg, 20 mg, oral, Daily, Moy Cohen MD, 20 mg at 10/05/23 0816    flu vacc [id number] 6mos up (PF) (Fluarix, Flulaval, Fluzone) syringe 0.5 mL, 0.5 mL, intramuscular, Once, Florian Ash MD    glycerin (Adult) 2 g suppository 1 suppository, 1 suppository, rectal, Once, Moy Cohen MD    HYDROmorphone PF (Dilaudid) injection 0.2 mg, 0.2 mg, intravenous, q3h PRN, Nicholas Paredes PA-C, 0.2 mg at 10/05/23 0815    icosapent ethyL (Vascepa) capsule 2 g, 2 g, oral, BID with meals, Florian Ash MD, 2 g at 10/05/23 0816    levETIRAcetam in NaCl (iso-os) (Keppra)  mg, 500 mg, intravenous, q12h, Donnie Mcclain DO, Stopped at 10/05/23 0711    lidocaine 4 % patch 1 patch, 1 patch, transdermal, Daily, Nicholas Paredes PA-C, 1 patch at 10/05/23 0818    naloxone (Narcan) injection 0.2 mg, 0.2 mg, intravenous, PRN, Florian Ash MD    nicotine (Nicoderm CQ) 14 mg/24 hr patch 1 patch, 1 patch, transdermal, Daily, Florian Ash MD, 1 patch at 10/05/23 0818    ondansetron (Zofran) injection 4 mg, 4 mg, intravenous, q6h PRN, Florian Ash MD, 4 mg at 10/03/23 0634    oxyCODONE (Roxicodone) immediate release tablet 10 mg, 10 mg, oral, q4h PRN, Florian Ash MD, 10 mg at 10/05/23 1521    oxyCODONE (Roxicodone) immediate release tablet 5 mg, 5 mg, oral, q4h PRN, Florian Ash MD, 5 mg at 09/30/23 1716    oxygen (O2) therapy, , inhalation, Continuous - 02/gases, Florian Ash MD, Start at 10/04/23 0800     [DISCONTINUED] esomeprazole (NexIUM) suspension 40 mg, 40 mg, nasoduodenal tube, Daily before breakfast **OR** pantoprazole (ProtoNix) EC tablet 40 mg, 40 mg, oral, Daily before breakfast, 40 mg at 10/05/23 0656 **OR** [DISCONTINUED] pantoprazole (ProtoNix) injection 40 mg, 40 mg, intravenous, Daily before breakfast, Florian Ash MD    polyethylene glycol (Glycolax, Miralax) packet 17 g, 17 g, oral, BID, Moy Cohen MD, 17 g at 10/05/23 0818    pregabalin (Lyrica) capsule 300 mg, 300 mg, oral, BID, Florian Ash MD, 300 mg at 10/05/23 0816    promethazine (Phenergan) in 0.9 % sodium chloride 50 mL IV 12.5 mg, 12.5 mg, intravenous, q6h PRN, Florian Ash MD    sennosides (Senokot) tablet 8.6 mg, 1 tablet, oral, BID, Moy Cohen MD, 8.6 mg at 10/05/23 0816    tamsulosin (Flomax) 24 hr capsule 0.4 mg, 0.4 mg, oral, Daily, Florian Ash MD, 0.4 mg at 10/05/23 0816   Recent Labs:  Lab Results   Component Value Date    GLUCOSE 82 10/05/2023    CALCIUM 8.4 (L) 10/05/2023     (L) 10/05/2023    K 3.8 10/05/2023    CO2 29 10/05/2023    CL 97 (L) 10/05/2023    BUN 12 10/05/2023    CREATININE 0.79 10/05/2023        Allergies  Metoprolol and Mirtazapine    Food/Nutrition Related History:  GI Symptoms:                                          Last BM: last BM 9/28--miralax, colace, senna;  and suppository PRN   Oral Problems:  none      Dietary Orders (From admission, onward)       Start     Ordered    10/04/23 0842  Adult diet Regular  Diet effective now        Question:  Diet type  Answer:  Regular    10/04/23 0844                     Anthropometrics:  Height 177.8cm  Weight: 114kg  BMI 36kg/m2   IBW: 75.5kg  7/2023: 117.9kg or 3.3% x3 months (not significant)  11/2022: 99.8kg          Energy Needs:     KCAL: 2000-2200kcal (17-19kcal/kg)  PRO: 90-105g pro (1.2-1.4g/kg IBW)  FLUID: 1mL/kcal/d or as per physician.  No edema  Skin integrity: lower back incisions x2    Nutrition  Focused Physical Findings: deferred per pt request.        Diagnosis   Diagnosis:       Patient has Nutrition Diagnosis: Yes  Nutrition Diagnosis 1: Inadequate oral intake  Diagnosis Status (1): New  Related to (1): altered GI function  As Evidenced by (1): + constipation with oral intakes <50%  Additional Assessment Information (1): Case discussed with nurse and pharmacy. Family bringing in food from home however pt still not eating well.                                      Interventions/Recommendations   Interventions/Recommendations:       Food and/or Nutrient Delivery Interventions  Interventions: Meals and snacks, Medical food supplement    DIET: continue with regular diet as tolerated.   Meals and Snacks: General healthful diet, Fiber-modified diet  Goal: encourage >50% of meals ([facility] services reviewed with pt in detail encouraging more of small and frequent meals/snacks. Pt aware of floor stock pantry.)    2. SUPPLEMENT: ECC milkshake 2x/day     Medical Food Supplement: Commercial beverage  Goal: encourage >50% of ECC milkshakes 2x/day (318-330kcal and 9g pro/4 fl oz) (Pt agreeable to ECC milkshakes 2x/day and PRN)       3. MEDICATION: May need to consider fleets enema versus lactulose if no results from miralax, colace, and senna.        Coordination of Nutrition Care by a Nutrition Professional  Collaboration and Referral of Nutrition Care: Collaboration by nutrition professional with other providers        Monitoring and Evaluation   Monitoring/Evaluation:  Food and Nutrient Related History     Anthropometrics: Body Composition/Growth/Weight History    Biochemical Data, Medical Tests and Procedures           Follow Up  Time Spent (min): 45 minutes  Follow up: Provided inpatient RDN contact information  Last Date of Nutrition Visit: 10/05/23  Nutrition Follow-Up Needed?: 3-5 days

## 2023-10-05 NOTE — DISCHARGE SUMMARY
Discharge Diagnosis  Spondylosis without myelopathy or radiculopathy, lumbar region    Issues Requiring Follow-Up  SAH, SDH    Test Results Pending At Discharge  Pending Labs       Order Current Status    Extra Tubes In process    Lavender Top In process            Hospital Course   Patient underwent lumbar decompression and fusion by Dr. Vitaly Amezcua. He recovered well in PACU and transferred to [facility] floor for remainder of his care. Experienced extreme headache and CT head showed subdural hematoma and subarachnoid hemorrhage. He was monitored in ICU and repeat scan stable. He has tolerated oral intake for nutrition and pain medication well. She is urinating on own with no complications. He is ambulatory with assistance. On day of discharge she is medically stable. Will DC to home with home health.       Pertinent Physical Exam At Time of Discharge  Physical Exam  General: Well developed, awake/alert/oriented x3, no distress, alert and cooperative  Skin: Warm and dry, no lesions, no rashes  ENMT: Mucous membranes moist, no apparent injury, no lesions seen  Head/Neck: Neck Supple, no apparent injury  Respiratory/Thorax: Normal breath sounds with good chest expansion, thorax symmetric  Cardiovascular: No pitting edema, no JVD    Motor Strength: as prior    Muscle Bulk: Normal and symmetric in all extremities    Posture:   -- Cervical: Normal  -- Thoracic: Normal  -- Lumbar : Normal  Paraspinal muscle spasm/tenderness absent.   Midline tenderness absent    Sensation: intact to light touch    Home Medications     Medication List      START taking these medications     cyclobenzaprine 10 mg tablet; Commonly known as: Flexeril; Take 1 tablet   (10 mg) by mouth 3 times a day.   docusate sodium 100 mg capsule; Commonly known as: Colace; Take 1   capsule (100 mg) by mouth 2 times a day as needed for constipation.   levETIRAcetam 500 mg tablet; Commonly known as: Keppra; Take 1 tablet   (500 mg) by mouth 2 times a  day for 7 days.   naloxone 0.4 mg/mL injection; Commonly known as: Narcan; Infuse 0.5 mL   (0.2 mg) into a venous catheter if needed for respiratory depression (Once   PRN patient is unarousable, and respiratory rate less than 8).   oxyCODONE 10 mg immediate release tablet; Commonly known as: Roxicodone;   Take 1 tablet (10 mg) by mouth every 6 hours if needed for severe pain (7   - 10) or moderate pain (4 - 6) for up to 5 days.   pantoprazole 40 mg EC tablet; Commonly known as: ProtoNix; Take 1 tablet   (40 mg) by mouth once daily in the morning. Take before meals for 14 days.   Do not crush, chew, or split. Do not start before October 6, 2023.; Start   taking on: October 6, 2023   sennosides 8.6 mg tablet; Commonly known as: Senokot; Take 1 tablet (8.6   mg) by mouth 2 times a day for 14 days.   tamsulosin 0.4 mg 24 hr capsule; Commonly known as: Flomax; Take 1   capsule (0.4 mg) by mouth once daily for 14 days. Do not start before   October 6, 2023.; Start taking on: October 6, 2023     CONTINUE taking these medications     atorvastatin 10 mg tablet; Commonly known as: Lipitor; Take 1 tablet (10   mg) by mouth once daily.   escitalopram 20 mg tablet; Commonly known as: Lexapro   metFORMIN 500 mg tablet; Commonly known as: Glucophage; Take 2 tablets   (1,000 mg) by mouth 2 times a day with meals.   Ozempic 0.25 mg or 0.5 mg (2 mg/3 mL) pen injector; Generic drug:   semaglutide; Inject 0.25 mg under the skin every 7 days for 28 days, THEN   0.5 mg every 7 days for 28 days.; Start taking on: August 9, 2023   pregabalin 300 mg capsule; Commonly known as: Lyrica; Take 1 capsule   (300 mg) by mouth 2 times a day.   Vascepa 1 gram capsule; Generic drug: icosapent ethyL; Take 2 capsules   (2 g) by mouth 2 times a day with meals.     STOP taking these medications     buprenorphine-naloxone 8-2 mg SL film; Commonly known as: Suboxone   promethazine 25 mg tablet; Commonly known as: Phenergan   tadalafil 20 mg tablet;  Commonly known as: Cialis       Outpatient Follow-Up  Future Appointments   Date Time Provider Department Center   10/18/2023 12:00 PM Nicholas Paredes PA-C [id number] [place]   11/9/2023 11:00 AM Vitaly Amezcua MD PhD [id number] [place]   12/8/2023  9:00 AM JARED Cota-CNP [id number] [place]   12/28/2023 11:00 AM Vitaly Amezcua MD PhD [id number] [place]       Nicholas Paredes PA-C

## 2023-10-05 NOTE — NURSING NOTE
Patient given discharge instructions. Patient IV taken out. Patient waiting for wife to pick him up.

## 2023-10-05 NOTE — PROGRESS NOTES
Occupational Therapy    Occupational Therapy    Evaluation/Treatment    Patient Name: Gm Thrasher  MRN: [id number]  : 1965  Today's Date: 10/05/23  Time Calculation  Start Time: 1101  Stop Time: 1128  Time Calculation (min): 27 min       Assessment:  OT Assessment Results:  (acute rehab)  Pt pleasant and cooperative. Pt typically independent at baseline, and at this time requires Mod-Max A with ADL's and mobility. PT may benefit from intense OT at acute rehab facility when discharged. Pt would be able to work up to 3 hours of therapy a day.     Plan:  Treatment Interventions: ADL retraining, Neuromuscular reeducation, Endurance training  OT Frequency: 1 time per day until discharge (daily)  OT Discharge Recommendations: High intensity level of continued care  Treatment Interventions: ADL retraining, Neuromuscular reeducation, Endurance training  Subjective     Current Problem:  Pt came to [facility] 23 for an elective lumbar laminectomy with Dr. Amezcua.   : pt lethargic, and not able to work with therapy  10/1: OT/PT evals completed  10/2: pt with complaints of increased back pain and severe headache (10/10); pt also with episode of emesis  10/3: pt with continued severe back pain and headache; CT of head ordered and showed (+) subarachnoid hemorrhage; transferred to ICU  10/5: OT re-eval completed     General:   OT Received On: 23  General  Reason for Referral: lumbar surgery  Referred By: Dr. Amezcua  Past Medical History Relevant to Rehab: L foot drop, h/o ankle fracture, HTN, DM2, gout    Precautions:  Medical Precautions: Fall precautions, Spinal precautions  Precautions Comment:  (Pt with L drop foot from previous lumbar surgery); hemovac drain    Vital Signs:  Heart Rate: 74  Heart Rate Source: Monitor  Resp: 20  BP: 109/69    Pain:  Pain Assessment  Pain Assessment: 0-10  Pain Score: 10 - Worst possible pain  Pain Type: Acute pain  Pain Location: Back; pt also with complaints of groin  "pain, and with probable hematoma within the spinal canal at the L5 level. Presumed postoperative soft tissue emphysema and hematoma including the left psoas and quadratus lumborum muscles.   Objective     Cognition:  Overall Cognitive Status: Within Functional Limits (Pt easily agitated throughout eval)  Orientation Level: Oriented X4  Safety/Judgement: Exceptions to WFL (Pt A&O, yet confused with decreased safety througout eval)  Pt A&O, yet confused. Pt explaining that the \" (Dr. CINTRON) on TV told him what was wrong with him\". When this therapist asked pt if he felt confused, pt became agitated and stated, \"he was not confused just because he couldn't remember the 's name on the TV\". Pt agitated throughout eval.    Home Living:   Pt lives at home with his wife in a 1 floor house with no RA. Pt was independent with ADL's, and has a tub/shower with a shower chair and grab bars.   Pt was able to complete most household tasks. Pt's wife works as an RN    Activity Tolerance:  Endurance: Tolerates less than 10 min exercise with changes in vital signs  Pt with limited endurance secondary to pain     Bed Mobility/Transfers:   Pt was OOB and in bedside chair      Transfer 1  Transfer From 1: Sit to, Stand to  Transfer to 1: Sit, Stand  Transfer Level of Assistance 1: Minimum assistance, Moderate assistance, +2    Extremities: RUE   RUE : Within Functional Limits and LUE   LUE: Within Functional Limits    Outcome Measures: [facility] Daily Activity  Putting on and taking off regular lower body clothing: A lot  Bathing (including washing, rinsing, drying): A lot  Putting on and taking off regular upper body clothing: A lot  Toileting, which includes using toilet, bedpan or urinal: A lot  Taking care of personal grooming such as brushing teeth: A lot  Eating Meals: A lot  Daily Activity - Total Score: 12    EDUCATION:  Education  Individual(s) Educated: Patient  Education Provided:  (safety)  Education Comment: Pt agitatated " and not receptive to education    Goals:  Encounter Problems       Encounter Problems (Active)       Balance       Goal 1 (Progressing)       Start:  10/05/23    Expected End:  10/19/23       Pt will complete ADL's and mobility with good sit balance and fair+ stand balance                 Dressing Upper Extremities       STG - Patient will dress upper body (Progressing)       Start:  10/05/23    Expected End:  10/19/23       Pt will complete UB dressing ADL's with CGA using adaptive device(s) as needed             Dressings Lower Extremities       STG - Patient will complete lower body dressing (Progressing)       Start:  10/05/23    Expected End:  10/19/23       Pt will complete LB dressing with CGA using adaptive device as needed               Transfers       Goal 1 (Progressing)       Start:  10/05/23    Expected End:  10/19/23       Pt with complete all transfers safely with SBA         Goal 2 (Progressing)       Start:  10/05/23    Expected End:  10/19/23       Pt will complete all ADL's, transfers and mobility following spine precautions 100% of the time          Goal 3 (Progressing)       Start:  10/05/23    Expected End:  10/19/23       Pt will complete all bed mobility with SBA safely while following spine precautions

## 2023-10-05 NOTE — CARE PLAN
Problem: Balance  Goal: Goal 1  Description: Pt will complete ADL's and mobility with good sit balance and fair+ stand balance     Outcome: Progressing     Problem: Dressings Lower Extremities  Goal: STG - Patient will complete lower body dressing  Description: Pt will complete LB dressing with CGA using adaptive device as needed   Outcome: Progressing     Problem: Dressing Upper Extremities  Goal: STG - Patient will dress upper body  Description: Pt will complete UB dressing ADL's with CGA using adaptive device(s) as needed   Outcome: Progressing     Problem: Transfers  Goal: Goal 1  Description: Pt with complete all transfers safely with SBA  Outcome: Progressing  Goal: Goal 2  Description: Pt will complete all ADL's, transfers and mobility following spine precautions 100% of the time   Outcome: Progressing  Goal: Goal 3  Description: Pt will complete all bed mobility with SBA safely while following spine precautions     Outcome: Progressing

## 2023-10-05 NOTE — CARE PLAN
Problem: Pain  Goal: My pain/discomfort is manageable  Outcome: Progressing     Problem: Pain - Adult  Goal: Verbalizes/displays adequate comfort level or baseline comfort level  Outcome: Progressing     Problem: Pain  Goal: Walks with improved pain control throughout the shift  Outcome: Progressing     Problem: Pain  Goal: Performs ADL's with improved pain control throughout shift  Outcome: Progressing   The patient's goals for the shift include      The clinical goals for the shift include Pt will maintain O2 sats >92%    Over the shift, the patient did not make progress toward the following goals. Barriers to progression include motivation to learn. Recommendations to address these barriers include effective communication.

## 2023-10-05 NOTE — NURSING NOTE
Pt noncompliant with O2 and VS monitoring during night. He takes off BP cuff, SpO2 sensor, and nasal cannula with notable desats to 80%. Pt was educated multiple times. [facility] NP notified during this shift of pt's actions. Pt refused blood glucose checks overnight with agitation towards staff, [facility] NP notified.

## 2023-10-05 NOTE — NURSING NOTE
Patient left via wheelchair with all of belonings. Patient given discharge paperwork. Patient shows no s/s of distress. IV's out. Wife picking patient up downstairs.

## 2023-10-05 NOTE — CARE PLAN
Problem: PT Problem  Goal: Bed mobility  Description: Pt will perform supine<>sit with HOB flat, using log roll technique mod I.   Outcome: Progressing  Goal: Transfers  Description: Pt will perform all transfers with LRAD, JAYSHREE with good safety awareness.  Outcome: Progressing  Goal: Gait  Description: Pt will amb 150' with LRAD, JAYSHREE with reciprocal gait, L DF assist (AFO/wrap), equal step length and upright posture.  Outcome: Progressing  Goal: Back precautions  Description: Pt will be independent in maintaining spinal precautions 3/3.  Outcome: Progressing

## 2023-10-05 NOTE — NURSING NOTE
Patient has DC orders. Patient states his wife can pick him up after she gets off of work at 1930. Patient does not have any other person that is able to pick the patient up until then.

## 2023-10-05 NOTE — PROGRESS NOTES
Gm Thrasher is a 58 y.o. male on day 6 of admission presenting with Spondylosis without myelopathy or radiculopathy, lumbar region.    Subjective   The patient's headache has resolved. The patient states his main source of pain is in his back where his lumbar drain is placed.        Objective     Physical Exam  Constitutional:       Appearance: He is obese.   Eyes:      Conjunctiva/sclera: Conjunctivae normal.   Cardiovascular:      Pulses: Normal pulses.   Pulmonary:      Effort: Pulmonary effort is normal.   Abdominal:      General: There is distension.   Neurological:      Mental Status: He is alert and oriented to person, place, and time.         Last Recorded Vitals  Blood pressure 108/69, pulse 67, temperature 36.2 °C (97.2 °F), resp. rate 20, weight 114 kg (251 lb 15.8 oz), SpO2 93 %.  Intake/Output last 3 Shifts:  I/O last 3 completed shifts:  In: 240 (2.1 mL/kg) [P.O.:240]  Out: 2625 (23 mL/kg) [Urine:2625 (0.6 mL/kg/hr)]  Weight: 114.3 kg     Relevant Results  {If you would like to pull in Medications, type .meds     If you would like to pull in Lab results for the last 24 hours, type .[id number]    If you would like to pull in Imaging results, type .[id number] :99}    {Link to Stroke Scoring tools - Link :99}                       Assessment/Plan   Principal Problem:    Spondylosis without myelopathy or radiculopathy, lumbar region  Active Problems:    High blood cholesterol level    #Subarachnoid hemorrhage  #Spondylosis without myelopathy or radiculopathy, lumbar region        Plan:     Neurology:  -Patient found to have traumatic subdural hematoma on CT scan, which has been stable on repeat CT scan.   -Every hour neurochecks. The patient is okay to go to the neuro stepdown floor.   - Monitor for signs of reversible bleeding  -CAM bundle  -ABCDEF bundle  -Neurology following the patient  -Appreciate neurology recommendations  -Continue to monitor patient's neurological status with Q1 neurochecks.       Cardiology:     -No coronary complaints at this time     Respiratory:     -Patient is saturating well on 2L nasal cannula at this time  -CT negative for pulmonary embolism  -Continue to monitor pulse oximetry and maintain SPO2 greater than 92%      GI:     -Bowel regimen with protonix  -Diet: Patient has progressed to regular diet.   -Monitor diet as appropriate. Monitor input and output.      :     -kidney function is intact  -No urinary or renal complaints at this time  -Continue to monitor I's and O's  -Continue with daily RFP's     Heme:     -No need for transfusion at this time  -Chemical DVT prophylaxis continued with Lovenox.   -Continue to monitor daily CBC, monitor for signs of blood loss or acute anemia     Endo:      -No known endocrinology issues  -ICU electrolyte replacement protocol ordered  -We will continue to monitor electrolyte levels on RFP/CMP     ID:  -Patient's WBC last checked was 13.8 which has dropped to 10.6.   -No signs of infectious disease on admission     DVT prop:   GI prop: Protonix  Diet:.  Regular  Lines: PIV     Disposition: Patient to remain in ICU for every 1 hour neurochecks.     {This patient does not have an ACP note on file for this encounter, please fill one out - Advance Care Planning Activity :99}      Parish Zelaya RN      I saw and evaluated the patient. I personally obtained the key and critical portions of the history and physical exam or was physically present for key and critical portions performed by the resident/fellow. I reviewed the resident/fellow's documentation and discussed the patient with the resident/fellow. I agree with the resident/fellow's medical decision making as documented in the note.    Parish Zelaya RN

## 2023-10-05 NOTE — PROGRESS NOTES
"Physical Therapy    Physical Therapy Reevaluation    Patient Name: Gm Thrasher  MRN: [id number]  Today's Date: 10/5/2023   Time Calculation  Start Time: 1054  Stop Time: 1113  Time Calculation (min): 19 min    Assessment/Plan   PT Assessment  PT Assessment Results: Decreased strength, Decreased range of motion, Decreased endurance, Impaired balance, Decreased mobility, Impaired judgement, Decreased safety awareness, Pain, Orthopedic restrictions  Rehab Prognosis: Good  Evaluation/Treatment Tolerance: Patient limited by pain, Treatment limited secondary to agitation  Medical Staff Made Aware: Yes  End of Session Communication: Bedside nurse    Assessment Comment: Pt is a 59 y/o male admitted on 9/29 for lumbar spondylosis and subdural hemorrhage. Pt presents with 10/10 headache pain and reports \"off the chart\" groin pain. Upon entering room pt displays poor adherence to spinal precautions as pt seen twisting and leaning off chair. He displayed agitation throughout the treatment session. Pt able to tolerate sit <> stand x2 trials with FWW min A x2 with instruction on proper body mechanics and hand placement. He was able to ambulate ~6-8 ft forward/backward with FWW CGA-min A x2 displaying a significant LLE hip hike for sufficient foot clearance d/t left drop foot.  Pt displays poor safety awareness and impulsivity. Required education on spinal precautions to minimize pain. Pt is functioning below baseline level of function. Pt will benefit from skilled therapy during stay to improve overall functional mobility, strength, ROM, endurance and safety awareness. Upon discharge pt will benefit from high intensity therapy for continued improvement in functional mobility.       End of Session Patient Position: Alarm on, Up in chair  IP OR SWING BED PT PLAN  Inpatient or Swing Bed: Inpatient  PT Plan  Treatment/Interventions: Bed mobility, Transfer training, Gait training, Balance training, Neuromuscular re-education, " Strengthening, Endurance training, Range of motion, Therapeutic exercise, Therapeutic activity  PT Plan: Skilled PT  PT Frequency: 5 times per week  PT Discharge Recommendations: High intensity level of continued care  Equipment Recommended upon Discharge: Wheeled walker  PT Recommended Transfer Status: Assist x2, Assistive device    Subjective     Current Problem:  1. Spondylosis without myelopathy or radiculopathy, lumbar region        2. Spinal stenosis, lumbar region without neurogenic claudication  cyclobenzaprine (Flexeril) 10 mg tablet    docusate sodium (Colace) 100 mg capsule    levETIRAcetam (Keppra) 500 mg tablet    naloxone (Narcan) 0.4 mg/mL injection    oxyCODONE (Roxicodone) 10 mg immediate release tablet    pantoprazole (ProtoNix) 40 mg EC tablet    sennosides (Senokot) 8.6 mg tablet    tamsulosin (Flomax) 0.4 mg 24 hr capsule    Referral to Home Health      3. Acute postoperative pain  cyclobenzaprine (Flexeril) 10 mg tablet    naloxone (Narcan) 0.4 mg/mL injection    oxyCODONE (Roxicodone) 10 mg immediate release tablet    sennosides (Senokot) 8.6 mg tablet      4. Neurogenic claudication due to lumbar spinal stenosis  Referral to Home Health      5. Medication management  levETIRAcetam (Keppra) 500 mg tablet      6. Vitamin B 12 deficiency  tamsulosin (Flomax) 0.4 mg 24 hr capsule        Past Medical History:   Diagnosis Date    Borderline diabetes     H/O chest x-ray     1/20: Impression: Stable, enlarged cardiac mediastinal silhouette with mild central pulmonary vascular congestion. Nonspecific bibasilar airspace disease, worsened in overall appearance compared with the previous study, findings can be seen in  infiltrate/pneumonia and/or atelectasis.    H/O CT scan     CT Ankle: 1/20: A vertically oriented nondisplaced oblique fracture of the medial ankle mortise extends into the distal tibial metadiaphysis.  Several small old avulsion fracture fragments are noted inferior to both malleoli; as  well as soft tissue swelling about the ankle.  There is no other fracture, radiodense foreign bodies, pathologic calcifications, or worrisome bone destruction identified.    H/O CT scan of abdomen     2016: Diffuse urinary bladder wall thickening, Small atrophic left kidney with pyelocalyceal ectasia and severe cortical thinning, , Moderate pyelocalyceal ectasia of the right kidney with cortical thinning; unchanged    H/O CT scan of brain     11/22: normal 1/20: FINDINGS:  There is no intracranial hemorrhage, mass effect, midline shift, extra-axial collection, evidence of hydrocephalus, recent ischemic infarct, or skull fracture identified.    There is no significant atrophy or white matter changes, for age.  Moderate mucosal thickening within the paranasal sinuses again noted. The mastoid air cells are clear    H/O CT scan of chest     10/19: 1. Bibasilar infiltrate/pneumonia with patchy pneumonia scattered throughout the left lung and bilateral pleural effusions.  2. Vascular calcifications left anterior descending coronary artery with mild to moderate cardiomegaly.  3. Moderate to moderately severe diffuse fatty infiltration of liver. 10/21: Bilateral groundglass opacities as discussed. These findings are nonspecific    H/O diagnostic ultrasound     RUQ US 2013: Cholelithiasis and mild gallbladder distention, Increased liver echogenicity    H/O echocardiogram     1/20: Normal LV and RV.  No significant valve disease.  Normal estimated PA pressure.  Normal diastolic filling pattern.    H/O magnetic resonance imaging of cervical spine     2003: CENTRAL STENOSIS WORST C3-C4 1/21: Multilevel degenerative changes with disc height loss, disc osteophyte complexes, facet/uncovertebral degenerative changes. Moderate to severe canal and bilateral foraminal narrowing at C3-C4, C4-C5, C5-C6, C6-C7 grossly similar to  prior cervical MRI    H/O magnetic resonance imaging of lumbar spine     2012:  NEW RIGHT POSTERIOR EXTRUSION  L4-5, WITH CONTACT OF THE EXITING RIGHT L5 AND DESCENDING RIGHT S1 NERVES. s/p laminectomy L5 2019 ([facility]): NARROWING OF MULTIPLE LUMBAR DISC LEVELS WITH FAIRLY ADVANCED DEGENERATIVE CHANGES. SEE INDIVIDUAL LEVELS ABOVE.  MILD LUMBAR CANAL STENOSIS AT THE L4-5 LEVEL.  NARROWING OF NEURAL FORAMINA,    High cholesterol      Past Surgical History:   Procedure Laterality Date    CARDIAC CATHETERIZATION      8/2015: The coronary anatomy is R dominant.  L main coronary artery was normal.  Left anterior descending artery presents luminal irregularities.  Circumflex coronary artery normal.  R coronary artery presents luminal irregularities.  Mid RCA lesion 40% stenosis.  LVEF 50%    CERVICAL LAMINECTOMY      C3-C4, s/p cervical laminectomy.    ESOPHAGOGASTRODUODENOSCOPY      12/15: Localized mild erythema was found at the gastroesophageal junction    KIDNEY SURGERY  09/12/2013    Kidney Surgery    LUMBAR FUSION  09/30/2023    L3-4 & L4-5 XLIF(NUVASIVE), L5-S1 TLIF([employer]TRONIC), L3-S1 POSTEROLATERAL FUSION, L3-5    LUMBAR LAMINECTOMY      s/p L5 laminectomy x2;  residual left foot drop    OTHER SURGICAL HISTORY  01/28/2020    Lumbar vertebral fusion    OTHER SURGICAL HISTORY  01/28/2020    Appendectomy    OTHER SURGICAL HISTORY  01/28/2020    Cholecystectomy    OTHER SURGICAL HISTORY  01/28/2020    [place] tooth extraction    OTHER SURGICAL HISTORY  01/28/2020    Tonsillectomy       General Visit Information:  General  Reason for Referral: Impaired mobility  Referred By: Dr. Amezcua  Family/Caregiver Present: No  Patient Position Received: Up in chair, Alarm on  General Comment: Consulted with bedside nurse with permission to work with pt. Upon entering room pt chair alarm ringing and pt sitting in chair twisting and leaning off right side of chair. Pt educated to maintain spine precautions and not twist in chair beginning session.    Home Living:  Home Living  Type of Home: House  Lives With: Spouse  Home Layout: One  "level  Home Access: Level entry  Bathroom Shower/Tub: Tub/shower unit  Bathroom Equipment: Grab bars in shower, Shower chair with back  Home Living Comments: Per last PT evaluation, pt lives in ranch style home with wife. No RA to enter. First floor set up with bed/bath. Tub shower with shower seat and grab bars.    Prior Level of Function:  Prior Function Per Pt/Caregiver Report  Level of [place]: Independent with ADLs and functional transfers, Independent with homemaking with ambulation  Receives Help From: Family  ADL Assistance: Independent  Homemaking Assistance: Independent  Ambulatory Assistance: Needs assistance  Transfers: Assistive device (walking stick)  Gait: Independent  Prior Function Comments: Per last PT evaluation pt independent with ADLs/IADLs and mobility. Has a walking stick.    Precautions:  Precautions  Medical Precautions: Fall precautions, Spinal precautions  Post-Surgical Precautions: Spinal precautions    Vital Signs:  Vital Signs  Heart Rate: 74 (Post treatment: 72)  Heart Rate Source: Monitor  Resp: 20 (Post treatment: 29)  BP: 109/69  BP Location: Left arm  BP Method: Automatic  Patient Position: Sitting  Objective     Pain:  Pain Assessment  Pain Assessment: 0-10  Pain Score: 10 - Worst possible pain  Pain Location:  (Headache pain 10/10, groin pain with pt reporting \"off the chart pain.\")    Cognition:  Cognition  Overall Cognitive Status:  (A&O x3 with prompting for correct month. Pt agitated throughout session.)    General Assessments:     Static Sitting Balance  Static Sitting-Comment/Number of Minutes: Good  Static Standing Balance  Static Standing-Comment/Number of Minutes: Fair+  Dynamic Standing Balance  Dynamic Standing-Comments: Fair+    Functional Assessments:     Bed Mobility  Bed Mobility: No  Transfers  Transfer: Yes  Transfer 1  Trials/Comments 1: Sit <> stand transfers x2 trials with FWW min A x2. Required verbal cues for proper body mechanics and hand " placement.  Ambulation/Gait Training  Ambulation/Gait Training Performed: Yes  Ambulation/Gait Training 1  Comments/Distance (ft) 1: Pt able to ambulate forward/backward ~5-6 ft with FWW CGA-min A x2 with chair follow. Pt demonstrates excessive left hip flex for sufficient foot clerance d/t drop foot on left foot.          Extremity/Trunk Assessments:        RLE   RLE : Within Functional Limits  LLE   LLE :  (Left foot DF weakness and limited ROM, left drop foot)    Outcome Measures:     [facility] Basic Mobility  Turning from your back to your side while in a flat bed without using bedrails: A little  Moving from lying on your back to sitting on the side of a flat bed without using bedrails: A little  Moving to and from bed to chair (including a wheelchair): A little  Standing up from a chair using your arms (e.g. wheelchair or bedside chair): A little  To walk in hospital room: A lot  Climbing 3-5 steps with railing: Total  Basic Mobility - Total Score: 15  Encounter Problems       Encounter Problems (Active)       PT Problem       Bed mobility (Progressing)       Start:  10/01/23    Expected End:  10/15/23       Pt will perform supine<>sit with HOB flat, using log roll technique mod I.          Transfers (Progressing)       Start:  10/01/23    Expected End:  10/15/23       Pt will perform all transfers with LRAD, JAYSHREE with good safety awareness.         Gait (Progressing)       Start:  10/01/23    Expected End:  10/15/23       Pt will amb 150' with LRAD, JAYSHREE with reciprocal gait, L DF assist (AFO/wrap), equal step length and upright posture.         Back precautions (Progressing)       Start:  10/01/23    Expected End:  10/15/23       Pt will be independent in maintaining spinal precautions 3/3.                Education Documentation  Body Mechanics, taught by Alice Haro, S-PT at 10/5/2023  1:24 PM.  Learner: Patient  Readiness: Acceptance  Method: Explanation, Demonstration  Response: Verbalizes  Understanding, Needs Reinforcement    Precautions, taught by INDU Leon at 10/5/2023  1:24 PM.  Learner: Patient  Readiness: Acceptance  Method: Explanation, Demonstration  Response: Verbalizes Understanding, Needs Reinforcement    Mobility Training, taught by INDU Leon at 10/5/2023  1:24 PM.  Learner: Patient  Readiness: Acceptance  Method: Explanation, Demonstration  Response: Verbalizes Understanding, Needs Reinforcement    Education Comments  Pt educated on the benefits of mobility and importance of participation in therapy for overall health and wellness. Pt educated on proper body mechanics and proper hand placement during functional mobility tasks. Required instruction/education on maintaining proper spinal precautions to minimize pain.

## 2023-10-06 ENCOUNTER — PATIENT OUTREACH (OUTPATIENT)
Dept: PRIMARY CARE | Facility: CLINIC | Age: 58
End: 2023-10-06

## 2023-10-06 DIAGNOSIS — Z98.1 S/P LAMINECTOMY WITH SPINAL FUSION: ICD-10-CM

## 2023-10-06 DIAGNOSIS — M47.816 SPONDYLOSIS OF LUMBAR REGION WITHOUT MYELOPATHY OR RADICULOPATHY: ICD-10-CM

## 2023-10-06 NOTE — PROGRESS NOTES
Discharge Facility: [facility]  Discharge Diagnosis:Spondylosis without myelopathy or radiculopathy, lumbar region   Procedure:  1. L3-4, L4-5 extreme lateral interbody fusion  2. L3-5 laminectomy, L5-S1 revision laminectomy, L5-S1 transforaminal lumbar interbody fusion, L3-S1 instrumented posterolateral fusion, repair of dural tear  Admission Date: 9/29/23  Discharge Date: 10/5/23    PCP Appointment Date: declines at this time  Specialist Appointment Date: Dr. Amezcua  Hospital Encounter and Summary: Linked   See Discharge Assessment below for further details.    Hospital Course   Patient underwent lumbar decompression and fusion by Dr. Vitaly Amezcua. He recovered well in PACU and transferred to [facility] floor for remainder of his care. Experienced extreme headache and CT head showed subdural hematoma and subarachnoid hemorrhage. He was monitored in ICU and repeat scan stable. He has tolerated oral intake for nutrition and pain medication well. He is urinating on own with no complications. He is ambulatory with assistance. On day of discharge he is medically stable. Will DC to home with home health.     Medications  Medications reviewed with patient/caregiver?: Yes (10/6/2023  1:18 PM)  Is the patient having any side effects they believe may be caused by any medication additions or changes?: No (10/6/2023  1:18 PM)  Does the patient have all medications ordered at discharge?: Yes (10/6/2023  1:18 PM)  Care Management Interventions: No intervention needed (10/6/2023  1:18 PM)  Medication Comments: Pt is concerned because they stopped his suboxone while in the hospital and put him back on oxy. (10/6/2023  1:18 PM)    Appointments  Does the patient have a primary care provider?: Yes (10/6/2023  1:18 PM)  Care Management Interventions: Educated patient on importance of making appointment (10/6/2023  1:18 PM)  Has the patient kept scheduled appointments due by today?: Yes (10/6/2023  1:18 PM)    Self Management  What  is the home health agency?: Home with [facility] (10/6/2023  1:18 PM)  What Durable Medical Equipment (DME) was ordered?: ambulating with walker (10/6/2023  1:18 PM)    Patient Teaching  Does the patient have access to their discharge instructions?: Yes (10/6/2023  1:18 PM)  Care Management Interventions: Reviewed instructions with patient (10/6/2023  1:18 PM)  What is the patient's perception of their health status since discharge?: Same (10/6/2023  1:18 PM)    Wrap Up  Wrap Up Additional Comments: see below: (10/6/2023  1:18 PM)

## 2023-10-09 ENCOUNTER — HOSPITAL ENCOUNTER (EMERGENCY)
Age: 58
Discharge: ANOTHER HOSPITAL | End: 2023-10-10
Attending: EMERGENCY MEDICINE
Payer: MEDICARE

## 2023-10-09 ENCOUNTER — APPOINTMENT (OUTPATIENT)
Dept: GENERAL RADIOLOGY | Age: 58
End: 2023-10-09
Payer: MEDICARE

## 2023-10-09 ENCOUNTER — APPOINTMENT (OUTPATIENT)
Dept: MRI IMAGING | Age: 58
End: 2023-10-09
Payer: MEDICARE

## 2023-10-09 ENCOUNTER — APPOINTMENT (OUTPATIENT)
Dept: CT IMAGING | Age: 58
End: 2023-10-09
Payer: MEDICARE

## 2023-10-09 DIAGNOSIS — S82.301A CLOSED FRACTURE OF DISTAL END OF RIGHT FIBULA AND TIBIA, INITIAL ENCOUNTER: ICD-10-CM

## 2023-10-09 DIAGNOSIS — R55 SYNCOPE AND COLLAPSE: ICD-10-CM

## 2023-10-09 DIAGNOSIS — S82.301A CLOSED EXTRA-ARTICULAR FRACTURE OF DISTAL END OF RIGHT TIBIA, INITIAL ENCOUNTER: Primary | ICD-10-CM

## 2023-10-09 DIAGNOSIS — R79.89 ELEVATED TROPONIN: ICD-10-CM

## 2023-10-09 DIAGNOSIS — Z98.890 HISTORY OF BACK SURGERY: ICD-10-CM

## 2023-10-09 DIAGNOSIS — S82.831A CLOSED FRACTURE OF DISTAL END OF RIGHT FIBULA AND TIBIA, INITIAL ENCOUNTER: ICD-10-CM

## 2023-10-09 PROBLEM — S82.891A ANKLE FRACTURE, RIGHT, CLOSED, INITIAL ENCOUNTER: Status: ACTIVE | Noted: 2023-10-09

## 2023-10-09 LAB
ALBUMIN SERPL-MCNC: 3.3 G/DL (ref 3.5–4.6)
ALP SERPL-CCNC: 123 U/L (ref 35–104)
ALT SERPL-CCNC: 11 U/L (ref 0–41)
AMPHET UR QL SCN: ABNORMAL
ANION GAP SERPL CALCULATED.3IONS-SCNC: 16 MEQ/L (ref 9–15)
ANISOCYTOSIS BLD QL SMEAR: ABNORMAL
AST SERPL-CCNC: 26 U/L (ref 0–40)
BARBITURATES UR QL SCN: ABNORMAL
BASOPHILS # BLD: 0 K/UL (ref 0–0.2)
BASOPHILS NFR BLD: 0.3 %
BENZODIAZ UR QL SCN: ABNORMAL
BILIRUB SERPL-MCNC: 0.6 MG/DL (ref 0.2–0.7)
BUN SERPL-MCNC: 9 MG/DL (ref 6–20)
CALCIUM SERPL-MCNC: 9.1 MG/DL (ref 8.5–9.9)
CANNABINOIDS UR QL SCN: ABNORMAL
CHLORIDE SERPL-SCNC: 93 MEQ/L (ref 95–107)
CK SERPL-CCNC: 441 U/L (ref 0–190)
CO2 SERPL-SCNC: 27 MEQ/L (ref 20–31)
COCAINE UR QL SCN: ABNORMAL
CREAT SERPL-MCNC: 0.87 MG/DL (ref 0.7–1.2)
DRUG SCREEN COMMENT UR-IMP: ABNORMAL
EOSINOPHIL # BLD: 0 K/UL (ref 0–0.7)
EOSINOPHIL NFR BLD: 0.1 %
ERYTHROCYTE [DISTWIDTH] IN BLOOD BY AUTOMATED COUNT: 16 % (ref 11.5–14.5)
ETHANOL PERCENT: NORMAL G/DL
ETHANOLAMINE SERPL-MCNC: <10 MG/DL (ref 0–0.08)
FENTANYL SCREEN, URINE: POSITIVE
GLOBULIN SER CALC-MCNC: 4 G/DL (ref 2.3–3.5)
GLUCOSE SERPL-MCNC: 186 MG/DL (ref 70–99)
HCT VFR BLD AUTO: 36.8 % (ref 42–52)
HGB BLD-MCNC: 12.1 G/DL (ref 14–18)
INR PPP: 1.2
LYMPHOCYTES # BLD: 1.1 K/UL (ref 1–4.8)
LYMPHOCYTES NFR BLD: 5 %
MCH RBC QN AUTO: 30.2 PG (ref 27–31.3)
MCHC RBC AUTO-ENTMCNC: 32.9 % (ref 33–37)
MCV RBC AUTO: 91.8 FL (ref 79–92.2)
METHADONE UR QL SCN: ABNORMAL
MONOCYTES # BLD: 1.5 K/UL (ref 0.2–0.8)
MONOCYTES NFR BLD: 6.7 %
NEUTROPHILS # BLD: 18.7 K/UL (ref 1.4–6.5)
NEUTS SEG NFR BLD: 89 %
NRBC BLD-RTO: 1 /100 WBC
OPIATES UR QL SCN: POSITIVE
OXYCODONE UR QL SCN: POSITIVE
PCP UR QL SCN: ABNORMAL
PLATELET # BLD AUTO: 401 K/UL (ref 130–400)
POTASSIUM SERPL-SCNC: 3 MEQ/L (ref 3.4–4.9)
PROPOXYPH UR QL SCN: ABNORMAL
PROT SERPL-MCNC: 7.3 G/DL (ref 6.3–8)
PROTHROMBIN TIME: 14.9 SEC (ref 12.3–14.9)
RBC # BLD AUTO: 4.01 M/UL (ref 4.7–6.1)
SODIUM SERPL-SCNC: 136 MEQ/L (ref 135–144)
TROPONIN, HIGH SENSITIVITY: 158 NG/L (ref 0–19)
TROPONIN, HIGH SENSITIVITY: 318 NG/L (ref 0–19)
WBC # BLD AUTO: 21 K/UL (ref 4.8–10.8)

## 2023-10-09 PROCEDURE — 6360000004 HC RX CONTRAST MEDICATION: Performed by: EMERGENCY MEDICINE

## 2023-10-09 PROCEDURE — 6370000000 HC RX 637 (ALT 250 FOR IP): Performed by: EMERGENCY MEDICINE

## 2023-10-09 PROCEDURE — 36415 COLL VENOUS BLD VENIPUNCTURE: CPT

## 2023-10-09 PROCEDURE — 80307 DRUG TEST PRSMV CHEM ANLYZR: CPT

## 2023-10-09 PROCEDURE — 82077 ASSAY SPEC XCP UR&BREATH IA: CPT

## 2023-10-09 PROCEDURE — 2580000003 HC RX 258: Performed by: EMERGENCY MEDICINE

## 2023-10-09 PROCEDURE — 80053 COMPREHEN METABOLIC PANEL: CPT

## 2023-10-09 PROCEDURE — 6360000002 HC RX W HCPCS: Performed by: EMERGENCY MEDICINE

## 2023-10-09 PROCEDURE — 96375 TX/PRO/DX INJ NEW DRUG ADDON: CPT

## 2023-10-09 PROCEDURE — A9579 GAD-BASE MR CONTRAST NOS,1ML: HCPCS | Performed by: EMERGENCY MEDICINE

## 2023-10-09 PROCEDURE — 85610 PROTHROMBIN TIME: CPT

## 2023-10-09 PROCEDURE — 82550 ASSAY OF CK (CPK): CPT

## 2023-10-09 PROCEDURE — 99285 EMERGENCY DEPT VISIT HI MDM: CPT

## 2023-10-09 PROCEDURE — 96374 THER/PROPH/DIAG INJ IV PUSH: CPT

## 2023-10-09 PROCEDURE — 85025 COMPLETE CBC W/AUTO DIFF WBC: CPT

## 2023-10-09 PROCEDURE — 71275 CT ANGIOGRAPHY CHEST: CPT

## 2023-10-09 PROCEDURE — 70450 CT HEAD/BRAIN W/O DYE: CPT

## 2023-10-09 PROCEDURE — 73610 X-RAY EXAM OF ANKLE: CPT

## 2023-10-09 PROCEDURE — 84484 ASSAY OF TROPONIN QUANT: CPT

## 2023-10-09 PROCEDURE — 96376 TX/PRO/DX INJ SAME DRUG ADON: CPT

## 2023-10-09 PROCEDURE — 93005 ELECTROCARDIOGRAM TRACING: CPT | Performed by: EMERGENCY MEDICINE

## 2023-10-09 PROCEDURE — 6360000002 HC RX W HCPCS: Performed by: STUDENT IN AN ORGANIZED HEALTH CARE EDUCATION/TRAINING PROGRAM

## 2023-10-09 PROCEDURE — 72158 MRI LUMBAR SPINE W/O & W/DYE: CPT

## 2023-10-09 RX ORDER — FENTANYL CITRATE 0.05 MG/ML
50 INJECTION, SOLUTION INTRAMUSCULAR; INTRAVENOUS ONCE
Status: COMPLETED | OUTPATIENT
Start: 2023-10-09 | End: 2023-10-09

## 2023-10-09 RX ORDER — TAMSULOSIN HYDROCHLORIDE 0.4 MG/1
0.4 CAPSULE ORAL DAILY
COMMUNITY

## 2023-10-09 RX ORDER — SEMAGLUTIDE 0.68 MG/ML
0.25 INJECTION, SOLUTION SUBCUTANEOUS WEEKLY
COMMUNITY

## 2023-10-09 RX ORDER — 0.9 % SODIUM CHLORIDE 0.9 %
1000 INTRAVENOUS SOLUTION INTRAVENOUS ONCE
Status: COMPLETED | OUTPATIENT
Start: 2023-10-09 | End: 2023-10-09

## 2023-10-09 RX ORDER — ONDANSETRON 2 MG/ML
4 INJECTION INTRAMUSCULAR; INTRAVENOUS ONCE
Status: COMPLETED | OUTPATIENT
Start: 2023-10-09 | End: 2023-10-09

## 2023-10-09 RX ORDER — POTASSIUM CHLORIDE 20 MEQ/1
40 TABLET, EXTENDED RELEASE ORAL ONCE
Status: COMPLETED | OUTPATIENT
Start: 2023-10-09 | End: 2023-10-09

## 2023-10-09 RX ORDER — MORPHINE SULFATE 4 MG/ML
4 INJECTION, SOLUTION INTRAMUSCULAR; INTRAVENOUS ONCE
Status: COMPLETED | OUTPATIENT
Start: 2023-10-09 | End: 2023-10-09

## 2023-10-09 RX ADMIN — MORPHINE SULFATE 4 MG: 4 INJECTION, SOLUTION INTRAMUSCULAR; INTRAVENOUS at 13:27

## 2023-10-09 RX ADMIN — SODIUM CHLORIDE 1000 ML: 9 INJECTION, SOLUTION INTRAVENOUS at 14:20

## 2023-10-09 RX ADMIN — ONDANSETRON 4 MG: 2 INJECTION INTRAMUSCULAR; INTRAVENOUS at 13:26

## 2023-10-09 RX ADMIN — FENTANYL CITRATE 50 MCG: 50 INJECTION INTRAMUSCULAR; INTRAVENOUS at 17:10

## 2023-10-09 RX ADMIN — FENTANYL CITRATE 50 MCG: 50 INJECTION INTRAMUSCULAR; INTRAVENOUS at 18:08

## 2023-10-09 RX ADMIN — GADOTERIDOL 20 ML: 279.3 INJECTION, SOLUTION INTRAVENOUS at 18:17

## 2023-10-09 RX ADMIN — IOPAMIDOL 75 ML: 612 INJECTION, SOLUTION INTRAVENOUS at 15:21

## 2023-10-09 RX ADMIN — POTASSIUM CHLORIDE 40 MEQ: 1500 TABLET, EXTENDED RELEASE ORAL at 14:22

## 2023-10-09 RX ADMIN — MORPHINE SULFATE 4 MG: 4 INJECTION, SOLUTION INTRAMUSCULAR; INTRAVENOUS at 21:23

## 2023-10-09 ASSESSMENT — ENCOUNTER SYMPTOMS
CHEST TIGHTNESS: 0
SORE THROAT: 0
EYE PAIN: 0
SHORTNESS OF BREATH: 0
VOMITING: 0
NAUSEA: 0
BACK PAIN: 1
ABDOMINAL PAIN: 0

## 2023-10-09 ASSESSMENT — PAIN SCALES - GENERAL
PAINLEVEL_OUTOF10: 10
PAINLEVEL_OUTOF10: 8
PAINLEVEL_OUTOF10: 5
PAINLEVEL_OUTOF10: 10
PAINLEVEL_OUTOF10: 9
PAINLEVEL_OUTOF10: 10
PAINLEVEL_OUTOF10: 10

## 2023-10-09 ASSESSMENT — PAIN DESCRIPTION - LOCATION
LOCATION: LEG
LOCATION: HEAD;BACK

## 2023-10-09 ASSESSMENT — PAIN DESCRIPTION - ORIENTATION: ORIENTATION: RIGHT

## 2023-10-09 ASSESSMENT — LIFESTYLE VARIABLES
HOW OFTEN DO YOU HAVE A DRINK CONTAINING ALCOHOL: NEVER
HOW MANY STANDARD DRINKS CONTAINING ALCOHOL DO YOU HAVE ON A TYPICAL DAY: PATIENT DOES NOT DRINK

## 2023-10-09 ASSESSMENT — PAIN DESCRIPTION - PAIN TYPE
TYPE: ACUTE PAIN
TYPE: ACUTE PAIN

## 2023-10-09 ASSESSMENT — PAIN - FUNCTIONAL ASSESSMENT: PAIN_FUNCTIONAL_ASSESSMENT: 0-10

## 2023-10-09 ASSESSMENT — PAIN DESCRIPTION - DESCRIPTORS: DESCRIPTORS: ACHING;THROBBING

## 2023-10-09 NOTE — ED NOTES
Patient is resting in bed with lights off.  Respirations equal and unlabored    Family at the bedside at this time   Roge Carl RN  10/09/23 [place], [id number]  10/09/23 [id number]

## 2023-10-09 NOTE — ED NOTES
Patient is sitting up eating a meal tray at this time, okay per Dr. Jeffery Renteria [place], GRACE  10/09/23 [id number]

## 2023-10-09 NOTE — ED PROVIDER NOTES
CT of the brain does show subacute subdural which was noted acutely while at [facility] and this is not new. Subarachnoid is gone. CTA of the chest does not show anything acute. I will contact the hospitalist service for admission. Dr. Nieves Barr from orthopedics will contact one of his colleagues for surgery of this ankle fracture. Medical Decision Making  Amount and/or Complexity of Data Reviewed  Labs: ordered. Radiology: ordered. ECG/medicine tests: ordered. Risk  Prescription drug management. Decision regarding hospitalization. REASSESSMENT          CRITICAL CARE TIME   Total Critical Care time was 30 minutes, excluding separately reportable procedures. There was a high probability of clinically significant/life threatening deterioration in the patient's condition which required my urgent intervention. CONSULTS:  IP CONSULT TO SPIRITUAL SERVICES    PROCEDURES:  Unless otherwise noted below, none     Procedures        FINAL IMPRESSION      1. Closed extra-articular fracture of distal end of right tibia, initial encounter    2. Closed fracture of distal end of right fibula and tibia, initial encounter    3. Elevated troponin    4. Syncope and collapse    5. History of back surgery          DISPOSITION/PLAN   DISPOSITION Decision To Transfer 10/09/2023 11:30:46 PM      PATIENT REFERRED TO:  No follow-up provider specified.     DISCHARGE MEDICATIONS:  Discharge Medication List as of 10/10/2023  2:57 AM        Controlled Substances Monitoring:          No data to display                (Please note that portions of this note were completed with a voice recognition program.  Efforts were made to edit the dictations but occasionally words are mis-transcribed.)    Freda Pabon DO (electronically signed)  Attending Emergency Physician            Freda Pabon DO  10/10/23 [id number]

## 2023-10-09 NOTE — ED NOTES
Labs collected and sent at this time   Patient to Xray at this time      Celsa Preston RN  10/09/23 [id number]

## 2023-10-09 NOTE — ED TRIAGE NOTES
Patient was standing 2 days ago and passed out, he woke up on the floor. He crawled to his bed until he called 911 this morning.  He has 10/10 pain to the R ankle

## 2023-10-09 NOTE — CARE COORDINATION
Met with pt briefly at bedside sisters provided much of input, pt had to go to MRI and he approved conversation. He also states he is , not legally and wants his sisters as POA, order obtained for  consult. Pt has one son he has no contact with. Pt was just dcd from [facility] for lumbar lame and fusion with some complications. Was supposed to have [address], no one showed. He only been home a few days & fell and was found by sister today. Sisters feel he needs rehab and with dx present will need. SNF list given and they said he will need a \"smoking facility. \"     Case Management Assessment  Initial Evaluation    Date/Time of Evaluation: 10/9/2023 6:08 PM  Assessment Completed by: Jermain Almaguer RN    If patient is discharged prior to next notation, then this note serves as note for discharge by case management. Patient Name: Sb Matta. YOB: 1965  Diagnosis: No admission diagnoses are documented for this encounter. Date / Time: 10/9/2023 12:35 PM    Patient Admission Status: Emergency   Readmission Risk (Low < 19, Mod (19-27), High > 27): No data recorded  Current PCP: Alfonso Qureshi MD  PCP verified by ? Yes    Chart Reviewed: Yes      History Provided by: Patient, Child/Family  Patient Orientation: Alert and Oriented, Person, Place, Situation, Self    Patient Cognition: Alert    Hospitalization in the last 30 days (Readmission):  No    If yes, Readmission Assessment in  Navigator will be completed.     Advance Directives:      Code Status: Prior   Patient's Primary Decision Maker is: Legal Next of Kin      Discharge Planning:    Patient lives with: Alone Type of Home: House  Primary Care Giver: Self  Patient Support Systems include: Family Members   Current Financial resources: Medicare, Other (Comment) (MANAGED CARE)  Current community resources:    Current services prior to admission: Durable Medical Equipment, Home Care (WAS SUPPOSED

## 2023-10-10 ENCOUNTER — APPOINTMENT (OUTPATIENT)
Dept: CARDIOLOGY | Facility: HOSPITAL | Age: 58
DRG: 321 | End: 2023-10-10
Payer: COMMERCIAL

## 2023-10-10 ENCOUNTER — APPOINTMENT (OUTPATIENT)
Dept: RADIOLOGY | Facility: HOSPITAL | Age: 58
DRG: 321 | End: 2023-10-10
Payer: COMMERCIAL

## 2023-10-10 ENCOUNTER — HOSPITAL ENCOUNTER (INPATIENT)
Facility: HOSPITAL | Age: 58
LOS: 10 days | Discharge: HOME | DRG: 321 | End: 2023-10-20
Attending: INTERNAL MEDICINE | Admitting: STUDENT IN AN ORGANIZED HEALTH CARE EDUCATION/TRAINING PROGRAM
Payer: COMMERCIAL

## 2023-10-10 VITALS
RESPIRATION RATE: 16 BRPM | HEIGHT: 71 IN | OXYGEN SATURATION: 97 % | SYSTOLIC BLOOD PRESSURE: 147 MMHG | HEART RATE: 76 BPM | BODY MASS INDEX: 36.68 KG/M2 | WEIGHT: 262 LBS | TEMPERATURE: 98.6 F | DIASTOLIC BLOOD PRESSURE: 71 MMHG

## 2023-10-10 DIAGNOSIS — I22.2 SUBSEQUENT NON-ST ELEVATION (NSTEMI) MYOCARDIAL INFARCTION (MULTI): ICD-10-CM

## 2023-10-10 DIAGNOSIS — R55 SYNCOPE, UNSPECIFIED SYNCOPE TYPE: Primary | ICD-10-CM

## 2023-10-10 DIAGNOSIS — S82.891A ANKLE FRACTURE, RIGHT, CLOSED, INITIAL ENCOUNTER: ICD-10-CM

## 2023-10-10 DIAGNOSIS — Z87.81 H/O FRACTURE OF ANKLE: ICD-10-CM

## 2023-10-10 DIAGNOSIS — I21.4 NSTEMI (NON-ST ELEVATED MYOCARDIAL INFARCTION) (MULTI): ICD-10-CM

## 2023-10-10 LAB
ALBUMIN SERPL BCP-MCNC: 3.1 G/DL (ref 3.4–5)
ALP SERPL-CCNC: 92 U/L (ref 33–120)
ALT SERPL W P-5'-P-CCNC: 15 U/L (ref 10–52)
ANION GAP SERPL CALC-SCNC: 12 MMOL/L (ref 10–20)
APPEARANCE UR: CLEAR
AST SERPL W P-5'-P-CCNC: 28 U/L (ref 9–39)
BASOPHILS # BLD AUTO: 0.06 X10*3/UL (ref 0–0.1)
BASOPHILS NFR BLD AUTO: 0.4 %
BILIRUB SERPL-MCNC: 0.9 MG/DL (ref 0–1.2)
BILIRUB UR STRIP.AUTO-MCNC: NEGATIVE MG/DL
BUN SERPL-MCNC: 11 MG/DL (ref 6–23)
CALCIUM SERPL-MCNC: 8.8 MG/DL (ref 8.6–10.3)
CARDIAC TROPONIN I PNL SERPL HS: 1373 NG/L (ref 0–20)
CARDIAC TROPONIN I PNL SERPL HS: 1452 NG/L (ref 0–20)
CARDIAC TROPONIN I PNL SERPL HS: 1573 NG/L (ref 0–20)
CHLORIDE SERPL-SCNC: 99 MMOL/L (ref 98–107)
CHOLEST SERPL-MCNC: 113 MG/DL (ref 0–199)
CHOLESTEROL/HDL RATIO: 6.1
CK MB CFR SERPL CALC: 4 %MB OF CK
CK MB SERPL-CCNC: 9.6 NG/ML
CK SERPL-CCNC: 270 U/L (ref 0–325)
CO2 SERPL-SCNC: 27 MMOL/L (ref 21–32)
COLOR UR: YELLOW
CREAT SERPL-MCNC: 0.8 MG/DL (ref 0.5–1.3)
EKG ATRIAL RATE: 83 BPM
EKG P AXIS: 43 DEGREES
EKG P-R INTERVAL: 156 MS
EKG Q-T INTERVAL: 354 MS
EKG QRS DURATION: 100 MS
EKG QTC CALCULATION (BAZETT): 415 MS
EKG R AXIS: 19 DEGREES
EKG T AXIS: 88 DEGREES
EKG VENTRICULAR RATE: 83 BPM
EOSINOPHIL # BLD AUTO: 0.57 X10*3/UL (ref 0–0.7)
EOSINOPHIL NFR BLD AUTO: 4 %
ERYTHROCYTE [DISTWIDTH] IN BLOOD BY AUTOMATED COUNT: 16.4 % (ref 11.5–14.5)
GFR SERPL CREATININE-BSD FRML MDRD: >90 ML/MIN/1.73M*2
GLUCOSE BLD MANUAL STRIP-MCNC: 100 MG/DL (ref 74–99)
GLUCOSE BLD MANUAL STRIP-MCNC: 120 MG/DL (ref 74–99)
GLUCOSE BLD MANUAL STRIP-MCNC: 128 MG/DL (ref 74–99)
GLUCOSE BLD MANUAL STRIP-MCNC: 97 MG/DL (ref 74–99)
GLUCOSE SERPL-MCNC: 112 MG/DL (ref 74–99)
GLUCOSE UR STRIP.AUTO-MCNC: NEGATIVE MG/DL
HCT VFR BLD AUTO: 31.6 % (ref 41–52)
HDLC SERPL-MCNC: 18.4 MG/DL
HGB BLD-MCNC: 10.4 G/DL (ref 13.5–17.5)
IMM GRANULOCYTES # BLD AUTO: 0.06 X10*3/UL (ref 0–0.7)
IMM GRANULOCYTES NFR BLD AUTO: 0.4 % (ref 0–0.9)
KETONES UR STRIP.AUTO-MCNC: NEGATIVE MG/DL
LACTATE SERPL-SCNC: 0.9 MMOL/L (ref 0.4–2)
LDLC SERPL CALC-MCNC: 63 MG/DL (ref 140–190)
LEUKOCYTE ESTERASE UR QL STRIP.AUTO: NEGATIVE
LYMPHOCYTES # BLD AUTO: 2.01 X10*3/UL (ref 1.2–4.8)
LYMPHOCYTES NFR BLD AUTO: 14.2 %
MAGNESIUM SERPL-MCNC: 2 MG/DL (ref 1.6–2.4)
MCH RBC QN AUTO: 29.9 PG (ref 26–34)
MCHC RBC AUTO-ENTMCNC: 32.9 G/DL (ref 32–36)
MCV RBC AUTO: 91 FL (ref 80–100)
MONOCYTES # BLD AUTO: 1.02 X10*3/UL (ref 0.1–1)
MONOCYTES NFR BLD AUTO: 7.2 %
MRSA DNA SPEC QL NAA+PROBE: NOT DETECTED
NEUTROPHILS # BLD AUTO: 10.42 X10*3/UL (ref 1.2–7.7)
NEUTROPHILS NFR BLD AUTO: 73.8 %
NITRITE UR QL STRIP.AUTO: NEGATIVE
NON HDL CHOLESTEROL: 95 MG/DL (ref 0–149)
NRBC BLD-RTO: 0 /100 WBCS (ref 0–0)
PH UR STRIP.AUTO: 6 [PH]
PLATELET # BLD AUTO: 279 X10*3/UL (ref 150–450)
PMV BLD AUTO: 9.5 FL (ref 7.5–11.5)
POTASSIUM SERPL-SCNC: 3.8 MMOL/L (ref 3.5–5.3)
PROT SERPL-MCNC: 6.8 G/DL (ref 6.4–8.2)
PROT UR STRIP.AUTO-MCNC: NEGATIVE MG/DL
RBC # BLD AUTO: 3.48 X10*6/UL (ref 4.5–5.9)
RBC # UR STRIP.AUTO: NEGATIVE /UL
SODIUM SERPL-SCNC: 134 MMOL/L (ref 136–145)
SP GR UR STRIP.AUTO: 1.04
TRIGL SERPL-MCNC: 160 MG/DL (ref 0–149)
UROBILINOGEN UR STRIP.AUTO-MCNC: 2 MG/DL
VLDL: 32 MG/DL (ref 0–40)
WBC # BLD AUTO: 14.1 X10*3/UL (ref 4.4–11.3)

## 2023-10-10 PROCEDURE — 93306 TTE W/DOPPLER COMPLETE: CPT

## 2023-10-10 PROCEDURE — 2500000004 HC RX 250 GENERAL PHARMACY W/ HCPCS (ALT 636 FOR OP/ED): Performed by: STUDENT IN AN ORGANIZED HEALTH CARE EDUCATION/TRAINING PROGRAM

## 2023-10-10 PROCEDURE — 2500000004 HC RX 250 GENERAL PHARMACY W/ HCPCS (ALT 636 FOR OP/ED)

## 2023-10-10 PROCEDURE — 76770 US EXAM ABDO BACK WALL COMP: CPT

## 2023-10-10 PROCEDURE — 85025 COMPLETE CBC W/AUTO DIFF WBC: CPT

## 2023-10-10 PROCEDURE — 82550 ASSAY OF CK (CPK): CPT

## 2023-10-10 PROCEDURE — 73600 X-RAY EXAM OF ANKLE: CPT | Mod: RT,FY

## 2023-10-10 PROCEDURE — 36415 COLL VENOUS BLD VENIPUNCTURE: CPT

## 2023-10-10 PROCEDURE — 81003 URINALYSIS AUTO W/O SCOPE: CPT

## 2023-10-10 PROCEDURE — 73600 X-RAY EXAM OF ANKLE: CPT | Mod: RIGHT SIDE | Performed by: STUDENT IN AN ORGANIZED HEALTH CARE EDUCATION/TRAINING PROGRAM

## 2023-10-10 PROCEDURE — 93010 ELECTROCARDIOGRAM REPORT: CPT | Performed by: INTERNAL MEDICINE

## 2023-10-10 PROCEDURE — 87641 MR-STAPH DNA AMP PROBE: CPT | Performed by: INTERNAL MEDICINE

## 2023-10-10 PROCEDURE — 2580000001 HC RX 258 IV SOLUTIONS

## 2023-10-10 PROCEDURE — 83735 ASSAY OF MAGNESIUM: CPT

## 2023-10-10 PROCEDURE — 99223 1ST HOSP IP/OBS HIGH 75: CPT | Performed by: STUDENT IN AN ORGANIZED HEALTH CARE EDUCATION/TRAINING PROGRAM

## 2023-10-10 PROCEDURE — 83036 HEMOGLOBIN GLYCOSYLATED A1C: CPT | Mod: STJLAB

## 2023-10-10 PROCEDURE — 96372 THER/PROPH/DIAG INJ SC/IM: CPT

## 2023-10-10 PROCEDURE — 2500000001 HC RX 250 WO HCPCS SELF ADMINISTERED DRUGS (ALT 637 FOR MEDICARE OP)

## 2023-10-10 PROCEDURE — 84484 ASSAY OF TROPONIN QUANT: CPT

## 2023-10-10 PROCEDURE — 82947 ASSAY GLUCOSE BLOOD QUANT: CPT

## 2023-10-10 PROCEDURE — 6360000002 HC RX W HCPCS: Performed by: STUDENT IN AN ORGANIZED HEALTH CARE EDUCATION/TRAINING PROGRAM

## 2023-10-10 PROCEDURE — 76770 US EXAM ABDO BACK WALL COMP: CPT | Performed by: RADIOLOGY

## 2023-10-10 PROCEDURE — 87040 BLOOD CULTURE FOR BACTERIA: CPT

## 2023-10-10 PROCEDURE — 2060000001 HC INTERMEDIATE ICU ROOM DAILY

## 2023-10-10 PROCEDURE — 83605 ASSAY OF LACTIC ACID: CPT

## 2023-10-10 PROCEDURE — 87075 CULTR BACTERIA EXCEPT BLOOD: CPT | Mod: STJLAB

## 2023-10-10 PROCEDURE — 2500000004 HC RX 250 GENERAL PHARMACY W/ HCPCS (ALT 636 FOR OP/ED): Performed by: INTERNAL MEDICINE

## 2023-10-10 PROCEDURE — 99221 1ST HOSP IP/OBS SF/LOW 40: CPT | Performed by: ORTHOPAEDIC SURGERY

## 2023-10-10 PROCEDURE — 82553 CREATINE MB FRACTION: CPT

## 2023-10-10 PROCEDURE — 80061 LIPID PANEL: CPT

## 2023-10-10 PROCEDURE — 82553 CREATINE MB FRACTION: CPT | Mod: STJLAB

## 2023-10-10 RX ORDER — INSULIN LISPRO 100 [IU]/ML
0-5 INJECTION, SOLUTION INTRAVENOUS; SUBCUTANEOUS EVERY 4 HOURS
Status: CANCELLED | OUTPATIENT
Start: 2023-10-11

## 2023-10-10 RX ORDER — CEFEPIME 1 G/50ML
2 INJECTION, SOLUTION INTRAVENOUS EVERY 8 HOURS
Status: DISCONTINUED | OUTPATIENT
Start: 2023-10-10 | End: 2023-10-11

## 2023-10-10 RX ORDER — ALLOPURINOL 100 MG/1
100 TABLET ORAL DAILY PRN
Status: ON HOLD | COMMUNITY
End: 2023-11-20 | Stop reason: ENTERED-IN-ERROR

## 2023-10-10 RX ORDER — HEPARIN SODIUM 5000 [USP'U]/ML
5000 INJECTION, SOLUTION INTRAVENOUS; SUBCUTANEOUS EVERY 8 HOURS
Status: DISCONTINUED | OUTPATIENT
Start: 2023-10-10 | End: 2023-10-12

## 2023-10-10 RX ORDER — DOCUSATE SODIUM 100 MG/1
100 CAPSULE, LIQUID FILLED ORAL 2 TIMES DAILY PRN
Status: DISCONTINUED | OUTPATIENT
Start: 2023-10-10 | End: 2023-10-20 | Stop reason: HOSPADM

## 2023-10-10 RX ORDER — TAMSULOSIN HYDROCHLORIDE 0.4 MG/1
0.4 CAPSULE ORAL DAILY
Status: DISCONTINUED | OUTPATIENT
Start: 2023-10-10 | End: 2023-10-20 | Stop reason: HOSPADM

## 2023-10-10 RX ORDER — POLYETHYLENE GLYCOL 3350 17 G/17G
17 POWDER, FOR SOLUTION ORAL DAILY
Status: DISCONTINUED | OUTPATIENT
Start: 2023-10-10 | End: 2023-10-20 | Stop reason: HOSPADM

## 2023-10-10 RX ORDER — MORPHINE SULFATE 2 MG/ML
1 INJECTION, SOLUTION INTRAMUSCULAR; INTRAVENOUS
Status: DISCONTINUED | OUTPATIENT
Start: 2023-10-10 | End: 2023-10-10

## 2023-10-10 RX ORDER — OXYCODONE HYDROCHLORIDE 5 MG/1
5 TABLET ORAL EVERY 6 HOURS PRN
Status: DISCONTINUED | OUTPATIENT
Start: 2023-10-10 | End: 2023-10-10

## 2023-10-10 RX ORDER — SODIUM CHLORIDE 9 MG/ML
75 INJECTION, SOLUTION INTRAVENOUS CONTINUOUS
Status: DISCONTINUED | OUTPATIENT
Start: 2023-10-10 | End: 2023-10-11

## 2023-10-10 RX ORDER — ACETAMINOPHEN 650 MG/1
650 SUPPOSITORY RECTAL EVERY 4 HOURS PRN
Status: DISCONTINUED | OUTPATIENT
Start: 2023-10-10 | End: 2023-10-10

## 2023-10-10 RX ORDER — INSULIN LISPRO 100 [IU]/ML
0-5 INJECTION, SOLUTION INTRAVENOUS; SUBCUTANEOUS EVERY 4 HOURS
Status: DISCONTINUED | OUTPATIENT
Start: 2023-10-10 | End: 2023-10-20 | Stop reason: HOSPADM

## 2023-10-10 RX ORDER — ASPIRIN 325 MG
325 TABLET, DELAYED RELEASE (ENTERIC COATED) ORAL ONCE
Status: COMPLETED | OUTPATIENT
Start: 2023-10-10 | End: 2023-10-10

## 2023-10-10 RX ORDER — DEXTROSE MONOHYDRATE 100 MG/ML
0.3 INJECTION, SOLUTION INTRAVENOUS ONCE AS NEEDED
Status: DISCONTINUED | OUTPATIENT
Start: 2023-10-10 | End: 2023-10-11

## 2023-10-10 RX ORDER — SENNOSIDES 8.6 MG/1
1 TABLET ORAL 2 TIMES DAILY
Status: DISCONTINUED | OUTPATIENT
Start: 2023-10-10 | End: 2023-10-20 | Stop reason: HOSPADM

## 2023-10-10 RX ORDER — DEXTROSE 50 % IN WATER (D50W) INTRAVENOUS SYRINGE
25
Status: DISCONTINUED | OUTPATIENT
Start: 2023-10-10 | End: 2023-10-11

## 2023-10-10 RX ORDER — ATORVASTATIN CALCIUM 80 MG/1
80 TABLET, FILM COATED ORAL DAILY
Status: DISCONTINUED | OUTPATIENT
Start: 2023-10-10 | End: 2023-10-20 | Stop reason: HOSPADM

## 2023-10-10 RX ORDER — METOPROLOL TARTRATE 25 MG/1
TABLET, FILM COATED ORAL 2 TIMES DAILY
Status: CANCELLED | OUTPATIENT
Start: 2023-10-10

## 2023-10-10 RX ORDER — OXYCODONE HYDROCHLORIDE 10 MG/1
10 TABLET ORAL EVERY 6 HOURS PRN
Status: DISCONTINUED | OUTPATIENT
Start: 2023-10-10 | End: 2023-10-18

## 2023-10-10 RX ORDER — CARVEDILOL 6.25 MG/1
6.25 TABLET ORAL 2 TIMES DAILY
Status: CANCELLED | OUTPATIENT
Start: 2023-10-10

## 2023-10-10 RX ORDER — INSULIN LISPRO 100 [IU]/ML
0-5 INJECTION, SOLUTION INTRAVENOUS; SUBCUTANEOUS EVERY 4 HOURS
Status: DISCONTINUED | OUTPATIENT
Start: 2023-10-10 | End: 2023-10-10

## 2023-10-10 RX ORDER — PREGABALIN 150 MG/1
300 CAPSULE ORAL 2 TIMES DAILY
Status: DISCONTINUED | OUTPATIENT
Start: 2023-10-10 | End: 2023-10-20 | Stop reason: HOSPADM

## 2023-10-10 RX ORDER — HYDROMORPHONE HYDROCHLORIDE 1 MG/ML
0.6 INJECTION, SOLUTION INTRAMUSCULAR; INTRAVENOUS; SUBCUTANEOUS
Status: DISCONTINUED | OUTPATIENT
Start: 2023-10-10 | End: 2023-10-18

## 2023-10-10 RX ORDER — PANTOPRAZOLE SODIUM 40 MG/1
40 TABLET, DELAYED RELEASE ORAL
Status: DISCONTINUED | OUTPATIENT
Start: 2023-10-11 | End: 2023-10-20 | Stop reason: HOSPADM

## 2023-10-10 RX ORDER — INSULIN LISPRO 100 [IU]/ML
0-5 INJECTION, SOLUTION INTRAVENOUS; SUBCUTANEOUS
Status: DISCONTINUED | OUTPATIENT
Start: 2023-10-11 | End: 2023-10-12

## 2023-10-10 RX ORDER — ACETAMINOPHEN 325 MG/1
650 TABLET ORAL EVERY 4 HOURS PRN
Status: DISCONTINUED | OUTPATIENT
Start: 2023-10-10 | End: 2023-10-10

## 2023-10-10 RX ORDER — LEVETIRACETAM 500 MG/1
500 TABLET ORAL 2 TIMES DAILY
Status: DISPENSED | OUTPATIENT
Start: 2023-10-10 | End: 2023-10-16

## 2023-10-10 RX ORDER — ACETAMINOPHEN 325 MG/1
650 TABLET ORAL 4 TIMES DAILY
Status: DISCONTINUED | OUTPATIENT
Start: 2023-10-10 | End: 2023-10-20 | Stop reason: HOSPADM

## 2023-10-10 RX ORDER — INSULIN LISPRO 100 [IU]/ML
0-5 INJECTION, SOLUTION INTRAVENOUS; SUBCUTANEOUS
Status: CANCELLED | OUTPATIENT
Start: 2023-10-11

## 2023-10-10 RX ORDER — ESCITALOPRAM OXALATE 20 MG/1
20 TABLET ORAL DAILY
Status: DISCONTINUED | OUTPATIENT
Start: 2023-10-10 | End: 2023-10-20 | Stop reason: HOSPADM

## 2023-10-10 RX ORDER — INSULIN LISPRO 100 [IU]/ML
0-5 INJECTION, SOLUTION INTRAVENOUS; SUBCUTANEOUS
Status: DISCONTINUED | OUTPATIENT
Start: 2023-10-11 | End: 2023-10-10

## 2023-10-10 RX ORDER — MORPHINE SULFATE 4 MG/ML
4 INJECTION, SOLUTION INTRAMUSCULAR; INTRAVENOUS ONCE
Status: COMPLETED | OUTPATIENT
Start: 2023-10-10 | End: 2023-10-10

## 2023-10-10 RX ORDER — OXYCODONE AND ACETAMINOPHEN 5; 325 MG/1; MG/1
1 TABLET ORAL EVERY 6 HOURS PRN
Status: DISCONTINUED | OUTPATIENT
Start: 2023-10-10 | End: 2023-10-10

## 2023-10-10 RX ORDER — CYCLOBENZAPRINE HCL 10 MG
10 TABLET ORAL 3 TIMES DAILY
Status: DISCONTINUED | OUTPATIENT
Start: 2023-10-10 | End: 2023-10-20 | Stop reason: HOSPADM

## 2023-10-10 RX ORDER — ACETAMINOPHEN 160 MG/5ML
650 SOLUTION ORAL EVERY 4 HOURS PRN
Status: DISCONTINUED | OUTPATIENT
Start: 2023-10-10 | End: 2023-10-10

## 2023-10-10 RX ADMIN — CYCLOBENZAPRINE 10 MG: 10 TABLET, FILM COATED ORAL at 17:32

## 2023-10-10 RX ADMIN — SODIUM CHLORIDE 75 ML/HR: 9 INJECTION, SOLUTION INTRAVENOUS at 23:30

## 2023-10-10 RX ADMIN — SODIUM CHLORIDE, POTASSIUM CHLORIDE, SODIUM LACTATE AND CALCIUM CHLORIDE 500 ML: 600; 310; 30; 20 INJECTION, SOLUTION INTRAVENOUS at 05:23

## 2023-10-10 RX ADMIN — CEFEPIME 2 G: 1 INJECTION, SOLUTION INTRAVENOUS at 20:21

## 2023-10-10 RX ADMIN — HEPARIN SODIUM 5000 UNITS: 5000 INJECTION INTRAVENOUS; SUBCUTANEOUS at 12:26

## 2023-10-10 RX ADMIN — SENNOSIDES 8.6 MG: 8.6 TABLET, FILM COATED ORAL at 17:32

## 2023-10-10 RX ADMIN — ESCITALOPRAM OXALATE 20 MG: 20 TABLET, FILM COATED ORAL at 17:34

## 2023-10-10 RX ADMIN — Medication 1500 MG: at 20:00

## 2023-10-10 RX ADMIN — OXYCODONE HYDROCHLORIDE 5 MG: 5 TABLET ORAL at 09:39

## 2023-10-10 RX ADMIN — ACETAMINOPHEN 650 MG: 325 TABLET ORAL at 21:52

## 2023-10-10 RX ADMIN — TAMSULOSIN HYDROCHLORIDE 0.4 MG: 0.4 CAPSULE ORAL at 17:33

## 2023-10-10 RX ADMIN — MORPHINE SULFATE 4 MG: 4 INJECTION, SOLUTION INTRAMUSCULAR; INTRAVENOUS at 02:52

## 2023-10-10 RX ADMIN — ACETAMINOPHEN 650 MG: 325 TABLET ORAL at 07:37

## 2023-10-10 RX ADMIN — ATORVASTATIN CALCIUM 80 MG: 80 TABLET, FILM COATED ORAL at 21:52

## 2023-10-10 RX ADMIN — ACETAMINOPHEN 650 MG: 325 TABLET ORAL at 05:24

## 2023-10-10 RX ADMIN — HYDROMORPHONE HYDROCHLORIDE 0.6 MG: 1 INJECTION, SOLUTION INTRAMUSCULAR; INTRAVENOUS; SUBCUTANEOUS at 12:24

## 2023-10-10 RX ADMIN — HYDROMORPHONE HYDROCHLORIDE 0.4 MG: 1 INJECTION, SOLUTION INTRAMUSCULAR; INTRAVENOUS; SUBCUTANEOUS at 05:14

## 2023-10-10 RX ADMIN — SENNOSIDES 8.6 MG: 8.6 TABLET, FILM COATED ORAL at 21:52

## 2023-10-10 RX ADMIN — ACETAMINOPHEN 650 MG: 325 TABLET ORAL at 12:23

## 2023-10-10 RX ADMIN — HUMAN ALBUMIN MICROSPHERES AND PERFLUTREN 0.5 ML: 10; .22 INJECTION, SOLUTION INTRAVENOUS at 15:39

## 2023-10-10 RX ADMIN — CYCLOBENZAPRINE 10 MG: 10 TABLET, FILM COATED ORAL at 23:30

## 2023-10-10 RX ADMIN — OXYCODONE HYDROCHLORIDE 10 MG: 10 TABLET ORAL at 18:30

## 2023-10-10 RX ADMIN — CEFEPIME 2 G: 1 INJECTION, SOLUTION INTRAVENOUS at 12:29

## 2023-10-10 RX ADMIN — LEVETIRACETAM 500 MG: 500 TABLET, FILM COATED ORAL at 21:52

## 2023-10-10 RX ADMIN — HYDROMORPHONE HYDROCHLORIDE 0.6 MG: 1 INJECTION, SOLUTION INTRAMUSCULAR; INTRAVENOUS; SUBCUTANEOUS at 23:33

## 2023-10-10 RX ADMIN — ACETAMINOPHEN 650 MG: 325 TABLET ORAL at 17:34

## 2023-10-10 RX ADMIN — HYDROMORPHONE HYDROCHLORIDE 0.6 MG: 1 INJECTION, SOLUTION INTRAMUSCULAR; INTRAVENOUS; SUBCUTANEOUS at 20:22

## 2023-10-10 RX ADMIN — ASPIRIN 325 MG: 325 TABLET ORAL at 14:22

## 2023-10-10 RX ADMIN — HYDROMORPHONE HYDROCHLORIDE 0.6 MG: 1 INJECTION, SOLUTION INTRAMUSCULAR; INTRAVENOUS; SUBCUTANEOUS at 16:54

## 2023-10-10 RX ADMIN — Medication 1500 MG: at 08:00

## 2023-10-10 RX ADMIN — PIPERACILLIN SODIUM AND TAZOBACTAM SODIUM 3.38 G: 3; .375 INJECTION, SOLUTION INTRAVENOUS at 05:24

## 2023-10-10 RX ADMIN — HEPARIN SODIUM 5000 UNITS: 5000 INJECTION INTRAVENOUS; SUBCUTANEOUS at 20:21

## 2023-10-10 RX ADMIN — PREGABALIN 300 MG: 150 CAPSULE ORAL at 17:33

## 2023-10-10 SDOH — SOCIAL STABILITY: SOCIAL INSECURITY: HAVE YOU HAD THOUGHTS OF HARMING ANYONE ELSE?: NO

## 2023-10-10 SDOH — SOCIAL STABILITY: SOCIAL INSECURITY: ARE YOU OR HAVE YOU BEEN THREATENED OR ABUSED PHYSICALLY, EMOTIONALLY, OR SEXUALLY BY ANYONE?: NO

## 2023-10-10 SDOH — SOCIAL STABILITY: SOCIAL INSECURITY: HAS ANYONE EVER THREATENED TO HURT YOUR FAMILY OR YOUR PETS?: NO

## 2023-10-10 SDOH — SOCIAL STABILITY: SOCIAL INSECURITY: WERE YOU ABLE TO COMPLETE ALL THE BEHAVIORAL HEALTH SCREENINGS?: YES

## 2023-10-10 SDOH — SOCIAL STABILITY: SOCIAL INSECURITY: ABUSE: ADULT

## 2023-10-10 SDOH — SOCIAL STABILITY: SOCIAL INSECURITY: DO YOU FEEL UNSAFE GOING BACK TO THE PLACE WHERE YOU ARE LIVING?: NO

## 2023-10-10 SDOH — SOCIAL STABILITY: SOCIAL INSECURITY: ARE THERE ANY APPARENT SIGNS OF INJURIES/BEHAVIORS THAT COULD BE RELATED TO ABUSE/NEGLECT?: NO

## 2023-10-10 SDOH — SOCIAL STABILITY: SOCIAL INSECURITY: DOES ANYONE TRY TO KEEP YOU FROM HAVING/CONTACTING OTHER FRIENDS OR DOING THINGS OUTSIDE YOUR HOME?: NO

## 2023-10-10 SDOH — SOCIAL STABILITY: SOCIAL INSECURITY: DO YOU FEEL ANYONE HAS EXPLOITED OR TAKEN ADVANTAGE OF YOU FINANCIALLY OR OF YOUR PERSONAL PROPERTY?: NO

## 2023-10-10 ASSESSMENT — ENCOUNTER SYMPTOMS
LIGHT-HEADEDNESS: 0
CHILLS: 0
SHORTNESS OF BREATH: 0
FEVER: 0
ABDOMINAL PAIN: 0
CONFUSION: 0
WEAKNESS: 1
SPEECH DIFFICULTY: 0
COUGH: 0
PALPITATIONS: 0
JOINT SWELLING: 1
DIZZINESS: 0
NUMBNESS: 0
BACK PAIN: 1

## 2023-10-10 ASSESSMENT — COGNITIVE AND FUNCTIONAL STATUS - GENERAL
PATIENT BASELINE BEDBOUND: NO
MOBILITY SCORE: 12
HELP NEEDED FOR BATHING: A LOT
WALKING IN HOSPITAL ROOM: TOTAL
PERSONAL GROOMING: A LOT
TURNING FROM BACK TO SIDE WHILE IN FLAT BAD: A LITTLE
WALKING IN HOSPITAL ROOM: A LOT
TURNING FROM BACK TO SIDE WHILE IN FLAT BAD: A LOT
TOILETING: A LOT
CLIMB 3 TO 5 STEPS WITH RAILING: TOTAL
STANDING UP FROM CHAIR USING ARMS: TOTAL
STANDING UP FROM CHAIR USING ARMS: A LOT
CLIMB 3 TO 5 STEPS WITH RAILING: A LOT
MOVING TO AND FROM BED TO CHAIR: A LITTLE
MOVING FROM LYING ON BACK TO SITTING ON SIDE OF FLAT BED WITH BEDRAILS: A LITTLE
DAILY ACTIVITIY SCORE: 12
EATING MEALS: A LOT
DRESSING REGULAR LOWER BODY CLOTHING: A LOT
DRESSING REGULAR UPPER BODY CLOTHING: A LOT
MOBILITY SCORE: 12
MOVING FROM LYING ON BACK TO SITTING ON SIDE OF FLAT BED WITH BEDRAILS: A LOT
MOVING TO AND FROM BED TO CHAIR: A LOT

## 2023-10-10 ASSESSMENT — PAIN SCALES - GENERAL
PAINLEVEL_OUTOF10: 10 - WORST POSSIBLE PAIN
PAINLEVEL_OUTOF10: 10 - WORST POSSIBLE PAIN
PAINLEVEL_OUTOF10: 10
PAINLEVEL_OUTOF10: 7
PAINLEVEL_OUTOF10: 9
PAINLEVEL_OUTOF10: 10 - WORST POSSIBLE PAIN
PAINLEVEL_OUTOF10: 8
PAINLEVEL_OUTOF10: 9
PAINLEVEL_OUTOF10: 8
PAINLEVEL_OUTOF10: 10 - WORST POSSIBLE PAIN
PAINLEVEL_OUTOF10: 9
PAINLEVEL_OUTOF10: 8
PAINLEVEL_OUTOF10: 9
PAINLEVEL_OUTOF10: 8
PAINLEVEL_OUTOF10: 9

## 2023-10-10 ASSESSMENT — PAIN - FUNCTIONAL ASSESSMENT
PAIN_FUNCTIONAL_ASSESSMENT: 0-10

## 2023-10-10 ASSESSMENT — ACTIVITIES OF DAILY LIVING (ADL)
GROOMING: INDEPENDENT
BATHING: INDEPENDENT
JUDGMENT_ADEQUATE_SAFELY_COMPLETE_DAILY_ACTIVITIES: YES
WALKS IN HOME: INDEPENDENT
PATIENT'S MEMORY ADEQUATE TO SAFELY COMPLETE DAILY ACTIVITIES?: YES
ADEQUATE_TO_COMPLETE_ADL: YES
FEEDING YOURSELF: INDEPENDENT
TOILETING: INDEPENDENT
DRESSING YOURSELF: INDEPENDENT

## 2023-10-10 ASSESSMENT — LIFESTYLE VARIABLES
SKIP TO QUESTIONS 9-10: 1
HOW OFTEN DO YOU HAVE 6 OR MORE DRINKS ON ONE OCCASION: NEVER
HOW OFTEN DO YOU HAVE A DRINK CONTAINING ALCOHOL: NEVER
HOW MANY STANDARD DRINKS CONTAINING ALCOHOL DO YOU HAVE ON A TYPICAL DAY: PATIENT DOES NOT DRINK
AUDIT-C TOTAL SCORE: 0
AUDIT-C TOTAL SCORE: 0

## 2023-10-10 ASSESSMENT — PAIN DESCRIPTION - DESCRIPTORS
DESCRIPTORS: ACHING
DESCRIPTORS: ACHING

## 2023-10-10 ASSESSMENT — PATIENT HEALTH QUESTIONNAIRE - PHQ9
SUM OF ALL RESPONSES TO PHQ9 QUESTIONS 1 & 2: 0
1. LITTLE INTEREST OR PLEASURE IN DOING THINGS: NOT AT ALL
2. FEELING DOWN, DEPRESSED OR HOPELESS: NOT AT ALL

## 2023-10-10 NOTE — Clinical Note
Angioplasty of the right coronary artery lesion. Inflation 1: Pressure = 10 denise; Duration = 10 sec. Inflation 2: Pressure = 10 denise; Duration = 10 sec.

## 2023-10-10 NOTE — PROGRESS NOTES
Spiritual Care Visit    Clinical Encounter Type  Visited With: Patient  Routine Visit: Introduction  Continue Visiting: No                                            Taxonomy  Intended Effects: Build relationship of care and support, Convey a calming presence, Lessen anxiety  Methods: Assist with finding purpose, Encourage story-telling, Offer emotional support  Interventions: Acknowledge current situation, Active listening    Patient shared he believes in God.   listened to his story and was a supportive presence.

## 2023-10-10 NOTE — Clinical Note
Angioplasty of the right coronary artery lesion. Inflation 1: Pressure = 10 denise; Duration = 10 sec.

## 2023-10-10 NOTE — H&P
History Of Present Illness  Gm Thrasher is a 58 y.o. male presenting with 58-year-old male with past medical history significant for T2DM, recent lumbar decompression and fusion complicated by postoperative subarachnoid hemorrhage and subdural hematoma, completed by Dr. Amezcua 9/29/2023, BPH, neuropathy, hyperlipidemia who presents via transfer from [facility] after syncopal event and concern for potential retroperitoneal abscess.  Patient states that the last thing he remembers prior to losing consciousness is standing up and adjusting the attendant on his television.  He states that he had remembered collapsing and coming to.  Denies any postictal like changes occurring to his syncope.  Attributes syncopal event to potential pain.  Denies any current head pain or any trauma to his head that he is aware of.  States that when he had awoken from syncopized and he crawled over to the phone to contact his sister who lives in close proximity to him.  States that total duration of syncopal event was short-lived, less than 1 minute.  Patient stated that he felt to be in his regular state of health prior to.  Does endorse that he has had decreased p.o. intake over the last 24 hours however was unable to state as to why.  Patient was transferred to [facility].  While at this facility was found to have acute mildly displaced right tibial fracture with ipsilateral comminuted fibula fracture.  Given recent syncopal event and recent brain bleed underwent CT head without contrast which failed to demonstrate any new onset areas of bleeding or worsening sources of bleeding notable on CT.  Noncon head CT was additionally reviewed by on-call neurology team at [facility].  CT PE was negative for PE, showed patchy area of opacity suggestive of atelectasis in bilateral lower lobes.  Per chart review from this ED course patient was noted to have elevated leukocytosis to 21 with neutrophilic predominance, creatinine  kinase additionally was elevated to 400, troponin elevated x2 (158-->318) EKG nonischemic per report, urine tox positive for fentanyl/oxycodone consistent with patient's medications, blood tox panel negative, negative ethanol.  Of note, ordered MRI of spine which was concerning for retroperitoneal abscess extending into the L4/L5 disc space with apparent clumping of proximal cauda equina nerve root versus mass effect secondary to intrathecal fluid collection.  There was discussion between emergency department physicians and  neurosurgery at [facility], neurosurgery team at [facility] stated that they did not think this area of fluid collection to be abscess in nature and represented rather the place in which they did the surgery insertion from.  Per patient's request along with potential postoperative complication decision was made to transfer patient to  facility.  Upon arrival patient in no acute distress, vitally stable.  Requiring supplemental oxygen which per report given to nursing staff was placed after patient received morphine and subsequently desaturated.  Endorsing pain along fracture site however otherwise feels to be in his normal state of health.  Denies any recent sick symptoms, fever or head injury.  Denies any chest discomfort, no ongoing shortness of breath, no headache numbness or weakness.  Denies any saddle anesthesia, denies any bowel or bladder incontinence or new onset urinary retention. .     Past Medical History  Past Medical History:   Diagnosis Date    Borderline diabetes     H/O chest x-ray     1/20: Impression: Stable, enlarged cardiac mediastinal silhouette with mild central pulmonary vascular congestion. Nonspecific bibasilar airspace disease, worsened in overall appearance compared with the previous study, findings can be seen in  infiltrate/pneumonia and/or atelectasis.    H/O CT scan     CT Ankle: 1/20: A vertically oriented nondisplaced oblique fracture of the medial ankle mortise extends  into the distal tibial metadiaphysis.  Several small old avulsion fracture fragments are noted inferior to both malleoli; as well as soft tissue swelling about the ankle.  There is no other fracture, radiodense foreign bodies, pathologic calcifications, or worrisome bone destruction identified.    H/O CT scan of abdomen     2016: Diffuse urinary bladder wall thickening, Small atrophic left kidney with pyelocalyceal ectasia and severe cortical thinning, , Moderate pyelocalyceal ectasia of the right kidney with cortical thinning; unchanged    H/O CT scan of brain     11/22: normal 1/20: FINDINGS:  There is no intracranial hemorrhage, mass effect, midline shift, extra-axial collection, evidence of hydrocephalus, recent ischemic infarct, or skull fracture identified.    There is no significant atrophy or white matter changes, for age.  Moderate mucosal thickening within the paranasal sinuses again noted. The mastoid air cells are clear    H/O CT scan of chest     10/19: 1. Bibasilar infiltrate/pneumonia with patchy pneumonia scattered throughout the left lung and bilateral pleural effusions.  2. Vascular calcifications left anterior descending coronary artery with mild to moderate cardiomegaly.  3. Moderate to moderately severe diffuse fatty infiltration of liver. 10/21: Bilateral groundglass opacities as discussed. These findings are nonspecific    H/O diagnostic ultrasound     RUQ US 2013: Cholelithiasis and mild gallbladder distention, Increased liver echogenicity    H/O echocardiogram     1/20: Normal LV and RV.  No significant valve disease.  Normal estimated PA pressure.  Normal diastolic filling pattern.    H/O magnetic resonance imaging of cervical spine     2003: CENTRAL STENOSIS WORST C3-C4 1/21: Multilevel degenerative changes with disc height loss, disc osteophyte complexes, facet/uncovertebral degenerative changes. Moderate to severe canal and bilateral foraminal narrowing at C3-C4, C4-C5, C5-C6, C6-C7  grossly similar to  prior cervical MRI    H/O magnetic resonance imaging of lumbar spine     2012:  NEW RIGHT POSTERIOR EXTRUSION L4-5, WITH CONTACT OF THE EXITING RIGHT L5 AND DESCENDING RIGHT S1 NERVES. s/p laminectomy L5 2019 ([facility]): NARROWING OF MULTIPLE LUMBAR DISC LEVELS WITH FAIRLY ADVANCED DEGENERATIVE CHANGES. SEE INDIVIDUAL LEVELS ABOVE.  MILD LUMBAR CANAL STENOSIS AT THE L4-5 LEVEL.  NARROWING OF NEURAL FORAMINA,    High cholesterol        Surgical History  Past Surgical History:   Procedure Laterality Date    CARDIAC CATHETERIZATION      8/2015: The coronary anatomy is R dominant.  L main coronary artery was normal.  Left anterior descending artery presents luminal irregularities.  Circumflex coronary artery normal.  R coronary artery presents luminal irregularities.  Mid RCA lesion 40% stenosis.  LVEF 50%    CERVICAL LAMINECTOMY      C3-C4, s/p cervical laminectomy.    ESOPHAGOGASTRODUODENOSCOPY      12/15: Localized mild erythema was found at the gastroesophageal junction    KIDNEY SURGERY  09/12/2013    Kidney Surgery    LUMBAR FUSION  10/06/2023    1. L3-4, L4-5 extreme lateral interbody fusion 2. L3-5 laminectomy, L5-S1 revision laminectomy, L5-S1 transforaminal lumbar interbody fusion, L3-S1 instrumented posterolateral fusion, repair of dural tear    LUMBAR FUSION  09/30/2023    L3-4 & L4-5 XLIF(NUVASIVE), L5-S1 TLIF([employer]TRONIC), L3-S1 POSTEROLATERAL FUSION, L3-5    LUMBAR LAMINECTOMY      s/p L5 laminectomy x2;  residual left foot drop    OTHER SURGICAL HISTORY  01/28/2020    Appendectomy    OTHER SURGICAL HISTORY  01/28/2020    Cholecystectomy    OTHER SURGICAL HISTORY  01/28/2020    [place] tooth extraction    OTHER SURGICAL HISTORY  01/28/2020    Tonsillectomy        Social History  He reports that he has been smoking cigarettes. He has never used smokeless tobacco. He reports that he does not currently use alcohol. He reports that he does not use drugs.    Family History  Family History    Problem Relation Name Age of Onset    No Known Problems Mother          F: ETOHIsm, DM, COPD M: Lung CA, CAD B: CA metastatic lung () S; ETOH ism S:        Allergies  Metoprolol and Mirtazapine    Review of Systems   Constitutional:  Negative for chills and fever.   Eyes:  Negative for visual disturbance.   Respiratory:  Negative for cough and shortness of breath.    Cardiovascular:  Negative for chest pain and palpitations.   Gastrointestinal:  Negative for abdominal pain.   Endocrine: Negative for polyuria.   Musculoskeletal:  Positive for back pain and joint swelling.        Over area of recent surgical site   Neurological:  Positive for syncope and weakness. Negative for dizziness, speech difficulty, light-headedness and numbness.        Weakness of right leg secondary to pain   Psychiatric/Behavioral:  Negative for confusion.         Physical Exam  Constitutional:       Appearance: He is obese. He is not ill-appearing, toxic-appearing or diaphoretic.   HENT:      Head: Normocephalic and atraumatic.      Nose: No congestion.      Mouth/Throat:      Mouth: Mucous membranes are dry.   Eyes:      Comments: Extraocular motions intact, pupils are equally reactive, patient does have some pupillary diameter discrepancy left greater than right approximately 3 mm and 5 mm in size difference may be secondary to anatomical variant   Cardiovascular:      Rate and Rhythm: Normal rate and regular rhythm.      Heart sounds: No murmur heard.     No friction rub. No gallop.   Pulmonary:      Effort: Pulmonary effort is normal.      Breath sounds: Normal breath sounds. No wheezing, rhonchi or rales.      Comments: On supplemental O2 4 L  Abdominal:      General: Abdomen is flat.      Palpations: Abdomen is soft. There is no mass.      Tenderness: There is no abdominal tenderness. There is no guarding or rebound.   Musculoskeletal:         General: Swelling and signs of injury present.      Comments: Gross edema and pain  "with palpation over area identified for fracture in the right ankle.  No obvious malalignment or displacement notable, fracture is closed.  Good pulses at the right ankle.   Skin:     Findings: Bruising present.      Comments: Surgical incision is well approximated and not acutely tender with palpation, no discharge or purulence, no obvious signs of infection notable.  No donis-incisional fluctuance crepitus or induration.  Decreased skin turgor notable on anterior chest consistent with dehydration   Neurological:      General: No focal deficit present.      Mental Status: He is oriented to person, place, and time.      Cranial Nerves: No cranial nerve deficit.      Comments: Decreased bilateral DTRs however given unable to position patient to accurately test secondary to pain   Psychiatric:         Mood and Affect: Mood normal.         Behavior: Behavior normal.         Thought Content: Thought content normal.          Last Recorded Vitals  Blood pressure 141/84, pulse 72, temperature 35.9 °C (96.6 °F), temperature source Temporal, resp. rate 22, height 1.803 m (5' 11\"), weight 114 kg (251 lb 4.8 oz), SpO2 96 %.    Relevant Results    Current Facility-Administered Medications:     acetaminophen (Tylenol) tablet 650 mg, 650 mg, oral, 4x daily **OR** [DISCONTINUED] acetaminophen (Tylenol) oral liquid 650 mg, 650 mg, oral, q4h PRN **OR** [DISCONTINUED] acetaminophen (Tylenol) suppository 650 mg, 650 mg, rectal, q4h PRN, Malcolm Howard DO    dextrose 10 % in water (D10W) infusion, 0.3 g/kg/hr, intravenous, Once PRN, Malcolm Howard DO    dextrose 50 % injection 25 g, 25 g, intravenous, q15 min PRN, Malcolm Howard,     glucagon (Glucagen) injection 1 mg, 1 mg, intramuscular, q15 min PRN, Malcolm Howard DO    HYDROmorphone (Dilaudid) injection 0.4 mg, 0.4 mg, intravenous, q4h PRN, Addy Deshpande, DO    insulin lispro (HumaLOG) injection 0-5 Units, 0-5 Units, subcutaneous, q4h, Malcolm Howard DO    lactated Ringer's bolus " 500 mL, 500 mL, intravenous, Once, Malcolm Howard DO    oxyCODONE (Roxicodone) immediate release tablet 5 mg, 5 mg, oral, q6h PRN, Malcolm Howard DO    piperacillin-tazobactam-dextrose (Zosyn) IV 3.375 g, 3.375 g, intravenous, q8h, Malcolm Howard DO    polyethylene glycol (Glycolax, Miralax) packet 17 g, 17 g, oral, Daily, Malcolm Howard DO   MRI LUMBAR SPINE W WO CONTRAST    Result Date: 10/9/2023  EXAMINATION: MRI OF THE LUMBAR SPINE WITHOUT AND WITH CONTRAST  10/9/2023 5:55 pm TECHNIQUE: Multiplanar multisequence MRI of the lumbar spine was performed without and with the administration of intravenous contrast. COMPARISON: None. HISTORY: ORDERING SYSTEM PROVIDED HISTORY: spine surgery high WBC TECHNOLOGIST PROVIDED HISTORY: Reason for exam:->spine surgery high WBC Decision Support Exception - unselect if not a suspected or confirmed emergency medical condition->Emergency Medical Condition (MA) What reading provider will be dictating this exam?->CRC FINDINGS: The exam is degraded by motion as well as artifact created by orthopedic hardware.  The patient is status post lower lumbar laminectomies with pedicle screw and connecting niyah fixation from L3-S1 as well as multilevel intervertebral body fusion. There appears to be abnormal clumping of the cauda equina nerve roots just caudal to the conus medullaris, best appreciated on image number 1 of series 10, without associated enhancement postcontrast administration. A fluid collection in the laminectomy bed likely reflects a postop seroma. There is a rim enhancing fluid collection extending into the left psoas muscle measuring up to 2.5 x 9.3 cm in diameter, worrisome for abscess.  This may extend into the L4-5 disc space.  There is bilateral hydronephrosis.  The left kidney is severely atrophic.    MR findings worrisome for retroperitoneal abscess, possibly extending into the L4-5 disc space. Apparent clumping of the proximal cauda equina nerve roots versus mass  effect due to an intrathecal fluid collection.  Recommend MRI of the T-spine for further evaluation.    CT angio chest w and wo IV contrast    Result Date: 10/9/2023  EXAMINATION: CTA OF THE CHEST WITH AND WITHOUT CONTRAST 10/9/2023 3:12 pm TECHNIQUE: CTA of the chest was performed before and after the administration of intravenous contrast.  Multiplanar reformatted images are provided for review.  MIP images are provided for review. Automated exposure control, iterative reconstruction, and/or weight based adjustment of the mA/kV was utilized to reduce the radiation dose to as low as reasonably achievable. COMPARISON: None. HISTORY: ORDERING SYSTEM PROVIDED HISTORY: PE TECHNOLOGIST PROVIDED HISTORY: Reason for exam:->PE Decision Support Exception - unselect if not a suspected or confirmed emergency medical condition->Emergency Medical Condition (MA) What reading provider will be dictating this exam?->CRC FINDINGS: Pulmonary Arteries: Pulmonary arteries are adequately opacified for evaluation.  No evidence of intraluminal filling defect to suggest pulmonary embolism.  Main pulmonary artery is normal in caliber. Mediastinum: No evidence of mediastinal lymphadenopathy.  The heart and pericardium demonstrate no acute abnormality.  There is no acute abnormality of the thoracic aorta. Lungs/pleura: Somewhat patchy subpleural curvilinear markings are seen in the bilateral lower lobes, overall morphology favoring atelectasis or infectious infiltrate.  Scattered central small cysts are observed. Upper Abdomen: Limited images of the upper abdomen are unremarkable. Soft Tissues/Bones: No acute bone or soft tissue abnormality.    1. No evidence of pulmonary embolism. 2. Patchy curvilinear densities in the bilateral lower lobes, atelectasis is favored over other process    CT Head W/O Contrast    Result Date: 10/9/2023  EXAMINATION: CT OF THE HEAD WITHOUT CONTRAST  10/9/2023 3:12 pm TECHNIQUE: CT of the head was performed  "without the administration of intravenous contrast. Automated exposure control, iterative reconstruction, and/or weight based adjustment of the mA/kV was utilized to reduce the radiation dose to as low as reasonably achievable. COMPARISON: 11/21/2022 HISTORY: ORDERING SYSTEM PROVIDED HISTORY: syncope TECHNOLOGIST PROVIDED HISTORY: Reason for exam:->syncope Has a \"code stroke\" or \"stroke alert\" been called?->No Decision Support Exception - unselect if not a suspected or confirmed emergency medical condition->Emergency Medical Condition (MA) What reading provider will be dictating this exam?->CRC FINDINGS: BRAIN/VENTRICLES: There is some subtle increased attenuation along the right tentorium cerebelli (series 3, image 23).  This is suspicious for a acute subdural hematoma.  There is no mass effect or edema.  There are no acute infarcts. ORBITS: The visualized portion of the orbits demonstrate no acute abnormality. SINUSES: The visualized paranasal sinuses and mastoid air cells demonstrate no acute abnormality. SOFT TISSUES/SKULL:  No acute abnormality of the visualized skull or soft tissues.    Findings suspicious of a small acute right-sided subdural hematoma.  There is no mass effect present..    XR ankle right 3+ views    Result Date: 10/9/2023  EXAMINATION: THREE XRAY VIEWS OF THE RIGHT ANKLE 10/9/2023 1:02 pm COMPARISON: January 18, 2020 1334 hours. HISTORY: ORDERING SYSTEM PROVIDED HISTORY: fall TECHNOLOGIST PROVIDED HISTORY: Reason for exam:->fall What reading provider will be dictating this exam?->CRC FINDINGS: Two views of the right ankle are submitted. There is both a new acute spiral mildly displaced fracture of the distal right tibia superimposed upon an old vertical fracture with intra-articular extension at the time.  There is now a new associated comminuted fracture of the distal right fibula. Identified again is a well corticated bony density distal to the medial malleolus and to the lateral malleolus " likely representing old avulsion injuries.. The mortise is intact.  No dislocations.    THERE IS NOW A NEW ACUTE OBLIQUE FRACTURE OF THE DISTAL RIGHT TIBIA WITH A OLD VERTICAL FRACTURE OF THE TIBIA. THERE IS A NEW COMMINUTED FRACTURE OF THE DISTAL RIGHT FIBULA.        Assessment/Plan   Principal Problem:    Syncope, unspecified syncope type  58-year-old gentleman who is transferred admission from [facility] for recent syncopal event in the setting of recent subarachnoid hemorrhage/subdural hematoma, right ankle fracture and concern for retroperitoneal abscess over recent surgical site.  Patient vitally stable at this time is alert and oriented and does not appear to be having any worsening of cognition.  Syncopal trigger at this time unclear however suggested etiologies include acute dehydration (patient had syncopized upon standing and duration of episode was brief) in the setting of decreased p.o. intake and recent surgery (mildly anemic) versus cardiogenic syncope in the setting of elevated troponin versus other arrhythmia.  CT of the head reassuring that no repeat or worsening bleed is ongoing, CT PE completed and shows NO embolus or other evidence of pulmonary vascular obstruction.  Regarding patient's concerning finding of potential retroperitoneal abscess with concern for mass effect over cauda equina will cover patient at this time broadly with antibiotics and will order blood cultures.  Case has been reviewed with on-call neurosurgery team while patient was at [facility] who who feel area of concern to be post surgical fluid collection from area of surgical approach.  Physical exam reassuring, no obvious area of surgical site infection notable externally, patient denies any red flag symptoms associated with cauda equina.  Will monitor closely for any developing acute lower extremity neurological process.    #Syncope #hypovolemia versus vasovagal versus cardiogenic  -Per patient states syncopal event  occurred secondary to pain  -Ordered 500 cc bolus of lactated ringer, reevaluate fluid status following bolus  -Review of outside labs showed no obvious metabolic or toxicological etiology, mildly hypokalemic  -Ordered repeat labs at this admission  -Telemetry ordered  -ECHO pending    #Retroperitoneal abscess versus postsurgical fluid collection  #R/o cauda equina  -[place] with [place] [facility] via [facility], do not feel area of concern on MRI reflects abscess  -Blood cultures, lactate ordered  -Covering patient broadly with Vanco/Zosyn de-escalate as appropriate  -Neurosurgery consulted recommendations appreciated  -If concern for worsening cauda equina consider systemic steroids  -Patient denies any saddle anesthesia, bowel or bladder incontinence    #Mildly displaced distal L tibia fracture with comminuted distal L fibula fracture  -Repeat ankle x-ray given inability to review films from outside hospital  -Orthopedics consulted recommendations appreciated: Immobilization with healing versus intraoperative repair?  -Neurovascularly intact, good peripheral pulses  -Pain control with scheduled Tylenol, oxycodone and Dilaudid for severe pain    #Troponinemia  -Repeated EKG does not show significant ST segment changes, T wave inversions or pathological Q waves, otherwise normal sinus rhythm  - now and 81 daily  -Atorvastatin  -A1c/lipid panel for risk stratification  _Cards eval pending    #Non-IDDM2  -Insulin sliding scale    #BPH  -Continue home meds upon med rec completion       Full code  N.p.o. in the setting of potential surgery    To be seen and discussed with attending physician  Addy Deshpande DO  Family medicine PGY 3      Dispo: Pending PT/OT eval. LOS > 2 MN    Plan of care was discussed extensively with patient. Patient verbalized understanding through teach back method. All questions and concerns addressed upon examination.   Jimbo Allen DO  Complexity: High  Total visit time = > 75  minutes; more than 50% spent counseling/coordinating care  ###Of note, this documentation is completed using the Dragon Dictation system (voice recognition software). There may be spelling and/or grammatical errors that were not corrected prior to final submission.**     Jimbo Allen DO

## 2023-10-10 NOTE — PROGRESS NOTES
History Of Present Illness  Gm Thrasher is a 58 y.o. male presenting with syncope and new left psoas rim-enhancing fluid collection with potential extension into L4-L5.  ID was consulted for antibiotic recommendations.  Review of patient's history significant for hospitalization from 9/29-10/5/2023 due to elective lumbar decompression which was complicated by subarachnoid hemorrhage.  She was in the ICU at that time, and eventually recovered and was discharged home.  Unfortunately he suffered a syncopal episode at home and a fall, and was admitted to [facility].  At that time an MRI was performed demonstrating a rim-enhancing fluid collection at the left psoas measuring 2.5 x 9.3 cm with possible extension to L4-L5, and bilateral hydronephrosis.  Patient was transferred to [facility] for neurosurgical evaluation.  Upon examination this morning, patient mostly complains about left ankle pain, which prior x-ray demonstrated tib/fib fracture.  He denies any significant worsening of his low back pain, and denies any recent fevers.  His lumbar surgical site appears clean, with no erythema or drainage.    Hospital course thus far with leukocytosis 14.1, negative blood cultures.  UA negative for leukocyte esterase or white cells.  He was initiated on IV Zosyn/vancomycin due to concerns for abscess.  Neurosurgery was consulted, pending recommendations.     Past Medical History  He has a past medical history of Borderline diabetes, H/O chest x-ray, H/O CT scan, H/O CT scan of abdomen, H/O CT scan of brain, H/O CT scan of brain (10/10/2023), H/O CT scan of chest, H/O CT scan of chest (10/10/2023), H/O diagnostic ultrasound, H/O echocardiogram, H/O magnetic resonance imaging of cervical spine, H/O magnetic resonance imaging of lumbar spine, H/O magnetic resonance imaging of lumbar spine (10/10/2023), H/O x-ray of lower extremity (10/10/2023), and High cholesterol.    He has no past medical history of Arthritis, Asthma,  Cancer (CMS/[facility]), CHF (congestive heart failure) (CMS/[facility]), or COPD (chronic obstructive pulmonary disease) (CMS/[facility]).    Surgical History  He has a past surgical history that includes Kidney surgery (2013); Lumbar fusion (10/06/2023); Other surgical history (2020); Other surgical history (2020); Other surgical history (2020); Other surgical history (2020); Cardiac catheterization; Cervical laminectomy; Esophagogastroduodenoscopy; Lumbar laminectomy; and Lumbar fusion (2023).     Social History  He reports that he has been smoking cigarettes. He has never used smokeless tobacco. He reports that he does not currently use alcohol. He reports that he does not use drugs.    Family History  Family History   Problem Relation Name Age of Onset    No Known Problems Mother          F: ETOHIsm, DM, COPD M: Lung CA, CAD B: CA metastatic lung () S; ETOH ism S:        Allergies  Metoprolol and Mirtazapine    Review of Systems  Review of Systems (bold = positive):     Constitutional: Fever, Chills  Eyes: Blurry Vision, Vision Loss/ Change  ENMT: Nasal Discharge, Nasal Congestion  Respiratory: Dry Cough, Productive Cough, Wheezing, Shortness of Breath  Cardiac: Chest Pain, Syncope, Palpitations  Gastrointestinal: Nausea, Vomiting, Diarrhea, Constipation, Abdominal Pain  Genitourinary: Discharge, Dysuria, Flank Pain  Musculoskeletal: Pain, Swelling, Weakness  Neurological:  Dizziness, Confusion, Headache, Syncope  Skin: Pain, Rash, Ulcer  Endocrine: Sweat, Polyuria  Hematologic/Lymph:  Night Sweats, Petechiae  Allergic/Immunologic: Anaphylaxis    Physical Exam  Constitutional: Well developed, awake/alert/oriented x4, although poor historian  Skin: Lumbar surgical wound appears clean, no surrounding erythema, no drainage, no pain, no fluctuance  Eyes: Anicteric, clear sclera  ENMT: mucous membranes moist, no apparent injury, no lesions seen  Head/Neck: Atraumatic  Respiratory: Lungs clear to  auscultation bilaterally with no wheezes, rhonci or crackles  CV: Regular rate and rhythm, no murmurs or gallops auscultated  GI: Non-tender to deep palpation, no guarding  MSK: Left ankle pain  Psych: Appropriate mood and behavior    Last Recorded Vitals  /84 (BP Location: Right arm, Patient Position: Lying)   Pulse 75   Temp 35.9 °C (96.6 °F) (Temporal)   Resp 18   Wt 114 kg (251 lb 4.8 oz)   SpO2 93%     Results  Results for orders placed or performed during the hospital encounter of 10/10/23 (from the past 24 hour(s))   Urinalysis with Reflex Microscopic   Result Value Ref Range    Color, Urine Yellow Straw, Yellow    Appearance, Urine Clear Clear    Specific Gravity, Urine 1.038 (N) 1.005 - 1.035    pH, Urine 6.0 5.0, 5.5, 6.0, 6.5, 7.0, 7.5, 8.0    Protein, Urine NEGATIVE NEGATIVE mg/dL    Glucose, Urine NEGATIVE NEGATIVE mg/dL    Blood, Urine NEGATIVE NEGATIVE    Ketones, Urine NEGATIVE NEGATIVE mg/dL    Bilirubin, Urine NEGATIVE NEGATIVE    Urobilinogen, Urine 2.0 (N) <2.0 mg/dL    Nitrite, Urine NEGATIVE NEGATIVE    Leukocyte Esterase, Urine NEGATIVE NEGATIVE   POCT GLUCOSE   Result Value Ref Range    POCT Glucose 128 (H) 74 - 99 mg/dL   CBC and Auto Differential   Result Value Ref Range    WBC 14.1 (H) 4.4 - 11.3 x10*3/uL    nRBC 0.0 0.0 - 0.0 /100 WBCs    RBC 3.48 (L) 4.50 - 5.90 x10*6/uL    Hemoglobin 10.4 (L) 13.5 - 17.5 g/dL    Hematocrit 31.6 (L) 41.0 - 52.0 %    MCV 91 80 - 100 fL    MCH 29.9 26.0 - 34.0 pg    MCHC 32.9 32.0 - 36.0 g/dL    RDW 16.4 (H) 11.5 - 14.5 %    Platelets 279 150 - 450 x10*3/uL    MPV 9.5 7.5 - 11.5 fL    Neutrophils % 73.8 40.0 - 80.0 %    Immature Granulocytes %, Automated 0.4 0.0 - 0.9 %    Lymphocytes % 14.2 13.0 - 44.0 %    Monocytes % 7.2 2.0 - 10.0 %    Eosinophils % 4.0 0.0 - 6.0 %    Basophils % 0.4 0.0 - 2.0 %    Neutrophils Absolute 10.42 (H) 1.20 - 7.70 x10*3/uL    Immature Granulocytes Absolute, Automated 0.06 0.00 - 0.70 x10*3/uL    Lymphocytes  Absolute 2.01 1.20 - 4.80 x10*3/uL    Monocytes Absolute 1.02 (H) 0.10 - 1.00 x10*3/uL    Eosinophils Absolute 0.57 0.00 - 0.70 x10*3/uL    Basophils Absolute 0.06 0.00 - 0.10 x10*3/uL   Comprehensive metabolic panel   Result Value Ref Range    Glucose 112 (H) 74 - 99 mg/dL    Sodium 134 (L) 136 - 145 mmol/L    Potassium 3.8 3.5 - 5.3 mmol/L    Chloride 99 98 - 107 mmol/L    Bicarbonate 27 21 - 32 mmol/L    Anion Gap 12 10 - 20 mmol/L    Urea Nitrogen 11 6 - 23 mg/dL    Creatinine 0.80 0.50 - 1.30 mg/dL    eGFR >90 >60 mL/min/1.73m*2    Calcium 8.8 8.6 - 10.3 mg/dL    Albumin 3.1 (L) 3.4 - 5.0 g/dL    Alkaline Phosphatase 92 33 - 120 U/L    Total Protein 6.8 6.4 - 8.2 g/dL    AST 28 9 - 39 U/L    Bilirubin, Total 0.9 0.0 - 1.2 mg/dL    ALT 15 10 - 52 U/L   Creatine Kinase   Result Value Ref Range    Creatine Kinase 270 0 - 325 U/L   Troponin I, High Sensitivity, Initial   Result Value Ref Range    Troponin I, High Sensitivity 1,452 (HH) 0 - 20 ng/L   Magnesium   Result Value Ref Range    Magnesium 2.00 1.60 - 2.40 mg/dL   Lactate   Result Value Ref Range    Lactate 0.9 0.4 - 2.0 mmol/L       Imaging  XR ankle right 2 views  Narrative: Interpreted By:  Kameron Ross,   STUDY:  XR ANKLE RIGHT 2 VIEWS; ;  10/10/2023 5:27 am      INDICATION:  Signs/Symptoms:Rt ankle fracture.      COMPARISON:  Right ankle radiographs dated 05/15/2020.      ACCESSION NUMBER(S):  [id number]      ORDERING CLINICIAN:  DENIS MORE      FINDINGS:  There is an acute comminuted displaced fracture of the distal tibia  diaphysis with a proximally half shaft width lateral displacement of  the distal fracture fragment. There is also an acute comminuted  fracture of the distal fibula diaphysis with aproximately half shaft  width posterior displacement of the distal fracture fragment. There  are well corticated ossifications around the distal medial and  lateral malleolus reflecting osseous remodeling from the previous  healed fracture. The  ankle mortise appears intact.      Impression: Displaced and comminuted fracture of the distal tibia diaphysis and  distal fibula diaphysis, as described above. The ankle mortise  appears intact.          MACRO:  None      Signed by: Kameron Ross 10/10/2023 5:59 AM  Dictation workstation:   [id number]       Assessment/Plan     58-year-old male presenting for syncope, with new rim-enhancing lesion of left psoas with possible extension into L4-5, and bilateral hydronephrosis.  Significant history of prior lumbar decompression complicated by subarachnoid hemorrhage requiring ICU admission.    Assessment:  #New rim-enhancing 2.5 x 9.3 cm left psoas fluid collection, possible extension to L4-5  #Bilateral hydronephrosis  #Left tibia/fibula fracture  #Leukocytosis  #History of lumbar decompression 9/29    Plan:  -Continue IV cefepime and vancomycin  -Recommend interventional radiology drainage of fluid collection and fluid analysis  -Recommend bladder scan for evaluation of hydronephrosis. Possible hydronephrosis related fluid collection? Although UA here not suggestive of cystitis, unable to find UA from [facility]. Added on urine culture.  -Continue to monitor blood cultures  -MRSA PCR  ordered    To be staffed with attending,  Harlan De La Cruz, DO  Internal Medicine PGY-3

## 2023-10-10 NOTE — ED NOTES
Dr. Mariana Harvey called back at [phone]     [facility]  10/09/23 3179

## 2023-10-10 NOTE — CONSULTS
Inpatient consult to Cardiology  Consult performed by: Jonathan GASPAR MD  Consult ordered by: Juvenal Allen DO        History Of Present Illness:    Gm Thrasher is a 58 y.o. male presenting with Syncopal episode with a syncopal episode after getting up and standing and adjusting the antenna on his television set.  He has multiple other issues going on.  Recent back surgery.  Found in the emergency room to have elevated troponin although EKG has not changed. He had a lumbar decompression and fusion.  He had a postoperative subarachnoid hemorrhage and subdural hematoma. These surgical events occurred at the end of September 2023. No overt cardiac symptoms.No reported palpitations.  He does have a comminuted fracture of the fibula and a displaced right tibia.    He had a syncopal episode at home when he got up to change the antenna on his television set.  He does not recall any angina, shortness of breath or palpitations.  He did fracture his right ankle.  Otherwise he feels comfortable now.    However here in the hospital his troponin spiked to greater than 1500.    He had a cardiac catheterization several years ago that showed mild disease    Past medical history includes prediabetes, hyperlipidemia.  No history of hypertension.  Does have some sleep apnea but does not wear a mask.  No history of myocardial infarction, rheumatic fever.  Had a spontaneous intracranial hemorrhage a few weeks ago while in the hospital for his back surgery.      ROS  Gen.: There is no weight change, appetite is normal, there are no colds, flus, fevers or chills.  He is significant overweight.  Eyes: Vision is normal and there is no eye pain.  ENT: No sore throat, sinus congestion or nasal drainage  Cardiac: See history of present illness   Pulmonary: No shortness of breath, cough, hemoptysis, sleep apnea, wheeze, asthma or emphysema   Heme/lymph: No swollen glands, cancer  GI: No abdominal pain, change in bowel habits, melena,  "hematemesis, hematochezia, nausea, vomiting, diarrhea.   : No discharge, dysuria, frequency, urgency, hematuria, kidney failure or kidney stones.  He has benign prostatic hypertrophy.  Musculoskeletal: He has back and joint swelling.  He has chronic pain.  Skin: No rashes, bruising or bleeding  Psych: No depression, anxiety,. 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  Neurologic: Recent lumbar decompression surgery.  Review of systems is otherwise negative unless stated above or in history of present illness.       Last Recorded Vitals:  Vitals:    10/10/23 0353 10/10/23 0358 10/10/23 0830 10/10/23 1200   BP: 141/84 141/84 129/84 132/77   BP Location: Right arm  Right arm Right arm   Patient Position: Lying  Lying Lying   Pulse: 74 72 75 68   Resp: 12 22 18 16   Temp: 35.7 °C (96.3 °F) 35.9 °C (96.6 °F)     TempSrc: Temporal Temporal     SpO2: 97% 96% 93% 95%   Weight:  114 kg (251 lb 4.8 oz)  114 kg (251 lb 5.2 oz)   Height:  1.803 m (5' 11\")  1.8 m (5' 10.87\")       Last Labs:  CBC - 10/10/2023:  9:26 AM  14.1 10.4 279    31.6      CMP - 10/10/2023:  9:26 AM  8.8 6.8 28 --- 0.9   4.0 3.1 15 92      PTT - 9/20/2023:  9:27 AM  1.1   12.3 35     Troponin I, High Sensitivity   Date/Time Value Ref Range Status   10/10/2023 11:07 AM 1,573 (HH) 0 - 20 ng/L Final     Comment:     Previous result verified on 10/10/2023 1023 on specimen/case [id number] called with component [facility] for procedure Troponin I, High Sensitivity, Initial with value 1,452 ng/L.   10/10/2023 09:26 AM 1,452 (HH) 0 - 20 ng/L Final     Hemoglobin A1C   Date/Time Value Ref Range Status   06/02/2023 08:28 AM 6.5 (A) % Final     Comment:          Diagnosis of Diabetes-Adults   Non-Diabetic: < or = 5.6%   Increased risk for developing diabetes: 5.7-6.4%   Diagnostic of diabetes: > or = 6.5%  .       Monitoring of Diabetes                Age (y)     Therapeutic Goal (%)   Adults:          >18   "         <7.0   Pediatrics:    13-18           <7.5                   7-12           <8.0                   0- 6            7.5-8.5   American Diabetes Association. Diabetes Care 33(S1), Jan 2010.     05/25/2021 02:25 PM 5.6 % Final     Comment:          Diagnosis of Diabetes-Adults   Non-Diabetic: < or = 5.6%   Increased risk for developing diabetes: 5.7-6.4%   Diagnostic of diabetes: > or = 6.5%  .       Monitoring of Diabetes                Age (y)     Therapeutic Goal (%)   Adults:          >18           <7.0   Pediatrics:    13-18           <7.5                   7-12           <8.0                   0- 6            7.5-8.5   American Diabetes Association. Diabetes Care 33(S1), Jan 2010.       LDL Calculated   Date/Time Value Ref Range Status   10/10/2023 11:07 AM 63 (L) 140 - 190 mg/dL Final     Comment:                                 Near   Borderline      AGE      Desirable  Optimal    High     High     Very High     0-19 Y     0 - 109     ---    110-129   >/= 130     ----    20-24 Y     0 - 119     ---    120-159   >/= 160     ----      >24 Y     0 -  99   100-129  130-159   160-189     >/=190       VLDL   Date/Time Value Ref Range Status   10/10/2023 11:07 AM 32 0 - 40 mg/dL Final   06/02/2023 08:28 AM 61 (H) 0 - 40 mg/dL Final   12/27/2022 10:54 AM 50 (H) 0 - 40 mg/dL Final   01/28/2020 09:52 AM 69 (H) 0 - 40 mg/dL Final      Last I/O:  I/O last 3 completed shifts:  In: - (0 mL/kg)   Out: 425 (3.7 mL/kg) [Urine:425 (0.1 mL/kg/hr)]  Weight: 114 kg     Gen.: There is no weight change, appetite is normal, there are no colds, flus, fevers or chills.  He is overweight.  Eyes: Vision is normal and there is no eye pain.  ENT: No sore throat, sinus congestion or nasal drainage  Cardiac: See history of present illness   Pulmonary: No shortness of breath.  He is sedentary.  Does have sleep apnea does not wear a mask.  Heme/lymph: No swollen glands, cancer  GI: No abdominal pain, change in bowel habits, melena,  "hematemesis, hematochezia, nausea, vomiting, diarrhea.   : No discharge, dysuria, frequency, urgency, hematuria, kidney failure or kidney stones.  Musculoskeletal: Has had lumbar spondylolisthesis and had have surgery.  Skin: No rashes, bruising or bleeding  Psych: No depression, anxiety,. There is no memory loss  Endocrine; no thyroid disease but does have prediabetes.  Neurologic: No weakness, numbness, loss of balance, seizure disorder or history of stroke, he apparently had a spontaneous subarachnoid hemorrhage.  Review of systems is otherwise negative unless stated above or in history of present illness.      Past Cardiology Tests (Last 3 Years):  EKG: EKG is normal sinus rhythm with no acute changes and no evolution.  Telemetry is normal sinus rhythm with no arrhythmias.  No results found for this or any previous visit from the past 1095 days.    Echo:  No results found for this or any previous visit from the past 1095 days.    Ejection Fractions:  No results found for: \"EF\"  Cath:  No results found for this or any previous visit from the past 1095 days.    Stress Test:  No results found for this or any previous visit from the past 1095 days.    Cardiac Imaging:  No results found for this or any previous visit from the past 1095 days.      Past Medical History:  He has a past medical history of Borderline diabetes, H/O chest x-ray, H/O CT scan, H/O CT scan of abdomen, H/O CT scan of brain, H/O CT scan of brain (10/10/2023), H/O CT scan of chest, H/O CT scan of chest (10/10/2023), H/O diagnostic ultrasound, H/O echocardiogram, H/O magnetic resonance imaging of cervical spine, H/O magnetic resonance imaging of lumbar spine, H/O magnetic resonance imaging of lumbar spine (10/10/2023), H/O x-ray of lower extremity (10/10/2023), and High cholesterol.    He has no past medical history of Arthritis, Asthma, Cancer (CMS/HCC), CHF (congestive heart failure) (CMS/HCC), or COPD (chronic obstructive pulmonary disease) " (CMS/[facility]).    Past Surgical History:  He has a past surgical history that includes Kidney surgery (2013); Lumbar fusion (10/06/2023); Other surgical history (2020); Other surgical history (2020); Other surgical history (2020); Other surgical history (2020); Cardiac catheterization; Cervical laminectomy; Esophagogastroduodenoscopy; Lumbar laminectomy; and Lumbar fusion (2023).      Social History:  He reports that he has been smoking cigarettes. He has never used smokeless tobacco. He reports that he does not currently use alcohol. He reports that he does not use drugs.  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  Currently disabled.  Family History:  Family History   Problem Relation Name Age of Onset    No Known Problems Mother          F: ETOHIsm, DM, COPD M: Lung CA, CAD B: CA metastatic lung () S; ETOH ism S:   Mother is 74 and had heart disease.  Father is 81 has had a stroke.     Allergies:  Metoprolol and Mirtazapine    Inpatient Medications:  Scheduled medications   Medication Dose Route Frequency    acetaminophen  650 mg oral 4x daily    atorvastatin  80 mg oral Daily    cefepime  2 g intravenous q8h    cyclobenzaprine  10 mg oral TID    escitalopram  20 mg oral Daily    heparin  5,000 Units subcutaneous q8h    insulin lispro  0-5 Units subcutaneous q4h    levETIRAcetam  500 mg oral BID    [START ON 10/11/2023] pantoprazole  40 mg oral Daily before breakfast    perflutren protein A microsphere  0.5 mL intravenous Once in imaging    polyethylene glycol  17 g oral Daily    pregabalin  300 mg oral BID    sennosides  1 tablet oral BID    sulfur hexafluoride microsphr  2 mL intravenous Once in imaging    tamsulosin  0.4 mg oral Daily    vancomycin  1,500 mg intravenous q12h     PRN medications   Medication    dextrose 10 % in water (D10W)    dextrose    docusate sodium    glucagon    HYDROmorphone    HYDROmorphone    oxyCODONE     Continuous Medications    Medication Dose Last Rate     Outpatient Medications:  Current Outpatient Medications   Medication Instructions    allopurinol (ZYLOPRIM) 100 mg, oral, Daily PRN    atorvastatin (LIPITOR) 10 mg, oral, Daily    cyclobenzaprine (FLEXERIL) 10 mg, oral, 3 times daily    docusate sodium (COLACE) 100 mg, oral, 2 times daily PRN    escitalopram (Lexapro) 20 mg tablet Take 1 tablet (20 mg) by mouth once daily.    levETIRAcetam (KEPPRA) 500 mg, oral, 2 times daily    metFORMIN (GLUCOPHAGE) 1,000 mg, oral, 2 times daily with meals    naloxone (NARCAN) 0.2 mg, intravenous, As needed    oxyCODONE (ROXICODONE) 10 mg, oral, Every 6 hours PRN    pantoprazole (PROTONIX) 40 mg, oral, Daily before breakfast, Do not crush, chew, or split.    pregabalin (LYRICA) 300 mg, oral, 2 times daily    semaglutide (Ozempic) 0.25 mg or 0.5 mg (2 mg/3 mL) pen injector Inject 0.25 mg under the skin every 7 days for 28 days, THEN 0.5 mg every 7 days for 28 days.    sennosides (SENOKOT) 8.6 mg, oral, 2 times daily    tamsulosin (FLOMAX) 0.4 mg, oral, Daily    Vascepa 2 g, oral, 2 times daily with meals       Physical Exam:    Physical Exam: Overweight    Appearance: Well-developed, well-nourished in no apparent distress    Skin: Intact,  dry skin, no lesions, rash, petechiae or purpura. Skin is warm and dry with good color    Eyes: PERRLA, EOMs intact,  Conjunctiva pink with no redness or exudates, Eyelids without lesions. No scleral icterus.     ENT: Hearing grossly intact.  Nares patent, mucus membranes moist. Throat clear .    Neck: Supple, without meningismus. Thyroid not palpable. Trachea at midline. No lymphadenopathy. Normal carotid upstroke without bruit no jugular venous distention    Pulmonary: Clear bilaterally with good chest wall excursion. No rales, rhonchi or wheezing. No accessory muscle use or stridor. No deformity    Cardiac: Normal S1, S2 without murmur, rub, gallop or extrasystole.  Point of maximal impulse is not  displaced    Abdomen: Soft, nontender, active bowel sounds.  No palpable organomegaly.  No rebound or guarding.  No CVA tenderness.    Genitourinary: Exam deferred.    Musculoskeletal: Cast was right lower extremity.    Extremities: No cyanosis, clubbing, edema, cast on his right lower extremity.  Pulses are 2+ in the upper extremity equal bilaterally.    Neurological: Facial muscles symmetric, patient moves all 4 extremities equally.     Psychiatric: Appropriate mood and affect. Insight is good    Vascular: Pulses are 2+ in the upper and lower extremities and equal bilaterally with no radial femoral delay       Assessment/Plan   #1 substantially elevated acute troponin rise in a patient with history of mild cord disease in the past.  We will plan for definitive diagnosis of a cardiac catheterization.  The procedure, indications and risk were discussed with the patient appears understand agrees to proceed.  We will plan for tomorrow morning and if necessary engage in a interventional cardiologist for intervention if necessary.  In the meantime we will hold on antiplatelet therapy because of his recent intracranial bleed.  No overt heart failure or arrhythmia noted.    #2 preoperative cardiovascular assessment is that the patient has to have a cardiac catheterization prior because if he truly did have an acute coronary syndrome he would follow-up in a high risk category for complications from anesthesia and surgery.  Discussed with the patient.    #3 syncope likely orthostatic in nature and possibly exacerbated by pain medications.  Also back surgery can contribute to transient orthostatic syncope.  We will continue to monitor on telemetry.    Discussed with the patient  Peripheral IV 10/10/23 Left Antecubital (Active)   Site Assessment Dry;Clean 10/10/23 0830   Dressing Type Transparent 10/10/23 0830   Line Status Flushed;Saline locked 10/10/23 0830   Dressing Status Clean;Dry 10/10/23 0830   Number of days: 0        Code Status:  Full Code    I spent 60   minutes in the professional and overall care of this patient.        Jonathan Malone MD

## 2023-10-10 NOTE — HOSPITAL COURSE
58-year-old male presenting with complaint of isolated syncope found to have a new left psoas rim-enhancing fluid collection with potential extension into the L4-L5.  Recent medical history significant for hospitalization 9/29-10/5/2023 due to an elective lumbar decompression which was complicated by subarachnoid hemorrhage.  Patient was discharged home and then presented initially to [facility] for the syncopal episode suffering a fall from ground-level and having a fracture to his right ankle. Initial work-up which involved an MRI which demonstrated a rim-enhancing fluid collection at the left psoas measuring 2.5 x 9.3 cm with possible extension to the L4-L5 and bilateral hydronephrosis.  Patient was transferred to [facility] for neurosurgical evaluation as his spinal decompression was done here by Dr. Amezcua.  Initial labs were significant for leukocytosis of 14.1, blood cultures were drawn, UA negative.  Patient was initiated on IV Zosyn and vancomycin due to concerns for postsurgical abscess.  Mild troponinemia at outside facility ([facility]) with initial troponin of 358 with repeat of 158 which was complicated on arrival with repeat troponins here at [facility] revealing marked elevation in his troponin of 1400 of note patient remained chest pain-free with no anginal equivalent complaint, cardiology was consulted with plan for definitive diagnosis with left heart cath.  In the setting of patient's right ankle fracture orthopedic surgery was consulted.  In regards to the patient's MRI findings concerning for abscess neurosurgery was consulted noting they remark low concern for retroperitoneal abscess in the setting of incision remaining clean dry and intact.  After diagnostic cath revealing 90% stenosed RCA, orthopedic surgery, neurosurgery, cardiology correlated and planned to start patient on heparin, DAPT with repeat CT head to assess for possible acute bleed.  This was to assess if patient would be able to  withstand PCI with stent placement.  CT head the following morning revealed no acute bleed or intracranial changes from prior.  Thus, patient went for PCI and had stent placed to RCA on 10/13/2023. Patient tolerated the procedure well, has not had any acute bleeding while on DAPT or changes in mental status.  He underwent ORIF of right ankle on Tuesday, 10/17/2023. Discharged to home with home healthcare. New prescriptions for clopidogrel, baby aspirin, carvedilol.

## 2023-10-10 NOTE — ED NOTES
Pt right leg propped on pillow and elevated, pt continues to move around in bed and pull everything off and move leg.      Manda Albrecht  10/10/23 0003

## 2023-10-10 NOTE — Clinical Note
Patient Clipped and Prepped: right groin and right radial. Prepped with ChloraPrep, a minimum of 3 minute dry time, longer if needed, no pooling noted, patient draped in sterile fashion and Betadine and draped in sterile fashion.

## 2023-10-10 NOTE — CONSULTS
Reason For Consult  Concern for retroperitoneal abscess    History Of Present Illness  Gm Thrasher is a 58 y.o. male presenting with syncopal event who initially presented to [facility].  Patient with recent XLIF and posterior lumbar fusion by Dr. Vitaly Amezcua discharged prior week.  Patient states that he remembers collapsing coming to and denies any post ictal like episode MRI at outside hospital with concern for retroperitoneal abscess though after discussion with [facility] neurosurgery team this is likely from approach path and dural patch during surgery.  CT head also obtained and only right tentorial subdural hematoma collection of subacute nature identified-this is improved versus CT head obtained on 10/3/2023.  Neurosurgery placed on consult.  Of visit this morning patient is awake, alert, and cooperative.  He denies any worsening back pain or abdominal pain.  He denies any new numbness or tingling in his lower extremities.  Right lower extremity not tested for strength secondary to new fracture to left lower extremity at baseline.     Past Medical History  He has a past medical history of Borderline diabetes, H/O chest x-ray, H/O CT scan, H/O CT scan of abdomen, H/O CT scan of brain, H/O CT scan of brain (10/10/2023), H/O CT scan of chest, H/O CT scan of chest (10/10/2023), H/O diagnostic ultrasound, H/O echocardiogram, H/O magnetic resonance imaging of cervical spine, H/O magnetic resonance imaging of lumbar spine, H/O magnetic resonance imaging of lumbar spine (10/10/2023), H/O x-ray of lower extremity (10/10/2023), and High cholesterol.    He has no past medical history of Arthritis, Asthma, Cancer (CMS/[facility]), CHF (congestive heart failure) (CMS/[facility]), or COPD (chronic obstructive pulmonary disease) (CMS/[facility]).    Surgical History  He has a past surgical history that includes Kidney surgery (09/12/2013); Lumbar fusion (10/06/2023); Other surgical history (01/28/2020); Other surgical history  "(2020); Other surgical history (2020); Other surgical history (2020); Cardiac catheterization; Cervical laminectomy; Esophagogastroduodenoscopy; Lumbar laminectomy; and Lumbar fusion (2023).     Social History  He reports that he has been smoking cigarettes. He has never used smokeless tobacco. He reports that he does not currently use alcohol. He reports that he does not use drugs.    Family History  Family History   Problem Relation Name Age of Onset    No Known Problems Mother          F: ETOHIsm, DM, COPD M: Lung CA, CAD B: CA metastatic lung () S; ETOH ism S:        Allergies  Metoprolol and Mirtazapine    Review of Systems   systems reviewed and negative other than what is listed in the history of present illness      Physical Exam  General: Well developed, awake/alert/oriented x3, no distress, alert and cooperative  Skin: Warm and dry, no lesions, no rashes  ENMT: Mucous membranes moist, no apparent injury, no lesions seen  Head/Neck: Neck Supple, no apparent injury  Respiratory/Thorax: Normal breath sounds with good chest expansion, thorax symmetric  Cardiovascular: No pitting edema, no JVD    Motor Strength: Moves all extremities antigravity.  Right lower extremity not tested  Muscle Bulk: Normal and symmetric in all extremities  Incisions CDI    Paraspinal muscle spasm/tenderness absent.   Midline tenderness absent    Sensation: intact to light touch      Last Recorded Vitals  Blood pressure 132/77, pulse 68, temperature 35.9 °C (96.6 °F), temperature source Temporal, resp. rate 16, height 1.803 m (5' 11\"), weight 114 kg (251 lb 4.8 oz), SpO2 95 %.         Assessment/Plan     Low concern for retroperitoneal abscess after discussion with Dr. Amezcua  Incision clean, dry, intact  Subacute subdural hematoma identified 1 week prior.  Okay for ASA therapy following planned orthopedic surgery.  Recommend repeat head CT 24-hour after for surveillance.          Nicholas Paredes, " SERGIO

## 2023-10-10 NOTE — PROGRESS NOTES
Gm Thrasher is a 58 y.o. male on day 0 of admission presenting with Syncope, unspecified syncope type.      Subjective   -Patient seen with team, resting in bed in no apparent distress. Alert and cooperative.   -Patient endorses pain in ankle and chronic back pain but denies other positives to a 12 system ROS noting patient denies any chest pain, SOB, abdominal pain, nausea, vomiting, diarrhea, fevers of chills.   -Patient VSS WNL.        Objective     Last Recorded Vitals  /84 (BP Location: Right arm, Patient Position: Lying)   Pulse 75   Temp 35.9 °C (96.6 °F) (Temporal)   Resp 18   Wt 114 kg (251 lb 4.8 oz)   SpO2 93%   Intake/Output last 3 Shifts:    Intake/Output Summary (Last 24 hours) at 10/10/2023 0947  Last data filed at 10/10/2023 0723  Gross per 24 hour   Intake 500 ml   Output 425 ml   Net 75 ml       Admission Weight  Weight: 114 kg (251 lb 4.8 oz) (10/10/23 0358)    Daily Weight  10/10/23 : 114 kg (251 lb 4.8 oz)    Image Results  XR ankle right 2 views  Narrative: Interpreted By:  Kameron Ross,   STUDY:  XR ANKLE RIGHT 2 VIEWS; ;  10/10/2023 5:27 am      INDICATION:  Signs/Symptoms:Rt ankle fracture.      COMPARISON:  Right ankle radiographs dated 05/15/2020.      ACCESSION NUMBER(S):  [id number]      ORDERING CLINICIAN:  DENIS MORE      FINDINGS:  There is an acute comminuted displaced fracture of the distal tibia  diaphysis with a proximally half shaft width lateral displacement of  the distal fracture fragment. There is also an acute comminuted  fracture of the distal fibula diaphysis with aproximately half shaft  width posterior displacement of the distal fracture fragment. There  are well corticated ossifications around the distal medial and  lateral malleolus reflecting osseous remodeling from the previous  healed fracture. The ankle mortise appears intact.      Impression: Displaced and comminuted fracture of the distal tibia diaphysis and  distal fibula diaphysis, as  described above. The ankle mortise  appears intact.          MACRO:  None      Signed by: Kameron Ross 10/10/2023 5:59 AM  Dictation workstation:   [id number]    Scheduled medications  acetaminophen, 650 mg, oral, 4x daily  cefepime, 2 g, intravenous, q8h  heparin, 5,000 Units, subcutaneous, q8h  insulin lispro, 0-5 Units, subcutaneous, q4h  perflutren protein A microsphere, 0.5 mL, intravenous, Once in imaging  polyethylene glycol, 17 g, oral, Daily  sulfur hexafluoride microsphr, 2 mL, intravenous, Once in imaging  vancomycin, 1,500 mg, intravenous, q12h      Continuous medications     PRN medications  PRN medications: dextrose 10 % in water (D10W), dextrose, glucagon, HYDROmorphone, oxyCODONE    Results for orders placed or performed during the hospital encounter of 10/10/23 (from the past 24 hour(s))   Urinalysis with Reflex Microscopic   Result Value Ref Range    Color, Urine Yellow Straw, Yellow    Appearance, Urine Clear Clear    Specific Gravity, Urine 1.038 (N) 1.005 - 1.035    pH, Urine 6.0 5.0, 5.5, 6.0, 6.5, 7.0, 7.5, 8.0    Protein, Urine NEGATIVE NEGATIVE mg/dL    Glucose, Urine NEGATIVE NEGATIVE mg/dL    Blood, Urine NEGATIVE NEGATIVE    Ketones, Urine NEGATIVE NEGATIVE mg/dL    Bilirubin, Urine NEGATIVE NEGATIVE    Urobilinogen, Urine 2.0 (N) <2.0 mg/dL    Nitrite, Urine NEGATIVE NEGATIVE    Leukocyte Esterase, Urine NEGATIVE NEGATIVE   POCT GLUCOSE   Result Value Ref Range    POCT Glucose 128 (H) 74 - 99 mg/dL   CBC and Auto Differential   Result Value Ref Range    WBC 14.1 (H) 4.4 - 11.3 x10*3/uL    nRBC 0.0 0.0 - 0.0 /100 WBCs    RBC 3.48 (L) 4.50 - 5.90 x10*6/uL    Hemoglobin 10.4 (L) 13.5 - 17.5 g/dL    Hematocrit 31.6 (L) 41.0 - 52.0 %    MCV 91 80 - 100 fL    MCH 29.9 26.0 - 34.0 pg    MCHC 32.9 32.0 - 36.0 g/dL    RDW 16.4 (H) 11.5 - 14.5 %    Platelets 279 150 - 450 x10*3/uL    MPV 9.5 7.5 - 11.5 fL    Neutrophils % 73.8 40.0 - 80.0 %    Immature Granulocytes %, Automated 0.4 0.0 - 0.9 %     Lymphocytes % 14.2 13.0 - 44.0 %    Monocytes % 7.2 2.0 - 10.0 %    Eosinophils % 4.0 0.0 - 6.0 %    Basophils % 0.4 0.0 - 2.0 %    Neutrophils Absolute 10.42 (H) 1.20 - 7.70 x10*3/uL    Immature Granulocytes Absolute, Automated 0.06 0.00 - 0.70 x10*3/uL    Lymphocytes Absolute 2.01 1.20 - 4.80 x10*3/uL    Monocytes Absolute 1.02 (H) 0.10 - 1.00 x10*3/uL    Eosinophils Absolute 0.57 0.00 - 0.70 x10*3/uL    Basophils Absolute 0.06 0.00 - 0.10 x10*3/uL   Comprehensive metabolic panel   Result Value Ref Range    Glucose 112 (H) 74 - 99 mg/dL    Sodium 134 (L) 136 - 145 mmol/L    Potassium 3.8 3.5 - 5.3 mmol/L    Chloride 99 98 - 107 mmol/L    Bicarbonate 27 21 - 32 mmol/L    Anion Gap 12 10 - 20 mmol/L    Urea Nitrogen 11 6 - 23 mg/dL    Creatinine 0.80 0.50 - 1.30 mg/dL    eGFR >90 >60 mL/min/1.73m*2    Calcium 8.8 8.6 - 10.3 mg/dL    Albumin 3.1 (L) 3.4 - 5.0 g/dL    Alkaline Phosphatase 92 33 - 120 U/L    Total Protein 6.8 6.4 - 8.2 g/dL    AST 28 9 - 39 U/L    Bilirubin, Total 0.9 0.0 - 1.2 mg/dL    ALT 15 10 - 52 U/L   Creatine Kinase   Result Value Ref Range    Creatine Kinase 270 0 - 325 U/L   Troponin I, High Sensitivity, Initial   Result Value Ref Range    Troponin I, High Sensitivity 1,452 (HH) 0 - 20 ng/L   Magnesium   Result Value Ref Range    Magnesium 2.00 1.60 - 2.40 mg/dL   Lactate   Result Value Ref Range    Lactate 0.9 0.4 - 2.0 mmol/L        Physical Exam  Constitutional:       Appearance: Normal appearance.   Cardiovascular:      Rate and Rhythm: Normal rate and regular rhythm.      Pulses: Normal pulses.      Heart sounds: Normal heart sounds.   Pulmonary:      Effort: Pulmonary effort is normal. No respiratory distress.      Breath sounds: Normal breath sounds.   Abdominal:      General: Bowel sounds are normal. There is no distension.   Musculoskeletal:         General: Signs of injury (Ankle fracture, painful) present.      Right lower leg: No edema.      Left lower leg: No edema.   Skin:      Findings: Bruising (Bruising present on fractured ankle) present. No erythema (Surgical incision is clean, dry, and not erythemataous. Not tender to touch.).   Neurological:      Mental Status: He is alert.         Relevant Results    MRI LUMBAR SPINE W WO CONTRAST     Result Date: 10/9/2023  (THIS STUDY IS FROM OUTSIDE FACILITY)   EXAMINATION: MRI OF THE LUMBAR SPINE WITHOUT AND WITH CONTRAST  10/9/2023 5:55 pm TECHNIQUE: Multiplanar multisequence MRI of the lumbar spine was performed without and with the administration of intravenous contrast. COMPARISON: None. HISTORY: ORDERING SYSTEM PROVIDED HISTORY: spine surgery high WBC TECHNOLOGIST PROVIDED HISTORY: Reason for exam:->spine surgery high WBC Decision Support Exception - unselect if not a suspected or confirmed emergency medical condition->Emergency Medical Condition (MA) What reading provider will be dictating this exam?->CRC FINDINGS: The exam is degraded by motion as well as artifact created by orthopedic hardware.  The patient is status post lower lumbar laminectomies with pedicle screw and connecting niyah fixation from L3-S1 as well as multilevel intervertebral body fusion. There appears to be abnormal clumping of the cauda equina nerve roots just caudal to the conus medullaris, best appreciated on image number 1 of series 10, without associated enhancement postcontrast administration. A fluid collection in the laminectomy bed likely reflects a postop seroma. There is a rim enhancing fluid collection extending into the left psoas muscle measuring up to 2.5 x 9.3 cm in diameter, worrisome for abscess.  This may extend into the L4-5 disc space.  There is bilateral hydronephrosis.  The left kidney is severely atrophic.    MR findings worrisome for retroperitoneal abscess, possibly extending into the L4-5 disc space. Apparent clumping of the proximal cauda equina nerve roots versus mass effect due to an intrathecal fluid collection.  Recommend MRI of the  T-spine for further evaluation.    CT angio chest w and wo IV contrast    Result Date: 10/9/2023  EXAMINATION: CTA OF THE CHEST WITH AND WITHOUT CONTRAST 10/9/2023 3:12 pm TECHNIQUE: CTA of the chest was performed before and after the administration of intravenous contrast.  Multiplanar reformatted images are provided for review.  MIP images are provided for review. Automated exposure control, iterative reconstruction, and/or weight based adjustment of the mA/kV was utilized to reduce the radiation dose to as low as reasonably achievable. COMPARISON: None. HISTORY: ORDERING SYSTEM PROVIDED HISTORY: PE TECHNOLOGIST PROVIDED HISTORY: Reason for exam:->PE Decision Support Exception - unselect if not a suspected or confirmed emergency medical condition->Emergency Medical Condition (MA) What reading provider will be dictating this exam?->CRC FINDINGS: Pulmonary Arteries: Pulmonary arteries are adequately opacified for evaluation.  No evidence of intraluminal filling defect to suggest pulmonary embolism.  Main pulmonary artery is normal in caliber. Mediastinum: No evidence of mediastinal lymphadenopathy.  The heart and pericardium demonstrate no acute abnormality.  There is no acute abnormality of the thoracic aorta. Lungs/pleura: Somewhat patchy subpleural curvilinear markings are seen in the bilateral lower lobes, overall morphology favoring atelectasis or infectious infiltrate.  Scattered central small cysts are observed. Upper Abdomen: Limited images of the upper abdomen are unremarkable. Soft Tissues/Bones: No acute bone or soft tissue abnormality.    1. No evidence of pulmonary embolism. 2. Patchy curvilinear densities in the bilateral lower lobes, atelectasis is favored over other process    CT Head W/O Contrast    Result Date: 10/9/2023  EXAMINATION: CT OF THE HEAD WITHOUT CONTRAST  10/9/2023 3:12 pm TECHNIQUE: CT of the head was performed without the administration of intravenous contrast. Automated exposure  "control, iterative reconstruction, and/or weight based adjustment of the mA/kV was utilized to reduce the radiation dose to as low as reasonably achievable. COMPARISON: 11/21/2022 HISTORY: ORDERING SYSTEM PROVIDED HISTORY: syncope TECHNOLOGIST PROVIDED HISTORY: Reason for exam:->syncope Has a \"code stroke\" or \"stroke alert\" been called?->No Decision Support Exception - unselect if not a suspected or confirmed emergency medical condition->Emergency Medical Condition (MA) What reading provider will be dictating this exam?->CRC FINDINGS: BRAIN/VENTRICLES: There is some subtle increased attenuation along the right tentorium cerebelli (series 3, image 23).  This is suspicious for a acute subdural hematoma.  There is no mass effect or edema.  There are no acute infarcts. ORBITS: The visualized portion of the orbits demonstrate no acute abnormality. SINUSES: The visualized paranasal sinuses and mastoid air cells demonstrate no acute abnormality. SOFT TISSUES/SKULL:  No acute abnormality of the visualized skull or soft tissues.    Findings suspicious of a small acute right-sided subdural hematoma.  There is no mass effect present..    XR ankle right 3+ views    Result Date: 10/9/2023  EXAMINATION: THREE XRAY VIEWS OF THE RIGHT ANKLE 10/9/2023 1:02 pm COMPARISON: January 18, 2020 1334 hours. HISTORY: ORDERING SYSTEM PROVIDED HISTORY: fall TECHNOLOGIST PROVIDED HISTORY: Reason for exam:->fall What reading provider will be dictating this exam?->CRC FINDINGS: Two views of the right ankle are submitted. There is both a new acute spiral mildly displaced fracture of the distal right tibia superimposed upon an old vertical fracture with intra-articular extension at the time.  There is now a new associated comminuted fracture of the distal right fibula. Identified again is a well corticated bony density distal to the medial malleolus and to the lateral malleolus likely representing old avulsion injuries.. The mortise is intact.  No " dislocations.    THERE IS NOW A NEW ACUTE OBLIQUE FRACTURE OF THE DISTAL RIGHT TIBIA WITH A OLD VERTICAL FRACTURE OF THE TIBIA. THERE IS A NEW COMMINUTED FRACTURE OF THE DISTAL RIGHT FIBULA.        Assessment/Plan   Principal Problem:    Syncope, unspecified syncope type    Patient is a 58-year old male with a past medical history of MDD, T2 DM, BPH, hypertension, hyperlipidemia, JEMAL, chronic pain in neck and back, lumbar radiculopathy, and heroin use who is transferred from [facility] in the setting of recent lumbar surgery with subarachnoid hemorrhage/subdural hematoma for syncope and ankle pain secondary to the syncope. Upon evaluation, he was found to have a new fracture of the right distal tibia and fibula, potential retroperitoneal abscess on MRI, elevated troponin, elevated CK. Patient was admitted for management of his fracture, syncope etiology, and elevated troponin/CK.      #Syncope, Hypovolemia Vs. Cardiogenic Vs. Vasovagal  -Patient states syncopal event occurred secondary to pain but also endorsed poor P.O. intake.   -Received 500 ml bolus of LR.   -Trend CMP, CBC. Previous labs from outside facility yesterday and  today show no obvious metabolic or toxicological etiology.   -Telemetry continued.   -TTE to rule out cardiogenic causes.     #Retroperitoneal Abscess Vs. Postsurgical Fluid Collection  -Neurosurgery at [facility] did not feel MRI mass was an abscess, would appreciate further recs.   -Wound dry, intact, no pain with palpation.   -Patient empiric coverage switched from Vanco/Zosyn to Cefepime IV.   -Infectious Disease consulted, appreciate recs.   -TTE to rule out endocarditis 2/2 previous IVDU.     #Troponinemia  -Repeated EKG does not show significant ST segment changes, T wave inversions or pathological Q waves, otherwise normal sinus rhythm.   -Troponin at outside hospital was 318 -> 158  -Repeat troponin at  is 1452, noting patient denies any chest pain or anginal equivalents currently    -TTE ordered.     #Rhabdomyolysis, improving   -No findings concerning for recent trauma and no prolonged downtime, possible dehydration in the setting of poor oral intake.   -Mildly elevated CK at outside hospital but was normal on repeat at .  -received 500cc of IVF     #Displaced Distal right tibia and comminuted distal right fibula fracture.   -Repeated x-ray showing displaced and comminuted fracture of the distal tibia diaphysis and  distal fibula diaphysis.   -Consulted Ortho, appreciate recs.  -NPO for potential surgery.   -Pain control with scheduled Tylenol and oxycodone with PRN Dilaudid for severe pain.     #BPH  -Will continue Tamsulosin 0.4 mg daily     #Diabetes Type 2  -Insulin sliding scale  -hypoglycemic protocols in place  -Accu-checks       IVF: None.   Diet: NPO pending surgery.   DVT Prophylaxis: Heparin subcutaneous.   Consults: Cardiology, Neurosurgery, orthopedic surgery.   Dispo: Patient admitted for fracture, anticipated length of stay 1-2 midnights.     Code Status: Full Code    Lori Braxton MS-III    Plan discussed with intern, senior resident and attending.     Patient seen and examined independently of medical student. The above documentation was reviewed by me and reflects my direct input    Karthikeyan Salgado, DO PGY-2  Internal Medicine [facility]

## 2023-10-10 NOTE — Clinical Note
Angioplasty of the middle right coronary artery lesion. Inflation 1: Pressure = 24 denise; Duration = 10 sec.

## 2023-10-10 NOTE — CONSULTS
Reason For Consult  Right tibia fracture    History Of Present Illness  Gm Thrasher is a 58 y.o. male presenting with a right tibia fracture.  He had a syncopal episode and fell injuring the right leg.  He is also being worked up for a potential lumbar abscess after prior lumbar fusion surgery on 2023.  The orthopedic team to see the patient for this new right leg issue.     Past Medical History  He has a past medical history of Borderline diabetes, H/O chest x-ray, H/O CT scan, H/O CT scan of abdomen, H/O CT scan of brain, H/O CT scan of brain (10/10/2023), H/O CT scan of chest, H/O CT scan of chest (10/10/2023), H/O diagnostic ultrasound, H/O echocardiogram, H/O magnetic resonance imaging of cervical spine, H/O magnetic resonance imaging of lumbar spine, H/O magnetic resonance imaging of lumbar spine (10/10/2023), H/O x-ray of lower extremity (10/10/2023), and High cholesterol.    He has no past medical history of Arthritis, Asthma, Cancer (CMS/[facility]), CHF (congestive heart failure) (CMS/[facility]), or COPD (chronic obstructive pulmonary disease) (CMS/[facility]).    Surgical History  He has a past surgical history that includes Kidney surgery (2013); Lumbar fusion (10/06/2023); Other surgical history (2020); Other surgical history (2020); Other surgical history (2020); Other surgical history (2020); Cardiac catheterization; Cervical laminectomy; Esophagogastroduodenoscopy; Lumbar laminectomy; and Lumbar fusion (2023).     Social History  He reports that he has been smoking cigarettes. He has never used smokeless tobacco. He reports that he does not currently use alcohol. He reports that he does not use drugs.    Family History  Family History   Problem Relation Name Age of Onset    No Known Problems Mother          F: ETOHIsm, DM, COPD M: Lung CA, CAD B: CA metastatic lung () S; ETOH ism S:        Allergies  Metoprolol and Mirtazapine    Review of Systems  Please refer to the  "intake H&P     Physical Exam  HEENT: Within normal limits  Lungs: Clear  Abdomen: Soft, nontender  Skin: Clear  Extremity: He has a deformity to the right distal third of the tibia.  There is pain with any palpation.  He is able to wiggle the toes.  No numbness or tingling on today's exam.  Hip and knee is otherwise normal.  No evidence of other extremity trauma.  No redness or skin breakdown.     Last Recorded Vitals  Blood pressure 132/77, pulse 68, temperature 35.9 °C (96.6 °F), temperature source Temporal, resp. rate 16, height 1.803 m (5' 11\"), weight 114 kg (251 lb 4.8 oz), SpO2 95 %.    Relevant Results  X-rays show a displaced distal third tibial fracture with associated fibula fracture on the right.     Assessment/Plan     Displaced right distal third tibia/fibula fracture    Risk of benefits of surgical intervention were discussed at length with the patient.  These do include infection, stiffness, nerve damage, continued pain as well as need for subsequent operations.  The patient was noted to be in his bed without any splint or protection on the right leg.  As such at the bedside after appropriate pain medication and fusion he was placed into a well-padded short leg splint.  He will ice and elevate is much as possible and remain strictly nonweightbearing.  He is in the process of being medically optimized for surgery and we can proceed with intramedullary nailing at that time.  All questions were answered today with the patient at the bedside.      Addy Matamoros MD    "

## 2023-10-10 NOTE — CARE PLAN
S: Patient admitted 10/9/23 for retroperitoneal abscess  B: History of falls and back surgery.  A: patient A+Ox 4, reports pain controlled with PRN dilaudid, Dr Deshpande in to see patient, left pupil larger than right Dr green aware, right ankle very painful to touch, neurosurgery and ortho to see patient today, no chest pain, no SOB.  R: Patient from home alone anticipates discharging to home, but this nurse thinks patient may need therapy

## 2023-10-10 NOTE — PROGRESS NOTES
Occupational Therapy                 Therapy Communication Note    Patient Name: Gm Thrasehr  MRN: [id number]  Today's Date: 10/10/2023     Discipline: Occupational Therapy    Missed Visit Reason:  plans for surgery    Missed Time: Attempt    Comment: Pt was brought to the ED s/p fall. X-rays show a displaced distal third tibial fracture with associated fibula fracture on the right. Pt with plans for surgical repair. Will hold OT eval at this time, and complete as appropriate.

## 2023-10-10 NOTE — Clinical Note
Angioplasty of the right coronary artery lesion. Inflation 1: Pressure = 12 denise; Duration = 10 sec. Inflation 2: Pressure = 24 denise; Duration = 10 sec.

## 2023-10-10 NOTE — PROGRESS NOTES
"Vancomycin Dosing by Pharmacy- INITIAL    Gm Thrasher is a 58 y.o. year old male who Pharmacy has been consulted for vancomycin dosing for other surgicL INFECTION . Based on the patient's indication and renal status this patient will be dosed based on a goal AUC of 500-600.     Renal function is currently stable.    Visit Vitals  /84   Pulse 72   Temp 35.9 °C (96.6 °F) (Temporal)   Resp 22        Lab Results   Component Value Date    CREATININE 0.79 10/05/2023    CREATININE 0.81 10/04/2023    CREATININE CANCELED 10/04/2023    CREATININE 0.87 10/03/2023    CREATININE 0.76 10/01/2023        Patient weight is No results found for: \"PTWEIGHT\"    No results found for: \"CULTURE\"     No intake/output data recorded.  [unfilled]    No results found for: \"PATIENTTEMP\"       Assessment/Plan     Patient will not be given a loading dose.  Will initiate vancomycin maintenance,  1500 mg every 12 hours.    This dosing regimen is predicted by [employer] to result in the following pharmacokinetic parameters:  Loading dose: N/A  Regimen: 1500 mg IV every 12 hours.  Start time: 05:20 on 10/10/2023  Exposure target: AUC24 (range)400-600 mg/L.hr   AUC24,ss: 543 mg/L.hr  Probability of AUC24 > 400: 80 %  Ctrough,ss: 17.2 mg/L  Probability of Ctrough,ss > 20: 37 %  Probability of nephrotoxicity (Lodise JOHN 2009): 13 %    Follow-up level will be ordered on 10-12 at am3 unless clinically indicated sooner.  Will continue to monitor renal function daily while on vancomycin and order serum creatinine at least every 48 hours if not already ordered.  Follow for continued vancomycin needs, clinical response, and signs/symptoms of toxicity.       Maryjo L Lopresti, PharmD     22  "

## 2023-10-10 NOTE — ED NOTES
[address] [facility] [address] [id number] to initiate transfer to [facility]     PageCrimary anne RACHEL  10/09/23 2037

## 2023-10-10 NOTE — PROGRESS NOTES
Physical Therapy                 Therapy Communication Note    Patient Name: Gm Thrasher  MRN: [id number]  Today's Date: 10/10/2023     Discipline: Physical Therapy    Missed Time: Attempt    Comment:  PT orders received, chart reviewed.  Pt s/p syncopal event/fall.  Pt with acute displaced R distal tibia/fibula fracture.  Per ortho, plans to proceed with intramedullary nailing when pt is medically optimized.  Will hold PT eval at this time and re-attempt s/p surgery.

## 2023-10-10 NOTE — H&P (VIEW-ONLY)
Inpatient consult to Cardiology  Consult performed by: Jonathan GSAPAR MD  Consult ordered by: Juvenal Allen DO        History Of Present Illness:    Gm Thrasher is a 58 y.o. male presenting with Syncopal episode with a syncopal episode after getting up and standing and adjusting the antenna on his television set.  He has multiple other issues going on.  Recent back surgery.  Found in the emergency room to have elevated troponin although EKG has not changed. He had a lumbar decompression and fusion.  He had a postoperative subarachnoid hemorrhage and subdural hematoma. These surgical events occurred at the end of September 2023. No overt cardiac symptoms.No reported palpitations.  He does have a comminuted fracture of the fibula and a displaced right tibia.    He had a syncopal episode at home when he got up to change the antenna on his television set.  He does not recall any angina, shortness of breath or palpitations.  He did fracture his right ankle.  Otherwise he feels comfortable now.    However here in the hospital his troponin spiked to greater than 1500.    He had a cardiac catheterization several years ago that showed mild disease    Past medical history includes prediabetes, hyperlipidemia.  No history of hypertension.  Does have some sleep apnea but does not wear a mask.  No history of myocardial infarction, rheumatic fever.  Had a spontaneous intracranial hemorrhage a few weeks ago while in the hospital for his back surgery.      ROS  Gen.: There is no weight change, appetite is normal, there are no colds, flus, fevers or chills.  He is significant overweight.  Eyes: Vision is normal and there is no eye pain.  ENT: No sore throat, sinus congestion or nasal drainage  Cardiac: See history of present illness   Pulmonary: No shortness of breath, cough, hemoptysis, sleep apnea, wheeze, asthma or emphysema   Heme/lymph: No swollen glands, cancer  GI: No abdominal pain, change in bowel habits, melena,  "hematemesis, hematochezia, nausea, vomiting, diarrhea.   : No discharge, dysuria, frequency, urgency, hematuria, kidney failure or kidney stones.  He has benign prostatic hypertrophy.  Musculoskeletal: He has back and joint swelling.  He has chronic pain.  Skin: No rashes, bruising or bleeding  Psych: No depression, anxiety,. 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  Neurologic: Recent lumbar decompression surgery.  Review of systems is otherwise negative unless stated above or in history of present illness.       Last Recorded Vitals:  Vitals:    10/10/23 0353 10/10/23 0358 10/10/23 0830 10/10/23 1200   BP: 141/84 141/84 129/84 132/77   BP Location: Right arm  Right arm Right arm   Patient Position: Lying  Lying Lying   Pulse: 74 72 75 68   Resp: 12 22 18 16   Temp: 35.7 °C (96.3 °F) 35.9 °C (96.6 °F)     TempSrc: Temporal Temporal     SpO2: 97% 96% 93% 95%   Weight:  114 kg (251 lb 4.8 oz)  114 kg (251 lb 5.2 oz)   Height:  1.803 m (5' 11\")  1.8 m (5' 10.87\")       Last Labs:  CBC - 10/10/2023:  9:26 AM  14.1 10.4 279    31.6      CMP - 10/10/2023:  9:26 AM  8.8 6.8 28 --- 0.9   4.0 3.1 15 92      PTT - 9/20/2023:  9:27 AM  1.1   12.3 35     Troponin I, High Sensitivity   Date/Time Value Ref Range Status   10/10/2023 11:07 AM 1,573 (HH) 0 - 20 ng/L Final     Comment:     Previous result verified on 10/10/2023 1023 on specimen/case [id number] called with component [facility] for procedure Troponin I, High Sensitivity, Initial with value 1,452 ng/L.   10/10/2023 09:26 AM 1,452 (HH) 0 - 20 ng/L Final     Hemoglobin A1C   Date/Time Value Ref Range Status   06/02/2023 08:28 AM 6.5 (A) % Final     Comment:          Diagnosis of Diabetes-Adults   Non-Diabetic: < or = 5.6%   Increased risk for developing diabetes: 5.7-6.4%   Diagnostic of diabetes: > or = 6.5%  .       Monitoring of Diabetes                Age (y)     Therapeutic Goal (%)   Adults:          >18   "         <7.0   Pediatrics:    13-18           <7.5                   7-12           <8.0                   0- 6            7.5-8.5   American Diabetes Association. Diabetes Care 33(S1), Jan 2010.     05/25/2021 02:25 PM 5.6 % Final     Comment:          Diagnosis of Diabetes-Adults   Non-Diabetic: < or = 5.6%   Increased risk for developing diabetes: 5.7-6.4%   Diagnostic of diabetes: > or = 6.5%  .       Monitoring of Diabetes                Age (y)     Therapeutic Goal (%)   Adults:          >18           <7.0   Pediatrics:    13-18           <7.5                   7-12           <8.0                   0- 6            7.5-8.5   American Diabetes Association. Diabetes Care 33(S1), Jan 2010.       LDL Calculated   Date/Time Value Ref Range Status   10/10/2023 11:07 AM 63 (L) 140 - 190 mg/dL Final     Comment:                                 Near   Borderline      AGE      Desirable  Optimal    High     High     Very High     0-19 Y     0 - 109     ---    110-129   >/= 130     ----    20-24 Y     0 - 119     ---    120-159   >/= 160     ----      >24 Y     0 -  99   100-129  130-159   160-189     >/=190       VLDL   Date/Time Value Ref Range Status   10/10/2023 11:07 AM 32 0 - 40 mg/dL Final   06/02/2023 08:28 AM 61 (H) 0 - 40 mg/dL Final   12/27/2022 10:54 AM 50 (H) 0 - 40 mg/dL Final   01/28/2020 09:52 AM 69 (H) 0 - 40 mg/dL Final      Last I/O:  I/O last 3 completed shifts:  In: - (0 mL/kg)   Out: 425 (3.7 mL/kg) [Urine:425 (0.1 mL/kg/hr)]  Weight: 114 kg     Gen.: There is no weight change, appetite is normal, there are no colds, flus, fevers or chills.  He is overweight.  Eyes: Vision is normal and there is no eye pain.  ENT: No sore throat, sinus congestion or nasal drainage  Cardiac: See history of present illness   Pulmonary: No shortness of breath.  He is sedentary.  Does have sleep apnea does not wear a mask.  Heme/lymph: No swollen glands, cancer  GI: No abdominal pain, change in bowel habits, melena,  "hematemesis, hematochezia, nausea, vomiting, diarrhea.   : No discharge, dysuria, frequency, urgency, hematuria, kidney failure or kidney stones.  Musculoskeletal: Has had lumbar spondylolisthesis and had have surgery.  Skin: No rashes, bruising or bleeding  Psych: No depression, anxiety,. There is no memory loss  Endocrine; no thyroid disease but does have prediabetes.  Neurologic: No weakness, numbness, loss of balance, seizure disorder or history of stroke, he apparently had a spontaneous subarachnoid hemorrhage.  Review of systems is otherwise negative unless stated above or in history of present illness.      Past Cardiology Tests (Last 3 Years):  EKG: EKG is normal sinus rhythm with no acute changes and no evolution.  Telemetry is normal sinus rhythm with no arrhythmias.  No results found for this or any previous visit from the past 1095 days.    Echo:  No results found for this or any previous visit from the past 1095 days.    Ejection Fractions:  No results found for: \"EF\"  Cath:  No results found for this or any previous visit from the past 1095 days.    Stress Test:  No results found for this or any previous visit from the past 1095 days.    Cardiac Imaging:  No results found for this or any previous visit from the past 1095 days.      Past Medical History:  He has a past medical history of Borderline diabetes, H/O chest x-ray, H/O CT scan, H/O CT scan of abdomen, H/O CT scan of brain, H/O CT scan of brain (10/10/2023), H/O CT scan of chest, H/O CT scan of chest (10/10/2023), H/O diagnostic ultrasound, H/O echocardiogram, H/O magnetic resonance imaging of cervical spine, H/O magnetic resonance imaging of lumbar spine, H/O magnetic resonance imaging of lumbar spine (10/10/2023), H/O x-ray of lower extremity (10/10/2023), and High cholesterol.    He has no past medical history of Arthritis, Asthma, Cancer (CMS/HCC), CHF (congestive heart failure) (CMS/HCC), or COPD (chronic obstructive pulmonary disease) " (CMS/[facility]).    Past Surgical History:  He has a past surgical history that includes Kidney surgery (2013); Lumbar fusion (10/06/2023); Other surgical history (2020); Other surgical history (2020); Other surgical history (2020); Other surgical history (2020); Cardiac catheterization; Cervical laminectomy; Esophagogastroduodenoscopy; Lumbar laminectomy; and Lumbar fusion (2023).      Social History:  He reports that he has been smoking cigarettes. He has never used smokeless tobacco. He reports that he does not currently use alcohol. He reports that he does not use drugs.  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  Currently disabled.  Family History:  Family History   Problem Relation Name Age of Onset    No Known Problems Mother          F: ETOHIsm, DM, COPD M: Lung CA, CAD B: CA metastatic lung () S; ETOH ism S:   Mother is 74 and had heart disease.  Father is 81 has had a stroke.     Allergies:  Metoprolol and Mirtazapine    Inpatient Medications:  Scheduled medications   Medication Dose Route Frequency    acetaminophen  650 mg oral 4x daily    atorvastatin  80 mg oral Daily    cefepime  2 g intravenous q8h    cyclobenzaprine  10 mg oral TID    escitalopram  20 mg oral Daily    heparin  5,000 Units subcutaneous q8h    insulin lispro  0-5 Units subcutaneous q4h    levETIRAcetam  500 mg oral BID    [START ON 10/11/2023] pantoprazole  40 mg oral Daily before breakfast    perflutren protein A microsphere  0.5 mL intravenous Once in imaging    polyethylene glycol  17 g oral Daily    pregabalin  300 mg oral BID    sennosides  1 tablet oral BID    sulfur hexafluoride microsphr  2 mL intravenous Once in imaging    tamsulosin  0.4 mg oral Daily    vancomycin  1,500 mg intravenous q12h     PRN medications   Medication    dextrose 10 % in water (D10W)    dextrose    docusate sodium    glucagon    HYDROmorphone    HYDROmorphone    oxyCODONE     Continuous Medications    Medication Dose Last Rate     Outpatient Medications:  Current Outpatient Medications   Medication Instructions    allopurinol (ZYLOPRIM) 100 mg, oral, Daily PRN    atorvastatin (LIPITOR) 10 mg, oral, Daily    cyclobenzaprine (FLEXERIL) 10 mg, oral, 3 times daily    docusate sodium (COLACE) 100 mg, oral, 2 times daily PRN    escitalopram (Lexapro) 20 mg tablet Take 1 tablet (20 mg) by mouth once daily.    levETIRAcetam (KEPPRA) 500 mg, oral, 2 times daily    metFORMIN (GLUCOPHAGE) 1,000 mg, oral, 2 times daily with meals    naloxone (NARCAN) 0.2 mg, intravenous, As needed    oxyCODONE (ROXICODONE) 10 mg, oral, Every 6 hours PRN    pantoprazole (PROTONIX) 40 mg, oral, Daily before breakfast, Do not crush, chew, or split.    pregabalin (LYRICA) 300 mg, oral, 2 times daily    semaglutide (Ozempic) 0.25 mg or 0.5 mg (2 mg/3 mL) pen injector Inject 0.25 mg under the skin every 7 days for 28 days, THEN 0.5 mg every 7 days for 28 days.    sennosides (SENOKOT) 8.6 mg, oral, 2 times daily    tamsulosin (FLOMAX) 0.4 mg, oral, Daily    Vascepa 2 g, oral, 2 times daily with meals       Physical Exam:    Physical Exam: Overweight    Appearance: Well-developed, well-nourished in no apparent distress    Skin: Intact,  dry skin, no lesions, rash, petechiae or purpura. Skin is warm and dry with good color    Eyes: PERRLA, EOMs intact,  Conjunctiva pink with no redness or exudates, Eyelids without lesions. No scleral icterus.     ENT: Hearing grossly intact.  Nares patent, mucus membranes moist. Throat clear .    Neck: Supple, without meningismus. Thyroid not palpable. Trachea at midline. No lymphadenopathy. Normal carotid upstroke without bruit no jugular venous distention    Pulmonary: Clear bilaterally with good chest wall excursion. No rales, rhonchi or wheezing. No accessory muscle use or stridor. No deformity    Cardiac: Normal S1, S2 without murmur, rub, gallop or extrasystole.  Point of maximal impulse is not  displaced    Abdomen: Soft, nontender, active bowel sounds.  No palpable organomegaly.  No rebound or guarding.  No CVA tenderness.    Genitourinary: Exam deferred.    Musculoskeletal: Cast was right lower extremity.    Extremities: No cyanosis, clubbing, edema, cast on his right lower extremity.  Pulses are 2+ in the upper extremity equal bilaterally.    Neurological: Facial muscles symmetric, patient moves all 4 extremities equally.     Psychiatric: Appropriate mood and affect. Insight is good    Vascular: Pulses are 2+ in the upper and lower extremities and equal bilaterally with no radial femoral delay       Assessment/Plan   #1 substantially elevated acute troponin rise in a patient with history of mild cord disease in the past.  We will plan for definitive diagnosis of a cardiac catheterization.  The procedure, indications and risk were discussed with the patient appears understand agrees to proceed.  We will plan for tomorrow morning and if necessary engage in a interventional cardiologist for intervention if necessary.  In the meantime we will hold on antiplatelet therapy because of his recent intracranial bleed.  No overt heart failure or arrhythmia noted.    #2 preoperative cardiovascular assessment is that the patient has to have a cardiac catheterization prior because if he truly did have an acute coronary syndrome he would follow-up in a high risk category for complications from anesthesia and surgery.  Discussed with the patient.    #3 syncope likely orthostatic in nature and possibly exacerbated by pain medications.  Also back surgery can contribute to transient orthostatic syncope.  We will continue to monitor on telemetry.    Discussed with the patient  Peripheral IV 10/10/23 Left Antecubital (Active)   Site Assessment Dry;Clean 10/10/23 0830   Dressing Type Transparent 10/10/23 0830   Line Status Flushed;Saline locked 10/10/23 0830   Dressing Status Clean;Dry 10/10/23 0830   Number of days: 0        Code Status:  Full Code    I spent 60   minutes in the professional and overall care of this patient.        Jonathan Malone MD

## 2023-10-10 NOTE — H&P (VIEW-ONLY)
Reason For Consult  Right tibia fracture    History Of Present Illness  Gm Thrasher is a 58 y.o. male presenting with a right tibia fracture.  He had a syncopal episode and fell injuring the right leg.  He is also being worked up for a potential lumbar abscess after prior lumbar fusion surgery on 2023.  The orthopedic team to see the patient for this new right leg issue.     Past Medical History  He has a past medical history of Borderline diabetes, H/O chest x-ray, H/O CT scan, H/O CT scan of abdomen, H/O CT scan of brain, H/O CT scan of brain (10/10/2023), H/O CT scan of chest, H/O CT scan of chest (10/10/2023), H/O diagnostic ultrasound, H/O echocardiogram, H/O magnetic resonance imaging of cervical spine, H/O magnetic resonance imaging of lumbar spine, H/O magnetic resonance imaging of lumbar spine (10/10/2023), H/O x-ray of lower extremity (10/10/2023), and High cholesterol.    He has no past medical history of Arthritis, Asthma, Cancer (CMS/[facility]), CHF (congestive heart failure) (CMS/[facility]), or COPD (chronic obstructive pulmonary disease) (CMS/[facility]).    Surgical History  He has a past surgical history that includes Kidney surgery (2013); Lumbar fusion (10/06/2023); Other surgical history (2020); Other surgical history (2020); Other surgical history (2020); Other surgical history (2020); Cardiac catheterization; Cervical laminectomy; Esophagogastroduodenoscopy; Lumbar laminectomy; and Lumbar fusion (2023).     Social History  He reports that he has been smoking cigarettes. He has never used smokeless tobacco. He reports that he does not currently use alcohol. He reports that he does not use drugs.    Family History  Family History   Problem Relation Name Age of Onset    No Known Problems Mother          F: ETOHIsm, DM, COPD M: Lung CA, CAD B: CA metastatic lung () S; ETOH ism S:        Allergies  Metoprolol and Mirtazapine    Review of Systems  Please refer to the  "intake H&P     Physical Exam  HEENT: Within normal limits  Lungs: Clear  Abdomen: Soft, nontender  Skin: Clear  Extremity: He has a deformity to the right distal third of the tibia.  There is pain with any palpation.  He is able to wiggle the toes.  No numbness or tingling on today's exam.  Hip and knee is otherwise normal.  No evidence of other extremity trauma.  No redness or skin breakdown.     Last Recorded Vitals  Blood pressure 132/77, pulse 68, temperature 35.9 °C (96.6 °F), temperature source Temporal, resp. rate 16, height 1.803 m (5' 11\"), weight 114 kg (251 lb 4.8 oz), SpO2 95 %.    Relevant Results  X-rays show a displaced distal third tibial fracture with associated fibula fracture on the right.     Assessment/Plan     Displaced right distal third tibia/fibula fracture    Risk of benefits of surgical intervention were discussed at length with the patient.  These do include infection, stiffness, nerve damage, continued pain as well as need for subsequent operations.  The patient was noted to be in his bed without any splint or protection on the right leg.  As such at the bedside after appropriate pain medication and fusion he was placed into a well-padded short leg splint.  He will ice and elevate is much as possible and remain strictly nonweightbearing.  He is in the process of being medically optimized for surgery and we can proceed with intramedullary nailing at that time.  All questions were answered today with the patient at the bedside.      Addy Matamoros MD    "

## 2023-10-10 NOTE — NURSING NOTE
0438 patient absent of heart sounds, Son at bedside, Dr Lawson and Nahomi NP notified. Time of death 0446, Hospice called and notified patient was not yet active still in referral phase with hospice.

## 2023-10-11 ENCOUNTER — APPOINTMENT (OUTPATIENT)
Dept: RADIOLOGY | Facility: HOSPITAL | Age: 58
DRG: 321 | End: 2023-10-11
Payer: COMMERCIAL

## 2023-10-11 ENCOUNTER — APPOINTMENT (OUTPATIENT)
Dept: CARDIOLOGY | Facility: HOSPITAL | Age: 58
DRG: 321 | End: 2023-10-11
Payer: COMMERCIAL

## 2023-10-11 LAB
ABO GROUP (TYPE) IN BLOOD: NORMAL
ALBUMIN SERPL BCP-MCNC: 2.8 G/DL (ref 3.4–5)
ANION GAP SERPL CALC-SCNC: 13 MMOL/L (ref 10–20)
ANTIBODY SCREEN: NORMAL
ATRIAL RATE: 69 BPM
ATRIAL RATE: 81 BPM
BUN SERPL-MCNC: 16 MG/DL (ref 6–23)
CALCIUM SERPL-MCNC: 8.4 MG/DL (ref 8.6–10.3)
CHLORIDE SERPL-SCNC: 101 MMOL/L (ref 98–107)
CO2 SERPL-SCNC: 26 MMOL/L (ref 21–32)
CREAT SERPL-MCNC: 0.74 MG/DL (ref 0.5–1.3)
ERYTHROCYTE [DISTWIDTH] IN BLOOD BY AUTOMATED COUNT: 16.2 % (ref 11.5–14.5)
EST. AVERAGE GLUCOSE BLD GHB EST-MCNC: 117 MG/DL
GFR SERPL CREATININE-BSD FRML MDRD: >90 ML/MIN/1.73M*2
GLUCOSE BLD MANUAL STRIP-MCNC: 83 MG/DL (ref 74–99)
GLUCOSE BLD MANUAL STRIP-MCNC: 86 MG/DL (ref 74–99)
GLUCOSE BLD MANUAL STRIP-MCNC: 86 MG/DL (ref 74–99)
GLUCOSE BLD MANUAL STRIP-MCNC: 91 MG/DL (ref 74–99)
GLUCOSE BLD MANUAL STRIP-MCNC: 92 MG/DL (ref 74–99)
GLUCOSE BLD MANUAL STRIP-MCNC: 95 MG/DL (ref 74–99)
GLUCOSE SERPL-MCNC: 86 MG/DL (ref 74–99)
HBA1C MFR BLD: 5.7 %
HCT VFR BLD AUTO: 29.6 % (ref 41–52)
HGB BLD-MCNC: 9.5 G/DL (ref 13.5–17.5)
MAGNESIUM SERPL-MCNC: 1.96 MG/DL (ref 1.6–2.4)
MCH RBC QN AUTO: 30 PG (ref 26–34)
MCHC RBC AUTO-ENTMCNC: 32.1 G/DL (ref 32–36)
MCV RBC AUTO: 93 FL (ref 80–100)
NRBC BLD-RTO: 0 /100 WBCS (ref 0–0)
P AXIS: -1 DEGREES
P AXIS: 55 DEGREES
P OFFSET: 199 MS
P OFFSET: 199 MS
P ONSET: 145 MS
P ONSET: 146 MS
PHOSPHATE SERPL-MCNC: 4.3 MG/DL (ref 2.5–4.9)
PLATELET # BLD AUTO: 303 X10*3/UL (ref 150–450)
PMV BLD AUTO: 9.7 FL (ref 7.5–11.5)
POTASSIUM SERPL-SCNC: 3.6 MMOL/L (ref 3.5–5.3)
PR INTERVAL: 146 MS
PR INTERVAL: 150 MS
Q ONSET: 218 MS
Q ONSET: 221 MS
QRS COUNT: 11 BEATS
QRS COUNT: 13 BEATS
QRS DURATION: 90 MS
QRS DURATION: 94 MS
QT INTERVAL: 374 MS
QT INTERVAL: 380 MS
QTC CALCULATION(BAZETT): 407 MS
QTC CALCULATION(BAZETT): 434 MS
QTC FREDERICIA: 398 MS
QTC FREDERICIA: 413 MS
R AXIS: 22 DEGREES
R AXIS: 31 DEGREES
RBC # BLD AUTO: 3.17 X10*6/UL (ref 4.5–5.9)
RH FACTOR (ANTIGEN D): NORMAL
SODIUM SERPL-SCNC: 136 MMOL/L (ref 136–145)
T AXIS: 36 DEGREES
T AXIS: 39 DEGREES
T OFFSET: 408 MS
T OFFSET: 408 MS
VENTRICULAR RATE: 69 BPM
VENTRICULAR RATE: 81 BPM
WBC # BLD AUTO: 11.7 X10*3/UL (ref 4.4–11.3)

## 2023-10-11 PROCEDURE — 86901 BLOOD TYPING SEROLOGIC RH(D): CPT | Performed by: PHYSICIAN ASSISTANT

## 2023-10-11 PROCEDURE — 76937 US GUIDE VASCULAR ACCESS: CPT | Performed by: INTERNAL MEDICINE

## 2023-10-11 PROCEDURE — 93010 ELECTROCARDIOGRAM REPORT: CPT | Performed by: INTERNAL MEDICINE

## 2023-10-11 PROCEDURE — 99221 1ST HOSP IP/OBS SF/LOW 40: CPT | Performed by: INTERNAL MEDICINE

## 2023-10-11 PROCEDURE — C1894 INTRO/SHEATH, NON-LASER: HCPCS | Performed by: INTERNAL MEDICINE

## 2023-10-11 PROCEDURE — 80069 RENAL FUNCTION PANEL: CPT

## 2023-10-11 PROCEDURE — 93005 ELECTROCARDIOGRAM TRACING: CPT

## 2023-10-11 PROCEDURE — 2500000004 HC RX 250 GENERAL PHARMACY W/ HCPCS (ALT 636 FOR OP/ED)

## 2023-10-11 PROCEDURE — 2500000001 HC RX 250 WO HCPCS SELF ADMINISTERED DRUGS (ALT 637 FOR MEDICARE OP): Performed by: INTERNAL MEDICINE

## 2023-10-11 PROCEDURE — 36415 COLL VENOUS BLD VENIPUNCTURE: CPT

## 2023-10-11 PROCEDURE — 2500000004 HC RX 250 GENERAL PHARMACY W/ HCPCS (ALT 636 FOR OP/ED): Performed by: STUDENT IN AN ORGANIZED HEALTH CARE EDUCATION/TRAINING PROGRAM

## 2023-10-11 PROCEDURE — 2500000004 HC RX 250 GENERAL PHARMACY W/ HCPCS (ALT 636 FOR OP/ED): Performed by: INTERNAL MEDICINE

## 2023-10-11 PROCEDURE — 2720000007 HC OR 272 NO HCPCS: Performed by: INTERNAL MEDICINE

## 2023-10-11 PROCEDURE — 96372 THER/PROPH/DIAG INJ SC/IM: CPT

## 2023-10-11 PROCEDURE — G1004 CDSM NDSC: HCPCS

## 2023-10-11 PROCEDURE — 2500000005 HC RX 250 GENERAL PHARMACY W/O HCPCS: Performed by: INTERNAL MEDICINE

## 2023-10-11 PROCEDURE — 99231 SBSQ HOSP IP/OBS SF/LOW 25: CPT | Performed by: PHYSICIAN ASSISTANT

## 2023-10-11 PROCEDURE — 2550000001 HC RX 255 CONTRASTS: Performed by: INTERNAL MEDICINE

## 2023-10-11 PROCEDURE — 2060000001 HC INTERMEDIATE ICU ROOM DAILY

## 2023-10-11 PROCEDURE — 93458 L HRT ARTERY/VENTRICLE ANGIO: CPT | Performed by: INTERNAL MEDICINE

## 2023-10-11 PROCEDURE — 2580000001 HC RX 258 IV SOLUTIONS: Performed by: STUDENT IN AN ORGANIZED HEALTH CARE EDUCATION/TRAINING PROGRAM

## 2023-10-11 PROCEDURE — 82947 ASSAY GLUCOSE BLOOD QUANT: CPT

## 2023-10-11 PROCEDURE — 73700 CT LOWER EXTREMITY W/O DYE: CPT | Mod: RIGHT SIDE | Performed by: STUDENT IN AN ORGANIZED HEALTH CARE EDUCATION/TRAINING PROGRAM

## 2023-10-11 PROCEDURE — 36415 COLL VENOUS BLD VENIPUNCTURE: CPT | Performed by: PHYSICIAN ASSISTANT

## 2023-10-11 PROCEDURE — 99233 SBSQ HOSP IP/OBS HIGH 50: CPT | Performed by: STUDENT IN AN ORGANIZED HEALTH CARE EDUCATION/TRAINING PROGRAM

## 2023-10-11 PROCEDURE — 99152 MOD SED SAME PHYS/QHP 5/>YRS: CPT | Performed by: INTERNAL MEDICINE

## 2023-10-11 PROCEDURE — C1769 GUIDE WIRE: HCPCS | Performed by: INTERNAL MEDICINE

## 2023-10-11 PROCEDURE — 83735 ASSAY OF MAGNESIUM: CPT

## 2023-10-11 PROCEDURE — 99153 MOD SED SAME PHYS/QHP EA: CPT | Performed by: INTERNAL MEDICINE

## 2023-10-11 PROCEDURE — 85027 COMPLETE CBC AUTOMATED: CPT

## 2023-10-11 PROCEDURE — 2500000001 HC RX 250 WO HCPCS SELF ADMINISTERED DRUGS (ALT 637 FOR MEDICARE OP)

## 2023-10-11 RX ORDER — SODIUM CHLORIDE, SODIUM LACTATE, POTASSIUM CHLORIDE, CALCIUM CHLORIDE 600; 310; 30; 20 MG/100ML; MG/100ML; MG/100ML; MG/100ML
100 INJECTION, SOLUTION INTRAVENOUS CONTINUOUS
Status: DISCONTINUED | OUTPATIENT
Start: 2023-10-11 | End: 2023-10-20 | Stop reason: HOSPADM

## 2023-10-11 RX ORDER — ASPIRIN 81 MG/1
81 TABLET ORAL ONCE
Status: COMPLETED | OUTPATIENT
Start: 2023-10-11 | End: 2023-10-11

## 2023-10-11 RX ORDER — ALLOPURINOL 100 MG/1
100 TABLET ORAL DAILY
Status: DISCONTINUED | OUTPATIENT
Start: 2023-10-12 | End: 2023-10-20 | Stop reason: HOSPADM

## 2023-10-11 RX ORDER — NAPROXEN SODIUM 220 MG/1
81 TABLET, FILM COATED ORAL DAILY
Status: DISCONTINUED | OUTPATIENT
Start: 2023-10-12 | End: 2023-10-20 | Stop reason: HOSPADM

## 2023-10-11 RX ORDER — CARVEDILOL 12.5 MG/1
12.5 TABLET ORAL 2 TIMES DAILY
Status: DISCONTINUED | OUTPATIENT
Start: 2023-10-11 | End: 2023-10-20 | Stop reason: HOSPADM

## 2023-10-11 RX ORDER — NITROGLYCERIN 5 MG/ML
INJECTION, SOLUTION INTRAVENOUS AS NEEDED
Status: DISCONTINUED | OUTPATIENT
Start: 2023-10-11 | End: 2023-10-11 | Stop reason: HOSPADM

## 2023-10-11 RX ORDER — FENTANYL CITRATE 50 UG/ML
INJECTION, SOLUTION INTRAMUSCULAR; INTRAVENOUS AS NEEDED
Status: DISCONTINUED | OUTPATIENT
Start: 2023-10-11 | End: 2023-10-11 | Stop reason: HOSPADM

## 2023-10-11 RX ORDER — CEFAZOLIN SODIUM 2 G/100ML
2 INJECTION, SOLUTION INTRAVENOUS EVERY 8 HOURS
Status: DISCONTINUED | OUTPATIENT
Start: 2023-10-11 | End: 2023-10-12

## 2023-10-11 RX ORDER — LIDOCAINE HYDROCHLORIDE 20 MG/ML
INJECTION, SOLUTION INFILTRATION; PERINEURAL AS NEEDED
Status: DISCONTINUED | OUTPATIENT
Start: 2023-10-11 | End: 2023-10-11 | Stop reason: HOSPADM

## 2023-10-11 RX ORDER — MIDAZOLAM HYDROCHLORIDE 1 MG/ML
INJECTION INTRAMUSCULAR; INTRAVENOUS AS NEEDED
Status: DISCONTINUED | OUTPATIENT
Start: 2023-10-11 | End: 2023-10-11 | Stop reason: HOSPADM

## 2023-10-11 RX ADMIN — OXYCODONE HYDROCHLORIDE 10 MG: 10 TABLET ORAL at 20:49

## 2023-10-11 RX ADMIN — HEPARIN SODIUM 5000 UNITS: 5000 INJECTION INTRAVENOUS; SUBCUTANEOUS at 19:31

## 2023-10-11 RX ADMIN — Medication 1500 MG: at 08:00

## 2023-10-11 RX ADMIN — ACETAMINOPHEN 650 MG: 325 TABLET ORAL at 20:49

## 2023-10-11 RX ADMIN — SENNOSIDES 8.6 MG: 8.6 TABLET, FILM COATED ORAL at 09:54

## 2023-10-11 RX ADMIN — CYCLOBENZAPRINE 10 MG: 10 TABLET, FILM COATED ORAL at 20:49

## 2023-10-11 RX ADMIN — POLYETHYLENE GLYCOL 3350 17 G: 17 POWDER, FOR SOLUTION ORAL at 09:54

## 2023-10-11 RX ADMIN — HYDROMORPHONE HYDROCHLORIDE 0.6 MG: 1 INJECTION, SOLUTION INTRAMUSCULAR; INTRAVENOUS; SUBCUTANEOUS at 16:18

## 2023-10-11 RX ADMIN — HYDROMORPHONE HYDROCHLORIDE 0.6 MG: 1 INJECTION, SOLUTION INTRAMUSCULAR; INTRAVENOUS; SUBCUTANEOUS at 13:13

## 2023-10-11 RX ADMIN — CEFEPIME 2 G: 1 INJECTION, SOLUTION INTRAVENOUS at 04:33

## 2023-10-11 RX ADMIN — HYDROMORPHONE HYDROCHLORIDE 0.6 MG: 1 INJECTION, SOLUTION INTRAMUSCULAR; INTRAVENOUS; SUBCUTANEOUS at 06:28

## 2023-10-11 RX ADMIN — CYCLOBENZAPRINE 10 MG: 10 TABLET, FILM COATED ORAL at 09:54

## 2023-10-11 RX ADMIN — ACETAMINOPHEN 650 MG: 325 TABLET ORAL at 06:28

## 2023-10-11 RX ADMIN — OXYCODONE HYDROCHLORIDE 10 MG: 10 TABLET ORAL at 14:29

## 2023-10-11 RX ADMIN — LEVETIRACETAM 500 MG: 500 TABLET, FILM COATED ORAL at 09:54

## 2023-10-11 RX ADMIN — SODIUM CHLORIDE 75 ML/HR: 9 INJECTION, SOLUTION INTRAVENOUS at 06:29

## 2023-10-11 RX ADMIN — ESCITALOPRAM OXALATE 20 MG: 20 TABLET, FILM COATED ORAL at 09:54

## 2023-10-11 RX ADMIN — HYDROMORPHONE HYDROCHLORIDE 0.6 MG: 1 INJECTION, SOLUTION INTRAMUSCULAR; INTRAVENOUS; SUBCUTANEOUS at 19:15

## 2023-10-11 RX ADMIN — ACETAMINOPHEN 650 MG: 325 TABLET ORAL at 13:09

## 2023-10-11 RX ADMIN — SODIUM CHLORIDE, POTASSIUM CHLORIDE, SODIUM LACTATE AND CALCIUM CHLORIDE 100 ML/HR: 600; 310; 30; 20 INJECTION, SOLUTION INTRAVENOUS at 17:03

## 2023-10-11 RX ADMIN — SENNOSIDES 8.6 MG: 8.6 TABLET, FILM COATED ORAL at 20:49

## 2023-10-11 RX ADMIN — HYDROMORPHONE HYDROCHLORIDE 0.6 MG: 1 INJECTION, SOLUTION INTRAMUSCULAR; INTRAVENOUS; SUBCUTANEOUS at 03:13

## 2023-10-11 RX ADMIN — TAMSULOSIN HYDROCHLORIDE 0.4 MG: 0.4 CAPSULE ORAL at 09:54

## 2023-10-11 RX ADMIN — PANTOPRAZOLE SODIUM 40 MG: 40 TABLET, DELAYED RELEASE ORAL at 06:28

## 2023-10-11 RX ADMIN — PREGABALIN 300 MG: 150 CAPSULE ORAL at 20:49

## 2023-10-11 RX ADMIN — CARVEDILOL 12.5 MG: 12.5 TABLET, FILM COATED ORAL at 20:49

## 2023-10-11 RX ADMIN — CARVEDILOL 12.5 MG: 12.5 TABLET, FILM COATED ORAL at 10:09

## 2023-10-11 RX ADMIN — CYCLOBENZAPRINE 10 MG: 10 TABLET, FILM COATED ORAL at 14:29

## 2023-10-11 RX ADMIN — ACETAMINOPHEN 650 MG: 325 TABLET ORAL at 16:18

## 2023-10-11 RX ADMIN — HEPARIN SODIUM 5000 UNITS: 5000 INJECTION INTRAVENOUS; SUBCUTANEOUS at 13:09

## 2023-10-11 RX ADMIN — HYDROMORPHONE HYDROCHLORIDE 0.6 MG: 1 INJECTION, SOLUTION INTRAMUSCULAR; INTRAVENOUS; SUBCUTANEOUS at 09:54

## 2023-10-11 RX ADMIN — HYDROMORPHONE HYDROCHLORIDE 0.6 MG: 1 INJECTION, SOLUTION INTRAMUSCULAR; INTRAVENOUS; SUBCUTANEOUS at 23:02

## 2023-10-11 RX ADMIN — ATORVASTATIN CALCIUM 80 MG: 80 TABLET, FILM COATED ORAL at 20:49

## 2023-10-11 RX ADMIN — PREGABALIN 300 MG: 150 CAPSULE ORAL at 09:54

## 2023-10-11 RX ADMIN — ASPIRIN 81 MG: 81 TABLET, COATED ORAL at 09:54

## 2023-10-11 RX ADMIN — LEVETIRACETAM 500 MG: 500 TABLET, FILM COATED ORAL at 20:49

## 2023-10-11 ASSESSMENT — PAIN - FUNCTIONAL ASSESSMENT
PAIN_FUNCTIONAL_ASSESSMENT: 0-10

## 2023-10-11 ASSESSMENT — COGNITIVE AND FUNCTIONAL STATUS - GENERAL
DAILY ACTIVITIY SCORE: 20
DAILY ACTIVITIY SCORE: 22
DRESSING REGULAR UPPER BODY CLOTHING: A LITTLE
MOVING TO AND FROM BED TO CHAIR: A LITTLE
MOBILITY SCORE: 18
TOILETING: A LITTLE
PATIENT BASELINE BEDBOUND: YES
MOVING TO AND FROM BED TO CHAIR: A LITTLE
MOVING FROM LYING ON BACK TO SITTING ON SIDE OF FLAT BED WITH BEDRAILS: A LITTLE
HELP NEEDED FOR BATHING: A LITTLE
CLIMB 3 TO 5 STEPS WITH RAILING: TOTAL
STANDING UP FROM CHAIR USING ARMS: TOTAL
TOILETING: A LITTLE
STANDING UP FROM CHAIR USING ARMS: A LITTLE
DRESSING REGULAR LOWER BODY CLOTHING: A LITTLE
DRESSING REGULAR UPPER BODY CLOTHING: A LITTLE
WALKING IN HOSPITAL ROOM: A LOT
CLIMB 3 TO 5 STEPS WITH RAILING: TOTAL
WALKING IN HOSPITAL ROOM: TOTAL
CLIMB 3 TO 5 STEPS WITH RAILING: A LOT
DRESSING REGULAR LOWER BODY CLOTHING: A LITTLE
MOBILITY SCORE: 12
DRESSING REGULAR LOWER BODY CLOTHING: A LITTLE
TURNING FROM BACK TO SIDE WHILE IN FLAT BAD: A LITTLE
WALKING IN HOSPITAL ROOM: A LOT
MOBILITY SCORE: 16
DAILY ACTIVITIY SCORE: 20
STANDING UP FROM CHAIR USING ARMS: A LITTLE
HELP NEEDED FOR BATHING: A LITTLE
TOILETING: A LITTLE
MOVING TO AND FROM BED TO CHAIR: A LOT

## 2023-10-11 ASSESSMENT — ACTIVITIES OF DAILY LIVING (ADL)
DRESSING YOURSELF: NEEDS ASSISTANCE
BATHING: NEEDS ASSISTANCE
ADEQUATE_TO_COMPLETE_ADL: YES
FEEDING YOURSELF: INDEPENDENT
WALKS IN HOME: INDEPENDENT
GROOMING: INDEPENDENT
ASSISTIVE_DEVICE: SPLINT RLE
TOILETING: NEEDS ASSISTANCE
PATIENT'S MEMORY ADEQUATE TO SAFELY COMPLETE DAILY ACTIVITIES?: YES
HEARING - RIGHT EAR: FUNCTIONAL
HEARING - LEFT EAR: FUNCTIONAL
JUDGMENT_ADEQUATE_SAFELY_COMPLETE_DAILY_ACTIVITIES: YES

## 2023-10-11 ASSESSMENT — PAIN SCALES - GENERAL
PAINLEVEL_OUTOF10: 6
PAINLEVEL_OUTOF10: 8
PAINLEVEL_OUTOF10: 4
PAINLEVEL_OUTOF10: 9
PAINLEVEL_OUTOF10: 7
PAINLEVEL_OUTOF10: 9
PAINLEVEL_OUTOF10: 5 - MODERATE PAIN
PAINLEVEL_OUTOF10: 9
PAINLEVEL_OUTOF10: 9
PAINLEVEL_OUTOF10: 8
PAINLEVEL_OUTOF10: 9

## 2023-10-11 NOTE — NURSING NOTE
Patient returned from cath lab 0930 with R radial TR band with 15ml of air. No hematoma, no bruising, no bleeding noted to R radial. 2+ pulse R radial.

## 2023-10-11 NOTE — CARE PLAN
Pt alert and oriented x4. Reports severe pain to right ankle, improved with PRN dilaudid. Pt pulse ox dropping to 88-89% when sleeping, increased to 4L NC and dr ulloa notified. Received vanco and cefepime per orders. Heparin held this AM. NPO since midnight. Pt to receive cath this morning

## 2023-10-11 NOTE — PROGRESS NOTES
Occupational Therapy                 Therapy Communication Note    Patient Name: Gm Thrasher  MRN: [id number]  Today's Date: 10/11/2023     Discipline: Occupational Therapy    Missed Visit Reason:  OT order received and chart reviewed. Pt. Awaiting surgery for fracture and having cardiac cath this date. Will hold and attempt as medically appropriate    Missed Time: Attempt    Comment:

## 2023-10-11 NOTE — BRIEF OP NOTE
Physician Transition of Care Summary  Invasive Cardiovascular Lab    Procedure Date: 10/11/2023  Attending:    * Jonathan Kelley Primary  Resident/Fellow/Other Assistant: No surgical staff documented.    Pre Procedure Indications:   ACS greater than 24 hours NSTEMI     Post Procedure Diagnosis:   Non-STEMI coronary disease    Procedure(s):   Left Heart Catheterization    Procedure Findings:   90% lesion in a large dominant right coronary artery.  Mild disease left anterior sitting artery.  Circumflex artery appeared normal.  Normal ventricular function.  Normal left-sided hemodynamics.    Description of the Procedure:   See catheterization report.  Right radial approach with Samson catheter.    Complications:   None    Stents/Implants:   ?    Anticoagulation/Antiplatelet Plan:   Plan for aspirin 81 Mg daily if okay with neurosurgery.    Estimated Blood Loss:   3mL    Anesthesia: Moderate Sedation Anesthesia Staff: No anesthesia staff entered.    Any Specimen(s) Removed:   No specimens collected during this procedure.    Disposition:   Patient is in an awkward position because of her recent subarachnoid hemorrhage with subdural hematoma triggered by parent severe vomiting after discussion with the neurosurgery physicians assistant.  Discussed with interventional cardiologist.  Right coronary needs to be revascularized.  However dual antiplatelet the therapy will be necessary.    Recommend for neurosurgery reassessment and timing when dual antiplatelet therapy can be performed.  In the meantime we will treat medically with statin therapy, aspirin oh point okay with neurosurgery and beta-blocker therapy and no strenuous activity.  Also, would postpone ankle surgery if possible pending revascularization of the right coronary artery.    Ventricular function is preserved.    Discussed with the patient      Electronically signed by: Jonathan Malone MD, 10/11/2023 9:36 AM

## 2023-10-11 NOTE — PROGRESS NOTES
"Gm Thrasher is a 58 y.o. male on day 1 of admission presenting with Syncope, unspecified syncope type.      Last Recorded Vitals  Blood pressure 99/54, pulse 66, temperature 36.7 °C (98.1 °F), temperature source Temporal, resp. rate 18, height 1.8 m (5' 10.87\"), weight 115 kg (253 lb 1.6 oz), SpO2 92 %.  Intake/Output last 3 Shifts:  I/O last 3 completed shifts:  In: 1300 (11.3 mL/kg) [IV Piggyback:1300]  Out: 1225 (10.7 mL/kg) [Urine:1225 (0.3 mL/kg/hr)]  Weight: 114.8 kg     Relevant Results         This patient currently has cardiac telemetry ordered; if you would like to modify or discontinue the telemetry order, click here to go to the orders activity to modify/discontinue the order.                 Assessment/Plan   Principal Problem:    Syncope, unspecified syncope type  Active Problems:    H/O fracture of ankle    NSTEMI (non-ST elevated myocardial infarction) (CMS/HCC)    Low concern for retroperitoneal abscess after discussion with Dr. Amezcua  Incision clean, dry, intact  Continue medical management including ID and following blood cultures  Subacute subdural hematoma and intracranial hemorrhage identified 8 days prior. This likely from significant emesis d/t headache and back pain.   Repeat head CT outside hospital with resolution of intracranial hemorrhage and only tentorial collection identified.   NSTEMI.   After discussion with Dr. Seven monteiro for dual antiplatelet/ASA therapy following stent placement or planned orthopedic surgery. Recommend repeat head CT 24-hour after initiation for surveillance.          Nicholas Paredes PA-C      "

## 2023-10-11 NOTE — CONSULTS
Came to see the patient for evaluation of PCI to the RCA.    Reviewed notes.    Briefly this is a 58-year-old male with a history of diabetes, dyslipidemia, neuropathy, lumbar decompressive surgery with fusion complicated by postoperative subarachnoid hemorrhage and subdural hematoma who presented for syncope.  Work-up included troponins which were elevated and he was brought to the catheterization lab where he was found to have severe disease of his RCA.    His syncope did result in trauma to the lower leg with a displaced fracture of the left tibia and fibula.    Patient currently is having no chest pain.    Past medical and surgical history reviewed.  Social history and family history reviewed.  Vital signs reviewed.  No apparent distress.  Alert and oriented x3.  No agitation    Labs and medications reviewed.    Reviewed catheterization films and echocardiogram report.  Reviewed telemetry monitoring.    1) NSTEMI: Culprit RCA lesion amenable to PCI.  Explained to the patient that this is technically feasible however there are other concerns to consider including the use of dual antiplatelet therapy.  2 obvious concerns would be for his intracranial hemorrhage and secondly for surgery on his lower extremity.  Neurosurgery was consulted and has commented that he would be okay for dual antiplatelet therapy if stenting were required.    If PCI were to be performed his dual antiplatelet therapy could not be interrupted for his leg surgery.  This would clearly increase the risk of procedural bleeding.  PCI cannot be performed if dual antiplatelet therapy is to be interrupted.    We would need confirmation that dual antiplatelet therapy would not be interrupted for surgery.  Because the stent would be placed in the setting of ACS the current recommendation is for 12 months of dual antiplatelet therapy.    I have discussed these thoughts with Dr. Malone as well as the interventional cardiologist Dr. Huertas.  Either   Aleksandar or myself would be available tomorrow for PCI if necessary.

## 2023-10-11 NOTE — PROGRESS NOTES
Vancomycin Dosing by Pharmacy- Cessation of Therapy    Consult to pharmacy for vancomycin dosing has been discontinued by the prescriber, pharmacy will sign off at this time.    Please call pharmacy if there are further questions or re-enter a consult if vancomycin is resumed.     Denis Tate, PharmD

## 2023-10-11 NOTE — NURSING NOTE
Patient is alert and oriented x4. R radial pressure drsg WDL. Will become NPO at midnight. Medicated with prn dilaudid and prn oxycodone for severe pain R ankle thoughout shift. R ankle remains in splint, pedal pulse present. Continues with plan of care.

## 2023-10-11 NOTE — PROGRESS NOTES
Gm Thrasher is a 58 y.o. male on day 1 of admission presenting with Syncope, unspecified syncope type.      Subjective   -Patient seen with team, resting in bed in no apparent distress. Alert and cooperative.   -Patient endorses pain in ankle and chronic back pain but denies other positives to a 12 system ROS noting patient denies any chest pain, SOB, abdominal pain, nausea, vomiting, diarrhea, fevers of chills.   -Patient VSS WNL.  -Patient in no acute events overnight  -Patient's labs significant for WBC 11.7, hemoglobin 9.5       Objective     Last Recorded Vitals  /53 (BP Location: Left arm, Patient Position: Lying)   Pulse 65   Temp 36.3 °C (97.3 °F) (Temporal)   Resp 18   Wt 115 kg (253 lb 1.6 oz)   SpO2 92%   Intake/Output last 3 Shifts:    Intake/Output Summary (Last 24 hours) at 10/11/2023 1045  Last data filed at 10/11/2023 1035  Gross per 24 hour   Intake 1561.25 ml   Output 560 ml   Net 1001.25 ml       Admission Weight  Weight: 114 kg (251 lb 4.8 oz) (10/10/23 0358)    Daily Weight  10/11/23 : 115 kg (253 lb 1.6 oz)    Image Results  ECG 12 lead  Normal sinus rhythm  Nonspecific ST and T wave abnormality  Abnormal ECG  When compared with ECG of 20-SEP-2023 09:06,  Nonspecific T wave abnormality now evident in Inferior leads  Confirmed by Juan Carlos Lynne ([id number]) on 10/11/2023 8:26:21 AM  US renal complete  Narrative: Interpreted By:  Erick Banks,   STUDY:  US RENAL COMPLETE;  10/10/2023 9:46 pm      INDICATION:  Signs/Symptoms:Bilateral hydro.      COMPARISON:  CT lumbar spine dated 10/03/2023.      ACCESSION NUMBER(S):  [id number]      ORDERING CLINICIAN:  VERN MONTEMAYOR      TECHNIQUE:  Grayscale and color Doppler sonographic images of the kidneys were  obtained  .      FINDINGS:  RIGHT KIDNEY:  The right kidney measures 14.8 cm in length. Renal cortical  echogenicity and thickness are within normal limits. Severe  hydronephrosis. No sonographic evident calculus.      LEFT  KIDNEY:  The left kidney measures 11.2 cm in length. There are elements of  cortical renal thinning that may reflect scarring or prior infarct  within the upper and lower poles. Renal cortical echogenicity is  otherwise within normal limits. 6 mm diameter parenchymal  calcification versus calculus. No hydronephrosis.      BLADDER:  Bladder wall appears thickened measuring 6 mm. Bilateral ureteral  jets visualized.      Impression: Severe right hydronephrosis, similar in appearance to CT lumbar spine  examination. Bilateral ureteral jets visualized.      Mild asymmetric enlargement of the right kidney with cortical renal  scarring involving the left kidney.      Signed by: Erick Banks 10/11/2023 8:01 AM  Dictation workstation:   [id number]  Transthoracic Echo (TTE) Complete              [facility]  [address] [address], [facility] [place] [address]     Tel [phone] Fax [phone]    TRANSTHORACIC ECHOCARDIOGRAM REPORT       Patient Name:      JESUS METCALF       Reading Physician:    [id number] Jonathan Malone MD  Study Date:        10/10/2023           Ordering Provider:    [id number] VERN MONTEMAYOR  MRN/PID:           [id number]             Fellow:  Accession#:        [id number]         Nurse:  Date of Birth/Age: 1965 / 58 years Sonographer:          Aleyda Hansen RDCS  Gender:            M                    Additional Staff:  Height:            180.00 cm            Admit Date:           10/10/2023  Weight:            51.71 kg             Admission Status:     Inpatient -                                                                Routine  BSA:               1.66 m2              Department Location:  [facility]  Blood Pressure: 141 /84 mmHg    Study Type:    TRANSTHORACIC ECHO (TTE) COMPLETE  Diagnosis/ICD: Syncope-R55  Indication:    Syncope  CPT Codes:     Echo Complete w Full  [id number]    Patient History:  Smoker:            Current.  Diabetes:          Yes  BMI:               Obese >30  Pertinent History: Hyperlipidemia and HTN. Limited imaging windows available. No                     previous echocardiogram.    Study Detail: The following Echo studies were performed: 2D, M-Mode, Doppler and                color flow. Image quality for this study is poor. Technically                challenging study due to body habitus and patient lying in supine                position. Definity used as a contrast agent for endocardial border                definition. Total contrast used for this procedure was 2 mL via IV                push.       PHYSICIAN INTERPRETATION:  Left Ventricle: Left ventricular systolic function is low normal, with an estimated ejection fraction of 50-55%. There are no regional wall motion abnormalities. The left ventricular cavity size is normal. Left ventricular diastolic filling was indeterminate.  Left Atrium: The left atrium is normal in size.  Right Ventricle: The right ventricle is mildly enlarged. There is low normal right ventricular systolic function.  Right Atrium: The right atrium is normal in size.  Aortic Valve: The aortic valve is trileaflet. There is mild aortic valve cusp calcification. There is no evidence of aortic valve regurgitation. The peak instantaneous gradient of the aortic valve is 3.7 mmHg.  Mitral Valve: The mitral valve is normal in structure. There is no evidence of mitral valve regurgitation.  Tricuspid Valve: The tricuspid valve is structurally normal. No evidence of tricuspid regurgitation. The Doppler estimated RVSP is within normal limits.  Pulmonic Valve: The pulmonic valve is structurally normal. There is no indication of pulmonic valve regurgitation.  Pericardium: There is no pericardial effusion noted.  Aorta: The aortic root is normal. There is no dilatation of the ascending aorta. There is no dilatation of the aortic  root.  Systemic Veins: The inferior vena cava was not well visualized.       CONCLUSIONS:   1. Left ventricular systolic function is low normal with a 50-55% estimated ejection fraction.   2. There is low normal right ventricular systolic function.   3. RVSP within normal limits.    QUANTITATIVE DATA SUMMARY:  2D MEASUREMENTS:                            Normal Ranges:  LAs:           4.70 cm    (2.7-4.0cm)  IVSd:          1.10 cm    (0.6-1.1cm)  LVPWd:         1.25 cm    (0.6-1.1cm)  LVIDd:         5.20 cm    (3.9-5.9cm)  LVIDs:         4.55 cm  LV Mass Index: 168.7 g/m2  LV % FS        12.5 %    M-MODE MEASUREMENTS:                   Normal Ranges:  Ao Root: 3.90 cm (2.0-3.7cm)    LV DIASTOLIC FUNCTION:                      Normal Ranges:  MV Peak E: 0.37 m/s (0.7-1.2 m/s)  MV Peak A: 0.57 m/s (0.42-0.7 m/s)  E/A Ratio: 0.65     (1.0-2.2)    MITRAL VALVE:                       Normal Ranges:  MV Vmax:    0.65 m/s (<=1.3m/s)  MV peak P.7 mmHg (<5mmHg)  MV mean P.0 mmHg (<48mmHg)  MV DT:      222 msec (150-240msec)    AORTIC VALVE:                          Normal Ranges:  AoV Vmax:      0.97 m/s (<=1.7m/s)  AoV Peak PG:   3.7 mmHg (<20mmHg)  LVOT Max Graeme:  0.78 m/s (<=1.1m/s)  LVOT VTI:      14.90 cm  LVOT Diameter: 2.10 cm  (1.8-2.4cm)  AoV Area,Vmax: 2.78 cm2 (2.5-4.5cm2)       RIGHT VENTRICLE:  TAPSE: 22.5 mm  RV s'  0.12 m/s    PULMONIC VALVE:                          Normal Ranges:  PV Accel Time: 119 msec (>120ms)  PV Max Graeme:    0.7 m/s  (0.6-0.9m/s)  PV Max P.7 mmHg       [id number] Jonathan Malone MD  Electronically signed on 10/11/2023 at 7:56:19 AM       ** Final **    Scheduled medications  acetaminophen, 650 mg, oral, 4x daily  atorvastatin, 80 mg, oral, Daily  carvedilol, 12.5 mg, oral, BID  cefepime, 2 g, intravenous, q8h  cyclobenzaprine, 10 mg, oral, TID  escitalopram, 20 mg, oral, Daily  heparin, 5,000 Units, subcutaneous, q8h  insulin lispro, 0-5 Units, subcutaneous, q4h  [Held by provider]  insulin lispro, 0-5 Units, subcutaneous, TID with meals  levETIRAcetam, 500 mg, oral, BID  pantoprazole, 40 mg, oral, Daily before breakfast  perflutren lipid microspheres, 3 mL of dilution, intravenous, Once in imaging  polyethylene glycol, 17 g, oral, Daily  pregabalin, 300 mg, oral, BID  sennosides, 1 tablet, oral, BID  sulfur hexafluoride microsphr, 2 mL, intravenous, Once in imaging  tamsulosin, 0.4 mg, oral, Daily  vancomycin, 1,500 mg, intravenous, q12h      Continuous medications     PRN medications  PRN medications: dextrose 10 % in water (D10W), dextrose, docusate sodium, glucagon, HYDROmorphone, HYDROmorphone, oxyCODONE    Results for orders placed or performed during the hospital encounter of 10/10/23 (from the past 24 hour(s))   Troponin, High Sensitivity, 1 Hour   Result Value Ref Range    Troponin I, High Sensitivity 1,573 (HH) 0 - 20 ng/L   Lipid panel   Result Value Ref Range    Cholesterol 113 0 - 199 mg/dL    HDL-Cholesterol 18.4 mg/dL    Cholesterol/HDL Ratio 6.1     LDL Calculated 63 (L) 140 - 190 mg/dL    VLDL 32 0 - 40 mg/dL    Triglycerides 160 (H) 0 - 149 mg/dL    Non HDL Cholesterol 95 0 - 149 mg/dL   POCT GLUCOSE   Result Value Ref Range    POCT Glucose 100 (H) 74 - 99 mg/dL   MRSA Surveillance for Vancomycin De-escalation, PCR    Specimen: Anterior Nares; Swab   Result Value Ref Range    MRSA PCR Not Detected Not Detected   Troponin I, High Sensitivity   Result Value Ref Range    Troponin I, High Sensitivity 1,373 (HH) 0 - 20 ng/L   POCT GLUCOSE   Result Value Ref Range    POCT Glucose 97 74 - 99 mg/dL   POCT GLUCOSE   Result Value Ref Range    POCT Glucose 120 (H) 74 - 99 mg/dL   POCT GLUCOSE   Result Value Ref Range    POCT Glucose 92 74 - 99 mg/dL   POCT GLUCOSE   Result Value Ref Range    POCT Glucose 86 74 - 99 mg/dL   CBC   Result Value Ref Range    WBC 11.7 (H) 4.4 - 11.3 x10*3/uL    nRBC 0.0 0.0 - 0.0 /100 WBCs    RBC 3.17 (L) 4.50 - 5.90 x10*6/uL    Hemoglobin 9.5 (L) 13.5 - 17.5  g/dL    Hematocrit 29.6 (L) 41.0 - 52.0 %    MCV 93 80 - 100 fL    MCH 30.0 26.0 - 34.0 pg    MCHC 32.1 32.0 - 36.0 g/dL    RDW 16.2 (H) 11.5 - 14.5 %    Platelets 303 150 - 450 x10*3/uL    MPV 9.7 7.5 - 11.5 fL   Magnesium   Result Value Ref Range    Magnesium 1.96 1.60 - 2.40 mg/dL   Renal function panel   Result Value Ref Range    Glucose 86 74 - 99 mg/dL    Sodium 136 136 - 145 mmol/L    Potassium 3.6 3.5 - 5.3 mmol/L    Chloride 101 98 - 107 mmol/L    Bicarbonate 26 21 - 32 mmol/L    Anion Gap 13 10 - 20 mmol/L    Urea Nitrogen 16 6 - 23 mg/dL    Creatinine 0.74 0.50 - 1.30 mg/dL    eGFR >90 >60 mL/min/1.73m*2    Calcium 8.4 (L) 8.6 - 10.3 mg/dL    Phosphorus 4.3 2.5 - 4.9 mg/dL    Albumin 2.8 (L) 3.4 - 5.0 g/dL   POCT GLUCOSE   Result Value Ref Range    POCT Glucose 95 74 - 99 mg/dL   ECG 12 lead   Result Value Ref Range    Ventricular Rate 69 BPM    Atrial Rate 69 BPM    VT Interval 146 ms    QRS Duration 94 ms    QT Interval 380 ms    QTC Calculation(Bazett) 407 ms    P Axis -1 degrees    R Axis 22 degrees    T Axis 36 degrees    QRS Count 11 beats    Q Onset 218 ms    P Onset 145 ms    P Offset 199 ms    T Offset 408 ms    QTC Fredericia 398 ms        Physical Exam:  General: Not in acute distress, A&O x 3, alert, cooperative, well-developed  HEENT: Normocephalic, atraumatic, EOMI, moist mucous membranes  Neck: Neck supple, trachea midline, no evidence of trauma  Cardiovascular: RRR, S1 and S2 appreciated, no murmurs rubs gallops appreciated, distal pulses 2+ bilaterally (radial and dorsalis pedis)  Respiratory: Vesicular breath sounds appreciated bilaterally, no adventitious sounds appreciated, no increased work of breathing  GI: Abdomen soft, nondistended, nontender to palpation, bowel sounds present  Extremities: No edema appreciated in lower extremities bilaterally, no cyanosis, right lower extremity with splint on ankle.  Lumbar back: Approximately 10 cm incision well appreciated, open to air without  signs of infection or erythema with sutures still in place.  Neuro: A&O X3, no focal deficits, strength and sensation intact bilaterally  Skin: Warm and dry, without lesions or rashes      Relevant Results    MRI LUMBAR SPINE W WO CONTRAST     Result Date: 10/9/2023  (THIS STUDY IS FROM OUTSIDE FACILITY)   EXAMINATION: MRI OF THE LUMBAR SPINE WITHOUT AND WITH CONTRAST  10/9/2023 5:55 pm TECHNIQUE: Multiplanar multisequence MRI of the lumbar spine was performed without and with the administration of intravenous contrast. COMPARISON: None. HISTORY: ORDERING SYSTEM PROVIDED HISTORY: spine surgery high WBC TECHNOLOGIST PROVIDED HISTORY: Reason for exam:->spine surgery high WBC Decision Support Exception - unselect if not a suspected or confirmed emergency medical condition->Emergency Medical Condition (MA) What reading provider will be dictating this exam?->CRC FINDINGS: The exam is degraded by motion as well as artifact created by orthopedic hardware.  The patient is status post lower lumbar laminectomies with pedicle screw and connecting niyah fixation from L3-S1 as well as multilevel intervertebral body fusion. There appears to be abnormal clumping of the cauda equina nerve roots just caudal to the conus medullaris, best appreciated on image number 1 of series 10, without associated enhancement postcontrast administration. A fluid collection in the laminectomy bed likely reflects a postop seroma. There is a rim enhancing fluid collection extending into the left psoas muscle measuring up to 2.5 x 9.3 cm in diameter, worrisome for abscess.  This may extend into the L4-5 disc space.  There is bilateral hydronephrosis.  The left kidney is severely atrophic.    MR findings worrisome for retroperitoneal abscess, possibly extending into the L4-5 disc space. Apparent clumping of the proximal cauda equina nerve roots versus mass effect due to an intrathecal fluid collection.  Recommend MRI of the T-spine for further  evaluation.    CT angio chest w and wo IV contrast    Result Date: 10/9/2023  EXAMINATION: CTA OF THE CHEST WITH AND WITHOUT CONTRAST 10/9/2023 3:12 pm TECHNIQUE: CTA of the chest was performed before and after the administration of intravenous contrast.  Multiplanar reformatted images are provided for review.  MIP images are provided for review. Automated exposure control, iterative reconstruction, and/or weight based adjustment of the mA/kV was utilized to reduce the radiation dose to as low as reasonably achievable. COMPARISON: None. HISTORY: ORDERING SYSTEM PROVIDED HISTORY: PE TECHNOLOGIST PROVIDED HISTORY: Reason for exam:->PE Decision Support Exception - unselect if not a suspected or confirmed emergency medical condition->Emergency Medical Condition (MA) What reading provider will be dictating this exam?->CRC FINDINGS: Pulmonary Arteries: Pulmonary arteries are adequately opacified for evaluation.  No evidence of intraluminal filling defect to suggest pulmonary embolism.  Main pulmonary artery is normal in caliber. Mediastinum: No evidence of mediastinal lymphadenopathy.  The heart and pericardium demonstrate no acute abnormality.  There is no acute abnormality of the thoracic aorta. Lungs/pleura: Somewhat patchy subpleural curvilinear markings are seen in the bilateral lower lobes, overall morphology favoring atelectasis or infectious infiltrate.  Scattered central small cysts are observed. Upper Abdomen: Limited images of the upper abdomen are unremarkable. Soft Tissues/Bones: No acute bone or soft tissue abnormality.    1. No evidence of pulmonary embolism. 2. Patchy curvilinear densities in the bilateral lower lobes, atelectasis is favored over other process    CT Head W/O Contrast    Result Date: 10/9/2023  EXAMINATION: CT OF THE HEAD WITHOUT CONTRAST  10/9/2023 3:12 pm TECHNIQUE: CT of the head was performed without the administration of intravenous contrast. Automated exposure control, iterative  "reconstruction, and/or weight based adjustment of the mA/kV was utilized to reduce the radiation dose to as low as reasonably achievable. COMPARISON: 11/21/2022 HISTORY: ORDERING SYSTEM PROVIDED HISTORY: syncope TECHNOLOGIST PROVIDED HISTORY: Reason for exam:->syncope Has a \"code stroke\" or \"stroke alert\" been called?->No Decision Support Exception - unselect if not a suspected or confirmed emergency medical condition->Emergency Medical Condition (MA) What reading provider will be dictating this exam?->CRC FINDINGS: BRAIN/VENTRICLES: There is some subtle increased attenuation along the right tentorium cerebelli (series 3, image 23).  This is suspicious for a acute subdural hematoma.  There is no mass effect or edema.  There are no acute infarcts. ORBITS: The visualized portion of the orbits demonstrate no acute abnormality. SINUSES: The visualized paranasal sinuses and mastoid air cells demonstrate no acute abnormality. SOFT TISSUES/SKULL:  No acute abnormality of the visualized skull or soft tissues.    Findings suspicious of a small acute right-sided subdural hematoma.  There is no mass effect present..    XR ankle right 3+ views    Result Date: 10/9/2023  EXAMINATION: THREE XRAY VIEWS OF THE RIGHT ANKLE 10/9/2023 1:02 pm COMPARISON: January 18, 2020 1334 hours. HISTORY: ORDERING SYSTEM PROVIDED HISTORY: fall TECHNOLOGIST PROVIDED HISTORY: Reason for exam:->fall What reading provider will be dictating this exam?->CRC FINDINGS: Two views of the right ankle are submitted. There is both a new acute spiral mildly displaced fracture of the distal right tibia superimposed upon an old vertical fracture with intra-articular extension at the time.  There is now a new associated comminuted fracture of the distal right fibula. Identified again is a well corticated bony density distal to the medial malleolus and to the lateral malleolus likely representing old avulsion injuries.. The mortise is intact.  No " dislocations.    THERE IS NOW A NEW ACUTE OBLIQUE FRACTURE OF THE DISTAL RIGHT TIBIA WITH A OLD VERTICAL FRACTURE OF THE TIBIA. THERE IS A NEW COMMINUTED FRACTURE OF THE DISTAL RIGHT FIBULA.        Assessment/Plan   Principal Problem:    Syncope, unspecified syncope type  Active Problems:    H/O fracture of ankle    NSTEMI (non-ST elevated myocardial infarction) (CMS/[facility])    Patient is a 58-year old male with a past medical history of MDD, T2 DM, BPH, hypertension, hyperlipidemia, JEMAL, chronic pain in neck and back, lumbar radiculopathy, and heroin use who is transferred from [facility] in the setting of recent lumbar surgery with subarachnoid hemorrhage/subdural hematoma for syncope and ankle pain secondary to the syncope. Upon evaluation, he was found to have a new fracture of the right distal tibia and fibula, potential retroperitoneal abscess on MRI, elevated troponin, elevated CK. Patient was admitted for management of his fracture, syncope etiology, and elevated troponin    Vasovagal Syncope  -Patient states syncopal event occurred secondary to pain but also endorsed poor P.O. intake.   -Trend CMP, CBC. Previous labs from outside facility yesterday and [facility] today show no obvious metabolic or toxicological etiology.   -Telemetry continued, no malignant arrythmias seen on tele since admission  -TTE revealed EF of 50 to 55%, no structural heart disease    Retroperitoneal Abscess Vs. Postsurgical Fluid Collection  -Neurosurgery at [facility] [place] and his surgeon at this facility reviewed MRI and assessed that the findings on MRI were consistent with normal post-operative changes rather than an abscess  -Wound dry, intact, no pain with palpation.   -Infectious Disease following, continue to appreciate recs.   ---> ID had a discussion with neurosurgery deemed a low likelihood of abscess more likely fluid collection postsurgical and noninfectious.  ----> As per ID discontinued cefepime and vancomycin (10/11)  Admission blood  cultures from 10/10 NGTD    NSTEMI: Troponinemia, without chest pain or EKG changes, now s/p cardiac catheterization (diagnostic)  -Diagnostic cardiac catheterization 10/11/2023 which demonstrated a proximal RCA stenosis of 90% with interventional plan pending  -Repeated EKG does not show significant ST segment changes, T wave inversions or pathological Q waves, otherwise normal sinus rhythm.   -Echocardiogram 10/10 revealed EF of 50-55%, no regional wall motion abnormalities  -ASA 81 / Atorvastatin 80 daily  -Spoke with both neurosurgery and orthopedics which both state DAPT can be started, this should not interrupt surgery and a CT head will need to be repeated 24 hour after starting DAPT    Displaced Distal right tibia and comminuted distal right fibula fracture.   -Repeated x-ray showing displaced and comminuted fracture of the distal tibia diaphysis and distal fibula diaphysis.   ---> Ortho awaiting cardiac interventional plan, they stated DAPT will not preclude surgery and OK to start as required  -Pain control with scheduled Tylenol and oxycodone with PRN Dilaudid for severe pain.   Flexeril prn  Miralax/senokot    Recent lumbar fusion(9/29/2023) c/b   - Post op SAH/SDH (10/3), SAH resolved on imaging at OSH, SDH persists   - Intraoperative dural tear s/p dural patch  Started on keppra for seizure prophylaxis due to bleed during prior admission and on discharge, will address with neurosurgery necessity and length of treatment. Will switch to IV if NPO for surgery/PCI  Will require CT head 24 hours post initiation of DAPT    Primary HTN  No home meds on admission  Carvedilol 12.5 started 10/11    #Diabetes Type 2?  -A1c 5.7%, BG 80 - 120  Takes metformin at home, holding for contrast while hospitalized  Holding insulin  POCBG qshift    Gout  Continued home allopurinol    #BPH  -Will continue Tamsulosin 0.4 mg daily     IVF: None.   Diet: Cardiac, NPO for possible PCI or Ortho sx tomorrow  DVT Prophylaxis:  Heparin subcutaneous.   Consults: Cardiology, Neurosurgery, orthopedic surgery.   Dispo: Anticipate additional 2 to 3 days hospital stay.    Code Status: Full Code    Brad Del Real MD,   Internal medicine PGY 1    Seen and examined with resident physicians. Labs and imaging personally reviewed. My changes to plain in italics. Case discussed with case management, residents and patient.    Complexity: High    Total visit time = > 50 minutes; more than 50% spent counseling/coordinating care    I saw and evaluated the patient. I personally obtained the key and critical portions of the history and physical exam or was physically present for key and critical portions performed by the resident/fellow. I reviewed the resident/fellow's documentation and discussed the patient with the resident/fellow. I agree with the resident/fellow's medical decision making as documented in the note.    Jimbo Allen, DO

## 2023-10-11 NOTE — PROGRESS NOTES
Gm Thrasher is a 58 y.o. male on day 1 of admission presenting with Syncope, unspecified syncope type.      Subjective   Patient evaluated at bedside this morning.  Underwent cardiac catheterization this morning, with demonstration of occlusion of the RCA, however no intervention performed due to recent history of subarachnoid hemorrhage.  To be discussed with neurosurgery in regards to timing of DAPT initiation.    Did discuss with neurosurgery extensively yesterday in regards to fluid collection noted at the left psoas.  Likely this fluid collection is related to recent lumbar decompression, low likelihood of acute infectious etiology.       Objective     Last Recorded Vitals  /53 (BP Location: Left arm, Patient Position: Lying)   Pulse 65   Temp 36.3 °C (97.3 °F) (Temporal)   Resp 18   Wt 115 kg (253 lb 1.6 oz)   SpO2 92%   Intake/Output last 3 Shifts:    Intake/Output Summary (Last 24 hours) at 10/11/2023 1047  Last data filed at 10/11/2023 1035  Gross per 24 hour   Intake 1561.25 ml   Output 560 ml   Net 1001.25 ml       Admission Weight  Weight: 114 kg (251 lb 4.8 oz) (10/10/23 0358)    Daily Weight  10/11/23 : 115 kg (253 lb 1.6 oz)      Scheduled medications  acetaminophen, 650 mg, oral, 4x daily  atorvastatin, 80 mg, oral, Daily  carvedilol, 12.5 mg, oral, BID  cyclobenzaprine, 10 mg, oral, TID  escitalopram, 20 mg, oral, Daily  heparin, 5,000 Units, subcutaneous, q8h  insulin lispro, 0-5 Units, subcutaneous, q4h  [Held by provider] insulin lispro, 0-5 Units, subcutaneous, TID with meals  levETIRAcetam, 500 mg, oral, BID  pantoprazole, 40 mg, oral, Daily before breakfast  perflutren lipid microspheres, 3 mL of dilution, intravenous, Once in imaging  polyethylene glycol, 17 g, oral, Daily  pregabalin, 300 mg, oral, BID  sennosides, 1 tablet, oral, BID  sulfur hexafluoride microsphr, 2 mL, intravenous, Once in imaging  tamsulosin, 0.4 mg, oral, Daily      Continuous medications     PRN  medications  PRN medications: dextrose 10 % in water (D10W), dextrose, docusate sodium, glucagon, HYDROmorphone, HYDROmorphone, oxyCODONE     Physical Exam  Constitutional: Well developed, awake/alert/oriented x4, although poor historian  Skin: Lumbar surgical wound appears clean, no surrounding erythema, no drainage, no pain, no fluctuance  Eyes: Anicteric, clear sclera  ENMT: mucous membranes moist, no apparent injury, no lesions seen  Head/Neck: Atraumatic  Respiratory: Lungs clear to auscultation bilaterally with no wheezes, rhonci or crackles  CV: Regular rate and rhythm, no murmurs or gallops auscultated  GI: Non-tender to deep palpation, no guarding  MSK: Left ankle pain  Psych: Appropriate mood and behavior    Relevant Results  Results for orders placed or performed during the hospital encounter of 10/10/23 (from the past 24 hour(s))   Troponin, High Sensitivity, 1 Hour   Result Value Ref Range    Troponin I, High Sensitivity 1,573 (HH) 0 - 20 ng/L   Lipid panel   Result Value Ref Range    Cholesterol 113 0 - 199 mg/dL    HDL-Cholesterol 18.4 mg/dL    Cholesterol/HDL Ratio 6.1     LDL Calculated 63 (L) 140 - 190 mg/dL    VLDL 32 0 - 40 mg/dL    Triglycerides 160 (H) 0 - 149 mg/dL    Non HDL Cholesterol 95 0 - 149 mg/dL   POCT GLUCOSE   Result Value Ref Range    POCT Glucose 100 (H) 74 - 99 mg/dL   MRSA Surveillance for Vancomycin De-escalation, PCR    Specimen: Anterior Nares; Swab   Result Value Ref Range    MRSA PCR Not Detected Not Detected   Troponin I, High Sensitivity   Result Value Ref Range    Troponin I, High Sensitivity 1,373 (HH) 0 - 20 ng/L   POCT GLUCOSE   Result Value Ref Range    POCT Glucose 97 74 - 99 mg/dL   POCT GLUCOSE   Result Value Ref Range    POCT Glucose 120 (H) 74 - 99 mg/dL   POCT GLUCOSE   Result Value Ref Range    POCT Glucose 92 74 - 99 mg/dL   POCT GLUCOSE   Result Value Ref Range    POCT Glucose 86 74 - 99 mg/dL   CBC   Result Value Ref Range    WBC 11.7 (H) 4.4 - 11.3  x10*3/uL    nRBC 0.0 0.0 - 0.0 /100 WBCs    RBC 3.17 (L) 4.50 - 5.90 x10*6/uL    Hemoglobin 9.5 (L) 13.5 - 17.5 g/dL    Hematocrit 29.6 (L) 41.0 - 52.0 %    MCV 93 80 - 100 fL    MCH 30.0 26.0 - 34.0 pg    MCHC 32.1 32.0 - 36.0 g/dL    RDW 16.2 (H) 11.5 - 14.5 %    Platelets 303 150 - 450 x10*3/uL    MPV 9.7 7.5 - 11.5 fL   Magnesium   Result Value Ref Range    Magnesium 1.96 1.60 - 2.40 mg/dL   Renal function panel   Result Value Ref Range    Glucose 86 74 - 99 mg/dL    Sodium 136 136 - 145 mmol/L    Potassium 3.6 3.5 - 5.3 mmol/L    Chloride 101 98 - 107 mmol/L    Bicarbonate 26 21 - 32 mmol/L    Anion Gap 13 10 - 20 mmol/L    Urea Nitrogen 16 6 - 23 mg/dL    Creatinine 0.74 0.50 - 1.30 mg/dL    eGFR >90 >60 mL/min/1.73m*2    Calcium 8.4 (L) 8.6 - 10.3 mg/dL    Phosphorus 4.3 2.5 - 4.9 mg/dL    Albumin 2.8 (L) 3.4 - 5.0 g/dL   POCT GLUCOSE   Result Value Ref Range    POCT Glucose 95 74 - 99 mg/dL   ECG 12 lead   Result Value Ref Range    Ventricular Rate 69 BPM    Atrial Rate 69 BPM    ID Interval 146 ms    QRS Duration 94 ms    QT Interval 380 ms    QTC Calculation(Bazett) 407 ms    P Axis -1 degrees    R Axis 22 degrees    T Axis 36 degrees    QRS Count 11 beats    Q Onset 218 ms    P Onset 145 ms    P Offset 199 ms    T Offset 408 ms    QTC Fredericia 398 ms        Image Results  ECG 12 lead  Normal sinus rhythm  Nonspecific ST and T wave abnormality  Abnormal ECG  When compared with ECG of 20-SEP-2023 09:06,  Nonspecific T wave abnormality now evident in Inferior leads  Confirmed by Juan Carlos Lynne ([id number]) on 10/11/2023 8:26:21 AM  US renal complete  Narrative: Interpreted By:  Erick Banks,   STUDY:  US RENAL COMPLETE;  10/10/2023 9:46 pm      INDICATION:  Signs/Symptoms:Bilateral hydro.      COMPARISON:  CT lumbar spine dated 10/03/2023.      ACCESSION NUMBER(S):  [id number]      ORDERING CLINICIAN:  VERN MONTEMAYOR      TECHNIQUE:  Grayscale and color Doppler sonographic images of the kidneys  were  obtained  .      FINDINGS:  RIGHT KIDNEY:  The right kidney measures 14.8 cm in length. Renal cortical  echogenicity and thickness are within normal limits. Severe  hydronephrosis. No sonographic evident calculus.      LEFT KIDNEY:  The left kidney measures 11.2 cm in length. There are elements of  cortical renal thinning that may reflect scarring or prior infarct  within the upper and lower poles. Renal cortical echogenicity is  otherwise within normal limits. 6 mm diameter parenchymal  calcification versus calculus. No hydronephrosis.      BLADDER:  Bladder wall appears thickened measuring 6 mm. Bilateral ureteral  jets visualized.      Impression: Severe right hydronephrosis, similar in appearance to CT lumbar spine  examination. Bilateral ureteral jets visualized.      Mild asymmetric enlargement of the right kidney with cortical renal  scarring involving the left kidney.      Signed by: Erick Banks 10/11/2023 8:01 AM  Dictation workstation:   [id number]  Transthoracic Echo (TTE) Complete              [facility]  [id number] [id number]     Tel [phone] Fax [phone]    TRANSTHORACIC ECHOCARDIOGRAM REPORT       Patient Name:      JESUS METCALF       Reading Physician:    [id number] Jonathan Malone MD  Study Date:        10/10/2023           Ordering Provider:    [id number] VERN MONTEMAYOR  MRN/PID:           [id number]             Fellow:  Accession#:        [id number]         Nurse:  Date of Birth/Age: 1965 / 58 years Sonographer:          Aleyda Hansen RDCS  Gender:            M                    Additional Staff:  Height:            180.00 cm            Admit Date:           10/10/2023  Weight:            51.71 kg             Admission Status:     Inpatient -                                                                Routine  BSA:                1.66 m2              Department Location:  [facility] CCU SD  Blood Pressure: 141 /84 mmHg    Study Type:    TRANSTHORACIC ECHO (TTE) COMPLETE  Diagnosis/ICD: Syncope-R55  Indication:    Syncope  CPT Codes:     Echo Complete w Full [id number]    Patient History:  Smoker:            Current.  Diabetes:          Yes  BMI:               Obese >30  Pertinent History: Hyperlipidemia and HTN. Limited imaging windows available. No                     previous echocardiogram.    Study Detail: The following Echo studies were performed: 2D, M-Mode, Doppler and                color flow. Image quality for this study is poor. Technically                challenging study due to body habitus and patient lying in supine                position. Definity used as a contrast agent for endocardial border                definition. Total contrast used for this procedure was 2 mL via IV                push.       PHYSICIAN INTERPRETATION:  Left Ventricle: Left ventricular systolic function is low normal, with an estimated ejection fraction of 50-55%. There are no regional wall motion abnormalities. The left ventricular cavity size is normal. Left ventricular diastolic filling was indeterminate.  Left Atrium: The left atrium is normal in size.  Right Ventricle: The right ventricle is mildly enlarged. There is low normal right ventricular systolic function.  Right Atrium: The right atrium is normal in size.  Aortic Valve: The aortic valve is trileaflet. There is mild aortic valve cusp calcification. There is no evidence of aortic valve regurgitation. The peak instantaneous gradient of the aortic valve is 3.7 mmHg.  Mitral Valve: The mitral valve is normal in structure. There is no evidence of mitral valve regurgitation.  Tricuspid Valve: The tricuspid valve is structurally normal. No evidence of tricuspid regurgitation. The Doppler estimated RVSP is within normal limits.  Pulmonic Valve: The pulmonic valve is structurally normal. There is  no indication of pulmonic valve regurgitation.  Pericardium: There is no pericardial effusion noted.  Aorta: The aortic root is normal. There is no dilatation of the ascending aorta. There is no dilatation of the aortic root.  Systemic Veins: The inferior vena cava was not well visualized.       CONCLUSIONS:   1. Left ventricular systolic function is low normal with a 50-55% estimated ejection fraction.   2. There is low normal right ventricular systolic function.   3. RVSP within normal limits.    QUANTITATIVE DATA SUMMARY:  2D MEASUREMENTS:                            Normal Ranges:  LAs:           4.70 cm    (2.7-4.0cm)  IVSd:          1.10 cm    (0.6-1.1cm)  LVPWd:         1.25 cm    (0.6-1.1cm)  LVIDd:         5.20 cm    (3.9-5.9cm)  LVIDs:         4.55 cm  LV Mass Index: 168.7 g/m2  LV % FS        12.5 %    M-MODE MEASUREMENTS:                   Normal Ranges:  Ao Root: 3.90 cm (2.0-3.7cm)    LV DIASTOLIC FUNCTION:                      Normal Ranges:  MV Peak E: 0.37 m/s (0.7-1.2 m/s)  MV Peak A: 0.57 m/s (0.42-0.7 m/s)  E/A Ratio: 0.65     (1.0-2.2)    MITRAL VALVE:                       Normal Ranges:  MV Vmax:    0.65 m/s (<=1.3m/s)  MV peak P.7 mmHg (<5mmHg)  MV mean P.0 mmHg (<48mmHg)  MV DT:      222 msec (150-240msec)    AORTIC VALVE:                          Normal Ranges:  AoV Vmax:      0.97 m/s (<=1.7m/s)  AoV Peak PG:   3.7 mmHg (<20mmHg)  LVOT Max Graeme:  0.78 m/s (<=1.1m/s)  LVOT VTI:      14.90 cm  LVOT Diameter: 2.10 cm  (1.8-2.4cm)  AoV Area,Vmax: 2.78 cm2 (2.5-4.5cm2)       RIGHT VENTRICLE:  TAPSE: 22.5 mm  RV s'  0.12 m/s    PULMONIC VALVE:                          Normal Ranges:  PV Accel Time: 119 msec (>120ms)  PV Max Graeme:    0.7 m/s  (0.6-0.9m/s)  PV Max P.7 mmHg       [id number] Jonathan Malone MD  Electronically signed on 10/11/2023 at 7:56:19 AM       ** Final **           Assessment/Plan   58-year-old male presenting for syncope, with new rim-enhancing lesion of left psoas with  possible extension into L4-5, and bilateral hydronephrosis.  Significant history of prior lumbar decompression complicated by subarachnoid hemorrhage requiring ICU admission.     Assessment:  #New rim-enhancing 2.5 x 9.3 cm left psoas fluid collection, possible extension to L4-5  #Bilateral hydronephrosis  #Left tibia/fibula fracture  #NSTEMI, with 90% RCA occlusion  #Leukocytosis  #History of lumbar decompression 9/29     Plan:  -Discontinue IV cefepime and vancomycin.  Discussion held with neurosurgery, low likelihood of abscess, fluid collection likely postsurgical and noninfectious.  Monitor off of antibiotics  -Continue to monitor blood cultures       To be staffed with attending,  Harlan De La Cruz, DO  Internal Medicine PGY-3

## 2023-10-11 NOTE — PROGRESS NOTES
"Gm Thrasher is a 58 y.o. male on day 2 of admission presenting with Syncope, unspecified syncope type.    Subjective      Sleeping, arouses to verbal stimulation continues to have moderate to high amount of pain in R LE. Cardiac cath revealed RCA lesion.    Objective     Physical Exam  Constitutional:       Appearance: Normal appearance.   HENT:      Mouth/Throat:      Mouth: Mucous membranes are moist.   Pulmonary:      Effort: Pulmonary effort is normal.      Breath sounds: Normal breath sounds.   Musculoskeletal:      Comments: R LE splinted, foot warm and dry with intact sensation, DP pulse palpable   Neurological:      Mental Status: He is alert.         Last Recorded Vitals  Blood pressure 115/62, pulse 64, temperature 36.7 °C (98.1 °F), temperature source Temporal, resp. rate 18, height 1.8 m (5' 10.87\"), weight 115 kg (253 lb 1.6 oz), SpO2 93 %.  Intake/Output last 3 Shifts:  I/O last 3 completed shifts:  In: 1300 (11.3 mL/kg) [IV Piggyback:1300]  Out: 1225 (10.7 mL/kg) [Urine:1225 (0.3 mL/kg/hr)]  Weight: 114.8 kg     Relevant Results  Scheduled medications  acetaminophen, 650 mg, oral, 4x daily  atorvastatin, 80 mg, oral, Daily  carvedilol, 12.5 mg, oral, BID  ceFAZolin, 2 g, intravenous, q8h  cyclobenzaprine, 10 mg, oral, TID  escitalopram, 20 mg, oral, Daily  heparin, 5,000 Units, subcutaneous, q8h  insulin lispro, 0-5 Units, subcutaneous, q4h  [Held by provider] insulin lispro, 0-5 Units, subcutaneous, TID with meals  levETIRAcetam, 500 mg, oral, BID  pantoprazole, 40 mg, oral, Daily before breakfast  perflutren lipid microspheres, 3 mL of dilution, intravenous, Once in imaging  polyethylene glycol, 17 g, oral, Daily  pregabalin, 300 mg, oral, BID  sennosides, 1 tablet, oral, BID  sulfur hexafluoride microsphr, 2 mL, intravenous, Once in imaging  tamsulosin, 0.4 mg, oral, Daily      Continuous medications  lactated Ringer's, 100 mL/hr, Last Rate: 100 mL/hr (10/11/23 1703)      PRN medications  PRN " medications: dextrose 10 % in water (D10W), dextrose, docusate sodium, glucagon, HYDROmorphone, HYDROmorphone, oxyCODONE     Results for orders placed or performed during the hospital encounter of 10/10/23 (from the past 24 hour(s))   POCT GLUCOSE   Result Value Ref Range    POCT Glucose 97 74 - 99 mg/dL   POCT GLUCOSE   Result Value Ref Range    POCT Glucose 120 (H) 74 - 99 mg/dL   POCT GLUCOSE   Result Value Ref Range    POCT Glucose 92 74 - 99 mg/dL   POCT GLUCOSE   Result Value Ref Range    POCT Glucose 86 74 - 99 mg/dL   CBC   Result Value Ref Range    WBC 11.7 (H) 4.4 - 11.3 x10*3/uL    nRBC 0.0 0.0 - 0.0 /100 WBCs    RBC 3.17 (L) 4.50 - 5.90 x10*6/uL    Hemoglobin 9.5 (L) 13.5 - 17.5 g/dL    Hematocrit 29.6 (L) 41.0 - 52.0 %    MCV 93 80 - 100 fL    MCH 30.0 26.0 - 34.0 pg    MCHC 32.1 32.0 - 36.0 g/dL    RDW 16.2 (H) 11.5 - 14.5 %    Platelets 303 150 - 450 x10*3/uL    MPV 9.7 7.5 - 11.5 fL   Magnesium   Result Value Ref Range    Magnesium 1.96 1.60 - 2.40 mg/dL   Renal function panel   Result Value Ref Range    Glucose 86 74 - 99 mg/dL    Sodium 136 136 - 145 mmol/L    Potassium 3.6 3.5 - 5.3 mmol/L    Chloride 101 98 - 107 mmol/L    Bicarbonate 26 21 - 32 mmol/L    Anion Gap 13 10 - 20 mmol/L    Urea Nitrogen 16 6 - 23 mg/dL    Creatinine 0.74 0.50 - 1.30 mg/dL    eGFR >90 >60 mL/min/1.73m*2    Calcium 8.4 (L) 8.6 - 10.3 mg/dL    Phosphorus 4.3 2.5 - 4.9 mg/dL    Albumin 2.8 (L) 3.4 - 5.0 g/dL   POCT GLUCOSE   Result Value Ref Range    POCT Glucose 95 74 - 99 mg/dL   ECG 12 lead   Result Value Ref Range    Ventricular Rate 69 BPM    Atrial Rate 69 BPM    NV Interval 146 ms    QRS Duration 94 ms    QT Interval 380 ms    QTC Calculation(Bazett) 407 ms    P Axis -1 degrees    R Axis 22 degrees    T Axis 36 degrees    QRS Count 11 beats    Q Onset 218 ms    P Onset 145 ms    P Offset 199 ms    T Offset 408 ms    QTC Fredericia 398 ms   POCT GLUCOSE   Result Value Ref Range    POCT Glucose 83 74 - 99 mg/dL    POCT GLUCOSE   Result Value Ref Range    POCT Glucose 91 74 - 99 mg/dL           CT ankle right wo IV contrast    Result Date: 10/11/2023  Interpreted By:  Christina Watt, STUDY: CT ANKLE RIGHT WO IV CONTRAST;  10/11/2023 3:24 pm   INDICATION: Signs/Symptoms:distal tibia fx.   COMPARISON: Right ankle radiographs dated 05/15/2020.   ACCESSION NUMBER(S): [id number]   ORDERING CLINICIAN: BILL SWEENEY   TECHNIQUE: CT imaging of the  right lower extremity was obtained  without administration of intravenous contrast medium. Coronal and sagittal reformatted images were performed. 3D reformatted images were created and reviewed.   FINDINGS: OSSEOUS STRUCTURES: There is an oblique minimally comminuted fracture through distal tibial diaphysis. There is mild comminution of fracture fragments about the fracture site. There is mild anterior and medial displacement of the proximal fracture fragment with overriding of fracture fragments by approximately 1.5 cm. There is also an oblique mildly-comminuted fracture through distal fibular metadiaphysis. There is anterior and lateral displacement of the proximal fracture fragment with significant overriding of fracture fragments by approximately 3 cm. There is extension of fracture lucency to the distal tibiofibular joint articulation (as seen on coronal image 68/104). However the distal tibiofibular joint articulation appears grossly intact without significant displacement or dislocation. There is also cortical remodeling with an oblique irregular lucency extending through the medial aspect of the tibial plafond, likely related to cortical remodeling from remote fracture. There is incongruity of the distal articular surface of tibia at the level of this remote fracture extension (best seen on coronal image 55/104 and sagittal image 47/104). There are moderate degenerative changes in the tibiotalar joint articulation with joint space narrowing and prominent marginal osteophytes.  There are well corticated ossific fragments at the tip of medial and lateral malleoli, likely related to remote trauma. Subtalar joint articulation is maintained and demonstrates mild-to-moderate degenerative changes with subchondral sclerosis and prominent marginal osteophytes. Note made of os trigonum. There is also prominent osseous process along the dorsal aspect of the talar neck which is likely favored to be sequela of remote injury of the dorsal talonavicular ligament. Rest of the osseous structures appear grossly unremarkable.   SOFT TISSUES: There is soft tissue swelling about the included distal leg extending to the ankle. Evaluation of tendons and ligaments is limited due to CT technique. Within this limitation, tendons appear grossly unremarkable.       1. Acute minimally comminuted and displaced fractures through distal tibia and fibula as described above. 2. There are also findings consistent with remote trauma with significant cortical remodeling of the distal tibia with associated incongruity of the distal tibial articular surface as described above. 3. Moderate arthrosis of the tibiotalar and subtalar joints. 4. Soft tissue swelling about the distal leg and ankle.   MACRO: None   Signed by: Christina Watt 10/11/2023 4:47 PM Dictation workstation:   [id number]    ECG 12 lead    Result Date: 10/11/2023  Normal sinus rhythm Nonspecific ST and T wave abnormality Abnormal ECG When compared with ECG of 20-SEP-2023 09:06, Nonspecific T wave abnormality now evident in Inferior leads Confirmed by Juan Carlos Lynne ([id number]) on 10/11/2023 8:26:21 AM    US renal complete    Result Date: 10/11/2023  Interpreted By:  Erick Banks, STUDY: US RENAL COMPLETE;  10/10/2023 9:46 pm   INDICATION: Signs/Symptoms:Bilateral hydro.   COMPARISON: CT lumbar spine dated 10/03/2023.   ACCESSION NUMBER(S): [id number]   ORDERING CLINICIAN: VERN MONTEMAYOR   TECHNIQUE: Grayscale and color Doppler sonographic images of the  kidneys were obtained  .   FINDINGS: RIGHT KIDNEY: The right kidney measures 14.8 cm in length. Renal cortical echogenicity and thickness are within normal limits. Severe hydronephrosis. No sonographic evident calculus.   LEFT KIDNEY: The left kidney measures 11.2 cm in length. There are elements of cortical renal thinning that may reflect scarring or prior infarct within the upper and lower poles. Renal cortical echogenicity is otherwise within normal limits. 6 mm diameter parenchymal calcification versus calculus. No hydronephrosis.   BLADDER: Bladder wall appears thickened measuring 6 mm. Bilateral ureteral jets visualized.       Severe right hydronephrosis, similar in appearance to CT lumbar spine examination. Bilateral ureteral jets visualized.   Mild asymmetric enlargement of the right kidney with cortical renal scarring involving the left kidney.   Signed by: Erick Banks 10/11/2023 8:01 AM Dictation workstation:   [id number]    Transthoracic Echo (TTE) Complete    Result Date: 10/11/2023            [facility] [id number] [address] [facility] [address]    Tel [phone] Fax [phone] TRANSTHORACIC ECHOCARDIOGRAM REPORT  Patient Name:      JESUS DIXON       Reading Physician:    [id number] Jonathan Malone MD Study Date:        10/10/2023           Ordering Provider:    [id number] VERN MONTEMAYOR MRN/PID:           [id number]             Fellow: Accession#:        [id number]         Nurse: Date of Birth/Age: 1965 / 58 years Sonographer:          Aleyda Hansen RDCS Gender:            M                    Additional Staff: Height:            180.00 cm            Admit Date:           10/10/2023 Weight:            51.71 kg             Admission Status:     Inpatient -                                                               Routine BSA:               1.66 m2               Department Location:  [facility] CCU SD Blood Pressure: 141 /84 mmHg Study Type:    TRANSTHORACIC ECHO (TTE) COMPLETE Diagnosis/ICD: Syncope-R55 Indication:    Syncope CPT Codes:     Echo Complete w Full [id number] Patient History: Smoker:            Current. Diabetes:          Yes BMI:               Obese >30 Pertinent History: Hyperlipidemia and HTN. Limited imaging windows available. No                    previous echocardiogram. Study Detail: The following Echo studies were performed: 2D, M-Mode, Doppler and               color flow. Image quality for this study is poor. Technically               challenging study due to body habitus and patient lying in supine               position. Definity used as a contrast agent for endocardial border               definition. Total contrast used for this procedure was 2 mL via IV               push.  PHYSICIAN INTERPRETATION: Left Ventricle: Left ventricular systolic function is low normal, with an estimated ejection fraction of 50-55%. There are no regional wall motion abnormalities. The left ventricular cavity size is normal. Left ventricular diastolic filling was indeterminate. Left Atrium: The left atrium is normal in size. Right Ventricle: The right ventricle is mildly enlarged. There is low normal right ventricular systolic function. Right Atrium: The right atrium is normal in size. Aortic Valve: The aortic valve is trileaflet. There is mild aortic valve cusp calcification. There is no evidence of aortic valve regurgitation. The peak instantaneous gradient of the aortic valve is 3.7 mmHg. Mitral Valve: The mitral valve is normal in structure. There is no evidence of mitral valve regurgitation. Tricuspid Valve: The tricuspid valve is structurally normal. No evidence of tricuspid regurgitation. The Doppler estimated RVSP is within normal limits. Pulmonic Valve: The pulmonic valve is structurally normal. There is no indication of pulmonic valve regurgitation. Pericardium:  There is no pericardial effusion noted. Aorta: The aortic root is normal. There is no dilatation of the ascending aorta. There is no dilatation of the aortic root. Systemic Veins: The inferior vena cava was not well visualized.  CONCLUSIONS:  1. Left ventricular systolic function is low normal with a 50-55% estimated ejection fraction.  2. There is low normal right ventricular systolic function.  3. RVSP within normal limits. QUANTITATIVE DATA SUMMARY: 2D MEASUREMENTS:                           Normal Ranges: LAs:           4.70 cm    (2.7-4.0cm) IVSd:          1.10 cm    (0.6-1.1cm) LVPWd:         1.25 cm    (0.6-1.1cm) LVIDd:         5.20 cm    (3.9-5.9cm) LVIDs:         4.55 cm LV Mass Index: 168.7 g/m2 LV % FS        12.5 % M-MODE MEASUREMENTS:                  Normal Ranges: Ao Root: 3.90 cm (2.0-3.7cm) LV DIASTOLIC FUNCTION:                     Normal Ranges: MV Peak E: 0.37 m/s (0.7-1.2 m/s) MV Peak A: 0.57 m/s (0.42-0.7 m/s) E/A Ratio: 0.65     (1.0-2.2) MITRAL VALVE:                      Normal Ranges: MV Vmax:    0.65 m/s (<=1.3m/s) MV peak P.7 mmHg (<5mmHg) MV mean P.0 mmHg (<48mmHg) MV DT:      222 msec (150-240msec) AORTIC VALVE:                         Normal Ranges: AoV Vmax:      0.97 m/s (<=1.7m/s) AoV Peak PG:   3.7 mmHg (<20mmHg) LVOT Max Graeme:  0.78 m/s (<=1.1m/s) LVOT VTI:      14.90 cm LVOT Diameter: 2.10 cm  (1.8-2.4cm) AoV Area,Vmax: 2.78 cm2 (2.5-4.5cm2)  RIGHT VENTRICLE: TAPSE: 22.5 mm RV s'  0.12 m/s PULMONIC VALVE:                         Normal Ranges: PV Accel Time: 119 msec (>120ms) PV Max Graeme:    0.7 m/s  (0.6-0.9m/s) PV Max P.7 mmHg  [id number] Jonathan Malone MD Electronically signed on 10/11/2023 at 7:56:19 AM  ** Final **     XR ankle right 2 views    Result Date: 10/10/2023  Interpreted By:  Kameron Ross, STUDY: XR ANKLE RIGHT 2 VIEWS; ;  10/10/2023 5:27 am   INDICATION: Signs/Symptoms:Rt ankle fracture.   COMPARISON: Right ankle radiographs dated 05/15/2020.   ACCESSION  NUMBER(S): [id number]   ORDERING CLINICIAN: DENIS MORE   FINDINGS: There is an acute comminuted displaced fracture of the distal tibia diaphysis with a proximally half shaft width lateral displacement of the distal fracture fragment. There is also an acute comminuted fracture of the distal fibula diaphysis with aproximately half shaft width posterior displacement of the distal fracture fragment. There are well corticated ossifications around the distal medial and lateral malleolus reflecting osseous remodeling from the previous healed fracture. The ankle mortise appears intact.       Displaced and comminuted fracture of the distal tibia diaphysis and distal fibula diaphysis, as described above. The ankle mortise appears intact.     MACRO: None   Signed by: Kameron Ross 10/10/2023 5:59 AM Dictation workstation:   [id number]    MR LUMBAR SPINE W AND WO CONTRAST    Result Date: 10/9/2023  EXAMINATION: MRI OF THE LUMBAR SPINE WITHOUT AND WITH CONTRAST  10/9/2023 5:55 pm TECHNIQUE: Multiplanar multisequence MRI of the lumbar spine was performed without and with the administration of intravenous contrast. COMPARISON: None. HISTORY: ORDERING SYSTEM PROVIDED HISTORY: spine surgery high WBC TECHNOLOGIST PROVIDED HISTORY: Reason for exam:->spine surgery high WBC Decision Support Exception - unselect if not a suspected or confirmed emergency medical condition->Emergency Medical Condition (MA) What reading provider will be dictating this exam?->CRC FINDINGS: The exam is degraded by motion as well as artifact created by orthopedic hardware.  The patient is status post lower lumbar laminectomies with pedicle screw and connecting niyah fixation from L3-S1 as well as multilevel intervertebral body fusion. There appears to be abnormal clumping of the cauda equina nerve roots just caudal to the conus medullaris, best appreciated on image number 1 of series 10, without associated enhancement postcontrast administration. A fluid  collection in the laminectomy bed likely reflects a postop seroma. There is a rim enhancing fluid collection extending into the left psoas muscle measuring up to 2.5 x 9.3 cm in diameter, worrisome for abscess.  This may extend into the L4-5 disc space.  There is bilateral hydronephrosis.  The left kidney is severely atrophic.    MR findings worrisome for retroperitoneal abscess, possibly extending into the L4-5 disc space. Apparent clumping of the proximal cauda equina nerve roots versus mass effect due to an intrathecal fluid collection.  Recommend MRI of the T-spine for further evaluation.    CT angio chest w and wo IV contrast    Result Date: 10/9/2023  EXAMINATION: CTA OF THE CHEST WITH AND WITHOUT CONTRAST 10/9/2023 3:12 pm TECHNIQUE: CTA of the chest was performed before and after the administration of intravenous contrast.  Multiplanar reformatted images are provided for review.  MIP images are provided for review. Automated exposure control, iterative reconstruction, and/or weight based adjustment of the mA/kV was utilized to reduce the radiation dose to as low as reasonably achievable. COMPARISON: None. HISTORY: ORDERING SYSTEM PROVIDED HISTORY: PE TECHNOLOGIST PROVIDED HISTORY: Reason for exam:->PE Decision Support Exception - unselect if not a suspected or confirmed emergency medical condition->Emergency Medical Condition (MA) What reading provider will be dictating this exam?->CRC FINDINGS: Pulmonary Arteries: Pulmonary arteries are adequately opacified for evaluation.  No evidence of intraluminal filling defect to suggest pulmonary embolism.  Main pulmonary artery is normal in caliber. Mediastinum: No evidence of mediastinal lymphadenopathy.  The heart and pericardium demonstrate no acute abnormality.  There is no acute abnormality of the thoracic aorta. Lungs/pleura: Somewhat patchy subpleural curvilinear markings are seen in the bilateral lower lobes, overall morphology favoring atelectasis or  "infectious infiltrate.  Scattered central small cysts are observed. Upper Abdomen: Limited images of the upper abdomen are unremarkable. Soft Tissues/Bones: No acute bone or soft tissue abnormality.    1. No evidence of pulmonary embolism. 2. Patchy curvilinear densities in the bilateral lower lobes, atelectasis is favored over other process    CT HEAD WO IV CONTRAST    Result Date: 10/9/2023  EXAMINATION: CT OF THE HEAD WITHOUT CONTRAST  10/9/2023 3:12 pm TECHNIQUE: CT of the head was performed without the administration of intravenous contrast. Automated exposure control, iterative reconstruction, and/or weight based adjustment of the mA/kV was utilized to reduce the radiation dose to as low as reasonably achievable. COMPARISON: 11/21/2022 HISTORY: ORDERING SYSTEM PROVIDED HISTORY: syncope TECHNOLOGIST PROVIDED HISTORY: Reason for exam:->syncope Has a \"code stroke\" or \"stroke alert\" been called?->No Decision Support Exception - unselect if not a suspected or confirmed emergency medical condition->Emergency Medical Condition (MA) What reading provider will be dictating this exam?->CRC FINDINGS: BRAIN/VENTRICLES: There is some subtle increased attenuation along the right tentorium cerebelli (series 3, image 23).  This is suspicious for a acute subdural hematoma.  There is no mass effect or edema.  There are no acute infarcts. ORBITS: The visualized portion of the orbits demonstrate no acute abnormality. SINUSES: The visualized paranasal sinuses and mastoid air cells demonstrate no acute abnormality. SOFT TISSUES/SKULL:  No acute abnormality of the visualized skull or soft tissues.    Findings suspicious of a small acute right-sided subdural hematoma.  There is no mass effect present..    XR ankle right 3+ views    Result Date: 10/9/2023  EXAMINATION: THREE XRAY VIEWS OF THE RIGHT ANKLE 10/9/2023 1:02 pm COMPARISON: January 18, 2020 1334 hours. HISTORY: ORDERING SYSTEM PROVIDED HISTORY: fall TECHNOLOGIST PROVIDED " HISTORY: Reason for exam:->fall What reading provider will be dictating this exam?->CRC FINDINGS: Two views of the right ankle are submitted. There is both a new acute spiral mildly displaced fracture of the distal right tibia superimposed upon an old vertical fracture with intra-articular extension at the time.  There is now a new associated comminuted fracture of the distal right fibula. Identified again is a well corticated bony density distal to the medial malleolus and to the lateral malleolus likely representing old avulsion injuries.. The mortise is intact.  No dislocations.    THERE IS NOW A NEW ACUTE OBLIQUE FRACTURE OF THE DISTAL RIGHT TIBIA WITH A OLD VERTICAL FRACTURE OF THE TIBIA. THERE IS A NEW COMMINUTED FRACTURE OF THE DISTAL RIGHT FIBULA.    CT head wo IV contrast    Result Date: 10/3/2023  Interpreted By:  Lissy Reid, STUDY: CT HEAD WO IV CONTRAST;  10/3/2023 3:54 pm   INDICATION: Signs/Symptoms:subarachnoid surveillance.   COMPARISON: CT head from 10/03/2023   ACCESSION NUMBER(S): [id number]   ORDERING CLINICIAN: DORIS DAILEY   TECHNIQUE: Noncontrast axial CT scan of head was performed. Angled reformats in brain and bone windows were generated. The images were reviewed in bone, brain, blood and soft tissue windows.   FINDINGS: CSF Spaces: Note is again made of a small amount of subarachnoid hemorrhage within the right frontal convexity. The overall appearance is unchanged as compared to prior study. There is minimal hyperdense attenuation within the left occipital horn which may reflect a small amount of hemorrhage, new since prior study. Diffuse subdural hemorrhage is also noted along the tentorium as well as overlying right cerebellar hemisphere. There is mild mass effect on the 4th ventricle as before. There is no hydrocephalus.   Parenchyma:  There is a small hemorrhagic focus within the right parietal lobe measuring 9 x 4 mm with mild surrounding edema. Remainder of the grey-white  differentiation unchanged. There is no mass effect or midline shift.   Calvarium: The calvarium is unremarkable.   Paranasal sinuses and mastoids: Mild mucosal thickening is seen within right maxillary sinus and few ethmoid air cells. Bilateral mastoids are clear.       Stable right frontal subarachnoid hemorrhage. Stable small suspected right parietal lobe intraparenchymal hemorrhage with mild surrounding edema.   Minimal suspicious left lateral ventricular hemorrhage, new since prior study. Attention on follow-up imaging is recommended.   Stable diffuse subarachnoid hemorrhage overlying right cerebellar hemisphere. Stable small suspected right subdural hemorrhage in the posterior fossa.   Signed by: Lissy Reid 10/3/2023 5:12 PM Dictation workstation:   [id number]    CT head wo IV contrast    Result Date: 10/3/2023  Interpreted By:  Trenton Moura, STUDY: CT HEAD WO IV CONTRAST;  10/3/2023 9:21 am   INDICATION: Signs/Symptoms:position headache with emesis postop.   COMPARISON: 10/18/2014   ACCESSION NUMBER(S): [id number]   ORDERING CLINICIAN: KATIUSKA COMBS   TECHNIQUE: Sequential trans axial images were obtained  .   FINDINGS: INTRACRANIAL:   CORTICAL SULCI AND EXTRA-AXIAL SPACES:  There is faint increased attenuation within sulci of the right cerebral hemisphere, best seen at the apex on image 28 of series 201, frontal lobe on image 27 and temporal lobe on image 18 indicating subarachnoid hemorrhage. Focal attenuation at the right parietal lobe on image 25 is also probably within a deep sulcus, although may be inter parenchymal. There is also slight increased attenuation along the tentorium greater on the right, and right cerebellar sulci indicating additional hemorrhage. Somewhat more discrete right cerebellar attenuation on sagittal image 47 of series 205 is also probably within a deep sulcus, although intraparenchymal blood is not excluded.   VENTRICULAR SYSTEM:  There is attenuation within the 3rd ventricle,  at the floor of the lateral ventricles greater on the left, and with a small foci of the 4th ventricle indicating intraventricular blood.   CEREBRAL PARENCHYMA:  Unremarkable without significant degenerative change.There is no evidence of definite subacute infarction, intracranial hemorrhage or mass.   EXTRACRANIAL: Mucosal thickening and mucous retention cysts at the right maxillary air cell. The calvarium is intact.       Subarachnoid hemorrhage at the right convexity, posterior fossa on the right and along the tentorium. More focal attenuation at the right parietal apex and right cerebellum is probably subarachnoid blood within deeper sulci, although a parenchymal hemorrhage is not excluded. Intraventricular blood is also present.   MACRO: Information as given in the impression was communicated with secure chat.   Signed by: Trenton Moura 10/3/2023 10:39 AM Dictation workstation:   [id number]    CT lumbar spine wo IV contrast    Result Date: 10/3/2023  Interpreted By:  Trenton Moura, STUDY: CT LUMBAR SPINE WO IV CONTRAST  10/3/2023 9:21 am   INDICATION: Signs/Symptoms:positional headache with emesis, dural tear intraop   COMPARISON: None.   ACCESSION NUMBER(S): [id number]   ORDERING CLINICIAN: KATIUSKA COMBS   TECHNIQUE: Transaxial helical scans with orthogonal reconstructions  .   FINDINGS: OSSEOUS STRUCTURES: Intact.   ALIGNMENT: Within normal limits in the AP plane without significant scoliosis.   LOWER THORACIC SPINE: Mild spondylosis, with mild disc space narrowing at the T11-12 level with mild anterior marginal osteophyte formation and Schmorl's node.   T12-L1: Moderate narrowing of the disc interspace with mild-to-moderate marginal osteophyte formation. There is also mild approximate 4 mm posterior osteophytic lipping-disc complex, with mild canal stenosis.   L1-2: Moderate narrowing of the disc interspace with mild anterior disc bulge and mild-to-moderate marginal osteophyte formation. There is also  mild-to-moderate 5 mm posterior osteophytic lipping-disc complex with mild canal stenosis.   L2-3: Maintained.   L3-4: Status post expander disc prosthesis and laminectomy. There are also L4 and L5 laminectomies, with transfixation with posterior rods and L3 through S1 transpedicular screws without hardware loosening. This however does result in beam hardening limiting image detail at these levels. There are scattered foci of air within the spinal canal at these levels, presumably postoperative and best seen left paramedially at the L3-4 level there is posterior soft tissue fullness probably postoperative edema and possibly hematoma with postoperative emphysema. Several flecks of air also noted within the left psoas and quadratus lumborum, which are also thickened indicating hematoma.. There also appears to be moderate 6 mm posterior central disc protrusion. There is mild apophyseal hypertrophy with mild foraminal impingement. There is also moderate anterior marginal osteophyte formation and anterior disc bulge.   L4-5: There is moderate narrowing of the disc interspace, but also an expanded disc prosthesis, and again laminectomy. There is moderate anterior marginal osteophyte formation and anterior disc bulge. Is mild-to-moderate hypertrophy of the apophyseal joints but without significant foraminal impingement. There is slight cortical step-off at the inferior margin of the right L4 pedicle on image 41 of series 210 questionable for a fracture.   L5-S1: There moderate-to-marked narrowing of the disc interspace with a moderate-to-marked retrolisthesis, but there also been placement of a disc prosthesis with laminectomy. There is fullness and increased attenuation at the spinal canal at the L5 level, greater right paramedially probably due to nonspecific hematoma, which may be epidural, subdural, subarachnoid or combination of such. Measuring approximately 3.7 cm on image 107 of series 206. There has also been a  right laminotomy. There is moderate anterior marginal osteophyte formation and mild posterior osteophytic lipping-disc complex. There is mild left apophyseal hypertrophy with vacuum phenomena, with mild foraminal impingement..   ADDITIONAL FINDINGS: Moderate right hydronephrosis, incompletely evaluated on this exam, but may be due to distal ureteral stricture, as the distal ureter does not appear dilated. There also appears to be partially included moderately distended urinary bladder. There is marked right renal cortical atrophy also with hydronephrosis.       Questionable fracture of the left L4 pedicle. Probable hematoma within the spinal canal at the L5 level. Presumed postoperative soft tissue emphysema and hematoma including the left psoas and quadratus lumborum muscles. Otherwise satisfactory postop appearance.   MACRO: Trenton Moura discussed the significance and urgency of this critical finding secure chat with  KATIUSKA COMBS on 10/3/2023 at 10:25 am. (**-RCF-**) Findings:  See findings.     Signed by: Trenton Moura 10/3/2023 10:26 AM Dictation workstation:   [id number]    XR lumbar spine 2-3 views    Result Date: 10/1/2023  Interpreted By:  Moe Mckeon, STUDY: XR LUMBAR SPINE 2-3 VIEWS;  10/1/2023 11:06 am   INDICATION: Signs/Symptoms:post op.   COMPARISON: 03/08/2012   ACCESSION NUMBER(S): [id number]   ORDERING CLINICIAN: SUBHA JUNE   FINDINGS: Multiple views of the  lumbar spine are obtained. Interval laminectomy changes and posterior pedicle screws fusing L3 through S1. Alignment appears to be intact. Multilevel discogenic degenerative changes.       Interval fusion of the lower lumbar spine..     Signed by: Moe Mckeon 10/1/2023 11:49 AM Dictation workstation:   [id number]    Electrocardiogram 12 Lead    Result Date: 9/23/2023  Normal sinus rhythm Normal ECG When compared with ECG of 26-JUN-2013 10:04, No significant change was found Confirmed by Fina Talavera ([id number]) on 9/23/2023 1:07:05 PM    XR  chest 2 view    Result Date: 9/21/2023  Interpreted By:  GUSTAVO MONROE MD MRN: [id number] Patient Name: JESUS METCALF  STUDY: TH CHEST 2 VIEW PA AND LAT;  9/20/2023 9:50 am  INDICATION: preop .  COMPARISON: None.  ACCESSION NUMBER(S): [id number]  ORDERING CLINICIAN: DEAN OGLESBY  FINDINGS: The lungs are clear without pleural effusion. Normal heart size, mediastinum, jay, and pulmonary vasculature.      No active disease in the chest.      Assessment/Plan   Principal Problem:    Syncope, unspecified syncope type  Active Problems:    H/O fracture of ankle    NSTEMI (non-ST elevated myocardial infarction) (CMS/[facility])    Plan for surgical repair R LE fracture when medically optimized  In the meantime continue pain control and immobilization of fracture       I spent 30 minutes in the professional and overall care of this patient.      Kristian Raines PA-C

## 2023-10-12 ENCOUNTER — ANESTHESIA EVENT (OUTPATIENT)
Dept: OPERATING ROOM | Facility: HOSPITAL | Age: 58
DRG: 321 | End: 2023-10-12
Payer: COMMERCIAL

## 2023-10-12 LAB
ALBUMIN SERPL BCP-MCNC: 2.8 G/DL (ref 3.4–5)
ANION GAP SERPL CALC-SCNC: 11 MMOL/L (ref 10–20)
BUN SERPL-MCNC: 12 MG/DL (ref 6–23)
CALCIUM SERPL-MCNC: 8.2 MG/DL (ref 8.6–10.3)
CHLORIDE SERPL-SCNC: 100 MMOL/L (ref 98–107)
CHOLEST SERPL-MCNC: 100 MG/DL (ref 0–199)
CHOLESTEROL/HDL RATIO: 6.4
CO2 SERPL-SCNC: 27 MMOL/L (ref 21–32)
CREAT SERPL-MCNC: 0.76 MG/DL (ref 0.5–1.3)
ERYTHROCYTE [DISTWIDTH] IN BLOOD BY AUTOMATED COUNT: 16.1 % (ref 11.5–14.5)
GFR SERPL CREATININE-BSD FRML MDRD: >90 ML/MIN/1.73M*2
GLUCOSE BLD MANUAL STRIP-MCNC: 78 MG/DL (ref 74–99)
GLUCOSE BLD MANUAL STRIP-MCNC: 85 MG/DL (ref 74–99)
GLUCOSE BLD MANUAL STRIP-MCNC: 88 MG/DL (ref 74–99)
GLUCOSE BLD MANUAL STRIP-MCNC: 89 MG/DL (ref 74–99)
GLUCOSE BLD MANUAL STRIP-MCNC: 97 MG/DL (ref 74–99)
GLUCOSE SERPL-MCNC: 78 MG/DL (ref 74–99)
HCT VFR BLD AUTO: 28.8 % (ref 41–52)
HDLC SERPL-MCNC: 15.6 MG/DL
HGB BLD-MCNC: 9.3 G/DL (ref 13.5–17.5)
LDLC SERPL CALC-MCNC: 43 MG/DL
MAGNESIUM SERPL-MCNC: 1.88 MG/DL (ref 1.6–2.4)
MCH RBC QN AUTO: 29.5 PG (ref 26–34)
MCHC RBC AUTO-ENTMCNC: 32.3 G/DL (ref 32–36)
MCV RBC AUTO: 91 FL (ref 80–100)
NON HDL CHOLESTEROL: 84 MG/DL (ref 0–149)
NRBC BLD-RTO: 0 /100 WBCS (ref 0–0)
PHOSPHATE SERPL-MCNC: 4 MG/DL (ref 2.5–4.9)
PLATELET # BLD AUTO: 282 X10*3/UL (ref 150–450)
PMV BLD AUTO: 9.6 FL (ref 7.5–11.5)
POTASSIUM SERPL-SCNC: 3.8 MMOL/L (ref 3.5–5.3)
RBC # BLD AUTO: 3.15 X10*6/UL (ref 4.5–5.9)
SODIUM SERPL-SCNC: 134 MMOL/L (ref 136–145)
TRIGL SERPL-MCNC: 207 MG/DL (ref 0–149)
UFH PPP CHRO-ACNC: 0.3 IU/ML
VLDL: 41 MG/DL (ref 0–40)
WBC # BLD AUTO: 8.8 X10*3/UL (ref 4.4–11.3)

## 2023-10-12 PROCEDURE — 82947 ASSAY GLUCOSE BLOOD QUANT: CPT

## 2023-10-12 PROCEDURE — 2500000001 HC RX 250 WO HCPCS SELF ADMINISTERED DRUGS (ALT 637 FOR MEDICARE OP)

## 2023-10-12 PROCEDURE — 2500000004 HC RX 250 GENERAL PHARMACY W/ HCPCS (ALT 636 FOR OP/ED)

## 2023-10-12 PROCEDURE — 83735 ASSAY OF MAGNESIUM: CPT

## 2023-10-12 PROCEDURE — 96372 THER/PROPH/DIAG INJ SC/IM: CPT

## 2023-10-12 PROCEDURE — 80061 LIPID PANEL: CPT | Performed by: INTERNAL MEDICINE

## 2023-10-12 PROCEDURE — 2060000001 HC INTERMEDIATE ICU ROOM DAILY

## 2023-10-12 PROCEDURE — 2580000001 HC RX 258 IV SOLUTIONS: Performed by: STUDENT IN AN ORGANIZED HEALTH CARE EDUCATION/TRAINING PROGRAM

## 2023-10-12 PROCEDURE — 2500000001 HC RX 250 WO HCPCS SELF ADMINISTERED DRUGS (ALT 637 FOR MEDICARE OP): Performed by: STUDENT IN AN ORGANIZED HEALTH CARE EDUCATION/TRAINING PROGRAM

## 2023-10-12 PROCEDURE — 85027 COMPLETE CBC AUTOMATED: CPT

## 2023-10-12 PROCEDURE — 2500000004 HC RX 250 GENERAL PHARMACY W/ HCPCS (ALT 636 FOR OP/ED): Performed by: STUDENT IN AN ORGANIZED HEALTH CARE EDUCATION/TRAINING PROGRAM

## 2023-10-12 PROCEDURE — 2500000001 HC RX 250 WO HCPCS SELF ADMINISTERED DRUGS (ALT 637 FOR MEDICARE OP): Performed by: INTERNAL MEDICINE

## 2023-10-12 PROCEDURE — 99233 SBSQ HOSP IP/OBS HIGH 50: CPT | Performed by: STUDENT IN AN ORGANIZED HEALTH CARE EDUCATION/TRAINING PROGRAM

## 2023-10-12 PROCEDURE — 36415 COLL VENOUS BLD VENIPUNCTURE: CPT

## 2023-10-12 PROCEDURE — 80069 RENAL FUNCTION PANEL: CPT

## 2023-10-12 PROCEDURE — 85520 HEPARIN ASSAY: CPT

## 2023-10-12 RX ORDER — CLOPIDOGREL BISULFATE 300 MG/1
300 TABLET, FILM COATED ORAL ONCE
Status: COMPLETED | OUTPATIENT
Start: 2023-10-12 | End: 2023-10-12

## 2023-10-12 RX ORDER — HEPARIN SODIUM 5000 [USP'U]/ML
2000-4000 INJECTION, SOLUTION INTRAVENOUS; SUBCUTANEOUS EVERY 4 HOURS PRN
Status: DISCONTINUED | OUTPATIENT
Start: 2023-10-12 | End: 2023-10-12

## 2023-10-12 RX ORDER — CLOPIDOGREL BISULFATE 300 MG/1
300 TABLET, FILM COATED ORAL ONCE
Status: DISCONTINUED | OUTPATIENT
Start: 2023-10-12 | End: 2023-10-12

## 2023-10-12 RX ORDER — HEPARIN SODIUM 5000 [USP'U]/ML
4000 INJECTION, SOLUTION INTRAVENOUS; SUBCUTANEOUS ONCE
Status: DISCONTINUED | OUTPATIENT
Start: 2023-10-12 | End: 2023-10-12

## 2023-10-12 RX ORDER — HEPARIN SODIUM 1000 [USP'U]/ML
4000 INJECTION, SOLUTION INTRAVENOUS; SUBCUTANEOUS ONCE
Status: DISCONTINUED | OUTPATIENT
Start: 2023-10-12 | End: 2023-10-16

## 2023-10-12 RX ORDER — CLOPIDOGREL BISULFATE 75 MG/1
75 TABLET ORAL DAILY
Status: DISCONTINUED | OUTPATIENT
Start: 2023-10-13 | End: 2023-10-12

## 2023-10-12 RX ORDER — HEPARIN SODIUM 1000 [USP'U]/ML
2000-4000 INJECTION, SOLUTION INTRAVENOUS; SUBCUTANEOUS EVERY 4 HOURS PRN
Status: DISCONTINUED | OUTPATIENT
Start: 2023-10-12 | End: 2023-10-13

## 2023-10-12 RX ORDER — HEPARIN SODIUM 10000 [USP'U]/100ML
0-4000 INJECTION, SOLUTION INTRAVENOUS CONTINUOUS
Status: DISCONTINUED | OUTPATIENT
Start: 2023-10-12 | End: 2023-10-13

## 2023-10-12 RX ADMIN — OXYCODONE HYDROCHLORIDE 10 MG: 10 TABLET ORAL at 20:11

## 2023-10-12 RX ADMIN — ATORVASTATIN CALCIUM 80 MG: 80 TABLET, FILM COATED ORAL at 20:10

## 2023-10-12 RX ADMIN — CARVEDILOL 12.5 MG: 12.5 TABLET, FILM COATED ORAL at 08:34

## 2023-10-12 RX ADMIN — SENNOSIDES 8.6 MG: 8.6 TABLET, FILM COATED ORAL at 20:10

## 2023-10-12 RX ADMIN — ACETAMINOPHEN 650 MG: 325 TABLET ORAL at 20:10

## 2023-10-12 RX ADMIN — CYCLOBENZAPRINE 10 MG: 10 TABLET, FILM COATED ORAL at 20:10

## 2023-10-12 RX ADMIN — SODIUM CHLORIDE, POTASSIUM CHLORIDE, SODIUM LACTATE AND CALCIUM CHLORIDE 100 ML/HR: 600; 310; 30; 20 INJECTION, SOLUTION INTRAVENOUS at 02:47

## 2023-10-12 RX ADMIN — HEPARIN SODIUM 5000 UNITS: 5000 INJECTION INTRAVENOUS; SUBCUTANEOUS at 04:17

## 2023-10-12 RX ADMIN — SENNOSIDES 8.6 MG: 8.6 TABLET, FILM COATED ORAL at 08:34

## 2023-10-12 RX ADMIN — CYCLOBENZAPRINE 10 MG: 10 TABLET, FILM COATED ORAL at 14:07

## 2023-10-12 RX ADMIN — HEPARIN SODIUM 1200 UNITS/HR: 10000 INJECTION, SOLUTION INTRAVENOUS at 16:44

## 2023-10-12 RX ADMIN — TAMSULOSIN HYDROCHLORIDE 0.4 MG: 0.4 CAPSULE ORAL at 08:34

## 2023-10-12 RX ADMIN — CARVEDILOL 12.5 MG: 12.5 TABLET, FILM COATED ORAL at 20:10

## 2023-10-12 RX ADMIN — OXYCODONE HYDROCHLORIDE 10 MG: 10 TABLET ORAL at 12:39

## 2023-10-12 RX ADMIN — LEVETIRACETAM 500 MG: 500 TABLET, FILM COATED ORAL at 20:10

## 2023-10-12 RX ADMIN — HYDROMORPHONE HYDROCHLORIDE 0.6 MG: 1 INJECTION, SOLUTION INTRAMUSCULAR; INTRAVENOUS; SUBCUTANEOUS at 10:04

## 2023-10-12 RX ADMIN — SODIUM CHLORIDE, POTASSIUM CHLORIDE, SODIUM LACTATE AND CALCIUM CHLORIDE 100 ML/HR: 600; 310; 30; 20 INJECTION, SOLUTION INTRAVENOUS at 20:15

## 2023-10-12 RX ADMIN — HYDROMORPHONE HYDROCHLORIDE 0.6 MG: 1 INJECTION, SOLUTION INTRAMUSCULAR; INTRAVENOUS; SUBCUTANEOUS at 07:02

## 2023-10-12 RX ADMIN — ACETAMINOPHEN 650 MG: 325 TABLET ORAL at 16:41

## 2023-10-12 RX ADMIN — PREGABALIN 300 MG: 150 CAPSULE ORAL at 08:34

## 2023-10-12 RX ADMIN — OXYCODONE HYDROCHLORIDE 10 MG: 10 TABLET ORAL at 04:17

## 2023-10-12 RX ADMIN — ACETAMINOPHEN 650 MG: 325 TABLET ORAL at 12:39

## 2023-10-12 RX ADMIN — HYDROMORPHONE HYDROCHLORIDE 0.6 MG: 1 INJECTION, SOLUTION INTRAMUSCULAR; INTRAVENOUS; SUBCUTANEOUS at 07:01

## 2023-10-12 RX ADMIN — PREGABALIN 300 MG: 150 CAPSULE ORAL at 20:10

## 2023-10-12 RX ADMIN — PANTOPRAZOLE SODIUM 40 MG: 40 TABLET, DELAYED RELEASE ORAL at 07:02

## 2023-10-12 RX ADMIN — HEPARIN SODIUM 5000 UNITS: 5000 INJECTION INTRAVENOUS; SUBCUTANEOUS at 12:40

## 2023-10-12 RX ADMIN — HYDROMORPHONE HYDROCHLORIDE 0.6 MG: 1 INJECTION, SOLUTION INTRAMUSCULAR; INTRAVENOUS; SUBCUTANEOUS at 17:10

## 2023-10-12 RX ADMIN — ALLOPURINOL 100 MG: 100 TABLET ORAL at 08:34

## 2023-10-12 RX ADMIN — HYDROMORPHONE HYDROCHLORIDE 0.6 MG: 1 INJECTION, SOLUTION INTRAMUSCULAR; INTRAVENOUS; SUBCUTANEOUS at 02:03

## 2023-10-12 RX ADMIN — LEVETIRACETAM 500 MG: 500 TABLET, FILM COATED ORAL at 08:34

## 2023-10-12 RX ADMIN — HYDROMORPHONE HYDROCHLORIDE 0.6 MG: 1 INJECTION, SOLUTION INTRAMUSCULAR; INTRAVENOUS; SUBCUTANEOUS at 14:07

## 2023-10-12 RX ADMIN — ASPIRIN 81 MG 81 MG: 81 TABLET ORAL at 08:34

## 2023-10-12 RX ADMIN — HYDROMORPHONE HYDROCHLORIDE 0.6 MG: 1 INJECTION, SOLUTION INTRAMUSCULAR; INTRAVENOUS; SUBCUTANEOUS at 20:11

## 2023-10-12 RX ADMIN — ACETAMINOPHEN 650 MG: 325 TABLET ORAL at 07:02

## 2023-10-12 RX ADMIN — HEPARIN SODIUM 4000 UNITS: 5000 INJECTION INTRAVENOUS; SUBCUTANEOUS at 16:49

## 2023-10-12 RX ADMIN — HYDROMORPHONE HYDROCHLORIDE 0.6 MG: 1 INJECTION, SOLUTION INTRAMUSCULAR; INTRAVENOUS; SUBCUTANEOUS at 23:33

## 2023-10-12 RX ADMIN — CYCLOBENZAPRINE 10 MG: 10 TABLET, FILM COATED ORAL at 08:34

## 2023-10-12 RX ADMIN — CLOPIDOGREL BISULFATE 300 MG: 300 TABLET, FILM COATED ORAL at 16:43

## 2023-10-12 RX ADMIN — ESCITALOPRAM OXALATE 20 MG: 20 TABLET, FILM COATED ORAL at 08:34

## 2023-10-12 ASSESSMENT — PAIN SCALES - GENERAL
PAINLEVEL_OUTOF10: 6
PAINLEVEL_OUTOF10: 9
PAINLEVEL_OUTOF10: 5 - MODERATE PAIN
PAINLEVEL_OUTOF10: 9
PAINLEVEL_OUTOF10: 10 - WORST POSSIBLE PAIN
PAINLEVEL_OUTOF10: 7
PAINLEVEL_OUTOF10: 6
PAINLEVEL_OUTOF10: 9
PAINLEVEL_OUTOF10: 7
PAINLEVEL_OUTOF10: 4
PAINLEVEL_OUTOF10: 5 - MODERATE PAIN
PAINLEVEL_OUTOF10: 6
PAINLEVEL_OUTOF10: 9

## 2023-10-12 ASSESSMENT — COGNITIVE AND FUNCTIONAL STATUS - GENERAL
CLIMB 3 TO 5 STEPS WITH RAILING: TOTAL
DAILY ACTIVITIY SCORE: 22
STANDING UP FROM CHAIR USING ARMS: A LITTLE
MOBILITY SCORE: 16
WALKING IN HOSPITAL ROOM: A LOT
MOVING TO AND FROM BED TO CHAIR: A LOT
DRESSING REGULAR LOWER BODY CLOTHING: A LITTLE
MOBILITY SCORE: 16
TOILETING: A LITTLE
DRESSING REGULAR LOWER BODY CLOTHING: A LITTLE
TOILETING: A LITTLE
MOVING TO AND FROM BED TO CHAIR: A LOT
WALKING IN HOSPITAL ROOM: A LOT
STANDING UP FROM CHAIR USING ARMS: A LITTLE
CLIMB 3 TO 5 STEPS WITH RAILING: TOTAL
DAILY ACTIVITIY SCORE: 22

## 2023-10-12 ASSESSMENT — PAIN - FUNCTIONAL ASSESSMENT
PAIN_FUNCTIONAL_ASSESSMENT: 0-10

## 2023-10-12 ASSESSMENT — PAIN DESCRIPTION - DESCRIPTORS
DESCRIPTORS: ACHING;DISCOMFORT;SORE
DESCRIPTORS: ACHING;DISCOMFORT;SORE

## 2023-10-12 ASSESSMENT — ACTIVITIES OF DAILY LIVING (ADL)
LACK_OF_TRANSPORTATION: PATIENT DECLINED
LACK_OF_TRANSPORTATION: PATIENT DECLINED

## 2023-10-12 NOTE — PROGRESS NOTES
DAILY PROGRESS NOTE        Visit Vitals  /72 (BP Location: Left arm, Patient Position: Lying)   Pulse 66   Temp 36.3 °C (97.3 °F) (Temporal)   Resp 18      Temp (24hrs), Av.3 °C (97.4 °F), Min:36 °C (96.8 °F), Max:36.7 °C (98.1 °F)       Exam:   Focused examination right leg: Currently within the splint splint is clean dry and intact.  Compartments are soft and compressible  Extremity shows neuro vascular status intact. Flexion and extension intact on extremity.  Calves soft and non-tender without evidence of DVT.      Labs reviewed:    CT ankle right wo IV contrast    Result Date: 10/11/2023  Interpreted By:  Christina Watt, STUDY: CT ANKLE RIGHT WO IV CONTRAST;  10/11/2023 3:24 pm   INDICATION: Signs/Symptoms:distal tibia fx.   COMPARISON: Right ankle radiographs dated 05/15/2020.   ACCESSION NUMBER(S): [id number]   ORDERING CLINICIAN: BILL SWEENEY   TECHNIQUE: CT imaging of the  right lower extremity was obtained  without administration of intravenous contrast medium. Coronal and sagittal reformatted images were performed. 3D reformatted images were created and reviewed.   FINDINGS: OSSEOUS STRUCTURES: There is an oblique minimally comminuted fracture through distal tibial diaphysis. There is mild comminution of fracture fragments about the fracture site. There is mild anterior and medial displacement of the proximal fracture fragment with overriding of fracture fragments by approximately 1.5 cm. There is also an oblique mildly-comminuted fracture through distal fibular metadiaphysis. There is anterior and lateral displacement of the proximal fracture fragment with significant overriding of fracture fragments by approximately 3 cm. There is extension of fracture lucency to the distal tibiofibular joint articulation (as seen on coronal image 68/104). However the distal tibiofibular joint articulation appears grossly intact without significant displacement or dislocation. There is also cortical  remodeling with an oblique irregular lucency extending through the medial aspect of the tibial plafond, likely related to cortical remodeling from remote fracture. There is incongruity of the distal articular surface of tibia at the level of this remote fracture extension (best seen on coronal image 55/104 and sagittal image 47/104). There are moderate degenerative changes in the tibiotalar joint articulation with joint space narrowing and prominent marginal osteophytes. There are well corticated ossific fragments at the tip of medial and lateral malleoli, likely related to remote trauma. Subtalar joint articulation is maintained and demonstrates mild-to-moderate degenerative changes with subchondral sclerosis and prominent marginal osteophytes. Note made of os trigonum. There is also prominent osseous process along the dorsal aspect of the talar neck which is likely favored to be sequela of remote injury of the dorsal talonavicular ligament. Rest of the osseous structures appear grossly unremarkable.   SOFT TISSUES: There is soft tissue swelling about the included distal leg extending to the ankle. Evaluation of tendons and ligaments is limited due to CT technique. Within this limitation, tendons appear grossly unremarkable.       1. Acute minimally comminuted and displaced fractures through distal tibia and fibula as described above. 2. There are also findings consistent with remote trauma with significant cortical remodeling of the distal tibia with associated incongruity of the distal tibial articular surface as described above. 3. Moderate arthrosis of the tibiotalar and subtalar joints. 4. Soft tissue swelling about the distal leg and ankle.   MACRO: None   Signed by: Christina Watt 10/11/2023 4:47 PM Dictation workstation:   [id number]    XR ankle right 2 views    Result Date: 10/10/2023  Interpreted By:  Kameron Ross, STUDY: XR ANKLE RIGHT 2 VIEWS; ;  10/10/2023 5:27 am   INDICATION: Signs/Symptoms:Rt  ankle fracture.   COMPARISON: Right ankle radiographs dated 05/15/2020.   ACCESSION NUMBER(S): [id number]   ORDERING CLINICIAN: DENIS MORE   FINDINGS: There is an acute comminuted displaced fracture of the distal tibia diaphysis with a proximally half shaft width lateral displacement of the distal fracture fragment. There is also an acute comminuted fracture of the distal fibula diaphysis with aproximately half shaft width posterior displacement of the distal fracture fragment. There are well corticated ossifications around the distal medial and lateral malleolus reflecting osseous remodeling from the previous healed fracture. The ankle mortise appears intact.       Displaced and comminuted fracture of the distal tibia diaphysis and distal fibula diaphysis, as described above. The ankle mortise appears intact.     MACRO: None   Signed by: Kameron Ross 10/10/2023 5:59 AM Dictation workstation:   [id number]  XR ankle right 3+ views    Result Date: 10/9/2023  EXAMINATION: THREE XRAY VIEWS OF THE RIGHT ANKLE 10/9/2023 1:02 pm COMPARISON: January 18, 2020 1334 hours. HISTORY: ORDERING SYSTEM PROVIDED HISTORY: fall TECHNOLOGIST PROVIDED HISTORY: Reason for exam:->fall What reading provider will be dictating this exam?->CRC FINDINGS: Two views of the right ankle are submitted. There is both a new acute spiral mildly displaced fracture of the distal right tibia superimposed upon an old vertical fracture with intra-articular extension at the time.  There is now a new associated comminuted fracture of the distal right fibula. Identified again is a well corticated bony density distal to the medial malleolus and to the lateral malleolus likely representing old avulsion injuries.. The mortise is intact.  No dislocations.    THERE IS NOW A NEW ACUTE OBLIQUE FRACTURE OF THE DISTAL RIGHT TIBIA WITH A OLD VERTICAL FRACTURE OF THE TIBIA. THERE IS A NEW COMMINUTED FRACTURE OF THE DISTAL RIGHT FIBULA.      I&O  I/O last 3  completed shifts:  In: 2796.3 (24.4 mL/kg) [P.O.:140; I.V.:1856.3 (16.2 mL/kg); IV Piggyback:800]  Out: 2210 (19.3 mL/kg) [Urine:2200 (0.5 mL/kg/hr); Blood:10]  Weight: 114.8 kg       Assessment: 58-year-old male with right distal tibia/fibula fracture    Plan:    Discussed case with Dr. Colletta this morning.  Plan is to proceed with stent today with cardiology team.  Patient will require dual antiplatelet agents after this is performed.  From orthopedic surgery standpoint patient does need fixation of leg and will be at higher risk of bleeding given this.  However discussed that we do not truly have a choices do not want to result in worsening cardiac problems intraoperatively.  Therefore we will proceed with surgical intervention tomorrow.  Risk benefits and alternatives to treatment were discussed with patient today.  Consent was obtained electronically.  Using shared informed decision making he wishes to proceed.  This was also discussed with the patient's wife over the phone.  Also discussed this case with the anesthesia team.  Discussed that this problem truly can be life-threatening if patient is erika medically optimized for surgery.    CT reviewed.  Strict nonweightbearing right lower extremity.    The risks of surgery were discussed including but not limited to the risks of medications given for surgery, the risk of blood loss during and after surgery that can lead to the need for blood products in certain situations, infection, damage to normal structures that can lead to long term problems of pain or dysfunction, wound healing complications, the possibility of nonunion/malunion of any osteotomies and late or chronic pain as a result of the surgical intervention.  In addition potentially life threatening complications that can occur at the time of surgery and after surgery were discussed including but not limited to deep vein thrombosis, pulmonary embolism, myocardial infarction, stroke and death.    Rocco  JULITO Samuel MD MD  10/12/2023 12:03 PM

## 2023-10-12 NOTE — PROGRESS NOTES
Gm Thrasher is a 58 y.o. male on day 2 of admission presenting with Syncope, unspecified syncope type.      Subjective   -Patient seen with team, resting in bed in no apparent distress. Alert and cooperative.   -Patient endorses pain in ankle and chronic back pain but denies other positives to a 12 system ROS noting patient denies any chest pain, SOB, abdominal pain, nausea, vomiting, diarrhea, fevers of chills.   -Patient VSS WNL.  -Patient in no acute events overnight  -Patient's labs significant for sodium 134, triglycerides 207, hemoglobin 9.3       Objective     Last Recorded Vitals  /72 (BP Location: Left arm, Patient Position: Lying)   Pulse 66   Temp 36.3 °C (97.3 °F) (Temporal)   Resp 18   Wt 115 kg (253 lb 1.6 oz)   SpO2 92%   Intake/Output last 3 Shifts:    Intake/Output Summary (Last 24 hours) at 10/12/2023 1054  Last data filed at 10/12/2023 0906  Gross per 24 hour   Intake 1745 ml   Output 2250 ml   Net -505 ml         Admission Weight  Weight: 114 kg (251 lb 4.8 oz) (10/10/23 0358)    Daily Weight  10/11/23 : 115 kg (253 lb 1.6 oz)    Image Results  ECG 12 lead  Normal sinus rhythm  Normal ECG  When compared with ECG of 10-OCT-2023 05:44, (unconfirmed)  No significant change was found  Confirmed by Fina Talavera ([id number]) on 10/11/2023 6:13:48 PM  CT ankle right wo IV contrast  Narrative: Interpreted By:  Christina Watt,   STUDY:  CT ANKLE RIGHT WO IV CONTRAST;  10/11/2023 3:24 pm      INDICATION:  Signs/Symptoms:distal tibia fx.      COMPARISON:  Right ankle radiographs dated 05/15/2020.      ACCESSION NUMBER(S):  [id number]      ORDERING CLINICIAN:  BILL SWEENEY      TECHNIQUE:  CT imaging of the  right lower extremity was obtained  without  administration of intravenous contrast medium. Coronal and sagittal  reformatted images were performed. 3D reformatted images were created  and reviewed.      FINDINGS:  OSSEOUS STRUCTURES:  There is an oblique minimally comminuted fracture  through distal  tibial diaphysis. There is mild comminution of fracture fragments  about the fracture site. There is mild anterior and medial  displacement of the proximal fracture fragment with overriding of  fracture fragments by approximately 1.5 cm. There is also an oblique  mildly-comminuted fracture through distal fibular metadiaphysis.  There is anterior and lateral displacement of the proximal fracture  fragment with significant overriding of fracture fragments by  approximately 3 cm. There is extension of fracture lucency to the  distal tibiofibular joint articulation (as seen on coronal image  68/104). However the distal tibiofibular joint articulation appears  grossly intact without significant displacement or dislocation. There  is also cortical remodeling with an oblique irregular lucency  extending through the medial aspect of the tibial plafond, likely  related to cortical remodeling from remote fracture. There is  incongruity of the distal articular surface of tibia at the level of  this remote fracture extension (best seen on coronal image 55/104 and  sagittal image 47/104). There are moderate degenerative changes in  the tibiotalar joint articulation with joint space narrowing and  prominent marginal osteophytes. There are well corticated ossific  fragments at the tip of medial and lateral malleoli, likely related  to remote trauma. Subtalar joint articulation is maintained and  demonstrates mild-to-moderate degenerative changes with subchondral  sclerosis and prominent marginal osteophytes. Note made of os  trigonum. There is also prominent osseous process along the dorsal  aspect of the talar neck which is likely favored to be sequela of  remote injury of the dorsal talonavicular ligament. Rest of the  osseous structures appear grossly unremarkable.      SOFT TISSUES:  There is soft tissue swelling about the included distal leg extending  to the ankle. Evaluation of tendons and ligaments is  limited due to  CT technique. Within this limitation, tendons appear grossly  unremarkable.      Impression: 1. Acute minimally comminuted and displaced fractures through distal  tibia and fibula as described above.  2. There are also findings consistent with remote trauma with  significant cortical remodeling of the distal tibia with associated  incongruity of the distal tibial articular surface as described above.  3. Moderate arthrosis of the tibiotalar and subtalar joints.  4. Soft tissue swelling about the distal leg and ankle.      MACRO:  None      Signed by: Christina Watt 10/11/2023 4:47 PM  Dictation workstation:   [id number]  ECG 12 lead  Normal sinus rhythm  Nonspecific ST and T wave abnormality  Abnormal ECG  When compared with ECG of 20-SEP-2023 09:06,  Nonspecific T wave abnormality now evident in Inferior leads  Confirmed by Juan Carlos Lynne ([id number]) on 10/11/2023 8:26:21 AM  US renal complete  Narrative: Interpreted By:  Erick Banks,   STUDY:  US RENAL COMPLETE;  10/10/2023 9:46 pm      INDICATION:  Signs/Symptoms:Bilateral hydro.      COMPARISON:  CT lumbar spine dated 10/03/2023.      ACCESSION NUMBER(S):  [id number]      ORDERING CLINICIAN:  VERN MONTEMAYOR      TECHNIQUE:  Grayscale and color Doppler sonographic images of the kidneys were  obtained  .      FINDINGS:  RIGHT KIDNEY:  The right kidney measures 14.8 cm in length. Renal cortical  echogenicity and thickness are within normal limits. Severe  hydronephrosis. No sonographic evident calculus.      LEFT KIDNEY:  The left kidney measures 11.2 cm in length. There are elements of  cortical renal thinning that may reflect scarring or prior infarct  within the upper and lower poles. Renal cortical echogenicity is  otherwise within normal limits. 6 mm diameter parenchymal  calcification versus calculus. No hydronephrosis.      BLADDER:  Bladder wall appears thickened measuring 6 mm. Bilateral ureteral  jets visualized.      Impression:  Severe right hydronephrosis, similar in appearance to CT lumbar spine  examination. Bilateral ureteral jets visualized.      Mild asymmetric enlargement of the right kidney with cortical renal  scarring involving the left kidney.      Signed by: Erick Banks 10/11/2023 8:01 AM  Dictation workstation:   [id number]  Transthoracic Echo (TTE) Complete              [facility]  [address], [facility] [address]     Tel [phone] Fax [phone]    TRANSTHORACIC ECHOCARDIOGRAM REPORT       Patient Name:      JESUS DIXON       Reading Physician:    [id number] Jonathan Malone MD  Study Date:        10/10/2023           Ordering Provider:    [id number] VERN MONTEMAYOR  MRN/PID:           [id number]             Fellow:  Accession#:        [id number]         Nurse:  Date of Birth/Age: 1965 / 58 years Sonographer:          Aleyda Hansen RDCS  Gender:            M                    Additional Staff:  Height:            180.00 cm            Admit Date:           10/10/2023  Weight:            51.71 kg             Admission Status:     Inpatient -                                                                Routine  BSA:               1.66 m2              Department Location:  [facility] CCU [facility]  Blood Pressure: 141 /84 mmHg    Study Type:    TRANSTHORACIC ECHO (TTE) COMPLETE  Diagnosis/ICD: Syncope-R55  Indication:    Syncope  CPT Codes:     Echo Complete w Full [id number]    Patient History:  Smoker:            Current.  Diabetes:          Yes  BMI:               Obese >30  Pertinent History: Hyperlipidemia and HTN. Limited imaging windows available. No                     previous echocardiogram.    Study Detail: The following Echo studies were performed: 2D, M-Mode, Doppler and                color flow. Image quality for this study is poor. Technically                challenging study  due to body habitus and patient lying in supine                position. Definity used as a contrast agent for endocardial border                definition. Total contrast used for this procedure was 2 mL via IV                push.       PHYSICIAN INTERPRETATION:  Left Ventricle: Left ventricular systolic function is low normal, with an estimated ejection fraction of 50-55%. There are no regional wall motion abnormalities. The left ventricular cavity size is normal. Left ventricular diastolic filling was indeterminate.  Left Atrium: The left atrium is normal in size.  Right Ventricle: The right ventricle is mildly enlarged. There is low normal right ventricular systolic function.  Right Atrium: The right atrium is normal in size.  Aortic Valve: The aortic valve is trileaflet. There is mild aortic valve cusp calcification. There is no evidence of aortic valve regurgitation. The peak instantaneous gradient of the aortic valve is 3.7 mmHg.  Mitral Valve: The mitral valve is normal in structure. There is no evidence of mitral valve regurgitation.  Tricuspid Valve: The tricuspid valve is structurally normal. No evidence of tricuspid regurgitation. The Doppler estimated RVSP is within normal limits.  Pulmonic Valve: The pulmonic valve is structurally normal. There is no indication of pulmonic valve regurgitation.  Pericardium: There is no pericardial effusion noted.  Aorta: The aortic root is normal. There is no dilatation of the ascending aorta. There is no dilatation of the aortic root.  Systemic Veins: The inferior vena cava was not well visualized.       CONCLUSIONS:   1. Left ventricular systolic function is low normal with a 50-55% estimated ejection fraction.   2. There is low normal right ventricular systolic function.   3. RVSP within normal limits.    QUANTITATIVE DATA SUMMARY:  2D MEASUREMENTS:                            Normal Ranges:  LAs:           4.70 cm    (2.7-4.0cm)  IVSd:          1.10 cm     (0.6-1.1cm)  LVPWd:         1.25 cm    (0.6-1.1cm)  LVIDd:         5.20 cm    (3.9-5.9cm)  LVIDs:         4.55 cm  LV Mass Index: 168.7 g/m2  LV % FS        12.5 %    M-MODE MEASUREMENTS:                   Normal Ranges:  Ao Root: 3.90 cm (2.0-3.7cm)    LV DIASTOLIC FUNCTION:                      Normal Ranges:  MV Peak E: 0.37 m/s (0.7-1.2 m/s)  MV Peak A: 0.57 m/s (0.42-0.7 m/s)  E/A Ratio: 0.65     (1.0-2.2)    MITRAL VALVE:                       Normal Ranges:  MV Vmax:    0.65 m/s (<=1.3m/s)  MV peak P.7 mmHg (<5mmHg)  MV mean P.0 mmHg (<48mmHg)  MV DT:      222 msec (150-240msec)    AORTIC VALVE:                          Normal Ranges:  AoV Vmax:      0.97 m/s (<=1.7m/s)  AoV Peak PG:   3.7 mmHg (<20mmHg)  LVOT Max Graeme:  0.78 m/s (<=1.1m/s)  LVOT VTI:      14.90 cm  LVOT Diameter: 2.10 cm  (1.8-2.4cm)  AoV Area,Vmax: 2.78 cm2 (2.5-4.5cm2)       RIGHT VENTRICLE:  TAPSE: 22.5 mm  RV s'  0.12 m/s    PULMONIC VALVE:                          Normal Ranges:  PV Accel Time: 119 msec (>120ms)  PV Max Graeme:    0.7 m/s  (0.6-0.9m/s)  PV Max P.7 mmHg       [id number] Jonathan Malone MD  Electronically signed on 10/11/2023 at 7:56:19 AM       ** Final **    Scheduled medications  acetaminophen, 650 mg, oral, 4x daily  allopurinol, 100 mg, oral, Daily  aspirin, 81 mg, oral, Daily  atorvastatin, 80 mg, oral, Daily  carvedilol, 12.5 mg, oral, BID  cyclobenzaprine, 10 mg, oral, TID  escitalopram, 20 mg, oral, Daily  heparin, 5,000 Units, subcutaneous, q8h  insulin lispro, 0-5 Units, subcutaneous, q4h  levETIRAcetam, 500 mg, oral, BID  pantoprazole, 40 mg, oral, Daily before breakfast  perflutren lipid microspheres, 3 mL of dilution, intravenous, Once in imaging  polyethylene glycol, 17 g, oral, Daily  pregabalin, 300 mg, oral, BID  sennosides, 1 tablet, oral, BID  sulfur hexafluoride microsphr, 2 mL, intravenous, Once in imaging  tamsulosin, 0.4 mg, oral, Daily      Continuous medications  lactated Ringer's, 100 mL/hr,  Last Rate: 100 mL/hr (10/12/23 0305)    PRN medications  PRN medications: docusate sodium, HYDROmorphone, HYDROmorphone, oxyCODONE    Results for orders placed or performed during the hospital encounter of 10/10/23 (from the past 24 hour(s))   POCT GLUCOSE   Result Value Ref Range    POCT Glucose 83 74 - 99 mg/dL   POCT GLUCOSE   Result Value Ref Range    POCT Glucose 91 74 - 99 mg/dL   ECG 12 lead   Result Value Ref Range    Ventricular Rate 81 BPM    Atrial Rate 81 BPM    CT Interval 150 ms    QRS Duration 90 ms    QT Interval 374 ms    QTC Calculation(Bazett) 434 ms    P Axis 55 degrees    R Axis 31 degrees    T Axis 39 degrees    QRS Count 13 beats    Q Onset 221 ms    P Onset 146 ms    P Offset 199 ms    T Offset 408 ms    QTC Fredericia 413 ms   Type and Screen   Result Value Ref Range    ABO TYPE O     Rh TYPE POS     ANTIBODY SCREEN NEG    POCT GLUCOSE   Result Value Ref Range    POCT Glucose 86 74 - 99 mg/dL   POCT GLUCOSE   Result Value Ref Range    POCT Glucose 89 74 - 99 mg/dL   POCT GLUCOSE   Result Value Ref Range    POCT Glucose 97 74 - 99 mg/dL   POCT GLUCOSE   Result Value Ref Range    POCT Glucose 88 74 - 99 mg/dL   Lipid Panel   Result Value Ref Range    Cholesterol 100 0 - 199 mg/dL    HDL-Cholesterol 15.6 mg/dL    Cholesterol/HDL Ratio 6.4     LDL Calculated 43 <=99 mg/dL    VLDL 41 (H) 0 - 40 mg/dL    Triglycerides 207 (H) 0 - 149 mg/dL    Non HDL Cholesterol 84 0 - 149 mg/dL   CBC   Result Value Ref Range    WBC 8.8 4.4 - 11.3 x10*3/uL    nRBC 0.0 0.0 - 0.0 /100 WBCs    RBC 3.15 (L) 4.50 - 5.90 x10*6/uL    Hemoglobin 9.3 (L) 13.5 - 17.5 g/dL    Hematocrit 28.8 (L) 41.0 - 52.0 %    MCV 91 80 - 100 fL    MCH 29.5 26.0 - 34.0 pg    MCHC 32.3 32.0 - 36.0 g/dL    RDW 16.1 (H) 11.5 - 14.5 %    Platelets 282 150 - 450 x10*3/uL    MPV 9.6 7.5 - 11.5 fL   Renal Function Panel   Result Value Ref Range    Glucose 78 74 - 99 mg/dL    Sodium 134 (L) 136 - 145 mmol/L    Potassium 3.8 3.5 - 5.3 mmol/L     Chloride 100 98 - 107 mmol/L    Bicarbonate 27 21 - 32 mmol/L    Anion Gap 11 10 - 20 mmol/L    Urea Nitrogen 12 6 - 23 mg/dL    Creatinine 0.76 0.50 - 1.30 mg/dL    eGFR >90 >60 mL/min/1.73m*2    Calcium 8.2 (L) 8.6 - 10.3 mg/dL    Phosphorus 4.0 2.5 - 4.9 mg/dL    Albumin 2.8 (L) 3.4 - 5.0 g/dL   Magnesium   Result Value Ref Range    Magnesium 1.88 1.60 - 2.40 mg/dL        Physical Exam:  General: Not in acute distress, A&O x 3, alert, cooperative, well-developed  HEENT: Normocephalic, atraumatic, EOMI, moist mucous membranes  Neck: Neck supple, trachea midline, no evidence of trauma  Cardiovascular: RRR, S1 and S2 appreciated, no murmurs rubs gallops appreciated, distal pulses 2+ bilaterally (radial and dorsalis pedis)  Respiratory: Vesicular breath sounds appreciated bilaterally, no adventitious sounds appreciated, no increased work of breathing  GI: Abdomen soft, nondistended, nontender to palpation, bowel sounds present  Extremities: No edema appreciated in lower extremities bilaterally, no cyanosis, right lower extremity with splint on ankle.  Bandage from PCI on right wrist  Lumbar back: Approximately 10 cm incision well appreciated, open to air without signs of infection or erythema with sutures still in place.  Neuro: A&O X3, no focal deficits, strength and sensation intact bilaterally  Skin: Warm and dry, without lesions or rashes      Relevant Results    MRI LUMBAR SPINE W WO CONTRAST     Result Date: 10/9/2023  (THIS STUDY IS FROM OUTSIDE FACILITY)   EXAMINATION: MRI OF THE LUMBAR SPINE WITHOUT AND WITH CONTRAST  10/9/2023 5:55 pm TECHNIQUE: Multiplanar multisequence MRI of the lumbar spine was performed without and with the administration of intravenous contrast. COMPARISON: None. HISTORY: ORDERING SYSTEM PROVIDED HISTORY: spine surgery high WBC TECHNOLOGIST PROVIDED HISTORY: Reason for exam:->spine surgery high WBC Decision Support Exception - unselect if not a suspected or confirmed emergency medical  condition->Emergency Medical Condition (MA) What reading provider will be dictating this exam?->CRC FINDINGS: The exam is degraded by motion as well as artifact created by orthopedic hardware.  The patient is status post lower lumbar laminectomies with pedicle screw and connecting niyah fixation from L3-S1 as well as multilevel intervertebral body fusion. There appears to be abnormal clumping of the cauda equina nerve roots just caudal to the conus medullaris, best appreciated on image number 1 of series 10, without associated enhancement postcontrast administration. A fluid collection in the laminectomy bed likely reflects a postop seroma. There is a rim enhancing fluid collection extending into the left psoas muscle measuring up to 2.5 x 9.3 cm in diameter, worrisome for abscess.  This may extend into the L4-5 disc space.  There is bilateral hydronephrosis.  The left kidney is severely atrophic.    MR findings worrisome for retroperitoneal abscess, possibly extending into the L4-5 disc space. Apparent clumping of the proximal cauda equina nerve roots versus mass effect due to an intrathecal fluid collection.  Recommend MRI of the T-spine for further evaluation.    CT angio chest w and wo IV contrast    Result Date: 10/9/2023  EXAMINATION: CTA OF THE CHEST WITH AND WITHOUT CONTRAST 10/9/2023 3:12 pm TECHNIQUE: CTA of the chest was performed before and after the administration of intravenous contrast.  Multiplanar reformatted images are provided for review.  MIP images are provided for review. Automated exposure control, iterative reconstruction, and/or weight based adjustment of the mA/kV was utilized to reduce the radiation dose to as low as reasonably achievable. COMPARISON: None. HISTORY: ORDERING SYSTEM PROVIDED HISTORY: PE TECHNOLOGIST PROVIDED HISTORY: Reason for exam:->PE Decision Support Exception - unselect if not a suspected or confirmed emergency medical condition->Emergency Medical Condition (MA) What  "reading provider will be dictating this exam?->CRC FINDINGS: Pulmonary Arteries: Pulmonary arteries are adequately opacified for evaluation.  No evidence of intraluminal filling defect to suggest pulmonary embolism.  Main pulmonary artery is normal in caliber. Mediastinum: No evidence of mediastinal lymphadenopathy.  The heart and pericardium demonstrate no acute abnormality.  There is no acute abnormality of the thoracic aorta. Lungs/pleura: Somewhat patchy subpleural curvilinear markings are seen in the bilateral lower lobes, overall morphology favoring atelectasis or infectious infiltrate.  Scattered central small cysts are observed. Upper Abdomen: Limited images of the upper abdomen are unremarkable. Soft Tissues/Bones: No acute bone or soft tissue abnormality.    1. No evidence of pulmonary embolism. 2. Patchy curvilinear densities in the bilateral lower lobes, atelectasis is favored over other process    CT Head W/O Contrast    Result Date: 10/9/2023  EXAMINATION: CT OF THE HEAD WITHOUT CONTRAST  10/9/2023 3:12 pm TECHNIQUE: CT of the head was performed without the administration of intravenous contrast. Automated exposure control, iterative reconstruction, and/or weight based adjustment of the mA/kV was utilized to reduce the radiation dose to as low as reasonably achievable. COMPARISON: 11/21/2022 HISTORY: ORDERING SYSTEM PROVIDED HISTORY: syncope TECHNOLOGIST PROVIDED HISTORY: Reason for exam:->syncope Has a \"code stroke\" or \"stroke alert\" been called?->No Decision Support Exception - unselect if not a suspected or confirmed emergency medical condition->Emergency Medical Condition (MA) What reading provider will be dictating this exam?->CRC FINDINGS: BRAIN/VENTRICLES: There is some subtle increased attenuation along the right tentorium cerebelli (series 3, image 23).  This is suspicious for a acute subdural hematoma.  There is no mass effect or edema.  There are no acute infarcts. ORBITS: The visualized " portion of the orbits demonstrate no acute abnormality. SINUSES: The visualized paranasal sinuses and mastoid air cells demonstrate no acute abnormality. SOFT TISSUES/SKULL:  No acute abnormality of the visualized skull or soft tissues.    Findings suspicious of a small acute right-sided subdural hematoma.  There is no mass effect present..    XR ankle right 3+ views    Result Date: 10/9/2023  EXAMINATION: THREE XRAY VIEWS OF THE RIGHT ANKLE 10/9/2023 1:02 pm COMPARISON: January 18, 2020 1334 hours. HISTORY: ORDERING SYSTEM PROVIDED HISTORY: fall TECHNOLOGIST PROVIDED HISTORY: Reason for exam:->fall What reading provider will be dictating this exam?->CRC FINDINGS: Two views of the right ankle are submitted. There is both a new acute spiral mildly displaced fracture of the distal right tibia superimposed upon an old vertical fracture with intra-articular extension at the time.  There is now a new associated comminuted fracture of the distal right fibula. Identified again is a well corticated bony density distal to the medial malleolus and to the lateral malleolus likely representing old avulsion injuries.. The mortise is intact.  No dislocations.    THERE IS NOW A NEW ACUTE OBLIQUE FRACTURE OF THE DISTAL RIGHT TIBIA WITH A OLD VERTICAL FRACTURE OF THE TIBIA. THERE IS A NEW COMMINUTED FRACTURE OF THE DISTAL RIGHT FIBULA.        Assessment/Plan   Principal Problem:    Syncope, unspecified syncope type  Active Problems:    H/O fracture of ankle    NSTEMI (non-ST elevated myocardial infarction) (CMS/HCC)    Patient is a 58-year old male with a past medical history of MDD, T2 DM, BPH, hypertension, hyperlipidemia, JEMAL, chronic pain in neck and back, lumbar radiculopathy, and heroin use who is transferred from [facility] in the setting of recent lumbar surgery with subarachnoid hemorrhage/subdural hematoma for syncope and ankle pain secondary to the syncope. Upon evaluation, he was found to have a new fracture of the  right distal tibia and fibula, potential retroperitoneal abscess on MRI, elevated troponin, elevated CK. Patient was admitted for management of his fracture, syncope etiology, and elevated troponin    Vasovagal Syncope  -Patient states syncopal event occurred 2/2 to pain but also endorsed poor P.O. intake.   -Trend CMP, CBC. Previous labs from outside facility yesterday and [facility] illustrated no obvious metabolic or toxicological etiology.   -Telemetry continued, no malignant arrythmias seen on tele since admission  -TTE revealed EF of 50 to 55%, no structural heart disease    Retroperitoneal Abscess Vs. Postsurgical Fluid Collection  -Neurosurgery at [facility] [place] and his surgeon at this facility reviewed MRI and assessed that the findings on MRI were consistent with normal post-operative changes rather than an abscess  -Wound dry, intact, no pain with palpation.   -Infectious Disease following, continue to appreciate recs.   ---> ID had a discussion with neurosurgery deemed a low likelihood of abscess more likely fluid collection postsurgical and noninfectious.  ----> As per ID discontinued cefepime and vancomycin (10/11)  [facility]    NSTEMI: Troponinemia, without chest pain or EKG changes, now s/p cardiac catheterization (diagnostic)  -Diagnostic cardiac catheterization 10/11/2023 which demonstrated a proximal RCA stenosis of 90% with interventional plan pending  -Repeated EKG does not show significant ST segment changes, T wave inversions or pathological Q waves, otherwise normal sinus rhythm.   -Echocardiogram 10/10 revealed EF of 50-55%, no regional wall motion abnormalities  - Continue aspirin 81mg  -Continue Atorvastatin 80 daily  -neurosurgery and orthopedics which both state DAPT can be started, this should not interrupt surgery and a CT head will need to be repeated 24 hour after starting DAPT.  Neurosurgery also okay with heparin bolus during PCI.  -Plan for PCI with stent placement  tomorrow pending cardiology's recommendations  ---> Dr. Huertas interventional cardiology wants patient to begin aspirin, Plavix, heparin today with repeat head scan in the morning to assess for bleed.  If there is new/recurrent bleed patient will not be a candidate for PCI.    Displaced Distal right tibia and comminuted distal right fibula fracture.   -Repeated x-ray showing displaced and comminuted fracture of the distal tibia diaphysis and distal fibula diaphysis.   --> Ortho awaiting cardiac interventional plan, they stated DAPT will not preclude surgery and OK to start as required  -Pain control with scheduled Tylenol and oxycodone with PRN Dilaudid for severe pain.   -Continue Flexeril prn  -Continue bowel regimen consisting of Miralax/senokot    Recent lumbar fusion(9/29/2023) c/b   - Post op SAH/SDH (10/3), SAH resolved on imaging at OSH, SDH persists   - Intraoperative dural tear s/p dural patch  Started on keppra for seizure prophylaxis due to bleed during prior admission and on discharge, will address with neurosurgery necessity and length of treatment. Will switch to IV if NPO for surgery/PCI  -Obtain CT head 24 hours post initiation of DAPT, and heparin bolus    Primary HTN:  -Patient takes no medication for this at home  -Continue Coreg 12.5mg     #Diabetes Type 2: Known history, with recent A1c subdiabetic  -A1c 5.7  -Hold home metformin at this time  -No insulin at this time as patient's glucose has been ranging ,  -Continue POCT glucose checks    Gout: Known hx  -Continue home allopurinol    BPH: known hx  -Will continue Tamsulosin 0.4 mg daily     IVF: None.   Diet: Cardiac, n.p.o. after midnight  DVT Prophylaxis: Heparin subcutaneous.   Consults: Cardiology, Neurosurgery, orthopedic surgery.   Dispo: Anticipate additional 2 to 3 days hospital stay.    Code Status: Full Code    Brad Del Real MD,   Internal medicine PGY 1    Seen and examined with resident physicians. Labs and imaging  personally reviewed.     Pain well controlled today, no acute events overnight.  Hemodynamically stable.  Well perfused on exam, denies chest pain, denies shortness of breath.    Discussed at length the plan regarding anticoagulation with the patient today.  Currently the plan is to begin therapeutic anticoagulation, dual antiplatelet therapy and to watch the patient closely overnight for any signs of recurrent bleed and perform a CT scan in the morning to assess for intracranial bleeding given his recent episode of bleeding.  Discussed this with with neurosurgery who feels the patient is low risk for repeat intracranial hemorrhage as well as orthopedic surgery who can perform the repair of his distal tibial fibula fracture on dual antiplatelet therapy sometime next week.  If he tolerates anticoagulation and antiplatelet therapy and CT scan is without significant change tomorrow expect he can go to the Cath Lab for stenting of his proximal RCA stenosis.  The patient is in agreement with this plan and would like to proceed as he is concerned about persistent damage to his heart if he does not receive an intervention for the stenotic lesion.    Case discussed with case management, residents and patient.    Complexity: High    Total visit time = > 50 minutes; more than 50% spent counseling/coordinating care    I saw and evaluated the patient. I personally obtained the key and critical portions of the history and physical exam or was physically present for key and critical portions performed by the resident/fellow. I reviewed the resident/fellow's documentation and discussed the patient with the resident/fellow. I agree with the resident/fellow's medical decision making as documented in the note.    Jimbo Allen, DO

## 2023-10-12 NOTE — PROGRESS NOTES
Physical Therapy                 Therapy Communication Note    Patient Name: Gm Thrasher  MRN: [id number]  Today's Date: 10/12/2023     Discipline: Physical Therapy    Missed Visit Reason: s/p PCI to RCA 10/11 and upcoming surgery for R tibia 10/12.    Missed Time: Attempt    Comment: Order received. Chart review completed. Will follow up with pt and evaluate when pt appropriative. Pt s/p PCI to RCA and waiting for surgery tomorrow for R tibia/fibula fx.

## 2023-10-12 NOTE — NURSING NOTE
Patient is alert and oriented x4. Pain to R ankle, being managed by prn dilaudid and oxycodone. Started on heparin drip at 1200 units hr. Heparin assay for 2100. Plan is for repeat CT of head tomorrow. Continues with plan of care.

## 2023-10-12 NOTE — TREATMENT PLAN
Case discussed at length with Dr. Le, Dr. Malone and neurosurgery team. Neurosurgery team recommended to obtain CT head 24 hrs after initiation of DAPT/heparin  Plan for today would be to start DAPT - with aspirin and plavix (load) as well as heparin drip. Monitor tolerance. Obtain repeat CT head tomorrow. If any evidence of intracranial re-bleed then we know it is not safe to proceed with PCI. If patient tolerates then we will proceed with PCI - with DAPT and heparin load during procedure. We are trying to avoid having to stop DAPT after a freshly placed stent, which would put him at very high risk of in-stent thrombosis and myocardial infarction.    Discussed with primary team

## 2023-10-12 NOTE — CARE PLAN
The patient's goals for the shift include No pain    The clinical goals for the shift include Patient will have pain less than 8 within shift    Over the shift, the patient did not make progress toward the following goals. Barriers to progression include patient continues to have increase pain.. Recommendations to address these barriers include to continue to have pain medications as needed.

## 2023-10-12 NOTE — PROGRESS NOTES
Subjective   Remains afebrile, no events overnight, no complaints this morning    Objective     Last Recorded Vitals  /74 (BP Location: Left arm, Patient Position: Lying)   Pulse 74   Temp 36.5 °C (97.7 °F) (Temporal)   Resp 16   Wt 115 kg (253 lb 1.6 oz)   SpO2 93%   Intake/Output last 3 Shifts:    Intake/Output Summary (Last 24 hours) at 10/12/2023 1549  Last data filed at 10/12/2023 1325  Gross per 24 hour   Intake 2176.67 ml   Output 1450 ml   Net 726.67 ml         Admission Weight  Weight: 114 kg (251 lb 4.8 oz) (10/10/23 0358)    Daily Weight  10/11/23 : 115 kg (253 lb 1.6 oz)      Scheduled medications  acetaminophen, 650 mg, oral, 4x daily  allopurinol, 100 mg, oral, Daily  aspirin, 81 mg, oral, Daily  atorvastatin, 80 mg, oral, Daily  carvedilol, 12.5 mg, oral, BID  cyclobenzaprine, 10 mg, oral, TID  escitalopram, 20 mg, oral, Daily  heparin, 5,000 Units, subcutaneous, q8h  insulin lispro, 0-5 Units, subcutaneous, q4h  levETIRAcetam, 500 mg, oral, BID  pantoprazole, 40 mg, oral, Daily before breakfast  perflutren lipid microspheres, 3 mL of dilution, intravenous, Once in imaging  polyethylene glycol, 17 g, oral, Daily  pregabalin, 300 mg, oral, BID  sennosides, 1 tablet, oral, BID  sulfur hexafluoride microsphr, 2 mL, intravenous, Once in imaging  tamsulosin, 0.4 mg, oral, Daily      Continuous medications  lactated Ringer's, 100 mL/hr, Last Rate: 100 mL/hr (10/12/23 0305)    PRN medications  PRN medications: docusate sodium, HYDROmorphone, HYDROmorphone, oxyCODONE     Physical Exam  Constitutional: Well developed, awake/alert/oriented x4, although poor historian  Skin: Lumbar surgical wound appears clean, no surrounding erythema, no drainage, no pain, no fluctuance  Eyes: Anicteric, clear sclera  ENMT: mucous membranes moist, no apparent injury, no lesions seen  Head/Neck: Atraumatic  Respiratory: Lungs clear to auscultation bilaterally with no wheezes, rhonci or crackles  CV: Regular rate and  rhythm, no murmurs or gallops auscultated  GI: Non-tender to deep palpation, no guarding  MSK: Left ankle pain  Psych: Appropriate mood and behavior    Relevant Results  Results for orders placed or performed during the hospital encounter of 10/10/23 (from the past 24 hour(s))   POCT GLUCOSE   Result Value Ref Range    POCT Glucose 91 74 - 99 mg/dL   ECG 12 lead   Result Value Ref Range    Ventricular Rate 81 BPM    Atrial Rate 81 BPM    PA Interval 150 ms    QRS Duration 90 ms    QT Interval 374 ms    QTC Calculation(Bazett) 434 ms    P Axis 55 degrees    R Axis 31 degrees    T Axis 39 degrees    QRS Count 13 beats    Q Onset 221 ms    P Onset 146 ms    P Offset 199 ms    T Offset 408 ms    QTC Fredericia 413 ms   Type and Screen   Result Value Ref Range    ABO TYPE O     Rh TYPE POS     ANTIBODY SCREEN NEG    POCT GLUCOSE   Result Value Ref Range    POCT Glucose 86 74 - 99 mg/dL   POCT GLUCOSE   Result Value Ref Range    POCT Glucose 89 74 - 99 mg/dL   POCT GLUCOSE   Result Value Ref Range    POCT Glucose 97 74 - 99 mg/dL   POCT GLUCOSE   Result Value Ref Range    POCT Glucose 88 74 - 99 mg/dL   Lipid Panel   Result Value Ref Range    Cholesterol 100 0 - 199 mg/dL    HDL-Cholesterol 15.6 mg/dL    Cholesterol/HDL Ratio 6.4     LDL Calculated 43 <=99 mg/dL    VLDL 41 (H) 0 - 40 mg/dL    Triglycerides 207 (H) 0 - 149 mg/dL    Non HDL Cholesterol 84 0 - 149 mg/dL   CBC   Result Value Ref Range    WBC 8.8 4.4 - 11.3 x10*3/uL    nRBC 0.0 0.0 - 0.0 /100 WBCs    RBC 3.15 (L) 4.50 - 5.90 x10*6/uL    Hemoglobin 9.3 (L) 13.5 - 17.5 g/dL    Hematocrit 28.8 (L) 41.0 - 52.0 %    MCV 91 80 - 100 fL    MCH 29.5 26.0 - 34.0 pg    MCHC 32.3 32.0 - 36.0 g/dL    RDW 16.1 (H) 11.5 - 14.5 %    Platelets 282 150 - 450 x10*3/uL    MPV 9.6 7.5 - 11.5 fL   Renal Function Panel   Result Value Ref Range    Glucose 78 74 - 99 mg/dL    Sodium 134 (L) 136 - 145 mmol/L    Potassium 3.8 3.5 - 5.3 mmol/L    Chloride 100 98 - 107 mmol/L     Bicarbonate 27 21 - 32 mmol/L    Anion Gap 11 10 - 20 mmol/L    Urea Nitrogen 12 6 - 23 mg/dL    Creatinine 0.76 0.50 - 1.30 mg/dL    eGFR >90 >60 mL/min/1.73m*2    Calcium 8.2 (L) 8.6 - 10.3 mg/dL    Phosphorus 4.0 2.5 - 4.9 mg/dL    Albumin 2.8 (L) 3.4 - 5.0 g/dL   Magnesium   Result Value Ref Range    Magnesium 1.88 1.60 - 2.40 mg/dL   POCT GLUCOSE   Result Value Ref Range    POCT Glucose 78 74 - 99 mg/dL        Image Results  ECG 12 lead  Normal sinus rhythm  Normal ECG  When compared with ECG of 10-OCT-2023 05:44, (unconfirmed)  No significant change was found  Confirmed by Fina Talavera ([id number]) on 10/11/2023 6:13:48 PM  CT ankle right wo IV contrast  Narrative: Interpreted By:  Christina Watt,   STUDY:  CT ANKLE RIGHT WO IV CONTRAST;  10/11/2023 3:24 pm      INDICATION:  Signs/Symptoms:distal tibia fx.      COMPARISON:  Right ankle radiographs dated 05/15/2020.      ACCESSION NUMBER(S):  [id number]      ORDERING CLINICIAN:  BILL SWEENEY      TECHNIQUE:  CT imaging of the  right lower extremity was obtained  without  administration of intravenous contrast medium. Coronal and sagittal  reformatted images were performed. 3D reformatted images were created  and reviewed.      FINDINGS:  OSSEOUS STRUCTURES:  There is an oblique minimally comminuted fracture through distal  tibial diaphysis. There is mild comminution of fracture fragments  about the fracture site. There is mild anterior and medial  displacement of the proximal fracture fragment with overriding of  fracture fragments by approximately 1.5 cm. There is also an oblique  mildly-comminuted fracture through distal fibular metadiaphysis.  There is anterior and lateral displacement of the proximal fracture  fragment with significant overriding of fracture fragments by  approximately 3 cm. There is extension of fracture lucency to the  distal tibiofibular joint articulation (as seen on coronal image  68/104). However the distal tibiofibular joint  articulation appears  grossly intact without significant displacement or dislocation. There  is also cortical remodeling with an oblique irregular lucency  extending through the medial aspect of the tibial plafond, likely  related to cortical remodeling from remote fracture. There is  incongruity of the distal articular surface of tibia at the level of  this remote fracture extension (best seen on coronal image 55/104 and  sagittal image 47/104). There are moderate degenerative changes in  the tibiotalar joint articulation with joint space narrowing and  prominent marginal osteophytes. There are well corticated ossific  fragments at the tip of medial and lateral malleoli, likely related  to remote trauma. Subtalar joint articulation is maintained and  demonstrates mild-to-moderate degenerative changes with subchondral  sclerosis and prominent marginal osteophytes. Note made of os  trigonum. There is also prominent osseous process along the dorsal  aspect of the talar neck which is likely favored to be sequela of  remote injury of the dorsal talonavicular ligament. Rest of the  osseous structures appear grossly unremarkable.      SOFT TISSUES:  There is soft tissue swelling about the included distal leg extending  to the ankle. Evaluation of tendons and ligaments is limited due to  CT technique. Within this limitation, tendons appear grossly  unremarkable.      Impression: 1. Acute minimally comminuted and displaced fractures through distal  tibia and fibula as described above.  2. There are also findings consistent with remote trauma with  significant cortical remodeling of the distal tibia with associated  incongruity of the distal tibial articular surface as described above.  3. Moderate arthrosis of the tibiotalar and subtalar joints.  4. Soft tissue swelling about the distal leg and ankle.      MACRO:  None      Signed by: Christina Watt 10/11/2023 4:47 PM  Dictation workstation:   [id number]  ECG 12  lead  Normal sinus rhythm  Nonspecific ST and T wave abnormality  Abnormal ECG  When compared with ECG of 20-SEP-2023 09:06,  Nonspecific T wave abnormality now evident in Inferior leads  Confirmed by Juan Carlos Lynne ([id number]) on 10/11/2023 8:26:21 AM  US renal complete  Narrative: Interpreted By:  Erick Banks,   STUDY:  US RENAL COMPLETE;  10/10/2023 9:46 pm      INDICATION:  Signs/Symptoms:Bilateral hydro.      COMPARISON:  CT lumbar spine dated 10/03/2023.      ACCESSION NUMBER(S):  [id number]      ORDERING CLINICIAN:  VERN MONTEMAYOR      TECHNIQUE:  Grayscale and color Doppler sonographic images of the kidneys were  obtained  .      FINDINGS:  RIGHT KIDNEY:  The right kidney measures 14.8 cm in length. Renal cortical  echogenicity and thickness are within normal limits. Severe  hydronephrosis. No sonographic evident calculus.      LEFT KIDNEY:  The left kidney measures 11.2 cm in length. There are elements of  cortical renal thinning that may reflect scarring or prior infarct  within the upper and lower poles. Renal cortical echogenicity is  otherwise within normal limits. 6 mm diameter parenchymal  calcification versus calculus. No hydronephrosis.      BLADDER:  Bladder wall appears thickened measuring 6 mm. Bilateral ureteral  jets visualized.      Impression: Severe right hydronephrosis, similar in appearance to CT lumbar spine  examination. Bilateral ureteral jets visualized.      Mild asymmetric enlargement of the right kidney with cortical renal  scarring involving the left kidney.      Signed by: Erick Banks 10/11/2023 8:01 AM  Dictation workstation:   [id number]  Transthoracic Echo (TTE) Complete              [facility] [place]  [id number] [id number]     Tel [phone] Fax [phone]    TRANSTHORACIC ECHOCARDIOGRAM REPORT       Patient Name:      JESUS LOREDOUITT       Reading Physician:    Riana Malone                                                                 MD  Study Date:        10/10/2023           Ordering Provider:    [id number] VERN MONTEMAYOR  MRN/PID:           [id number]             Fellow:  Accession#:        [id number]         Nurse:  Date of Birth/Age: 1965 / 58 years Sonographer:          Aleyda Hansen RDCS  Gender:            M                    Additional Staff:  Height:            180.00 cm            Admit Date:           10/10/2023  Weight:            51.71 kg             Admission Status:     Inpatient -                                                                Routine  BSA:               1.66 m2              Department Location:  [facility]  Blood Pressure: 141 /84 mmHg    Study Type:    TRANSTHORACIC ECHO (TTE) COMPLETE  Diagnosis/ICD: Syncope-R55  Indication:    Syncope  CPT Codes:     Echo Complete w Full [id number]    Patient History:  Smoker:            Current.  Diabetes:          Yes  BMI:               Obese >30  Pertinent History: Hyperlipidemia and HTN. Limited imaging windows available. No                     previous echocardiogram.    Study Detail: The following Echo studies were performed: 2D, M-Mode, Doppler and                color flow. Image quality for this study is poor. Technically                challenging study due to body habitus and patient lying in supine                position. Definity used as a contrast agent for endocardial border                definition. Total contrast used for this procedure was 2 mL via IV                push.       PHYSICIAN INTERPRETATION:  Left Ventricle: Left ventricular systolic function is low normal, with an estimated ejection fraction of 50-55%. There are no regional wall motion abnormalities. The left ventricular cavity size is normal. Left ventricular diastolic filling was indeterminate.  Left Atrium: The left atrium is normal in size.  Right Ventricle: The right ventricle is mildly enlarged. There is low normal  right ventricular systolic function.  Right Atrium: The right atrium is normal in size.  Aortic Valve: The aortic valve is trileaflet. There is mild aortic valve cusp calcification. There is no evidence of aortic valve regurgitation. The peak instantaneous gradient of the aortic valve is 3.7 mmHg.  Mitral Valve: The mitral valve is normal in structure. There is no evidence of mitral valve regurgitation.  Tricuspid Valve: The tricuspid valve is structurally normal. No evidence of tricuspid regurgitation. The Doppler estimated RVSP is within normal limits.  Pulmonic Valve: The pulmonic valve is structurally normal. There is no indication of pulmonic valve regurgitation.  Pericardium: There is no pericardial effusion noted.  Aorta: The aortic root is normal. There is no dilatation of the ascending aorta. There is no dilatation of the aortic root.  Systemic Veins: The inferior vena cava was not well visualized.       CONCLUSIONS:   1. Left ventricular systolic function is low normal with a 50-55% estimated ejection fraction.   2. There is low normal right ventricular systolic function.   3. RVSP within normal limits.    QUANTITATIVE DATA SUMMARY:  2D MEASUREMENTS:                            Normal Ranges:  LAs:           4.70 cm    (2.7-4.0cm)  IVSd:          1.10 cm    (0.6-1.1cm)  LVPWd:         1.25 cm    (0.6-1.1cm)  LVIDd:         5.20 cm    (3.9-5.9cm)  LVIDs:         4.55 cm  LV Mass Index: 168.7 g/m2  LV % FS        12.5 %    M-MODE MEASUREMENTS:                   Normal Ranges:  Ao Root: 3.90 cm (2.0-3.7cm)    LV DIASTOLIC FUNCTION:                      Normal Ranges:  MV Peak E: 0.37 m/s (0.7-1.2 m/s)  MV Peak A: 0.57 m/s (0.42-0.7 m/s)  E/A Ratio: 0.65     (1.0-2.2)    MITRAL VALVE:                       Normal Ranges:  MV Vmax:    0.65 m/s (<=1.3m/s)  MV peak P.7 mmHg (<5mmHg)  MV mean P.0 mmHg (<48mmHg)  MV DT:      222 msec (150-240msec)    AORTIC VALVE:                          Normal Ranges:  AoV  Vmax:      0.97 m/s (<=1.7m/s)  AoV Peak PG:   3.7 mmHg (<20mmHg)  LVOT Max Graeme:  0.78 m/s (<=1.1m/s)  LVOT VTI:      14.90 cm  LVOT Diameter: 2.10 cm  (1.8-2.4cm)  AoV Area,Vmax: 2.78 cm2 (2.5-4.5cm2)       RIGHT VENTRICLE:  TAPSE: 22.5 mm  RV s'  0.12 m/s    PULMONIC VALVE:                          Normal Ranges:  PV Accel Time: 119 msec (>120ms)  PV Max Graeme:    0.7 m/s  (0.6-0.9m/s)  PV Max P.7 mmHg       [id number] Jonathan Malone MD  Electronically signed on 10/11/2023 at 7:56:19 AM       ** Final **           Assessment/Plan   #New rim-enhancing 2.5 x 9.3 cm left psoas fluid collection, possible extension to L4-5 less likely abscess per neurosurgery evaluation, likely postsurgical seroma  #Bilateral hydronephrosis  #Left tibia/fibula fracture  #NSTEMI, with 90% RCA occlusion  #Leukocytosis improving  #History of lumbar decompression        -Remains clinically stable off antibiotics  -Blood cultures negative     ID team will sign off, please call back if any questions

## 2023-10-12 NOTE — PROGRESS NOTES
"Occupational Therapy                 Therapy Communication Note    Patient Name: mG Thrasher  MRN: [id number]  Today's Date: 10/12/2023     Discipline: Occupational Therapy    Missed Visit Reason:  surgery to repair R tibia 10/13    Missed Time: Attempt    Comment: Pt was brought to the ED s/p syncopal episode. Pt was discharged home 10/6/23 from [facility] after therapy recommendations of acute rehab. Pt with complaints of R ankle pain, and found to have \"a new acute oblique fracture of the distal right tibia with an old vertical fracture of the tibia. There's a new comminuted fracture of the distal right fibula\".   Pt underwent cardiac cath with stent placement 10/11 prior to planned surgical repair of R ankle fracture 10/13. Will hold OT eval at this time and complete as appropriate s/p surgery.        Pt came to [facility] 9/29/23 for an elective lumbar laminectomy with Dr. Amezcua.   9/30: pt lethargic, and not able to work with therapy  10/1: OT/PT evals completed  10/2: pt with complaints of increased back pain and severe headache (10/10); pt also with episode of emesis  10/3: pt with continued severe back pain and headache; CT of head ordered and showed (+) subarachnoid hemorrhage; transferred to ICU  10/5: OT re-eval completed   10/6: pt discharged home  "

## 2023-10-13 ENCOUNTER — ANESTHESIA (OUTPATIENT)
Dept: OPERATING ROOM | Facility: HOSPITAL | Age: 58
DRG: 321 | End: 2023-10-13
Payer: COMMERCIAL

## 2023-10-13 ENCOUNTER — APPOINTMENT (OUTPATIENT)
Dept: RADIOLOGY | Facility: HOSPITAL | Age: 58
DRG: 321 | End: 2023-10-13
Payer: COMMERCIAL

## 2023-10-13 LAB
ALBUMIN SERPL BCP-MCNC: 2.9 G/DL (ref 3.4–5)
ANION GAP SERPL CALC-SCNC: 12 MMOL/L (ref 10–20)
BUN SERPL-MCNC: 10 MG/DL (ref 6–23)
CALCIUM SERPL-MCNC: 8.3 MG/DL (ref 8.6–10.3)
CHLORIDE SERPL-SCNC: 100 MMOL/L (ref 98–107)
CO2 SERPL-SCNC: 28 MMOL/L (ref 21–32)
CREAT SERPL-MCNC: 0.83 MG/DL (ref 0.5–1.3)
ERYTHROCYTE [DISTWIDTH] IN BLOOD BY AUTOMATED COUNT: 15.7 % (ref 11.5–14.5)
GFR SERPL CREATININE-BSD FRML MDRD: >90 ML/MIN/1.73M*2
GLUCOSE BLD MANUAL STRIP-MCNC: 76 MG/DL (ref 74–99)
GLUCOSE BLD MANUAL STRIP-MCNC: 88 MG/DL (ref 74–99)
GLUCOSE SERPL-MCNC: 99 MG/DL (ref 74–99)
HCT VFR BLD AUTO: 29.4 % (ref 41–52)
HGB BLD-MCNC: 9.9 G/DL (ref 13.5–17.5)
MAGNESIUM SERPL-MCNC: 1.82 MG/DL (ref 1.6–2.4)
MCH RBC QN AUTO: 30.2 PG (ref 26–34)
MCHC RBC AUTO-ENTMCNC: 33.7 G/DL (ref 32–36)
MCV RBC AUTO: 90 FL (ref 80–100)
NRBC BLD-RTO: 0 /100 WBCS (ref 0–0)
PHOSPHATE SERPL-MCNC: 3.5 MG/DL (ref 2.5–4.9)
PLATELET # BLD AUTO: 334 X10*3/UL (ref 150–450)
PMV BLD AUTO: 9.5 FL (ref 7.5–11.5)
POTASSIUM SERPL-SCNC: 3.7 MMOL/L (ref 3.5–5.3)
RBC # BLD AUTO: 3.28 X10*6/UL (ref 4.5–5.9)
SODIUM SERPL-SCNC: 136 MMOL/L (ref 136–145)
UFH PPP CHRO-ACNC: 0.2 IU/ML
WBC # BLD AUTO: 9.8 X10*3/UL (ref 4.4–11.3)

## 2023-10-13 PROCEDURE — 36415 COLL VENOUS BLD VENIPUNCTURE: CPT | Performed by: INTERNAL MEDICINE

## 2023-10-13 PROCEDURE — 92928 PRQ TCAT PLMT NTRAC ST 1 LES: CPT | Performed by: INTERNAL MEDICINE

## 2023-10-13 PROCEDURE — 80069 RENAL FUNCTION PANEL: CPT

## 2023-10-13 PROCEDURE — 2500000005 HC RX 250 GENERAL PHARMACY W/O HCPCS: Performed by: INTERNAL MEDICINE

## 2023-10-13 PROCEDURE — 2580000001 HC RX 258 IV SOLUTIONS: Performed by: STUDENT IN AN ORGANIZED HEALTH CARE EDUCATION/TRAINING PROGRAM

## 2023-10-13 PROCEDURE — 70450 CT HEAD/BRAIN W/O DYE: CPT | Mod: MG

## 2023-10-13 PROCEDURE — 85520 HEPARIN ASSAY: CPT | Performed by: INTERNAL MEDICINE

## 2023-10-13 PROCEDURE — 2060000001 HC INTERMEDIATE ICU ROOM DAILY

## 2023-10-13 PROCEDURE — C1874 STENT, COATED/COV W/DEL SYS: HCPCS | Performed by: INTERNAL MEDICINE

## 2023-10-13 PROCEDURE — 2500000001 HC RX 250 WO HCPCS SELF ADMINISTERED DRUGS (ALT 637 FOR MEDICARE OP): Performed by: STUDENT IN AN ORGANIZED HEALTH CARE EDUCATION/TRAINING PROGRAM

## 2023-10-13 PROCEDURE — 2500000004 HC RX 250 GENERAL PHARMACY W/ HCPCS (ALT 636 FOR OP/ED): Performed by: INTERNAL MEDICINE

## 2023-10-13 PROCEDURE — C9600 PERC DRUG-EL COR STENT SING: HCPCS | Performed by: INTERNAL MEDICINE

## 2023-10-13 PROCEDURE — C1887 CATHETER, GUIDING: HCPCS | Performed by: INTERNAL MEDICINE

## 2023-10-13 PROCEDURE — 99153 MOD SED SAME PHYS/QHP EA: CPT | Performed by: INTERNAL MEDICINE

## 2023-10-13 PROCEDURE — C1894 INTRO/SHEATH, NON-LASER: HCPCS | Performed by: INTERNAL MEDICINE

## 2023-10-13 PROCEDURE — 4A023N7 MEASUREMENT OF CARDIAC SAMPLING AND PRESSURE, LEFT HEART, PERCUTANEOUS APPROACH: ICD-10-PCS | Performed by: FAMILY MEDICINE

## 2023-10-13 PROCEDURE — 2500000001 HC RX 250 WO HCPCS SELF ADMINISTERED DRUGS (ALT 637 FOR MEDICARE OP)

## 2023-10-13 PROCEDURE — 99233 SBSQ HOSP IP/OBS HIGH 50: CPT

## 2023-10-13 PROCEDURE — 2500000004 HC RX 250 GENERAL PHARMACY W/ HCPCS (ALT 636 FOR OP/ED)

## 2023-10-13 PROCEDURE — 82947 ASSAY GLUCOSE BLOOD QUANT: CPT

## 2023-10-13 PROCEDURE — 2500000001 HC RX 250 WO HCPCS SELF ADMINISTERED DRUGS (ALT 637 FOR MEDICARE OP): Performed by: INTERNAL MEDICINE

## 2023-10-13 PROCEDURE — 99152 MOD SED SAME PHYS/QHP 5/>YRS: CPT | Performed by: INTERNAL MEDICINE

## 2023-10-13 PROCEDURE — C1725 CATH, TRANSLUMIN NON-LASER: HCPCS | Performed by: INTERNAL MEDICINE

## 2023-10-13 PROCEDURE — C1769 GUIDE WIRE: HCPCS | Performed by: INTERNAL MEDICINE

## 2023-10-13 PROCEDURE — 85027 COMPLETE CBC AUTOMATED: CPT

## 2023-10-13 PROCEDURE — 2550000001 HC RX 255 CONTRASTS: Performed by: INTERNAL MEDICINE

## 2023-10-13 PROCEDURE — 2720000007 HC OR 272 NO HCPCS: Performed by: INTERNAL MEDICINE

## 2023-10-13 PROCEDURE — 2500000004 HC RX 250 GENERAL PHARMACY W/ HCPCS (ALT 636 FOR OP/ED): Performed by: STUDENT IN AN ORGANIZED HEALTH CARE EDUCATION/TRAINING PROGRAM

## 2023-10-13 PROCEDURE — 027034Z DILATION OF CORONARY ARTERY, ONE ARTERY WITH DRUG-ELUTING INTRALUMINAL DEVICE, PERCUTANEOUS APPROACH: ICD-10-PCS | Performed by: FAMILY MEDICINE

## 2023-10-13 PROCEDURE — 83735 ASSAY OF MAGNESIUM: CPT

## 2023-10-13 PROCEDURE — 70450 CT HEAD/BRAIN W/O DYE: CPT | Performed by: RADIOLOGY

## 2023-10-13 PROCEDURE — 2780000003 HC OR 278 NO HCPCS: Performed by: INTERNAL MEDICINE

## 2023-10-13 PROCEDURE — 36415 COLL VENOUS BLD VENIPUNCTURE: CPT

## 2023-10-13 DEVICE — STENT ONYXNG35022UX ONYX 3.50X22RX
Type: IMPLANTABLE DEVICE | Status: FUNCTIONAL
Brand: ONYX FRONTIER™

## 2023-10-13 RX ORDER — NITROGLYCERIN 5 MG/ML
INJECTION, SOLUTION INTRAVENOUS AS NEEDED
Status: DISCONTINUED | OUTPATIENT
Start: 2023-10-13 | End: 2023-10-13 | Stop reason: HOSPADM

## 2023-10-13 RX ORDER — HEPARIN SODIUM 1000 [USP'U]/ML
INJECTION, SOLUTION INTRAVENOUS; SUBCUTANEOUS AS NEEDED
Status: DISCONTINUED | OUTPATIENT
Start: 2023-10-13 | End: 2023-10-13 | Stop reason: HOSPADM

## 2023-10-13 RX ORDER — CLOPIDOGREL BISULFATE 300 MG/1
TABLET, FILM COATED ORAL AS NEEDED
Status: DISCONTINUED | OUTPATIENT
Start: 2023-10-13 | End: 2023-10-13 | Stop reason: HOSPADM

## 2023-10-13 RX ORDER — MIDAZOLAM HYDROCHLORIDE 1 MG/ML
INJECTION INTRAMUSCULAR; INTRAVENOUS AS NEEDED
Status: DISCONTINUED | OUTPATIENT
Start: 2023-10-13 | End: 2023-10-13 | Stop reason: HOSPADM

## 2023-10-13 RX ORDER — HYDROXYZINE HYDROCHLORIDE 25 MG/1
25 TABLET, FILM COATED ORAL ONCE
Status: COMPLETED | OUTPATIENT
Start: 2023-10-13 | End: 2023-10-13

## 2023-10-13 RX ORDER — FENTANYL CITRATE 50 UG/ML
INJECTION, SOLUTION INTRAMUSCULAR; INTRAVENOUS AS NEEDED
Status: DISCONTINUED | OUTPATIENT
Start: 2023-10-13 | End: 2023-10-13 | Stop reason: HOSPADM

## 2023-10-13 RX ORDER — LIDOCAINE HYDROCHLORIDE 20 MG/ML
INJECTION, SOLUTION INFILTRATION; PERINEURAL AS NEEDED
Status: DISCONTINUED | OUTPATIENT
Start: 2023-10-13 | End: 2023-10-13 | Stop reason: HOSPADM

## 2023-10-13 RX ADMIN — ESCITALOPRAM OXALATE 20 MG: 20 TABLET, FILM COATED ORAL at 08:57

## 2023-10-13 RX ADMIN — HYDROMORPHONE HYDROCHLORIDE 0.6 MG: 1 INJECTION, SOLUTION INTRAMUSCULAR; INTRAVENOUS; SUBCUTANEOUS at 20:04

## 2023-10-13 RX ADMIN — ACETAMINOPHEN 650 MG: 325 TABLET ORAL at 06:04

## 2023-10-13 RX ADMIN — LEVETIRACETAM 500 MG: 500 TABLET, FILM COATED ORAL at 08:57

## 2023-10-13 RX ADMIN — SODIUM CHLORIDE, POTASSIUM CHLORIDE, SODIUM LACTATE AND CALCIUM CHLORIDE 100 ML/HR: 600; 310; 30; 20 INJECTION, SOLUTION INTRAVENOUS at 18:23

## 2023-10-13 RX ADMIN — PREGABALIN 300 MG: 150 CAPSULE ORAL at 08:57

## 2023-10-13 RX ADMIN — HYDROMORPHONE HYDROCHLORIDE 0.6 MG: 1 INJECTION, SOLUTION INTRAMUSCULAR; INTRAVENOUS; SUBCUTANEOUS at 03:57

## 2023-10-13 RX ADMIN — PANTOPRAZOLE SODIUM 40 MG: 40 TABLET, DELAYED RELEASE ORAL at 06:04

## 2023-10-13 RX ADMIN — ALLOPURINOL 100 MG: 100 TABLET ORAL at 08:57

## 2023-10-13 RX ADMIN — SODIUM CHLORIDE, POTASSIUM CHLORIDE, SODIUM LACTATE AND CALCIUM CHLORIDE 100 ML/HR: 600; 310; 30; 20 INJECTION, SOLUTION INTRAVENOUS at 06:55

## 2023-10-13 RX ADMIN — HEPARIN SODIUM 2000 UNITS: 1000 INJECTION INTRAVENOUS; SUBCUTANEOUS at 05:10

## 2023-10-13 RX ADMIN — HYDROXYZINE HYDROCHLORIDE 25 MG: 25 TABLET ORAL at 20:05

## 2023-10-13 RX ADMIN — CARVEDILOL 12.5 MG: 12.5 TABLET, FILM COATED ORAL at 20:04

## 2023-10-13 RX ADMIN — CYCLOBENZAPRINE 10 MG: 10 TABLET, FILM COATED ORAL at 20:05

## 2023-10-13 RX ADMIN — OXYCODONE HYDROCHLORIDE 10 MG: 10 TABLET ORAL at 03:57

## 2023-10-13 RX ADMIN — LEVETIRACETAM 500 MG: 500 TABLET, FILM COATED ORAL at 20:04

## 2023-10-13 RX ADMIN — CYCLOBENZAPRINE 10 MG: 10 TABLET, FILM COATED ORAL at 15:58

## 2023-10-13 RX ADMIN — HYDROMORPHONE HYDROCHLORIDE 0.6 MG: 1 INJECTION, SOLUTION INTRAMUSCULAR; INTRAVENOUS; SUBCUTANEOUS at 23:10

## 2023-10-13 RX ADMIN — SENNOSIDES 8.6 MG: 8.6 TABLET, FILM COATED ORAL at 20:05

## 2023-10-13 RX ADMIN — ASPIRIN 81 MG 81 MG: 81 TABLET ORAL at 08:57

## 2023-10-13 RX ADMIN — TAMSULOSIN HYDROCHLORIDE 0.4 MG: 0.4 CAPSULE ORAL at 08:57

## 2023-10-13 RX ADMIN — ACETAMINOPHEN 650 MG: 325 TABLET ORAL at 18:25

## 2023-10-13 RX ADMIN — PREGABALIN 300 MG: 150 CAPSULE ORAL at 20:05

## 2023-10-13 RX ADMIN — OXYCODONE HYDROCHLORIDE 10 MG: 10 TABLET ORAL at 11:18

## 2023-10-13 RX ADMIN — ACETAMINOPHEN 650 MG: 325 TABLET ORAL at 20:05

## 2023-10-13 RX ADMIN — HYDROMORPHONE HYDROCHLORIDE 0.6 MG: 1 INJECTION, SOLUTION INTRAMUSCULAR; INTRAVENOUS; SUBCUTANEOUS at 06:56

## 2023-10-13 RX ADMIN — HYDROMORPHONE HYDROCHLORIDE 0.6 MG: 1 INJECTION, SOLUTION INTRAMUSCULAR; INTRAVENOUS; SUBCUTANEOUS at 10:03

## 2023-10-13 RX ADMIN — CYCLOBENZAPRINE 10 MG: 10 TABLET, FILM COATED ORAL at 08:57

## 2023-10-13 RX ADMIN — ATORVASTATIN CALCIUM 80 MG: 80 TABLET, FILM COATED ORAL at 20:05

## 2023-10-13 RX ADMIN — HYDROMORPHONE HYDROCHLORIDE 0.6 MG: 1 INJECTION, SOLUTION INTRAMUSCULAR; INTRAVENOUS; SUBCUTANEOUS at 15:57

## 2023-10-13 RX ADMIN — OXYCODONE HYDROCHLORIDE 10 MG: 10 TABLET ORAL at 18:25

## 2023-10-13 ASSESSMENT — PAIN SCALES - GENERAL
PAINLEVEL_OUTOF10: 10 - WORST POSSIBLE PAIN
PAINLEVEL_OUTOF10: 6
PAINLEVEL_OUTOF10: 10 - WORST POSSIBLE PAIN
PAINLEVEL_OUTOF10: 8
PAINLEVEL_OUTOF10: 8
PAINLEVEL_OUTOF10: 10 - WORST POSSIBLE PAIN
PAINLEVEL_OUTOF10: 8

## 2023-10-13 ASSESSMENT — COGNITIVE AND FUNCTIONAL STATUS - GENERAL
DRESSING REGULAR LOWER BODY CLOTHING: A LITTLE
STANDING UP FROM CHAIR USING ARMS: A LITTLE
MOVING TO AND FROM BED TO CHAIR: A LITTLE
WALKING IN HOSPITAL ROOM: A LOT
MOVING TO AND FROM BED TO CHAIR: A LITTLE
CLIMB 3 TO 5 STEPS WITH RAILING: A LITTLE
WALKING IN HOSPITAL ROOM: A LITTLE
CLIMB 3 TO 5 STEPS WITH RAILING: A LOT
MOBILITY SCORE: 20
MOBILITY SCORE: 18
STANDING UP FROM CHAIR USING ARMS: A LITTLE
DRESSING REGULAR LOWER BODY CLOTHING: A LITTLE
DAILY ACTIVITIY SCORE: 23
DAILY ACTIVITIY SCORE: 23

## 2023-10-13 ASSESSMENT — PAIN - FUNCTIONAL ASSESSMENT
PAIN_FUNCTIONAL_ASSESSMENT: 0-10

## 2023-10-13 ASSESSMENT — PAIN DESCRIPTION - DESCRIPTORS
DESCRIPTORS: ACHING;DISCOMFORT;SORE

## 2023-10-13 NOTE — PROGRESS NOTES
"Gm Thrasher is a 58 y.o. male on day 3 of admission presenting with Syncope, unspecified syncope type.    Subjective   Mr. Thrasher is going for a CTA of his head today.  He describes mild to moderate right distal lower extremity discomfort.  He is aware that he will need to be medically optimized prior to surgical intervention for his right distal lower extremity fractures.       Objective     Physical Exam  Vitals reviewed.   HENT:      Head: Normocephalic.      Mouth/Throat:      Mouth: Mucous membranes are dry.      Pharynx: Oropharynx is clear.   Eyes:      Extraocular Movements: Extraocular movements intact.   Pulmonary:      Effort: Pulmonary effort is normal.   Abdominal:      Palpations: Abdomen is soft.   Musculoskeletal:         General: No tenderness, deformity or signs of injury.      Right lower leg: No edema.      Left lower leg: No edema.      Comments: Distal right lower extremity splint in place, is clean dry and intact.  Right toes are pink with 2 to 3-second cap refill.  Light touch sensation and motor function intact to all 5 right toes   Skin:     General: Skin is warm and dry.      Capillary Refill: Capillary refill takes 2 to 3 seconds.   Neurological:      General: No focal deficit present.      Mental Status: He is alert. Mental status is at baseline.   Psychiatric:         Mood and Affect: Mood normal.         Last Recorded Vitals  Blood pressure 148/71, pulse 53, temperature 36.5 °C (97.7 °F), temperature source Temporal, resp. rate 18, height 1.8 m (5' 10.87\"), weight 119 kg (261 lb 6.4 oz), SpO2 95 %.  Intake/Output last 3 Shifts:  I/O last 3 completed shifts:  In: 4854.4 (40.9 mL/kg) [P.O.:1360; I.V.:3494.4 (29.5 mL/kg)]  Out: 3800 (32 mL/kg) [Urine:3800 (0.9 mL/kg/hr)]  Weight: 118.6 kg     Relevant Results      Scheduled medications  acetaminophen, 650 mg, oral, 4x daily  allopurinol, 100 mg, oral, Daily  aspirin, 81 mg, oral, Daily  atorvastatin, 80 mg, oral, Daily  carvedilol, " 12.5 mg, oral, BID  cyclobenzaprine, 10 mg, oral, TID  escitalopram, 20 mg, oral, Daily  heparin, 4,000 Units, intravenous, Once  insulin lispro, 0-5 Units, subcutaneous, q4h  levETIRAcetam, 500 mg, oral, BID  pantoprazole, 40 mg, oral, Daily before breakfast  perflutren lipid microspheres, 3 mL of dilution, intravenous, Once in imaging  polyethylene glycol, 17 g, oral, Daily  pregabalin, 300 mg, oral, BID  sennosides, 1 tablet, oral, BID  sulfur hexafluoride microsphr, 2 mL, intravenous, Once in imaging  tamsulosin, 0.4 mg, oral, Daily      Continuous medications  lactated Ringer's, 100 mL/hr, Last Rate: 100 mL/hr (10/13/23 0655)      PRN medications  PRN medications: docusate sodium, HYDROmorphone, HYDROmorphone, oxyCODONE  Results for orders placed or performed during the hospital encounter of 10/10/23 (from the past 24 hour(s))   POCT GLUCOSE   Result Value Ref Range    POCT Glucose 78 74 - 99 mg/dL   POCT GLUCOSE   Result Value Ref Range    POCT Glucose 85 74 - 99 mg/dL   Heparin Assay   Result Value Ref Range    Heparin Unfractionated 0.3 See Comment Below for Therapeutic Ranges IU/mL   CBC   Result Value Ref Range    WBC 9.8 4.4 - 11.3 x10*3/uL    nRBC 0.0 0.0 - 0.0 /100 WBCs    RBC 3.28 (L) 4.50 - 5.90 x10*6/uL    Hemoglobin 9.9 (L) 13.5 - 17.5 g/dL    Hematocrit 29.4 (L) 41.0 - 52.0 %    MCV 90 80 - 100 fL    MCH 30.2 26.0 - 34.0 pg    MCHC 33.7 32.0 - 36.0 g/dL    RDW 15.7 (H) 11.5 - 14.5 %    Platelets 334 150 - 450 x10*3/uL    MPV 9.5 7.5 - 11.5 fL   Renal Function Panel   Result Value Ref Range    Glucose 99 74 - 99 mg/dL    Sodium 136 136 - 145 mmol/L    Potassium 3.7 3.5 - 5.3 mmol/L    Chloride 100 98 - 107 mmol/L    Bicarbonate 28 21 - 32 mmol/L    Anion Gap 12 10 - 20 mmol/L    Urea Nitrogen 10 6 - 23 mg/dL    Creatinine 0.83 0.50 - 1.30 mg/dL    eGFR >90 >60 mL/min/1.73m*2    Calcium 8.3 (L) 8.6 - 10.3 mg/dL    Phosphorus 3.5 2.5 - 4.9 mg/dL    Albumin 2.9 (L) 3.4 - 5.0 g/dL   Magnesium   Result  Value Ref Range    Magnesium 1.82 1.60 - 2.40 mg/dL   Heparin Assay, UFH   Result Value Ref Range    Heparin Unfractionated 0.2 See Comment Below for Therapeutic Ranges IU/mL   POCT GLUCOSE   Result Value Ref Range    POCT Glucose 88 74 - 99 mg/dL          This patient currently has cardiac telemetry ordered; if you would like to modify or discontinue the telemetry order, click here to go to the orders activity to modify/discontinue the order.                 Assessment/Plan   Principal Problem:    Syncope, unspecified syncope type  Active Problems:    H/O fracture of ankle    NSTEMI (non-ST elevated myocardial infarction) (CMS/[facility])    Right distal 1/3 tibia/fibula fractures  Splinted  Strict NWB right LE  Elevate on 2-3 pillows while in bed  May ice intermittently  Ortho service following, please contact service once patient has been medically optimized for ORIF      Seen on rounds this morning with Dr. BERNY Matamoros  I spent 30 minutes in the professional and overall care of this patient.      Justina Rock PA-C

## 2023-10-13 NOTE — PROGRESS NOTES
Physical Therapy                 Therapy Communication Note    Patient Name: Gm Thrasher  MRN: [id number]  Today's Date: 10/13/2023     Discipline: Physical Therapy    Missed Visit Reason:   cardiac cath 10/13    Missed Time: Attempt    Comment: PT hold, spoke with RN, pt going for cardiac cath today 10/13. Still pending surgical repair of R ankle fx, now scheduled for Monday 10/16. Will re-attempt as appropriate.     Recent PMH:  Pt came to [facility] 9/29/23 for an elective lumbar laminectomy with Dr. Amezcua.   9/30: pt lethargic, and not able to work with therapy  10/1: OT/PT evals completed  10/2: pt with complaints of increased back pain and severe headache (10/10); pt also with episode of emesis  10/3: pt with continued severe back pain and headache; CT of head ordered and showed (+) subarachnoid hemorrhage; transferred to ICU  10/5: OT re-eval completed   10/6: pt discharged home      Dee Padron, PT, DPT

## 2023-10-13 NOTE — NURSING NOTE
Cardiac Rehab: Patient seen for qualifying diagnosis to cardiac rehab program. Given [facility] book and discussed enrollment into phase II. Reviewed cardiac lifestyle modifications necessary for improved heart health. Encouraged cardiology follow up post hospitalization. Discussed the importance of medication compliance. Smoking cessation given. Cardiac rehab staff will contact pt following discharge for enrollment in program.

## 2023-10-13 NOTE — PROGRESS NOTES
Gm Thrasher is a 58 y.o. male on day 3 of admission presenting with Syncope, unspecified syncope type.      Subjective   -Patient seen with team, resting in bed in no apparent distress. Alert and cooperative.   -Patient endorses pain in ankle and chronic back pain but denies other positives to a 12 system ROS noting patient denies any chest pain, SOB, abdominal pain, nausea, vomiting, diarrhea, fevers of chills.   -Patient VSS WNL.  -Patient in no acute events overnight  -Patient went for CT head without contrast this morning which showed no acute brain bleed  -Patient's labs significant for hemoglobin 9.9       Objective     Last Recorded Vitals  /71 (BP Location: Right arm, Patient Position: Lying)   Pulse 53   Temp 36.5 °C (97.7 °F) (Temporal)   Resp 18   Wt 119 kg (261 lb 6.4 oz)   SpO2 95%   Intake/Output last 3 Shifts:    Intake/Output Summary (Last 24 hours) at 10/13/2023 1419  Last data filed at 10/13/2023 1339  Gross per 24 hour   Intake 2791.07 ml   Output 2905 ml   Net -113.93 ml         Admission Weight  Weight: 114 kg (251 lb 4.8 oz) (10/10/23 0358)    Daily Weight  10/13/23 : 119 kg (261 lb 6.4 oz)    Image Results  ECG 12 lead  Normal sinus rhythm  Normal ECG  When compared with ECG of 10-OCT-2023 05:44, (unconfirmed)  No significant change was found  Confirmed by Fina Talavera ([id number]) on 10/11/2023 6:13:48 PM  CT ankle right wo IV contrast  Narrative: Interpreted By:  Christina Watt,   STUDY:  CT ANKLE RIGHT WO IV CONTRAST;  10/11/2023 3:24 pm      INDICATION:  Signs/Symptoms:distal tibia fx.      COMPARISON:  Right ankle radiographs dated 05/15/2020.      ACCESSION NUMBER(S):  [id number]      ORDERING CLINICIAN:  BILL SWEENEY      TECHNIQUE:  CT imaging of the  right lower extremity was obtained  without  administration of intravenous contrast medium. Coronal and sagittal  reformatted images were performed. 3D reformatted images were created  and reviewed.       FINDINGS:  OSSEOUS STRUCTURES:  There is an oblique minimally comminuted fracture through distal  tibial diaphysis. There is mild comminution of fracture fragments  about the fracture site. There is mild anterior and medial  displacement of the proximal fracture fragment with overriding of  fracture fragments by approximately 1.5 cm. There is also an oblique  mildly-comminuted fracture through distal fibular metadiaphysis.  There is anterior and lateral displacement of the proximal fracture  fragment with significant overriding of fracture fragments by  approximately 3 cm. There is extension of fracture lucency to the  distal tibiofibular joint articulation (as seen on coronal image  68/104). However the distal tibiofibular joint articulation appears  grossly intact without significant displacement or dislocation. There  is also cortical remodeling with an oblique irregular lucency  extending through the medial aspect of the tibial plafond, likely  related to cortical remodeling from remote fracture. There is  incongruity of the distal articular surface of tibia at the level of  this remote fracture extension (best seen on coronal image 55/104 and  sagittal image 47/104). There are moderate degenerative changes in  the tibiotalar joint articulation with joint space narrowing and  prominent marginal osteophytes. There are well corticated ossific  fragments at the tip of medial and lateral malleoli, likely related  to remote trauma. Subtalar joint articulation is maintained and  demonstrates mild-to-moderate degenerative changes with subchondral  sclerosis and prominent marginal osteophytes. Note made of os  trigonum. There is also prominent osseous process along the dorsal  aspect of the talar neck which is likely favored to be sequela of  remote injury of the dorsal talonavicular ligament. Rest of the  osseous structures appear grossly unremarkable.      SOFT TISSUES:  There is soft tissue swelling about the  included distal leg extending  to the ankle. Evaluation of tendons and ligaments is limited due to  CT technique. Within this limitation, tendons appear grossly  unremarkable.      Impression: 1. Acute minimally comminuted and displaced fractures through distal  tibia and fibula as described above.  2. There are also findings consistent with remote trauma with  significant cortical remodeling of the distal tibia with associated  incongruity of the distal tibial articular surface as described above.  3. Moderate arthrosis of the tibiotalar and subtalar joints.  4. Soft tissue swelling about the distal leg and ankle.      MACRO:  None      Signed by: Christina Watt 10/11/2023 4:47 PM  Dictation workstation:   [id number]  ECG 12 lead  Normal sinus rhythm  Nonspecific ST and T wave abnormality  Abnormal ECG  When compared with ECG of 20-SEP-2023 09:06,  Nonspecific T wave abnormality now evident in Inferior leads  Confirmed by Juan Carlos Lynne ([id number]) on 10/11/2023 8:26:21 AM  US renal complete  Narrative: Interpreted By:  Erick Banks,   STUDY:  US RENAL COMPLETE;  10/10/2023 9:46 pm      INDICATION:  Signs/Symptoms:Bilateral hydro.      COMPARISON:  CT lumbar spine dated 10/03/2023.      ACCESSION NUMBER(S):  [id number]      ORDERING CLINICIAN:  VERN MONTEMAYOR      TECHNIQUE:  Grayscale and color Doppler sonographic images of the kidneys were  obtained  .      FINDINGS:  RIGHT KIDNEY:  The right kidney measures 14.8 cm in length. Renal cortical  echogenicity and thickness are within normal limits. Severe  hydronephrosis. No sonographic evident calculus.      LEFT KIDNEY:  The left kidney measures 11.2 cm in length. There are elements of  cortical renal thinning that may reflect scarring or prior infarct  within the upper and lower poles. Renal cortical echogenicity is  otherwise within normal limits. 6 mm diameter parenchymal  calcification versus calculus. No hydronephrosis.      BLADDER:  Bladder wall appears  thickened measuring 6 mm. Bilateral ureteral  jets visualized.      Impression: Severe right hydronephrosis, similar in appearance to CT lumbar spine  examination. Bilateral ureteral jets visualized.      Mild asymmetric enlargement of the right kidney with cortical renal  scarring involving the left kidney.      Signed by: Erick Banks 10/11/2023 8:01 AM  Dictation workstation:   [id number]  Transthoracic Echo (TTE) Complete              [facility] [place]  [address], [facility] [address]     Tel [phone] Fax [phone]    TRANSTHORACIC ECHOCARDIOGRAM REPORT       Patient Name:      JESUS METCALF       Reading Physician:    [id number] Jonathan Malone MD  Study Date:        10/10/2023           Ordering Provider:    [id number] VERN MONTEMAYOR  MRN/PID:           [id number]             Fellow:  Accession#:        [id number]         Nurse:  Date of Birth/Age: 1965 / 58 years Sonographer:          Aleyda Hansen RDCS  Gender:            M                    Additional Staff:  Height:            180.00 cm            Admit Date:           10/10/2023  Weight:            51.71 kg             Admission Status:     Inpatient -                                                                Routine  BSA:               1.66 m2              Department Location:  [facility]  Blood Pressure: 141 /84 mmHg    Study Type:    TRANSTHORACIC ECHO (TTE) COMPLETE  Diagnosis/ICD: Syncope-R55  Indication:    Syncope  CPT Codes:     Echo Complete w Full [id number]    Patient History:  Smoker:            Current.  Diabetes:          Yes  BMI:               Obese >30  Pertinent History: Hyperlipidemia and HTN. Limited imaging windows available. No                     previous echocardiogram.    Study Detail: The following Echo studies were performed: 2D, M-Mode, Doppler and                color flow.  Image quality for this study is poor. Technically                challenging study due to body habitus and patient lying in supine                position. Definity used as a contrast agent for endocardial border                definition. Total contrast used for this procedure was 2 mL via IV                push.       PHYSICIAN INTERPRETATION:  Left Ventricle: Left ventricular systolic function is low normal, with an estimated ejection fraction of 50-55%. There are no regional wall motion abnormalities. The left ventricular cavity size is normal. Left ventricular diastolic filling was indeterminate.  Left Atrium: The left atrium is normal in size.  Right Ventricle: The right ventricle is mildly enlarged. There is low normal right ventricular systolic function.  Right Atrium: The right atrium is normal in size.  Aortic Valve: The aortic valve is trileaflet. There is mild aortic valve cusp calcification. There is no evidence of aortic valve regurgitation. The peak instantaneous gradient of the aortic valve is 3.7 mmHg.  Mitral Valve: The mitral valve is normal in structure. There is no evidence of mitral valve regurgitation.  Tricuspid Valve: The tricuspid valve is structurally normal. No evidence of tricuspid regurgitation. The Doppler estimated RVSP is within normal limits.  Pulmonic Valve: The pulmonic valve is structurally normal. There is no indication of pulmonic valve regurgitation.  Pericardium: There is no pericardial effusion noted.  Aorta: The aortic root is normal. There is no dilatation of the ascending aorta. There is no dilatation of the aortic root.  Systemic Veins: The inferior vena cava was not well visualized.       CONCLUSIONS:   1. Left ventricular systolic function is low normal with a 50-55% estimated ejection fraction.   2. There is low normal right ventricular systolic function.   3. RVSP within normal limits.    QUANTITATIVE DATA SUMMARY:  2D MEASUREMENTS:                            Normal  Ranges:  LAs:           4.70 cm    (2.7-4.0cm)  IVSd:          1.10 cm    (0.6-1.1cm)  LVPWd:         1.25 cm    (0.6-1.1cm)  LVIDd:         5.20 cm    (3.9-5.9cm)  LVIDs:         4.55 cm  LV Mass Index: 168.7 g/m2  LV % FS        12.5 %    M-MODE MEASUREMENTS:                   Normal Ranges:  Ao Root: 3.90 cm (2.0-3.7cm)    LV DIASTOLIC FUNCTION:                      Normal Ranges:  MV Peak E: 0.37 m/s (0.7-1.2 m/s)  MV Peak A: 0.57 m/s (0.42-0.7 m/s)  E/A Ratio: 0.65     (1.0-2.2)    MITRAL VALVE:                       Normal Ranges:  MV Vmax:    0.65 m/s (<=1.3m/s)  MV peak P.7 mmHg (<5mmHg)  MV mean P.0 mmHg (<48mmHg)  MV DT:      222 msec (150-240msec)    AORTIC VALVE:                          Normal Ranges:  AoV Vmax:      0.97 m/s (<=1.7m/s)  AoV Peak PG:   3.7 mmHg (<20mmHg)  LVOT Max Graeme:  0.78 m/s (<=1.1m/s)  LVOT VTI:      14.90 cm  LVOT Diameter: 2.10 cm  (1.8-2.4cm)  AoV Area,Vmax: 2.78 cm2 (2.5-4.5cm2)       RIGHT VENTRICLE:  TAPSE: 22.5 mm  RV s'  0.12 m/s    PULMONIC VALVE:                          Normal Ranges:  PV Accel Time: 119 msec (>120ms)  PV Max Graeme:    0.7 m/s  (0.6-0.9m/s)  PV Max P.7 mmHg       [id number] Jonathan Malone MD  Electronically signed on 10/11/2023 at 7:56:19 AM       ** Final **    Scheduled medications  acetaminophen, 650 mg, oral, 4x daily  allopurinol, 100 mg, oral, Daily  aspirin, 81 mg, oral, Daily  atorvastatin, 80 mg, oral, Daily  carvedilol, 12.5 mg, oral, BID  cyclobenzaprine, 10 mg, oral, TID  escitalopram, 20 mg, oral, Daily  heparin, 4,000 Units, intravenous, Once  insulin lispro, 0-5 Units, subcutaneous, q4h  levETIRAcetam, 500 mg, oral, BID  pantoprazole, 40 mg, oral, Daily before breakfast  perflutren lipid microspheres, 3 mL of dilution, intravenous, Once in imaging  polyethylene glycol, 17 g, oral, Daily  pregabalin, 300 mg, oral, BID  sennosides, 1 tablet, oral, BID  sulfur hexafluoride microsphr, 2 mL, intravenous, Once in imaging  tamsulosin, 0.4  mg, oral, Daily      Continuous medications  lactated Ringer's, 100 mL/hr, Last Rate: 100 mL/hr (10/13/23 0655)    PRN medications  PRN medications: docusate sodium, HYDROmorphone, HYDROmorphone, oxyCODONE    Results for orders placed or performed during the hospital encounter of 10/10/23 (from the past 24 hour(s))   POCT GLUCOSE   Result Value Ref Range    POCT Glucose 85 74 - 99 mg/dL   Heparin Assay   Result Value Ref Range    Heparin Unfractionated 0.3 See Comment Below for Therapeutic Ranges IU/mL   CBC   Result Value Ref Range    WBC 9.8 4.4 - 11.3 x10*3/uL    nRBC 0.0 0.0 - 0.0 /100 WBCs    RBC 3.28 (L) 4.50 - 5.90 x10*6/uL    Hemoglobin 9.9 (L) 13.5 - 17.5 g/dL    Hematocrit 29.4 (L) 41.0 - 52.0 %    MCV 90 80 - 100 fL    MCH 30.2 26.0 - 34.0 pg    MCHC 33.7 32.0 - 36.0 g/dL    RDW 15.7 (H) 11.5 - 14.5 %    Platelets 334 150 - 450 x10*3/uL    MPV 9.5 7.5 - 11.5 fL   Renal Function Panel   Result Value Ref Range    Glucose 99 74 - 99 mg/dL    Sodium 136 136 - 145 mmol/L    Potassium 3.7 3.5 - 5.3 mmol/L    Chloride 100 98 - 107 mmol/L    Bicarbonate 28 21 - 32 mmol/L    Anion Gap 12 10 - 20 mmol/L    Urea Nitrogen 10 6 - 23 mg/dL    Creatinine 0.83 0.50 - 1.30 mg/dL    eGFR >90 >60 mL/min/1.73m*2    Calcium 8.3 (L) 8.6 - 10.3 mg/dL    Phosphorus 3.5 2.5 - 4.9 mg/dL    Albumin 2.9 (L) 3.4 - 5.0 g/dL   Magnesium   Result Value Ref Range    Magnesium 1.82 1.60 - 2.40 mg/dL   Heparin Assay, UFH   Result Value Ref Range    Heparin Unfractionated 0.2 See Comment Below for Therapeutic Ranges IU/mL   POCT GLUCOSE   Result Value Ref Range    POCT Glucose 88 74 - 99 mg/dL        Physical Exam:  General: Not in acute distress, A&O x 3, alert, cooperative, well-developed  HEENT: Normocephalic, atraumatic, EOMI, moist mucous membranes  Neck: Neck supple, trachea midline, no evidence of trauma  Cardiovascular: RRR, S1 and S2 appreciated, no murmurs rubs gallops appreciated, distal pulses 2+ bilaterally (radial and dorsalis  pedis)  Respiratory: Vesicular breath sounds appreciated bilaterally, no adventitious sounds appreciated, no increased work of breathing  GI: Abdomen soft, nondistended, nontender to palpation, bowel sounds present  Extremities: No edema appreciated in lower extremities bilaterally, no cyanosis, right lower extremity with splint on ankle.  Bandage from PCI on right wrist  Lumbar back: Approximately 10 cm incision well appreciated, open to air without signs of infection or erythema with sutures still in place.  Neuro: A&O X3, no focal deficits, strength and sensation intact bilaterally  Skin: Warm and dry, without lesions or rashes      Relevant Results    MRI LUMBAR SPINE W WO CONTRAST     Result Date: 10/9/2023  (THIS STUDY IS FROM OUTSIDE FACILITY)   EXAMINATION: MRI OF THE LUMBAR SPINE WITHOUT AND WITH CONTRAST  10/9/2023 5:55 pm TECHNIQUE: Multiplanar multisequence MRI of the lumbar spine was performed without and with the administration of intravenous contrast. COMPARISON: None. HISTORY: ORDERING SYSTEM PROVIDED HISTORY: spine surgery high WBC TECHNOLOGIST PROVIDED HISTORY: Reason for exam:->spine surgery high WBC Decision Support Exception - unselect if not a suspected or confirmed emergency medical condition->Emergency Medical Condition (MA) What reading provider will be dictating this exam?->CRC FINDINGS: The exam is degraded by motion as well as artifact created by orthopedic hardware.  The patient is status post lower lumbar laminectomies with pedicle screw and connecting niyah fixation from L3-S1 as well as multilevel intervertebral body fusion. There appears to be abnormal clumping of the cauda equina nerve roots just caudal to the conus medullaris, best appreciated on image number 1 of series 10, without associated enhancement postcontrast administration. A fluid collection in the laminectomy bed likely reflects a postop seroma. There is a rim enhancing fluid collection extending into the left psoas  muscle measuring up to 2.5 x 9.3 cm in diameter, worrisome for abscess.  This may extend into the L4-5 disc space.  There is bilateral hydronephrosis.  The left kidney is severely atrophic.    MR findings worrisome for retroperitoneal abscess, possibly extending into the L4-5 disc space. Apparent clumping of the proximal cauda equina nerve roots versus mass effect due to an intrathecal fluid collection.  Recommend MRI of the T-spine for further evaluation.    CT angio chest w and wo IV contrast    Result Date: 10/9/2023  EXAMINATION: CTA OF THE CHEST WITH AND WITHOUT CONTRAST 10/9/2023 3:12 pm TECHNIQUE: CTA of the chest was performed before and after the administration of intravenous contrast.  Multiplanar reformatted images are provided for review.  MIP images are provided for review. Automated exposure control, iterative reconstruction, and/or weight based adjustment of the mA/kV was utilized to reduce the radiation dose to as low as reasonably achievable. COMPARISON: None. HISTORY: ORDERING SYSTEM PROVIDED HISTORY: PE TECHNOLOGIST PROVIDED HISTORY: Reason for exam:->PE Decision Support Exception - unselect if not a suspected or confirmed emergency medical condition->Emergency Medical Condition (MA) What reading provider will be dictating this exam?->CRC FINDINGS: Pulmonary Arteries: Pulmonary arteries are adequately opacified for evaluation.  No evidence of intraluminal filling defect to suggest pulmonary embolism.  Main pulmonary artery is normal in caliber. Mediastinum: No evidence of mediastinal lymphadenopathy.  The heart and pericardium demonstrate no acute abnormality.  There is no acute abnormality of the thoracic aorta. Lungs/pleura: Somewhat patchy subpleural curvilinear markings are seen in the bilateral lower lobes, overall morphology favoring atelectasis or infectious infiltrate.  Scattered central small cysts are observed. Upper Abdomen: Limited images of the upper abdomen are unremarkable. Soft  "Tissues/Bones: No acute bone or soft tissue abnormality.    1. No evidence of pulmonary embolism. 2. Patchy curvilinear densities in the bilateral lower lobes, atelectasis is favored over other process    CT Head W/O Contrast    Result Date: 10/9/2023  EXAMINATION: CT OF THE HEAD WITHOUT CONTRAST  10/9/2023 3:12 pm TECHNIQUE: CT of the head was performed without the administration of intravenous contrast. Automated exposure control, iterative reconstruction, and/or weight based adjustment of the mA/kV was utilized to reduce the radiation dose to as low as reasonably achievable. COMPARISON: 11/21/2022 HISTORY: ORDERING SYSTEM PROVIDED HISTORY: syncope TECHNOLOGIST PROVIDED HISTORY: Reason for exam:->syncope Has a \"code stroke\" or \"stroke alert\" been called?->No Decision Support Exception - unselect if not a suspected or confirmed emergency medical condition->Emergency Medical Condition (MA) What reading provider will be dictating this exam?->CRC FINDINGS: BRAIN/VENTRICLES: There is some subtle increased attenuation along the right tentorium cerebelli (series 3, image 23).  This is suspicious for a acute subdural hematoma.  There is no mass effect or edema.  There are no acute infarcts. ORBITS: The visualized portion of the orbits demonstrate no acute abnormality. SINUSES: The visualized paranasal sinuses and mastoid air cells demonstrate no acute abnormality. SOFT TISSUES/SKULL:  No acute abnormality of the visualized skull or soft tissues.    Findings suspicious of a small acute right-sided subdural hematoma.  There is no mass effect present..    XR ankle right 3+ views    Result Date: 10/9/2023  EXAMINATION: THREE XRAY VIEWS OF THE RIGHT ANKLE 10/9/2023 1:02 pm COMPARISON: January 18, 2020 1334 hours. HISTORY: ORDERING SYSTEM PROVIDED HISTORY: fall TECHNOLOGIST PROVIDED HISTORY: Reason for exam:->fall What reading provider will be dictating this exam?->CRC FINDINGS: Two views of the right ankle are submitted. There " is both a new acute spiral mildly displaced fracture of the distal right tibia superimposed upon an old vertical fracture with intra-articular extension at the time.  There is now a new associated comminuted fracture of the distal right fibula. Identified again is a well corticated bony density distal to the medial malleolus and to the lateral malleolus likely representing old avulsion injuries.. The mortise is intact.  No dislocations.    THERE IS NOW A NEW ACUTE OBLIQUE FRACTURE OF THE DISTAL RIGHT TIBIA WITH A OLD VERTICAL FRACTURE OF THE TIBIA. THERE IS A NEW COMMINUTED FRACTURE OF THE DISTAL RIGHT FIBULA.        Assessment/Plan   Principal Problem:    Syncope, unspecified syncope type  Active Problems:    H/O fracture of ankle    NSTEMI (non-ST elevated myocardial infarction) (CMS/[facility])    Patient is a 58-year old male with a past medical history of MDD, T2 DM, BPH, hypertension, hyperlipidemia, JEMAL, chronic pain in neck and back, lumbar radiculopathy, and heroin use who is transferred from [facility] in the setting of recent lumbar surgery with subarachnoid hemorrhage/subdural hematoma for syncope and ankle pain secondary to the syncope. Upon evaluation, he was found to have a new fracture of the right distal tibia and fibula, potential retroperitoneal abscess on MRI, elevated troponin, elevated CK. Patient was admitted for management of his fracture, syncope etiology, and elevated troponin.    Vasovagal Syncope  -Patient states syncopal event occurred 2/2 to pain but also endorsed poor P.O. intake.   -Trend CMP, CBC. Previous labs from outside facility yesterday and [facility] illustrated no obvious metabolic or toxicological etiology.   -Telemetry continued, no malignant arrythmias seen on tele since admission  -TTE revealed EF of 50 to 55%, no structural heart disease    Retroperitoneal Abscess Vs. Postsurgical Fluid Collection  -[place] [facility] [place] reviewed MRI and assessed that  the findings on MRI were consistent with normal post-operative changes rather than an abscess  -Wound dry, intact, no pain with palpation.   -Infectious Disease following, continue to appreciate recs.   ---> ID had a discussion with neurosurgery deemed a low likelihood of abscess more likely fluid collection postsurgical and noninfectious.  ----> As per ID discontinued cefepime and vancomycin (10/11)  -Admission blood cultures from 10/10 NGTD    NSTEMI: Troponinemia, without chest pain or EKG changes, now s/p cardiac catheterization (diagnostic)  -Diagnostic cardiac catheterization 10/11/2023 which demonstrated a proximal RCA stenosis of 90% with interventional plan pending  -Echocardiogram 10/10 revealed EF of 50-55%, no regional wall motion abnormalities  - Continue aspirin 81mg  -Continue Atorvastatin 80 daily  -neurosurgery and orthopedics which both state DAPT can be started, this should not interrupt surgery and a CT head will need to be repeated 24 hour after starting DAPT.  Neurosurgery also okay with heparin bolus during PCI.  -S/p PCI with stent placement to RCA 10/13/2023  --> Continue to monitor patient's mental status while on DAPT, patient tolerated the procedure well    Displaced Distal right tibia and comminuted distal right fibula fracture.   -Repeated x-ray showing displaced and comminuted fracture of the distal tibia diaphysis and distal fibula diaphysis.   --> Ortho awaiting cardiac interventional plan, they stated DAPT will not preclude surgery and OK to start as required  ----> Once stable for procedure from cardiology perspective following PCI on 10/13/2023 patient will proceed with ORIF of right ankle as per Ortho's recs.  -Pain control with scheduled Tylenol and oxycodone with PRN Dilaudid for severe pain.   -Continue Flexeril prn  -Continue bowel regimen consisting of Miralax/senokot    Recent lumbar fusion(9/29/2023) c/b   - Post op SAH/SDH (10/3), SAH resolved on imaging at OSH, SDH  persists   - Intraoperative dural tear s/p dural patch  Started on keppra for seizure prophylaxis due to bleed during prior admission and on discharge, will address with neurosurgery necessity and length of treatment. Will switch to IV if NPO for surgery  -CT head 10/13/2023 negative for any acute bleeds  -Continue to monitor neuro status while on DAPT.    Primary HTN:  -Patient takes no medication for this at home  -Continue Coreg 12.5mg     #Diabetes Type 2: Known history, with recent A1c subdiabetic  -A1c 5.7  -Hold home metformin at this time  -No insulin at this time as patient's glucose has been ranging ,  -Continue POCT glucose checks    Gout: Known hx  -Continue home allopurinol    BPH: known hx  -Will continue Tamsulosin 0.4 mg daily     IVF: None.   Diet: Cardiac  DVT Prophylaxis: Heparin subcutaneous.   Consults: Cardiology, Neurosurgery, orthopedic surgery.   Dispo: Anticipate additional 2 to 3 days hospital stay.    Code Status: Full Code    Brad Del Real MD,   Internal medicine PGY 1

## 2023-10-13 NOTE — PROGRESS NOTES
Occupational Therapy                 Therapy Communication Note    Patient Name: Gm Thrasher  MRN: [id number]  Today's Date: 10/13/2023     Discipline: Occupational Therapy    Missed Visit Reason:      Missed Time: Attempt    Comment:  Attempted to see Pt for O.T. eval this date. Pt going for Cardiac cath today. Will monitor and see Pt post procedure for OT eval.

## 2023-10-13 NOTE — POST-PROCEDURE NOTE
Physician Transition of Care Summary  Invasive Cardiovascular Lab    Procedure Date: 10/13/2023  Attending:    * Juvenal Le - Primary  Resident/Fellow/Other Assistant: No surgical staff documented.    Pre Procedure Indications:   ACS greater than 24 hours NSTEMI     Post Procedure Diagnosis:   NSTEMI    Procedure(s):   Percutaneous Coronary Intervention    Procedure Findings:   Severe RCA disease    Description of the Procedure:   Right radial. PCI to RCA. TR Band with 15 ml air inserted    Complications:   none    Stents/Implants:   [place] 3.5x22mm JUSTO upsized to 4mm    Anticoagulation/Antiplatelet Plan:   Heparin    Estimated Blood Loss:   5 mL    Anesthesia: Moderate Sedation Anesthesia Staff: No anesthesia staff entered.    Any Specimen(s) Removed:   No specimens collected during this procedure.    Disposition:   To floor      Electronically signed by: Juvenal Le MD, 10/13/2023 1:49 PM

## 2023-10-14 ENCOUNTER — APPOINTMENT (OUTPATIENT)
Dept: CARDIOLOGY | Facility: HOSPITAL | Age: 58
DRG: 321 | End: 2023-10-14
Payer: COMMERCIAL

## 2023-10-14 LAB
ATRIAL RATE: 58 BPM
BACTERIA BLD CULT: NORMAL
BACTERIA BLD CULT: NORMAL
P AXIS: 28 DEGREES
P OFFSET: 167 MS
P ONSET: 118 MS
PR INTERVAL: 202 MS
Q ONSET: 219 MS
QRS COUNT: 10 BEATS
QRS DURATION: 98 MS
QT INTERVAL: 430 MS
QTC CALCULATION(BAZETT): 422 MS
QTC FREDERICIA: 424 MS
R AXIS: 22 DEGREES
T AXIS: 27 DEGREES
T OFFSET: 434 MS
VENTRICULAR RATE: 58 BPM

## 2023-10-14 PROCEDURE — 2580000001 HC RX 258 IV SOLUTIONS: Performed by: STUDENT IN AN ORGANIZED HEALTH CARE EDUCATION/TRAINING PROGRAM

## 2023-10-14 PROCEDURE — 2500000001 HC RX 250 WO HCPCS SELF ADMINISTERED DRUGS (ALT 637 FOR MEDICARE OP): Performed by: STUDENT IN AN ORGANIZED HEALTH CARE EDUCATION/TRAINING PROGRAM

## 2023-10-14 PROCEDURE — 2500000004 HC RX 250 GENERAL PHARMACY W/ HCPCS (ALT 636 FOR OP/ED)

## 2023-10-14 PROCEDURE — 2500000004 HC RX 250 GENERAL PHARMACY W/ HCPCS (ALT 636 FOR OP/ED): Performed by: STUDENT IN AN ORGANIZED HEALTH CARE EDUCATION/TRAINING PROGRAM

## 2023-10-14 PROCEDURE — 93005 ELECTROCARDIOGRAM TRACING: CPT

## 2023-10-14 PROCEDURE — 2500000001 HC RX 250 WO HCPCS SELF ADMINISTERED DRUGS (ALT 637 FOR MEDICARE OP)

## 2023-10-14 PROCEDURE — 99232 SBSQ HOSP IP/OBS MODERATE 35: CPT

## 2023-10-14 PROCEDURE — 2060000001 HC INTERMEDIATE ICU ROOM DAILY

## 2023-10-14 PROCEDURE — 2500000001 HC RX 250 WO HCPCS SELF ADMINISTERED DRUGS (ALT 637 FOR MEDICARE OP): Performed by: INTERNAL MEDICINE

## 2023-10-14 RX ORDER — HYDROXYZINE HYDROCHLORIDE 25 MG/1
25 TABLET, FILM COATED ORAL ONCE
Status: COMPLETED | OUTPATIENT
Start: 2023-10-15 | End: 2023-10-14

## 2023-10-14 RX ADMIN — SENNOSIDES 8.6 MG: 8.6 TABLET, FILM COATED ORAL at 08:38

## 2023-10-14 RX ADMIN — ATORVASTATIN CALCIUM 80 MG: 80 TABLET, FILM COATED ORAL at 21:02

## 2023-10-14 RX ADMIN — OXYCODONE HYDROCHLORIDE 10 MG: 10 TABLET ORAL at 14:46

## 2023-10-14 RX ADMIN — ACETAMINOPHEN 650 MG: 325 TABLET ORAL at 21:02

## 2023-10-14 RX ADMIN — ASPIRIN 81 MG 81 MG: 81 TABLET ORAL at 08:37

## 2023-10-14 RX ADMIN — LEVETIRACETAM 500 MG: 500 TABLET, FILM COATED ORAL at 21:02

## 2023-10-14 RX ADMIN — ALLOPURINOL 100 MG: 100 TABLET ORAL at 08:38

## 2023-10-14 RX ADMIN — ACETAMINOPHEN 650 MG: 325 TABLET ORAL at 12:37

## 2023-10-14 RX ADMIN — HYDROMORPHONE HYDROCHLORIDE 0.6 MG: 1 INJECTION, SOLUTION INTRAMUSCULAR; INTRAVENOUS; SUBCUTANEOUS at 08:36

## 2023-10-14 RX ADMIN — POLYETHYLENE GLYCOL 3350 17 G: 17 POWDER, FOR SOLUTION ORAL at 08:37

## 2023-10-14 RX ADMIN — LEVETIRACETAM 500 MG: 500 TABLET, FILM COATED ORAL at 08:38

## 2023-10-14 RX ADMIN — CYCLOBENZAPRINE 10 MG: 10 TABLET, FILM COATED ORAL at 08:38

## 2023-10-14 RX ADMIN — HYDROMORPHONE HYDROCHLORIDE 0.6 MG: 1 INJECTION, SOLUTION INTRAMUSCULAR; INTRAVENOUS; SUBCUTANEOUS at 19:28

## 2023-10-14 RX ADMIN — ESCITALOPRAM OXALATE 20 MG: 20 TABLET, FILM COATED ORAL at 08:37

## 2023-10-14 RX ADMIN — HYDROMORPHONE HYDROCHLORIDE 0.6 MG: 1 INJECTION, SOLUTION INTRAMUSCULAR; INTRAVENOUS; SUBCUTANEOUS at 22:30

## 2023-10-14 RX ADMIN — SENNOSIDES 8.6 MG: 8.6 TABLET, FILM COATED ORAL at 21:02

## 2023-10-14 RX ADMIN — OXYCODONE HYDROCHLORIDE 10 MG: 10 TABLET ORAL at 23:56

## 2023-10-14 RX ADMIN — HYDROMORPHONE HYDROCHLORIDE 0.6 MG: 1 INJECTION, SOLUTION INTRAMUSCULAR; INTRAVENOUS; SUBCUTANEOUS at 12:38

## 2023-10-14 RX ADMIN — CARVEDILOL 12.5 MG: 12.5 TABLET, FILM COATED ORAL at 08:37

## 2023-10-14 RX ADMIN — PANTOPRAZOLE SODIUM 40 MG: 40 TABLET, DELAYED RELEASE ORAL at 06:02

## 2023-10-14 RX ADMIN — HYDROMORPHONE HYDROCHLORIDE 0.6 MG: 1 INJECTION, SOLUTION INTRAMUSCULAR; INTRAVENOUS; SUBCUTANEOUS at 16:38

## 2023-10-14 RX ADMIN — PREGABALIN 300 MG: 150 CAPSULE ORAL at 21:03

## 2023-10-14 RX ADMIN — HYDROMORPHONE HYDROCHLORIDE 0.6 MG: 1 INJECTION, SOLUTION INTRAMUSCULAR; INTRAVENOUS; SUBCUTANEOUS at 05:14

## 2023-10-14 RX ADMIN — SODIUM CHLORIDE, POTASSIUM CHLORIDE, SODIUM LACTATE AND CALCIUM CHLORIDE 100 ML/HR: 600; 310; 30; 20 INJECTION, SOLUTION INTRAVENOUS at 05:18

## 2023-10-14 RX ADMIN — CYCLOBENZAPRINE 10 MG: 10 TABLET, FILM COATED ORAL at 21:02

## 2023-10-14 RX ADMIN — CARVEDILOL 12.5 MG: 12.5 TABLET, FILM COATED ORAL at 21:03

## 2023-10-14 RX ADMIN — OXYCODONE HYDROCHLORIDE 10 MG: 10 TABLET ORAL at 02:14

## 2023-10-14 RX ADMIN — HYDROMORPHONE HYDROCHLORIDE 0.6 MG: 1 INJECTION, SOLUTION INTRAMUSCULAR; INTRAVENOUS; SUBCUTANEOUS at 02:14

## 2023-10-14 RX ADMIN — CYCLOBENZAPRINE 10 MG: 10 TABLET, FILM COATED ORAL at 14:47

## 2023-10-14 RX ADMIN — TAMSULOSIN HYDROCHLORIDE 0.4 MG: 0.4 CAPSULE ORAL at 08:38

## 2023-10-14 RX ADMIN — ACETAMINOPHEN 650 MG: 325 TABLET ORAL at 06:02

## 2023-10-14 RX ADMIN — PREGABALIN 300 MG: 150 CAPSULE ORAL at 08:37

## 2023-10-14 RX ADMIN — HYDROXYZINE HYDROCHLORIDE 25 MG: 25 TABLET ORAL at 23:59

## 2023-10-14 RX ADMIN — SODIUM CHLORIDE, POTASSIUM CHLORIDE, SODIUM LACTATE AND CALCIUM CHLORIDE 100 ML/HR: 600; 310; 30; 20 INJECTION, SOLUTION INTRAVENOUS at 16:37

## 2023-10-14 ASSESSMENT — PAIN SCALES - GENERAL
PAINLEVEL_OUTOF10: 9
PAINLEVEL_OUTOF10: 9
PAINLEVEL_OUTOF10: 8
PAINLEVEL_OUTOF10: 9
PAINLEVEL_OUTOF10: 8
PAINLEVEL_OUTOF10: 10 - WORST POSSIBLE PAIN
PAINLEVEL_OUTOF10: 10 - WORST POSSIBLE PAIN
PAINLEVEL_OUTOF10: 8
PAINLEVEL_OUTOF10: 7

## 2023-10-14 ASSESSMENT — COGNITIVE AND FUNCTIONAL STATUS - GENERAL
CLIMB 3 TO 5 STEPS WITH RAILING: A LOT
TURNING FROM BACK TO SIDE WHILE IN FLAT BAD: A LITTLE
TOILETING: A LITTLE
MOVING FROM LYING ON BACK TO SITTING ON SIDE OF FLAT BED WITH BEDRAILS: A LITTLE
HELP NEEDED FOR BATHING: A LITTLE
MOVING TO AND FROM BED TO CHAIR: A LITTLE
WALKING IN HOSPITAL ROOM: A LOT
DRESSING REGULAR LOWER BODY CLOTHING: A LITTLE
DAILY ACTIVITIY SCORE: 20
STANDING UP FROM CHAIR USING ARMS: A LITTLE
MOBILITY SCORE: 16
PERSONAL GROOMING: A LITTLE

## 2023-10-14 ASSESSMENT — PAIN DESCRIPTION - DESCRIPTORS
DESCRIPTORS: ACHING;DISCOMFORT;SORE;THROBBING
DESCRIPTORS: ACHING;DISCOMFORT;SORE
DESCRIPTORS: ACHING
DESCRIPTORS: ACHING

## 2023-10-14 ASSESSMENT — PAIN - FUNCTIONAL ASSESSMENT
PAIN_FUNCTIONAL_ASSESSMENT: 0-10

## 2023-10-14 NOTE — PROGRESS NOTES
Gm Thrasher is a 58 y.o. male on day 4 of admission presenting with Syncope, unspecified syncope type.      Subjective   -Patient seen with team, resting in bed in no apparent distress. Alert and cooperative.   -Patient endorses pain in ankle and chronic back pain but denies other positives to a 12 system -Patient VSS WNL.  -Patient in no acute events overnight       Objective     Last Recorded Vitals  /63   Pulse 56   Temp 36.5 °C (97.7 °F)   Resp 20   Wt 118 kg (259 lb 6.4 oz)   SpO2 97%   Intake/Output last 3 Shifts:    Intake/Output Summary (Last 24 hours) at 10/14/2023 1216  Last data filed at 10/14/2023 0600  Gross per 24 hour   Intake 2000 ml   Output 1605 ml   Net 395 ml         Admission Weight  Weight: 114 kg (251 lb 4.8 oz) (10/10/23 0358)    Daily Weight  10/14/23 : 118 kg (259 lb 6.4 oz)    Image Results  CT head wo IV contrast  Narrative: Interpreted By:  Jules Albright,  Anabel Calvillo   STUDY:  CT HEAD WO IV CONTRAST;  10/13/2023 6:40 am      INDICATION:  Signs/Symptoms:Patient started on anticoagulation, previous  subarachnoid and subdural hemorrhages..      COMPARISON:  CT of the head 10/03/2023.      ACCESSION NUMBER(S):  [id number]      ORDERING CLINICIAN:  JENNIFER RAE      TECHNIQUE:  Noncontrast axial CT of the head was performed. Angled reformats in  brain and bone windows were generated. The images were reviewed in  bone, brain, blood and soft tissue windows. Coronal and sagittal  reformats were provided for review.      FINDINGS:  CSF Spaces: Interval improvement in subarachnoid hemorrhage within  the right frontal convexity. Resolved hyperdense attenuation within  the left occipital horn. Previously noted diffuse subdural hemorrhage  along the tentorium and overlying the right cerebellar hemisphere is  less pronounced from prior. Basal cisterns are patent. There is no  hydrocephalus.      Parenchyma: Previously noted hemorrhagic focus within the right  parietal  lobe is not visualized on the current study. There is no new  intraparenchymal hemorrhage. Grey-white differentiation is intact. No  mass effect or midline shift.      Calvarium: Intact.      Paranasal sinuses and mastoids: Mucous retention cysts versus polyps  are visualized within the right maxillary sinus and right frontal  sinus. Mastoid air cells are clear.      Impression: In comparison to prior CT dated 10/03/2023, there has been interval  improvement in the right frontal subarachnoid hemorrhage and diffuse  subdural hemorrhage along the tentorium and overlying the right  cerebellar hemisphere as well as resolution of right parietal lobe  intraparenchymal hemorrhage.      Prior hemorrhagic focus within the right parietal lobe is not  visualized on the current study.      Prior hyperdense attenuation with the left occipital horn is not  visualized on the current study.      I personally reviewed the image(s)/study and interpretation. I agree  with the findings as stated. Interpreted at [place], [place]      MACRO:  None      Signed by: Jules Albright 10/13/2023 9:42 AM  Dictation workstation:   [id number]  Cardiac catheterization - coronary     [facility] [place]       [id number] [address]    Cardiovascular Catheterization Report    Patient Name:      JESUS METCALF      Performing           [id number] Juvenal Le                                         Physician:           MD  Study Date:        10/13/2023          Verifying Physician: Dusty Le MD  MRN/PID:           [id number]            Cardiologist:        [id number] Jonathan Malone MD  Accession#:        [id number]        Ordering Provider:   Dusty LE  Date of Birth/Age: 1965 / 58      Fellow:                     years  Gender:            M                   Fellow:  Encounter#:         [id number]       Study: PCI - Percutaneous Coronary Intervention       Indications:  JESUS METCALF is a 59 year old male who presents with diabetes, dyslipidemia, prior myocardial infarction and a chest pain assessment of atypical angina. NSTE - ACS.  Stress test performed: No. CTA performed: No. Chris accessed: No. LVEF  Assessed: Yes. LVEF = 50-55%.  Cardiac arrest: No.  Cardiac surgical consult: No.  Cardiovascular Instability: No  Frailty status of patient entering lab: 7 = Severely frail.       Procedure Description:  After infiltration with 2% Lidocaine, the right radial artery was cannulated with a modified Seldinger technique. Subsequently a 6 [place] sheath was placed in the right radial artery. Selective coronary catheterization was performed using a 6 Fr catheter(s) exchanged over a guide wire to cannulate the coronary arteries.  After completion of the procedure, the arterial sheath was pulled and a TR Band Radial Compression Device was utilized to obtain patent hemostasis.     Coronary Angiography:  The coronary circulation is right dominant.     Right Coronary Artery Distribution:    The right coronary artery is a normal caliber vessel. The RCA arises normally from the right sinus of Valsalva, supplies the right posterolateral descending artery and supplies the posterolateral system. The proximal to mid right coronary artery showed 80% stenosis. Lesion length was estimated at 21 mm. This lesion's characteristics fall within the ACC/AHA High/C classification. This lesion determined to be suitable for percutaneous coronary intervention.     Coronary Interventions:  Angiography reveals a 80% stenosis of the proximal to mid right coronary artery coronary artery. Pre-intervention ALBERTINA flow was 3. Percutaneous coronary intervention was performed within the proximal to mid right coronary artery. The vessel was pre-dilated using a compliant balloon 2.5 mm x 15 mm [place] Carlitos drug-eluting stent 3.5 mm x 22  mm was advanced to the lesion and implanted. The stenosis was successfully reduced from 80% to 0%. Post-intervention ALBERTINA flow was 3. Used a 6Fr JR4 guide with poor support. Needed a Guideliner for additional support. Would recommend use of an AR1 or AR2 in future for better guide support.     Coronary Lesion Summary:  Vessel   Stenosis   Vessel Segment  Length (mm)  RCA    80% stenosis proximal to mid     21       Hemo Personnel:  +------------------------+-------------+  Name                    Duty           +------------------------+-------------+  Juvenal Le MD            PROC MD 1  +------------------------+-------------+  Harshal Galindo RT PROC SCRUB 1  +------------------------+-------------+  Lily Davis RN           PROC CIRC 1  +------------------------+-------------+  Vic Franco RN          PROC RECORD 1  +------------------------+-------------+       Hemodynamic Pressures:     +----+---------------------+----------+-------------+--------------+---------+  Site      Date Time      Phase NameSystolic mmHgDiastolic mmHgMean mmHg  +----+---------------------+----------+-------------+--------------+---------+    AO10/13/2023 1:19:39 PM  AIR REST          111            58       80  +----+---------------------+----------+-------------+--------------+---------+    AO10/13/2023 1:19:42 PM  AIR REST          109            55       76  +----+---------------------+----------+-------------+--------------+---------+    AO10/13/2023 1:19:49 PM  AIR REST          110            54       76  +----+---------------------+----------+-------------+--------------+---------+    AO10/13/2023 1:19:54 PM  AIR REST          111            48       76  +----+---------------------+----------+-------------+--------------+---------+    AO10/13/2023 1:20:18 PM  AIR REST          105            52        73  +----+---------------------+----------+-------------+--------------+---------+    AO10/13/2023 1:24:56 PM  AIR REST          100            53       72  +----+---------------------+----------+-------------+--------------+---------+    AO10/13/2023 1:28:44 PM  AIR REST          119            60       83  +----+---------------------+----------+-------------+--------------+---------+    AO10/13/2023 1:30:05 PM  AIR REST          130            67       90  +----+---------------------+----------+-------------+--------------+---------+    AO10/13/2023 1:34:29 PM  AIR REST          120            62       83  +----+---------------------+----------+-------------+--------------+---------+       Complications:  No in-lab complications observed.     Cardiac Cath Post Procedure Notes:  Post Procedure Diagnosis: Single vessel disease.  Blood Loss:               Estimated blood loss during the procedure was 0 mls.  Specimens Removed:        Number of specimen(s) removed: none.       Recommendations:  Maximize medical therapy.  Agressive risk factor modification efforts.  Anti-platelet therapy with Aspirin and Clopidogrel (Plavix) 75mg daily.  Consider referral to cardiac rehabilitation.    ____________________________________________________________________________________  CONCLUSIONS:   1. Successful PCI of the prox to mid RCA with [place] Carlitos 3.5x22mm JUSTO upsized to 4 mm.    ICD 10 Codes:  Non ST elevation (NSTEMI) myocardial infarction-I21.4     CPT Codes:  Stent w angioplasty Right Coronary single major Artery branch (PCI)-17850.     [id number] Juvenal Le MD  Performing Physician  Electronically signed by [id number] Juvenal Le MD on 10/13/2023 at 4:18:13 PM         ** Final **  Cardiac catheterization - coronary     [place], Cath Lab       [id number] [address]    Cardiovascular Catheterization Report    Patient Name:      JESUS METCALF       Performing Physician: Riana Taylor MD  Study Date:        10/11/2023          Verifying Physician:  Riana Taylor MD  MRN/PID:           [id number]            Cardiologist:  Accession#:        [id number]        Ordering Provider:    Riana TAYLOR  Date of Birth/Age: 1965 / 58      Fellow:                     years  Gender:            M                   Fellow:  Encounter#:        [id number]       Study: Left Heart Cath       Indications:  JESUS METCALF is a 59 year old male who presents with dyslipidemia, hypertension, obesity and an asymptomatic chest pain assessment. NSTE - ACS.  Stress test performed: No. CTA performed: No. Chris accessed: No. LVEF  Assessed: No.  Cardiac arrest: No responsive following cardiac arrest.  Cardiac surgical consult: No.  Cardiovascular Instability: No  Frailty status of patient entering lab: 2 = Well.       Procedure Description Comments:  Under sterile conditions, local anesthesia and intravenous sedation a 5 Dominican sheath was placed in the right radial artery. Nitroglycerin given in the sideport. Neck selective coronary arteriography and the ventriculography was performed using a Samson catheter. The catheter was aspirin flushed in standard fashion changing with guidewire.    Patient tolerated procedure well with no immediate complications Cardi catheterization procedure.    The films were reviewed with the nurse cardiologist. Stenting of the right coronary artery is necessary after obtaining permission from neurosurgery to use dual antiplatelet therapy.    The sheath was removed and a band was applied and the patient was returned to recovery in good: Condition with no immediate complication card catheterization procedure    In the ensuing couple of days this was discussed with the neurosurgical team and interventional cardiologist and the  patient was started on dual antiplatelet therapy and heparin and a repeat CT scan showed no evidence of intracranial hemorrhage and therefore the coronary stenting intervention was performed by the interventional cardiologist on October 13. There was successful stent placed in the right coronary artery.     Coronary Angiography:  The coronary circulation is right dominant.     Coronary Angiography Comments:  Modest coronary calcifications were noted    Right coronary was large and dominant and had a long lesion of 80 to 90% in the mid to proximal right coronary artery. Mild scattered disease throughout the remainder of the right coronary artery.    Left main coronary artery was now normal caliber dimension free of stenoses.    Left anterior descending artery demonstrated a moderate-sized first diagonal small second diagonal small third diagonal and moderate-sized distal left anterior sending segment. There is a 50% stenosis at the first diagonal and the left anterior descending artery, modest diseased distally.    Ramus intermedius was a very small vessel and was normal    Circumflex artery demonstrated a tiny first obtuse marginal large second obtuse marginal absent third obtuse marginal and very small distal circumflex second. This vessel appeared to be normal    No similar collateral flow was noted.         Left Ventriculography:  Left-ventricular gram demonstrated ejection fraction of 60% with no focal abnormalities and no significant mitral sufficiency noted with hand-injection.     Coronary Interventions:  Angiography reveals a % stenosis of the left main coronary artery. Percutaneous coronary intervention was performed within the left main.     Additional Findings:  2 days later patient had a successful stenting of the right coronary artery by interventional cardiologist.       Heart Rhythm:  Patient's heart rhythm was normal sinus.     Hemo Personnel:  +--------------+---------+  Name          Duty        +--------------+---------+  Jonathan Malone MD 1  +--------------+---------+       +---------+  Contrast:  +---------+  Isovue:    +---------+       Hemodynamic Pressures:  Normal left-sided hemodynamics    No  valve gradient pullback    Normal left ventricular stock pressure at approxi-10 mmHg.  +----+---------------+----------+-------------+--------------+-------+---------+  Site   Date Time   Phase NameSystolic mmHgDiastolic mmHgED mmHgMean mmHg  +----+---------------+----------+-------------+--------------+-------+---------+    AO     10/11/2023  AIR REST           98            62              79           8:36:37 AM                                                       +----+---------------+----------+-------------+--------------+-------+---------+    LV     10/11/2023  AIR REST          101             8     20                    8:37:50 AM                                                       +----+---------------+----------+-------------+--------------+-------+---------+    LV     10/11/2023  AIR REST           97            10     15                    8:37:58 AM                                                       +----+---------------+----------+-------------+--------------+-------+---------+   LVp     10/11/2023  AIR REST          101             3     16                    8:38:11 AM                                                       +----+---------------+----------+-------------+--------------+-------+---------+   AOp     10/11/2023  AIR REST          117            64              85           8:38:18 AM                                                       +----+---------------+----------+-------------+--------------+-------+---------+    AO     10/11/2023  AIR REST          133            77             102           8:39:54 AM                                                        +----+---------------+----------+-------------+--------------+-------+---------+    AO     10/11/2023  AIR REST            0            54              64           8:41:26 AM                                                       +----+---------------+----------+-------------+--------------+-------+---------+       Complications:  No in-lab complications observed.     Cardiac Cath Post Procedure Notes:  Post Procedure Diagnosis: Double vessel disease.  Blood Loss:               Estimated blood loss during the procedure was 3 cc                            mls.  Specimens Removed:        Number of specimen(s) removed: none.       Recommendations:  Maximize medical therapy.  Agressive risk factor modification efforts.  Consider referral to cardiac rehabilitation.  Percutaneous coronary intervention.    ____________________________________________________________________________________  CONCLUSIONS:   1. 1. Normal left ventricular contractility.   2. 2. Normal left-sided hemodynamics.   3. 3. Right coronary dominant.   4. 4. Two-vessel coronary disease with 50% lesion in the mid to proximal LAD, 80 to 90% stenosis in the mid to proximal right coronary artery.   5. 5. No collateral flow noted.   6. 2 days later patient had a successful stenting of the right coronary artery by interventional cardiologist.    ICD 10 Codes:  Subsequent non ST elevation (NSTEMI) myocardial infarction-I22.2     [id number] Jonathan Malone MD  Performing Physician  Electronically signed by [id number] Jonathan Malone MD on 10/13/2023 at 2:30:46 PM         ** Final **    Scheduled medications  acetaminophen, 650 mg, oral, 4x daily  allopurinol, 100 mg, oral, Daily  aspirin, 81 mg, oral, Daily  atorvastatin, 80 mg, oral, Daily  carvedilol, 12.5 mg, oral, BID  cyclobenzaprine, 10 mg, oral, TID  escitalopram, 20 mg, oral, Daily  heparin, 4,000 Units, intravenous, Once  [Held by provider] insulin lispro, 0-5 Units, subcutaneous,  q4h  levETIRAcetam, 500 mg, oral, BID  pantoprazole, 40 mg, oral, Daily before breakfast  perflutren lipid microspheres, 3 mL of dilution, intravenous, Once in imaging  polyethylene glycol, 17 g, oral, Daily  pregabalin, 300 mg, oral, BID  sennosides, 1 tablet, oral, BID  sulfur hexafluoride microsphr, 2 mL, intravenous, Once in imaging  tamsulosin, 0.4 mg, oral, Daily      Continuous medications  lactated Ringer's, 100 mL/hr, Last Rate: 100 mL/hr (10/14/23 0518)    PRN medications  PRN medications: docusate sodium, HYDROmorphone, HYDROmorphone, oxyCODONE    Results for orders placed or performed during the hospital encounter of 10/10/23 (from the past 24 hour(s))   POCT GLUCOSE   Result Value Ref Range    POCT Glucose 76 74 - 99 mg/dL        Physical Exam:  General: Not in acute distress, A&O x 3, alert, cooperative, well-developed  HEENT: Normocephalic, atraumatic, EOMI, moist mucous membranes  Neck: Neck supple, trachea midline, no evidence of trauma  Cardiovascular: RRR, S1 and S2 appreciated, no murmurs rubs gallops appreciated, distal pulses 2+ bilaterally (radial and dorsalis pedis)  Respiratory: Vesicular breath sounds appreciated bilaterally, no adventitious sounds appreciated, no increased work of breathing  GI: Abdomen soft, nondistended, nontender to palpation, bowel sounds present  Extremities: No edema appreciated in lower extremities bilaterally, no cyanosis, right lower extremity with splint on ankle.  Bandage from PCI on right wrist  Lumbar back: Approximately 10 cm incision well appreciated, open to air without signs of infection or erythema with sutures still in place.  Neuro: A&O X3, no focal deficits, strength and sensation intact bilaterally  Skin: Warm and dry, without lesions or rashes      Relevant Results    MRI LUMBAR SPINE W WO CONTRAST     Result Date: 10/9/2023  (THIS STUDY IS FROM OUTSIDE FACILITY)   EXAMINATION: MRI OF THE LUMBAR SPINE WITHOUT AND WITH CONTRAST  10/9/2023 5:55 pm  TECHNIQUE: Multiplanar multisequence MRI of the lumbar spine was performed without and with the administration of intravenous contrast. COMPARISON: None. HISTORY: ORDERING SYSTEM PROVIDED HISTORY: spine surgery high WBC TECHNOLOGIST PROVIDED HISTORY: Reason for exam:->spine surgery high WBC Decision Support Exception - unselect if not a suspected or confirmed emergency medical condition->Emergency Medical Condition (MA) What reading provider will be dictating this exam?->CRC FINDINGS: The exam is degraded by motion as well as artifact created by orthopedic hardware.  The patient is status post lower lumbar laminectomies with pedicle screw and connecting niyah fixation from L3-S1 as well as multilevel intervertebral body fusion. There appears to be abnormal clumping of the cauda equina nerve roots just caudal to the conus medullaris, best appreciated on image number 1 of series 10, without associated enhancement postcontrast administration. A fluid collection in the laminectomy bed likely reflects a postop seroma. There is a rim enhancing fluid collection extending into the left psoas muscle measuring up to 2.5 x 9.3 cm in diameter, worrisome for abscess.  This may extend into the L4-5 disc space.  There is bilateral hydronephrosis.  The left kidney is severely atrophic.    MR findings worrisome for retroperitoneal abscess, possibly extending into the L4-5 disc space. Apparent clumping of the proximal cauda equina nerve roots versus mass effect due to an intrathecal fluid collection.  Recommend MRI of the T-spine for further evaluation.    CT angio chest w and wo IV contrast    Result Date: 10/9/2023  EXAMINATION: CTA OF THE CHEST WITH AND WITHOUT CONTRAST 10/9/2023 3:12 pm TECHNIQUE: CTA of the chest was performed before and after the administration of intravenous contrast.  Multiplanar reformatted images are provided for review.  MIP images are provided for review. Automated exposure control, iterative  "reconstruction, and/or weight based adjustment of the mA/kV was utilized to reduce the radiation dose to as low as reasonably achievable. COMPARISON: None. HISTORY: ORDERING SYSTEM PROVIDED HISTORY: PE TECHNOLOGIST PROVIDED HISTORY: Reason for exam:->PE Decision Support Exception - unselect if not a suspected or confirmed emergency medical condition->Emergency Medical Condition (MA) What reading provider will be dictating this exam?->CRC FINDINGS: Pulmonary Arteries: Pulmonary arteries are adequately opacified for evaluation.  No evidence of intraluminal filling defect to suggest pulmonary embolism.  Main pulmonary artery is normal in caliber. Mediastinum: No evidence of mediastinal lymphadenopathy.  The heart and pericardium demonstrate no acute abnormality.  There is no acute abnormality of the thoracic aorta. Lungs/pleura: Somewhat patchy subpleural curvilinear markings are seen in the bilateral lower lobes, overall morphology favoring atelectasis or infectious infiltrate.  Scattered central small cysts are observed. Upper Abdomen: Limited images of the upper abdomen are unremarkable. Soft Tissues/Bones: No acute bone or soft tissue abnormality.    1. No evidence of pulmonary embolism. 2. Patchy curvilinear densities in the bilateral lower lobes, atelectasis is favored over other process    CT Head W/O Contrast    Result Date: 10/9/2023  EXAMINATION: CT OF THE HEAD WITHOUT CONTRAST  10/9/2023 3:12 pm TECHNIQUE: CT of the head was performed without the administration of intravenous contrast. Automated exposure control, iterative reconstruction, and/or weight based adjustment of the mA/kV was utilized to reduce the radiation dose to as low as reasonably achievable. COMPARISON: 11/21/2022 HISTORY: ORDERING SYSTEM PROVIDED HISTORY: syncope TECHNOLOGIST PROVIDED HISTORY: Reason for exam:->syncope Has a \"code stroke\" or \"stroke alert\" been called?->No Decision Support Exception - unselect if not a suspected or " confirmed emergency medical condition->Emergency Medical Condition (MA) What reading provider will be dictating this exam?->CRC FINDINGS: BRAIN/VENTRICLES: There is some subtle increased attenuation along the right tentorium cerebelli (series 3, image 23).  This is suspicious for a acute subdural hematoma.  There is no mass effect or edema.  There are no acute infarcts. ORBITS: The visualized portion of the orbits demonstrate no acute abnormality. SINUSES: The visualized paranasal sinuses and mastoid air cells demonstrate no acute abnormality. SOFT TISSUES/SKULL:  No acute abnormality of the visualized skull or soft tissues.    Findings suspicious of a small acute right-sided subdural hematoma.  There is no mass effect present..    XR ankle right 3+ views    Result Date: 10/9/2023  EXAMINATION: THREE XRAY VIEWS OF THE RIGHT ANKLE 10/9/2023 1:02 pm COMPARISON: January 18, 2020 1334 hours. HISTORY: ORDERING SYSTEM PROVIDED HISTORY: fall TECHNOLOGIST PROVIDED HISTORY: Reason for exam:->fall What reading provider will be dictating this exam?->CRC FINDINGS: Two views of the right ankle are submitted. There is both a new acute spiral mildly displaced fracture of the distal right tibia superimposed upon an old vertical fracture with intra-articular extension at the time.  There is now a new associated comminuted fracture of the distal right fibula. Identified again is a well corticated bony density distal to the medial malleolus and to the lateral malleolus likely representing old avulsion injuries.. The mortise is intact.  No dislocations.    THERE IS NOW A NEW ACUTE OBLIQUE FRACTURE OF THE DISTAL RIGHT TIBIA WITH A OLD VERTICAL FRACTURE OF THE TIBIA. THERE IS A NEW COMMINUTED FRACTURE OF THE DISTAL RIGHT FIBULA.        Assessment/Plan   Principal Problem:    Syncope, unspecified syncope type  Active Problems:    H/O fracture of ankle    NSTEMI (non-ST elevated myocardial infarction) (CMS/[facility])    Patient is a 58-year old  male with a past medical history of MDD, T2 DM, BPH, hypertension, hyperlipidemia, JEMAL, chronic pain in neck and back, lumbar radiculopathy, and heroin use who is transferred from [facility] in the setting of recent lumbar surgery with subarachnoid hemorrhage/subdural hematoma for syncope and ankle pain secondary to the syncope. Upon evaluation, he was found to have a new fracture of the right distal tibia and fibula, potential retroperitoneal abscess on MRI, elevated troponin, elevated CK. Patient was admitted for management of his fracture, syncope etiology, and elevated troponin.    Vasovagal Syncope  -Patient states syncopal event occurred 2/2 to pain but also endorsed poor P.O. intake.   -Trend CMP, CBC. Previous labs from outside facility yesterday and [facility] illustrated no obvious metabolic or toxicological etiology.   -Telemetry continued, no malignant arrythmias seen on tele since admission  -TTE revealed EF of 50 to 55%, no structural heart disease    Retroperitoneal Abscess Vs. Postsurgical Fluid Collection  -Neurosurgery at [facility] and his surgeon at this facility reviewed MRI and assessed that the findings on MRI were consistent with normal post-operative changes rather than an abscess  -Wound dry, intact, no pain with palpation.   -Infectious Disease following, continue to appreciate recs.   ---> ID had a discussion with neurosurgery deemed a low likelihood of abscess more likely fluid collection postsurgical and noninfectious.  ----> As per ID discontinued cefepime and vancomycin (10/11)  -Admission blood cultures from 10/10 NGTD    NSTEMI: Troponinemia, without chest pain or EKG changes, now s/p cardiac catheterization (diagnostic)  -Diagnostic cardiac catheterization 10/11/2023 which demonstrated a proximal RCA stenosis of 90% with interventional plan pending  -Echocardiogram 10/10 revealed EF of 50-55%, no regional wall motion abnormalities  - Continue aspirin 81mg  -Continue Atorvastatin 80  daily  -neurosurgery and orthopedics which both state DAPT can be started, this should not interrupt surgery and a CT head will need to be repeated 24 hour after starting DAPT.  Neurosurgery also okay with heparin bolus during PCI.  -S/p PCI with stent placement to RCA 10/13/2023  --> Continue to monitor patient's mental status while on DAPT    Displaced Distal right tibia and comminuted distal right fibula fracture.   -Repeated x-ray showing displaced and comminuted fracture of the distal tibia diaphysis and distal fibula diaphysis.   --> Ortho awaiting cardiac interventional plan, they stated DAPT will not preclude surgery and OK to start as required  ----> As per orthopedic surgery patient will likely go to the operating room for ORIF on Tuesday, 10/17/2023  -Pain control with scheduled Tylenol and oxycodone with PRN Dilaudid for severe pain.   -Continue Flexeril prn  -Continue bowel regimen consisting of Miralax/senokot    Recent lumbar fusion(9/29/2023) c/b   - Post op SAH/SDH (10/3), SAH resolved on imaging at OSH, SDH persists   - Intraoperative dural tear s/p dural patch  Started on keppra for seizure prophylaxis due to bleed during prior admission and on discharge, will address with neurosurgery necessity and length of treatment. Will switch to IV if NPO for surgery  -CT head 10/13/2023 negative for any acute bleeds  -Continue to monitor neuro status while on DAPT.    Primary HTN:  -Patient takes no medication for this at home  -Continue Coreg 12.5mg     #Diabetes Type 2: Known history, with recent A1c subdiabetic  -A1c 5.7  -Hold home metformin at this time  -No insulin at this time as patient's glucose has been ranging ,  -Continue POCT glucose checks    Gout: Known hx  -Continue home allopurinol    BPH: known hx  -Will continue Tamsulosin 0.4 mg daily     IVF: None.   Diet: Cardiac  DVT Prophylaxis: Heparin subcutaneous.   Consults: Cardiology, Neurosurgery, orthopedic surgery.   Dispo: Anticipate  additional 2 to 3 days hospital stay.    Code Status: Full Code    Brad Del Real MD,   Internal medicine PGY 1

## 2023-10-14 NOTE — NURSING NOTE
Patient resting in bed, complains of pain to right ankle, IV dilaudid given per PRN order. No other complaints, call light in reach

## 2023-10-14 NOTE — PROGRESS NOTES
"Gm Thrasher is a 58 y.o. male on day 4 of admission presenting with Syncope, unspecified syncope type.    Subjective   Mr. Thrasher had cardiac stent yesterday. He reports mild to moderate right distal LE pain. Would like to get up and work with therapy.        Objective     HENT:      Head: Normocephalic.      Mouth/Throat:      Mouth: Mucous membranes are moist     Pharynx: Oropharynx is clear.   Eyes:      Extraocular Movements: Extraocular movements intact.   Pulmonary:      Effort: Pulmonary effort is normal.   Abdominal:      Palpations: Abdomen is soft.   Musculoskeletal:         General: No tenderness, deformity or signs of injury.      Right lower leg: No edema.      Left lower leg: No edema.      Comments: Distal right lower extremity splint in place, is clean dry and intact.  Right toes are pink with 2 to 3-second cap refill.  Light touch sensation and motor function intact to all 5 right toes   Skin:     General: Skin is warm and dry.   Neurological:      General: No focal deficit present.      Mental Status: He is alert. Mental status is at baseline.   Psychiatric:         Mood and Affect: Mood normal.     Last Recorded Vitals  Blood pressure 131/67, pulse 54, temperature 35.8 °C (96.4 °F), temperature source Temporal, resp. rate 15, height 1.8 m (5' 10.87\"), weight 118 kg (259 lb 6.4 oz), SpO2 97 %.  Intake/Output last 3 Shifts:  I/O last 3 completed shifts:  In: 3661 (31.1 mL/kg) [P.O.:1500; I.V.:2161 (18.4 mL/kg)]  Out: 4055 (34.5 mL/kg) [Urine:4050 (1 mL/kg/hr); Blood:5]  Weight: 117.7 kg     Relevant Results      Scheduled medications  acetaminophen, 650 mg, oral, 4x daily  allopurinol, 100 mg, oral, Daily  aspirin, 81 mg, oral, Daily  atorvastatin, 80 mg, oral, Daily  carvedilol, 12.5 mg, oral, BID  cyclobenzaprine, 10 mg, oral, TID  escitalopram, 20 mg, oral, Daily  heparin, 4,000 Units, intravenous, Once  [Held by provider] insulin lispro, 0-5 Units, subcutaneous, q4h  levETIRAcetam, 500 mg, " oral, BID  pantoprazole, 40 mg, oral, Daily before breakfast  perflutren lipid microspheres, 3 mL of dilution, intravenous, Once in imaging  polyethylene glycol, 17 g, oral, Daily  pregabalin, 300 mg, oral, BID  sennosides, 1 tablet, oral, BID  sulfur hexafluoride microsphr, 2 mL, intravenous, Once in imaging  tamsulosin, 0.4 mg, oral, Daily      Continuous medications  lactated Ringer's, 100 mL/hr, Last Rate: 100 mL/hr (10/14/23 0518)      PRN medications  PRN medications: docusate sodium, HYDROmorphone, HYDROmorphone, oxyCODONE  Results for orders placed or performed during the hospital encounter of 10/10/23 (from the past 24 hour(s))   POCT GLUCOSE   Result Value Ref Range    POCT Glucose 76 74 - 99 mg/dL          This patient currently has cardiac telemetry ordered; if you would like to modify or discontinue the telemetry order, click here to go to the orders activity to modify/discontinue the order.                 Assessment/Plan   Principal Problem:    Syncope, unspecified syncope type  Active Problems:    H/O fracture of ankle    NSTEMI (non-ST elevated myocardial infarction) (CMS/HCC)    Right distal 1/3 tibia/fibula fractures  Splinted  Strict NWB right LE, may work with therapy from ortho standpoint  Elevate on 2-3 pillows while in bed  May ice intermittently  Plan for right TIB/FIB ORIF on Tuesday if medically optimized and Ok with Cardiology        I spent 30 minutes in the professional and overall care of this patient.      Justina Rock PA-C

## 2023-10-15 PROCEDURE — 2500000001 HC RX 250 WO HCPCS SELF ADMINISTERED DRUGS (ALT 637 FOR MEDICARE OP): Performed by: STUDENT IN AN ORGANIZED HEALTH CARE EDUCATION/TRAINING PROGRAM

## 2023-10-15 PROCEDURE — 2500000004 HC RX 250 GENERAL PHARMACY W/ HCPCS (ALT 636 FOR OP/ED)

## 2023-10-15 PROCEDURE — 2500000001 HC RX 250 WO HCPCS SELF ADMINISTERED DRUGS (ALT 637 FOR MEDICARE OP)

## 2023-10-15 PROCEDURE — 99232 SBSQ HOSP IP/OBS MODERATE 35: CPT

## 2023-10-15 PROCEDURE — 2500000001 HC RX 250 WO HCPCS SELF ADMINISTERED DRUGS (ALT 637 FOR MEDICARE OP): Performed by: INTERNAL MEDICINE

## 2023-10-15 PROCEDURE — 2580000001 HC RX 258 IV SOLUTIONS: Performed by: STUDENT IN AN ORGANIZED HEALTH CARE EDUCATION/TRAINING PROGRAM

## 2023-10-15 PROCEDURE — 2500000004 HC RX 250 GENERAL PHARMACY W/ HCPCS (ALT 636 FOR OP/ED): Performed by: STUDENT IN AN ORGANIZED HEALTH CARE EDUCATION/TRAINING PROGRAM

## 2023-10-15 PROCEDURE — 2060000001 HC INTERMEDIATE ICU ROOM DAILY

## 2023-10-15 RX ORDER — HYDROXYZINE HYDROCHLORIDE 25 MG/1
25 TABLET, FILM COATED ORAL NIGHTLY PRN
Status: DISCONTINUED | OUTPATIENT
Start: 2023-10-15 | End: 2023-10-20 | Stop reason: HOSPADM

## 2023-10-15 RX ORDER — GLYCERIN 1 G/1
1 SUPPOSITORY RECTAL ONCE
Status: DISCONTINUED | OUTPATIENT
Start: 2023-10-15 | End: 2023-10-20 | Stop reason: HOSPADM

## 2023-10-15 RX ADMIN — HYDROXYZINE HYDROCHLORIDE 25 MG: 25 TABLET ORAL at 20:41

## 2023-10-15 RX ADMIN — ESCITALOPRAM OXALATE 20 MG: 20 TABLET, FILM COATED ORAL at 09:00

## 2023-10-15 RX ADMIN — CARVEDILOL 12.5 MG: 12.5 TABLET, FILM COATED ORAL at 20:41

## 2023-10-15 RX ADMIN — LEVETIRACETAM 500 MG: 500 TABLET, FILM COATED ORAL at 08:19

## 2023-10-15 RX ADMIN — HYDROMORPHONE HYDROCHLORIDE 0.6 MG: 1 INJECTION, SOLUTION INTRAMUSCULAR; INTRAVENOUS; SUBCUTANEOUS at 23:25

## 2023-10-15 RX ADMIN — SODIUM CHLORIDE, POTASSIUM CHLORIDE, SODIUM LACTATE AND CALCIUM CHLORIDE 100 ML/HR: 600; 310; 30; 20 INJECTION, SOLUTION INTRAVENOUS at 04:17

## 2023-10-15 RX ADMIN — HYDROMORPHONE HYDROCHLORIDE 0.6 MG: 1 INJECTION, SOLUTION INTRAMUSCULAR; INTRAVENOUS; SUBCUTANEOUS at 13:38

## 2023-10-15 RX ADMIN — ACETAMINOPHEN 650 MG: 325 TABLET ORAL at 13:38

## 2023-10-15 RX ADMIN — LEVETIRACETAM 500 MG: 500 TABLET, FILM COATED ORAL at 20:41

## 2023-10-15 RX ADMIN — POLYETHYLENE GLYCOL 3350 17 G: 17 POWDER, FOR SOLUTION ORAL at 08:20

## 2023-10-15 RX ADMIN — HYDROMORPHONE HYDROCHLORIDE 0.6 MG: 1 INJECTION, SOLUTION INTRAMUSCULAR; INTRAVENOUS; SUBCUTANEOUS at 16:26

## 2023-10-15 RX ADMIN — HYDROMORPHONE HYDROCHLORIDE 0.6 MG: 1 INJECTION, SOLUTION INTRAMUSCULAR; INTRAVENOUS; SUBCUTANEOUS at 08:20

## 2023-10-15 RX ADMIN — SODIUM CHLORIDE, POTASSIUM CHLORIDE, SODIUM LACTATE AND CALCIUM CHLORIDE 100 ML/HR: 600; 310; 30; 20 INJECTION, SOLUTION INTRAVENOUS at 14:08

## 2023-10-15 RX ADMIN — ACETAMINOPHEN 650 MG: 325 TABLET ORAL at 08:27

## 2023-10-15 RX ADMIN — PREGABALIN 300 MG: 150 CAPSULE ORAL at 20:41

## 2023-10-15 RX ADMIN — CYCLOBENZAPRINE 10 MG: 10 TABLET, FILM COATED ORAL at 08:20

## 2023-10-15 RX ADMIN — OXYCODONE HYDROCHLORIDE 10 MG: 10 TABLET ORAL at 20:41

## 2023-10-15 RX ADMIN — HYDROMORPHONE HYDROCHLORIDE 0.6 MG: 1 INJECTION, SOLUTION INTRAMUSCULAR; INTRAVENOUS; SUBCUTANEOUS at 19:45

## 2023-10-15 RX ADMIN — PREGABALIN 300 MG: 150 CAPSULE ORAL at 08:19

## 2023-10-15 RX ADMIN — ASPIRIN 81 MG 81 MG: 81 TABLET ORAL at 08:20

## 2023-10-15 RX ADMIN — SENNOSIDES 8.6 MG: 8.6 TABLET, FILM COATED ORAL at 20:41

## 2023-10-15 RX ADMIN — HYDROMORPHONE HYDROCHLORIDE 0.6 MG: 1 INJECTION, SOLUTION INTRAMUSCULAR; INTRAVENOUS; SUBCUTANEOUS at 04:17

## 2023-10-15 RX ADMIN — TAMSULOSIN HYDROCHLORIDE 0.4 MG: 0.4 CAPSULE ORAL at 08:19

## 2023-10-15 RX ADMIN — ATORVASTATIN CALCIUM 80 MG: 80 TABLET, FILM COATED ORAL at 20:41

## 2023-10-15 RX ADMIN — PANTOPRAZOLE SODIUM 40 MG: 40 TABLET, DELAYED RELEASE ORAL at 08:19

## 2023-10-15 RX ADMIN — SENNOSIDES 8.6 MG: 8.6 TABLET, FILM COATED ORAL at 08:19

## 2023-10-15 RX ADMIN — CYCLOBENZAPRINE 10 MG: 10 TABLET, FILM COATED ORAL at 20:41

## 2023-10-15 RX ADMIN — CARVEDILOL 12.5 MG: 12.5 TABLET, FILM COATED ORAL at 08:19

## 2023-10-15 RX ADMIN — ALLOPURINOL 100 MG: 100 TABLET ORAL at 08:20

## 2023-10-15 ASSESSMENT — PAIN SCALES - GENERAL
PAINLEVEL_OUTOF10: 9
PAINLEVEL_OUTOF10: 8
PAINLEVEL_OUTOF10: 9
PAINLEVEL_OUTOF10: 6
PAINLEVEL_OUTOF10: 9

## 2023-10-15 ASSESSMENT — PAIN - FUNCTIONAL ASSESSMENT
PAIN_FUNCTIONAL_ASSESSMENT: 0-10

## 2023-10-15 NOTE — PROGRESS NOTES
Patient resting comfortably.  Pain to right leg adequately controlled.  Patient status postcardiac stenting.          Splint and dressing clean dry and stable to right lower extremity.        Assessment: Right distal third tib-fib fracture        Plan: Plan for medical optimization surgical clearance and operative stabilization of right lower extremity injury on Tuesday with Dr. Hale.  Patient is to maintain the splint, nonweightbearing, ice and elevation, in the interim.

## 2023-10-15 NOTE — CARE PLAN
Cell Phone/PDA (specify)/Clothing The patient's goals for the shift include No pain    The clinical goals for the shift include Patient will have pain level less than 6 and remain comfortable within shift    Over the shift, the patient did not make progress toward the following goals. Barriers to progression include . Recommendations to address these barriers include .

## 2023-10-15 NOTE — PROGRESS NOTES
Gm Thrasher is a 58 y.o. male on day 5 of admission presenting with Syncope, unspecified syncope type.      Subjective   -Patient seen with team, resting in bed in no apparent distress. Alert and cooperative.   -Patient endorses pain in ankle and chronic back pain but denies other positives to a 12 system -Patient VSS WNL.  -Patient in no acute events overnight  -Patient's labs significant for hemoglobin 9.9       Objective     Last Recorded Vitals  /56   Pulse 52   Temp 36.3 °C (97.3 °F)   Resp 14   Wt 117 kg (257 lb 14.4 oz)   SpO2 96%   Intake/Output last 3 Shifts:    Intake/Output Summary (Last 24 hours) at 10/15/2023 1336  Last data filed at 10/15/2023 1100  Gross per 24 hour   Intake --   Output 3475 ml   Net -3475 ml         Admission Weight  Weight: 114 kg (251 lb 4.8 oz) (10/10/23 0358)    Daily Weight  10/15/23 : 117 kg (257 lb 14.4 oz)    Image Results  Electrocardiogram 12 Lead  Sinus bradycardia  Otherwise normal ECG  When compared with ECG of 10-OCT-2023 15:07,  No significant change was found  Confirmed by Jonathan Malone ([id number]) on 10/14/2023 3:57:57 PM    Scheduled medications  acetaminophen, 650 mg, oral, 4x daily  allopurinol, 100 mg, oral, Daily  aspirin, 81 mg, oral, Daily  atorvastatin, 80 mg, oral, Daily  carvedilol, 12.5 mg, oral, BID  cyclobenzaprine, 10 mg, oral, TID  escitalopram, 20 mg, oral, Daily  glycerin, 1 suppository, rectal, Once  heparin, 4,000 Units, intravenous, Once  [Held by provider] insulin lispro, 0-5 Units, subcutaneous, q4h  levETIRAcetam, 500 mg, oral, BID  pantoprazole, 40 mg, oral, Daily before breakfast  perflutren lipid microspheres, 3 mL of dilution, intravenous, Once in imaging  polyethylene glycol, 17 g, oral, Daily  pregabalin, 300 mg, oral, BID  sennosides, 1 tablet, oral, BID  sulfur hexafluoride microsphr, 2 mL, intravenous, Once in imaging  tamsulosin, 0.4 mg, oral, Daily      Continuous medications  lactated Ringer's, 100 mL/hr, Last Rate: 100 mL/hr  (10/15/23 0417)    PRN medications  PRN medications: docusate sodium, HYDROmorphone, HYDROmorphone, hydrOXYzine HCL, oxyCODONE    Results for orders placed or performed during the hospital encounter of 10/10/23 (from the past 24 hour(s))   Electrocardiogram 12 Lead   Result Value Ref Range    Ventricular Rate 58 BPM    Atrial Rate 58 BPM    NM Interval 202 ms    QRS Duration 98 ms    QT Interval 430 ms    QTC Calculation(Bazett) 422 ms    P Axis 28 degrees    R Axis 22 degrees    T Axis 27 degrees    QRS Count 10 beats    Q Onset 219 ms    P Onset 118 ms    P Offset 167 ms    T Offset 434 ms    QTC Fredericia 424 ms        Physical Exam:  General: Not in acute distress, A&O x 3, alert, cooperative, well-developed  HEENT: Normocephalic, atraumatic, EOMI, moist mucous membranes  Neck: Neck supple, trachea midline, no evidence of trauma  Cardiovascular: RRR, S1 and S2 appreciated, no murmurs rubs gallops appreciated, distal pulses 2+ bilaterally (radial and dorsalis pedis)  Respiratory: Vesicular breath sounds appreciated bilaterally, no adventitious sounds appreciated, no increased work of breathing  GI: Abdomen soft, nondistended, nontender to palpation, bowel sounds present  Extremities: No edema appreciated in lower extremities bilaterally, no cyanosis, right lower extremity with splint on ankle.  Bandage from PCI on right wrist  Lumbar back: Approximately 10 cm incision well appreciated, open to air without signs of infection or erythema with sutures still in place.  Neuro: A&O X3, no focal deficits, strength and sensation intact bilaterally  Skin: Warm and dry, without lesions or rashes      Relevant Results    MRI LUMBAR SPINE W WO CONTRAST     Result Date: 10/9/2023  (THIS STUDY IS FROM OUTSIDE FACILITY)   EXAMINATION: MRI OF THE LUMBAR SPINE WITHOUT AND WITH CONTRAST  10/9/2023 5:55 pm TECHNIQUE: Multiplanar multisequence MRI of the lumbar spine was performed without and with the administration of intravenous  contrast. COMPARISON: None. HISTORY: ORDERING SYSTEM PROVIDED HISTORY: spine surgery high WBC TECHNOLOGIST PROVIDED HISTORY: Reason for exam:->spine surgery high WBC Decision Support Exception - unselect if not a suspected or confirmed emergency medical condition->Emergency Medical Condition (MA) What reading provider will be dictating this exam?->CRC FINDINGS: The exam is degraded by motion as well as artifact created by orthopedic hardware.  The patient is status post lower lumbar laminectomies with pedicle screw and connecting niyah fixation from L3-S1 as well as multilevel intervertebral body fusion. There appears to be abnormal clumping of the cauda equina nerve roots just caudal to the conus medullaris, best appreciated on image number 1 of series 10, without associated enhancement postcontrast administration. A fluid collection in the laminectomy bed likely reflects a postop seroma. There is a rim enhancing fluid collection extending into the left psoas muscle measuring up to 2.5 x 9.3 cm in diameter, worrisome for abscess.  This may extend into the L4-5 disc space.  There is bilateral hydronephrosis.  The left kidney is severely atrophic.    MR findings worrisome for retroperitoneal abscess, possibly extending into the L4-5 disc space. Apparent clumping of the proximal cauda equina nerve roots versus mass effect due to an intrathecal fluid collection.  Recommend MRI of the T-spine for further evaluation.    CT angio chest w and wo IV contrast    Result Date: 10/9/2023  EXAMINATION: CTA OF THE CHEST WITH AND WITHOUT CONTRAST 10/9/2023 3:12 pm TECHNIQUE: CTA of the chest was performed before and after the administration of intravenous contrast.  Multiplanar reformatted images are provided for review.  MIP images are provided for review. Automated exposure control, iterative reconstruction, and/or weight based adjustment of the mA/kV was utilized to reduce the radiation dose to as low as reasonably achievable.  "COMPARISON: None. HISTORY: ORDERING SYSTEM PROVIDED HISTORY: PE TECHNOLOGIST PROVIDED HISTORY: Reason for exam:->PE Decision Support Exception - unselect if not a suspected or confirmed emergency medical condition->Emergency Medical Condition (MA) What reading provider will be dictating this exam?->CRC FINDINGS: Pulmonary Arteries: Pulmonary arteries are adequately opacified for evaluation.  No evidence of intraluminal filling defect to suggest pulmonary embolism.  Main pulmonary artery is normal in caliber. Mediastinum: No evidence of mediastinal lymphadenopathy.  The heart and pericardium demonstrate no acute abnormality.  There is no acute abnormality of the thoracic aorta. Lungs/pleura: Somewhat patchy subpleural curvilinear markings are seen in the bilateral lower lobes, overall morphology favoring atelectasis or infectious infiltrate.  Scattered central small cysts are observed. Upper Abdomen: Limited images of the upper abdomen are unremarkable. Soft Tissues/Bones: No acute bone or soft tissue abnormality.    1. No evidence of pulmonary embolism. 2. Patchy curvilinear densities in the bilateral lower lobes, atelectasis is favored over other process    CT Head W/O Contrast    Result Date: 10/9/2023  EXAMINATION: CT OF THE HEAD WITHOUT CONTRAST  10/9/2023 3:12 pm TECHNIQUE: CT of the head was performed without the administration of intravenous contrast. Automated exposure control, iterative reconstruction, and/or weight based adjustment of the mA/kV was utilized to reduce the radiation dose to as low as reasonably achievable. COMPARISON: 11/21/2022 HISTORY: ORDERING SYSTEM PROVIDED HISTORY: syncope TECHNOLOGIST PROVIDED HISTORY: Reason for exam:->syncope Has a \"code stroke\" or \"stroke alert\" been called?->No Decision Support Exception - unselect if not a suspected or confirmed emergency medical condition->Emergency Medical Condition (MA) What reading provider will be dictating this exam?->CRC FINDINGS: " BRAIN/VENTRICLES: There is some subtle increased attenuation along the right tentorium cerebelli (series 3, image 23).  This is suspicious for a acute subdural hematoma.  There is no mass effect or edema.  There are no acute infarcts. ORBITS: The visualized portion of the orbits demonstrate no acute abnormality. SINUSES: The visualized paranasal sinuses and mastoid air cells demonstrate no acute abnormality. SOFT TISSUES/SKULL:  No acute abnormality of the visualized skull or soft tissues.    Findings suspicious of a small acute right-sided subdural hematoma.  There is no mass effect present..    XR ankle right 3+ views    Result Date: 10/9/2023  EXAMINATION: THREE XRAY VIEWS OF THE RIGHT ANKLE 10/9/2023 1:02 pm COMPARISON: January 18, 2020 1334 hours. HISTORY: ORDERING SYSTEM PROVIDED HISTORY: fall TECHNOLOGIST PROVIDED HISTORY: Reason for exam:->fall What reading provider will be dictating this exam?->CRC FINDINGS: Two views of the right ankle are submitted. There is both a new acute spiral mildly displaced fracture of the distal right tibia superimposed upon an old vertical fracture with intra-articular extension at the time.  There is now a new associated comminuted fracture of the distal right fibula. Identified again is a well corticated bony density distal to the medial malleolus and to the lateral malleolus likely representing old avulsion injuries.. The mortise is intact.  No dislocations.    THERE IS NOW A NEW ACUTE OBLIQUE FRACTURE OF THE DISTAL RIGHT TIBIA WITH A OLD VERTICAL FRACTURE OF THE TIBIA. THERE IS A NEW COMMINUTED FRACTURE OF THE DISTAL RIGHT FIBULA.        Assessment/Plan   Principal Problem:    Syncope, unspecified syncope type  Active Problems:    H/O fracture of ankle    NSTEMI (non-ST elevated myocardial infarction) (CMS/[facility])    Patient is a 58-year old male with a past medical history of MDD, T2 DM, BPH, hypertension, hyperlipidemia, JEMAL, chronic pain in neck and back, lumbar  radiculopathy, and heroin use who is transferred from [facility] in the setting of recent lumbar surgery with subarachnoid hemorrhage/subdural hematoma for syncope and ankle pain secondary to the syncope. Upon evaluation, he was found to have a new fracture of the right distal tibia and fibula, potential retroperitoneal abscess on MRI, elevated troponin, elevated CK. Patient was admitted for management of his fracture, syncope etiology, and elevated troponin.    Vasovagal Syncope  -Patient states syncopal event occurred 2/2 to pain but also endorsed poor P.O. intake.   -Trend CMP, CBC. Previous labs from outside facility yesterday and [facility] illustrated no obvious metabolic or toxicological etiology.   -Telemetry continued, no malignant arrythmias seen on tele since admission  -TTE revealed EF of 50 to 55%, no structural heart disease    Retroperitoneal Abscess Vs. Postsurgical Fluid Collection  -Neurosurgery at [facility] and his surgeon at this facility reviewed MRI and assessed that the findings on MRI were consistent with normal post-operative changes rather than an abscess  -Wound dry, intact, no pain with palpation.   -Infectious Disease following, continue to appreciate recs.   ---> ID had a discussion with neurosurgery deemed a low likelihood of abscess more likely fluid collection postsurgical and noninfectious.  ----> As per ID discontinued cefepime and vancomycin (10/11)  -Admission blood cultures from 10/10 NGTD    NSTEMI: Troponinemia, without chest pain or EKG changes, now s/p cardiac catheterization (diagnostic)  -Diagnostic cardiac catheterization 10/11/2023 which demonstrated a proximal RCA stenosis of 90% with interventional plan pending  -Echocardiogram 10/10 revealed EF of 50-55%, no regional wall motion abnormalities  - Continue aspirin 81mg  -Continue Atorvastatin 80 daily  -neurosurgery and orthopedics which both state DAPT can be started, this should not interrupt surgery and a CT head will need to  be repeated 24 hour after starting DAPT.  Neurosurgery also okay with heparin bolus during PCI.  -S/p PCI with stent placement to RCA 10/13/2023  --> Continue to monitor patient's mental status while on DAPT    Displaced Distal right tibia and comminuted distal right fibula fracture.   -Repeated x-ray showing displaced and comminuted fracture of the distal tibia diaphysis and distal fibula diaphysis.   --> Ortho awaiting cardiac interventional plan, they stated DAPT will not preclude surgery and OK to start as required  ----> As per orthopedic surgery patient will likely go to the operating room for ORIF on Tuesday, 10/17/2023  -Pain control with scheduled Tylenol and oxycodone with PRN Dilaudid for severe pain.   -Continue Flexeril prn  -Continue bowel regimen consisting of Miralax/senokot    Recent lumbar fusion(9/29/2023) c/b   - Post op SAH/SDH (10/3), SAH resolved on imaging at OSH, SDH persists   - Intraoperative dural tear s/p dural patch  Started on keppra for seizure prophylaxis due to bleed during prior admission and on discharge, will address with neurosurgery necessity and length of treatment. Will switch to IV if NPO for surgery  -CT head 10/13/2023 negative for any acute bleeds  -Continue to monitor neuro status while on DAPT.    Primary HTN:  -Patient takes no medication for this at home  -Continue Coreg 12.5mg     #Diabetes Type 2: Known history, with recent A1c subdiabetic  -A1c 5.7  -Hold home metformin at this time  -No insulin at this time as patient's glucose has been ranging ,  -Continue POCT glucose checks    Gout: Known hx  -Continue home allopurinol    BPH: known hx  -Will continue Tamsulosin 0.4 mg daily     IVF: None.   Diet: Cardiac  DVT Prophylaxis: Heparin subcutaneous.   Consults: Cardiology, Neurosurgery, orthopedic surgery.   Dispo: Anticipate additional 2 to 3 days hospital stay.    Code Status: Full Code    Brad Del Real MD,   Internal medicine PGY 1

## 2023-10-15 NOTE — NURSING NOTE
Complains of pain to right lower leg 10/10, IV dilauded and scheduled tylenol given as ordered    1000 medical team in to see patient    1135 ortho in to see patient, plan is for surgery on Tuesday, 10/17/23

## 2023-10-16 ENCOUNTER — PREP FOR PROCEDURE (OUTPATIENT)
Dept: ORTHOPEDIC SURGERY | Facility: HOSPITAL | Age: 58
End: 2023-10-16
Payer: COMMERCIAL

## 2023-10-16 DIAGNOSIS — S82.201A TIBIA/FIBULA FRACTURE, RIGHT, CLOSED, INITIAL ENCOUNTER: Primary | ICD-10-CM

## 2023-10-16 DIAGNOSIS — S82.401A TIBIA/FIBULA FRACTURE, RIGHT, CLOSED, INITIAL ENCOUNTER: Primary | ICD-10-CM

## 2023-10-16 PROCEDURE — 2500000001 HC RX 250 WO HCPCS SELF ADMINISTERED DRUGS (ALT 637 FOR MEDICARE OP)

## 2023-10-16 PROCEDURE — 2500000001 HC RX 250 WO HCPCS SELF ADMINISTERED DRUGS (ALT 637 FOR MEDICARE OP): Performed by: INTERNAL MEDICINE

## 2023-10-16 PROCEDURE — 96372 THER/PROPH/DIAG INJ SC/IM: CPT

## 2023-10-16 PROCEDURE — 99232 SBSQ HOSP IP/OBS MODERATE 35: CPT

## 2023-10-16 PROCEDURE — 2500000004 HC RX 250 GENERAL PHARMACY W/ HCPCS (ALT 636 FOR OP/ED)

## 2023-10-16 PROCEDURE — 2500000001 HC RX 250 WO HCPCS SELF ADMINISTERED DRUGS (ALT 637 FOR MEDICARE OP): Performed by: STUDENT IN AN ORGANIZED HEALTH CARE EDUCATION/TRAINING PROGRAM

## 2023-10-16 PROCEDURE — 1200000002 HC GENERAL ROOM WITH TELEMETRY DAILY

## 2023-10-16 PROCEDURE — 2580000001 HC RX 258 IV SOLUTIONS: Performed by: STUDENT IN AN ORGANIZED HEALTH CARE EDUCATION/TRAINING PROGRAM

## 2023-10-16 PROCEDURE — 2500000004 HC RX 250 GENERAL PHARMACY W/ HCPCS (ALT 636 FOR OP/ED): Performed by: STUDENT IN AN ORGANIZED HEALTH CARE EDUCATION/TRAINING PROGRAM

## 2023-10-16 RX ORDER — CLOPIDOGREL BISULFATE 75 MG/1
75 TABLET ORAL DAILY
Status: DISCONTINUED | OUTPATIENT
Start: 2023-10-16 | End: 2023-10-17

## 2023-10-16 RX ORDER — HEPARIN SODIUM 5000 [USP'U]/ML
5000 INJECTION, SOLUTION INTRAVENOUS; SUBCUTANEOUS EVERY 8 HOURS
Status: COMPLETED | OUTPATIENT
Start: 2023-10-16 | End: 2023-10-16

## 2023-10-16 RX ORDER — LEVETIRACETAM 5 MG/ML
500 INJECTION INTRAVASCULAR EVERY 12 HOURS
Status: COMPLETED | OUTPATIENT
Start: 2023-10-17 | End: 2023-10-17

## 2023-10-16 RX ORDER — ACETAMINOPHEN 500 MG
5 TABLET ORAL NIGHTLY
Status: DISCONTINUED | OUTPATIENT
Start: 2023-10-16 | End: 2023-10-20 | Stop reason: HOSPADM

## 2023-10-16 RX ADMIN — SENNOSIDES 8.6 MG: 8.6 TABLET, FILM COATED ORAL at 08:28

## 2023-10-16 RX ADMIN — SODIUM CHLORIDE, POTASSIUM CHLORIDE, SODIUM LACTATE AND CALCIUM CHLORIDE 100 ML/HR: 600; 310; 30; 20 INJECTION, SOLUTION INTRAVENOUS at 08:58

## 2023-10-16 RX ADMIN — ESCITALOPRAM OXALATE 20 MG: 20 TABLET, FILM COATED ORAL at 08:28

## 2023-10-16 RX ADMIN — ACETAMINOPHEN 650 MG: 325 TABLET ORAL at 08:35

## 2023-10-16 RX ADMIN — OXYCODONE HYDROCHLORIDE 10 MG: 10 TABLET ORAL at 12:48

## 2023-10-16 RX ADMIN — ASPIRIN 81 MG 81 MG: 81 TABLET ORAL at 08:28

## 2023-10-16 RX ADMIN — SODIUM CHLORIDE, POTASSIUM CHLORIDE, SODIUM LACTATE AND CALCIUM CHLORIDE 100 ML/HR: 600; 310; 30; 20 INJECTION, SOLUTION INTRAVENOUS at 16:42

## 2023-10-16 RX ADMIN — ACETAMINOPHEN 650 MG: 325 TABLET ORAL at 12:48

## 2023-10-16 RX ADMIN — PREGABALIN 300 MG: 150 CAPSULE ORAL at 20:40

## 2023-10-16 RX ADMIN — PREGABALIN 300 MG: 150 CAPSULE ORAL at 08:28

## 2023-10-16 RX ADMIN — HYDROMORPHONE HYDROCHLORIDE 0.6 MG: 1 INJECTION, SOLUTION INTRAMUSCULAR; INTRAVENOUS; SUBCUTANEOUS at 08:29

## 2023-10-16 RX ADMIN — CLOPIDOGREL BISULFATE 75 MG: 75 TABLET ORAL at 08:58

## 2023-10-16 RX ADMIN — CARVEDILOL 12.5 MG: 12.5 TABLET, FILM COATED ORAL at 16:16

## 2023-10-16 RX ADMIN — ACETAMINOPHEN 650 MG: 325 TABLET ORAL at 16:16

## 2023-10-16 RX ADMIN — HYDROMORPHONE HYDROCHLORIDE 0.6 MG: 1 INJECTION, SOLUTION INTRAMUSCULAR; INTRAVENOUS; SUBCUTANEOUS at 16:16

## 2023-10-16 RX ADMIN — ATORVASTATIN CALCIUM 80 MG: 80 TABLET, FILM COATED ORAL at 20:40

## 2023-10-16 RX ADMIN — CARVEDILOL 12.5 MG: 12.5 TABLET, FILM COATED ORAL at 20:40

## 2023-10-16 RX ADMIN — CYCLOBENZAPRINE 10 MG: 10 TABLET, FILM COATED ORAL at 08:28

## 2023-10-16 RX ADMIN — TAMSULOSIN HYDROCHLORIDE 0.4 MG: 0.4 CAPSULE ORAL at 08:29

## 2023-10-16 RX ADMIN — HYDROMORPHONE HYDROCHLORIDE 0.6 MG: 1 INJECTION, SOLUTION INTRAMUSCULAR; INTRAVENOUS; SUBCUTANEOUS at 03:41

## 2023-10-16 RX ADMIN — HEPARIN SODIUM 5000 UNITS: 5000 INJECTION INTRAVENOUS; SUBCUTANEOUS at 08:58

## 2023-10-16 RX ADMIN — HYDROMORPHONE HYDROCHLORIDE 0.6 MG: 1 INJECTION, SOLUTION INTRAMUSCULAR; INTRAVENOUS; SUBCUTANEOUS at 12:48

## 2023-10-16 RX ADMIN — CYCLOBENZAPRINE 10 MG: 10 TABLET, FILM COATED ORAL at 16:16

## 2023-10-16 RX ADMIN — ALLOPURINOL 100 MG: 100 TABLET ORAL at 08:28

## 2023-10-16 RX ADMIN — LEVETIRACETAM 500 MG: 500 TABLET, FILM COATED ORAL at 20:40

## 2023-10-16 RX ADMIN — SODIUM CHLORIDE, POTASSIUM CHLORIDE, SODIUM LACTATE AND CALCIUM CHLORIDE 100 ML/HR: 600; 310; 30; 20 INJECTION, SOLUTION INTRAVENOUS at 00:29

## 2023-10-16 RX ADMIN — ACETAMINOPHEN 650 MG: 325 TABLET ORAL at 20:40

## 2023-10-16 RX ADMIN — Medication 5 MG: at 20:40

## 2023-10-16 RX ADMIN — CYCLOBENZAPRINE 10 MG: 10 TABLET, FILM COATED ORAL at 20:40

## 2023-10-16 RX ADMIN — PANTOPRAZOLE SODIUM 40 MG: 40 TABLET, DELAYED RELEASE ORAL at 08:28

## 2023-10-16 RX ADMIN — LEVETIRACETAM 500 MG: 500 TABLET, FILM COATED ORAL at 08:29

## 2023-10-16 RX ADMIN — HEPARIN SODIUM 5000 UNITS: 5000 INJECTION INTRAVENOUS; SUBCUTANEOUS at 16:15

## 2023-10-16 RX ADMIN — OXYCODONE HYDROCHLORIDE 10 MG: 10 TABLET ORAL at 05:54

## 2023-10-16 RX ADMIN — HYDROMORPHONE HYDROCHLORIDE 0.6 MG: 1 INJECTION, SOLUTION INTRAMUSCULAR; INTRAVENOUS; SUBCUTANEOUS at 20:40

## 2023-10-16 RX ADMIN — HYDROXYZINE HYDROCHLORIDE 25 MG: 25 TABLET ORAL at 20:40

## 2023-10-16 ASSESSMENT — PAIN - FUNCTIONAL ASSESSMENT
PAIN_FUNCTIONAL_ASSESSMENT: 0-10

## 2023-10-16 ASSESSMENT — PAIN SCALES - GENERAL
PAINLEVEL_OUTOF10: 3
PAINLEVEL_OUTOF10: 3
PAINLEVEL_OUTOF10: 9
PAINLEVEL_OUTOF10: 10 - WORST POSSIBLE PAIN
PAINLEVEL_OUTOF10: 9
PAINLEVEL_OUTOF10: 0 - NO PAIN

## 2023-10-16 NOTE — CARE PLAN
The patient's goals for the shift include No pain    The clinical goals for the shift include pain will be decreased      Problem: Chronic Conditions and Co-morbidities  Goal: Patient's chronic conditions and co-morbidity symptoms are monitored and maintained or improved  Outcome: Progressing     Pt.  Pain 10/10 this shift. Pain meds given. Pt. To transfer to ortho. Awaiting bed. Ankle surgery tomorrow. Plavix pm dose on hold. Npo @ midnight. Pt. Last bm was a week ago. Miralax and enema refused due to pt. Not wanting to get up. Pt. Educated on risks.

## 2023-10-16 NOTE — PROGRESS NOTES
DAILY PROGRESS NOTE        Patient doing well  Visit Vitals  /87 (BP Location: Right arm)   Pulse 63   Temp 36.2 °C (97.2 °F) (Temporal)   Resp 18      Temp (24hrs), Av.2 °C (97.1 °F), Min:36.1 °C (97 °F), Max:36.2 °C (97.2 °F)       Pain Control moderate distal right LE pain  No chest pain or shortness of breath.  No calf pain    Exam:     Extremity shows neuro vascular status intact. Distal right lower extremity splint in place, is clean dry and intact.  Right toes are pink with 2 to 3-second cap refill.  Light touch sensation and motor function intact to all 5 right toes    Calves soft and non-tender without evidence of DVT.        Labs reviewed:  CBC: @LABRCNT(WBC:3,HGB:3,PLT:3)@  BMP:  @LABRCNT(Na:3,K:3,CL:3,CO2:3,BUN:3,Creatinine:3,Glucose:3)@    I&O  I/O last 3 completed shifts:  In: 200 (1.7 mL/kg) [P.O.:200]  Out: 3475 (29.7 mL/kg) [Urine:3475 (0.8 mL/kg/hr)]  Weight: 117 kg       Assessment:  right distal 1/3 tib/fib fractures    Plan:  Right distal 1/3 tibia/fibula fractures  Splinted  Strict NWB right LE  Elevate on 2-3 pillows while in bed  May ice intermittently  Plan for ORIF right distal 1/3 tib/fib fractures tomorrow 10/17  NPO after midnight  Ancef on call to OR    SERGIO Vaca  10/16/2023 12:47 PM

## 2023-10-16 NOTE — PROGRESS NOTES
Occupational Therapy                 Therapy Communication Note    Patient Name: Gm Thrasher  MRN: [id number]  Today's Date: 10/16/2023     Discipline: Occupational Therapy    Missed Visit Reason:  Pt awaiting ORIF.  Will follow up post op for therapy evaluation.    Missed Time: Attempt

## 2023-10-16 NOTE — PROGRESS NOTES
Physical Therapy                 Therapy Communication Note    Patient Name: Gm Thrasher  MRN: [id number]  Today's Date: 10/16/2023     Discipline: Physical Therapy    Missed Time: Attempt    Comment:  Pt awaiting ORIF of ankle.  Surgery tentatively schedule for Tuesday 10/17.  Will hold PT eval and complete s/p surgery.

## 2023-10-16 NOTE — PROGRESS NOTES
"Gm Thrasher is a 58 y.o. male on day 6 of admission presenting with Syncope, unspecified syncope type.    Subjective   Patient seen at the bedside in the a.m.  States that he had poor sleep last night and he is tired.  Poor sleep due to pain in right lower extremity.  Otherwise no other complaints today.  No chest pain, shortness of breath, abdominal pain.  Still needs to have a bowel movement but he will ask for an enema if he does not have a bowel movement by this evening.       Objective     Physical Exam  Constitutional:       General: He is not in acute distress.     Appearance: He is not ill-appearing.   HENT:      Head: Normocephalic and atraumatic.      Mouth/Throat:      Mouth: Mucous membranes are moist.      Pharynx: Oropharynx is clear.   Eyes:      General: No scleral icterus.     Conjunctiva/sclera: Conjunctivae normal.   Cardiovascular:      Rate and Rhythm: Normal rate and regular rhythm.      Pulses: Normal pulses.      Heart sounds: Normal heart sounds.   Pulmonary:      Effort: Pulmonary effort is normal.      Breath sounds: Normal breath sounds.   Abdominal:      General: Abdomen is flat. Bowel sounds are normal.      Palpations: Abdomen is soft.   Musculoskeletal:      Comments: Splint and bandages in place on right lower extremities.  Able to move toes and plantar and dorsiflex with normal strength but with pain during exam.   Skin:     General: Skin is warm and dry.      Capillary Refill: Capillary refill takes less than 2 seconds.   Neurological:      General: No focal deficit present.      Mental Status: He is alert and oriented to person, place, and time.         Last Recorded Vitals  Blood pressure 139/87, pulse 63, temperature 36.2 °C (97.2 °F), temperature source Temporal, resp. rate 18, height 1.8 m (5' 10.87\"), weight 117 kg (257 lb 14.4 oz), SpO2 94 %.  Intake/Output last 3 Shifts:  I/O last 3 completed shifts:  In: 200 (1.7 mL/kg) [P.O.:200]  Out: 3475 (29.7 mL/kg) [Urine:3475 " (0.8 mL/kg/hr)]  Weight: 117 kg     Relevant Results           This patient currently has cardiac telemetry ordered; if you would like to modify or discontinue the telemetry order, click here to go to the orders activity to modify/discontinue the order.    Scheduled medications  acetaminophen, 650 mg, oral, 4x daily  allopurinol, 100 mg, oral, Daily  aspirin, 81 mg, oral, Daily  atorvastatin, 80 mg, oral, Daily  carvedilol, 12.5 mg, oral, BID  clopidogrel, 75 mg, oral, Daily  cyclobenzaprine, 10 mg, oral, TID  escitalopram, 20 mg, oral, Daily  glycerin, 1 suppository, rectal, Once  heparin (porcine), 5,000 Units, subcutaneous, q8h  [Held by provider] insulin lispro, 0-5 Units, subcutaneous, q4h  levETIRAcetam, 500 mg, oral, BID  [START ON 10/17/2023] levETIRAcetam, 500 mg, intravenous, q12h  pantoprazole, 40 mg, oral, Daily before breakfast  perflutren lipid microspheres, 3 mL of dilution, intravenous, Once in imaging  polyethylene glycol, 17 g, oral, Daily  pregabalin, 300 mg, oral, BID  sennosides, 1 tablet, oral, BID  sulfur hexafluoride microsphr, 2 mL, intravenous, Once in imaging  tamsulosin, 0.4 mg, oral, Daily      Continuous medications  lactated Ringer's, 100 mL/hr, Last Rate: 100 mL/hr (10/16/23 0858)      PRN medications  PRN medications: docusate sodium, HYDROmorphone, HYDROmorphone, hydrOXYzine HCL, oxyCODONE          Assessment/Plan   Principal Problem:    Syncope, unspecified syncope type  Active Problems:    H/O fracture of ankle    NSTEMI (non-ST elevated myocardial infarction) (CMS/HCC)    Gm is a 58-year old male with a past medical history of MDD, T2 DM, BPH, hypertension, hyperlipidemia, JEMAL, chronic pain in neck and back, lumbar radiculopathy, and heroin use who was transferred from [facility] in the setting of recent lumbar surgery with subarachnoid hemorrhage/subdural hematoma for syncope and ankle pain secondary to the syncope. Upon evaluation, he was found to have a new fracture  of the right distal tibia and fibula, potential retroperitoneal abscess on MRI, elevated troponin, elevated CK. Patient was admitted for management of his fracture, syncope etiology, and elevated troponin that proved to be an NSTEMI    #Displaced Distal right tibia and comminuted distal right fibula fracture.   -Repeated x-ray showing displaced and comminuted fracture of the distal tibia diaphysis and distal fibula diaphysis.   --> Per Ortho, they stated DAPT will not preclude surgery and OK to start as required  ----> As per orthopedic surgery patient will go to the operating room for ORIF on Tuesday, 10/17/2023  -Pain control with scheduled Tylenol and oxycodone with PRN Dilaudid for severe pain.   -Continue Flexeril  -Continue bowel regimen consisting of Miralax/senokot  -NPO at midnight    #NSTEMI  -initially Troponinemia, without chest pain or EKG changes, now s/p cardiac catheterization (diagnostic)  -Diagnostic cardiac catheterization 10/11/2023 which demonstrated a proximal RCA stenosis of 90% with interventional plan pending  -Echocardiogram 10/10 revealed EF of 50-55%, no regional wall motion abnormalities  -Continue aspirin 81mg  -Continue Atorvastatin 80 daily  -neurosurgery and orthopedics which both state DAPT can be started, this should not interrupt surgery and a CT head will need to be repeated 24 hour after starting DAPT.  Neurosurgery also okay with heparin bolus during PCI.  -S/p PCI with stent placement to RCA 10/13/2023  --> Continue to monitor patient's mental status while on DAPT     #Vasovagal Syncope  -Patient states syncopal event occurred 2/2 to pain but also endorsed poor P.O. intake.   -Trend CMP, CBC. Previous labs from outside facility yesterday and  illustrated no obvious metabolic or toxicological etiology.   -Telemetry continued, no malignant arrythmias seen on tele since admission  -TTE revealed EF of 50 to 55%, no structural heart disease     #Less likely Retroperitoneal  Abscess Vs. Likely Postsurgical Fluid Collection  -Neurosurgery at [facility] and his surgeon at this facility reviewed MRI and assessed that the findings on MRI were consistent with normal post-operative changes rather than an abscess  -Wound dry, intact, no pain with palpation.   -Infectious Disease following, continue to appreciate recs.   ---> ID had a discussion with neurosurgery deemed a low likelihood of abscess more likely fluid collection postsurgical and noninfectious.  ----> As per ID discontinued cefepime and vancomycin (10/11)  -Admission blood cultures from 10/10 NGTD    #Recent lumbar fusion(9/29/2023) c/b   - Post op SAH/SDH (10/3), SAH resolved on imaging at OSH, SDH persists   - Intraoperative dural tear s/p dural patch  -On keppra for seizure prophylaxis due to bleed during prior admission and on discharge, will address with neurosurgery necessity and length of treatment.  -Switch Keppra to IV when NPO for surgery  -CT head 10/13/2023 negative for any acute bleeds  -Continue to monitor neuro status while on DAPT.     #Primary HTN:  -Patient takes no medication for this at home  -Continue Coreg 12.5mg      #Diabetes Type 2: Known history, with recent A1c subdiabetic  -A1c 5.7  -Hold home metformin at this time  -No insulin at this time as patient's glucose has been ranging ,  -Continue POCT glucose checks     #Gout: Known hx  -Continue home allopurinol     #BPH: known hx  -Will continue Tamsulosin 0.4 mg daily      IVF: None.   Diet: Cardiac, NPO at midnight  DVT Prophylaxis: Heparin subcutaneous.   Consults: Cardiology, Neurosurgery, orthopedic surgery.   Dispo: Anticipate additional 2 to 3 days hospital stay.     Code Status: Full Code         Patient to be discussed with attending physician     Mark Corrigan DO  PGY-2 Family Medicine

## 2023-10-16 NOTE — NURSING NOTE
Cardiac Rehab: Patient seen for qualifying diagnosis to cardiac rehab program. Given [facility] book and discussed enrollment into phase II. Reviewed cardiac lifestyle modifications necessary for improved heart health. Encouraged cardiology follow up post hospitalization. Discussed the importance of medication compliance. Cardiac rehab staff will contact pt following discharge for enrollment in program.

## 2023-10-16 NOTE — PROGRESS NOTES
Care Transition  Met with patient for discharge plan. States he has a fractured ankle that needs surgery with pins. Initially presented with chest pain and had cath with stent placed. Will need a walker at discharge. Still has pain at left ankle. Has order for pain meds. Does not seem in distress as this morning. Will be transferred to [facility] unit today. Discharge plan pending surgery. Francia Booker, RN

## 2023-10-17 ENCOUNTER — APPOINTMENT (OUTPATIENT)
Dept: RADIOLOGY | Facility: HOSPITAL | Age: 58
DRG: 321 | End: 2023-10-17
Payer: COMMERCIAL

## 2023-10-17 PROBLEM — Z79.899 OTHER LONG TERM (CURRENT) DRUG THERAPY: Status: ACTIVE | Noted: 2023-09-20

## 2023-10-17 PROBLEM — S82.891A ANKLE FRACTURE, RIGHT, CLOSED, INITIAL ENCOUNTER: Status: ACTIVE | Noted: 2023-10-09

## 2023-10-17 PROBLEM — M54.16 LEFT LUMBAR RADICULOPATHY: Status: ACTIVE | Noted: 2023-10-17

## 2023-10-17 PROBLEM — D72.829 LEUKOCYTOSIS: Status: ACTIVE | Noted: 2019-02-03

## 2023-10-17 PROBLEM — M48.061 DEGENERATIVE LUMBAR SPINAL STENOSIS: Status: ACTIVE | Noted: 2023-10-17

## 2023-10-17 PROBLEM — Z87.39 HISTORY OF RHABDOMYOLYSIS: Status: ACTIVE | Noted: 2023-10-17

## 2023-10-17 PROBLEM — R89.2 ABNORMAL DRUG SCREEN: Status: ACTIVE | Noted: 2023-10-17

## 2023-10-17 PROBLEM — R00.0 TACHYCARDIA: Status: ACTIVE | Noted: 2023-10-17

## 2023-10-17 PROBLEM — J43.9 EMPHYSEMA LUNG (MULTI): Status: ACTIVE | Noted: 2023-09-20

## 2023-10-17 PROBLEM — M43.17 SPONDYLOLISTHESIS, LUMBOSACRAL REGION: Status: ACTIVE | Noted: 2023-01-13

## 2023-10-17 PROBLEM — R73.01 IFG (IMPAIRED FASTING GLUCOSE): Status: ACTIVE | Noted: 2023-10-17

## 2023-10-17 PROBLEM — F39 MOOD DISORDER (CMS-HCC): Status: ACTIVE | Noted: 2023-09-20

## 2023-10-17 LAB
ALBUMIN SERPL BCP-MCNC: 3 G/DL (ref 3.4–5)
ANION GAP SERPL CALC-SCNC: 11 MMOL/L (ref 10–20)
BUN SERPL-MCNC: 7 MG/DL (ref 6–23)
CALCIUM SERPL-MCNC: 8.6 MG/DL (ref 8.6–10.3)
CHLORIDE SERPL-SCNC: 99 MMOL/L (ref 98–107)
CO2 SERPL-SCNC: 29 MMOL/L (ref 21–32)
CREAT SERPL-MCNC: 0.9 MG/DL (ref 0.5–1.3)
ERYTHROCYTE [DISTWIDTH] IN BLOOD BY AUTOMATED COUNT: 16.2 % (ref 11.5–14.5)
GFR SERPL CREATININE-BSD FRML MDRD: >90 ML/MIN/1.73M*2
GLUCOSE BLD MANUAL STRIP-MCNC: 104 MG/DL (ref 74–99)
GLUCOSE SERPL-MCNC: 92 MG/DL (ref 74–99)
HCT VFR BLD AUTO: 33.1 % (ref 41–52)
HGB BLD-MCNC: 10.9 G/DL (ref 13.5–17.5)
INR PPP: 1.2 (ref 0.9–1.1)
MAGNESIUM SERPL-MCNC: 1.78 MG/DL (ref 1.6–2.4)
MCH RBC QN AUTO: 30.1 PG (ref 26–34)
MCHC RBC AUTO-ENTMCNC: 32.9 G/DL (ref 32–36)
MCV RBC AUTO: 91 FL (ref 80–100)
NRBC BLD-RTO: 0 /100 WBCS (ref 0–0)
PHOSPHATE SERPL-MCNC: 4.7 MG/DL (ref 2.5–4.9)
PLATELET # BLD AUTO: 320 X10*3/UL (ref 150–450)
PMV BLD AUTO: 9.2 FL (ref 7.5–11.5)
POTASSIUM SERPL-SCNC: 4.2 MMOL/L (ref 3.5–5.3)
PROTHROMBIN TIME: 13.1 SECONDS (ref 9.8–12.8)
RBC # BLD AUTO: 3.62 X10*6/UL (ref 4.5–5.9)
SODIUM SERPL-SCNC: 135 MMOL/L (ref 136–145)
WBC # BLD AUTO: 6.2 X10*3/UL (ref 4.4–11.3)

## 2023-10-17 PROCEDURE — 0QSG04Z REPOSITION RIGHT TIBIA WITH INTERNAL FIXATION DEVICE, OPEN APPROACH: ICD-10-PCS | Performed by: ORTHOPAEDIC SURGERY

## 2023-10-17 PROCEDURE — 2500000001 HC RX 250 WO HCPCS SELF ADMINISTERED DRUGS (ALT 637 FOR MEDICARE OP): Performed by: STUDENT IN AN ORGANIZED HEALTH CARE EDUCATION/TRAINING PROGRAM

## 2023-10-17 PROCEDURE — 27759 TREATMENT OF TIBIA FRACTURE: CPT | Performed by: PHYSICIAN ASSISTANT

## 2023-10-17 PROCEDURE — 2780000003 HC OR 278 NO HCPCS: Performed by: ORTHOPAEDIC SURGERY

## 2023-10-17 PROCEDURE — 2500000004 HC RX 250 GENERAL PHARMACY W/ HCPCS (ALT 636 FOR OP/ED): Performed by: NURSE ANESTHETIST, CERTIFIED REGISTERED

## 2023-10-17 PROCEDURE — 2500000004 HC RX 250 GENERAL PHARMACY W/ HCPCS (ALT 636 FOR OP/ED): Performed by: STUDENT IN AN ORGANIZED HEALTH CARE EDUCATION/TRAINING PROGRAM

## 2023-10-17 PROCEDURE — 36415 COLL VENOUS BLD VENIPUNCTURE: CPT

## 2023-10-17 PROCEDURE — 3700000001 HC GENERAL ANESTHESIA TIME - INITIAL BASE CHARGE: Performed by: ORTHOPAEDIC SURGERY

## 2023-10-17 PROCEDURE — 2500000004 HC RX 250 GENERAL PHARMACY W/ HCPCS (ALT 636 FOR OP/ED): Performed by: INTERNAL MEDICINE

## 2023-10-17 PROCEDURE — 3600000009 HC OR TIME - EACH INCREMENTAL 1 MINUTE - PROCEDURE LEVEL FOUR: Performed by: ORTHOPAEDIC SURGERY

## 2023-10-17 PROCEDURE — 7100000002 HC RECOVERY ROOM TIME - EACH INCREMENTAL 1 MINUTE: Performed by: ORTHOPAEDIC SURGERY

## 2023-10-17 PROCEDURE — A27788 PR CLOSED RX DIST FIBULA FX,MANIP: Performed by: NURSE ANESTHETIST, CERTIFIED REGISTERED

## 2023-10-17 PROCEDURE — 85610 PROTHROMBIN TIME: CPT

## 2023-10-17 PROCEDURE — 82947 ASSAY GLUCOSE BLOOD QUANT: CPT

## 2023-10-17 PROCEDURE — 27788 TREATMENT OF ANKLE FRACTURE: CPT | Performed by: ORTHOPAEDIC SURGERY

## 2023-10-17 PROCEDURE — 85027 COMPLETE CBC AUTOMATED: CPT

## 2023-10-17 PROCEDURE — 27759 TREATMENT OF TIBIA FRACTURE: CPT | Performed by: ORTHOPAEDIC SURGERY

## 2023-10-17 PROCEDURE — 1100000001 HC PRIVATE ROOM DAILY

## 2023-10-17 PROCEDURE — 2500000004 HC RX 250 GENERAL PHARMACY W/ HCPCS (ALT 636 FOR OP/ED)

## 2023-10-17 PROCEDURE — 1200000002 HC GENERAL ROOM WITH TELEMETRY DAILY

## 2023-10-17 PROCEDURE — 3700000002 HC GENERAL ANESTHESIA TIME - EACH INCREMENTAL 1 MINUTE: Performed by: ORTHOPAEDIC SURGERY

## 2023-10-17 PROCEDURE — 2580000001 HC RX 258 IV SOLUTIONS: Performed by: STUDENT IN AN ORGANIZED HEALTH CARE EDUCATION/TRAINING PROGRAM

## 2023-10-17 PROCEDURE — 2500000001 HC RX 250 WO HCPCS SELF ADMINISTERED DRUGS (ALT 637 FOR MEDICARE OP)

## 2023-10-17 PROCEDURE — 7100000001 HC RECOVERY ROOM TIME - INITIAL BASE CHARGE: Performed by: ORTHOPAEDIC SURGERY

## 2023-10-17 PROCEDURE — 2500000001 HC RX 250 WO HCPCS SELF ADMINISTERED DRUGS (ALT 637 FOR MEDICARE OP): Performed by: INTERNAL MEDICINE

## 2023-10-17 PROCEDURE — 99232 SBSQ HOSP IP/OBS MODERATE 35: CPT

## 2023-10-17 PROCEDURE — 2720000007 HC OR 272 NO HCPCS: Performed by: ORTHOPAEDIC SURGERY

## 2023-10-17 PROCEDURE — 76000 FLUOROSCOPY <1 HR PHYS/QHP: CPT

## 2023-10-17 PROCEDURE — 83735 ASSAY OF MAGNESIUM: CPT

## 2023-10-17 PROCEDURE — A27788 PR CLOSED RX DIST FIBULA FX,MANIP: Performed by: ANESTHESIOLOGY

## 2023-10-17 PROCEDURE — 80069 RENAL FUNCTION PANEL: CPT

## 2023-10-17 PROCEDURE — 2500000005 HC RX 250 GENERAL PHARMACY W/O HCPCS: Performed by: NURSE ANESTHETIST, CERTIFIED REGISTERED

## 2023-10-17 PROCEDURE — 3600000004 HC OR TIME - INITIAL BASE CHARGE - PROCEDURE LEVEL FOUR: Performed by: ORTHOPAEDIC SURGERY

## 2023-10-17 DEVICE — IMPLANTABLE DEVICE: Type: IMPLANTABLE DEVICE | Site: TIBIA | Status: FUNCTIONAL

## 2023-10-17 RX ORDER — HYDROMORPHONE HYDROCHLORIDE 1 MG/ML
0.2 INJECTION, SOLUTION INTRAMUSCULAR; INTRAVENOUS; SUBCUTANEOUS EVERY 5 MIN PRN
Status: DISCONTINUED | OUTPATIENT
Start: 2023-10-17 | End: 2023-10-18 | Stop reason: HOSPADM

## 2023-10-17 RX ORDER — FAMOTIDINE 10 MG/ML
20 INJECTION INTRAVENOUS ONCE
Status: DISCONTINUED | OUTPATIENT
Start: 2023-10-17 | End: 2023-10-18 | Stop reason: HOSPADM

## 2023-10-17 RX ORDER — ALBUTEROL SULFATE 0.83 MG/ML
2.5 SOLUTION RESPIRATORY (INHALATION) ONCE AS NEEDED
Status: DISCONTINUED | OUTPATIENT
Start: 2023-10-17 | End: 2023-10-18 | Stop reason: HOSPADM

## 2023-10-17 RX ORDER — LORAZEPAM 2 MG/ML
0.5 INJECTION INTRAMUSCULAR ONCE
Status: COMPLETED | OUTPATIENT
Start: 2023-10-17 | End: 2023-10-17

## 2023-10-17 RX ORDER — LORAZEPAM 0.5 MG/1
0.5 TABLET ORAL ONCE
Status: DISCONTINUED | OUTPATIENT
Start: 2023-10-17 | End: 2023-10-17

## 2023-10-17 RX ORDER — BUPRENORPHINE HYDROCHLORIDE AND NALOXONE HYDROCHLORIDE DIHYDRATE 8; 2 MG/1; MG/1
TABLET SUBLINGUAL 2 TIMES DAILY
COMMUNITY
End: 2023-10-20 | Stop reason: HOSPADM

## 2023-10-17 RX ORDER — ENOXAPARIN SODIUM 100 MG/ML
40 INJECTION SUBCUTANEOUS DAILY
Status: ON HOLD | COMMUNITY
Start: 2023-10-06 | End: 2023-10-20 | Stop reason: SDUPTHER

## 2023-10-17 RX ORDER — SODIUM CHLORIDE, SODIUM LACTATE, POTASSIUM CHLORIDE, CALCIUM CHLORIDE 600; 310; 30; 20 MG/100ML; MG/100ML; MG/100ML; MG/100ML
100 INJECTION, SOLUTION INTRAVENOUS CONTINUOUS
Status: DISCONTINUED | OUTPATIENT
Start: 2023-10-17 | End: 2023-10-18 | Stop reason: HOSPADM

## 2023-10-17 RX ORDER — HYDROMORPHONE HYDROCHLORIDE 1 MG/ML
0.5 INJECTION, SOLUTION INTRAMUSCULAR; INTRAVENOUS; SUBCUTANEOUS EVERY 5 MIN PRN
Status: DISCONTINUED | OUTPATIENT
Start: 2023-10-17 | End: 2023-10-18 | Stop reason: HOSPADM

## 2023-10-17 RX ORDER — OXYCODONE HYDROCHLORIDE 10 MG/1
10 TABLET ORAL EVERY 4 HOURS PRN
Status: DISCONTINUED | OUTPATIENT
Start: 2023-10-17 | End: 2023-10-18 | Stop reason: HOSPADM

## 2023-10-17 RX ORDER — DIPHENHYDRAMINE HYDROCHLORIDE 50 MG/ML
12.5 INJECTION INTRAMUSCULAR; INTRAVENOUS ONCE AS NEEDED
Status: DISCONTINUED | OUTPATIENT
Start: 2023-10-17 | End: 2023-10-18 | Stop reason: HOSPADM

## 2023-10-17 RX ORDER — ONDANSETRON HYDROCHLORIDE 2 MG/ML
INJECTION, SOLUTION INTRAVENOUS AS NEEDED
Status: DISCONTINUED | OUTPATIENT
Start: 2023-10-17 | End: 2023-10-17

## 2023-10-17 RX ORDER — IBUPROFEN 200 MG
1 TABLET ORAL
COMMUNITY
Start: 2023-09-30 | End: 2023-11-09 | Stop reason: ALTCHOICE

## 2023-10-17 RX ORDER — MIDAZOLAM HYDROCHLORIDE 1 MG/ML
INJECTION, SOLUTION INTRAMUSCULAR; INTRAVENOUS AS NEEDED
Status: DISCONTINUED | OUTPATIENT
Start: 2023-10-17 | End: 2023-10-17

## 2023-10-17 RX ORDER — ONDANSETRON HYDROCHLORIDE 2 MG/ML
4 INJECTION, SOLUTION INTRAVENOUS ONCE AS NEEDED
Status: DISCONTINUED | OUTPATIENT
Start: 2023-10-17 | End: 2023-10-18 | Stop reason: HOSPADM

## 2023-10-17 RX ORDER — NALOXONE HYDROCHLORIDE 4 MG/.1ML
4 SPRAY NASAL
COMMUNITY
Start: 2023-10-05

## 2023-10-17 RX ORDER — CEFAZOLIN SODIUM 2 G/100ML
INJECTION, SOLUTION INTRAVENOUS AS NEEDED
Status: DISCONTINUED | OUTPATIENT
Start: 2023-10-17 | End: 2023-10-17

## 2023-10-17 RX ORDER — MEPERIDINE HYDROCHLORIDE 50 MG/ML
12.5 INJECTION INTRAMUSCULAR; INTRAVENOUS; SUBCUTANEOUS EVERY 10 MIN PRN
Status: DISCONTINUED | OUTPATIENT
Start: 2023-10-17 | End: 2023-10-18 | Stop reason: HOSPADM

## 2023-10-17 RX ORDER — LIDOCAINE HYDROCHLORIDE 10 MG/ML
0.1 INJECTION, SOLUTION EPIDURAL; INFILTRATION; INTRACAUDAL; PERINEURAL ONCE
Status: DISCONTINUED | OUTPATIENT
Start: 2023-10-17 | End: 2023-10-18 | Stop reason: HOSPADM

## 2023-10-17 RX ORDER — ROCURONIUM BROMIDE 10 MG/ML
INJECTION, SOLUTION INTRAVENOUS AS NEEDED
Status: DISCONTINUED | OUTPATIENT
Start: 2023-10-17 | End: 2023-10-17

## 2023-10-17 RX ORDER — FENTANYL CITRATE 50 UG/ML
INJECTION, SOLUTION INTRAMUSCULAR; INTRAVENOUS AS NEEDED
Status: DISCONTINUED | OUTPATIENT
Start: 2023-10-17 | End: 2023-10-17

## 2023-10-17 RX ORDER — LIDOCAINE HYDROCHLORIDE 20 MG/ML
INJECTION, SOLUTION INFILTRATION; PERINEURAL AS NEEDED
Status: DISCONTINUED | OUTPATIENT
Start: 2023-10-17 | End: 2023-10-17

## 2023-10-17 RX ORDER — PROPOFOL 10 MG/ML
INJECTION, EMULSION INTRAVENOUS AS NEEDED
Status: DISCONTINUED | OUTPATIENT
Start: 2023-10-17 | End: 2023-10-17

## 2023-10-17 RX ORDER — CLOPIDOGREL BISULFATE 75 MG/1
75 TABLET ORAL DAILY
Status: DISCONTINUED | OUTPATIENT
Start: 2023-10-17 | End: 2023-10-20 | Stop reason: HOSPADM

## 2023-10-17 RX ORDER — OXYCODONE HYDROCHLORIDE 5 MG/1
5 TABLET ORAL EVERY 4 HOURS PRN
Status: DISCONTINUED | OUTPATIENT
Start: 2023-10-17 | End: 2023-10-18 | Stop reason: HOSPADM

## 2023-10-17 RX ORDER — NEBIVOLOL 5 MG/1
5 TABLET ORAL DAILY
COMMUNITY
Start: 2021-06-08 | End: 2023-10-20 | Stop reason: HOSPADM

## 2023-10-17 RX ORDER — HYDRALAZINE HYDROCHLORIDE 20 MG/ML
5 INJECTION INTRAMUSCULAR; INTRAVENOUS EVERY 30 MIN PRN
Status: DISCONTINUED | OUTPATIENT
Start: 2023-10-17 | End: 2023-10-18 | Stop reason: HOSPADM

## 2023-10-17 RX ORDER — CHLORHEXIDINE GLUCONATE ORAL RINSE 1.2 MG/ML
SOLUTION DENTAL
COMMUNITY
Start: 2023-09-15 | End: 2023-10-20 | Stop reason: HOSPADM

## 2023-10-17 RX ORDER — METOCLOPRAMIDE HYDROCHLORIDE 5 MG/ML
10 INJECTION INTRAMUSCULAR; INTRAVENOUS ONCE AS NEEDED
Status: DISCONTINUED | OUTPATIENT
Start: 2023-10-17 | End: 2023-10-18 | Stop reason: HOSPADM

## 2023-10-17 RX ORDER — LEVETIRACETAM 500 MG/1
500 TABLET ORAL 2 TIMES DAILY
Status: DISCONTINUED | OUTPATIENT
Start: 2023-10-18 | End: 2023-10-20 | Stop reason: HOSPADM

## 2023-10-17 RX ORDER — ACETAMINOPHEN 325 MG/1
975 TABLET ORAL ONCE
Status: DISCONTINUED | OUTPATIENT
Start: 2023-10-17 | End: 2023-10-18 | Stop reason: HOSPADM

## 2023-10-17 RX ORDER — PHENYLEPHRINE HCL IN 0.9% NACL 0.4MG/10ML
SYRINGE (ML) INTRAVENOUS AS NEEDED
Status: DISCONTINUED | OUTPATIENT
Start: 2023-10-17 | End: 2023-10-17

## 2023-10-17 RX ORDER — ERGOCALCIFEROL 1.25 MG/1
50000 CAPSULE ORAL
COMMUNITY
Start: 2021-05-26 | End: 2024-01-26 | Stop reason: SDUPTHER

## 2023-10-17 RX ADMIN — HYDROMORPHONE HYDROCHLORIDE 0.6 MG: 1 INJECTION, SOLUTION INTRAMUSCULAR; INTRAVENOUS; SUBCUTANEOUS at 15:22

## 2023-10-17 RX ADMIN — CEFAZOLIN SODIUM 2 G: 2 INJECTION, SOLUTION INTRAVENOUS at 20:09

## 2023-10-17 RX ADMIN — SODIUM CHLORIDE, POTASSIUM CHLORIDE, SODIUM LACTATE AND CALCIUM CHLORIDE 100 ML/HR: 600; 310; 30; 20 INJECTION, SOLUTION INTRAVENOUS at 11:55

## 2023-10-17 RX ADMIN — HYDROMORPHONE HYDROCHLORIDE 0.6 MG: 1 INJECTION, SOLUTION INTRAMUSCULAR; INTRAVENOUS; SUBCUTANEOUS at 11:38

## 2023-10-17 RX ADMIN — HYDROMORPHONE HYDROCHLORIDE 0.5 MG: 1 INJECTION, SOLUTION INTRAMUSCULAR; INTRAVENOUS; SUBCUTANEOUS at 22:34

## 2023-10-17 RX ADMIN — PROPOFOL 200 MG: 10 INJECTION, EMULSION INTRAVENOUS at 20:09

## 2023-10-17 RX ADMIN — Medication 100 MCG: at 20:21

## 2023-10-17 RX ADMIN — LEVETIRACETAM 500 MG: 5 INJECTION INTRAVENOUS at 12:32

## 2023-10-17 RX ADMIN — CYCLOBENZAPRINE 10 MG: 10 TABLET, FILM COATED ORAL at 09:26

## 2023-10-17 RX ADMIN — Medication 100 MCG: at 20:12

## 2023-10-17 RX ADMIN — CARVEDILOL 12.5 MG: 12.5 TABLET, FILM COATED ORAL at 09:27

## 2023-10-17 RX ADMIN — TAMSULOSIN HYDROCHLORIDE 0.4 MG: 0.4 CAPSULE ORAL at 09:27

## 2023-10-17 RX ADMIN — ROCURONIUM BROMIDE 50 MG: 10 INJECTION, SOLUTION INTRAVENOUS at 20:33

## 2023-10-17 RX ADMIN — CYCLOBENZAPRINE 10 MG: 10 TABLET, FILM COATED ORAL at 15:23

## 2023-10-17 RX ADMIN — HYDROMORPHONE HYDROCHLORIDE 0.5 MG: 1 INJECTION, SOLUTION INTRAMUSCULAR; INTRAVENOUS; SUBCUTANEOUS at 22:23

## 2023-10-17 RX ADMIN — SUGAMMADEX 400 MG: 100 INJECTION, SOLUTION INTRAVENOUS at 22:09

## 2023-10-17 RX ADMIN — ONDANSETRON 4 MG: 2 INJECTION INTRAMUSCULAR; INTRAVENOUS at 21:43

## 2023-10-17 RX ADMIN — PREGABALIN 300 MG: 150 CAPSULE ORAL at 09:28

## 2023-10-17 RX ADMIN — MIDAZOLAM 2 MG: 1 INJECTION INTRAMUSCULAR; INTRAVENOUS at 20:09

## 2023-10-17 RX ADMIN — FENTANYL CITRATE 50 MCG: 50 INJECTION, SOLUTION INTRAMUSCULAR; INTRAVENOUS at 20:09

## 2023-10-17 RX ADMIN — SODIUM CHLORIDE, POTASSIUM CHLORIDE, SODIUM LACTATE AND CALCIUM CHLORIDE 100 ML/HR: 600; 310; 30; 20 INJECTION, SOLUTION INTRAVENOUS at 01:54

## 2023-10-17 RX ADMIN — HYDROMORPHONE HYDROCHLORIDE 0.6 MG: 1 INJECTION, SOLUTION INTRAMUSCULAR; INTRAVENOUS; SUBCUTANEOUS at 16:35

## 2023-10-17 RX ADMIN — FENTANYL CITRATE 50 MCG: 50 INJECTION, SOLUTION INTRAMUSCULAR; INTRAVENOUS at 21:03

## 2023-10-17 RX ADMIN — LORAZEPAM 0.5 MG: 2 INJECTION INTRAMUSCULAR; INTRAVENOUS at 12:31

## 2023-10-17 RX ADMIN — OXYCODONE HYDROCHLORIDE 10 MG: 10 TABLET ORAL at 09:30

## 2023-10-17 RX ADMIN — HYDROMORPHONE HYDROCHLORIDE 0.6 MG: 1 INJECTION, SOLUTION INTRAMUSCULAR; INTRAVENOUS; SUBCUTANEOUS at 18:48

## 2023-10-17 RX ADMIN — ESCITALOPRAM OXALATE 20 MG: 20 TABLET, FILM COATED ORAL at 09:29

## 2023-10-17 RX ADMIN — LIDOCAINE HYDROCHLORIDE 100 MG: 20 INJECTION, SOLUTION INFILTRATION; PERINEURAL at 20:09

## 2023-10-17 RX ADMIN — ALLOPURINOL 100 MG: 100 TABLET ORAL at 09:28

## 2023-10-17 RX ADMIN — ACETAMINOPHEN 650 MG: 325 TABLET ORAL at 06:08

## 2023-10-17 RX ADMIN — HYDROMORPHONE HYDROCHLORIDE 0.6 MG: 1 INJECTION, SOLUTION INTRAMUSCULAR; INTRAVENOUS; SUBCUTANEOUS at 06:08

## 2023-10-17 RX ADMIN — ASPIRIN 81 MG 81 MG: 81 TABLET ORAL at 09:29

## 2023-10-17 RX ADMIN — OXYCODONE HYDROCHLORIDE 10 MG: 10 TABLET ORAL at 01:54

## 2023-10-17 RX ADMIN — HYDROMORPHONE HYDROCHLORIDE 0.5 MG: 1 INJECTION, SOLUTION INTRAMUSCULAR; INTRAVENOUS; SUBCUTANEOUS at 23:03

## 2023-10-17 RX ADMIN — ROCURONIUM BROMIDE 50 MG: 10 INJECTION, SOLUTION INTRAVENOUS at 20:09

## 2023-10-17 RX ADMIN — Medication 100 MCG: at 20:18

## 2023-10-17 RX ADMIN — HYDROMORPHONE HYDROCHLORIDE 0.6 MG: 1 INJECTION, SOLUTION INTRAMUSCULAR; INTRAVENOUS; SUBCUTANEOUS at 03:16

## 2023-10-17 RX ADMIN — HYDROMORPHONE HYDROCHLORIDE 0.6 MG: 1 INJECTION, SOLUTION INTRAMUSCULAR; INTRAVENOUS; SUBCUTANEOUS at 12:42

## 2023-10-17 RX ADMIN — PANTOPRAZOLE SODIUM 40 MG: 40 TABLET, DELAYED RELEASE ORAL at 09:30

## 2023-10-17 RX ADMIN — HYDROMORPHONE HYDROCHLORIDE 0.6 MG: 1 INJECTION, SOLUTION INTRAMUSCULAR; INTRAVENOUS; SUBCUTANEOUS at 09:08

## 2023-10-17 RX ADMIN — Medication 100 MCG: at 21:44

## 2023-10-17 RX ADMIN — LEVETIRACETAM 500 MG: 5 INJECTION INTRAVENOUS at 00:27

## 2023-10-17 ASSESSMENT — PAIN SCALES - GENERAL
PAINLEVEL_OUTOF10: 9
PAIN_LEVEL: 2
PAINLEVEL_OUTOF10: 8
PAINLEVEL_OUTOF10: 7
PAINLEVEL_OUTOF10: 10 - WORST POSSIBLE PAIN
PAINLEVEL_OUTOF10: 9
PAINLEVEL_OUTOF10: 8
PAINLEVEL_OUTOF10: 9
PAINLEVEL_OUTOF10: 6
PAINLEVEL_OUTOF10: 9
PAINLEVEL_OUTOF10: 5 - MODERATE PAIN
PAINLEVEL_OUTOF10: 8
PAINLEVEL_OUTOF10: 0 - NO PAIN
PAINLEVEL_OUTOF10: 6
PAINLEVEL_OUTOF10: 9
PAINLEVEL_OUTOF10: 8
PAINLEVEL_OUTOF10: 9
PAINLEVEL_OUTOF10: 8
PAINLEVEL_OUTOF10: 0 - NO PAIN

## 2023-10-17 ASSESSMENT — COGNITIVE AND FUNCTIONAL STATUS - GENERAL
DRESSING REGULAR LOWER BODY CLOTHING: A LITTLE
PERSONAL GROOMING: A LITTLE
DAILY ACTIVITIY SCORE: 20
WALKING IN HOSPITAL ROOM: A LOT
MOVING TO AND FROM BED TO CHAIR: A LITTLE
CLIMB 3 TO 5 STEPS WITH RAILING: A LOT
STANDING UP FROM CHAIR USING ARMS: A LITTLE
MOVING FROM LYING ON BACK TO SITTING ON SIDE OF FLAT BED WITH BEDRAILS: A LITTLE
HELP NEEDED FOR BATHING: A LITTLE
TOILETING: A LITTLE
TURNING FROM BACK TO SIDE WHILE IN FLAT BAD: A LITTLE
MOBILITY SCORE: 16

## 2023-10-17 ASSESSMENT — PAIN - FUNCTIONAL ASSESSMENT
PAIN_FUNCTIONAL_ASSESSMENT: 0-10

## 2023-10-17 ASSESSMENT — PAIN DESCRIPTION - DESCRIPTORS
DESCRIPTORS: ACHING

## 2023-10-17 NOTE — CARE PLAN
The patient's goals for the shift include No pain    The clinical goals for the shift include no pain    Over the shift, the patient did not make progress toward the following goals. Barriers to progression include ***. Recommendations to address these barriers include ***.

## 2023-10-17 NOTE — PROGRESS NOTES
"Gm Thrasher is a 58 y.o. male on day 7 of admission presenting with Syncope, unspecified syncope type.    Subjective   Patient was seen at the bedside in the a.m.  Endorses right leg pain and fragmented sleep but otherwise no issues overnight.  Did have a bowel movement yesterday.  Looking forward to having his procedure done so he can get out of bed.  No chest pain, shortness of breath, dizziness lightheadedness.  No other questions at this time       Objective     Physical Exam  Vitals reviewed.   Constitutional:       General: He is not in acute distress.     Appearance: Normal appearance. He is not ill-appearing.   HENT:      Mouth/Throat:      Mouth: Mucous membranes are moist.      Pharynx: Oropharynx is clear.   Eyes:      General: No scleral icterus.     Conjunctiva/sclera: Conjunctivae normal.   Cardiovascular:      Rate and Rhythm: Normal rate and regular rhythm.      Pulses: Normal pulses.      Heart sounds: Normal heart sounds.   Pulmonary:      Effort: Pulmonary effort is normal.      Breath sounds: Normal breath sounds.   Abdominal:      General: Abdomen is flat.      Palpations: Abdomen is soft.   Musculoskeletal:         General: Tenderness present.      Left lower leg: No edema.      Comments: Right lower extremity wrapped in bandage and splint.  Right ankle is tender.   Skin:     General: Skin is warm and dry.      Capillary Refill: Capillary refill takes less than 2 seconds.   Neurological:      General: No focal deficit present.      Mental Status: He is alert and oriented to person, place, and time.         Last Recorded Vitals  Blood pressure 135/82, pulse 61, temperature 36.4 °C (97.5 °F), temperature source Temporal, resp. rate 17, height 1.8 m (5' 10.87\"), weight 115 kg (253 lb 11.2 oz), SpO2 95 %.  Intake/Output last 3 Shifts:  I/O last 3 completed shifts:  In: 677.8 (5.9 mL/kg) [P.O.:580; I.V.:97.8 (0.8 mL/kg)]  Out: 3425 (29.8 mL/kg) [Urine:3425 (0.8 mL/kg/hr)]  Weight: 115.1 kg "     Relevant Results           This patient currently has cardiac telemetry ordered; if you would like to modify or discontinue the telemetry order, click here to go to the orders activity to modify/discontinue the order.  Scheduled medications  acetaminophen, 650 mg, oral, 4x daily  allopurinol, 100 mg, oral, Daily  aspirin, 81 mg, oral, Daily  atorvastatin, 80 mg, oral, Daily  carvedilol, 12.5 mg, oral, BID  [Held by provider] clopidogrel, 75 mg, oral, Daily  cyclobenzaprine, 10 mg, oral, TID  escitalopram, 20 mg, oral, Daily  glycerin, 1 suppository, rectal, Once  [Held by provider] insulin lispro, 0-5 Units, subcutaneous, q4h  levETIRAcetam, 500 mg, intravenous, q12h  melatonin, 5 mg, oral, Nightly  pantoprazole, 40 mg, oral, Daily before breakfast  perflutren lipid microspheres, 3 mL of dilution, intravenous, Once in imaging  polyethylene glycol, 17 g, oral, Daily  pregabalin, 300 mg, oral, BID  sennosides, 1 tablet, oral, BID  sulfur hexafluoride microsphr, 2 mL, intravenous, Once in imaging  tamsulosin, 0.4 mg, oral, Daily      Continuous medications  lactated Ringer's, 100 mL/hr, Last Rate: 100 mL/hr (10/17/23 0255)      PRN medications  PRN medications: docusate sodium, HYDROmorphone, HYDROmorphone, hydrOXYzine HCL, oxyCODONE, sodium phosphates  Results for orders placed or performed during the hospital encounter of 10/10/23 (from the past 24 hour(s))   Protime-INR   Result Value Ref Range    Protime 13.1 (H) 9.8 - 12.8 seconds    INR 1.2 (H) 0.9 - 1.1   CBC   Result Value Ref Range    WBC 6.2 4.4 - 11.3 x10*3/uL    nRBC 0.0 0.0 - 0.0 /100 WBCs    RBC 3.62 (L) 4.50 - 5.90 x10*6/uL    Hemoglobin 10.9 (L) 13.5 - 17.5 g/dL    Hematocrit 33.1 (L) 41.0 - 52.0 %    MCV 91 80 - 100 fL    MCH 30.1 26.0 - 34.0 pg    MCHC 32.9 32.0 - 36.0 g/dL    RDW 16.2 (H) 11.5 - 14.5 %    Platelets 320 150 - 450 x10*3/uL    MPV 9.2 7.5 - 11.5 fL   Renal function panel   Result Value Ref Range    Glucose 92 74 - 99 mg/dL    Sodium  135 (L) 136 - 145 mmol/L    Potassium 4.2 3.5 - 5.3 mmol/L    Chloride 99 98 - 107 mmol/L    Bicarbonate 29 21 - 32 mmol/L    Anion Gap 11 10 - 20 mmol/L    Urea Nitrogen 7 6 - 23 mg/dL    Creatinine 0.90 0.50 - 1.30 mg/dL    eGFR >90 >60 mL/min/1.73m*2    Calcium 8.6 8.6 - 10.3 mg/dL    Phosphorus 4.7 2.5 - 4.9 mg/dL    Albumin 3.0 (L) 3.4 - 5.0 g/dL   Magnesium   Result Value Ref Range    Magnesium 1.78 1.60 - 2.40 mg/dL                    Assessment/Plan   Principal Problem:    Syncope, unspecified syncope type  Active Problems:    H/O fracture of ankle    NSTEMI (non-ST elevated myocardial infarction) (CMS/[facility])    Gm is a 58-year old male with a past medical history of MDD, T2 DM, BPH, hypertension, hyperlipidemia, JEMAL, chronic pain in neck and back, lumbar radiculopathy, and heroin use who was transferred from [facility] in the setting of recent lumbar surgery with subarachnoid hemorrhage/subdural hematoma for syncope and ankle pain secondary to the syncope. Upon evaluation, he was found to have a new fracture of the right distal tibia and fibula, potential retroperitoneal abscess on MRI, elevated troponin, elevated CK. Patient was admitted for management of his fracture, syncope etiology, and elevated troponin that proved to be an NSTEMI.  Surgery scheduled for today, 10/17.  To resume dual antiplatelet therapy after surgery.  Transition back to oral Keppra after surgery as well.  Plan for PT/OT and plan safe dispo following surgery.     #Displaced Distal right tibia and comminuted distal right fibula fracture.   -Repeated x-ray showing displaced and comminuted fracture of the distal tibia diaphysis and distal fibula diaphysis.   -As per discussion with cardiology and orthopedics, will hold dual antiplatelet therapy for procedure and continue tonight after surgery.  ----> As per orthopedic surgery patient will go to the operating room for ORIF on Tuesday, 10/17/2023  -Pain control with scheduled  Tylenol and oxycodone with PRN Dilaudid for severe pain.   -Continue Flexeril  -Continue bowel regimen consisting of Miralax/senokot  -NPO today until surgery  -PT OT after surgery     #NSTEMI  -initially Troponinemia, without chest pain or EKG changes, now s/p cardiac catheterization (diagnostic)  -Diagnostic cardiac catheterization 10/11/2023 which demonstrated a proximal RCA stenosis of 90% with interventional plan pending  -Echocardiogram 10/10 revealed EF of 50-55%, no regional wall motion abnormalities  -Continue aspirin 81mg  -Continue Atorvastatin 80 daily  -neurosurgery and orthopedics which both state DAPT can be started, this should not interrupt surgery and a CT head will need to be repeated 24 hour after starting DAPT.  Neurosurgery also okay with heparin bolus during PCI.  -S/p PCI with stent placement to RCA 10/13/2023  --> Continue to monitor patient's mental status while on DAPT  -Continue dual antiplatelet therapy this p.m. after surgery, will continue to have evening doses.     #Vasovagal Syncope  -Patient states syncopal event occurred 2/2 to pain but also endorsed poor P.O. intake.   -Trend CMP, CBC. Previous labs from outside facility yesterday and  illustrated no obvious metabolic or toxicological etiology.   -Telemetry continued, no malignant arrythmias seen on tele since admission  -TTE revealed EF of 50 to 55%, no structural heart disease     #Less likely Retroperitoneal Abscess Vs. Likely Postsurgical Fluid Collection  -[place] [facility] [place] reviewed MRI and assessed that the findings on MRI were consistent with normal post-operative changes rather than an abscess  -Wound dry, intact, no pain with palpation.   -Infectious Disease following, continue to appreciate recs.   ---> ID had a discussion with neurosurgery deemed a low likelihood of abscess more likely fluid collection postsurgical and noninfectious.  ----> As per ID discontinued cefepime and  vancomycin (10/11)  -Admission blood cultures from 10/10 NGTD     #Recent lumbar fusion(9/29/2023) c/b   - Post op SAH/SDH (10/3), SAH resolved on imaging at OSH, SDH persists   - Intraoperative dural tear s/p dural patch  -On keppra for seizure prophylaxis due to bleed during prior admission and on discharge, will address with neurosurgery necessity and length of treatment.  -IV Keppra today while n.p.o. for surgery.  Transition back to oral Keppra after surgery  -CT head 10/13/2023 negative for any acute bleeds  -Continue to monitor neuro status while on DAPT.     #Primary HTN:  -Patient takes no medication for this at home  -Continue Coreg 12.5mg      #Diabetes Type 2: Known history, with recent A1c subdiabetic  -A1c 5.7  -Hold home metformin at this time  -No insulin at this time as patient's glucose has been ranging ,  -Continue POCT glucose checks     #Gout: Known hx  -Continue home allopurinol     #BPH: known hx  -Will continue Tamsulosin 0.4 mg daily      IVF: None.   Diet: Cardiac, NPO at midnight  DVT Prophylaxis: Heparin subcutaneous.   Consults: Cardiology, Neurosurgery, orthopedic surgery.   Dispo: Anticipate additional 1-2 midnights hospital stay pending PT/OT evaluation after surgery     Code Status: Full Code           Patient to be discussed with attending physician     Mark Corrigan DO  PGY-2 Family Medicine

## 2023-10-17 NOTE — INTERVAL H&P NOTE
H&P reviewed. The patient was examined and there are no changes to the H&P.    Plan for ORIF/intramedullary fixation for distal tibia fracture with possible external fixation    Risks benefits and alternatives to surgery were discussed including but not limited to Infection, bleeding, neurovascular injury, pain and dysfunction, hardware related complications including cutout failure breakage, loss of function, motion, and permanent disability as well as the cardiovascular and pulmonary complications from anesthesia including death and DVT. Patient and family accept these risks.We discussed specifically  Wound healing complication including dehiscence infection failure, osteomyelitis, hardware complication can cut out, failure, breakage, malunion, nonunion and the potential need for future revision surgeries including hardware removal or staged procedures.  Discussed pacifically with his ongoing anticoagulation the higher risk for wound healing complications as well as potential need for staged surgeries.  He accepts the overall plan

## 2023-10-17 NOTE — CARE PLAN
The patient's goals for the shift include No pain    The clinical goals for the shift include pain will be decreased    Over the shift, the patient did not make progress toward the following goals. Barriers to progression include . Recommendations to address these barriers include .

## 2023-10-17 NOTE — ANESTHESIA PREPROCEDURE EVALUATION
Patient: Gm Thrasher    Procedure Information       Date/Time: 10/17/23 1555    Procedure: Insertion Intramedullary RIGHT Nail Tibia   WOULD LIKE TO FOLLOW SELF (Right: Leg Lower)    [facility]    Surgeons: Vic Hale MD            Relevant Problems   Cardiovascular   (+) NSTEMI (non-ST elevated myocardial infarction) (CMS/HCC)      Neuro/Psych   (+) Left lumbar radiculopathy      Pulmonary   (+) Emphysema lung (CMS/HCC)      Musculoskeletal   (+) Degenerative lumbar spinal stenosis   (+) Herniation of lumbar intervertebral disc without myelopathy   (+) Lumbar canal stenosis   (+) Lumbar spondylosis       Clinical information reviewed:    Allergies  Meds  Problems              NPO Detail:  No data recorded     Physical Exam    Airway  Mallampati: III  TM distance: >3 FB     Cardiovascular   Rhythm: regular  Rate: normal     Dental   (+) upper dentures     Pulmonary - normal exam     Abdominal   Abdomen: soft             Anesthesia Plan    ASA 3     general     intravenous induction   Postoperative administration of opioids is intended.  Anesthetic plan and risks discussed with patient.    Plan discussed with CRNA.

## 2023-10-18 ENCOUNTER — APPOINTMENT (OUTPATIENT)
Dept: NEUROSURGERY | Facility: CLINIC | Age: 58
End: 2023-10-18
Payer: COMMERCIAL

## 2023-10-18 LAB
ALBUMIN SERPL BCP-MCNC: 2.9 G/DL (ref 3.4–5)
ANION GAP SERPL CALC-SCNC: 9 MMOL/L (ref 10–20)
BUN SERPL-MCNC: 8 MG/DL (ref 6–23)
CALCIUM SERPL-MCNC: 8.3 MG/DL (ref 8.6–10.3)
CHLORIDE SERPL-SCNC: 98 MMOL/L (ref 98–107)
CO2 SERPL-SCNC: 32 MMOL/L (ref 21–32)
CREAT SERPL-MCNC: 0.87 MG/DL (ref 0.5–1.3)
ERYTHROCYTE [DISTWIDTH] IN BLOOD BY AUTOMATED COUNT: 16.2 % (ref 11.5–14.5)
ERYTHROCYTE [DISTWIDTH] IN BLOOD BY AUTOMATED COUNT: 16.2 % (ref 11.5–14.5)
ERYTHROCYTE [DISTWIDTH] IN BLOOD BY AUTOMATED COUNT: 16.3 % (ref 11.5–14.5)
GFR SERPL CREATININE-BSD FRML MDRD: >90 ML/MIN/1.73M*2
GLUCOSE BLD MANUAL STRIP-MCNC: 105 MG/DL (ref 74–99)
GLUCOSE SERPL-MCNC: 100 MG/DL (ref 74–99)
HCT VFR BLD AUTO: 30.7 % (ref 41–52)
HCT VFR BLD AUTO: 31.3 % (ref 41–52)
HCT VFR BLD AUTO: 32.8 % (ref 41–52)
HGB BLD-MCNC: 10.4 G/DL (ref 13.5–17.5)
HGB BLD-MCNC: 9.8 G/DL (ref 13.5–17.5)
HGB BLD-MCNC: 9.9 G/DL (ref 13.5–17.5)
MAGNESIUM SERPL-MCNC: 1.72 MG/DL (ref 1.6–2.4)
MCH RBC QN AUTO: 29.3 PG (ref 26–34)
MCH RBC QN AUTO: 29.5 PG (ref 26–34)
MCH RBC QN AUTO: 29.7 PG (ref 26–34)
MCHC RBC AUTO-ENTMCNC: 31.3 G/DL (ref 32–36)
MCHC RBC AUTO-ENTMCNC: 31.7 G/DL (ref 32–36)
MCHC RBC AUTO-ENTMCNC: 32.2 G/DL (ref 32–36)
MCV RBC AUTO: 92 FL (ref 80–100)
MCV RBC AUTO: 93 FL (ref 80–100)
MCV RBC AUTO: 93 FL (ref 80–100)
NRBC BLD-RTO: 0 /100 WBCS (ref 0–0)
PHOSPHATE SERPL-MCNC: 5.1 MG/DL (ref 2.5–4.9)
PLATELET # BLD AUTO: 314 X10*3/UL (ref 150–450)
PLATELET # BLD AUTO: 318 X10*3/UL (ref 150–450)
PLATELET # BLD AUTO: 322 X10*3/UL (ref 150–450)
PMV BLD AUTO: 9.2 FL (ref 7.5–11.5)
PMV BLD AUTO: 9.2 FL (ref 7.5–11.5)
PMV BLD AUTO: 9.4 FL (ref 7.5–11.5)
POTASSIUM SERPL-SCNC: 4.1 MMOL/L (ref 3.5–5.3)
RBC # BLD AUTO: 3.33 X10*6/UL (ref 4.5–5.9)
RBC # BLD AUTO: 3.35 X10*6/UL (ref 4.5–5.9)
RBC # BLD AUTO: 3.52 X10*6/UL (ref 4.5–5.9)
SODIUM SERPL-SCNC: 135 MMOL/L (ref 136–145)
WBC # BLD AUTO: 10.6 X10*3/UL (ref 4.4–11.3)
WBC # BLD AUTO: 7.7 X10*3/UL (ref 4.4–11.3)
WBC # BLD AUTO: 8.1 X10*3/UL (ref 4.4–11.3)

## 2023-10-18 PROCEDURE — 2500000001 HC RX 250 WO HCPCS SELF ADMINISTERED DRUGS (ALT 637 FOR MEDICARE OP)

## 2023-10-18 PROCEDURE — 97530 THERAPEUTIC ACTIVITIES: CPT | Mod: GP | Performed by: PHYSICAL THERAPIST

## 2023-10-18 PROCEDURE — 83735 ASSAY OF MAGNESIUM: CPT

## 2023-10-18 PROCEDURE — 2500000001 HC RX 250 WO HCPCS SELF ADMINISTERED DRUGS (ALT 637 FOR MEDICARE OP): Performed by: ORTHOPAEDIC SURGERY

## 2023-10-18 PROCEDURE — 96372 THER/PROPH/DIAG INJ SC/IM: CPT | Performed by: ORTHOPAEDIC SURGERY

## 2023-10-18 PROCEDURE — 36415 COLL VENOUS BLD VENIPUNCTURE: CPT

## 2023-10-18 PROCEDURE — 2500000001 HC RX 250 WO HCPCS SELF ADMINISTERED DRUGS (ALT 637 FOR MEDICARE OP): Performed by: INTERNAL MEDICINE

## 2023-10-18 PROCEDURE — 97166 OT EVAL MOD COMPLEX 45 MIN: CPT | Mod: GO

## 2023-10-18 PROCEDURE — 97162 PT EVAL MOD COMPLEX 30 MIN: CPT | Mod: GP | Performed by: PHYSICAL THERAPIST

## 2023-10-18 PROCEDURE — 99231 SBSQ HOSP IP/OBS SF/LOW 25: CPT | Performed by: PHYSICIAN ASSISTANT

## 2023-10-18 PROCEDURE — 1200000002 HC GENERAL ROOM WITH TELEMETRY DAILY

## 2023-10-18 PROCEDURE — 85027 COMPLETE CBC AUTOMATED: CPT | Performed by: INTERNAL MEDICINE

## 2023-10-18 PROCEDURE — 2500000004 HC RX 250 GENERAL PHARMACY W/ HCPCS (ALT 636 FOR OP/ED): Performed by: INTERNAL MEDICINE

## 2023-10-18 PROCEDURE — 80069 RENAL FUNCTION PANEL: CPT

## 2023-10-18 PROCEDURE — 2500000004 HC RX 250 GENERAL PHARMACY W/ HCPCS (ALT 636 FOR OP/ED): Performed by: ORTHOPAEDIC SURGERY

## 2023-10-18 PROCEDURE — 2500000004 HC RX 250 GENERAL PHARMACY W/ HCPCS (ALT 636 FOR OP/ED)

## 2023-10-18 PROCEDURE — 82947 ASSAY GLUCOSE BLOOD QUANT: CPT

## 2023-10-18 PROCEDURE — 85027 COMPLETE CBC AUTOMATED: CPT

## 2023-10-18 PROCEDURE — 99232 SBSQ HOSP IP/OBS MODERATE 35: CPT

## 2023-10-18 PROCEDURE — 2500000004 HC RX 250 GENERAL PHARMACY W/ HCPCS (ALT 636 FOR OP/ED): Performed by: STUDENT IN AN ORGANIZED HEALTH CARE EDUCATION/TRAINING PROGRAM

## 2023-10-18 RX ORDER — DOCUSATE SODIUM 100 MG/1
100 CAPSULE, LIQUID FILLED ORAL 2 TIMES DAILY
Status: DISCONTINUED | OUTPATIENT
Start: 2023-10-18 | End: 2023-10-20 | Stop reason: HOSPADM

## 2023-10-18 RX ORDER — PROCHLORPERAZINE 25 MG/1
25 SUPPOSITORY RECTAL EVERY 12 HOURS PRN
Status: DISCONTINUED | OUTPATIENT
Start: 2023-10-18 | End: 2023-10-20 | Stop reason: HOSPADM

## 2023-10-18 RX ORDER — OXYCODONE HYDROCHLORIDE 10 MG/1
10 TABLET ORAL EVERY 6 HOURS PRN
Status: DISCONTINUED | OUTPATIENT
Start: 2023-10-18 | End: 2023-10-19

## 2023-10-18 RX ORDER — HYDROMORPHONE HYDROCHLORIDE 1 MG/ML
1 INJECTION, SOLUTION INTRAMUSCULAR; INTRAVENOUS; SUBCUTANEOUS EVERY 4 HOURS PRN
Status: DISCONTINUED | OUTPATIENT
Start: 2023-10-18 | End: 2023-10-19

## 2023-10-18 RX ORDER — MORPHINE SULFATE 2 MG/ML
2 INJECTION, SOLUTION INTRAMUSCULAR; INTRAVENOUS EVERY 2 HOUR PRN
Status: DISCONTINUED | OUTPATIENT
Start: 2023-10-18 | End: 2023-10-18

## 2023-10-18 RX ORDER — DIPHENHYDRAMINE HYDROCHLORIDE 50 MG/ML
12.5 INJECTION INTRAMUSCULAR; INTRAVENOUS EVERY 6 HOURS PRN
Status: DISCONTINUED | OUTPATIENT
Start: 2023-10-18 | End: 2023-10-19

## 2023-10-18 RX ORDER — BISACODYL 5 MG
10 TABLET, DELAYED RELEASE (ENTERIC COATED) ORAL DAILY PRN
Status: DISCONTINUED | OUTPATIENT
Start: 2023-10-18 | End: 2023-10-20 | Stop reason: HOSPADM

## 2023-10-18 RX ORDER — OXYCODONE HYDROCHLORIDE 10 MG/1
10 TABLET ORAL EVERY 6 HOURS PRN
Status: DISCONTINUED | OUTPATIENT
Start: 2023-10-18 | End: 2023-10-18

## 2023-10-18 RX ORDER — CEFAZOLIN SODIUM 2 G/100ML
2 INJECTION, SOLUTION INTRAVENOUS EVERY 8 HOURS
Status: COMPLETED | OUTPATIENT
Start: 2023-10-18 | End: 2023-10-18

## 2023-10-18 RX ORDER — CEFAZOLIN SODIUM 2 G/100ML
2 INJECTION, SOLUTION INTRAVENOUS
Status: DISCONTINUED | OUTPATIENT
Start: 2023-10-18 | End: 2023-10-18

## 2023-10-18 RX ORDER — SODIUM CHLORIDE, SODIUM LACTATE, POTASSIUM CHLORIDE, CALCIUM CHLORIDE 600; 310; 30; 20 MG/100ML; MG/100ML; MG/100ML; MG/100ML
50 INJECTION, SOLUTION INTRAVENOUS CONTINUOUS
Status: ACTIVE | OUTPATIENT
Start: 2023-10-18 | End: 2023-10-19

## 2023-10-18 RX ORDER — ENOXAPARIN SODIUM 100 MG/ML
40 INJECTION SUBCUTANEOUS DAILY
Status: DISCONTINUED | OUTPATIENT
Start: 2023-10-18 | End: 2023-10-20 | Stop reason: HOSPADM

## 2023-10-18 RX ORDER — PROCHLORPERAZINE MALEATE 10 MG
10 TABLET ORAL EVERY 6 HOURS PRN
Status: DISCONTINUED | OUTPATIENT
Start: 2023-10-18 | End: 2023-10-20 | Stop reason: HOSPADM

## 2023-10-18 RX ORDER — NALOXONE HYDROCHLORIDE 0.4 MG/ML
0.2 INJECTION, SOLUTION INTRAMUSCULAR; INTRAVENOUS; SUBCUTANEOUS EVERY 5 MIN PRN
Status: DISCONTINUED | OUTPATIENT
Start: 2023-10-18 | End: 2023-10-20 | Stop reason: HOSPADM

## 2023-10-18 RX ORDER — PROCHLORPERAZINE EDISYLATE 5 MG/ML
10 INJECTION INTRAMUSCULAR; INTRAVENOUS EVERY 6 HOURS PRN
Status: DISCONTINUED | OUTPATIENT
Start: 2023-10-18 | End: 2023-10-20 | Stop reason: HOSPADM

## 2023-10-18 RX ADMIN — SENNOSIDES 8.6 MG: 8.6 TABLET, FILM COATED ORAL at 09:37

## 2023-10-18 RX ADMIN — PANTOPRAZOLE SODIUM 40 MG: 40 TABLET, DELAYED RELEASE ORAL at 09:37

## 2023-10-18 RX ADMIN — HYDROMORPHONE HYDROCHLORIDE 1 MG: 1 INJECTION, SOLUTION INTRAMUSCULAR; INTRAVENOUS; SUBCUTANEOUS at 13:28

## 2023-10-18 RX ADMIN — ACETAMINOPHEN 650 MG: 325 TABLET ORAL at 20:19

## 2023-10-18 RX ADMIN — CYCLOBENZAPRINE 10 MG: 10 TABLET, FILM COATED ORAL at 09:36

## 2023-10-18 RX ADMIN — CYCLOBENZAPRINE 10 MG: 10 TABLET, FILM COATED ORAL at 20:22

## 2023-10-18 RX ADMIN — CYCLOBENZAPRINE 10 MG: 10 TABLET, FILM COATED ORAL at 14:52

## 2023-10-18 RX ADMIN — CEFAZOLIN SODIUM 2 G: 2 INJECTION, SOLUTION INTRAVENOUS at 16:24

## 2023-10-18 RX ADMIN — SENNOSIDES 8.6 MG: 8.6 TABLET, FILM COATED ORAL at 20:19

## 2023-10-18 RX ADMIN — ACETAMINOPHEN 650 MG: 325 TABLET ORAL at 00:42

## 2023-10-18 RX ADMIN — ASPIRIN 81 MG 81 MG: 81 TABLET ORAL at 09:37

## 2023-10-18 RX ADMIN — LEVETIRACETAM 500 MG: 500 TABLET, FILM COATED ORAL at 09:37

## 2023-10-18 RX ADMIN — HYDROMORPHONE HYDROCHLORIDE 1 MG: 1 INJECTION, SOLUTION INTRAMUSCULAR; INTRAVENOUS; SUBCUTANEOUS at 18:10

## 2023-10-18 RX ADMIN — ENOXAPARIN SODIUM 40 MG: 40 INJECTION SUBCUTANEOUS at 11:16

## 2023-10-18 RX ADMIN — CYCLOBENZAPRINE 10 MG: 10 TABLET, FILM COATED ORAL at 00:52

## 2023-10-18 RX ADMIN — PREGABALIN 300 MG: 150 CAPSULE ORAL at 09:36

## 2023-10-18 RX ADMIN — CLOPIDOGREL BISULFATE 75 MG: 75 TABLET ORAL at 09:36

## 2023-10-18 RX ADMIN — ALLOPURINOL 100 MG: 100 TABLET ORAL at 09:37

## 2023-10-18 RX ADMIN — OXYCODONE HYDROCHLORIDE 10 MG: 10 TABLET ORAL at 08:08

## 2023-10-18 RX ADMIN — CARVEDILOL 12.5 MG: 12.5 TABLET, FILM COATED ORAL at 09:36

## 2023-10-18 RX ADMIN — ATORVASTATIN CALCIUM 80 MG: 80 TABLET, FILM COATED ORAL at 00:42

## 2023-10-18 RX ADMIN — MORPHINE SULFATE 2 MG: 2 INJECTION, SOLUTION INTRAMUSCULAR; INTRAVENOUS at 11:13

## 2023-10-18 RX ADMIN — SENNOSIDES 8.6 MG: 8.6 TABLET, FILM COATED ORAL at 00:42

## 2023-10-18 RX ADMIN — OXYCODONE HYDROCHLORIDE 10 MG: 10 TABLET ORAL at 01:42

## 2023-10-18 RX ADMIN — SODIUM CHLORIDE, POTASSIUM CHLORIDE, SODIUM LACTATE AND CALCIUM CHLORIDE 50 ML/HR: 600; 310; 30; 20 INJECTION, SOLUTION INTRAVENOUS at 22:49

## 2023-10-18 RX ADMIN — DOCUSATE SODIUM 100 MG: 100 CAPSULE, LIQUID FILLED ORAL at 20:20

## 2023-10-18 RX ADMIN — PREGABALIN 300 MG: 150 CAPSULE ORAL at 20:20

## 2023-10-18 RX ADMIN — ACETAMINOPHEN 650 MG: 325 TABLET ORAL at 16:24

## 2023-10-18 RX ADMIN — CEFAZOLIN SODIUM 2 G: 2 INJECTION, SOLUTION INTRAVENOUS at 09:35

## 2023-10-18 RX ADMIN — SODIUM CHLORIDE, POTASSIUM CHLORIDE, SODIUM LACTATE AND CALCIUM CHLORIDE 50 ML/HR: 600; 310; 30; 20 INJECTION, SOLUTION INTRAVENOUS at 09:35

## 2023-10-18 RX ADMIN — LEVETIRACETAM 500 MG: 500 TABLET, FILM COATED ORAL at 20:20

## 2023-10-18 RX ADMIN — Medication 2 L/MIN: at 08:00

## 2023-10-18 RX ADMIN — HYDROMORPHONE HYDROCHLORIDE 0.6 MG: 1 INJECTION, SOLUTION INTRAMUSCULAR; INTRAVENOUS; SUBCUTANEOUS at 04:25

## 2023-10-18 RX ADMIN — HYDROMORPHONE HYDROCHLORIDE 0.6 MG: 1 INJECTION, SOLUTION INTRAMUSCULAR; INTRAVENOUS; SUBCUTANEOUS at 09:33

## 2023-10-18 RX ADMIN — ESCITALOPRAM OXALATE 20 MG: 20 TABLET, FILM COATED ORAL at 09:37

## 2023-10-18 RX ADMIN — Medication 5 MG: at 00:43

## 2023-10-18 RX ADMIN — Medication 5 MG: at 20:20

## 2023-10-18 RX ADMIN — TAMSULOSIN HYDROCHLORIDE 0.4 MG: 0.4 CAPSULE ORAL at 09:36

## 2023-10-18 RX ADMIN — ATORVASTATIN CALCIUM 80 MG: 80 TABLET, FILM COATED ORAL at 20:22

## 2023-10-18 RX ADMIN — OXYCODONE HYDROCHLORIDE 10 MG: 10 TABLET ORAL at 14:53

## 2023-10-18 RX ADMIN — PREGABALIN 300 MG: 150 CAPSULE ORAL at 00:43

## 2023-10-18 RX ADMIN — ACETAMINOPHEN 650 MG: 325 TABLET ORAL at 06:06

## 2023-10-18 SDOH — ECONOMIC STABILITY: FOOD INSECURITY: WITHIN THE PAST 12 MONTHS, YOU WORRIED THAT YOUR FOOD WOULD RUN OUT BEFORE YOU GOT MONEY TO BUY MORE.: NEVER TRUE

## 2023-10-18 SDOH — ECONOMIC STABILITY: FOOD INSECURITY: WITHIN THE PAST 12 MONTHS, THE FOOD YOU BOUGHT JUST DIDN'T LAST AND YOU DIDN'T HAVE MONEY TO GET MORE.: NEVER TRUE

## 2023-10-18 SDOH — ECONOMIC STABILITY: INCOME INSECURITY: IN THE PAST 12 MONTHS, HAS THE ELECTRIC, GAS, OIL, OR WATER COMPANY THREATENED TO SHUT OFF SERVICE IN YOUR HOME?: NO

## 2023-10-18 ASSESSMENT — COGNITIVE AND FUNCTIONAL STATUS - GENERAL
MOVING FROM LYING ON BACK TO SITTING ON SIDE OF FLAT BED WITH BEDRAILS: A LITTLE
DRESSING REGULAR UPPER BODY CLOTHING: A LOT
HELP NEEDED FOR BATHING: A LOT
DAILY ACTIVITIY SCORE: 15
WALKING IN HOSPITAL ROOM: TOTAL
CLIMB 3 TO 5 STEPS WITH RAILING: TOTAL
HELP NEEDED FOR BATHING: A LITTLE
MOVING FROM LYING ON BACK TO SITTING ON SIDE OF FLAT BED WITH BEDRAILS: A LITTLE
MOVING FROM LYING ON BACK TO SITTING ON SIDE OF FLAT BED WITH BEDRAILS: A LITTLE
TOILETING: A LOT
MOVING TO AND FROM BED TO CHAIR: A LITTLE
WALKING IN HOSPITAL ROOM: TOTAL
DRESSING REGULAR UPPER BODY CLOTHING: A LITTLE
DRESSING REGULAR LOWER BODY CLOTHING: TOTAL
HELP NEEDED FOR BATHING: A LOT
MOVING TO AND FROM BED TO CHAIR: TOTAL
WALKING IN HOSPITAL ROOM: TOTAL
MOBILITY SCORE: 14
DRESSING REGULAR LOWER BODY CLOTHING: A LOT
CLIMB 3 TO 5 STEPS WITH RAILING: TOTAL
TOILETING: A LOT
STANDING UP FROM CHAIR USING ARMS: A LOT
DRESSING REGULAR UPPER BODY CLOTHING: A LOT
TOILETING: A LITTLE
TURNING FROM BACK TO SIDE WHILE IN FLAT BAD: A LITTLE
DAILY ACTIVITIY SCORE: 16
CLIMB 3 TO 5 STEPS WITH RAILING: TOTAL
DRESSING REGULAR LOWER BODY CLOTHING: A LITTLE
MOBILITY SCORE: 10
TURNING FROM BACK TO SIDE WHILE IN FLAT BAD: A LITTLE
DAILY ACTIVITIY SCORE: 19
TURNING FROM BACK TO SIDE WHILE IN FLAT BAD: A LITTLE
STANDING UP FROM CHAIR USING ARMS: A LITTLE
PERSONAL GROOMING: A LITTLE
MOVING TO AND FROM BED TO CHAIR: A LOT
STANDING UP FROM CHAIR USING ARMS: TOTAL
MOBILITY SCORE: 12

## 2023-10-18 ASSESSMENT — PAIN SCALES - GENERAL
PAINLEVEL_OUTOF10: 9
PAINLEVEL_OUTOF10: 10 - WORST POSSIBLE PAIN
PAINLEVEL_OUTOF10: 9
PAINLEVEL_OUTOF10: 6
PAINLEVEL_OUTOF10: 9
PAINLEVEL_OUTOF10: 10 - WORST POSSIBLE PAIN
PAINLEVEL_OUTOF10: 8
PAINLEVEL_OUTOF10: 10 - WORST POSSIBLE PAIN
PAINLEVEL_OUTOF10: 9
PAINLEVEL_OUTOF10: 5 - MODERATE PAIN
PAINLEVEL_OUTOF10: 9
PAINLEVEL_OUTOF10: 9

## 2023-10-18 ASSESSMENT — PAIN - FUNCTIONAL ASSESSMENT
PAIN_FUNCTIONAL_ASSESSMENT: 0-10
PAIN_FUNCTIONAL_ASSESSMENT: 0-10

## 2023-10-18 ASSESSMENT — ACTIVITIES OF DAILY LIVING (ADL)
LACK_OF_TRANSPORTATION: NO
ADL_ASSISTANCE: INDEPENDENT

## 2023-10-18 NOTE — CARE PLAN
The patient's goals for the shift include No pain      Problem: Discharge Planning  Goal: Discharge to home or other facility with appropriate resources  Outcome: Progressing     Problem: Chronic Conditions and Co-morbidities  Goal: Patient's chronic conditions and co-morbidity symptoms are monitored and maintained or improved  Outcome: Progressing     Problem: Pain  Goal: My pain/discomfort is manageable  Outcome: Progressing     Problem: Pain  Goal: Takes deep breaths with improved pain control throughout the shift  Outcome: Progressing  Goal: Turns in bed with improved pain control throughout the shift  Outcome: Progressing     Problem: Skin  Goal: Promote skin healing  Outcome: Progressing  Flowsheets (Taken 10/12/2023 0310 by Erick Moralez RN)  Promote skin healing:   Assess skin/pad under line(s)/device(s)   Ensure correct size (line/device) and apply per  instructions   Protective dressings over bony prominences   Rotate device position/do not position patient on device   Turn/reposition every 2 hours/use positioning/transfer devices  Goal: Prevent/minimize sheer/friction injuries  Outcome: Progressing  Flowsheets (Taken 10/14/2023 2046 by Betina Dumont RN)  Prevent/minimize sheer/friction injuries:   HOB 30 degrees or less   Turn/reposition every 2 hours/use positioning/transfer devices

## 2023-10-18 NOTE — PROGRESS NOTES
Occupational Therapy    Occupational Therapy    Evaluation/Treatment    Patient Name: Gm Thrasher  MRN: [id number]  : 1965  Today's Date: 10/18/23  Time Calculation  Start Time: 1033  Stop Time: 1050  Time Calculation (min): 17 min       Assessment:  OT Assessment:  (Pt. is limited  by pain.Benefits from moderate intensity therapy to increase safety and independence in ADLs and mobility to return to PLOF)  Prognosis: Fair  Barriers to Discharge:  (NWB right LE)  Evaluation/Treatment Tolerance: Patient limited by pain  End of Session Communication: Bedside nurse  End of Session Patient Position: Bed, 3 rail up, Alarm on  OT Assessment Results: Decreased ADL status, Decreased safe judgment during ADL, Decreased functional mobility  Prognosis: Fair  Barriers to Discharge:  (NWB right LE)  Evaluation/Treatment Tolerance: Patient limited by pain    Plan:  Treatment Interventions: ADL retraining, Functional transfer training  OT Frequency: 5 times per week  OT Discharge Recommendations: Moderate intensity level of continued care  OT Recommended Transfer Status: Assist of 2  Treatment Interventions: ADL retraining, Functional transfer training  Subjective     Current Problem:  1. Syncope, unspecified syncope type  Transthoracic Echo (TTE) Complete    Transthoracic Echo (TTE) Complete      2. H/O fracture of ankle  Case Request Operating Room: Insertion Intramedullary Nail Tibia    Case Request Operating Room: Insertion Intramedullary Nail Tibia    CANCELED: Case Request Operating Room: Insertion Intramedullary Nail Tibia    CANCELED: Case Request Operating Room: Insertion Intramedullary Nail Tibia    CANCELED: Case Request Operating Room: Insertion Intramedullary Nail Tibia    CANCELED: Case Request Operating Room: Insertion Intramedullary Nail Tibia      3. NSTEMI (non-ST elevated myocardial infarction) (CMS/HCC)  Case Request Cath Lab: Left Heart Cath    Case Request Cath Lab: Left Heart Cath    Cardiac  catheterization - coronary    Cardiac catheterization - coronary    Case Request Cath Lab: PCI RCA, right radial 6Fr    Case Request Cath Lab: PCI RCA, right radial 6Fr    Cardiac catheterization - coronary    Cardiac catheterization - coronary    CANCELED: Cardiac catheterization - coronary    CANCELED: Cardiac catheterization - coronary      4. Subsequent non-ST elevation (NSTEMI) myocardial infarction (CMS/HCC)  Cardiac catheterization - coronary        Past Medical History:   Diagnosis Date    Borderline diabetes     Emphysema lung (CMS/HCC) 9/20/2023    H/O chest x-ray     1/20: Impression: Stable, enlarged cardiac mediastinal silhouette with mild central pulmonary vascular congestion. Nonspecific bibasilar airspace disease, worsened in overall appearance compared with the previous study, findings can be seen in  infiltrate/pneumonia and/or atelectasis.    H/O CT scan     CT Ankle: 1/20: A vertically oriented nondisplaced oblique fracture of the medial ankle mortise extends into the distal tibial metadiaphysis.  Several small old avulsion fracture fragments are noted inferior to both malleoli; as well as soft tissue swelling about the ankle.  There is no other fracture, radiodense foreign bodies, pathologic calcifications, or worrisome bone destruction identified.    H/O CT scan of abdomen     2016: Diffuse urinary bladder wall thickening, Small atrophic left kidney with pyelocalyceal ectasia and severe cortical thinning, , Moderate pyelocalyceal ectasia of the right kidney with cortical thinning; unchanged    H/O CT scan of brain     11/22: normal 1/20: FINDINGS:  There is no intracranial hemorrhage, mass effect, midline shift, extra-axial collection, evidence of hydrocephalus, recent ischemic infarct, or skull fracture identified.    There is no significant atrophy or white matter changes, for age.  Moderate mucosal thickening within the paranasal sinuses again noted. The mastoid air cells are clear    H/O CT  scan of brain 10/10/2023    There is some subtle increased attenuation along the right  tentorium cerebelli.  This is suspicious for a acute  subdural hematoma.    H/O CT scan of chest     10/19: 1. Bibasilar infiltrate/pneumonia with patchy pneumonia scattered throughout the left lung and bilateral pleural effusions.  2. Vascular calcifications left anterior descending coronary artery with mild to moderate cardiomegaly.  3. Moderate to moderately severe diffuse fatty infiltration of liver. 10/21: Bilateral groundglass opacities as discussed. These findings are nonspecific    H/O CT scan of chest 10/10/2023    NO PE. Somewhat patchy subpleural curvilinear markings are seen in the  bilateral lower lobes, overall morphology favoring atelectasis or infectious  infiltrate.  Scattered central small cysts are observed.    H/O diagnostic ultrasound     RUQ US 2013: Cholelithiasis and mild gallbladder distention, Increased liver echogenicity    H/O echocardiogram     1/20: Normal LV and RV.  No significant valve disease.  Normal estimated PA pressure.  Normal diastolic filling pattern.    H/O magnetic resonance imaging of cervical spine     2003: CENTRAL STENOSIS WORST C3-C4 1/21: Multilevel degenerative changes with disc height loss, disc osteophyte complexes, facet/uncovertebral degenerative changes. Moderate to severe canal and bilateral foraminal narrowing at C3-C4, C4-C5, C5-C6, C6-C7 grossly similar to  prior cervical MRI    H/O magnetic resonance imaging of lumbar spine     2012:  NEW RIGHT POSTERIOR EXTRUSION L4-5, WITH CONTACT OF THE EXITING RIGHT L5 AND DESCENDING RIGHT S1 NERVES. s/p laminectomy L5 2019 ([facility]): NARROWING OF MULTIPLE LUMBAR DISC LEVELS WITH FAIRLY ADVANCED DEGENERATIVE CHANGES. SEE INDIVIDUAL LEVELS ABOVE.  MILD LUMBAR CANAL STENOSIS AT THE L4-5 LEVEL.  NARROWING OF NEURAL FORAMINA,    H/O magnetic resonance imaging of lumbar spine 10/10/2023    MR findings worrisome for retroperitoneal abscess,  possibly extending into  the L4-5 disc space.   Apparent clumping of the proximal cauda equina nerve roots versus mass effect  due to an intrathecal fluid collection.  Recommend MRI of the T-spine for  further evaluation    H/O x-ray of lower extremity 10/10/2023    XR ankle: NEW ACUTE OBLIQUE FRACTURE OF THE DISTAL RIGHT TIBIA WITH A  OLD VERTICAL FRACTURE OF THE TIBIA.   THERE IS A NEW COMMINUTED FRACTURE OF THE DISTAL RIGHT FIBULA.    High cholesterol      Past Surgical History:   Procedure Laterality Date    CARDIAC CATHETERIZATION      8/2015: The coronary anatomy is R dominant.  L main coronary artery was normal.  Left anterior descending artery presents luminal irregularities.  Circumflex coronary artery normal.  R coronary artery presents luminal irregularities.  Mid RCA lesion 40% stenosis.  LVEF 50%    CARDIAC CATHETERIZATION Right 10/11/2023    Procedure: Left Heart Cath;  Surgeon: Jonathan GASPAR MD;  Location: [facility] Cardiac Cath Lab;  Service: Cardiovascular;  Laterality: Right;  radial    CARDIAC CATHETERIZATION N/A 10/13/2023    Procedure: PCI RCA, right radial 6Fr;  Surgeon: Juvenal Le MD;  Location: [facility] Cardiac Cath Lab;  Service: Cardiovascular;  Laterality: N/A;    CERVICAL LAMINECTOMY      C3-C4, s/p cervical laminectomy.    ESOPHAGOGASTRODUODENOSCOPY      12/15: Localized mild erythema was found at the gastroesophageal junction    KIDNEY SURGERY  09/12/2013    Kidney Surgery    LUMBAR FUSION  10/06/2023    1. L3-4, L4-5 extreme lateral interbody fusion 2. L3-5 laminectomy, L5-S1 revision laminectomy, L5-S1 transforaminal lumbar interbody fusion, L3-S1 instrumented posterolateral fusion, repair of dural tear    LUMBAR FUSION  09/30/2023    L3-4 & L4-5 XLIF(NUVASIVE), L5-S1 TLIF([employer]TRONIC), L3-S1 POSTEROLATERAL FUSION, L3-5    LUMBAR LAMINECTOMY      s/p L5 laminectomy x2;  residual left foot drop    OTHER SURGICAL HISTORY  01/28/2020    Appendectomy    OTHER SURGICAL HISTORY  01/28/2020     Cholecystectomy    OTHER SURGICAL HISTORY  01/28/2020    [place] tooth extraction    OTHER SURGICAL HISTORY  01/28/2020    Tonsillectomy       General:      General  Reason for Referral: ADLs,discharge planning  Referred By:   Family/Caregiver Present: No  Prior to Session Communication: Bedside nurse  Patient Position Received: Bed, 3 rail up, Alarm on  General Comment:  (s/p Right tibia intramedullary fixation for distal tibial shaft fracture on 10/17                                  Recent lumbar decompression and fusion,  subarachnoid hemorrhage and subdural hematoma, 9/29/2023)    Precautions:  LE Weight Bearing Status: Right Non-Weight Bearing  Medical Precautions: Fall precautions, Spinal precautions  Foot drop left foot         Pain:  Pain Assessment  Pain Assessment: 0-10  Pain Score: 9  Pain Location: Ankle  Pain Orientation: Right  Objective     Cognition:  Overall Cognitive Status: Within Functional Limits  Orientation Level: Oriented X4         Home Living:  Home Living Comments:  (Pt. lives with spouse in ranch home with 1 entry step.Has tub shower with grab bars.Owns walking stick,wc, ww.PLOF independent prior to back sx 9/27.States spouse is a nurse)    Prior Function:  Level of [place]: Independent with ADLs and functional transfers  ADL Assistance: Independent  Homemaking Assistance: Independent  Ambulatory Assistance: Independent    IADL History:       ADL History:  Grooming Assistance: Stand by  LE Dressing Assistance: Maximal         Bed Mobility/Transfers: Bed Mobility  Bed Mobility:  (supine <-> sit mod assist for right LE,able to scoot self up in bed in supine)  Transfers  Transfer:  (Deferred due to dizzy/lightheaded)    Hand Function:  Hand Function  Gross Grasp: Functional  Coordination: Functional    Extremities: RUE   RUE : Within Functional Limits and LUE   LUE: Within Functional Limits    Outcome Measures: [facility] Daily Activity  Putting on and taking off regular lower body  clothing: Total  Bathing (including washing, rinsing, drying): A lot  Putting on and taking off regular upper body clothing: A lot  Toileting, which includes using toilet, bedpan or urinal: A lot  Taking care of personal grooming such as brushing teeth: None  Eating Meals: None  Daily Activity - Total Score: 15      EDUCATION:  Education  Individual(s) Educated: Patient  Education Provided: Fall precautons, Risk and benefits of OT discussed with patient or other (WB status and spine precations)    Goals:  Encounter Problems       Encounter Problems (Active)       Dressings Lower Extremities       STG - Patient to complete lower body dressing min assist       Start:  10/18/23    Expected End:  11/01/23               Grooming       STG - Patient completes grooming MOD I       Start:  10/18/23    Expected End:  11/01/23               Transfers       STG - Patient will perform bed mobility CGA       Start:  10/18/23    Expected End:  11/01/23            STG - Patient will perform toilet transfer mod assist       Start:  10/18/23    Expected End:  11/01/23            STG - Patient maintains weight bearing status during transfers       Start:  10/18/23    Expected End:  11/01/23

## 2023-10-18 NOTE — CARE PLAN
Problem: Skin  Goal: Promote skin healing  10/18/2023 0827 by Yessy Elizabeth RN  Outcome: Progressing  Flowsheets (Taken 10/18/2023 0827)  Promote skin healing:   Assess skin/pad under line(s)/device(s)   Protective dressings over bony prominences  10/18/2023 0717 by Yessy Elizabeth RN  Outcome: Progressing  Flowsheets (Taken 10/12/2023 0310 by Erick Moralez RN)  Promote skin healing:   Assess skin/pad under line(s)/device(s)   Ensure correct size (line/device) and apply per  instructions   Protective dressings over bony prominences   Rotate device position/do not position patient on device   Turn/reposition every 2 hours/use positioning/transfer devices  Goal: Prevent/minimize sheer/friction injuries  10/18/2023 0827 by Yessy Elizabeth RN  Outcome: Progressing  Flowsheets (Taken 10/18/2023 0827)  Prevent/minimize sheer/friction injuries:   HOB 30 degrees or less   Increase activity/out of bed for meals  10/18/2023 0717 by Yessy Elizabeth RN  Outcome: Progressing  Flowsheets (Taken 10/14/2023 2046 by Betina Dumont RN)  Prevent/minimize sheer/friction injuries:   HOB 30 degrees or less   Turn/reposition every 2 hours/use positioning/transfer devices

## 2023-10-18 NOTE — NURSING NOTE
Patient in bed awake and alertx4. Patient has LR running at 50mL. Patient received pain medication per order throughout shift. Patient had leg rewrapped to help with pain. Bed alarm active and audible, bed locked in lowest position, call bell within reach.

## 2023-10-18 NOTE — OP NOTE
Insertion Intramedullary RIGHT Nail Tibia   WOULD LIKE TO FOLLOW SELF (R) Operative Note     Date: 10/10/2023 - 10/17/2023  OR Location: [facility] OR    Name: Gm Thrasher YOB: 1965, Age: 58 y.o., MRN: [id number], Sex: male    Diagnosis  Pre-op Diagnosis     * H/O fracture of ankle [Z87.81] Post-op Diagnosis     * H/O fracture of ankle [Z87.81]     Procedures  Insertion Intramedullary RIGHT Nail Tibia   WOULD LIKE TO FOLLOW SELF  13674 - [place] OSTEOT MLT W/RELIGNMT IMED PAYAL  1.  Right tibia intramedullary fixation for distal tibial shaft fracture  2.  Nonop management right distal fibula fracture with manipulation    Surgeons      * Vic Hale - Primary    Resident/Fellow/Other Assistant:  No surgical staff documented.    Procedure Summary  Anesthesia: General  ASA: III  Anesthesia Staff: Anesthesiologist: Erick Briggs DO  CRNA: JARED Abarca-CRNA  Estimated Blood Loss: 100mL  Intra-op Medications:   Medication Name Total Dose   HYDROmorphone (Dilaudid) injection 0.6 mg 0.6 mg   insulin lispro (HumaLOG) injection 0-5 Units Cannot be calculated              Anesthesia Record               Intraprocedure I/O Totals       None           Specimen: No specimens collected     Staff:   Circulator: Cj Miranda RN  Scrub Person: Lakesha Mayen         Drains and/or Catheters: * None in log *    Tourniquet Times:         Implants:  Implants       Type Name Action Serial No.       tibial nail 11mm Implanted       low profile locking screw Implanted               Findings: Displaced distal third tibial shaft fracture    Indications: Indications: 58-year-old male injured his right distal tibia and was recommended for surgery for ORIF versus intramedullary nail fixation.  Patient notable complicated course in the hospital and required a delayed surgery given his complex a cardiac plan of care and surgery was delayed after initial admission.   Recommended for intramedullary fixation for tibia fracture.  Risks  benefits and alternatives to surgery were discussed including but not limited to Infection, bleeding, neurovascular injury, pain and dysfunction, hardware related complications including cutout failure breakage, loss of function, motion, and permanent disability as well as the cardiovascular and pulmonary complications from anesthesia including death and DVT. Patient and family accept these risks.We discussed specifically  Wound healing complication including dehiscence infection failure, osteomyelitis, hardware complication can cut out, failure, breakage, malunion, nonunion and the potential need for future revision surgeries including hardware removal.  We also discussed specifically wound healing is ongoing anticoagulation    Patient identified in the preop area.  Right lower extremity marked and confirmed with patient as the operative site.  Brought back to the operating room and anesthetized under general anesthesia.Operative lower extremity was then prepped and draped in standard sterile fashion.  Patient, site and procedure were confirmed with timeout.  Everyone in the room agreed.  Preop antibiotics were given.  We then proceeded with tibial nailing.  Made a direct midline incision over the superior pole the patella down to the quadriceps tendon.  We made a incision through the quadriceps tendon for intended suprapatellar nail.  Deep retractors were placed.  We found our starting point in the proximal tibia on the AP and lateral plane.  Guidewire was advanced.  Placed the rigid reamer over guidewire assistance to gain access to the proximal canal.  We then placed a ball-tipped guidewire to the fracture site.  Placed an Esmarch tourniquet directly to the fracture site for reduction tool.  Placed a reduction niyah past the fracture site.  Overall the fracture was adequate reduced and the guidewire was ending centrally within the ankle mortise.  We then measured our nail and selected a 375 mm nail.  We then began  progressively reaming up to a 12.5 mm.  A 11 mm nail was selected.  Guidewire was in place and the nail was advanced to the distal tibia.  Once the nail was adequately seated, the ball-tipped guidewire was then removed.  Placed locking screws in the proximal os of the tibia.  These were done in crossing diagonal fashion through percutaneous technique.  They were drilled and screws were placed.  There were appropriate length confirmed fluoroscopically.  We lightly malleted for compression across the fracture.  Then under perfect circles technique we placed a distal locking screws.  Made 2 percutaneous incisions on the medial tibia skin and subcutaneous fat were divided a bicortically drilled our distal locking screws.  They were measured and screws were placed sequentially.  Had excellent bite were firmly capturing the distal segment.  The distal fibula was out to adequate length.  Under fluoroscopic exam and manipulation we did not require any additional fixation as he had adequate length.  Final fluoroscopic images were taken in all planes.  Were satisfied that with implant location, fracture reduction and alignment.  Wounds were irrigated with normal saline.  Thoroughly irrigated the arthrotomy with saline through the sleeve.  Quadriceps incision closed with interrupted #1 Vicryl superficial suture, 2-0 Vicryl subcutaneous and the skin stapled shut.   Sterile dressings were applied as well as a well-padded plaster splint.  Patient was sent to the PACU stable condition.    Vic Mcwilliams PA-C was present throughout the entire case. Given the nature of the disease process and the procedure, a skilled surgical first assistant was necessary during the case. The assistant was necessary to hold retractors and to manipulate the extremity during the procedure. A certified scrub tech was at the back table managing the instruments and supplies for the surgical case.  Attending Attestation: I was present and scrubbed for  the entire procedure.    Vic Hale  Phone Number: [phone]

## 2023-10-18 NOTE — PROGRESS NOTES
"Gm Thrasher is a 58 y.o. male on day 8 of admission presenting with Syncope, unspecified syncope type.    Subjective   Mr. Thrasher has minimal right distal LE pain. No issues overnight       Objective     Physical Exam  Vitals reviewed.   HENT:      Head: Normocephalic.      Mouth/Throat:      Mouth: Mucous membranes are moist.      Pharynx: Oropharynx is clear.   Eyes:      Extraocular Movements: Extraocular movements intact.   Cardiovascular:      Rate and Rhythm: Normal rate.   Pulmonary:      Effort: Pulmonary effort is normal.   Abdominal:      Palpations: Abdomen is soft.   Musculoskeletal:         General: No tenderness, deformity or signs of injury.      Right lower leg: No edema.      Left lower leg: No edema.      Comments: Distal right LE splint dressing is dry and intact.  Right toes with light touch sensation intact/motor intact, toes pink with brisk cap refill     Skin:     General: Skin is warm and dry.      Capillary Refill: Capillary refill takes less than 2 seconds.   Neurological:      General: No focal deficit present.      Mental Status: He is alert. Mental status is at baseline.   Psychiatric:         Mood and Affect: Mood normal.         Last Recorded Vitals  Blood pressure 142/80, pulse 72, temperature 35.8 °C (96.4 °F), temperature source Temporal, resp. rate 16, height 1.8 m (5' 10.87\"), weight 115 kg (253 lb 11.2 oz), SpO2 94 %.  Intake/Output last 3 Shifts:  I/O last 3 completed shifts:  In: 2039.5 (17.7 mL/kg) [I.V.:1939.5 (16.9 mL/kg); IV Piggyback:100]  Out: 3575 (31.1 mL/kg) [Urine:3550 (0.9 mL/kg/hr); Blood:25]  Weight: 115.1 kg     Relevant Results      Scheduled medications  acetaminophen, 650 mg, oral, 4x daily  allopurinol, 100 mg, oral, Daily  aspirin, 81 mg, oral, Daily  atorvastatin, 80 mg, oral, Daily  carvedilol, 12.5 mg, oral, BID  ceFAZolin, 2 g, intravenous, q8h  clopidogrel, 75 mg, oral, Daily  cyclobenzaprine, 10 mg, oral, TID  docusate sodium, 100 mg, oral, " BID  enoxaparin, 40 mg, subcutaneous, Daily  escitalopram, 20 mg, oral, Daily  glycerin, 1 suppository, rectal, Once  [Held by provider] insulin lispro, 0-5 Units, subcutaneous, q4h  levETIRAcetam, 500 mg, oral, BID  melatonin, 5 mg, oral, Nightly  pantoprazole, 40 mg, oral, Daily before breakfast  perflutren lipid microspheres, 3 mL of dilution, intravenous, Once in imaging  polyethylene glycol, 17 g, oral, Daily  pregabalin, 300 mg, oral, BID  sennosides, 1 tablet, oral, BID  sulfur hexafluoride microsphr, 2 mL, intravenous, Once in imaging  tamsulosin, 0.4 mg, oral, Daily      Continuous medications  lactated Ringer's, 100 mL/hr, Last Rate: Stopped (10/17/23 2215)  lactated Ringer's, 50 mL/hr  oxygen, 2 L/min      PRN medications  PRN medications: benzocaine-menthol, bisacodyl, diphenhydrAMINE, docusate sodium, HYDROmorphone, HYDROmorphone, HYDROmorphone, hydrOXYzine HCL, morphine, naloxone, oxyCODONE, oxyCODONE, prochlorperazine **OR** prochlorperazine **OR** prochlorperazine, sodium phosphates  Results for orders placed or performed during the hospital encounter of 10/10/23 (from the past 24 hour(s))   POCT GLUCOSE   Result Value Ref Range    POCT Glucose 104 (H) 74 - 99 mg/dL   CBC   Result Value Ref Range    WBC 10.6 4.4 - 11.3 x10*3/uL    nRBC 0.0 0.0 - 0.0 /100 WBCs    RBC 3.52 (L) 4.50 - 5.90 x10*6/uL    Hemoglobin 10.4 (L) 13.5 - 17.5 g/dL    Hematocrit 32.8 (L) 41.0 - 52.0 %    MCV 93 80 - 100 fL    MCH 29.5 26.0 - 34.0 pg    MCHC 31.7 (L) 32.0 - 36.0 g/dL    RDW 16.3 (H) 11.5 - 14.5 %    Platelets 318 150 - 450 x10*3/uL    MPV 9.2 7.5 - 11.5 fL   CBC   Result Value Ref Range    WBC 7.7 4.4 - 11.3 x10*3/uL    nRBC 0.0 0.0 - 0.0 /100 WBCs    RBC 3.33 (L) 4.50 - 5.90 x10*6/uL    Hemoglobin 9.9 (L) 13.5 - 17.5 g/dL    Hematocrit 30.7 (L) 41.0 - 52.0 %    MCV 92 80 - 100 fL    MCH 29.7 26.0 - 34.0 pg    MCHC 32.2 32.0 - 36.0 g/dL    RDW 16.2 (H) 11.5 - 14.5 %    Platelets 322 150 - 450 x10*3/uL    MPV 9.2  7.5 - 11.5 fL   Renal Function Panel   Result Value Ref Range    Glucose 100 (H) 74 - 99 mg/dL    Sodium 135 (L) 136 - 145 mmol/L    Potassium 4.1 3.5 - 5.3 mmol/L    Chloride 98 98 - 107 mmol/L    Bicarbonate 32 21 - 32 mmol/L    Anion Gap 9 (L) 10 - 20 mmol/L    Urea Nitrogen 8 6 - 23 mg/dL    Creatinine 0.87 0.50 - 1.30 mg/dL    eGFR >90 >60 mL/min/1.73m*2    Calcium 8.3 (L) 8.6 - 10.3 mg/dL    Phosphorus 5.1 (H) 2.5 - 4.9 mg/dL    Albumin 2.9 (L) 3.4 - 5.0 g/dL   Magnesium   Result Value Ref Range    Magnesium 1.72 1.60 - 2.40 mg/dL   POCT GLUCOSE   Result Value Ref Range    POCT Glucose 105 (H) 74 - 99 mg/dL          This patient currently has cardiac telemetry ordered; if you would like to modify or discontinue the telemetry order, click here to go to the orders activity to modify/discontinue the order.                 Assessment/Plan   Principal Problem:    Syncope, unspecified syncope type  Active Problems:    H/O fracture of ankle    NSTEMI (non-ST elevated myocardial infarction) (CMS/[facility])    POD #1 s/p IM nailing right tibia  Continue PT/OT, non weight bearing right LE, cleared to work with PT/OT  Reviewed am labs  Multimodal pain regimen  knee dressing to be removed on post op day #7  VTE prophylaxis: lovenox daily X 30 days  Will discharge home when appropriate with [facility]  Follow up with Dr. Hale as directed         I spent 30 minutes in the professional and overall care of this patient.      Justina Rock PA-C

## 2023-10-18 NOTE — ANESTHESIA PROCEDURE NOTES
Peripheral IV  Date/Time: 10/17/2023 8:00 PM      Placement  Needle size: 18 G  Laterality: right  Location: wrist  Site prep: alcohol  Technique: anatomical landmarks  Attempts: 1

## 2023-10-18 NOTE — PROGRESS NOTES
Physical Therapy    Physical Therapy Evaluation    Patient Name: Gm Thrasher  MRN: [id number]  Today's Date: 10/18/2023   Time Calculation  Start Time: 1034  Stop Time: 1050  Time Calculation (min): 16 min    Assessment/Plan   PT Assessment  PT Assessment Results: Decreased strength, Decreased range of motion, Decreased endurance, Impaired balance, Decreased mobility, Decreased safety awareness, Orthopedic restrictions, Pain  Rehab Prognosis: Good  Evaluation/Treatment Tolerance: Patient limited by pain  Medical Staff Made Aware: Yes  End of Session Communication: Bedside nurse    Assessment Comment: Pt is a 59 y/o male admitted on 10/10 for syncope episode s/p right tibia intramedullary fixation. Pt presents with 9/10 right knee and ankle pain. Activity limited during session d/t pain and pt c/o dizziness and lightheadedness when sitting EOB. Pt able to tolerate bed mobility supine <> sit mod A x1 with use of bed HR. He required max A x1 to maintain NWB precautions on RLE with SPT holding right foot. Pt demonstrates ability to scoot towards HOB using LLE for stability while SPT held R foot to maintain NWB precautions. He is functioning below baseline level of function. Pt will benefit from skilled therapy during stay to improve overall functional mobility, strength, ROM, endurance and safety awareness. Upon discharge pt will benefit from moderate intensity therapy for continued improvement in functional mobility.     End of Session Patient Position: Bed, 3 rail up, Alarm on  IP OR SWING BED PT PLAN  Inpatient or Swing Bed: Inpatient  PT Plan  Treatment/Interventions: Bed mobility, Transfer training, Gait training, Balance training, Neuromuscular re-education, Strengthening, Endurance training, Range of motion, Therapeutic exercise, Therapeutic activity  PT Plan: Skilled PT  PT Frequency: 5 times per week  PT Discharge Recommendations: Moderate intensity level of continued care  PT Recommended Transfer Status:  Total assist    Subjective     Current Problem:  1. Syncope, unspecified syncope type  Transthoracic Echo (TTE) Complete    Transthoracic Echo (TTE) Complete      2. H/O fracture of ankle  Case Request Operating Room: Insertion Intramedullary Nail Tibia    Case Request Operating Room: Insertion Intramedullary Nail Tibia    CANCELED: Case Request Operating Room: Insertion Intramedullary Nail Tibia    CANCELED: Case Request Operating Room: Insertion Intramedullary Nail Tibia    CANCELED: Case Request Operating Room: Insertion Intramedullary Nail Tibia    CANCELED: Case Request Operating Room: Insertion Intramedullary Nail Tibia      3. NSTEMI (non-ST elevated myocardial infarction) (CMS/HCC)  Case Request Cath Lab: Left Heart Cath    Case Request Cath Lab: Left Heart Cath    Cardiac catheterization - coronary    Cardiac catheterization - coronary    Case Request Cath Lab: PCI RCA, right radial 6Fr    Case Request Cath Lab: PCI RCA, right radial 6Fr    Cardiac catheterization - coronary    Cardiac catheterization - coronary    CANCELED: Cardiac catheterization - coronary    CANCELED: Cardiac catheterization - coronary      4. Subsequent non-ST elevation (NSTEMI) myocardial infarction (CMS/[facility])  Cardiac catheterization - coronary        Past Medical History:   Diagnosis Date    Borderline diabetes     Emphysema lung (CMS/HCC) 9/20/2023    H/O chest x-ray     1/20: Impression: Stable, enlarged cardiac mediastinal silhouette with mild central pulmonary vascular congestion. Nonspecific bibasilar airspace disease, worsened in overall appearance compared with the previous study, findings can be seen in  infiltrate/pneumonia and/or atelectasis.    H/O CT scan     CT Ankle: 1/20: A vertically oriented nondisplaced oblique fracture of the medial ankle mortise extends into the distal tibial metadiaphysis.  Several small old avulsion fracture fragments are noted inferior to both malleoli; as well as soft tissue swelling about the  ankle.  There is no other fracture, radiodense foreign bodies, pathologic calcifications, or worrisome bone destruction identified.    H/O CT scan of abdomen     2016: Diffuse urinary bladder wall thickening, Small atrophic left kidney with pyelocalyceal ectasia and severe cortical thinning, , Moderate pyelocalyceal ectasia of the right kidney with cortical thinning; unchanged    H/O CT scan of brain     11/22: normal 1/20: FINDINGS:  There is no intracranial hemorrhage, mass effect, midline shift, extra-axial collection, evidence of hydrocephalus, recent ischemic infarct, or skull fracture identified.    There is no significant atrophy or white matter changes, for age.  Moderate mucosal thickening within the paranasal sinuses again noted. The mastoid air cells are clear    H/O CT scan of brain 10/10/2023    There is some subtle increased attenuation along the right  tentorium cerebelli.  This is suspicious for a acute  subdural hematoma.    H/O CT scan of chest     10/19: 1. Bibasilar infiltrate/pneumonia with patchy pneumonia scattered throughout the left lung and bilateral pleural effusions.  2. Vascular calcifications left anterior descending coronary artery with mild to moderate cardiomegaly.  3. Moderate to moderately severe diffuse fatty infiltration of liver. 10/21: Bilateral groundglass opacities as discussed. These findings are nonspecific    H/O CT scan of chest 10/10/2023    NO PE. Somewhat patchy subpleural curvilinear markings are seen in the  bilateral lower lobes, overall morphology favoring atelectasis or infectious  infiltrate.  Scattered central small cysts are observed.    H/O diagnostic ultrasound     RUQ US 2013: Cholelithiasis and mild gallbladder distention, Increased liver echogenicity    H/O echocardiogram     1/20: Normal LV and RV.  No significant valve disease.  Normal estimated PA pressure.  Normal diastolic filling pattern.    H/O magnetic resonance imaging of cervical spine     2003:  CENTRAL STENOSIS WORST C3-C4 1/21: Multilevel degenerative changes with disc height loss, disc osteophyte complexes, facet/uncovertebral degenerative changes. Moderate to severe canal and bilateral foraminal narrowing at C3-C4, C4-C5, C5-C6, C6-C7 grossly similar to  prior cervical MRI    H/O magnetic resonance imaging of lumbar spine     2012:  NEW RIGHT POSTERIOR EXTRUSION L4-5, WITH CONTACT OF THE EXITING RIGHT L5 AND DESCENDING RIGHT S1 NERVES. s/p laminectomy L5 2019 ([facility]): NARROWING OF MULTIPLE LUMBAR DISC LEVELS WITH FAIRLY ADVANCED DEGENERATIVE CHANGES. SEE INDIVIDUAL LEVELS ABOVE.  MILD LUMBAR CANAL STENOSIS AT THE L4-5 LEVEL.  NARROWING OF NEURAL FORAMINA,    H/O magnetic resonance imaging of lumbar spine 10/10/2023    MR findings worrisome for retroperitoneal abscess, possibly extending into  the L4-5 disc space.   Apparent clumping of the proximal cauda equina nerve roots versus mass effect  due to an intrathecal fluid collection.  Recommend MRI of the T-spine for  further evaluation    H/O x-ray of lower extremity 10/10/2023    XR ankle: NEW ACUTE OBLIQUE FRACTURE OF THE DISTAL RIGHT TIBIA WITH A  OLD VERTICAL FRACTURE OF THE TIBIA.   THERE IS A NEW COMMINUTED FRACTURE OF THE DISTAL RIGHT FIBULA.    High cholesterol      Past Surgical History:   Procedure Laterality Date    CARDIAC CATHETERIZATION      8/2015: The coronary anatomy is R dominant.  L main coronary artery was normal.  Left anterior descending artery presents luminal irregularities.  Circumflex coronary artery normal.  R coronary artery presents luminal irregularities.  Mid RCA lesion 40% stenosis.  LVEF 50%    CARDIAC CATHETERIZATION Right 10/11/2023    Procedure: Left Heart Cath;  Surgeon: Jonathan GASPAR MD;  Location: [facility] Cardiac Cath Lab;  Service: Cardiovascular;  Laterality: Right;  radial    CARDIAC CATHETERIZATION N/A 10/13/2023    Procedure: PCI RCA, right radial 6Fr;  Surgeon: Juvenal Le MD;  Location: [facility] Cardiac Cath Lab;   Service: Cardiovascular;  Laterality: N/A;    CERVICAL LAMINECTOMY      C3-C4, s/p cervical laminectomy.    ESOPHAGOGASTRODUODENOSCOPY      12/15: Localized mild erythema was found at the gastroesophageal junction    KIDNEY SURGERY  09/12/2013    Kidney Surgery    LUMBAR FUSION  10/06/2023    1. L3-4, L4-5 extreme lateral interbody fusion 2. L3-5 laminectomy, L5-S1 revision laminectomy, L5-S1 transforaminal lumbar interbody fusion, L3-S1 instrumented posterolateral fusion, repair of dural tear    LUMBAR FUSION  09/30/2023    L3-4 & L4-5 XLIF(NUVASIVE), L5-S1 TLIF([employer]), L3-S1 POSTEROLATERAL FUSION, L3-5    LUMBAR LAMINECTOMY      s/p L5 laminectomy x2;  residual left foot drop    OTHER SURGICAL HISTORY  01/28/2020    Appendectomy    OTHER SURGICAL HISTORY  01/28/2020    Cholecystectomy    OTHER SURGICAL HISTORY  01/28/2020    [place] tooth extraction    OTHER SURGICAL HISTORY  01/28/2020    Tonsillectomy       General Visit Information:  General  Reason for Referral: Impaired mobility  Referred By: Dr. Le  Family/Caregiver Present: No  Co-Treatment: OT  Prior to Session Communication: Bedside nurse  Patient Position Received: Bed, 3 rail up, Alarm on  General Comment: Consulted with bedside nurse with permission to work with pt. Upon entering room pt in supine with HOB elevated. Pt willing to work with therapy.    Home Living:  Home Living  Home Living Comments: Pt lives in ranch style home with wife. 1 RA to enter. First floor set up with bed/bath. Tub shower with grab bars.    Prior Level of Function:  Prior Function Per Pt/Caregiver Report  Prior Function Comments: Pt mod I with mobility with use of walking stick. Owns a cane, manual w/c, 3-wheeled rollator walker and FWW. Pt independent with ADLs/IADLs. Has wife for assistance if needed.    Precautions:  Precautions  LE Weight Bearing Status: Right Non-Weight Bearing  Medical Precautions: Fall precautions, Spinal precautions    Vital Signs:      Objective     Pain:  Pain Assessment  Pain Assessment: 0-10  Pain Score: 9  Pain Type: Surgical pain  Pain Location: Ankle (Pt also c/o of R knee pain.)  Pain Orientation: Right    Cognition:  Cognition  Overall Cognitive Status: Within Functional Limits  Orientation Level: Oriented X4    General Assessments:  General Observation  General Observation: Pt tearful throughout session and yelling d/t pain.   Activity Tolerance  Endurance: Decreased tolerance for upright activites  Activity Tolerance Comments: Pt c/o dizziness and lightheadedness when sitting EOB.  Sensation  Sensation Comment: No sensation deficits identified.       Static Sitting Balance  Static Sitting-Comment/Number of Minutes: Good with UE/LE support       Functional Assessments:     Bed Mobility  Bed Mobility:  (Supine <> sit mod A x1 with use of bed HR. Required max A x1 to maintain NWB precautions on RLE with SPT holding LE during transfer.)     Extremity/Trunk Assessments:     RLE   RLE :  (Limited AROM and strength deficits d/t recent fx and surgery.)  LLE   LLE :  (Left drop foot, limited ability to complete ankle DF. MMT not formally assessed.)    Outcome Measures:  [facility] Basic Mobility  Turning from your back to your side while in a flat bed without using bedrails: A little  Moving from lying on your back to sitting on the side of a flat bed without using bedrails: A little  Moving to and from bed to chair (including a wheelchair): A lot  Standing up from a chair using your arms (e.g. wheelchair or bedside chair): A lot  To walk in hospital room: Total  Climbing 3-5 steps with railing: Total  Basic Mobility - Total Score: 12       E = Exercise and Early Mobility  Current Activity: Sitting at edge of bed  Documentation of an Acceptable Level of Exercise/Mobilization Performed: Active transfer - out of bed to chair       Goals:  Encounter Problems       Encounter Problems (Active)       PT Problem       Bed mobility (Progressing)        Start:  10/18/23    Expected End:  11/01/23       Pt will be able to complete all bed mobility tasks mod I with use of bed HR and maintaining spine precautions.           Transfers (Progressing)       Start:  10/18/23    Expected End:  11/01/23       Pt will be able to complete all transfers with FWW mod A demonstrating good safety awareness, proper body mechanics and ability to maintain NWB precautions on RLE.           Strength (Progressing)       Start:  10/18/23    Expected End:  11/01/23       Pt will improve/maintain BLE strength with supine, seated and standing exercises to WFL.           Pain (Progressing)       Start:  10/18/23    Expected End:  11/01/23       Pt will demonstrate decreased pain to </= 5/10.         Precautions (Progressing)       Start:  10/18/23    Expected End:  11/01/23       Pt will demonstrate ability to maintain all orthopedic precautions during functional mobility activities.               Education Documentation  Body Mechanics, taught by INDU Leon at 10/18/2023 12:29 PM.  Learner: Patient  Readiness: Acceptance  Method: Explanation  Response: Verbalizes Understanding, Needs Reinforcement    Precautions, taught by INUD Leon at 10/18/2023 12:29 PM.  Learner: Patient  Readiness: Acceptance  Method: Explanation  Response: Verbalizes Understanding, Needs Reinforcement    Education Comments  Pt educated on the benefits of mobility and importance of participation in therapy for overall health and wellness. Pt educated on proper body mechanics and proper hand placement during functional mobility tasks. Pt educated on NWB precautions on RLE.

## 2023-10-18 NOTE — CARE PLAN
Pt arrived to unit and had some c/o pain , pt was given PRN po and IV pain meds this shift. Pt was able to rest some this shift. Pt neuro checks remains stable. Pt neurovascular checks remains unchanged. Pt dressing to rt foot is intact and leg is elevated. Pt safety been maintained this shift.

## 2023-10-18 NOTE — ANESTHESIA POSTPROCEDURE EVALUATION
Patient: Gm Thrasher    Procedure Summary       Date: 10/17/23 Room / Location: [facility] OR 01 / Virtual [facility] OR    Anesthesia Start: 1958 Anesthesia Stop: 2216    Procedure: Insertion Intramedullary RIGHT Nail Tibia   WOULD LIKE TO FOLLOW SELF (Right: Leg Lower) Diagnosis:       H/O fracture of ankle      (H/O fracture of ankle [Z87.81])    Surgeons: Vic Hale MD Responsible Provider: Erick Briggs DO    Anesthesia Type: general ASA Status: 3            Anesthesia Type: general    Vitals Value Taken Time   /62 10/17/23 2215   Temp 36.5 °C (97.7 °F) 10/17/23 2210   Pulse 53 10/17/23 2215   Resp 14 10/17/23 2215   SpO2 100 % 10/17/23 2215   Vitals shown include unvalidated device data.    Anesthesia Post Evaluation    Patient location during evaluation: PACU  Patient participation: complete - patient participated  Level of consciousness: awake  Pain score: 2  Pain management: adequate  Airway patency: patent  Cardiovascular status: acceptable  Respiratory status: acceptable  Hydration status: acceptable        There were no known notable events for this encounter.

## 2023-10-18 NOTE — PROGRESS NOTES
Jesus Thrasher is a 58 y.o. male on day 8 of admission presenting with Ankle fracture, right, closed, initial encounter.    Subjective   No acute overnight events. Patient seen and evaluated at bedside. Continues to complain of throbbing right lower extremity pain. He is able to urinate. Denies any chest pain, shortness of breath, nausea, vomiting, dysuria, or hematuria.    Objective     Last Recorded Vitals  /62   Pulse 66   Temp 36.1 °C (97 °F) (Temporal)   Resp 18   Wt 115 kg (253 lb 11.2 oz)   SpO2 93%   Intake/Output last 3 Shifts:    Intake/Output Summary (Last 24 hours) at 10/18/2023 1659  Last data filed at 10/18/2023 1642  Gross per 24 hour   Intake 1347.5 ml   Output 1950 ml   Net -602.5 ml     Admission Weight  Weight: 114 kg (251 lb 4.8 oz) (10/10/23 0358)    Daily Weight  10/17/23 : 115 kg (253 lb 11.2 oz)    Image Results  Cardiac catheterization - coronary     [facility] [place], Cath Lab       [address] [address]    Cardiovascular Catheterization Report    Patient Name:      JESUS THRASHER      Performing           [id number] Juvenal Le                                         Physician:           MD  Study Date:        10/13/2023          Verifying Physician: [id number] Le MD  MRN/PID:           [id number]            Cardiologist:        [id number] Jonathan Malone MD  Accession#:        [id number]        Ordering Provider:   [id number] LE  Date of Birth/Age: 1965 / 58      Fellow:                     years  Gender:            M                   Fellow:  Encounter#:        [id number]       Study: PCI - Percutaneous Coronary Intervention       Indications:  JESUS THRASHER is a 59 year old male who presents with diabetes, dyslipidemia, prior myocardial infarction and a chest pain assessment of atypical angina. NSTE - ACS.  Stress test performed: No. CTA performed: Carolann Perez accessed: NoDeborah  LVEF  Assessed: Yes. LVEF = 50-55%.  Cardiac arrest: No.  Cardiac surgical consult: No.  Cardiovascular Instability: No  Frailty status of patient entering lab: 7 = Severely frail.       Procedure Description:  After infiltration with 2% Lidocaine, the right radial artery was cannulated with a modified Seldinger technique. Subsequently a 6 Frisian sheath was placed in the right radial artery. Selective coronary catheterization was performed using a 6 Fr catheter(s) exchanged over a guide wire to cannulate the coronary arteries.  After completion of the procedure, the arterial sheath was pulled and a TR Band Radial Compression Device was utilized to obtain patent hemostasis.     Coronary Angiography:  The coronary circulation is right dominant.     Right Coronary Artery Distribution:    The right coronary artery is a normal caliber vessel. The RCA arises normally from the right sinus of Valsalva, supplies the right posterolateral descending artery and supplies the posterolateral system. The proximal to mid right coronary artery showed 80% stenosis. Lesion length was estimated at 21 mm. This lesion's characteristics fall within the ACC/AHA High/C classification. This lesion determined to be suitable for percutaneous coronary intervention.     Coronary Interventions:  Angiography reveals a 80% stenosis of the proximal to mid right coronary artery coronary artery. Pre-intervention ALBERTINA flow was 3. Percutaneous coronary intervention was performed within the proximal to mid right coronary artery. The vessel was pre-dilated using a compliant balloon 2.5 mm x 15 mm [place] Carlitos drug-eluting stent 3.5 mm x 22 mm was advanced to the lesion and implanted. The stenosis was successfully reduced from 80% to 0%. Post-intervention ALBERTINA flow was 3. Used a 6Fr JR4 guide with poor support. Needed a Guideliner for additional support. Would recommend use of an AR1 or AR2 in future for better guide support.     Coronary Lesion  Summary:  Vessel   Stenosis   Vessel Segment  Length (mm)  RCA    80% stenosis proximal to mid     21       Hemo Personnel:  +------------------------+-------------+  Name                    Duty           +------------------------+-------------+  Juvenal Le MD            PROC MD 1  +------------------------+-------------+  Harshal Galindo RT PROC SCRUB 1  +------------------------+-------------+  Lily Davis RN           PROC CIRC 1  +------------------------+-------------+  Vic Franco RN          PROC RECORD 1  +------------------------+-------------+       Hemodynamic Pressures:     +----+---------------------+----------+-------------+--------------+---------+  Site      Date Time      Phase NameSystolic mmHgDiastolic mmHgMean mmHg  +----+---------------------+----------+-------------+--------------+---------+    AO10/13/2023 1:19:39 PM  AIR REST          111            58       80  +----+---------------------+----------+-------------+--------------+---------+    AO10/13/2023 1:19:42 PM  AIR REST          109            55       76  +----+---------------------+----------+-------------+--------------+---------+    AO10/13/2023 1:19:49 PM  AIR REST          110            54       76  +----+---------------------+----------+-------------+--------------+---------+    AO10/13/2023 1:19:54 PM  AIR REST          111            48       76  +----+---------------------+----------+-------------+--------------+---------+    AO10/13/2023 1:20:18 PM  AIR REST          105            52       73  +----+---------------------+----------+-------------+--------------+---------+    AO10/13/2023 1:24:56 PM  AIR REST          100            53       72  +----+---------------------+----------+-------------+--------------+---------+    AO10/13/2023 1:28:44 PM  AIR REST          119            60        83  +----+---------------------+----------+-------------+--------------+---------+    AO10/13/2023 1:30:05 PM  AIR REST          130            67       90  +----+---------------------+----------+-------------+--------------+---------+    AO10/13/2023 1:34:29 PM  AIR REST          120            62       83  +----+---------------------+----------+-------------+--------------+---------+       Complications:  No in-lab complications observed.     Cardiac Cath Post Procedure Notes:  Post Procedure Diagnosis: Single vessel disease.  Blood Loss:               Estimated blood loss during the procedure was 0 mls.  Specimens Removed:        Number of specimen(s) removed: none.       Recommendations:  Maximize medical therapy.  Agressive risk factor modification efforts.  Anti-platelet therapy with Aspirin and Clopidogrel (Plavix) 75mg daily.  Consider referral to cardiac rehabilitation.    ____________________________________________________________________________________  CONCLUSIONS:   1. Successful PCI of the prox to mid RCA with [place] 3.5x22mm JUSTO upsized to 4 mm.    ICD 10 Codes:  Non ST elevation (NSTEMI) myocardial infarction-I21.4     CPT Codes:  Stent w angioplasty Right Coronary single major Artery branch (PCI)-98936.     [id number] Juvenal Le MD  Performing Physician  Electronically signed by [id number] Juvenal Le MD on 10/13/2023 at 4:18:13 PM         ** Final **  Cardiac catheterization - coronary     [facility] [place]       [address] [address]    Cardiovascular Catheterization Report    Patient Name:      JESUS METCALF      Performing Physician: Riana Malone MD  Study Date:        10/11/2023          Verifying Physician:  Riana Malone MD  MRN/PID:           [id number]            Cardiologist:  Accession#:         [id number]        Ordering Provider:    [id number] MAXIMUS TAYLOR  Date of Birth/Age: 1965 / 58      Fellow:                     years  Gender:            M                   Fellow:  Encounter#:        [id number]       Study: Left Heart Cath       Indications:  JESUS METCALF is a 59 year old male who presents with dyslipidemia, hypertension, obesity and an asymptomatic chest pain assessment. NSTE - ACS.  Stress test performed: No. CTA performed: No. Chris accessed: No. LVEF  Assessed: No.  Cardiac arrest: No responsive following cardiac arrest.  Cardiac surgical consult: No.  Cardiovascular Instability: No  Frailty status of patient entering lab: 2 = Well.       Procedure Description Comments:  Under sterile conditions, local anesthesia and intravenous sedation a 5 Thai sheath was placed in the right radial artery. Nitroglycerin given in the sideport. Neck selective coronary arteriography and the ventriculography was performed using a Samson catheter. The catheter was aspirin flushed in standard fashion changing with guidewire.    Patient tolerated procedure well with no immediate complications Cardi catheterization procedure.    The films were reviewed with the nurse cardiologist. Stenting of the right coronary artery is necessary after obtaining permission from neurosurgery to use dual antiplatelet therapy.    The sheath was removed and a band was applied and the patient was returned to recovery in good: Condition with no immediate complication card catheterization procedure    In the ensuing couple of days this was discussed with the neurosurgical team and interventional cardiologist and the patient was started on dual antiplatelet therapy and heparin and a repeat CT scan showed no evidence of intracranial hemorrhage and therefore the coronary stenting intervention was performed by the interventional cardiologist on October 13. There was successful stent placed in the right coronary artery.     Coronary  Angiography:  The coronary circulation is right dominant.     Coronary Angiography Comments:  Modest coronary calcifications were noted    Right coronary was large and dominant and had a long lesion of 80 to 90% in the mid to proximal right coronary artery. Mild scattered disease throughout the remainder of the right coronary artery.    Left main coronary artery was now normal caliber dimension free of stenoses.    Left anterior descending artery demonstrated a moderate-sized first diagonal small second diagonal small third diagonal and moderate-sized distal left anterior sending segment. There is a 50% stenosis at the first diagonal and the left anterior descending artery, modest diseased distally.    Ramus intermedius was a very small vessel and was normal    Circumflex artery demonstrated a tiny first obtuse marginal large second obtuse marginal absent third obtuse marginal and very small distal circumflex second. This vessel appeared to be normal    No similar collateral flow was noted.         Left Ventriculography:  Left-ventricular gram demonstrated ejection fraction of 60% with no focal abnormalities and no significant mitral sufficiency noted with hand-injection.     Coronary Interventions:  Angiography reveals a % stenosis of the left main coronary artery. Percutaneous coronary intervention was performed within the left main.     Additional Findings:  2 days later patient had a successful stenting of the right coronary artery by interventional cardiologist.       Heart Rhythm:  Patient's heart rhythm was normal sinus.     Hemo Personnel:  +--------------+---------+  Name          Duty       +--------------+---------+  Jonathan Malone MD 1  +--------------+---------+       +---------+  Contrast:  +---------+  Isovue:    +---------+       Hemodynamic Pressures:  Normal left-sided hemodynamics    No  valve gradient pullback    Normal left ventricular stock pressure at approxi-10  mmHg.  +----+---------------+----------+-------------+--------------+-------+---------+  Site   Date Time   Phase NameSystolic mmHgDiastolic mmHgED mmHgMean mmHg  +----+---------------+----------+-------------+--------------+-------+---------+    AO     10/11/2023  AIR REST           98            62              79           8:36:37 AM                                                       +----+---------------+----------+-------------+--------------+-------+---------+    LV     10/11/2023  AIR REST          101             8     20                    8:37:50 AM                                                       +----+---------------+----------+-------------+--------------+-------+---------+    LV     10/11/2023  AIR REST           97            10     15                    8:37:58 AM                                                       +----+---------------+----------+-------------+--------------+-------+---------+   LVp     10/11/2023  AIR REST          101             3     16                    8:38:11 AM                                                       +----+---------------+----------+-------------+--------------+-------+---------+   AOp     10/11/2023  AIR REST          117            64              85           8:38:18 AM                                                       +----+---------------+----------+-------------+--------------+-------+---------+    AO     10/11/2023  AIR REST          133            77             102           8:39:54 AM                                                       +----+---------------+----------+-------------+--------------+-------+---------+    AO     10/11/2023  AIR REST            0            54              64           8:41:26 AM                                                        +----+---------------+----------+-------------+--------------+-------+---------+       Complications:  No in-lab complications observed.     Cardiac Cath Post Procedure Notes:  Post Procedure Diagnosis: Double vessel disease.  Blood Loss:               Estimated blood loss during the procedure was 3 cc                            mls.  Specimens Removed:        Number of specimen(s) removed: none.       Recommendations:  Maximize medical therapy.  Agressive risk factor modification efforts.  Consider referral to cardiac rehabilitation.  Percutaneous coronary intervention.    ____________________________________________________________________________________  CONCLUSIONS:   1. 1. Normal left ventricular contractility.   2. 2. Normal left-sided hemodynamics.   3. 3. Right coronary dominant.   4. 4. Two-vessel coronary disease with 50% lesion in the mid to proximal LAD, 80 to 90% stenosis in the mid to proximal right coronary artery.   5. 5. No collateral flow noted.   6. 2 days later patient had a successful stenting of the right coronary artery by interventional cardiologist.    ICD 10 Codes:  Subsequent non ST elevation (NSTEMI) myocardial infarction-I22.2     [id number] Jonathan Malone MD  Performing Physician  Electronically signed by [id number] Jonathan Malone MD on 10/13/2023 at 2:30:46 PM         ** Final **    Physical Exam  Constitutional:       Appearance: Normal appearance. He is obese.   HENT:      Head: Normocephalic and atraumatic.      Mouth/Throat:      Mouth: Mucous membranes are moist.      Pharynx: No posterior oropharyngeal erythema.   Eyes:      General: No scleral icterus.     Extraocular Movements: Extraocular movements intact.   Cardiovascular:      Rate and Rhythm: Normal rate and regular rhythm.      Heart sounds: No murmur heard.     No friction rub. No gallop.   Pulmonary:      Effort: Pulmonary effort is normal. No respiratory distress.      Breath sounds: Normal breath sounds. No wheezing, rhonchi or  rales.   Abdominal:      General: Abdomen is flat. Bowel sounds are normal. There is no distension.      Palpations: Abdomen is soft.      Tenderness: There is no abdominal tenderness. There is no guarding.   Skin:     Comments: Right lower extremity covered in a bandage, no evidence of creeping erythema or swelling   Neurological:      General: No focal deficit present.      Mental Status: He is alert.     Relevant Results  Scheduled medications  acetaminophen, 650 mg, oral, 4x daily  allopurinol, 100 mg, oral, Daily  aspirin, 81 mg, oral, Daily  atorvastatin, 80 mg, oral, Daily  carvedilol, 12.5 mg, oral, BID  clopidogrel, 75 mg, oral, Daily  cyclobenzaprine, 10 mg, oral, TID  docusate sodium, 100 mg, oral, BID  enoxaparin, 40 mg, subcutaneous, Daily  escitalopram, 20 mg, oral, Daily  glycerin, 1 suppository, rectal, Once  [Held by provider] insulin lispro, 0-5 Units, subcutaneous, q4h  levETIRAcetam, 500 mg, oral, BID  melatonin, 5 mg, oral, Nightly  pantoprazole, 40 mg, oral, Daily before breakfast  perflutren lipid microspheres, 3 mL of dilution, intravenous, Once in imaging  polyethylene glycol, 17 g, oral, Daily  pregabalin, 300 mg, oral, BID  sennosides, 1 tablet, oral, BID  sulfur hexafluoride microsphr, 2 mL, intravenous, Once in imaging  tamsulosin, 0.4 mg, oral, Daily    Continuous medications  lactated Ringer's, 100 mL/hr, Last Rate: Stopped (10/17/23 2215)  lactated Ringer's, 50 mL/hr, Last Rate: 50 mL/hr (10/18/23 1642)  oxygen, 2 L/min      PRN medications  PRN medications: benzocaine-menthol, bisacodyl, diphenhydrAMINE, docusate sodium, HYDROmorphone, hydrOXYzine HCL, naloxone, oxyCODONE, prochlorperazine **OR** prochlorperazine **OR** prochlorperazine, sodium phosphates  Results for orders placed or performed during the hospital encounter of 10/10/23 (from the past 24 hour(s))   POCT GLUCOSE   Result Value Ref Range    POCT Glucose 104 (H) 74 - 99 mg/dL   CBC   Result Value Ref Range    WBC 10.6  4.4 - 11.3 x10*3/uL    nRBC 0.0 0.0 - 0.0 /100 WBCs    RBC 3.52 (L) 4.50 - 5.90 x10*6/uL    Hemoglobin 10.4 (L) 13.5 - 17.5 g/dL    Hematocrit 32.8 (L) 41.0 - 52.0 %    MCV 93 80 - 100 fL    MCH 29.5 26.0 - 34.0 pg    MCHC 31.7 (L) 32.0 - 36.0 g/dL    RDW 16.3 (H) 11.5 - 14.5 %    Platelets 318 150 - 450 x10*3/uL    MPV 9.2 7.5 - 11.5 fL   CBC   Result Value Ref Range    WBC 7.7 4.4 - 11.3 x10*3/uL    nRBC 0.0 0.0 - 0.0 /100 WBCs    RBC 3.33 (L) 4.50 - 5.90 x10*6/uL    Hemoglobin 9.9 (L) 13.5 - 17.5 g/dL    Hematocrit 30.7 (L) 41.0 - 52.0 %    MCV 92 80 - 100 fL    MCH 29.7 26.0 - 34.0 pg    MCHC 32.2 32.0 - 36.0 g/dL    RDW 16.2 (H) 11.5 - 14.5 %    Platelets 322 150 - 450 x10*3/uL    MPV 9.2 7.5 - 11.5 fL   Renal Function Panel   Result Value Ref Range    Glucose 100 (H) 74 - 99 mg/dL    Sodium 135 (L) 136 - 145 mmol/L    Potassium 4.1 3.5 - 5.3 mmol/L    Chloride 98 98 - 107 mmol/L    Bicarbonate 32 21 - 32 mmol/L    Anion Gap 9 (L) 10 - 20 mmol/L    Urea Nitrogen 8 6 - 23 mg/dL    Creatinine 0.87 0.50 - 1.30 mg/dL    eGFR >90 >60 mL/min/1.73m*2    Calcium 8.3 (L) 8.6 - 10.3 mg/dL    Phosphorus 5.1 (H) 2.5 - 4.9 mg/dL    Albumin 2.9 (L) 3.4 - 5.0 g/dL   Magnesium   Result Value Ref Range    Magnesium 1.72 1.60 - 2.40 mg/dL   POCT GLUCOSE   Result Value Ref Range    POCT Glucose 105 (H) 74 - 99 mg/dL   CBC   Result Value Ref Range    WBC 8.1 4.4 - 11.3 x10*3/uL    nRBC 0.0 0.0 - 0.0 /100 WBCs    RBC 3.35 (L) 4.50 - 5.90 x10*6/uL    Hemoglobin 9.8 (L) 13.5 - 17.5 g/dL    Hematocrit 31.3 (L) 41.0 - 52.0 %    MCV 93 80 - 100 fL    MCH 29.3 26.0 - 34.0 pg    MCHC 31.3 (L) 32.0 - 36.0 g/dL    RDW 16.2 (H) 11.5 - 14.5 %    Platelets 314 150 - 450 x10*3/uL    MPV 9.4 7.5 - 11.5 fL     Electrocardiogram 12 Lead    Result Date: 10/14/2023  Sinus bradycardia Otherwise normal ECG When compared with ECG of 10-OCT-2023 15:07, No significant change was found Confirmed by Jonathan Malone ([id number]) on 10/14/2023 3:57:57 PM    Cardiac  catheterization - coronary    Result Date: 10/13/2023   [facility] [place] Lab      [address] [address] Cardiovascular Catheterization Report Patient Name:      JESUS METCALF      Performing           [id number] Rosalie Le                                        Physician:           MD Study Date:        10/13/2023          Verifying Physician: Dusty Le MD MRN/PID:           [id number]            Cardiologist:        [id number] Jonathan Malone MD Accession#:        [id number]        Ordering Provider:   [id number] ROSALIE LE Date of Birth/Age: 1965 / 58      Fellow:                    years Gender:            M                   Fellow: Encounter#:        [id number]  Study: PCI - Percutaneous Coronary Intervention  Indications: JESUS METCALF is a 59 year old male who presents with diabetes, dyslipidemia, prior myocardial infarction and a chest pain assessment of atypical angina. NSTE - ACS. Stress test performed: No. CTA performed: No. Chris accessed: No. LVEF Assessed: Yes. LVEF = 50-55%. Cardiac arrest: No. Cardiac surgical consult: No. Cardiovascular Instability: No Frailty status of patient entering lab: 7 = Severely frail.  Procedure Description: After infiltration with 2% Lidocaine, the right radial artery was cannulated with a modified Seldinger technique. Subsequently a 6 [place] sheath was placed in the right radial artery. Selective coronary catheterization was performed using a 6 Fr catheter(s) exchanged over a guide wire to cannulate the coronary arteries. After completion of the procedure, the arterial sheath was pulled and a TR Band Radial Compression Device was utilized to obtain patent hemostasis.  Coronary Angiography: The coronary circulation is right dominant.  Right Coronary Artery Distribution: The right coronary artery is a normal caliber vessel. The RCA arises normally from the right sinus  of Valsalva, supplies the right posterolateral descending artery and supplies the posterolateral system. The proximal to mid right coronary artery showed 80% stenosis. Lesion length was estimated at 21 mm. This lesion's characteristics fall within the ACC/AHA High/C classification. This lesion determined to be suitable for percutaneous coronary intervention.  Coronary Interventions: Angiography reveals a 80% stenosis of the proximal to mid right coronary artery coronary artery. Pre-intervention ALBERTINA flow was 3. Percutaneous coronary intervention was performed within the proximal to mid right coronary artery. The vessel was pre-dilated using a compliant balloon 2.5 mm x 15 mm [place] drug-eluting stent 3.5 mm x 22 mm was advanced to the lesion and implanted. The stenosis was successfully reduced from 80% to 0%. Post-intervention ALBERTINA flow was 3. Used a 6Fr JR4 guide with poor support. Needed a Guideliner for additional support. Would recommend use of an AR1 or AR2 in future for better guide support.  Coronary Lesion Summary: Vessel   Stenosis   Vessel Segment  Length (mm) RCA    80% stenosis proximal to mid     21  Hemo Personnel: +------------------------+-------------+ Name                    Duty          +------------------------+-------------+ Juvenal Le MD            PROC MD 1 +------------------------+-------------+ Harshal Galindo RT PROC SCRUB 1 +------------------------+-------------+ Lily Davis RN           PROC CIRC 1 +------------------------+-------------+ Vic Franco RN          PROC RECORD 1 +------------------------+-------------+  Hemodynamic Pressures:  +----+---------------------+----------+-------------+--------------+---------+ Site      Date Time      Phase NameSystolic mmHgDiastolic mmHgMean mmHg +----+---------------------+----------+-------------+--------------+---------+   AO10/13/2023 1:19:39 PM  AIR REST          111            58       80  +----+---------------------+----------+-------------+--------------+---------+   AO10/13/2023 1:19:42 PM  AIR REST          109            55       76 +----+---------------------+----------+-------------+--------------+---------+   AO10/13/2023 1:19:49 PM  AIR REST          110            54       76 +----+---------------------+----------+-------------+--------------+---------+   AO10/13/2023 1:19:54 PM  AIR REST          111            48       76 +----+---------------------+----------+-------------+--------------+---------+   AO10/13/2023 1:20:18 PM  AIR REST          105            52       73 +----+---------------------+----------+-------------+--------------+---------+   AO10/13/2023 1:24:56 PM  AIR REST          100            53       72 +----+---------------------+----------+-------------+--------------+---------+   AO10/13/2023 1:28:44 PM  AIR REST          119            60       83 +----+---------------------+----------+-------------+--------------+---------+   AO10/13/2023 1:30:05 PM  AIR REST          130            67       90 +----+---------------------+----------+-------------+--------------+---------+   AO10/13/2023 1:34:29 PM  AIR REST          120            62       83 +----+---------------------+----------+-------------+--------------+---------+  Complications: No in-lab complications observed.  Cardiac Cath Post Procedure Notes: Post Procedure Diagnosis: Single vessel disease. Blood Loss:               Estimated blood loss during the procedure was 0 mls. Specimens Removed:        Number of specimen(s) removed: none.  Recommendations: Maximize medical therapy. Agressive risk factor modification efforts. Anti-platelet therapy with Aspirin and Clopidogrel (Plavix) 75mg daily. Consider referral to cardiac rehabilitation. ____________________________________________________________________________________ CONCLUSIONS:  1. Successful PCI of the  prox to mid RCA with [place] 3.5x22mm JUSTO upsized to 4 mm. ICD 10 Codes: Non ST elevation (NSTEMI) myocardial infarction-I21.4  CPT Codes: Stent w angioplasty Right Coronary single major Artery branch (PCI)-35061.  [id number] Juvenal Le MD Performing Physician Electronically signed by [id number] Juvenal Le MD on 10/13/2023 at 4:18:13 PM  ** Final **       Assessment/Plan   Principal Problem:    Ankle fracture, right, closed, initial encounter  Active Problems:    H/O fracture of ankle    Syncope, unspecified syncope type    NSTEMI (non-ST elevated myocardial infarction) (CMS/[facility])    Gm is a 58-year old male with a past medical history of MDD, T2 DM, BPH, hypertension, hyperlipidemia, JEMAL, chronic pain in neck and back, lumbar radiculopathy, and heroin use who was transferred from [facility] in the setting of recent lumbar surgery with subarachnoid hemorrhage/subdural hematoma for syncope and ankle pain secondary to the syncope. Upon evaluation, he was found to have a new fracture of the right distal tibia and fibula, potential retroperitoneal abscess on MRI, elevated troponin, elevated CK. Patient was admitted for management of his fracture, syncope etiology, and elevated troponin that proved to be an NSTEMI.  Right tibia intramedullary fixation performed on 10/17, pain regiment in place.  Plan for discharge pending clinical stability and PT/OT recs.     #Displaced Distal right tibia and comminuted distal right fibula fracture.   -Repeated x-ray showing displaced and comminuted fracture of the distal tibia diaphysis and distal fibula diaphysis.   -Right tibia intramedullary fixation performed on 10/17  -Pain control with scheduled Tylenol and oxycodone with PRN Dilaudid for severe pain.   -Continue Flexeril  -Continue bowel regimen consisting of Miralax/senokot  -PT OT     #NSTEMI  -Initially Troponinemia, without chest pain or EKG changes, now s/p cardiac catheterization (diagnostic)  -Diagnostic cardiac  catheterization 10/11/2023 which demonstrated a proximal RCA stenosis of 90%, PCI with stent placed to RCA on 10/13  -Echocardiogram 10/10 revealed EF of 50-55%, no regional wall motion abnormalities  -Continue DAPT  -Continue Atorvastatin 80 daily  -Check mental status every shift     #Vasovagal Syncope  -Patient states syncopal event occurred 2/2 to pain but also endorsed poor P.O. intake.   -Trend CMP, CBC. Previous labs from outside facility yesterday and  illustrated no obvious metabolic or toxicological etiology.   -Telemetry continued, no malignant arrythmias seen on tele since admission  -TTE revealed EF of 50 to 55%, no structural heart disease     #Less likely Retroperitoneal Abscess Vs. Likely Postsurgical Fluid Collection  -[place] [facility] [place] facility reviewed MRI and assessed that the findings on MRI were consistent with normal post-operative changes rather than an abscess  -Wound dry, intact, no pain with palpation.   -Infectious Disease following, continue to appreciate recs.   ---> ID had a discussion with neurosurgery deemed a low likelihood of abscess more likely fluid collection postsurgical and noninfectious.  ----> As per ID discontinued cefepime and vancomycin (10/11)  -Admission blood cultures from 10/10 NGTD     #Recent lumbar fusion(9/29/2023) c/b   - Post op SAH/SDH (10/3), SAH resolved on imaging at OSH, SDH persists   - Intraoperative dural tear s/p dural patch  -Keppra for seizure prophylaxis  -CT head 10/13/2023 negative for any acute bleeds  -Continue to monitor neuro status while on DAPT.     Chronic Conditions  #Primary HTN:  -Patient takes no medication for this at home  -Continue Coreg 12.5mg    #Diabetes Type 2: Known history, with recent A1c subdiabetic  -A1c 5.7  -Hold home metformin at this time  -No insulin at this time as patient's glucose has been ranging ,  -Continue POCT glucose checks  #Gout: Known hx  -Continue home allopurinol  #BPH:  known hx  -Will continue Tamsulosin 0.4 mg daily      IVF: LR 50mL/hr   Diet: Cardiac  DVT Prophylaxis: Lovenox  Consults: Cardiology, Neurosurgery, orthopedic surgery.   Dispo: Anticipate additional 1-2 midnights hospital stay pending PT/OT evaluation after surgery     Code Status: Full Code    The assessment and plan were discussed with the senior resident and attending physician,    Erick Waite,  PGY-1

## 2023-10-18 NOTE — PROGRESS NOTES
10/18/23 1152   Discharge Planning   Living Arrangements Spouse/significant other   Support Systems Spouse/significant other   Type of Residence Private residence   Number of Stairs to Enter Residence 0   Number of Stairs Within Residence 0   Who is requesting discharge planning? Provider   Patient expects to be discharged to: home with [facility]   Financial Resource Strain   How hard is it for you to pay for the very basics like food, housing, medical care, and heating? Not hard   Housing Stability   In the last 12 months, was there a time when you were not able to pay the mortgage or rent on time? N   In the last 12 months, was there a time when you did not have a steady place to sleep or slept in a shelter (including now)? N   Transportation Needs   In the past 12 months, has lack of transportation kept you from medical appointments or from getting medications? no   In the past 12 months, has lack of transportation kept you from meetings, work, or from getting things needed for daily living? No   Patient Choice   Provider Choice list and CMS website (https://medicare.gov/care-compare#search) for post-acute Quality and Resource Measure Data were provided and reviewed with: Other (Comment)  (Patient preference is [facility] d/t familiarity with agency)     Met with patient at bedside. Confirmed demographic information. Pt lives at home with wife in [place]. Patient not currently using DME but states he has a walker and cane at home. Pharmacy is [facility] in [place]. PCP is Marsha Francis, patient last saw about 3 months ago. Patient states goal is to return home at d/c and [facility] agency preference is [facility]. Therapy evals are pending.

## 2023-10-18 NOTE — CONSULTS
Nutrition Assessment Note  Assessment Subjective/Objective:   Note Type:  auto referral s/p heart cath with intervention     Note Authored by: Registered Dietitian Nutritionist   Pager Number: [phone] secure chat     Nutrition Note:  Gm Thrasher is a 58 y.o. male transferred from OSH on 10/10 s/p syncopal episode. Pt recently treated here 9/29/2023, for lumbar decompression and fusion complicated by postoperative subarachnoid hemorrhage and subdural hematoma; OSH concerns for retroperitoneal abscess however - findings per Neuro Surgery eval on 10/10.  Work up further finding displaced distal L tibia fracture with comminuted distal L fibula fracture as well as NSTEMI. Pt s/p heart cath on 10/13 with PCI and JUSTO to RCA; pt s/p right tibia intramedullary fixation on 10/17.    Past Medical History   has a past medical history of Borderline diabetes, Emphysema lung (CMS/HCC) (9/20/2023), H/O chest x-ray, H/O CT scan, H/O CT scan of abdomen, H/O CT scan of brain, H/O CT scan of brain (10/10/2023), H/O CT scan of chest, H/O CT scan of chest (10/10/2023), H/O diagnostic ultrasound, H/O echocardiogram, H/O magnetic resonance imaging of cervical spine, H/O magnetic resonance imaging of lumbar spine, H/O magnetic resonance imaging of lumbar spine (10/10/2023), H/O x-ray of lower extremity (10/10/2023), and High cholesterol.    He has no past medical history of Arthritis, Asthma, Cancer (CMS/[facility]), or CHF (congestive heart failure) (CMS/[facility]).  Surgical History   has a past surgical history that includes Kidney surgery (09/12/2013); Lumbar fusion (10/06/2023); Other surgical history (01/28/2020); Other surgical history (01/28/2020); Other surgical history (01/28/2020); Other surgical history (01/28/2020); Cardiac catheterization; Cervical laminectomy; Esophagogastroduodenoscopy; Lumbar laminectomy; Lumbar fusion (09/30/2023); Cardiac catheterization (Right, 10/11/2023); and Cardiac catheterization (N/A,  "10/13/2023).      Objective Information:    Pain: Pain Assessment: 0-10  Pain Score: 10 - Worst possible pain  Pain Type: Surgical pain  Pain Location: Ankle (Pt also c/o of R knee pain.)  Pain Orientation: Right  Pain Radiating Towards: groin, ankle  Pain Descriptors: Aching  Pain Frequency: Constant/continuous    Vitals: Blood pressure 109/62, pulse 66, temperature 36.1 °C (97 °F), temperature source Temporal, resp. rate 18, height 1.8 m (5' 10.87\"), weight 115 kg (253 lb 11.2 oz), SpO2 93 %.    Height/Weight:  Height: 180cm   Weight (kg): 114.8kg   BMI (kg/m2): 35kg/m2  IBW 78kg   Weight history/ % weight change:   10/18: 115kg  8/2023: 120kg  7/2023: 117.9kg  1/2023: 116.8kg  Pt reports hx of intentional weight loss \"I was on Ozempic to lose weight to help with my back and legs.\"  Significant Weight Loss: no.  Interpretation of Weight Loss: reported intentional losses       Recent Lab Results:  Lab Results   Component Value Date    GLUCOSE 100 (H) 10/18/2023    CALCIUM 8.3 (L) 10/18/2023     (L) 10/18/2023    K 4.1 10/18/2023    CO2 32 10/18/2023    CL 98 10/18/2023    BUN 8 10/18/2023    CREATININE 0.87 10/18/2023       Scheduled medications  acetaminophen, 650 mg, oral, 4x daily  allopurinol, 100 mg, oral, Daily  aspirin, 81 mg, oral, Daily  atorvastatin, 80 mg, oral, Daily  carvedilol, 12.5 mg, oral, BID  clopidogrel, 75 mg, oral, Daily  cyclobenzaprine, 10 mg, oral, TID  docusate sodium, 100 mg, oral, BID  enoxaparin, 40 mg, subcutaneous, Daily  escitalopram, 20 mg, oral, Daily  glycerin, 1 suppository, rectal, Once  [Held by provider] insulin lispro, 0-5 Units, subcutaneous, q4h  levETIRAcetam, 500 mg, oral, BID  melatonin, 5 mg, oral, Nightly  pantoprazole, 40 mg, oral, Daily before breakfast  perflutren lipid microspheres, 3 mL of dilution, intravenous, Once in imaging  polyethylene glycol, 17 g, oral, Daily  pregabalin, 300 mg, oral, BID  sennosides, 1 tablet, oral, BID  sulfur hexafluoride " microsphr, 2 mL, intravenous, Once in imaging  tamsulosin, 0.4 mg, oral, Daily      Continuous medications  lactated Ringer's, 100 mL/hr, Last Rate: Stopped (10/17/23 2215)  lactated Ringer's, 50 mL/hr, Last Rate: 50 mL/hr (10/18/23 1642)  oxygen, 2 L/min      PRN medications  PRN medications: benzocaine-menthol, bisacodyl, diphenhydrAMINE, docusate sodium, HYDROmorphone, hydrOXYzine HCL, naloxone, oxyCODONE, prochlorperazine **OR** prochlorperazine **OR** prochlorperazine, sodium phosphates    Nutrition Orders:  Dietary Orders (From admission, onward)       Start     Ordered    10/18/23 0746  Adult diet Regular  Diet effective now        Question:  Diet type  Answer:  Regular    10/18/23 0745    10/10/23 0424  May Participate in Room Service  Once        Question:  .  Answer:  Yes    10/10/23 0424                     Food/Nutrition Related History:  Energy Intake: pt reports good appetite at home  Food/Nutrition Related History:    GI Symptoms:  no BM reported with acute care stay   Time Frame for GI Symptoms Greater Than 2 Weeks:    Oral Problems: denies   Mobility: right leg in cast     Food Allergies: denies   Nutritional Supplements: denies     Nutrition Focused Physical Findings:   Physical Findings: deferred as per pt request.         Edema: none reported      Skin: back incisions, right leg incisions     Estimated Needs:   kcals/day: 1900-2200kcal   Predictive Equation Used: kcals/kg;  17-19kcal/kg   gms protein/day: 94-109g (1.2-1.4g/kg IBW)   Weight Used: IBW 78kg   mL fluid/day: 1mL/kcal/day or as per physician.     Nutrition Diagnosis:   New:yes  Increased nutrient needs related to acute nutritional stress as evidenced by syncopal with resulting right distal tibia fracture, inconsistent oral intakes since admit w/ recent hospitalization for lumbar decompression.    Inconsistent oral intakes related to altered GI function as evidenced by constipation as no BM through 10/10 admit.         Nutrition  "Interventions:  Coordination of Care with: nurse--dulcolax started 10/18.     Nutrition Education:  Diet Education:   10/18: Met with pt at bedside on 10/17. [facility] services reviewed in detail as \"I am a picky eater but I am finding some things I like.\"  Pt denies need for scheduled snacks or oral nutrition supplements; aware he can order own snacks. Pt currently denies need for home going education.   Education Provided to: Pt       Nutrition Goals:  Goals: Nutrition Therapy:     oral intake >75%, consumed provided oral supplement, adequate PO fluid intakes, Blood Glucose  mg/dl, lab values within normal limits, electrolytes within normal limits, promote healing, maintain stable wt     Nutrition Goal Outcomes: goal not met.  Will assess progress towards goals upon follow-up.       Nutrition Therapy Recommendations:   Recommendations:   Diet: currently continue regular diet in an attempt to encourage oral intakes.   Refer to cardiac rehab on discharge when appropriate.   May need to consider Senna x2 doses if no results with dulcolax.          Dietitian Monitoring and Evaluation Plan:  Monitoring and Evaluation Plan: PO intake/tolerance, fluid intake/adequacy, weight trend, stool output, urine output, skin healing/integrity, overall appearance, meal behavior,  digestive function,tolerance/adequacy, labs      Follow Up  Time Spent (min): 45 minutes  Last Date of Nutrition Visit: 10/18/23  Nutrition Follow-Up Needed?: 5-7 days      "

## 2023-10-18 NOTE — ANESTHESIA PROCEDURE NOTES
Airway  Date/Time: 10/17/2023 8:11 PM  Urgency: elective    Airway not difficult    Staffing  Performed: CRNA   Authorized by: Erick Briggs DO    Performed by: JARED Abarca-TELMA  Patient location during procedure: OR    Indications and Patient Condition  Indications for airway management: anesthesia and airway protection  Spontaneous ventilation: present  Sedation level: no sedation  Preoxygenated: yes  Patient position: sniffing  MILS maintained throughout  Mask difficulty assessment: 1 - vent by mask    Final Airway Details  Final airway type: endotracheal airway      Successful airway: ETT  Cuffed: yes   Successful intubation technique: video laryngoscopy  Blade: Obie  Blade size: #4  ETT size (mm): 7.5  Cormack-Lehane Classification: grade I - full view of glottis  Placement verified by: chest auscultation and capnometry   Measured from: lips  ETT to lips (cm): 23  Number of attempts at approach: 1

## 2023-10-19 ENCOUNTER — HOME HEALTH ADMISSION (OUTPATIENT)
Dept: HOME HEALTH SERVICES | Facility: HOME HEALTH | Age: 58
End: 2023-10-19
Payer: COMMERCIAL

## 2023-10-19 LAB
ALBUMIN SERPL BCP-MCNC: 2.9 G/DL (ref 3.4–5)
ANION GAP SERPL CALC-SCNC: 12 MMOL/L (ref 10–20)
BUN SERPL-MCNC: 8 MG/DL (ref 6–23)
CALCIUM SERPL-MCNC: 8.3 MG/DL (ref 8.6–10.3)
CHLORIDE SERPL-SCNC: 99 MMOL/L (ref 98–107)
CO2 SERPL-SCNC: 29 MMOL/L (ref 21–32)
CREAT SERPL-MCNC: 0.87 MG/DL (ref 0.5–1.3)
ERYTHROCYTE [DISTWIDTH] IN BLOOD BY AUTOMATED COUNT: 16.2 % (ref 11.5–14.5)
GFR SERPL CREATININE-BSD FRML MDRD: >90 ML/MIN/1.73M*2
GLUCOSE SERPL-MCNC: 89 MG/DL (ref 74–99)
HCT VFR BLD AUTO: 29.4 % (ref 41–52)
HGB BLD-MCNC: 9.4 G/DL (ref 13.5–17.5)
MAGNESIUM SERPL-MCNC: 1.8 MG/DL (ref 1.6–2.4)
MCH RBC QN AUTO: 29.7 PG (ref 26–34)
MCHC RBC AUTO-ENTMCNC: 32 G/DL (ref 32–36)
MCV RBC AUTO: 93 FL (ref 80–100)
NRBC BLD-RTO: 0 /100 WBCS (ref 0–0)
PHOSPHATE SERPL-MCNC: 4.2 MG/DL (ref 2.5–4.9)
PLATELET # BLD AUTO: 281 X10*3/UL (ref 150–450)
PMV BLD AUTO: 9.7 FL (ref 7.5–11.5)
POTASSIUM SERPL-SCNC: 3.7 MMOL/L (ref 3.5–5.3)
RBC # BLD AUTO: 3.17 X10*6/UL (ref 4.5–5.9)
SODIUM SERPL-SCNC: 136 MMOL/L (ref 136–145)
WBC # BLD AUTO: 7.6 X10*3/UL (ref 4.4–11.3)

## 2023-10-19 PROCEDURE — 1200000002 HC GENERAL ROOM WITH TELEMETRY DAILY

## 2023-10-19 PROCEDURE — 96372 THER/PROPH/DIAG INJ SC/IM: CPT | Performed by: ORTHOPAEDIC SURGERY

## 2023-10-19 PROCEDURE — 97530 THERAPEUTIC ACTIVITIES: CPT | Mod: GO | Performed by: OCCUPATIONAL THERAPY ASSISTANT

## 2023-10-19 PROCEDURE — 97110 THERAPEUTIC EXERCISES: CPT | Mod: GP,CQ

## 2023-10-19 PROCEDURE — 2500000004 HC RX 250 GENERAL PHARMACY W/ HCPCS (ALT 636 FOR OP/ED)

## 2023-10-19 PROCEDURE — 2500000004 HC RX 250 GENERAL PHARMACY W/ HCPCS (ALT 636 FOR OP/ED): Performed by: ORTHOPAEDIC SURGERY

## 2023-10-19 PROCEDURE — 2500000004 HC RX 250 GENERAL PHARMACY W/ HCPCS (ALT 636 FOR OP/ED): Performed by: INTERNAL MEDICINE

## 2023-10-19 PROCEDURE — 2500000001 HC RX 250 WO HCPCS SELF ADMINISTERED DRUGS (ALT 637 FOR MEDICARE OP): Performed by: ORTHOPAEDIC SURGERY

## 2023-10-19 PROCEDURE — 85027 COMPLETE CBC AUTOMATED: CPT

## 2023-10-19 PROCEDURE — 36415 COLL VENOUS BLD VENIPUNCTURE: CPT

## 2023-10-19 PROCEDURE — 2500000001 HC RX 250 WO HCPCS SELF ADMINISTERED DRUGS (ALT 637 FOR MEDICARE OP): Performed by: INTERNAL MEDICINE

## 2023-10-19 PROCEDURE — 80069 RENAL FUNCTION PANEL: CPT

## 2023-10-19 PROCEDURE — 2500000001 HC RX 250 WO HCPCS SELF ADMINISTERED DRUGS (ALT 637 FOR MEDICARE OP)

## 2023-10-19 PROCEDURE — 83735 ASSAY OF MAGNESIUM: CPT

## 2023-10-19 PROCEDURE — 97116 GAIT TRAINING THERAPY: CPT | Mod: GP,CQ

## 2023-10-19 PROCEDURE — 97535 SELF CARE MNGMENT TRAINING: CPT | Mod: GO | Performed by: OCCUPATIONAL THERAPY ASSISTANT

## 2023-10-19 PROCEDURE — 99232 SBSQ HOSP IP/OBS MODERATE 35: CPT

## 2023-10-19 RX ORDER — OXYCODONE HYDROCHLORIDE 5 MG/1
5 TABLET ORAL EVERY 6 HOURS PRN
Status: DISCONTINUED | OUTPATIENT
Start: 2023-10-19 | End: 2023-10-20 | Stop reason: HOSPADM

## 2023-10-19 RX ORDER — OXYCODONE HYDROCHLORIDE 10 MG/1
10 TABLET ORAL EVERY 6 HOURS PRN
Status: DISCONTINUED | OUTPATIENT
Start: 2023-10-19 | End: 2023-10-20 | Stop reason: HOSPADM

## 2023-10-19 RX ADMIN — ACETAMINOPHEN 650 MG: 325 TABLET ORAL at 13:11

## 2023-10-19 RX ADMIN — ASPIRIN 81 MG 81 MG: 81 TABLET ORAL at 08:54

## 2023-10-19 RX ADMIN — SODIUM CHLORIDE, POTASSIUM CHLORIDE, SODIUM LACTATE AND CALCIUM CHLORIDE 100 ML/HR: 600; 310; 30; 20 INJECTION, SOLUTION INTRAVENOUS at 16:39

## 2023-10-19 RX ADMIN — Medication 5 MG: at 21:01

## 2023-10-19 RX ADMIN — PREGABALIN 300 MG: 150 CAPSULE ORAL at 08:54

## 2023-10-19 RX ADMIN — OXYCODONE HYDROCHLORIDE 10 MG: 10 TABLET ORAL at 08:54

## 2023-10-19 RX ADMIN — SENNOSIDES 8.6 MG: 8.6 TABLET, FILM COATED ORAL at 08:54

## 2023-10-19 RX ADMIN — LEVETIRACETAM 500 MG: 500 TABLET, FILM COATED ORAL at 08:54

## 2023-10-19 RX ADMIN — CLOPIDOGREL BISULFATE 75 MG: 75 TABLET ORAL at 08:54

## 2023-10-19 RX ADMIN — SENNOSIDES 8.6 MG: 8.6 TABLET, FILM COATED ORAL at 21:01

## 2023-10-19 RX ADMIN — CYCLOBENZAPRINE 10 MG: 10 TABLET, FILM COATED ORAL at 08:54

## 2023-10-19 RX ADMIN — PREGABALIN 300 MG: 150 CAPSULE ORAL at 21:02

## 2023-10-19 RX ADMIN — HYDROMORPHONE HYDROCHLORIDE 1 MG: 1 INJECTION, SOLUTION INTRAMUSCULAR; INTRAVENOUS; SUBCUTANEOUS at 04:08

## 2023-10-19 RX ADMIN — ACETAMINOPHEN 650 MG: 325 TABLET ORAL at 16:39

## 2023-10-19 RX ADMIN — ACETAMINOPHEN 650 MG: 325 TABLET ORAL at 06:03

## 2023-10-19 RX ADMIN — DOCUSATE SODIUM 100 MG: 100 CAPSULE, LIQUID FILLED ORAL at 21:02

## 2023-10-19 RX ADMIN — DOCUSATE SODIUM 100 MG: 100 CAPSULE, LIQUID FILLED ORAL at 08:54

## 2023-10-19 RX ADMIN — ATORVASTATIN CALCIUM 80 MG: 80 TABLET, FILM COATED ORAL at 21:02

## 2023-10-19 RX ADMIN — OXYCODONE HYDROCHLORIDE 10 MG: 10 TABLET ORAL at 21:01

## 2023-10-19 RX ADMIN — TAMSULOSIN HYDROCHLORIDE 0.4 MG: 0.4 CAPSULE ORAL at 08:54

## 2023-10-19 RX ADMIN — OXYCODONE HYDROCHLORIDE 10 MG: 10 TABLET ORAL at 14:58

## 2023-10-19 RX ADMIN — LEVETIRACETAM 500 MG: 500 TABLET, FILM COATED ORAL at 21:02

## 2023-10-19 RX ADMIN — HYDROMORPHONE HYDROCHLORIDE 1 MG: 1 INJECTION, SOLUTION INTRAMUSCULAR; INTRAVENOUS; SUBCUTANEOUS at 09:43

## 2023-10-19 RX ADMIN — ACETAMINOPHEN 650 MG: 325 TABLET ORAL at 21:02

## 2023-10-19 RX ADMIN — PANTOPRAZOLE SODIUM 40 MG: 40 TABLET, DELAYED RELEASE ORAL at 08:54

## 2023-10-19 RX ADMIN — ESCITALOPRAM OXALATE 20 MG: 20 TABLET, FILM COATED ORAL at 08:54

## 2023-10-19 RX ADMIN — ALLOPURINOL 100 MG: 100 TABLET ORAL at 08:54

## 2023-10-19 RX ADMIN — OXYCODONE HYDROCHLORIDE 10 MG: 10 TABLET ORAL at 00:33

## 2023-10-19 RX ADMIN — CARVEDILOL 12.5 MG: 12.5 TABLET, FILM COATED ORAL at 08:54

## 2023-10-19 RX ADMIN — ENOXAPARIN SODIUM 40 MG: 40 INJECTION SUBCUTANEOUS at 08:55

## 2023-10-19 RX ADMIN — CYCLOBENZAPRINE 10 MG: 10 TABLET, FILM COATED ORAL at 21:02

## 2023-10-19 RX ADMIN — CYCLOBENZAPRINE 10 MG: 10 TABLET, FILM COATED ORAL at 14:58

## 2023-10-19 ASSESSMENT — COGNITIVE AND FUNCTIONAL STATUS - GENERAL
DRESSING REGULAR UPPER BODY CLOTHING: A LOT
DAILY ACTIVITIY SCORE: 15
CLIMB 3 TO 5 STEPS WITH RAILING: TOTAL
WALKING IN HOSPITAL ROOM: A LOT
DRESSING REGULAR LOWER BODY CLOTHING: TOTAL
STANDING UP FROM CHAIR USING ARMS: A LOT
CLIMB 3 TO 5 STEPS WITH RAILING: TOTAL
STANDING UP FROM CHAIR USING ARMS: A LOT
TURNING FROM BACK TO SIDE WHILE IN FLAT BAD: A LOT
TOILETING: A LOT
MOVING FROM LYING ON BACK TO SITTING ON SIDE OF FLAT BED WITH BEDRAILS: A LITTLE
MOVING FROM LYING ON BACK TO SITTING ON SIDE OF FLAT BED WITH BEDRAILS: A LITTLE
MOBILITY SCORE: 12
DAILY ACTIVITIY SCORE: 15
MOVING TO AND FROM BED TO CHAIR: A LOT
DRESSING REGULAR LOWER BODY CLOTHING: TOTAL
TOILETING: A LOT
WALKING IN HOSPITAL ROOM: A LOT
MOVING TO AND FROM BED TO CHAIR: A LOT
HELP NEEDED FOR BATHING: A LOT
DRESSING REGULAR UPPER BODY CLOTHING: A LOT
HELP NEEDED FOR BATHING: A LOT
TURNING FROM BACK TO SIDE WHILE IN FLAT BAD: A LOT
MOBILITY SCORE: 12

## 2023-10-19 ASSESSMENT — PAIN SCALES - GENERAL
PAINLEVEL_OUTOF10: 10 - WORST POSSIBLE PAIN
PAINLEVEL_OUTOF10: 9
PAINLEVEL_OUTOF10: 10 - WORST POSSIBLE PAIN
PAINLEVEL_OUTOF10: 5 - MODERATE PAIN
PAINLEVEL_OUTOF10: 9
PAINLEVEL_OUTOF10: 10 - WORST POSSIBLE PAIN
PAINLEVEL_OUTOF10: 9

## 2023-10-19 ASSESSMENT — PAIN DESCRIPTION - DESCRIPTORS
DESCRIPTORS: SHARP;ACHING
DESCRIPTORS: ACHING;SHARP

## 2023-10-19 ASSESSMENT — ACTIVITIES OF DAILY LIVING (ADL): HOME_MANAGEMENT_TIME_ENTRY: 15

## 2023-10-19 ASSESSMENT — PAIN - FUNCTIONAL ASSESSMENT: PAIN_FUNCTIONAL_ASSESSMENT: 0-10

## 2023-10-19 NOTE — PROGRESS NOTES
"Jesus Thrasher is a 58 y.o. male on day 9 of admission presenting with Ankle fracture, right, closed, initial encounter.    Subjective   No acute events.  Patient seen and evaluated at bedside.  Patient continues to complain of right knee pain, but notes that the ankle that was operated on feels \"great\".  Denies any fevers, chills, night sweats, dysuria, hematuria, diarrhea, chest pain, or shortness of breath.  Objective     Last Recorded Vitals  /78 (BP Location: Right arm, Patient Position: Sitting)   Pulse 68   Temp 36.2 °C (97.2 °F)   Resp 18   Wt 115 kg (253 lb 11.2 oz)   SpO2 93%   Intake/Output last 3 Shifts:    Intake/Output Summary (Last 24 hours) at 10/19/2023 1434  Last data filed at 10/19/2023 0900  Gross per 24 hour   Intake 209.16 ml   Output 4150 ml   Net -3940.84 ml     Admission Weight  Weight: 114 kg (251 lb 4.8 oz) (10/10/23 0358)    Daily Weight  10/17/23 : 115 kg (253 lb 11.2 oz)    Image Results  Cardiac catheterization - coronary     [facility] [place] Lab       [id number] [address]    Cardiovascular Catheterization Report    Patient Name:      JESUS THRASHER      Performing           [id number] Juvenal Le                                         Physician:           MD  Study Date:        10/13/2023          Verifying Physician: Dusty Le MD  MRN/PID:           [id number]            Cardiologist:        [id number] Jonathan Malone MD  Accession#:        [id number]        Ordering Provider:   Dusty LE  Date of Birth/Age: 1965 / 58      Fellow:                     years  Gender:            M                   Fellow:  Encounter#:        [id number]       Study: PCI - Percutaneous Coronary Intervention       Indications:  JESUS THRASHER is a 59 year old male who presents with diabetes, dyslipidemia, prior myocardial infarction and a chest pain assessment of atypical angina. " NSTE - ACS.  Stress test performed: No. CTA performed: No. Agaton accessed: No. LVEF  Assessed: Yes. LVEF = 50-55%.  Cardiac arrest: No.  Cardiac surgical consult: No.  Cardiovascular Instability: No  Frailty status of patient entering lab: 7 = Severely frail.       Procedure Description:  After infiltration with 2% Lidocaine, the right radial artery was cannulated with a modified Seldinger technique. Subsequently a 6 Citizen of [place] sheath was placed in the right radial artery. Selective coronary catheterization was performed using a 6 Fr catheter(s) exchanged over a guide wire to cannulate the coronary arteries.  After completion of the procedure, the arterial sheath was pulled and a TR Band Radial Compression Device was utilized to obtain patent hemostasis.     Coronary Angiography:  The coronary circulation is right dominant.     Right Coronary Artery Distribution:    The right coronary artery is a normal caliber vessel. The RCA arises normally from the right sinus of Valsalva, supplies the right posterolateral descending artery and supplies the posterolateral system. The proximal to mid right coronary artery showed 80% stenosis. Lesion length was estimated at 21 mm. This lesion's characteristics fall within the ACC/AHA High/C classification. This lesion determined to be suitable for percutaneous coronary intervention.     Coronary Interventions:  Angiography reveals a 80% stenosis of the proximal to mid right coronary artery coronary artery. Pre-intervention ALBERTINA flow was 3. Percutaneous coronary intervention was performed within the proximal to mid right coronary artery. The vessel was pre-dilated using a compliant balloon 2.5 mm x 15 mm [place] drug-eluting stent 3.5 mm x 22 mm was advanced to the lesion and implanted. The stenosis was successfully reduced from 80% to 0%. Post-intervention ALBERTINA flow was 3. Used a 6Fr JR4 guide with poor support. Needed a Guideliner for additional support. Would recommend use  of an AR1 or AR2 in future for better guide support.     Coronary Lesion Summary:  Vessel   Stenosis   Vessel Segment  Length (mm)  RCA    80% stenosis proximal to mid     21       Hemo Personnel:  +------------------------+-------------+  Name                    Duty           +------------------------+-------------+  Juvenal Le MD            PROC MD 1  +------------------------+-------------+  Harshal Galindo RT PROC SCRUB 1  +------------------------+-------------+  Lily Davis RN           PROC CIRC 1  +------------------------+-------------+  Vic Franco RN          PROC RECORD 1  +------------------------+-------------+       Hemodynamic Pressures:     +----+---------------------+----------+-------------+--------------+---------+  Site      Date Time      Phase NameSystolic mmHgDiastolic mmHgMean mmHg  +----+---------------------+----------+-------------+--------------+---------+    AO10/13/2023 1:19:39 PM  AIR REST          111            58       80  +----+---------------------+----------+-------------+--------------+---------+    AO10/13/2023 1:19:42 PM  AIR REST          109            55       76  +----+---------------------+----------+-------------+--------------+---------+    AO10/13/2023 1:19:49 PM  AIR REST          110            54       76  +----+---------------------+----------+-------------+--------------+---------+    AO10/13/2023 1:19:54 PM  AIR REST          111            48       76  +----+---------------------+----------+-------------+--------------+---------+    AO10/13/2023 1:20:18 PM  AIR REST          105            52       73  +----+---------------------+----------+-------------+--------------+---------+    AO10/13/2023 1:24:56 PM  AIR REST          100            53       72  +----+---------------------+----------+-------------+--------------+---------+    AO10/13/2023 1:28:44 PM  AIR REST          119             60       83  +----+---------------------+----------+-------------+--------------+---------+    AO10/13/2023 1:30:05 PM  AIR REST          130            67       90  +----+---------------------+----------+-------------+--------------+---------+    AO10/13/2023 1:34:29 PM  AIR REST          120            62       83  +----+---------------------+----------+-------------+--------------+---------+       Complications:  No in-lab complications observed.     Cardiac Cath Post Procedure Notes:  Post Procedure Diagnosis: Single vessel disease.  Blood Loss:               Estimated blood loss during the procedure was 0 mls.  Specimens Removed:        Number of specimen(s) removed: none.       Recommendations:  Maximize medical therapy.  Agressive risk factor modification efforts.  Anti-platelet therapy with Aspirin and Clopidogrel (Plavix) 75mg daily.  Consider referral to cardiac rehabilitation.    ____________________________________________________________________________________  CONCLUSIONS:   1. Successful PCI of the prox to mid RCA with [place] 3.5x22mm JUSTO upsized to 4 mm.    ICD 10 Codes:  Non ST elevation (NSTEMI) myocardial infarction-I21.4     CPT Codes:  Stent w angioplasty Right Coronary single major Artery branch (PCI)-04152.     [id number] Juvenal Le MD  Performing Physician  Electronically signed by [id number] Juvenal Le MD on 10/13/2023 at 4:18:13 PM         ** Final **  Cardiac catheterization - coronary     [facility] [place], Cath Lab       [address] [address]    Cardiovascular Catheterization Report    Patient Name:      JESUS METCALF      Performing Physician: Riana Malone MD  Study Date:        10/11/2023          Verifying Physician:  Riana Malone MD  MRN/PID:           [id number]             Cardiologist:  Accession#:        [id number]        Ordering Provider:    [id number] MAXIMUS CLAUDIA  Date of Birth/Age: 1965 / 58      Fellow:                     years  Gender:            M                   Fellow:  Encounter#:        [id number]       Study: Left Heart Cath       Indications:  JESUS METCALF is a 59 year old male who presents with dyslipidemia, hypertension, obesity and an asymptomatic chest pain assessment. NSTE - ACS.  Stress test performed: No. CTA performed: No. Chris accessed: No. LVEF  Assessed: No.  Cardiac arrest: No responsive following cardiac arrest.  Cardiac surgical consult: No.  Cardiovascular Instability: No  Frailty status of patient entering lab: 2 = Well.       Procedure Description Comments:  Under sterile conditions, local anesthesia and intravenous sedation a 5 [place] sheath was placed in the right radial artery. Nitroglycerin given in the sideport. Neck selective coronary arteriography and the ventriculography was performed using a Samson catheter. The catheter was aspirin flushed in standard fashion changing with guidewire.    Patient tolerated procedure well with no immediate complications Cardi catheterization procedure.    The films were reviewed with the nurse cardiologist. Stenting of the right coronary artery is necessary after obtaining permission from neurosurgery to use dual antiplatelet therapy.    The sheath was removed and a band was applied and the patient was returned to recovery in good: Condition with no immediate complication card catheterization procedure    In the ensuing couple of days this was discussed with the neurosurgical team and interventional cardiologist and the patient was started on dual antiplatelet therapy and heparin and a repeat CT scan showed no evidence of intracranial hemorrhage and therefore the coronary stenting intervention was performed by the interventional cardiologist on October 13. There was successful stent placed in the right  coronary artery.     Coronary Angiography:  The coronary circulation is right dominant.     Coronary Angiography Comments:  Modest coronary calcifications were noted    Right coronary was large and dominant and had a long lesion of 80 to 90% in the mid to proximal right coronary artery. Mild scattered disease throughout the remainder of the right coronary artery.    Left main coronary artery was now normal caliber dimension free of stenoses.    Left anterior descending artery demonstrated a moderate-sized first diagonal small second diagonal small third diagonal and moderate-sized distal left anterior sending segment. There is a 50% stenosis at the first diagonal and the left anterior descending artery, modest diseased distally.    Ramus intermedius was a very small vessel and was normal    Circumflex artery demonstrated a tiny first obtuse marginal large second obtuse marginal absent third obtuse marginal and very small distal circumflex second. This vessel appeared to be normal    No similar collateral flow was noted.         Left Ventriculography:  Left-ventricular gram demonstrated ejection fraction of 60% with no focal abnormalities and no significant mitral sufficiency noted with hand-injection.     Coronary Interventions:  Angiography reveals a % stenosis of the left main coronary artery. Percutaneous coronary intervention was performed within the left main.     Additional Findings:  2 days later patient had a successful stenting of the right coronary artery by interventional cardiologist.       Heart Rhythm:  Patient's heart rhythm was normal sinus.     Hemo Personnel:  +--------------+---------+  Name          Duty       +--------------+---------+  Jonathan Malone MD 1  +--------------+---------+       +---------+  Contrast:  +---------+  Isovue:    +---------+       Hemodynamic Pressures:  Normal left-sided hemodynamics    No  valve gradient pullback    Normal left ventricular stock  pressure at approxi-10 mmHg.  +----+---------------+----------+-------------+--------------+-------+---------+  Site   Date Time   Phase NameSystolic mmHgDiastolic mmHgED mmHgMean mmHg  +----+---------------+----------+-------------+--------------+-------+---------+    AO     10/11/2023  AIR REST           98            62              79           8:36:37 AM                                                       +----+---------------+----------+-------------+--------------+-------+---------+    LV     10/11/2023  AIR REST          101             8     20                    8:37:50 AM                                                       +----+---------------+----------+-------------+--------------+-------+---------+    LV     10/11/2023  AIR REST           97            10     15                    8:37:58 AM                                                       +----+---------------+----------+-------------+--------------+-------+---------+   LVp     10/11/2023  AIR REST          101             3     16                    8:38:11 AM                                                       +----+---------------+----------+-------------+--------------+-------+---------+   AOp     10/11/2023  AIR REST          117            64              85           8:38:18 AM                                                       +----+---------------+----------+-------------+--------------+-------+---------+    AO     10/11/2023  AIR REST          133            77             102           8:39:54 AM                                                       +----+---------------+----------+-------------+--------------+-------+---------+    AO     10/11/2023  AIR REST            0            54              64           8:41:26 AM                                                        +----+---------------+----------+-------------+--------------+-------+---------+       Complications:  No in-lab complications observed.     Cardiac Cath Post Procedure Notes:  Post Procedure Diagnosis: Double vessel disease.  Blood Loss:               Estimated blood loss during the procedure was 3 cc                            mls.  Specimens Removed:        Number of specimen(s) removed: none.       Recommendations:  Maximize medical therapy.  Agressive risk factor modification efforts.  Consider referral to cardiac rehabilitation.  Percutaneous coronary intervention.    ____________________________________________________________________________________  CONCLUSIONS:   1. 1. Normal left ventricular contractility.   2. 2. Normal left-sided hemodynamics.   3. 3. Right coronary dominant.   4. 4. Two-vessel coronary disease with 50% lesion in the mid to proximal LAD, 80 to 90% stenosis in the mid to proximal right coronary artery.   5. 5. No collateral flow noted.   6. 2 days later patient had a successful stenting of the right coronary artery by interventional cardiologist.    ICD 10 Codes:  Subsequent non ST elevation (NSTEMI) myocardial infarction-I22.2     [id number] Jonathan Malone MD  Performing Physician  Electronically signed by [id number] Jonathan Malone MD on 10/13/2023 at 2:30:46 PM         ** Final **    Physical Exam  Constitutional:       General: He is not in acute distress.     Appearance: Normal appearance. He is obese. He is not toxic-appearing.   HENT:      Head: Normocephalic and atraumatic.      Mouth/Throat:      Mouth: Mucous membranes are moist.      Pharynx: No posterior oropharyngeal erythema.   Eyes:      General: No scleral icterus.     Extraocular Movements: Extraocular movements intact.   Cardiovascular:      Rate and Rhythm: Normal rate and regular rhythm.      Heart sounds: No murmur heard.     No friction rub. No gallop.   Pulmonary:      Effort: Pulmonary effort is normal. No respiratory distress.       Breath sounds: Normal breath sounds. No wheezing, rhonchi or rales.   Abdominal:      General: Abdomen is flat. Bowel sounds are normal. There is no distension.      Palpations: Abdomen is soft.      Tenderness: There is no abdominal tenderness. There is no guarding.   Musculoskeletal:      Comments: Right lower extremity covered in a bandage, no evidence of creeping erythema or purulence of the surgical site.  Right knee tenderness, however this appears to be a surgical site that is well-healing   Skin:     General: Skin is warm and dry.   Neurological:      Mental Status: He is alert.   Psychiatric:         Mood and Affect: Mood normal.     Relevant Results  Scheduled medications  acetaminophen, 650 mg, oral, 4x daily  allopurinol, 100 mg, oral, Daily  aspirin, 81 mg, oral, Daily  atorvastatin, 80 mg, oral, Daily  carvedilol, 12.5 mg, oral, BID  clopidogrel, 75 mg, oral, Daily  cyclobenzaprine, 10 mg, oral, TID  docusate sodium, 100 mg, oral, BID  enoxaparin, 40 mg, subcutaneous, Daily  escitalopram, 20 mg, oral, Daily  glycerin, 1 suppository, rectal, Once  [Held by provider] insulin lispro, 0-5 Units, subcutaneous, q4h  levETIRAcetam, 500 mg, oral, BID  melatonin, 5 mg, oral, Nightly  pantoprazole, 40 mg, oral, Daily before breakfast  perflutren lipid microspheres, 3 mL of dilution, intravenous, Once in imaging  polyethylene glycol, 17 g, oral, Daily  pregabalin, 300 mg, oral, BID  sennosides, 1 tablet, oral, BID  sulfur hexafluoride microsphr, 2 mL, intravenous, Once in imaging  tamsulosin, 0.4 mg, oral, Daily    Continuous medications  lactated Ringer's, 100 mL/hr, Last Rate: Stopped (10/17/23 1154)  oxygen, 2 L/min    PRN medications  PRN medications: benzocaine-menthol, bisacodyl, docusate sodium, hydrOXYzine HCL, naloxone, oxyCODONE, oxyCODONE, prochlorperazine **OR** prochlorperazine **OR** prochlorperazine, sodium phosphates  Results for orders placed or performed during the hospital encounter of  10/10/23 (from the past 24 hour(s))   CBC   Result Value Ref Range    WBC 7.6 4.4 - 11.3 x10*3/uL    nRBC 0.0 0.0 - 0.0 /100 WBCs    RBC 3.17 (L) 4.50 - 5.90 x10*6/uL    Hemoglobin 9.4 (L) 13.5 - 17.5 g/dL    Hematocrit 29.4 (L) 41.0 - 52.0 %    MCV 93 80 - 100 fL    MCH 29.7 26.0 - 34.0 pg    MCHC 32.0 32.0 - 36.0 g/dL    RDW 16.2 (H) 11.5 - 14.5 %    Platelets 281 150 - 450 x10*3/uL    MPV 9.7 7.5 - 11.5 fL   Renal Function Panel   Result Value Ref Range    Glucose 89 74 - 99 mg/dL    Sodium 136 136 - 145 mmol/L    Potassium 3.7 3.5 - 5.3 mmol/L    Chloride 99 98 - 107 mmol/L    Bicarbonate 29 21 - 32 mmol/L    Anion Gap 12 10 - 20 mmol/L    Urea Nitrogen 8 6 - 23 mg/dL    Creatinine 0.87 0.50 - 1.30 mg/dL    eGFR >90 >60 mL/min/1.73m*2    Calcium 8.3 (L) 8.6 - 10.3 mg/dL    Phosphorus 4.2 2.5 - 4.9 mg/dL    Albumin 2.9 (L) 3.4 - 5.0 g/dL   Magnesium   Result Value Ref Range    Magnesium 1.80 1.60 - 2.40 mg/dL       Assessment/Plan   Principal Problem:    Ankle fracture, right, closed, initial encounter  Active Problems:    H/O fracture of ankle    Syncope, unspecified syncope type    NSTEMI (non-ST elevated myocardial infarction) (CMS/[facility])    Gm is a 58-year old male with a past medical history of MDD, T2 DM, BPH, hypertension, hyperlipidemia, JEMAL, chronic pain in neck and back, lumbar radiculopathy, and heroin use who was transferred from [facility] in the setting of recent lumbar surgery with subarachnoid hemorrhage/subdural hematoma for syncope and ankle pain secondary to the syncope. Upon evaluation, he was found to have a new fracture of the right distal tibia and fibula, potential retroperitoneal abscess on MRI, elevated troponin, elevated CK. Patient was admitted for management of his fracture, syncope etiology, and elevated troponin that proved to be an NSTEMI.  Right tibia intramedullary fixation performed on 10/17, pain regiment in place.  Plan for discharge pending clinical stability and PT/OT  recs.     #Displaced Distal right tibia and comminuted distal right fibula fracture.   -Repeat x-ray showing displaced and comminuted fracture of the distal tibia diaphysis and distal fibula diaphysis.   -Right tibia intramedullary fixation performed on 10/17, knee dressing to be removed on POD 7  -Pain control with scheduled Tylenol and prn oxycodone   -Continue Flexeril  -Continue bowel regimen consisting of Miralax/senokot  -PT recommended moderate intensity level continue care  -Will need outpatient follow up with ortho (Dr. Hale) in 2 weeks     #NSTEMI  -Initially Troponinemia, without chest pain or EKG changes, now s/p cardiac catheterization (diagnostic)  -Diagnostic cardiac catheterization 10/11/2023 which demonstrated a proximal RCA stenosis of 90%, PCI with stent placed to RCA on 10/13  -Echocardiogram 10/10 revealed EF of 50-55%, no regional wall motion abnormalities  -Continue DAPT  -Continue Atorvastatin 80 daily  -Check mental status every shift     #Vasovagal Syncope  -Patient states syncopal event occurred 2/2 to pain but also endorsed poor P.O. intake.   -Trend CMP, CBC  -Telemetry continued, no malignant arrythmias seen on tele since admission  -TTE revealed EF of 50 to 55%, no structural heart disease     #Less likely Retroperitoneal Abscess Vs. Likely Postsurgical Fluid Collection  -Neurosurgery at [facility] [place] [place] and his surgeon at this facility reviewed MRI and assessed that the findings on MRI were consistent with normal post-operative changes rather than an abscess  -Wound dry, intact, no pain with palpation.   -Infectious Disease following, continue to appreciate recs.   ---> ID had a discussion with neurosurgery deemed a low likelihood of abscess more likely fluid collection postsurgical and noninfectious.  ----> As per ID discontinued cefepime and vancomycin (10/11)  -Admission blood cultures from 10/10 NGTD     #Recent lumbar fusion(9/29/2023) c/b   - Post op SAH/SDH (10/3), SAH resolved on imaging  at OSH, SDH persists   - Intraoperative dural tear s/p dural patch  -Keppra for seizure prophylaxis  -CT head 10/13/2023 negative for any acute bleeds  -Continue to monitor neuro status while on DAPT.     Chronic Conditions  #Primary HTN:  -Patient takes no medication for this at home  -Continue Coreg 12.5mg    #Diabetes Type 2: Known history, with recent A1c subdiabetic  -A1c 5.7  -Hold home metformin at this time  -No insulin at this time as patient's glucose has been ranging ,  -Continue POCT glucose checks  #Gout: Known hx  -Continue home allopurinol  #BPH: known hx  -Will continue Tamsulosin 0.4 mg daily      IVF: LR 100mL/hr   Diet: Cardiac  DVT Prophylaxis: Lovenox for 30 days post op  Consults: Cardiology, Neurosurgery, orthopedic surgery.   Dispo: Anticipate 1 additional midnight, discharge pending continued clinical stability and improvement in pain.  Expect the patient to go home with home health care.     Code Status: Full Code    The assessment and plan were discussed with the senior resident and attending physician,    Erick Waite, DO PGY-1   Is This A New Presentation, Or A Follow-Up?: Skin Lesion

## 2023-10-19 NOTE — PROGRESS NOTES
Occupational Therapy    OT Treatment    Patient Name: Gm Thrasher  MRN: [id number]  Today's Date: 10/19/2023  Time Calculation  Start Time: 0925  Stop Time: 1010  Time Calculation (min): 45 min         Assessment:  Barriers to Discharge: None  Evaluation/Treatment Tolerance: Patient limited by pain  End of Session Patient Position: Alarm on (sit to supine, total assist with moving RLE)    Plan:  OT Frequency: 5 times per week  OT Discharge Recommendations: Moderate intensity level of continued care     Subjective     Current Problem:  1. Syncope, unspecified syncope type  Transthoracic Echo (TTE) Complete    Transthoracic Echo (TTE) Complete      2. H/O fracture of ankle  Case Request Operating Room: Insertion Intramedullary Nail Tibia    Case Request Operating Room: Insertion Intramedullary Nail Tibia    CANCELED: Case Request Operating Room: Insertion Intramedullary Nail Tibia    CANCELED: Case Request Operating Room: Insertion Intramedullary Nail Tibia    CANCELED: Case Request Operating Room: Insertion Intramedullary Nail Tibia    CANCELED: Case Request Operating Room: Insertion Intramedullary Nail Tibia      3. NSTEMI (non-ST elevated myocardial infarction) (CMS/HCC)  Case Request Cath Lab: Left Heart Cath    Case Request Cath Lab: Left Heart Cath    Cardiac catheterization - coronary    Cardiac catheterization - coronary    Case Request Cath Lab: PCI RCA, right radial 6Fr    Case Request Cath Lab: PCI RCA, right radial 6Fr    Cardiac catheterization - coronary    Cardiac catheterization - coronary    CANCELED: Cardiac catheterization - coronary    CANCELED: Cardiac catheterization - coronary      4. Subsequent non-ST elevation (NSTEMI) myocardial infarction (CMS/HCC)  Cardiac catheterization - coronary          General:  OT Received On: 10/19/23  Past Medical History Relevant to Rehab: spinal surgery prior  Caregiver Feedback: no family present  Patient Position Received: Alarm on, Bed, 3 rail up (patient  supine in bed finishing doctor visit)  General Comment:  (Educated patient on NWB RLE, fall risk and reviewed spinal precautions)    Vital Signs:       Pain:  Pain Assessment  Pain Type: Acute pain  Pain Location:  (right knee)  Patient's Stated Pain Goal: 10  Response to Interventions:  (notified RN for pain meds, administered meds)  Objective      Activities of Daily Living:    Grooming  Grooming Level of Assistance: Setup, Minimum assistance (sitting eob, washed face and head with wash cloth, doff gown and donned new one, donned underwear)  Grooming Comments:  (patient verbalized feeling better after adl activity)           LE Dressing  LE Dressing: Yes       Functional Standing Tolerance:       Bed Mobility/Transfers: Bed Mobility  Bed Mobility: Yes  Bed Mobility 1  Bed Mobility 1: Supine to sitting, Sitting to supine  Level of Assistance 1: Minimum assistance, Moderate assistance               Outcome Measures:[facility] Daily Activity  Putting on and taking off regular lower body clothing: Total  Bathing (including washing, rinsing, drying): A lot  Putting on and taking off regular upper body clothing: A lot  Toileting, which includes using toilet, bedpan or urinal: A lot  Taking care of personal grooming such as brushing teeth: None  Eating Meals: None  Daily Activity - Total Score: 15  Education Documentation  Handouts, taught by DOMINGUEZ Olivarez at 10/19/2023 11:13 AM.  Learner: Patient  Readiness: Acceptance  Method: Handout, Explanation  Response: Verbalizes Understanding, Demonstrated Understanding  Comment: anxious mood at times and wanting to walk    Precautions, taught by DOMINGUEZ Olivarez at 10/19/2023 11:13 AM.  Learner: Patient  Readiness: Acceptance  Method: Handout, Explanation  Response: Verbalizes Understanding, Demonstrated Understanding  Comment: anxious mood at times and wanting to walk    ADL Training, taught by DOMINGUEZ Olivarez at 10/19/2023 11:13 AM.  Learner: Patient  Readiness: Acceptance  Method:  Handout, Explanation  Response: Verbalizes Understanding, Demonstrated Understanding  Comment: anxious mood at times and wanting to walk    Education Comments  No comments found.        EDUCATION:  Education  Individual(s) Educated: Patient  Education Provided: Fall precautons, Risk and benefits of OT discussed with patient or other (WB status and spine precations)    Goals:  Encounter Problems       Encounter Problems (Active)       Dressings Lower Extremities       STG - Patient to complete lower body dressing min assist (Progressing)       Start:  10/18/23    Expected End:  11/01/23               Grooming       STG - Patient completes grooming MOD I (Progressing)       Start:  10/18/23    Expected End:  11/01/23               Transfers       STG - Patient will perform bed mobility CGA (Progressing)       Start:  10/18/23    Expected End:  11/01/23            STG - Patient will perform toilet transfer mod assist (Progressing)       Start:  10/18/23    Expected End:  11/01/23            STG - Patient maintains weight bearing status during transfers (Progressing)       Start:  10/18/23    Expected End:  11/01/23

## 2023-10-19 NOTE — CARE PLAN
Pt had an uneventful night. Pt had some c/o pain noted and was given IV pain meds twice this shift. Pt SBP was low at shift change doc ordered parameters for IV pain meds. Pt neuro checks remains unchanged. Pt foot dressing remains intact. Pt safety been maintained.

## 2023-10-19 NOTE — PROGRESS NOTES
"Gm Thrasher is a 58 y.o. male on day 9 of admission presenting with Ankle fracture, right, closed, initial encounter.    Subjective   Sleeping upon entering room arouses easily to verbal stimulation.  After awakening patient reports moderate to high amount of pain but is overdue for some p.o. pain medication.  Working with therapy but states overall he is gotten very weak and is difficult to fully participate.  Otherwise verbalizes no complaints.  Plan is for discharge to home with home health care when appropriate.       Objective     Physical Exam  HENT:      Head: Normocephalic.      Mouth/Throat:      Mouth: Mucous membranes are moist.      Pharynx: Oropharynx is clear.   Eyes:      Extraocular Movements: Extraocular movements intact.   Cardiovascular:      Rate and Rhythm: Normal rate.   Pulmonary:      Effort: Pulmonary effort is normal.   Abdominal:      Palpations: Abdomen is soft.   Musculoskeletal:         General: No tenderness, deformity or signs of injury.      Right lower leg: No edema.      Left lower leg: No edema.      Comments: Distal right LE splint dressing is dry and intact.  Right toes with light touch sensation intact/motor intact, toes pink with brisk cap refill     Skin:     General: Skin is warm and dry.      Capillary Refill: Capillary refill takes less than 2 seconds.   Neurological:      General: No focal deficit present.      Mental Status: He is alert. Mental status is at baseline.   Psychiatric:         Mood and Affect: Mood normal.     Last Recorded Vitals  Blood pressure 100/78, pulse 68, temperature 36.2 °C (97.2 °F), resp. rate 18, height 1.8 m (5' 10.87\"), weight 115 kg (253 lb 11.2 oz), SpO2 93 %.  Intake/Output last 3 Shifts:  I/O last 3 completed shifts:  In: 1398.3 (12.2 mL/kg) [I.V.:1298.3 (11.3 mL/kg); IV Piggyback:100]  Out: 5200 (45.2 mL/kg) [Urine:5175 (1.2 mL/kg/hr); Blood:25]  Weight: 115.1 kg     Relevant Results  Scheduled medications  acetaminophen, 650 mg, oral, " 4x daily  allopurinol, 100 mg, oral, Daily  aspirin, 81 mg, oral, Daily  atorvastatin, 80 mg, oral, Daily  carvedilol, 12.5 mg, oral, BID  clopidogrel, 75 mg, oral, Daily  cyclobenzaprine, 10 mg, oral, TID  docusate sodium, 100 mg, oral, BID  enoxaparin, 40 mg, subcutaneous, Daily  escitalopram, 20 mg, oral, Daily  glycerin, 1 suppository, rectal, Once  [Held by provider] insulin lispro, 0-5 Units, subcutaneous, q4h  levETIRAcetam, 500 mg, oral, BID  melatonin, 5 mg, oral, Nightly  pantoprazole, 40 mg, oral, Daily before breakfast  perflutren lipid microspheres, 3 mL of dilution, intravenous, Once in imaging  polyethylene glycol, 17 g, oral, Daily  pregabalin, 300 mg, oral, BID  sennosides, 1 tablet, oral, BID  sulfur hexafluoride microsphr, 2 mL, intravenous, Once in imaging  tamsulosin, 0.4 mg, oral, Daily      Continuous medications  lactated Ringer's, 100 mL/hr, Last Rate: Stopped (10/17/23 2215)  oxygen, 2 L/min      PRN medications  PRN medications: benzocaine-menthol, bisacodyl, docusate sodium, hydrOXYzine HCL, naloxone, oxyCODONE, oxyCODONE, prochlorperazine **OR** prochlorperazine **OR** prochlorperazine, sodium phosphates   Results for orders placed or performed during the hospital encounter of 10/10/23 (from the past 24 hour(s))   CBC   Result Value Ref Range    WBC 7.6 4.4 - 11.3 x10*3/uL    nRBC 0.0 0.0 - 0.0 /100 WBCs    RBC 3.17 (L) 4.50 - 5.90 x10*6/uL    Hemoglobin 9.4 (L) 13.5 - 17.5 g/dL    Hematocrit 29.4 (L) 41.0 - 52.0 %    MCV 93 80 - 100 fL    MCH 29.7 26.0 - 34.0 pg    MCHC 32.0 32.0 - 36.0 g/dL    RDW 16.2 (H) 11.5 - 14.5 %    Platelets 281 150 - 450 x10*3/uL    MPV 9.7 7.5 - 11.5 fL   Renal Function Panel   Result Value Ref Range    Glucose 89 74 - 99 mg/dL    Sodium 136 136 - 145 mmol/L    Potassium 3.7 3.5 - 5.3 mmol/L    Chloride 99 98 - 107 mmol/L    Bicarbonate 29 21 - 32 mmol/L    Anion Gap 12 10 - 20 mmol/L    Urea Nitrogen 8 6 - 23 mg/dL    Creatinine 0.87 0.50 - 1.30 mg/dL     eGFR >90 >60 mL/min/1.73m*2    Calcium 8.3 (L) 8.6 - 10.3 mg/dL    Phosphorus 4.2 2.5 - 4.9 mg/dL    Albumin 2.9 (L) 3.4 - 5.0 g/dL   Magnesium   Result Value Ref Range    Magnesium 1.80 1.60 - 2.40 mg/dL                   Assessment/Plan   Principal Problem:    Ankle fracture, right, closed, initial encounter  Active Problems:    H/O fracture of ankle    Syncope, unspecified syncope type    NSTEMI (non-ST elevated myocardial infarction) (CMS/[facility])    POD #2 s/p IM nailing right tibia  Continue PT/OT, non weight bearing right LE, cleared to work with PT/OT  Reviewed am labs  Multimodal pain regimen  knee dressing to be removed on post op day #7  VTE prophylaxis: lovenox daily X 30 days  Will discharge home when appropriate with [facility]  Follow up with Dr. Hale in 2 weeks       I spent 20 minutes in the professional and overall care of this patient.      Kristina Raines PA-C

## 2023-10-19 NOTE — PROGRESS NOTES
Physical Therapy    Physical Therapy Treatment    Patient Name: Gm Thrasher  MRN: [id number]  Today's Date: 10/19/2023  Time Calculation  Start Time: 1120  Stop Time: 1150  Time Calculation (min): 30 min       Assessment/Plan   PT Assessment  PT Assessment Results: Decreased strength, Decreased endurance, Impaired balance, Decreased mobility, Pain  Rehab Prognosis: Good  Evaluation/Treatment Tolerance: Patient limited by fatigue, Patient limited by pain  Medical Staff Made Aware: Yes  End of Session Communication: Bedside nurse    End of Session Patient Position: Up in chair, Alarm on  PT Plan  Inpatient/Swing Bed or Outpatient: Inpatient  PT Plan  Treatment/Interventions: Bed mobility, Transfer training  PT Plan: Skilled PT  PT Frequency: 5 times per week  PT Discharge Recommendations: Moderate intensity level of continued care  PT Recommended Transfer Status: Assist x2     10/19/23 1120   PT  Visit   PT Received On 10/19/23   Response to Previous Treatment Patient with no complaints from previous session.   General   Prior to Session Communication Bedside nurse   Patient Position Received Up in chair;Alarm on   Preferred Learning Style verbal;visual   Precautions   LE Weight Bearing Status Right Non-Weight Bearing   Medical Precautions Fall precautions   Post-Surgical Precautions Spinal precautions   Pain Assessment   Pain Assessment 0-10   Pain Score 9   Pain Type Acute pain   Pain Orientation Right   Pain Radiating Towards ankle   Pain Descriptors Aching;Sharp   Pain Frequency Constant/continuous   Cognition   Overall Cognitive Status WFL   Orientation Level Oriented X4   Bed Mobility   Bed Mobility Yes   Bed Mobility 1   Bed Mobility 1 Supine to sitting   Level of Assistance 1 Minimum assistance   Bed Mobility Comments 1 x1 assist   Transfers   Transfer Yes   Transfer 1   Transfer From 1 Bed to   Transfer to 1 Stand   Technique 1 Sit to stand   Transfer Device 1 Walker;Gait belt   Transfer Level of  Assistance 1 Moderate assistance   Trials/Comments 1 x1. assist to keep RLE off floor   Transfers 2   Transfer From 2 Stand to   Transfer to 2 Chair with arms   Technique 2 Stand pivot   Transfer Device 2 Walker   Transfer Level of Assistance 2 Moderate assistance   Trials/Comments 2 x1, cues for short hop on LLE while maintaining NWB RLE   Activity Tolerance   Endurance Tolerates 30+ min exercise without fatigue   PT Assessment   PT Assessment Results Decreased strength;Decreased endurance;Impaired balance;Decreased mobility;Pain   Rehab Prognosis Good   Evaluation/Treatment Tolerance Patient limited by fatigue;Patient limited by pain   End of Session Communication Bedside nurse   End of Session Patient Position Up in chair;Alarm on   Education   Individual(s) Educated Patient   Education Provided Fall Risk   PT Plan   Inpatient/Swing Bed or Outpatient Inpatient   PT Plan   Treatment/Interventions Bed mobility;Transfer training   PT Plan Skilled PT   PT Frequency 5 times per week   PT Discharge Recommendations Moderate intensity level of continued care   PT Recommended Transfer Status Assist x2     Outcome Measures:  [facility] Basic Mobility  Turning from your back to your side while in a flat bed without using bedrails: A little  Moving from lying on your back to sitting on the side of a flat bed without using bedrails: A lot  Moving to and from bed to chair (including a wheelchair): A lot  Standing up from a chair using your arms (e.g. wheelchair or bedside chair): A lot  To walk in hospital room: A lot  Climbing 3-5 steps with railing: Total  Basic Mobility - Total Score: 12  Education Documentation  Precautions, taught by Rocco Nguyen PTA at 10/19/2023 11:52 AM.  Learner: Patient  Readiness: Acceptance  Method: Explanation  Response: Verbalizes Understanding, Needs Reinforcement    Education Comments  No comments found.        Current Problem:  1. Syncope, unspecified syncope type  Transthoracic Echo (TTE) Complete     Transthoracic Echo (TTE) Complete      2. H/O fracture of ankle  Case Request Operating Room: Insertion Intramedullary Nail Tibia    Case Request Operating Room: Insertion Intramedullary Nail Tibia    CANCELED: Case Request Operating Room: Insertion Intramedullary Nail Tibia    CANCELED: Case Request Operating Room: Insertion Intramedullary Nail Tibia    CANCELED: Case Request Operating Room: Insertion Intramedullary Nail Tibia    CANCELED: Case Request Operating Room: Insertion Intramedullary Nail Tibia      3. NSTEMI (non-ST elevated myocardial infarction) (CMS/HCC)  Case Request Cath Lab: Left Heart Cath    Case Request Cath Lab: Left Heart Cath    Cardiac catheterization - coronary    Cardiac catheterization - coronary    Case Request Cath Lab: PCI RCA, right radial 6Fr    Case Request Cath Lab: PCI RCA, right radial 6Fr    Cardiac catheterization - coronary    Cardiac catheterization - coronary    CANCELED: Cardiac catheterization - coronary    CANCELED: Cardiac catheterization - coronary      4. Subsequent non-ST elevation (NSTEMI) myocardial infarction (CMS/HCC)  Cardiac catheterization - coronary        EDUCATION:  Education  Individual(s) Educated: Patient  Education Provided: Fall Risk  Encounter Problems       Encounter Problems (Active)       PT Problem       Bed mobility (Progressing)       Start:  10/18/23    Expected End:  11/01/23       Pt will be able to complete all bed mobility tasks mod I with use of bed HR and maintaining spine precautions.           Transfers (Progressing)       Start:  10/18/23    Expected End:  11/01/23       Pt will be able to complete all transfers with FWW mod A demonstrating good safety awareness, proper body mechanics and ability to maintain NWB precautions on RLE.           Strength (Progressing)       Start:  10/18/23    Expected End:  11/01/23       Pt will improve/maintain BLE strength with supine, seated and standing exercises to WFL.           Pain (Progressing)        Start:  10/18/23    Expected End:  11/01/23       Pt will demonstrate decreased pain to </= 5/10.         Precautions (Progressing)       Start:  10/18/23    Expected End:  11/01/23       Pt will demonstrate ability to maintain all orthopedic precautions during functional mobility activities.             Pain - Adult          Transfers       STG - Patient will perform bed mobility CGA (Progressing)       Start:  10/18/23    Expected End:  11/01/23            STG - Patient will perform toilet transfer mod assist (Progressing)       Start:  10/18/23    Expected End:  11/01/23            STG - Patient maintains weight bearing status during transfers (Progressing)       Start:  10/18/23    Expected End:  11/01/23

## 2023-10-19 NOTE — CARE PLAN
The patient's goals for the shift include No pain      Problem: Discharge Planning  Goal: Discharge to home or other facility with appropriate resources  Outcome: Progressing     Problem: Chronic Conditions and Co-morbidities  Goal: Patient's chronic conditions and co-morbidity symptoms are monitored and maintained or improved  Outcome: Progressing     Problem: Pain  Goal: My pain/discomfort is manageable  Outcome: Progressing  Flowsheets (Taken 10/19/2023 0708)  Resident's pain/discomfort is manageable:   Offer non-pharmacological pain management interventions   Administer pain medication prior to activities that may trigger pain   Identify and avoid pain triggers     Problem: Pain  Goal: Takes deep breaths with improved pain control throughout the shift  Outcome: Progressing  Goal: Turns in bed with improved pain control throughout the shift  Outcome: Progressing     Problem: Skin  Goal: Promote skin healing  Outcome: Progressing  Flowsheets (Taken 10/19/2023 0708)  Promote skin healing:   Assess skin/pad under line(s)/device(s)   Protective dressings over bony prominences   Turn/reposition every 2 hours/use positioning/transfer devices  Goal: Prevent/minimize sheer/friction injuries  Outcome: Progressing  Flowsheets (Taken 10/19/2023 0708)  Prevent/minimize sheer/friction injuries:   HOB 30 degrees or less   Increase activity/out of bed for meals   Turn/reposition every 2 hours/use positioning/transfer devices   Use pull sheet

## 2023-10-19 NOTE — NURSING NOTE
Resumed care of patient from MEG Mir. Patient in bed awake and alertx4. Patient assessment and vitals completed and documented.    1804: Patient in bed awake and alertx4. No current complaints of pain. Bed locked in lowest position, bed alarm active and audible, call bell within reach.

## 2023-10-20 ENCOUNTER — PHARMACY VISIT (OUTPATIENT)
Dept: PHARMACY | Facility: CLINIC | Age: 58
End: 2023-10-20
Payer: MEDICARE

## 2023-10-20 VITALS
DIASTOLIC BLOOD PRESSURE: 78 MMHG | RESPIRATION RATE: 20 BRPM | OXYGEN SATURATION: 98 % | HEIGHT: 71 IN | SYSTOLIC BLOOD PRESSURE: 143 MMHG | BODY MASS INDEX: 35.52 KG/M2 | HEART RATE: 70 BPM | TEMPERATURE: 97.2 F | WEIGHT: 253.7 LBS

## 2023-10-20 LAB
ALBUMIN SERPL BCP-MCNC: 3 G/DL (ref 3.4–5)
ANION GAP SERPL CALC-SCNC: 12 MMOL/L (ref 10–20)
BUN SERPL-MCNC: 8 MG/DL (ref 6–23)
CALCIUM SERPL-MCNC: 8.4 MG/DL (ref 8.6–10.3)
CHLORIDE SERPL-SCNC: 100 MMOL/L (ref 98–107)
CO2 SERPL-SCNC: 29 MMOL/L (ref 21–32)
CREAT SERPL-MCNC: 0.87 MG/DL (ref 0.5–1.3)
ERYTHROCYTE [DISTWIDTH] IN BLOOD BY AUTOMATED COUNT: 16.3 % (ref 11.5–14.5)
GFR SERPL CREATININE-BSD FRML MDRD: >90 ML/MIN/1.73M*2
GLUCOSE SERPL-MCNC: 91 MG/DL (ref 74–99)
HCT VFR BLD AUTO: 29.8 % (ref 41–52)
HGB BLD-MCNC: 9.5 G/DL (ref 13.5–17.5)
MAGNESIUM SERPL-MCNC: 1.72 MG/DL (ref 1.6–2.4)
MCH RBC QN AUTO: 29.2 PG (ref 26–34)
MCHC RBC AUTO-ENTMCNC: 31.9 G/DL (ref 32–36)
MCV RBC AUTO: 92 FL (ref 80–100)
NRBC BLD-RTO: 0 /100 WBCS (ref 0–0)
PHOSPHATE SERPL-MCNC: 3.8 MG/DL (ref 2.5–4.9)
PLATELET # BLD AUTO: 285 X10*3/UL (ref 150–450)
PMV BLD AUTO: 9.6 FL (ref 7.5–11.5)
POTASSIUM SERPL-SCNC: 3.5 MMOL/L (ref 3.5–5.3)
RBC # BLD AUTO: 3.25 X10*6/UL (ref 4.5–5.9)
SODIUM SERPL-SCNC: 137 MMOL/L (ref 136–145)
WBC # BLD AUTO: 7.7 X10*3/UL (ref 4.4–11.3)

## 2023-10-20 PROCEDURE — 36415 COLL VENOUS BLD VENIPUNCTURE: CPT

## 2023-10-20 PROCEDURE — 2500000004 HC RX 250 GENERAL PHARMACY W/ HCPCS (ALT 636 FOR OP/ED): Performed by: INTERNAL MEDICINE

## 2023-10-20 PROCEDURE — RXMED WILLOW AMBULATORY MEDICATION CHARGE

## 2023-10-20 PROCEDURE — 2500000001 HC RX 250 WO HCPCS SELF ADMINISTERED DRUGS (ALT 637 FOR MEDICARE OP): Performed by: INTERNAL MEDICINE

## 2023-10-20 PROCEDURE — 83735 ASSAY OF MAGNESIUM: CPT

## 2023-10-20 PROCEDURE — 2500000004 HC RX 250 GENERAL PHARMACY W/ HCPCS (ALT 636 FOR OP/ED): Performed by: ORTHOPAEDIC SURGERY

## 2023-10-20 PROCEDURE — 97116 GAIT TRAINING THERAPY: CPT | Mod: GP,CQ

## 2023-10-20 PROCEDURE — 97535 SELF CARE MNGMENT TRAINING: CPT | Mod: GO,CO

## 2023-10-20 PROCEDURE — 80069 RENAL FUNCTION PANEL: CPT

## 2023-10-20 PROCEDURE — 2500000001 HC RX 250 WO HCPCS SELF ADMINISTERED DRUGS (ALT 637 FOR MEDICARE OP)

## 2023-10-20 PROCEDURE — 85027 COMPLETE CBC AUTOMATED: CPT

## 2023-10-20 PROCEDURE — 96372 THER/PROPH/DIAG INJ SC/IM: CPT | Performed by: ORTHOPAEDIC SURGERY

## 2023-10-20 PROCEDURE — 99239 HOSP IP/OBS DSCHRG MGMT >30: CPT | Performed by: INTERNAL MEDICINE

## 2023-10-20 RX ORDER — OXYCODONE HYDROCHLORIDE 5 MG/1
5 TABLET ORAL EVERY 6 HOURS PRN
Qty: 12 TABLET | Refills: 0 | Status: SHIPPED | OUTPATIENT
Start: 2023-10-20 | End: 2023-10-30 | Stop reason: SDUPTHER

## 2023-10-20 RX ORDER — ENOXAPARIN SODIUM 100 MG/ML
40 INJECTION SUBCUTANEOUS DAILY
Qty: 30 EACH | Refills: 0 | Status: SHIPPED | OUTPATIENT
Start: 2023-10-20 | End: 2023-11-29 | Stop reason: HOSPADM

## 2023-10-20 RX ORDER — POLYETHYLENE GLYCOL 3350 17 G/17G
17 POWDER, FOR SOLUTION ORAL DAILY PRN
Qty: 30 PACKET | Refills: 0 | Status: SHIPPED | OUTPATIENT
Start: 2023-10-20 | End: 2023-10-30 | Stop reason: ALTCHOICE

## 2023-10-20 RX ORDER — ATORVASTATIN CALCIUM 80 MG/1
80 TABLET, FILM COATED ORAL DAILY
Qty: 30 TABLET | Refills: 0 | Status: SHIPPED | OUTPATIENT
Start: 2023-10-20 | End: 2024-01-04 | Stop reason: SDUPTHER

## 2023-10-20 RX ORDER — NAPROXEN SODIUM 220 MG/1
81 TABLET, FILM COATED ORAL DAILY
Qty: 30 TABLET | Refills: 0 | Status: ON HOLD | OUTPATIENT
Start: 2023-10-21 | End: 2023-11-26

## 2023-10-20 RX ORDER — CLOPIDOGREL BISULFATE 75 MG/1
75 TABLET ORAL DAILY
Qty: 30 TABLET | Refills: 0 | Status: ON HOLD | OUTPATIENT
Start: 2023-10-21 | End: 2023-11-26

## 2023-10-20 RX ORDER — CARVEDILOL 12.5 MG/1
12.5 TABLET ORAL 2 TIMES DAILY
Qty: 60 TABLET | Refills: 0 | Status: SHIPPED | OUTPATIENT
Start: 2023-10-20 | End: 2023-11-29 | Stop reason: HOSPADM

## 2023-10-20 RX ADMIN — PANTOPRAZOLE SODIUM 40 MG: 40 TABLET, DELAYED RELEASE ORAL at 09:04

## 2023-10-20 RX ADMIN — CYCLOBENZAPRINE 10 MG: 10 TABLET, FILM COATED ORAL at 09:03

## 2023-10-20 RX ADMIN — OXYCODONE HYDROCHLORIDE 10 MG: 10 TABLET ORAL at 09:02

## 2023-10-20 RX ADMIN — ESCITALOPRAM OXALATE 20 MG: 20 TABLET, FILM COATED ORAL at 09:04

## 2023-10-20 RX ADMIN — ACETAMINOPHEN 650 MG: 325 TABLET ORAL at 12:33

## 2023-10-20 RX ADMIN — ENOXAPARIN SODIUM 40 MG: 40 INJECTION SUBCUTANEOUS at 09:02

## 2023-10-20 RX ADMIN — CYCLOBENZAPRINE 10 MG: 10 TABLET, FILM COATED ORAL at 15:48

## 2023-10-20 RX ADMIN — LEVETIRACETAM 500 MG: 500 TABLET, FILM COATED ORAL at 09:02

## 2023-10-20 RX ADMIN — ALLOPURINOL 100 MG: 100 TABLET ORAL at 09:02

## 2023-10-20 RX ADMIN — ASPIRIN 81 MG 81 MG: 81 TABLET ORAL at 09:02

## 2023-10-20 RX ADMIN — ACETAMINOPHEN 650 MG: 325 TABLET ORAL at 09:04

## 2023-10-20 RX ADMIN — CARVEDILOL 12.5 MG: 12.5 TABLET, FILM COATED ORAL at 09:04

## 2023-10-20 RX ADMIN — TAMSULOSIN HYDROCHLORIDE 0.4 MG: 0.4 CAPSULE ORAL at 09:02

## 2023-10-20 RX ADMIN — PREGABALIN 300 MG: 150 CAPSULE ORAL at 12:33

## 2023-10-20 RX ADMIN — CLOPIDOGREL BISULFATE 75 MG: 75 TABLET ORAL at 09:02

## 2023-10-20 ASSESSMENT — COGNITIVE AND FUNCTIONAL STATUS - GENERAL
STANDING UP FROM CHAIR USING ARMS: A LITTLE
MOVING TO AND FROM BED TO CHAIR: A LITTLE
TURNING FROM BACK TO SIDE WHILE IN FLAT BAD: A LITTLE
WALKING IN HOSPITAL ROOM: A LOT
PERSONAL GROOMING: A LITTLE
DRESSING REGULAR LOWER BODY CLOTHING: A LOT
DRESSING REGULAR UPPER BODY CLOTHING: A LITTLE
DAILY ACTIVITIY SCORE: 17
TOILETING: A LITTLE
MOVING FROM LYING ON BACK TO SITTING ON SIDE OF FLAT BED WITH BEDRAILS: A LITTLE
HELP NEEDED FOR BATHING: A LOT
MOBILITY SCORE: 16
CLIMB 3 TO 5 STEPS WITH RAILING: A LOT

## 2023-10-20 ASSESSMENT — ACTIVITIES OF DAILY LIVING (ADL): HOME_MANAGEMENT_TIME_ENTRY: 25

## 2023-10-20 ASSESSMENT — PAIN SCALES - GENERAL
PAINLEVEL_OUTOF10: 10 - WORST POSSIBLE PAIN
PAINLEVEL_OUTOF10: 5 - MODERATE PAIN
PAINLEVEL_OUTOF10: 5 - MODERATE PAIN

## 2023-10-20 ASSESSMENT — PAIN DESCRIPTION - DESCRIPTORS: DESCRIPTORS: ACHING

## 2023-10-20 ASSESSMENT — PAIN - FUNCTIONAL ASSESSMENT
PAIN_FUNCTIONAL_ASSESSMENT: 0-10
PAIN_FUNCTIONAL_ASSESSMENT: 0-10

## 2023-10-20 NOTE — DISCHARGE INSTRUCTIONS
Orthopedic  Discharge Instructions    To prevent Clot formation, you have been placed on the following medication:  ASA for 30 days started on  Surgical Site Care:    If Aquacel Ag dressing is present, do not remove dressing for 7 days,Staples will be removed on post-operative day 14 and steri-strips applied  Showering is permitted starting POD1 if waterproof Aquacel dressing is present  Until all areas of incision are healed.    Physical Therapy:  Weight Bearing Status:  No weight bearing R LE  Precautions, Per Physical Therapy Handout  Pain Medications  Wean off pain medications as you deem appropriate as long as pain is under control  Cold packs/Ice packs/Machine  May be used 3 times daily for 15-30 minutes as necessary  Be sure to have a barrier (cloth, clothing, towel) between the site and the ice pack to prevent frostbite  Contact [facility] office if  Increased redness, swelling, drainage of any kind, and/or pain to surgery site.  As well as new onset fevers and or chills.  These could signify an infection.  Calf or thigh tenderness to touch as well as increased swelling or redness.  This could signify a clot formation.  Numbness or tingling to an area around the incision site or below the incision site (toes).  Any rash appears, increased  or new onset nausea/vomiting occur.  This may indicate a reaction to a medication.  Phone # [phone].  Follow up with Surgeon   I acknowledge that I have received dain hose and understand the instructions on how and when to wear them (on during the day, off at night)   Discharging RN who has gone over instructions and acknowledges dain hose have been received      
Detail Level: Zone
Photo Preface (Leave Blank If You Do Not Want): Photographs were obtained today

## 2023-10-20 NOTE — CARE PLAN
The patient's goals for the shift include No pain    The clinical goals for the shift include Pt will have adequate pain control with no changes in neuro status 10/20/23    Pt is to be discharged here shortly. He will be going home and family are on the way to pick him up.

## 2023-10-20 NOTE — PROGRESS NOTES
Occupational Therapy    OT Treatment    Patient Name: Gm Thrasher  MRN: [id number]  Today's Date: 10/20/2023  Time Calculation  Start Time: 0940  Stop Time: 1005  Time Calculation (min): 25 min         Assessment:  End of Session Communication: Bedside nurse  End of Session Patient Position: Up in chair, Alarm on       Plan:  OT Frequency: 5 times per week  OT Discharge Recommendations: Moderate intensity level of continued care     Subjective     Current Problem:  1. Syncope, unspecified syncope type  Transthoracic Echo (TTE) Complete    Transthoracic Echo (TTE) Complete      2. H/O fracture of ankle  Case Request Operating Room: Insertion Intramedullary Nail Tibia    Case Request Operating Room: Insertion Intramedullary Nail Tibia    CANCELED: Case Request Operating Room: Insertion Intramedullary Nail Tibia    CANCELED: Case Request Operating Room: Insertion Intramedullary Nail Tibia    CANCELED: Case Request Operating Room: Insertion Intramedullary Nail Tibia    CANCELED: Case Request Operating Room: Insertion Intramedullary Nail Tibia      3. NSTEMI (non-ST elevated myocardial infarction) (CMS/HCC)  Case Request Cath Lab: Left Heart Cath    Case Request Cath Lab: Left Heart Cath    Cardiac catheterization - coronary    Cardiac catheterization - coronary    Case Request Cath Lab: PCI RCA, right radial 6Fr    Case Request Cath Lab: PCI RCA, right radial 6Fr    Cardiac catheterization - coronary    Cardiac catheterization - coronary    clopidogrel (Plavix) 75 mg tablet    carvedilol (Coreg) 12.5 mg tablet    aspirin 81 mg chewable tablet    atorvastatin (Lipitor) 80 mg tablet    CANCELED: Cardiac catheterization - coronary    CANCELED: Cardiac catheterization - coronary      4. Subsequent non-ST elevation (NSTEMI) myocardial infarction (CMS/HCC)  Cardiac catheterization - coronary      5. Ankle fracture, right, closed, initial encounter  Referral to Home Health    polyethylene glycol (Glycolax, Miralax) packet     enoxaparin (Lovenox) 40 mg/0.4 mL syringe    oxyCODONE (Roxicodone) 5 mg immediate release tablet          General:  OT Received On: 10/20/23  Prior to Session Communication: Bedside nurse  Patient Position Received: Bed, 3 rail up  General Comment: Pt very pleasant and cooperative, MILLER educated on safety     Vital Signs:       Pain:  Pain Assessment  Pain Assessment: 0-10  Pain Score: 5 - Moderate pain  Objective      Activities of Daily Living:             LB Dressing  LB Dressing Level of Assistance: Minimum assistance  LB Dressing Where Assessed: Edge of bed  LB Dressing Comments: Educated on safe seated dressing and proper tehcnique.     Toileting  Toileting Comments: Educated on BSC/raised toilet for home and provided handout    Functional Standing Tolerance:       Bed Mobility/Transfers: Bed Mobility 1  Bed Mobility 1: Supine to sitting  Level of Assistance 1: Contact guard  Transfer 1  Transfer From 1: Sit to  Transfer to 1: Stand  Transfer Device 1: Walker, Gait belt  Transfer Level of Assistance 1: Contact guard  Trials/Comments 1: Pt completed multiple sit<>stands to promote strength and balance   Transfers 2  Transfer From 2: Bed to  Transfer to 2: Chair with arms  Transfer Device 2: Walker  Transfer Level of Assistance 2: Contact guard  Trials/Comments 2: Pt transferred short distance from bed>chair CGA                Therapy/Activity:    Therapeutic Activity  Therapeutic Activity Performed: Yes                 Outcome Measures:[facility] Daily Activity  Putting on and taking off regular lower body clothing: A lot  Bathing (including washing, rinsing, drying): A lot  Putting on and taking off regular upper body clothing: A little  Toileting, which includes using toilet, bedpan or urinal: A little  Taking care of personal grooming such as brushing teeth: A little  Eating Meals: None  Daily Activity - Total Score: 17  Education Documentation  Handouts, taught by DOMINGUEZ Freeman at 10/20/2023 12:44  PM.  Learner: Patient  Readiness: Eager  Method: Demonstration, Explanation  Response: Verbalizes Understanding, Demonstrated Understanding    Precautions, taught by DOMINGUEZ Freeman at 10/20/2023 12:44 PM.  Learner: Patient  Readiness: Eager  Method: Demonstration, Explanation  Response: Verbalizes Understanding, Demonstrated Understanding    ADL Training, taught by DOMINGUEZ Freeman at 10/20/2023 12:44 PM.  Learner: Patient  Readiness: Eager  Method: Demonstration, Explanation  Response: Verbalizes Understanding, Demonstrated Understanding    Education Comments  No comments found.        EDUCATION:  Education  Individual(s) Educated: Patient  Education Provided: Fall precautons, Risk and benefits of OT discussed with patient or other (WB status and spine precations)    Goals:  Encounter Problems       Encounter Problems (Active)       Dressings Lower Extremities       STG - Patient to complete lower body dressing min assist (Progressing)       Start:  10/18/23    Expected End:  11/01/23               Grooming       STG - Patient completes grooming MOD I (Progressing)       Start:  10/18/23    Expected End:  11/01/23               Transfers       STG - Patient will perform bed mobility CGA (Progressing)       Start:  10/18/23    Expected End:  11/01/23            STG - Patient will perform toilet transfer mod assist (Progressing)       Start:  10/18/23    Expected End:  11/01/23            STG - Patient maintains weight bearing status during transfers (Progressing)       Start:  10/18/23    Expected End:  11/01/23

## 2023-10-20 NOTE — PROGRESS NOTES
"Gm Thrasher is a 58 y.o. male on day 10 of admission presenting with Ankle fracture, right, closed, initial encounter.    Subjective   Sitting in chair waiting on discharge.  Currently verbalizing no complaints.  States progressing better with ambulation.  Eager to be discharged home.       Objective     Physical Exam  HENT:      Head: Normocephalic.      Mouth/Throat:      Mouth: Mucous membranes are moist.      Pharynx: Oropharynx is clear.   Eyes:      Extraocular Movements: Extraocular movements intact.   Cardiovascular:      Rate and Rhythm: Normal rate.   Pulmonary:      Effort: Pulmonary effort is normal.   Abdominal:      Palpations: Abdomen is soft.   Musculoskeletal:         General: No tenderness, deformity or signs of injury.      Right lower leg: No edema.      Left lower leg: No edema.      Comments: Distal right LE splint dressing is dry and intact.  Right toes with light touch sensation intact/motor intact, toes pink with brisk cap refill denies any current irritation from splint   Skin:     General: Skin is warm and dry.      Capillary Refill: Capillary refill takes less than 2 seconds.   Neurological:      General: No focal deficit present.      Mental Status: He is alert. Mental status is at baseline.   Psychiatric:         Mood and Affect: Mood normal.     Last Recorded Vitals  Blood pressure 143/78, pulse 70, temperature 36.2 °C (97.2 °F), temperature source Temporal, resp. rate 20, height 1.8 m (5' 10.87\"), weight 115 kg (253 lb 11.2 oz), SpO2 98 %.  Intake/Output last 3 Shifts:  I/O last 3 completed shifts:  In: 240 (2.1 mL/kg) [P.O.:240]  Out: 3400 (29.5 mL/kg) [Urine:3400 (0.8 mL/kg/hr)]  Weight: 115.1 kg     Relevant Results  Scheduled medications  acetaminophen, 650 mg, oral, 4x daily  allopurinol, 100 mg, oral, Daily  aspirin, 81 mg, oral, Daily  atorvastatin, 80 mg, oral, Daily  carvedilol, 12.5 mg, oral, BID  clopidogrel, 75 mg, oral, Daily  cyclobenzaprine, 10 mg, oral, " TID  docusate sodium, 100 mg, oral, BID  enoxaparin, 40 mg, subcutaneous, Daily  escitalopram, 20 mg, oral, Daily  glycerin, 1 suppository, rectal, Once  [Held by provider] insulin lispro, 0-5 Units, subcutaneous, q4h  levETIRAcetam, 500 mg, oral, BID  melatonin, 5 mg, oral, Nightly  pantoprazole, 40 mg, oral, Daily before breakfast  perflutren lipid microspheres, 3 mL of dilution, intravenous, Once in imaging  polyethylene glycol, 17 g, oral, Daily  pregabalin, 300 mg, oral, BID  sennosides, 1 tablet, oral, BID  sulfur hexafluoride microsphr, 2 mL, intravenous, Once in imaging  tamsulosin, 0.4 mg, oral, Daily      Continuous medications  lactated Ringer's, 100 mL/hr, Last Rate: 100 mL/hr (10/19/23 1639)  oxygen, 2 L/min      PRN medications  PRN medications: benzocaine-menthol, bisacodyl, docusate sodium, hydrOXYzine HCL, naloxone, oxyCODONE, oxyCODONE, prochlorperazine **OR** prochlorperazine **OR** prochlorperazine, sodium phosphates   Results for orders placed or performed during the hospital encounter of 10/10/23 (from the past 24 hour(s))   CBC   Result Value Ref Range    WBC 7.7 4.4 - 11.3 x10*3/uL    nRBC 0.0 0.0 - 0.0 /100 WBCs    RBC 3.25 (L) 4.50 - 5.90 x10*6/uL    Hemoglobin 9.5 (L) 13.5 - 17.5 g/dL    Hematocrit 29.8 (L) 41.0 - 52.0 %    MCV 92 80 - 100 fL    MCH 29.2 26.0 - 34.0 pg    MCHC 31.9 (L) 32.0 - 36.0 g/dL    RDW 16.3 (H) 11.5 - 14.5 %    Platelets 285 150 - 450 x10*3/uL    MPV 9.6 7.5 - 11.5 fL   Renal Function Panel   Result Value Ref Range    Glucose 91 74 - 99 mg/dL    Sodium 137 136 - 145 mmol/L    Potassium 3.5 3.5 - 5.3 mmol/L    Chloride 100 98 - 107 mmol/L    Bicarbonate 29 21 - 32 mmol/L    Anion Gap 12 10 - 20 mmol/L    Urea Nitrogen 8 6 - 23 mg/dL    Creatinine 0.87 0.50 - 1.30 mg/dL    eGFR >90 >60 mL/min/1.73m*2    Calcium 8.4 (L) 8.6 - 10.3 mg/dL    Phosphorus 3.8 2.5 - 4.9 mg/dL    Albumin 3.0 (L) 3.4 - 5.0 g/dL   Magnesium   Result Value Ref Range    Magnesium 1.72 1.60 - 2.40  mg/dL                   Assessment/Plan   Principal Problem:    Ankle fracture, right, closed, initial encounter  Active Problems:    H/O fracture of ankle    Syncope, unspecified syncope type    NSTEMI (non-ST elevated myocardial infarction) (CMS/[facility])    POD #3 s/p IM nailing right tibia  Continue PT/OT, non weight bearing right LE, cleared to work with PT/OT  Reviewed am labs  Multimodal pain regimen  VTE prophylaxis: lovenox daily X 30 days  discharging home with [facility]  Follow up with Dr. Hale in 2 weeks       I spent 20 minutes in the professional and overall care of this patient.      Kristina Raines PA-C

## 2023-10-20 NOTE — NURSING NOTE
0800  -Pt report received. No questions a this time.     1420  - Pt IV s removed . Discharge instructions reviewed and pt has no questions. Pt ride  was here but he had to leave. Pt to call nephew to come back for discharge. Pt has wheelchair at home and has no DME needs. He was medicated for pain and received his pain scripts.

## 2023-10-20 NOTE — PROGRESS NOTES
Physical Therapy    Physical Therapy Treatment    Patient Name: Gm Thrasher  MRN: [id number]  Today's Date: 10/20/2023  Time Calculation  Start Time: 0928  Stop Time: 0958  Time Calculation (min): 30 min       Assessment/Plan   PT Assessment  PT Assessment Results: Decreased strength, Decreased endurance, Impaired balance, Decreased mobility  Rehab Prognosis: Good  Evaluation/Treatment Tolerance: Patient limited by fatigue  Medical Staff Made Aware: Yes  End of Session Communication: Bedside nurse  Assessment Comment: patient performing transfers with much less assist and pain this session.  follows instructions well and has reasonable safety awareness though needs reminders to keep RLE off floor.  patient radha place on floor in static stand though appears only for proprioception.  End of Session Patient Position: Up in chair, Alarm on  PT Plan  Inpatient/Swing Bed or Outpatient: Inpatient  PT Plan  Treatment/Interventions: Bed mobility, Transfer training, Gait training  PT Plan: Skilled PT  PT Frequency: 5 times per week  PT Discharge Recommendations: Moderate intensity level of continued care  PT Recommended Transfer Status: Assist x1     10/20/23 0928   PT  Visit   PT Received On 10/20/23   Response to Previous Treatment Patient with no complaints from previous session.   General   Prior to Session Communication Bedside nurse   Patient Position Received Bed, 3 rail up;Alarm on   Preferred Learning Style verbal;visual   General Comment eager to participate in therapy.  thankful and appreciative of efforts from therapy team. patient seen for transfer training this  date to allow for safe homegoing.   Precautions   LE Weight Bearing Status Right Non-Weight Bearing   Medical Precautions Fall precautions   Post-Surgical Precautions Spinal precautions   Pain Assessment   Pain Assessment 0-10   Pain Score 5 - Moderate pain   Pain Type Acute pain   Pain Orientation Right   Pain Radiating Towards quad   Pain Descriptors  Aching   Cognition   Overall Cognitive Status WFL   Orientation Level Oriented X4   Therapeutic Exercise   Therapeutic Exercise Performed No   Balance/Neuromuscular Re-Education   Balance/Neuromuscular Re-Education Activity Performed Yes   Bed Mobility   Bed Mobility Yes   Bed Mobility 1   Bed Mobility 1 Supine to sitting   Level of Assistance 1 Close supervision;Contact guard  (CGA and VC for RLE)   Bed Mobility 2   Bed Mobility  2 Sitting to supine   Level of Assistance 2 Contact guard   Ambulation/Gait Training   Ambulation/Gait Training Performed Yes   Ambulation/Gait Training 1   Surface 1 Level tile   Device 1 Rolling walker   Gait Support Devices Gait belt   Assistance 1 Contact guard   Comments/Distance (ft) 1 able to hop on LLE 4' to recliner.  cues to maintain NWB RLE   Transfers   Transfer Yes   Transfer 1   Transfer From 1 Bed to   Transfer to 1 Stand   Technique 1 Sit to stand   Transfer Device 1 Walker;Gait belt   Transfer Level of Assistance 1 Contact guard  (VC/TC to keep R foot off floor.  patient placing R foot on floor for proprioception)   Trials/Comments 1 x5 STS trials, modified with using R hand on walker   Transfers 2   Transfer From 2 Stand to   Transfer to 2 Chair with arms   Technique 2 Sit to stand   Transfer Device 2 Walker   Transfer Level of Assistance 2 Contact guard   Activity Tolerance   Endurance Tolerates 30+ min exercise without fatigue   PT Assessment   PT Assessment Results Decreased strength;Decreased endurance;Impaired balance;Decreased mobility   Rehab Prognosis Good   Evaluation/Treatment Tolerance Patient limited by fatigue   Medical Staff Made Aware Yes   End of Session Communication Bedside nurse   Assessment Comment patient performing transfers with much less assist and pain this session.  follows instructions well and has reasonable safety awareness though needs reminders to keep RLE off floor.  patient radha place on floor in static stand though appears only for  proprioception.   End of Session Patient Position Up in chair;Alarm on   Education   Individual(s) Educated Patient   Education Provided Fall Risk   PT Plan   Inpatient/Swing Bed or Outpatient Inpatient   PT Plan   Treatment/Interventions Bed mobility;Transfer training;Gait training   PT Plan Skilled PT   PT Frequency 5 times per week   PT Discharge Recommendations Moderate intensity level of continued care   PT Recommended Transfer Status Assist x1     Outcome Measures:  [facility] Basic Mobility  Turning from your back to your side while in a flat bed without using bedrails: A little  Moving from lying on your back to sitting on the side of a flat bed without using bedrails: A little  Moving to and from bed to chair (including a wheelchair): A little  Standing up from a chair using your arms (e.g. wheelchair or bedside chair): A little  To walk in hospital room: A lot  Climbing 3-5 steps with railing: A lot  Basic Mobility - Total Score: 16  Education Documentation  Body Mechanics, taught by Rocco Nguyen PTA at 10/20/2023 10:04 AM.  Learner: Patient  Readiness: Acceptance  Method: Explanation  Response: Verbalizes Understanding, Demonstrated Understanding    Precautions, taught by Rocco Nguyen PTA at 10/20/2023 10:04 AM.  Learner: Patient  Readiness: Acceptance  Method: Explanation  Response: Verbalizes Understanding, Demonstrated Understanding    Precautions, taught by Rocco Nguyen PTA at 10/19/2023 11:52 AM.  Learner: Patient  Readiness: Acceptance  Method: Explanation  Response: Verbalizes Understanding, Needs Reinforcement    Education Comments  No comments found.        Current Problem:  1. Syncope, unspecified syncope type  Transthoracic Echo (TTE) Complete    Transthoracic Echo (TTE) Complete      2. H/O fracture of ankle  Case Request Operating Room: Insertion Intramedullary Nail Tibia    Case Request Operating Room: Insertion Intramedullary Nail Tibia    CANCELED: Case Request Operating Room: Insertion  Intramedullary Nail Tibia    CANCELED: Case Request Operating Room: Insertion Intramedullary Nail Tibia    CANCELED: Case Request Operating Room: Insertion Intramedullary Nail Tibia    CANCELED: Case Request Operating Room: Insertion Intramedullary Nail Tibia      3. NSTEMI (non-ST elevated myocardial infarction) (CMS/HCC)  Case Request Cath Lab: Left Heart Cath    Case Request Cath Lab: Left Heart Cath    Cardiac catheterization - coronary    Cardiac catheterization - coronary    Case Request Cath Lab: PCI RCA, right radial 6Fr    Case Request Cath Lab: PCI RCA, right radial 6Fr    Cardiac catheterization - coronary    Cardiac catheterization - coronary    CANCELED: Cardiac catheterization - coronary    CANCELED: Cardiac catheterization - coronary      4. Subsequent non-ST elevation (NSTEMI) myocardial infarction (CMS/HCC)  Cardiac catheterization - coronary      5. Ankle fracture, right, closed, initial encounter  Referral to Home Health      EDUCATION:  Education  Individual(s) Educated: Patient  Education Provided: Fall Risk  Encounter Problems       Encounter Problems (Active)       PT Problem       Bed mobility (Progressing)       Start:  10/18/23    Expected End:  11/01/23       Pt will be able to complete all bed mobility tasks mod I with use of bed HR and maintaining spine precautions.           Transfers (Progressing)       Start:  10/18/23    Expected End:  11/01/23       Pt will be able to complete all transfers with FWW mod A demonstrating good safety awareness, proper body mechanics and ability to maintain NWB precautions on RLE.           Strength (Progressing)       Start:  10/18/23    Expected End:  11/01/23       Pt will improve/maintain BLE strength with supine, seated and standing exercises to WFL.           Pain (Progressing)       Start:  10/18/23    Expected End:  11/01/23       Pt will demonstrate decreased pain to </= 5/10.         Precautions (Progressing)       Start:  10/18/23    Expected  End:  11/01/23       Pt will demonstrate ability to maintain all orthopedic precautions during functional mobility activities.             Pain - Adult          Transfers       STG - Patient will perform bed mobility CGA (Progressing)       Start:  10/18/23    Expected End:  11/01/23            STG - Patient will perform toilet transfer mod assist (Progressing)       Start:  10/18/23    Expected End:  11/01/23            STG - Patient maintains weight bearing status during transfers (Progressing)       Start:  10/18/23    Expected End:  11/01/23

## 2023-10-20 NOTE — PROGRESS NOTES
Sent message to [facility] to update that patient is discharging today. Per [facility] RN, they are out of network with patient's insurance. Met with patient at bedside to update and offer additional [facility] agency choices. Pt declined other [facility] choices and asked if he could go somewhere for outpt therapy. MD and nursing updated that patient with need an RX for outpatient therapy.     Janet Matta RN

## 2023-10-20 NOTE — DISCHARGE SUMMARY
Discharge Diagnosis  Ankle fracture, right, closed, initial encounter    Issues Requiring Follow-Up  Orthopedics follow up in 2 weeks- Dr. Hale  Cardiology follow up- new stent/CAD management    Discharge Meds     Your medication list        START taking these medications        Instructions Last Dose Given Next Dose Due   aspirin 81 mg chewable tablet  Start taking on: October 21, 2023      Chew 1 tablet (81 mg) once daily. Do not start before October 21, 2023.       carvedilol 12.5 mg tablet  Commonly known as: Coreg      Take 1 tablet (12.5 mg) by mouth 2 times a day.       clopidogrel 75 mg tablet  Commonly known as: Plavix  Start taking on: October 21, 2023      Take 1 tablet (75 mg) by mouth once daily. Do not start before October 21, 2023.       polyethylene glycol packet  Commonly known as: Glycolax, Miralax      Take 17 g by mouth once daily as needed (constipation).              CHANGE how you take these medications        Instructions Last Dose Given Next Dose Due   atorvastatin 80 mg tablet  Commonly known as: Lipitor  What changed:   medication strength  how much to take      Take 1 tablet (80 mg) by mouth once daily.       naloxone 4 mg/0.1 mL nasal spray  Commonly known as: Narcan  What changed: Another medication with the same name was removed. Continue taking this medication, and follow the directions you see here.           oxyCODONE 5 mg immediate release tablet  Commonly known as: Roxicodone  What changed:   medication strength  how much to take  reasons to take this      Take 1 tablet (5 mg) by mouth every 6 hours if needed for severe pain (7 - 10).              CONTINUE taking these medications        Instructions Last Dose Given Next Dose Due   allopurinol 100 mg tablet  Commonly known as: Zyloprim           cyanocobalamin 1,000 mcg/mL oral liquid  Commonly known as: Vitamin B-12           cyclobenzaprine 10 mg tablet  Commonly known as: Flexeril      Take 1 tablet (10 mg) by mouth 3 times a  day.       docusate sodium 100 mg capsule  Commonly known as: Colace      Take 1 capsule (100 mg) by mouth 2 times a day as needed for constipation.       enoxaparin 40 mg/0.4 mL syringe  Commonly known as: Lovenox      Inject 0.4 mL (40 mg) under the skin once daily.       ergocalciferol 1.25 MG (55363 UT) capsule  Commonly known as: Vitamin D-2           escitalopram 20 mg tablet  Commonly known as: Lexapro           levETIRAcetam 500 mg tablet  Commonly known as: Keppra      Take 1 tablet (500 mg) by mouth 2 times a day for 7 days.       metFORMIN 500 mg tablet  Commonly known as: Glucophage      Take 2 tablets (1,000 mg) by mouth 2 times a day with meals.       nicotine 14 mg/24 hr patch  Commonly known as: Nicoderm CQ           Ozempic 0.25 mg or 0.5 mg (2 mg/3 mL) pen injector  Generic drug: semaglutide  Start taking on: August 9, 2023      Inject 0.25 mg under the skin every 7 days for 28 days, THEN 0.5 mg every 7 days for 28 days.       pantoprazole 40 mg EC tablet  Commonly known as: ProtoNix      Take 1 tablet (40 mg) by mouth once daily in the morning. Take before meals for 14 days. Do not crush, chew, or split. Do not start before October 6, 2023.       pregabalin 300 mg capsule  Commonly known as: Lyrica      Take 1 capsule (300 mg) by mouth 2 times a day.       tamsulosin 0.4 mg 24 hr capsule  Commonly known as: Flomax      Take 1 capsule (0.4 mg) by mouth once daily for 14 days. Do not start before October 6, 2023.       Vascepa 1 gram capsule  Generic drug: icosapent ethyL      Take 2 capsules (2 g) by mouth 2 times a day with meals.              STOP taking these medications      benzocaine-menthol 15-3.6 mg lozenge  Commonly known as: Cepastat Sore Throat        buprenorphine-naloxone 8-2 mg SL tablet  Commonly known as: Suboxone        chlorhexidine 0.12 % solution  Commonly known as: Peridex        nebivolol 5 mg tablet  Commonly known as: Bystolic        oxygen gas therapy  Commonly known as:  O2        sennosides 8.6 mg tablet  Commonly known as: Senokot                  Where to Get Your Medications        These medications were sent to [facility]  [address], [address], [facility] [address]      Hours: 8:30 AM to 5 PM Mon-Fri, 9 AM to 1 PM Sat Phone: [phone]   aspirin 81 mg chewable tablet  atorvastatin 80 mg tablet  carvedilol 12.5 mg tablet  clopidogrel 75 mg tablet  enoxaparin 40 mg/0.4 mL syringe  polyethylene glycol packet       You can get these medications from any pharmacy    Bring a paper prescription for each of these medications  oxyCODONE 5 mg immediate release tablet         Test Results Pending At Discharge  Pending Labs       Order Current Status    Extra Tubes In process    Urine Spicer Tube In process            Hospital Course  58-year-old male presenting with complaint of isolated syncope found to have a new left psoas rim-enhancing fluid collection with potential extension into the L4-L5.  Recent medical history significant for hospitalization 9/29-10/5/2023 due to an elective lumbar decompression which was complicated by subarachnoid hemorrhage.  Patient was discharged home and then presented initially to [facility] for the syncopal episode suffering a fall from ground-level and having a fracture to his right ankle. Initial work-up which involved an MRI which demonstrated a rim-enhancing fluid collection at the left psoas measuring 2.5 x 9.3 cm with possible extension to the L4-L5 and bilateral hydronephrosis.  Patient was transferred to [facility] for neurosurgical evaluation as his spinal decompression was done here by Dr. Amezcua.  Initial labs were significant for leukocytosis of 14.1, blood cultures were drawn, UA negative.  Patient was initiated on IV Zosyn and vancomycin due to concerns for postsurgical abscess.  Mild troponinemia at outside facility ([facility]) with initial troponin of 358 with repeat of 158 which was complicated on arrival with repeat  troponins here at [facility] revealing marked elevation in his troponin of 1400 of note patient remained chest pain-free with no anginal equivalent complaint, cardiology was consulted with plan for definitive diagnosis with left heart cath.  In the setting of patient's right ankle fracture orthopedic surgery was consulted.  In regards to the patient's MRI findings concerning for abscess neurosurgery was consulted noting they remark low concern for retroperitoneal abscess in the setting of incision remaining clean dry and intact.  After diagnostic cath revealing 90% stenosed RCA, orthopedic surgery, neurosurgery, cardiology correlated and planned to start patient on heparin, DAPT with repeat CT head to assess for possible acute bleed.  This was to assess if patient would be able to withstand PCI with stent placement.  CT head the following morning revealed no acute bleed or intracranial changes from prior.  Thus, patient went for PCI and had stent placed to RCA on 10/13/2023. Patient tolerated the procedure well, has not had any acute bleeding while on DAPT or changes in mental status.  He underwent ORIF of right ankle on Tuesday, 10/17/2023. Discharged to home with home healthcare. New prescriptions for clopidogrel, baby aspirin, carvedilol.     Pertinent Physical Exam At Time of Discharge  Physical Exam  Constitutional:       Appearance: He is obese.   HENT:      Head: Normocephalic and atraumatic.      Mouth/Throat:      Mouth: Mucous membranes are moist.   Eyes:      Extraocular Movements: Extraocular movements intact.      Pupils: Pupils are equal, round, and reactive to light.   Cardiovascular:      Rate and Rhythm: Normal rate and regular rhythm.   Pulmonary:      Effort: Pulmonary effort is normal.      Breath sounds: Normal breath sounds.   Musculoskeletal:         General: Signs of injury present.   Skin:     General: Skin is warm and dry.      Findings: No rash.   Neurological:      Mental Status: He is  alert.         Outpatient Follow-Up  Future Appointments   Date Time Provider Department Center   11/9/2023 11:00 AM Vitaly Amezcua MD PhD [id number] [place]   12/8/2023  9:00 AM JARED Cota-CNP [id number] [place]   12/28/2023 11:00 AM Vitaly Amezcua MD PhD [id number] [place]         Evelyne Daniel DO

## 2023-10-23 ENCOUNTER — PATIENT OUTREACH (OUTPATIENT)
Dept: PRIMARY CARE | Facility: CLINIC | Age: 58
End: 2023-10-23
Payer: COMMERCIAL

## 2023-10-23 ENCOUNTER — DOCUMENTATION (OUTPATIENT)
Dept: PRIMARY CARE | Facility: CLINIC | Age: 58
End: 2023-10-23
Payer: COMMERCIAL

## 2023-10-23 DIAGNOSIS — R55 SYNCOPE, UNSPECIFIED SYNCOPE TYPE: ICD-10-CM

## 2023-10-23 DIAGNOSIS — S82.891A CLOSED FRACTURE OF RIGHT ANKLE, INITIAL ENCOUNTER: ICD-10-CM

## 2023-10-23 DIAGNOSIS — I21.4 NSTEMI (NON-ST ELEVATED MYOCARDIAL INFARCTION) (MULTI): ICD-10-CM

## 2023-10-23 NOTE — PROGRESS NOTES
Discharge Facility: [facility]  Discharge Diagnosis:  H/O fracture of ankle - s/p IM nailing right tibia     Syncope, unspecified syncope type    NSTEMI (non-ST elevated myocardial infarction) (CMS/[facility])  Admission Date: 10/10/23  Discharge Date: 10/20/23    PCP Appointment Date: 10/30/23 with Janet Giraldo CNP  Specialist Appointment Date:  Hospital Encounter and Summary: Linked   See Discharge Assessment below for further details.    Hospital Course  58-year-old male presenting with complaint of isolated syncope found to have a new left psoas rim-enhancing fluid collection with potential extension into the L4-L5.  Recent medical history significant for hospitalization 9/29-10/5/2023 due to an elective lumbar decompression which was complicated by subarachnoid hemorrhage.  Patient was discharged home and then presented initially to [facility] for the syncopal episode suffering a fall from ground-level and having a fracture to his right ankle. Initial work-up which involved an MRI which demonstrated a rim-enhancing fluid collection at the left psoas measuring 2.5 x 9.3 cm with possible extension to the L4-L5 and bilateral hydronephrosis.  Patient was transferred to [facility] for neurosurgical evaluation as his spinal decompression was done here by Dr. Amezcua.  Initial labs were significant for leukocytosis of 14.1, blood cultures were drawn, UA negative.  Patient was initiated on IV Zosyn and vancomycin due to concerns for postsurgical abscess.  Mild troponinemia at outside facility ([facility]) with initial troponin of 358 with repeat of 158 which was complicated on arrival with repeat troponins here at [facility] revealing marked elevation in his troponin of 1400 of note patient remained chest pain-free with no anginal equivalent complaint, cardiology was consulted with plan for definitive diagnosis with left heart cath.  In the setting of patient's right ankle fracture orthopedic surgery was consulted.  In regards  to the patient's MRI findings concerning for abscess neurosurgery was consulted noting they remark low concern for retroperitoneal abscess in the setting of incision remaining clean dry and intact.  After diagnostic cath revealing 90% stenosed RCA, orthopedic surgery, neurosurgery, cardiology correlated and planned to start patient on heparin, DAPT with repeat CT head to assess for possible acute bleed.  This was to assess if patient would be able to withstand PCI with stent placement.  CT head the following morning revealed no acute bleed or intracranial changes from prior.  Thus, patient went for PCI and had stent placed to RCA on 10/13/2023. Patient tolerated the procedure well, has not had any acute bleeding while on DAPT or changes in mental status.  He underwent ORIF of right ankle on Tuesday, 10/17/2023. Discharged to home with home healthcare. New prescriptions for clopidogrel, baby aspirin, carvedilol.     Issues Requiring Follow-Up  Orthopedics follow up in 2 weeks- Dr. Hale  Cardiology follow up- new stent/CAD management    Medications  Medications reviewed with patient/caregiver?: Yes (10/23/2023 10:16 AM)  Is the patient having any side effects they believe may be caused by any medication additions or changes?: No (10/23/2023 10:16 AM)  Does the patient have all medications ordered at discharge?: Yes (10/23/2023 10:16 AM)  Care Management Interventions: No intervention needed (10/23/2023 10:16 AM)  Is the patient taking all medications as directed (includes completed medication regime)?: Yes (10/23/2023 10:16 AM)  Care Management Interventions: Provided patient education (10/23/2023 10:16 AM)    Appointments  Does the patient have a primary care provider?: Yes (10/23/2023 10:16 AM)  Care Management Interventions: Verified appointment date/time/provider (10/23/2023 10:16 AM)  Has the patient kept scheduled appointments due by today?: Yes (10/23/2023 10:16 AM)    Self Management  What is the home health  agency?: Patient unsure of company name (10/23/2023 10:16 AM)  Has home health visited the patient within 72 hours of discharge?: Yes (10/23/2023 10:16 AM)  Has all Durable Medical Equipment (DME) been delivered?: Yes (10/23/2023 10:16 AM)  Follow Up Tasks: Home Health (10/23/2023 10:16 AM)    Patient Teaching  Does the patient have access to their discharge instructions?: Yes (10/23/2023 10:16 AM)  Care Management Interventions: Reviewed instructions with patient (10/23/2023 10:16 AM)  What is the patient's perception of their health status since discharge?: Improving (10/23/2023 10:16 AM)  Is the patient/caregiver able to teach back the hierarchy of who to call/visit for symptoms/problems? PCP, Specialist, Home Health nurse, Urgent Care, ED, 911: Yes (10/23/2023 10:16 AM)

## 2023-10-30 ENCOUNTER — TELEMEDICINE (OUTPATIENT)
Dept: PRIMARY CARE | Facility: CLINIC | Age: 58
End: 2023-10-30
Payer: COMMERCIAL

## 2023-10-30 DIAGNOSIS — M96.1 LUMBAR POST-LAMINECTOMY SYNDROME: ICD-10-CM

## 2023-10-30 DIAGNOSIS — J43.9 PULMONARY EMPHYSEMA, UNSPECIFIED EMPHYSEMA TYPE (MULTI): ICD-10-CM

## 2023-10-30 DIAGNOSIS — I25.2 H/O NON-ST ELEVATION MYOCARDIAL INFARCTION (NSTEMI): ICD-10-CM

## 2023-10-30 DIAGNOSIS — Z98.890 S/P SURGICAL MANIPULATION OF ANKLE JOINT: ICD-10-CM

## 2023-10-30 DIAGNOSIS — I21.4 NSTEMI (NON-ST ELEVATED MYOCARDIAL INFARCTION) (MULTI): ICD-10-CM

## 2023-10-30 DIAGNOSIS — S82.891A ANKLE FRACTURE, RIGHT, CLOSED, INITIAL ENCOUNTER: ICD-10-CM

## 2023-10-30 DIAGNOSIS — Z87.81 H/O FRACTURE OF ANKLE: Primary | ICD-10-CM

## 2023-10-30 DIAGNOSIS — E11.65 TYPE 2 DIABETES MELLITUS WITH HYPERGLYCEMIA, WITHOUT LONG-TERM CURRENT USE OF INSULIN (MULTI): ICD-10-CM

## 2023-10-30 DIAGNOSIS — E66.01 OBESITY, CLASS III, BMI 40-49.9 (MORBID OBESITY) (MULTI): ICD-10-CM

## 2023-10-30 DIAGNOSIS — F39 MOOD DISORDER (CMS-HCC): ICD-10-CM

## 2023-10-30 PROBLEM — F14.11: Status: RESOLVED | Noted: 2023-10-30 | Resolved: 2023-10-30

## 2023-10-30 PROBLEM — R55 SYNCOPE, UNSPECIFIED SYNCOPE TYPE: Status: RESOLVED | Noted: 2023-10-10 | Resolved: 2023-10-30

## 2023-10-30 PROBLEM — F14.11: Status: ACTIVE | Noted: 2023-10-30

## 2023-10-30 PROBLEM — R00.0 TACHYCARDIA: Status: RESOLVED | Noted: 2023-10-17 | Resolved: 2023-10-30

## 2023-10-30 PROCEDURE — 99496 TRANSJ CARE MGMT HIGH F2F 7D: CPT | Performed by: NURSE PRACTITIONER

## 2023-10-30 RX ORDER — OXYCODONE HYDROCHLORIDE 5 MG/1
5 TABLET ORAL EVERY 6 HOURS PRN
Qty: 28 TABLET | Refills: 0 | Status: SHIPPED | OUTPATIENT
Start: 2023-10-30 | End: 2023-11-06

## 2023-10-30 NOTE — ASSESSMENT & PLAN NOTE
Does not have FU with ortho  Significant pain  Non weight being  Ordered knee walker and sent to DME  Stat referral to ortho  Sent short term gonzalez due to recent lumbar and ankle surgery  discussed risks and benefits due to hx of cocaine use and on Suboxone, [facility] started by hospital  Pt verbalized understanding

## 2023-10-30 NOTE — PATIENT INSTRUCTIONS
Labor Progress Note - Ramos Guevara 31 y o  female MRN: [id number]    Unit/Bed#: -01 Encounter: [id number]    Obstetric History       T0      L0     SAB0   TAB0   Ectopic0   Multiple0   Live Births0      Gestational Age: 37w0d     Contraction Frequency (minutes): 3, irregular  Contraction Quality: Mild  Tachysystole: No   Dilation: 5-6        Effacement (%): 90  Station: -1  Baseline Rate: 135 bpm  Fetal Heart Rate: 150 BPM  FHR Category: Category I          Notes/comments:   Patient remains comfortable with epidural  Category I FHT  No cervical change from prior examination  >12 hours ruptured   Will start Pitocin titration for labor augmentation    Consent form signed, all questions answered to the patient's satisfaction    Discussed w/ Dr Lul Sharma MD 2018 6:21 AM Repeat labs  Ortho  Cinema  Scooter for leg

## 2023-10-30 NOTE — PROGRESS NOTES
"Patient: Gm Thrasher  : 1965  PCP: Marsha Francis MD  MRN: [id number]  Program: No linked episodes         Gm Thrasher is a 58 y.o. male presenting today for follow-up after being discharged from the hospital 10 days ago. The main problem requiring admission was NSTEMI, Syncope. The discharge summary and/or Transitional Care Management documentation was reviewed. Medication reconciliation was performed as indicated via the \"Timothy as Reviewed\" timestamp.     Gm Thrasher was contacted by Transitional Care Management services two days after his discharge. This encounter and supporting documentation was reviewed.    The complexity of medical decision making for this patient's transitional care is high.    23 Back surgery  Stayed in hospital for 7 days  Passed out at home  Broke ankle  Went to [facility] by ambulance  Transferred him to [facility]  Found 90% blockage in heart  Had NSTEMI  Did a stent  ---  Has plenty of help with sisters  Both nurses  Pain is a lot  Having anxiety attacks           ROS completely negative except what was mentioned in the HPI.  Problem List, surgical, social, and family histories which were reviewed and updated as necessary within the EMR. I also personally reviewed the notes, labs, and imaging that pertained to what was documented or discussed in the HPI.      Hospital course copied:   58-year-old male presenting with complaint of isolated syncope found to have a new left psoas rim-enhancing fluid collection with potential extension into the L4-L5.  Recent medical history significant for hospitalization -10/5/2023 due to an elective lumbar decompression which was complicated by subarachnoid hemorrhage.  Patient was discharged home and then presented initially to [facility] for the syncopal episode suffering a fall from ground-level and having a fracture to his right ankle. Initial work-up which involved an MRI which demonstrated a rim-enhancing fluid collection at the left " psoas measuring 2.5 x 9.3 cm with possible extension to the L4-L5 and bilateral hydronephrosis.  Patient was transferred to [facility] for neurosurgical evaluation as his spinal decompression was done here by Dr. Amezcua.  Initial labs were significant for leukocytosis of 14.1, blood cultures were drawn, UA negative.  Patient was initiated on IV Zosyn and vancomycin due to concerns for postsurgical abscess.  Mild troponinemia at outside facility ([facility]) with initial troponin of 358 with repeat of 158 which was complicated on arrival with repeat troponins here at [facility] revealing marked elevation in his troponin of 1400 of note patient remained chest pain-free with no anginal equivalent complaint, cardiology was consulted with plan for definitive diagnosis with left heart cath.  In the setting of patient's right ankle fracture orthopedic surgery was consulted.  In regards to the patient's MRI findings concerning for abscess neurosurgery was consulted noting they remark low concern for retroperitoneal abscess in the setting of incision remaining clean dry and intact.  After diagnostic cath revealing 90% stenosed RCA, orthopedic surgery, neurosurgery, cardiology correlated and planned to start patient on heparin, DAPT with repeat CT head to assess for possible acute bleed.  This was to assess if patient would be able to withstand PCI with stent placement.  CT head the following morning revealed no acute bleed or intracranial changes from prior.  Thus, patient went for PCI and had stent placed to RCA on 10/13/2023. Patient tolerated the procedure well, has not had any acute bleeding while on DAPT or changes in mental status.  He underwent ORIF of right ankle on Tuesday, 10/17/2023. Discharged to home with home healthcare. New prescriptions for clopidogrel, baby aspirin, carvedilol.     Most Recent Labs copied:  Component      Latest Ref Rng 10/20/2023   GLUCOSE      74 - 99 mg/dL 91    SODIUM      136 - 145 mmol/L  137    POTASSIUM      3.5 - 5.3 mmol/L 3.5    CHLORIDE      98 - 107 mmol/L 100    Bicarbonate      21 - 32 mmol/L 29    Anion Gap      10 - 20 mmol/L 12    Blood Urea Nitrogen      6 - 23 mg/dL 8    Creatinine      0.50 - 1.30 mg/dL 0.87    EGFR      >60 mL/min/1.73m*2 >90    Calcium      8.6 - 10.3 mg/dL 8.4 (L)    PHOSPHORUS      2.5 - 4.9 mg/dL 3.8    Albumin      3.4 - 5.0 g/dL 3.0 (L)    WBC      4.4 - 11.3 x10*3/uL 7.7    nRBC      0.0 - 0.0 /100 WBCs 0.0    RBC      4.50 - 5.90 x10*6/uL 3.25 (L)    HEMOGLOBIN      13.5 - 17.5 g/dL 9.5 (L)    HEMATOCRIT      41.0 - 52.0 % 29.8 (L)    MCV      80 - 100 fL 92    MCH      26.0 - 34.0 pg 29.2    MCHC      32.0 - 36.0 g/dL 31.9 (L)    RED CELL DISTRIBUTION WIDTH      11.5 - 14.5 % 16.3 (H)    Platelets      150 - 450 x10*3/uL 285    MEAN PLATELET VOLUME      7.5 - 11.5 fL 9.6    MAGNESIUM      1.60 - 2.40 mg/dL 1.72       Legend:  (L) Low  (H) High    Imaging copied:   CT head wo IV contrast 10/12/23  IMPRESSION:  In comparison to prior CT dated 10/03/2023, there has been interval  improvement in the right frontal subarachnoid hemorrhage and diffuse  subdural hemorrhage along the tentorium and overlying the right  cerebellar hemisphere as well as resolution of right parietal lobe  intraparenchymal hemorrhage.      Prior hemorrhagic focus within the right parietal lobe is not  visualized on the current study.      Prior hyperdense attenuation with the left occipital horn is not  visualized on the current study.      CT ankle right wo IV contrast 10/11/23  IMPRESSION:  1. Acute minimally comminuted and displaced fractures through distal  tibia and fibula as described above.  2. There are also findings consistent with remote trauma with  significant cortical remodeling of the distal tibia with associated  incongruity of the distal tibial articular surface as described above.  3. Moderate arthrosis of the tibiotalar and subtalar joints.  4. Soft tissue swelling about  the distal leg and ankle.    US Renal Complete 10/10/23  IMPRESSION:  Severe right hydronephrosis, similar in appearance to CT lumbar spine  examination. Bilateral ureteral jets visualized.      Mild asymmetric enlargement of the right kidney with cortical renal  scarring involving the left kidney.    XR ankle right 10/10/23  IMPRESSION:  Displaced and comminuted fracture of the distal tibia diaphysis and  distal fibula diaphysis, as described above. The ankle mortise  appears intact.    Physical Exam  Nursing note reviewed.   Constitutional:       Appearance: Normal appearance.   HENT:      Head: Normocephalic.      Right Ear: External ear normal.      Left Ear: External ear normal.      Nose: Nose normal.      Mouth/Throat:      Mouth: Mucous membranes are moist.   Eyes:      Extraocular Movements: Extraocular movements intact.      Conjunctiva/sclera: Conjunctivae normal.      Pupils: Pupils are equal, round, and reactive to light.   Pulmonary:      Effort: Pulmonary effort is normal.   Musculoskeletal:      Cervical back: Normal range of motion.   Neurological:      General: No focal deficit present.      Mental Status: He is alert and oriented to person, place, and time. Mental status is at baseline.   Psychiatric:         Mood and Affect: Mood normal.         Behavior: Behavior normal.         Thought Content: Thought content normal.         Judgment: Judgment normal.         Problem List Items Addressed This Visit       RESOLVED: Ankle fracture, right, closed, initial encounter    Relevant Medications    oxyCODONE (Roxicodone) 5 mg immediate release tablet    Emphysema lung (CMS/HCC)    Current Assessment & Plan     Has cut back on smoking  No exacerbations at this time  monitor         H/O fracture of ankle - Primary    Overview     XR 10/23: NEW ACUTE OBLIQUE FRACTURE OF THE DISTAL RIGHT TIBIA WITH A   OLD VERTICAL FRACTURE OF THE TIBIA. THERE IS A NEW COMMINUTED FRACTURE OF THE DISTAL RIGHT FIBULA.    10/11/23 CT ankle - 1. Acute minimally comminuted and displaced fractures through distal tibia and fibula as described above. 2. There are also findings consistent with remote trauma with significant cortical remodeling of the distal tibia with associated incongruity of the distal tibial articular surface as described above. 3. Moderate arthrosis of the tibiotalar and subtalar joints. 4. Soft tissue swelling about the distal leg and ankle.  10/17/23 Insertion Intramedullary RIGHT Nail Tibia by Dr Vic Hale         Current Assessment & Plan     Does not have FU with ortho  Significant pain  Non weight being  Ordered knee walker and sent to DME  Stat referral to ortho  Sent short term gonzalez due to recent lumbar and ankle surgery  discussed risks and benefits due to hx of cocaine use and on Suboxone, gonzalez started by hospital  Pt verbalized understanding         Relevant Medications    miscellaneous medical supply misc    oxyCODONE (Roxicodone) 5 mg immediate release tablet    Other Relevant Orders    Referral to Orthopaedic Surgery    H/O non-ST elevation myocardial infarction (NSTEMI)    Overview     10/13/23 - Angiography reveals a 80% stenosis of the proximal to mid right coronary artery coronary artery. Pre-intervention ALBERTINA flow was 3. Percutaneous coronary intervention was performed within the proximal to mid right coronary artery. The stenosis was successfully reduced from 80% to 0%. Post-intervention ALBERTINA flow was 3.          Current Assessment & Plan     TCM completed today  CINEMA  If unable to get in soon, will refer to general cardiology  On statin and vascepa         Lumbar post-laminectomy syndrome    Relevant Medications    oxyCODONE (Roxicodone) 5 mg immediate release tablet    Mood disorder (CMS/HCC)    Overview     On Lexapro  See psychiatry and twice weekly counseling         Current Assessment & Plan     Stable, monitor         S/P surgical manipulation of ankle joint    Relevant Medications     miscellaneous medical supply misc    oxyCODONE (Roxicodone) 5 mg immediate release tablet    Other Relevant Orders    Referral to Orthopaedic Surgery    Type 2 diabetes mellitus with hyperglycemia, without long-term current use of insulin (CMS/[facility])    Overview     : Hba1c 6.5%    On Metformin & Ozempic         Current Assessment & Plan     Has been off ozempic since surgery  Refilled  Needs repeat A1C  Would benefit from CINEMA due to DM2 plus new NSTEMI with cath         Relevant Medications    semaglutide 0.25 mg or 0.5 mg (2 mg/3 mL) pen injector    Other Relevant Orders    Referral to [facility]    Hemoglobin A1C    CBC and Auto Differential    Comprehensive Metabolic Panel     Other Visit Diagnoses       Obesity, Class III, BMI 40-49.9 (morbid obesity) (CMS/[facility])        Relevant Medications    semaglutide 0.25 mg or 0.5 mg (2 mg/3 mL) pen injector    Other Relevant Orders    Referral to [facility]    Hemoglobin A1C    CBC and Auto Differential    Comprehensive Metabolic Panel             Family History   Problem Relation Name Age of Onset    No Known Problems Mother          F: ETOHIsm, DM, COPD M: Lung CA, CAD B: CA metastatic lung () S; ETOH ism S:       Medications  Medications reviewed with patient/caregiver?: Yes (10/23/2023 10:16 AM)  Is the patient having any side effects they believe may be caused by any medication additions or changes?: No (10/23/2023 10:16 AM)  Does the patient have all medications ordered at discharge?: Yes (10/23/2023 10:16 AM)  Care Management Interventions: No intervention needed (10/23/2023 10:16 AM)  Is the patient taking all medications as directed (includes completed medication regime)?: Yes (10/23/2023 10:16 AM)  Care Management Interventions: Provided patient education (10/23/2023 10:16 AM)    Appointments  Does the patient have a primary care provider?: Yes (10/23/2023 10:16 AM)  Care Management Interventions: Verified appointment date/time/provider  (10/23/2023 10:16 AM)  Has the patient kept scheduled appointments due by today?: Yes (10/23/2023 10:16 AM)    Self Management  What is the home health agency?: Patient unsure of company name (10/23/2023 10:16 AM)  Has home health visited the patient within 72 hours of discharge?: Yes (10/23/2023 10:16 AM)  Has all Durable Medical Equipment (DME) been delivered?: Yes (10/23/2023 10:16 AM)  Follow Up Tasks: Home Health (10/23/2023 10:16 AM)    Patient Teaching  Does the patient have access to their discharge instructions?: Yes (10/23/2023 10:16 AM)  Care Management Interventions: Reviewed instructions with patient (10/23/2023 10:16 AM)  What is the patient's perception of their health status since discharge?: Improving (10/23/2023 10:16 AM)  Is the patient/caregiver able to teach back the hierarchy of who to call/visit for symptoms/problems? PCP, Specialist, Home Health nurse, Urgent Care, ED, 911: Yes (10/23/2023 10:16 AM)        No follow-ups on file.

## 2023-10-30 NOTE — ASSESSMENT & PLAN NOTE
TCM completed today  CINEMA  If unable to get in soon, will refer to general cardiology  On statin and vascepa

## 2023-10-30 NOTE — ASSESSMENT & PLAN NOTE
Has been off ozempic since surgery  Refilled  Needs repeat A1C  Would benefit from CINEMA due to DM2 plus new NSTEMI with cath

## 2023-11-01 ENCOUNTER — TELEPHONE (OUTPATIENT)
Dept: PRIMARY CARE | Facility: CLINIC | Age: 58
End: 2023-11-01
Payer: COMMERCIAL

## 2023-11-06 ENCOUNTER — ANCILLARY PROCEDURE (OUTPATIENT)
Dept: RADIOLOGY | Facility: CLINIC | Age: 58
End: 2023-11-06
Payer: COMMERCIAL

## 2023-11-06 ENCOUNTER — PATIENT OUTREACH (OUTPATIENT)
Dept: PRIMARY CARE | Facility: CLINIC | Age: 58
End: 2023-11-06

## 2023-11-06 ENCOUNTER — OFFICE VISIT (OUTPATIENT)
Dept: ORTHOPEDIC SURGERY | Facility: CLINIC | Age: 58
End: 2023-11-06
Payer: COMMERCIAL

## 2023-11-06 DIAGNOSIS — Z87.81 H/O FRACTURE OF ANKLE: ICD-10-CM

## 2023-11-06 DIAGNOSIS — Z98.890 S/P SURGICAL MANIPULATION OF ANKLE JOINT: ICD-10-CM

## 2023-11-06 DIAGNOSIS — E66.01 OBESITY, CLASS III, BMI 40-49.9 (MORBID OBESITY) (MULTI): ICD-10-CM

## 2023-11-06 DIAGNOSIS — E11.65 TYPE 2 DIABETES MELLITUS WITH HYPERGLYCEMIA, WITHOUT LONG-TERM CURRENT USE OF INSULIN (MULTI): ICD-10-CM

## 2023-11-06 DIAGNOSIS — I21.4 NSTEMI (NON-ST ELEVATED MYOCARDIAL INFARCTION) (MULTI): ICD-10-CM

## 2023-11-06 PROCEDURE — 3074F SYST BP LT 130 MM HG: CPT | Performed by: ORTHOPAEDIC SURGERY

## 2023-11-06 PROCEDURE — 3008F BODY MASS INDEX DOCD: CPT | Performed by: ORTHOPAEDIC SURGERY

## 2023-11-06 PROCEDURE — 3048F LDL-C <100 MG/DL: CPT | Performed by: ORTHOPAEDIC SURGERY

## 2023-11-06 PROCEDURE — L4361 PNEUMA/VAC WALK BOOT PRE OTS: HCPCS | Performed by: ORTHOPAEDIC SURGERY

## 2023-11-06 PROCEDURE — 73590 X-RAY EXAM OF LOWER LEG: CPT | Mod: RT,FY

## 2023-11-06 PROCEDURE — 99024 POSTOP FOLLOW-UP VISIT: CPT | Performed by: ORTHOPAEDIC SURGERY

## 2023-11-06 PROCEDURE — 73590 X-RAY EXAM OF LOWER LEG: CPT | Mod: RIGHT SIDE | Performed by: ORTHOPAEDIC SURGERY

## 2023-11-06 PROCEDURE — 4004F PT TOBACCO SCREEN RCVD TLK: CPT | Performed by: ORTHOPAEDIC SURGERY

## 2023-11-06 PROCEDURE — 99490 CHRNC CARE MGMT STAFF 1ST 20: CPT | Performed by: INTERNAL MEDICINE

## 2023-11-06 PROCEDURE — 3078F DIAST BP <80 MM HG: CPT | Performed by: ORTHOPAEDIC SURGERY

## 2023-11-06 PROCEDURE — 3044F HG A1C LEVEL LT 7.0%: CPT | Performed by: ORTHOPAEDIC SURGERY

## 2023-11-06 NOTE — PROGRESS NOTES
Just came home from seeing Dr. Hale  Non weight bearing for 4 more weeks  Has shaina Dubon and sisters are his main support  They take care of helping him eat, toilet    Going to cardiology on 11/9

## 2023-11-06 NOTE — PROGRESS NOTES
History of Present Illness  DOS 10/17/2023: Right distal tibial nailing  Patient returns today for evaluation right distal tibia nailing.  The patient notes improvement in pain.  The patient denies any significant numbness or tingling of calf pain.  No other specific concerns.  Is been nonweightbearing     Exam  Right lower extremity:  Incision healing nicely, no erythema or drainage  Intact flexion and extension of digits  Neurovascular exam normal distally  2+ dorsalis pedis pulse and good cap refill, no calf tenderness     Radiographs  Well aligned distal tibia fracture status post nailing     Assessment  Patient status post right distal tibial nailing and nonop fibula  Plan  Immobilization: Removable boot, nonweightbearing  Weight bearing: Nonweightbearing right lower extremity  Reviewed rehab /return to activities  Follow up 3 weeks repeat x-rays 2 view tibia

## 2023-11-07 NOTE — PROGRESS NOTES
Patient called feeling stressed and anxious  He is upset because he is non-weight bearing for another 4 weeks  Asking for a prescription from Dr. Francis  For either xanax for klonopin  I explained to him that he would need an appoitnment  To discuss this fully  He is not taking his lexapro regularly  He feels it causes side effects for him  He sees his psychiatrist on 11/14  I suggested he should also discuss this with him  He will call me back if he would like to schedule an appointment.

## 2023-11-09 ENCOUNTER — OFFICE VISIT (OUTPATIENT)
Dept: NEUROSURGERY | Facility: CLINIC | Age: 58
End: 2023-11-09
Payer: COMMERCIAL

## 2023-11-09 ENCOUNTER — APPOINTMENT (OUTPATIENT)
Dept: ORTHOPEDIC SURGERY | Facility: CLINIC | Age: 58
End: 2023-11-09
Payer: COMMERCIAL

## 2023-11-09 VITALS
DIASTOLIC BLOOD PRESSURE: 84 MMHG | HEIGHT: 71 IN | BODY MASS INDEX: 34.02 KG/M2 | RESPIRATION RATE: 15 BRPM | SYSTOLIC BLOOD PRESSURE: 125 MMHG | TEMPERATURE: 96.9 F | WEIGHT: 243 LBS | HEART RATE: 95 BPM

## 2023-11-09 DIAGNOSIS — Z98.1 S/P LUMBAR FUSION: Primary | ICD-10-CM

## 2023-11-09 DIAGNOSIS — T81.30XA WOUND DEHISCENCE: Primary | ICD-10-CM

## 2023-11-09 PROCEDURE — 99024 POSTOP FOLLOW-UP VISIT: CPT | Performed by: NEUROLOGICAL SURGERY

## 2023-11-09 PROCEDURE — 3079F DIAST BP 80-89 MM HG: CPT | Performed by: NEUROLOGICAL SURGERY

## 2023-11-09 PROCEDURE — 3074F SYST BP LT 130 MM HG: CPT | Performed by: NEUROLOGICAL SURGERY

## 2023-11-09 PROCEDURE — 3008F BODY MASS INDEX DOCD: CPT | Performed by: NEUROLOGICAL SURGERY

## 2023-11-09 PROCEDURE — 3048F LDL-C <100 MG/DL: CPT | Performed by: NEUROLOGICAL SURGERY

## 2023-11-09 PROCEDURE — 4004F PT TOBACCO SCREEN RCVD TLK: CPT | Performed by: NEUROLOGICAL SURGERY

## 2023-11-09 PROCEDURE — 3044F HG A1C LEVEL LT 7.0%: CPT | Performed by: NEUROLOGICAL SURGERY

## 2023-11-09 RX ORDER — SULFAMETHOXAZOLE AND TRIMETHOPRIM 800; 160 MG/1; MG/1
1 TABLET ORAL 2 TIMES DAILY
Qty: 14 TABLET | Refills: 0 | Status: SHIPPED | OUTPATIENT
Start: 2023-11-09 | End: 2023-11-29 | Stop reason: HOSPADM

## 2023-11-09 ASSESSMENT — PAIN SCALES - GENERAL: PAINLEVEL: 8

## 2023-11-19 ENCOUNTER — APPOINTMENT (OUTPATIENT)
Dept: CT IMAGING | Age: 58
End: 2023-11-19
Payer: MEDICARE

## 2023-11-19 ENCOUNTER — APPOINTMENT (OUTPATIENT)
Dept: GENERAL RADIOLOGY | Age: 58
End: 2023-11-19
Payer: MEDICARE

## 2023-11-19 ENCOUNTER — HOSPITAL ENCOUNTER (EMERGENCY)
Age: 58
Discharge: ANOTHER HOSPITAL | End: 2023-11-20
Attending: FAMILY MEDICINE
Payer: MEDICARE

## 2023-11-19 DIAGNOSIS — E86.0 DEHYDRATION: ICD-10-CM

## 2023-11-19 DIAGNOSIS — T81.49XA INFECTED SURGICAL WOUND: ICD-10-CM

## 2023-11-19 DIAGNOSIS — G93.40 ACUTE ENCEPHALOPATHY: Primary | ICD-10-CM

## 2023-11-19 LAB
ALBUMIN SERPL-MCNC: 2.9 G/DL (ref 3.5–4.6)
ALP SERPL-CCNC: 226 U/L (ref 35–104)
ALT SERPL-CCNC: 28 U/L (ref 0–41)
AMMONIA PLAS-SCNC: 17 UMOL/L (ref 16–60)
ANION GAP SERPL CALCULATED.3IONS-SCNC: 13 MEQ/L (ref 9–15)
AST SERPL-CCNC: 62 U/L (ref 0–40)
B PARAP IS1001 DNA NPH QL NAA+NON-PROBE: NOT DETECTED
B PERT.PT PRMT NPH QL NAA+NON-PROBE: NOT DETECTED
BASOPHILS # BLD: 0 K/UL (ref 0–0.2)
BASOPHILS NFR BLD: 0.3 %
BILIRUB SERPL-MCNC: 0.7 MG/DL (ref 0.2–0.7)
BUN SERPL-MCNC: 15 MG/DL (ref 6–20)
C PNEUM DNA NPH QL NAA+NON-PROBE: NOT DETECTED
CALCIUM SERPL-MCNC: 9.1 MG/DL (ref 8.5–9.9)
CHLORIDE SERPL-SCNC: 91 MEQ/L (ref 95–107)
CK SERPL-CCNC: 75 U/L (ref 0–190)
CO2 SERPL-SCNC: 30 MEQ/L (ref 20–31)
CREAT SERPL-MCNC: 1.22 MG/DL (ref 0.7–1.2)
EOSINOPHIL # BLD: 0.1 K/UL (ref 0–0.7)
EOSINOPHIL NFR BLD: 0.5 %
ERYTHROCYTE [DISTWIDTH] IN BLOOD BY AUTOMATED COUNT: 15.3 % (ref 11.5–14.5)
ETHANOL PERCENT: NORMAL G/DL
ETHANOLAMINE SERPL-MCNC: <10 MG/DL (ref 0–0.08)
FLUAV RNA NPH QL NAA+NON-PROBE: NOT DETECTED
FLUBV RNA NPH QL NAA+NON-PROBE: NOT DETECTED
GLOBULIN SER CALC-MCNC: 5.5 G/DL (ref 2.3–3.5)
GLUCOSE SERPL-MCNC: 115 MG/DL (ref 70–99)
HADV DNA NPH QL NAA+NON-PROBE: NOT DETECTED
HCOV 229E RNA NPH QL NAA+NON-PROBE: NOT DETECTED
HCOV HKU1 RNA NPH QL NAA+NON-PROBE: NOT DETECTED
HCOV NL63 RNA NPH QL NAA+NON-PROBE: NOT DETECTED
HCOV OC43 RNA NPH QL NAA+NON-PROBE: NOT DETECTED
HCT VFR BLD AUTO: 34.3 % (ref 42–52)
HGB BLD-MCNC: 10.7 G/DL (ref 14–18)
HMPV RNA NPH QL NAA+NON-PROBE: NOT DETECTED
HPIV1 RNA NPH QL NAA+NON-PROBE: NOT DETECTED
HPIV2 RNA NPH QL NAA+NON-PROBE: NOT DETECTED
HPIV3 RNA NPH QL NAA+NON-PROBE: NOT DETECTED
HPIV4 RNA NPH QL NAA+NON-PROBE: NOT DETECTED
LACTIC ACID, SEPSIS: 1.1 MMOL/L (ref 0.5–1.9)
LACTIC ACID, SEPSIS: 1.8 MMOL/L (ref 0.5–1.9)
LYMPHOCYTES # BLD: 2.3 K/UL (ref 1–4.8)
LYMPHOCYTES NFR BLD: 21.7 %
M PNEUMO DNA NPH QL NAA+NON-PROBE: NOT DETECTED
MCH RBC QN AUTO: 28.6 PG (ref 27–31.3)
MCHC RBC AUTO-ENTMCNC: 31.2 % (ref 33–37)
MCV RBC AUTO: 91.7 FL (ref 79–92.2)
MONOCYTES # BLD: 0.6 K/UL (ref 0.2–0.8)
MONOCYTES NFR BLD: 6.1 %
NEUTROPHILS # BLD: 7.3 K/UL (ref 1.4–6.5)
NEUTS SEG NFR BLD: 69.8 %
PLATELET # BLD AUTO: 415 K/UL (ref 130–400)
POTASSIUM SERPL-SCNC: 3.8 MEQ/L (ref 3.4–4.9)
PROCALCITONIN SERPL IA-MCNC: 0.54 NG/ML (ref 0–0.15)
PROT SERPL-MCNC: 8.4 G/DL (ref 6.3–8)
RBC # BLD AUTO: 3.74 M/UL (ref 4.7–6.1)
RSV RNA NPH QL NAA+NON-PROBE: NOT DETECTED
RV+EV RNA NPH QL NAA+NON-PROBE: NOT DETECTED
SARS-COV-2 RNA NPH QL NAA+NON-PROBE: NOT DETECTED
SODIUM SERPL-SCNC: 134 MEQ/L (ref 135–144)
WBC # BLD AUTO: 10.5 K/UL (ref 4.8–10.8)

## 2023-11-19 PROCEDURE — 82550 ASSAY OF CK (CPK): CPT

## 2023-11-19 PROCEDURE — 99285 EMERGENCY DEPT VISIT HI MDM: CPT

## 2023-11-19 PROCEDURE — 96366 THER/PROPH/DIAG IV INF ADDON: CPT

## 2023-11-19 PROCEDURE — 82077 ASSAY SPEC XCP UR&BREATH IA: CPT

## 2023-11-19 PROCEDURE — 96365 THER/PROPH/DIAG IV INF INIT: CPT

## 2023-11-19 PROCEDURE — 83605 ASSAY OF LACTIC ACID: CPT

## 2023-11-19 PROCEDURE — 72132 CT LUMBAR SPINE W/DYE: CPT

## 2023-11-19 PROCEDURE — 6360000002 HC RX W HCPCS: Performed by: FAMILY MEDICINE

## 2023-11-19 PROCEDURE — 87186 SC STD MICRODIL/AGAR DIL: CPT

## 2023-11-19 PROCEDURE — 84145 PROCALCITONIN (PCT): CPT

## 2023-11-19 PROCEDURE — 36415 COLL VENOUS BLD VENIPUNCTURE: CPT

## 2023-11-19 PROCEDURE — 2580000003 HC RX 258: Performed by: FAMILY MEDICINE

## 2023-11-19 PROCEDURE — 82140 ASSAY OF AMMONIA: CPT

## 2023-11-19 PROCEDURE — 87184 SC STD DISK METHOD PER PLATE: CPT

## 2023-11-19 PROCEDURE — 87040 BLOOD CULTURE FOR BACTERIA: CPT

## 2023-11-19 PROCEDURE — 87070 CULTURE OTHR SPECIMN AEROBIC: CPT

## 2023-11-19 PROCEDURE — 85025 COMPLETE CBC W/AUTO DIFF WBC: CPT

## 2023-11-19 PROCEDURE — 87075 CULTR BACTERIA EXCEPT BLOOD: CPT

## 2023-11-19 PROCEDURE — 87077 CULTURE AEROBIC IDENTIFY: CPT

## 2023-11-19 PROCEDURE — 71045 X-RAY EXAM CHEST 1 VIEW: CPT

## 2023-11-19 PROCEDURE — 96360 HYDRATION IV INFUSION INIT: CPT

## 2023-11-19 PROCEDURE — 70450 CT HEAD/BRAIN W/O DYE: CPT

## 2023-11-19 PROCEDURE — 0202U NFCT DS 22 TRGT SARS-COV-2: CPT

## 2023-11-19 PROCEDURE — 80053 COMPREHEN METABOLIC PANEL: CPT

## 2023-11-19 PROCEDURE — 93005 ELECTROCARDIOGRAM TRACING: CPT | Performed by: FAMILY MEDICINE

## 2023-11-19 PROCEDURE — 6360000004 HC RX CONTRAST MEDICATION: Performed by: FAMILY MEDICINE

## 2023-11-19 PROCEDURE — 86403 PARTICLE AGGLUT ANTBDY SCRN: CPT

## 2023-11-19 RX ORDER — 0.9 % SODIUM CHLORIDE 0.9 %
1000 INTRAVENOUS SOLUTION INTRAVENOUS ONCE
Status: COMPLETED | OUTPATIENT
Start: 2023-11-19 | End: 2023-11-19

## 2023-11-19 RX ORDER — 0.9 % SODIUM CHLORIDE 0.9 %
1000 INTRAVENOUS SOLUTION INTRAVENOUS ONCE
Status: COMPLETED | OUTPATIENT
Start: 2023-11-19 | End: 2023-11-20

## 2023-11-19 RX ADMIN — SODIUM CHLORIDE 1000 ML: 9 INJECTION, SOLUTION INTRAVENOUS at 19:12

## 2023-11-19 RX ADMIN — IOPAMIDOL 75 ML: 755 INJECTION, SOLUTION INTRAVENOUS at 18:11

## 2023-11-19 RX ADMIN — CEFTRIAXONE SODIUM 1000 MG: 1 INJECTION, POWDER, FOR SOLUTION INTRAMUSCULAR; INTRAVENOUS at 19:15

## 2023-11-19 RX ADMIN — SODIUM CHLORIDE 1000 ML: 9 INJECTION, SOLUTION INTRAVENOUS at 23:26

## 2023-11-19 ASSESSMENT — ENCOUNTER SYMPTOMS: RESPIRATORY NEGATIVE: 1

## 2023-11-19 ASSESSMENT — PAIN - FUNCTIONAL ASSESSMENT: PAIN_FUNCTIONAL_ASSESSMENT: NONE - DENIES PAIN

## 2023-11-19 NOTE — ED PROVIDER NOTES
[facility] ED  eMERGENCY dEPARTMENT eNCOUnter      Pt Name: Aleida Ocampo MRN: [id number]  Birthdate 1965  Date of evaluation: 11/19/2023  Provider: Manoj Marques MD  4:23 PM EST     CHIEF COMPLAINT       Chief Complaint   Patient presents with    Altered Mental Status         HISTORY OF PRESENT ILLNESS   (Location/Symptom, Timing/Onset,Context/Setting, Quality, Duration, Modifying Factors, Severity)  Note limiting factors. Aleida Ocampo is a 66 y.o. male who presents to the emergency department confusion     66years old known history of drug abuse in the past, diabetes hypertension coronary artery disease. Most recent surgery of lumbar fusion 2 months ago was seen by the neurosurgeon Dr. Gilmar Ring at [facility].  Patient was seen for a wound adhesiveness and purulent discharge on November 9 1 neurosurgery Dr. Chastity Denis who started him on oral antibiotic. Presented to the ED today with confusion and altered mental status was found by his sister today Kenan Terrell in his bed looks like he have not ate or drink for a few days confused. On arrival to the ER he is awake alert and oriented to self. Patient states he felt weak in the last few days had decreased appetite have not been eating or drinking well not sure if he was taking his medication patient is a very poor historian but admits that he low-grade fever and chills    The history is provided by the patient. NursingNotes were reviewed. REVIEW OF SYSTEMS    (2-9 systems for level 4, 10 or more for level 5)     Review of Systems   Constitutional:  Positive for chills and fever. HENT: Negative. Respiratory: Negative. Cardiovascular: Negative. Skin: Negative. Psychiatric/Behavioral:  Positive for confusion. Except as noted above the remainder of the review of systems was reviewed and negative.        PAST MEDICAL HISTORY     Past Medical History:   Diagnosis Date    DIOGENES (acute kidney injury) sister today Boni Funez in his bed looks like he have not ate or drink for a few days confused. On arrival to the ER he is awake alert and oriented to self. Patient states he felt weak in the last few days had decreased appetite have not been eating or drinking well not sure if he was taking his medication patient is a very poor historian but admits that he low-grade fever and chills  In the ED patient was found to be uncapped have a small dizziness from lower back incision significant amount of purulent material was expressed the wound was culture septic work-up was initiated patient was given 2 L of IV fluid was given first dose of Rocephin. Case was discussed with the hospitalist at [facility] advised due to recent surgery at [facility] the patient should be transferred to [facility] where he had his lumbar fusion done. Patient was notified transfer was initiated Dr. Pawel Samuel from [facility] accepted transfer to [facility] ER under Dr. Javan Duggan, [address] [place] [address] patient was given IV fluid and Rocephin in the ER vital have stated otherwise remained hemodynamically stable and was transferred to [facility] in stable condition also in CT CT of the lumbar spine was done showed no acute changes. Amount and/or Complexity of Data Reviewed  Labs: ordered. Radiology: ordered. ECG/medicine tests: ordered. Risk  Prescription drug management. MDM  Reviewed: previous chart, nursing note and vitals  Interpretation: labs, ECG and CT scan  Total time providing critical care: 30-74 minutes. This excludes time spent performing separately reportable procedures and services. Consults: admitting MD (Neurosurgery ER and hospitalist all contacted in the ED)         FINAL IMPRESSION      1. Acute encephalopathy    2. Dehydration    3. Infected surgical wound          DISPOSITION/PLAN   DISPOSITION Decision To Transfer 11/19/2023 08:45:33 PM      PATIENT REFERRED TO:  No follow-up provider specified.     DISCHARGE MEDICATIONS:  New

## 2023-11-20 ENCOUNTER — APPOINTMENT (OUTPATIENT)
Dept: RADIOLOGY | Facility: HOSPITAL | Age: 58
DRG: 856 | End: 2023-11-20
Payer: COMMERCIAL

## 2023-11-20 ENCOUNTER — ANESTHESIA EVENT (OUTPATIENT)
Dept: OPERATING ROOM | Facility: HOSPITAL | Age: 58
DRG: 856 | End: 2023-11-20
Payer: COMMERCIAL

## 2023-11-20 ENCOUNTER — HOSPITAL ENCOUNTER (INPATIENT)
Facility: HOSPITAL | Age: 58
LOS: 9 days | Discharge: NURSING, REHAB OR LONG-TERM CARE | DRG: 856 | End: 2023-11-29
Attending: EMERGENCY MEDICINE | Admitting: INTERNAL MEDICINE
Payer: COMMERCIAL

## 2023-11-20 ENCOUNTER — ANESTHESIA (OUTPATIENT)
Dept: OPERATING ROOM | Facility: HOSPITAL | Age: 58
DRG: 856 | End: 2023-11-20
Payer: COMMERCIAL

## 2023-11-20 VITALS
RESPIRATION RATE: 18 BRPM | HEART RATE: 87 BPM | SYSTOLIC BLOOD PRESSURE: 123 MMHG | OXYGEN SATURATION: 96 % | TEMPERATURE: 99.4 F | DIASTOLIC BLOOD PRESSURE: 83 MMHG

## 2023-11-20 DIAGNOSIS — G06.2 EPIDURAL ABSCESS (HHS-HCC): ICD-10-CM

## 2023-11-20 DIAGNOSIS — T81.30XA WOUND DEHISCENCE: Primary | ICD-10-CM

## 2023-11-20 DIAGNOSIS — I21.4 NSTEMI (NON-ST ELEVATED MYOCARDIAL INFARCTION) (MULTI): ICD-10-CM

## 2023-11-20 DIAGNOSIS — G83.4 CAUDA EQUINA COMPRESSION (MULTI): ICD-10-CM

## 2023-11-20 LAB
ALBUMIN SERPL BCP-MCNC: 2.6 G/DL (ref 3.4–5)
AMPHETAMINES UR QL SCN: ABNORMAL
ANION GAP SERPL CALC-SCNC: 16 MMOL/L (ref 10–20)
APPEARANCE UR: CLEAR
BARBITURATES UR QL SCN: ABNORMAL
BASOPHILS # BLD AUTO: 0.04 X10*3/UL (ref 0–0.1)
BASOPHILS NFR BLD AUTO: 0.4 %
BENZODIAZ UR QL SCN: ABNORMAL
BILIRUB UR STRIP.AUTO-MCNC: NEGATIVE MG/DL
BUN SERPL-MCNC: 14 MG/DL (ref 6–23)
BZE UR QL SCN: ABNORMAL
CALCIUM SERPL-MCNC: 8.1 MG/DL (ref 8.6–10.3)
CANNABINOIDS UR QL SCN: ABNORMAL
CHLORIDE SERPL-SCNC: 100 MMOL/L (ref 98–107)
CO2 SERPL-SCNC: 23 MMOL/L (ref 21–32)
COLOR UR: ABNORMAL
CREAT SERPL-MCNC: 0.92 MG/DL (ref 0.5–1.3)
CRP SERPL-MCNC: 18.12 MG/DL
EOSINOPHIL # BLD AUTO: 0.03 X10*3/UL (ref 0–0.7)
EOSINOPHIL NFR BLD AUTO: 0.3 %
ERYTHROCYTE [DISTWIDTH] IN BLOOD BY AUTOMATED COUNT: 15.2 % (ref 11.5–14.5)
ERYTHROCYTE [SEDIMENTATION RATE] IN BLOOD BY WESTERGREN METHOD: 55 MM/H (ref 0–20)
FENTANYL+NORFENTANYL UR QL SCN: ABNORMAL
GFR SERPL CREATININE-BSD FRML MDRD: >90 ML/MIN/1.73M*2
GLUCOSE SERPL-MCNC: 88 MG/DL (ref 74–99)
GLUCOSE UR STRIP.AUTO-MCNC: NEGATIVE MG/DL
HCT VFR BLD AUTO: 28.1 % (ref 41–52)
HGB BLD-MCNC: 8.9 G/DL (ref 13.5–17.5)
HOLD SPECIMEN: NORMAL
IMM GRANULOCYTES # BLD AUTO: 0.18 X10*3/UL (ref 0–0.7)
IMM GRANULOCYTES NFR BLD AUTO: 1.9 % (ref 0–0.9)
KETONES UR STRIP.AUTO-MCNC: NEGATIVE MG/DL
LEUKOCYTE ESTERASE UR QL STRIP.AUTO: NEGATIVE
LYMPHOCYTES # BLD AUTO: 2.07 X10*3/UL (ref 1.2–4.8)
LYMPHOCYTES NFR BLD AUTO: 22.4 %
MAGNESIUM SERPL-MCNC: 1.75 MG/DL (ref 1.6–2.4)
MCH RBC QN AUTO: 28.7 PG (ref 26–34)
MCHC RBC AUTO-ENTMCNC: 31.7 G/DL (ref 32–36)
MCV RBC AUTO: 91 FL (ref 80–100)
MONOCYTES # BLD AUTO: 0.59 X10*3/UL (ref 0.1–1)
MONOCYTES NFR BLD AUTO: 6.4 %
NEUTROPHILS # BLD AUTO: 6.33 X10*3/UL (ref 1.2–7.7)
NEUTROPHILS NFR BLD AUTO: 68.6 %
NITRITE UR QL STRIP.AUTO: NEGATIVE
NRBC BLD-RTO: 0 /100 WBCS (ref 0–0)
OPIATES UR QL SCN: ABNORMAL
OXYCODONE+OXYMORPHONE UR QL SCN: ABNORMAL
PCP UR QL SCN: ABNORMAL
PERFORMED ON: NORMAL
PH UR STRIP.AUTO: 6 [PH]
PHOSPHATE SERPL-MCNC: 4 MG/DL (ref 2.5–4.9)
PLATELET # BLD AUTO: 320 X10*3/UL (ref 150–450)
POC CREATININE: 1.2 MG/DL (ref 0.8–1.3)
POC SAMPLE TYPE: NORMAL
POTASSIUM SERPL-SCNC: 3.9 MMOL/L (ref 3.5–5.3)
PROT UR STRIP.AUTO-MCNC: ABNORMAL MG/DL
RBC # BLD AUTO: 3.1 X10*6/UL (ref 4.5–5.9)
RBC # UR STRIP.AUTO: ABNORMAL /UL
RBC #/AREA URNS AUTO: >20 /HPF
SODIUM SERPL-SCNC: 135 MMOL/L (ref 136–145)
SP GR UR STRIP.AUTO: 1.03
UROBILINOGEN UR STRIP.AUTO-MCNC: 4 MG/DL
WBC # BLD AUTO: 9.2 X10*3/UL (ref 4.4–11.3)
WBC #/AREA URNS AUTO: ABNORMAL /HPF

## 2023-11-20 PROCEDURE — 72131 CT LUMBAR SPINE W/O DYE: CPT

## 2023-11-20 PROCEDURE — 2500000005 HC RX 250 GENERAL PHARMACY W/O HCPCS: Performed by: NURSE ANESTHETIST, CERTIFIED REGISTERED

## 2023-11-20 PROCEDURE — 72148 MRI LUMBAR SPINE W/O DYE: CPT | Performed by: SURGERY

## 2023-11-20 PROCEDURE — 96365 THER/PROPH/DIAG IV INF INIT: CPT

## 2023-11-20 PROCEDURE — 3600000009 HC OR TIME - EACH INCREMENTAL 1 MINUTE - PROCEDURE LEVEL FOUR: Performed by: NEUROLOGICAL SURGERY

## 2023-11-20 PROCEDURE — 96366 THER/PROPH/DIAG IV INF ADDON: CPT

## 2023-11-20 PROCEDURE — 3700000001 HC GENERAL ANESTHESIA TIME - INITIAL BASE CHARGE: Performed by: NEUROLOGICAL SURGERY

## 2023-11-20 PROCEDURE — 72148 MRI LUMBAR SPINE W/O DYE: CPT

## 2023-11-20 PROCEDURE — 2500000004 HC RX 250 GENERAL PHARMACY W/ HCPCS (ALT 636 FOR OP/ED): Performed by: ANESTHESIOLOGIST ASSISTANT

## 2023-11-20 PROCEDURE — 22015 I&D ABSCESS P-SPINE L/S/LS: CPT | Performed by: NEUROLOGICAL SURGERY

## 2023-11-20 PROCEDURE — 2500000004 HC RX 250 GENERAL PHARMACY W/ HCPCS (ALT 636 FOR OP/ED): Performed by: STUDENT IN AN ORGANIZED HEALTH CARE EDUCATION/TRAINING PROGRAM

## 2023-11-20 PROCEDURE — 82947 ASSAY GLUCOSE BLOOD QUANT: CPT

## 2023-11-20 PROCEDURE — 7100000001 HC RECOVERY ROOM TIME - INITIAL BASE CHARGE: Performed by: NEUROLOGICAL SURGERY

## 2023-11-20 PROCEDURE — 2500000004 HC RX 250 GENERAL PHARMACY W/ HCPCS (ALT 636 FOR OP/ED): Performed by: NEUROLOGICAL SURGERY

## 2023-11-20 PROCEDURE — 72131 CT LUMBAR SPINE W/O DYE: CPT | Performed by: RADIOLOGY

## 2023-11-20 PROCEDURE — 87040 BLOOD CULTURE FOR BACTERIA: CPT | Mod: STJLAB

## 2023-11-20 PROCEDURE — 2720000007 HC OR 272 NO HCPCS: Performed by: NEUROLOGICAL SURGERY

## 2023-11-20 PROCEDURE — 2500000001 HC RX 250 WO HCPCS SELF ADMINISTERED DRUGS (ALT 637 FOR MEDICARE OP)

## 2023-11-20 PROCEDURE — 36415 COLL VENOUS BLD VENIPUNCTURE: CPT

## 2023-11-20 PROCEDURE — 3700000002 HC GENERAL ANESTHESIA TIME - EACH INCREMENTAL 1 MINUTE: Performed by: NEUROLOGICAL SURGERY

## 2023-11-20 PROCEDURE — 87186 SC STD MICRODIL/AGAR DIL: CPT | Mod: STJLAB | Performed by: INTERNAL MEDICINE

## 2023-11-20 PROCEDURE — 87075 CULTR BACTERIA EXCEPT BLOOD: CPT | Mod: STJLAB

## 2023-11-20 PROCEDURE — 2500000004 HC RX 250 GENERAL PHARMACY W/ HCPCS (ALT 636 FOR OP/ED)

## 2023-11-20 PROCEDURE — 83735 ASSAY OF MAGNESIUM: CPT

## 2023-11-20 PROCEDURE — 87186 SC STD MICRODIL/AGAR DIL: CPT | Mod: STJLAB | Performed by: NEUROLOGICAL SURGERY

## 2023-11-20 PROCEDURE — A4217 STERILE WATER/SALINE, 500 ML: HCPCS

## 2023-11-20 PROCEDURE — 80069 RENAL FUNCTION PANEL: CPT

## 2023-11-20 PROCEDURE — 3600000004 HC OR TIME - INITIAL BASE CHARGE - PROCEDURE LEVEL FOUR: Performed by: NEUROLOGICAL SURGERY

## 2023-11-20 PROCEDURE — 96375 TX/PRO/DX INJ NEW DRUG ADDON: CPT

## 2023-11-20 PROCEDURE — 99285 EMERGENCY DEPT VISIT HI MDM: CPT | Mod: 25 | Performed by: EMERGENCY MEDICINE

## 2023-11-20 PROCEDURE — A11042 PR DEBRIDEMENT, SKIN, SUB-Q TISSUE,=<20 SQ CM: Performed by: STUDENT IN AN ORGANIZED HEALTH CARE EDUCATION/TRAINING PROGRAM

## 2023-11-20 PROCEDURE — 7100000002 HC RECOVERY ROOM TIME - EACH INCREMENTAL 1 MINUTE: Performed by: NEUROLOGICAL SURGERY

## 2023-11-20 PROCEDURE — 87186 SC STD MICRODIL/AGAR DIL: CPT | Mod: STJLAB | Performed by: PHYSICIAN ASSISTANT

## 2023-11-20 PROCEDURE — 87075 CULTR BACTERIA EXCEPT BLOOD: CPT | Mod: STJLAB | Performed by: PHYSICIAN ASSISTANT

## 2023-11-20 PROCEDURE — 2500000004 HC RX 250 GENERAL PHARMACY W/ HCPCS (ALT 636 FOR OP/ED): Performed by: NURSE ANESTHETIST, CERTIFIED REGISTERED

## 2023-11-20 PROCEDURE — 1200000002 HC GENERAL ROOM WITH TELEMETRY DAILY

## 2023-11-20 PROCEDURE — A11042 PR DEBRIDEMENT, SKIN, SUB-Q TISSUE,=<20 SQ CM: Performed by: NURSE ANESTHETIST, CERTIFIED REGISTERED

## 2023-11-20 PROCEDURE — 2500000005 HC RX 250 GENERAL PHARMACY W/O HCPCS: Performed by: ANESTHESIOLOGIST ASSISTANT

## 2023-11-20 PROCEDURE — 99223 1ST HOSP IP/OBS HIGH 75: CPT

## 2023-11-20 PROCEDURE — 81001 URINALYSIS AUTO W/SCOPE: CPT

## 2023-11-20 PROCEDURE — 009U0ZZ DRAINAGE OF SPINAL CANAL, OPEN APPROACH: ICD-10-PCS | Performed by: NEUROLOGICAL SURGERY

## 2023-11-20 PROCEDURE — 86140 C-REACTIVE PROTEIN: CPT

## 2023-11-20 PROCEDURE — 85025 COMPLETE CBC W/AUTO DIFF WBC: CPT

## 2023-11-20 PROCEDURE — 85652 RBC SED RATE AUTOMATED: CPT

## 2023-11-20 PROCEDURE — 80307 DRUG TEST PRSMV CHEM ANLYZR: CPT

## 2023-11-20 PROCEDURE — 87075 CULTR BACTERIA EXCEPT BLOOD: CPT | Mod: STJLAB | Performed by: NEUROLOGICAL SURGERY

## 2023-11-20 RX ORDER — CLOPIDOGREL BISULFATE 75 MG/1
75 TABLET ORAL DAILY
Status: DISCONTINUED | OUTPATIENT
Start: 2023-11-20 | End: 2023-11-22

## 2023-11-20 RX ORDER — MIDAZOLAM HYDROCHLORIDE 1 MG/ML
1 INJECTION, SOLUTION INTRAMUSCULAR; INTRAVENOUS ONCE AS NEEDED
Status: DISCONTINUED | OUTPATIENT
Start: 2023-11-20 | End: 2023-11-20

## 2023-11-20 RX ORDER — LIDOCAINE HYDROCHLORIDE 10 MG/ML
0.1 INJECTION, SOLUTION EPIDURAL; INFILTRATION; INTRACAUDAL; PERINEURAL ONCE
Status: DISCONTINUED | OUTPATIENT
Start: 2023-11-20 | End: 2023-11-20

## 2023-11-20 RX ORDER — PROMETHAZINE HYDROCHLORIDE 25 MG/1
25 TABLET ORAL EVERY 6 HOURS PRN
Status: DISCONTINUED | OUTPATIENT
Start: 2023-11-20 | End: 2023-11-29 | Stop reason: HOSPADM

## 2023-11-20 RX ORDER — PREGABALIN 150 MG/1
300 CAPSULE ORAL 2 TIMES DAILY
Status: DISCONTINUED | OUTPATIENT
Start: 2023-11-20 | End: 2023-11-29 | Stop reason: HOSPADM

## 2023-11-20 RX ORDER — PROMETHAZINE HYDROCHLORIDE 25 MG/1
25 TABLET ORAL EVERY 6 HOURS PRN
COMMUNITY
End: 2024-01-12 | Stop reason: SDUPTHER

## 2023-11-20 RX ORDER — NAPROXEN SODIUM 220 MG/1
81 TABLET, FILM COATED ORAL DAILY
Status: DISCONTINUED | OUTPATIENT
Start: 2023-11-20 | End: 2023-11-22

## 2023-11-20 RX ORDER — HYDROMORPHONE HYDROCHLORIDE 1 MG/ML
0.5 INJECTION, SOLUTION INTRAMUSCULAR; INTRAVENOUS; SUBCUTANEOUS EVERY 5 MIN PRN
Status: DISCONTINUED | OUTPATIENT
Start: 2023-11-20 | End: 2023-11-20

## 2023-11-20 RX ORDER — PROPOFOL 10 MG/ML
INJECTION, EMULSION INTRAVENOUS AS NEEDED
Status: DISCONTINUED | OUTPATIENT
Start: 2023-11-20 | End: 2023-11-20

## 2023-11-20 RX ORDER — LORAZEPAM 2 MG/ML
1 INJECTION INTRAMUSCULAR ONCE
Status: COMPLETED | OUTPATIENT
Start: 2023-11-20 | End: 2023-11-20

## 2023-11-20 RX ORDER — ESCITALOPRAM OXALATE 20 MG/1
20 TABLET ORAL DAILY
Status: DISCONTINUED | OUTPATIENT
Start: 2023-11-20 | End: 2023-11-29 | Stop reason: HOSPADM

## 2023-11-20 RX ORDER — MEPERIDINE HYDROCHLORIDE 50 MG/ML
12.5 INJECTION INTRAMUSCULAR; INTRAVENOUS; SUBCUTANEOUS EVERY 10 MIN PRN
Status: DISCONTINUED | OUTPATIENT
Start: 2023-11-20 | End: 2023-11-20

## 2023-11-20 RX ORDER — ALBUTEROL SULFATE 0.83 MG/ML
2.5 SOLUTION RESPIRATORY (INHALATION) ONCE AS NEEDED
Status: DISCONTINUED | OUTPATIENT
Start: 2023-11-20 | End: 2023-11-20

## 2023-11-20 RX ORDER — POLYETHYLENE GLYCOL 3350 17 G/17G
17 POWDER, FOR SOLUTION ORAL DAILY
Status: DISCONTINUED | OUTPATIENT
Start: 2023-11-20 | End: 2023-11-29 | Stop reason: HOSPADM

## 2023-11-20 RX ORDER — VANCOMYCIN 2 GRAM/500 ML IN 0.9 % SODIUM CHLORIDE INTRAVENOUS
2000 ONCE
Status: DISCONTINUED | OUTPATIENT
Start: 2023-11-20 | End: 2023-11-20

## 2023-11-20 RX ORDER — BUPRENORPHINE AND NALOXONE 8; 2 MG/1; MG/1
3 FILM, SOLUBLE BUCCAL; SUBLINGUAL DAILY
COMMUNITY

## 2023-11-20 RX ORDER — TAMSULOSIN HYDROCHLORIDE 0.4 MG/1
0.4 CAPSULE ORAL DAILY
COMMUNITY
End: 2024-04-16 | Stop reason: ALTCHOICE

## 2023-11-20 RX ORDER — ROCURONIUM BROMIDE 10 MG/ML
INJECTION, SOLUTION INTRAVENOUS AS NEEDED
Status: DISCONTINUED | OUTPATIENT
Start: 2023-11-20 | End: 2023-11-20

## 2023-11-20 RX ORDER — ICOSAPENT ETHYL 1 G/1
2 CAPSULE ORAL
Status: DISCONTINUED | OUTPATIENT
Start: 2023-11-20 | End: 2023-11-29 | Stop reason: HOSPADM

## 2023-11-20 RX ORDER — OXYCODONE HYDROCHLORIDE 5 MG/1
5 TABLET ORAL EVERY 6 HOURS PRN
Status: DISCONTINUED | OUTPATIENT
Start: 2023-11-20 | End: 2023-11-25

## 2023-11-20 RX ORDER — SODIUM CHLORIDE, SODIUM GLUCONATE, SODIUM ACETATE, POTASSIUM CHLORIDE AND MAGNESIUM CHLORIDE 30; 37; 368; 526; 502 MG/100ML; MG/100ML; MG/100ML; MG/100ML; MG/100ML
INJECTION, SOLUTION INTRAVENOUS CONTINUOUS PRN
Status: DISCONTINUED | OUTPATIENT
Start: 2023-11-20 | End: 2023-11-20

## 2023-11-20 RX ORDER — VANCOMYCIN 2 GRAM/500 ML IN 0.9 % SODIUM CHLORIDE INTRAVENOUS
2000 ONCE
Status: COMPLETED | OUTPATIENT
Start: 2023-11-20 | End: 2023-11-20

## 2023-11-20 RX ORDER — METOCLOPRAMIDE HYDROCHLORIDE 5 MG/ML
10 INJECTION INTRAMUSCULAR; INTRAVENOUS ONCE AS NEEDED
Status: DISCONTINUED | OUTPATIENT
Start: 2023-11-20 | End: 2023-11-20

## 2023-11-20 RX ORDER — GADOTERATE MEGLUMINE 376.9 MG/ML
22 INJECTION INTRAVENOUS
Status: DISCONTINUED | OUTPATIENT
Start: 2023-11-20 | End: 2023-11-20

## 2023-11-20 RX ORDER — SUCCINYLCHOLINE CHLORIDE 100 MG/5ML
SYRINGE (ML) INTRAVENOUS AS NEEDED
Status: DISCONTINUED | OUTPATIENT
Start: 2023-11-20 | End: 2023-11-20

## 2023-11-20 RX ORDER — TAMSULOSIN HYDROCHLORIDE 0.4 MG/1
0.4 CAPSULE ORAL DAILY
Status: DISCONTINUED | OUTPATIENT
Start: 2023-11-20 | End: 2023-11-29 | Stop reason: HOSPADM

## 2023-11-20 RX ORDER — OXYCODONE HYDROCHLORIDE 10 MG/1
10 TABLET ORAL EVERY 6 HOURS PRN
Status: DISCONTINUED | OUTPATIENT
Start: 2023-11-20 | End: 2023-11-25

## 2023-11-20 RX ORDER — OXYCODONE HYDROCHLORIDE 5 MG/1
5 TABLET ORAL EVERY 4 HOURS PRN
Status: DISCONTINUED | OUTPATIENT
Start: 2023-11-20 | End: 2023-11-20

## 2023-11-20 RX ORDER — HYDROMORPHONE HYDROCHLORIDE 1 MG/ML
1 INJECTION, SOLUTION INTRAMUSCULAR; INTRAVENOUS; SUBCUTANEOUS EVERY 4 HOURS PRN
Status: DISCONTINUED | OUTPATIENT
Start: 2023-11-20 | End: 2023-11-20

## 2023-11-20 RX ORDER — LIDOCAINE HYDROCHLORIDE 20 MG/ML
INJECTION, SOLUTION INFILTRATION; PERINEURAL AS NEEDED
Status: DISCONTINUED | OUTPATIENT
Start: 2023-11-20 | End: 2023-11-20

## 2023-11-20 RX ORDER — HYDROMORPHONE HYDROCHLORIDE 1 MG/ML
1 INJECTION, SOLUTION INTRAMUSCULAR; INTRAVENOUS; SUBCUTANEOUS ONCE
Status: COMPLETED | OUTPATIENT
Start: 2023-11-20 | End: 2023-11-20

## 2023-11-20 RX ORDER — BUPRENORPHINE HYDROCHLORIDE AND NALOXONE HYDROCHLORIDE DIHYDRATE 8; 2 MG/1; MG/1
3 TABLET SUBLINGUAL DAILY
Status: DISCONTINUED | OUTPATIENT
Start: 2023-11-20 | End: 2023-11-26

## 2023-11-20 RX ORDER — PHENYLEPHRINE HCL IN 0.9% NACL 1 MG/10 ML
SYRINGE (ML) INTRAVENOUS AS NEEDED
Status: DISCONTINUED | OUTPATIENT
Start: 2023-11-20 | End: 2023-11-20

## 2023-11-20 RX ORDER — FENTANYL CITRATE 50 UG/ML
INJECTION, SOLUTION INTRAMUSCULAR; INTRAVENOUS AS NEEDED
Status: DISCONTINUED | OUTPATIENT
Start: 2023-11-20 | End: 2023-11-20

## 2023-11-20 RX ORDER — SODIUM CHLORIDE, SODIUM LACTATE, POTASSIUM CHLORIDE, CALCIUM CHLORIDE 600; 310; 30; 20 MG/100ML; MG/100ML; MG/100ML; MG/100ML
100 INJECTION, SOLUTION INTRAVENOUS CONTINUOUS
Status: DISCONTINUED | OUTPATIENT
Start: 2023-11-20 | End: 2023-11-20 | Stop reason: HOSPADM

## 2023-11-20 RX ORDER — LABETALOL HYDROCHLORIDE 5 MG/ML
5 INJECTION, SOLUTION INTRAVENOUS ONCE AS NEEDED
Status: DISCONTINUED | OUTPATIENT
Start: 2023-11-20 | End: 2023-11-20

## 2023-11-20 RX ORDER — VANCOMYCIN HYDROCHLORIDE 1 G/20ML
INJECTION, POWDER, LYOPHILIZED, FOR SOLUTION INTRAVENOUS AS NEEDED
Status: DISCONTINUED | OUTPATIENT
Start: 2023-11-20 | End: 2023-11-20 | Stop reason: HOSPADM

## 2023-11-20 RX ORDER — FENTANYL CITRATE 50 UG/ML
50 INJECTION, SOLUTION INTRAMUSCULAR; INTRAVENOUS EVERY 5 MIN PRN
Status: DISCONTINUED | OUTPATIENT
Start: 2023-11-20 | End: 2023-11-20

## 2023-11-20 RX ADMIN — Medication 100 MCG: at 20:05

## 2023-11-20 RX ADMIN — ROCURONIUM BROMIDE 20 MG: 10 INJECTION, SOLUTION INTRAVENOUS at 18:38

## 2023-11-20 RX ADMIN — POLYETHYLENE GLYCOL 3350 17 G: 17 POWDER, FOR SOLUTION ORAL at 09:34

## 2023-11-20 RX ADMIN — Medication 200 MCG: at 18:54

## 2023-11-20 RX ADMIN — LIDOCAINE HYDROCHLORIDE 100 MG: 20 INJECTION, SOLUTION INFILTRATION; PERINEURAL at 18:02

## 2023-11-20 RX ADMIN — LORAZEPAM 1 MG: 2 INJECTION, SOLUTION INTRAMUSCULAR; INTRAVENOUS at 04:09

## 2023-11-20 RX ADMIN — Medication 100 MG: at 18:02

## 2023-11-20 RX ADMIN — PIPERACILLIN SODIUM AND TAZOBACTAM SODIUM 3.38 G: 3; .375 INJECTION, SOLUTION INTRAVENOUS at 11:29

## 2023-11-20 RX ADMIN — PIPERACILLIN SODIUM AND TAZOBACTAM SODIUM 3.38 G: 3; .375 INJECTION, SOLUTION INTRAVENOUS at 22:16

## 2023-11-20 RX ADMIN — PROPOFOL 200 MG: 10 INJECTION, EMULSION INTRAVENOUS at 18:02

## 2023-11-20 RX ADMIN — VANCOMYCIN HYDROCHLORIDE 1250 MG: 1.25 INJECTION, POWDER, LYOPHILIZED, FOR SOLUTION INTRAVENOUS at 16:34

## 2023-11-20 RX ADMIN — OXYCODONE HYDROCHLORIDE 10 MG: 10 TABLET ORAL at 09:34

## 2023-11-20 RX ADMIN — SODIUM CHLORIDE, SODIUM LACTATE, POTASSIUM CHLORIDE, AND CALCIUM CHLORIDE: 600; 310; 30; 20 INJECTION, SOLUTION INTRAVENOUS at 17:54

## 2023-11-20 RX ADMIN — HYDROMORPHONE HYDROCHLORIDE 0.5 MG: 1 INJECTION, SOLUTION INTRAMUSCULAR; INTRAVENOUS; SUBCUTANEOUS at 19:44

## 2023-11-20 RX ADMIN — ROCURONIUM BROMIDE 30 MG: 10 INJECTION, SOLUTION INTRAVENOUS at 18:12

## 2023-11-20 RX ADMIN — Medication 2000 MG: at 05:08

## 2023-11-20 RX ADMIN — PIPERACILLIN SODIUM AND TAZOBACTAM SODIUM 3.38 G: 3; .375 INJECTION, SOLUTION INTRAVENOUS at 03:29

## 2023-11-20 RX ADMIN — HYDROMORPHONE HYDROCHLORIDE 0.5 MG: 1 INJECTION, SOLUTION INTRAMUSCULAR; INTRAVENOUS; SUBCUTANEOUS at 19:45

## 2023-11-20 RX ADMIN — SODIUM CHLORIDE, SODIUM GLUCONATE, SODIUM ACETATE, POTASSIUM CHLORIDE AND MAGNESIUM CHLORIDE: 526; 502; 368; 37; 30 INJECTION, SOLUTION INTRAVENOUS at 19:59

## 2023-11-20 RX ADMIN — FENTANYL CITRATE 50 MCG: 50 INJECTION, SOLUTION INTRAMUSCULAR; INTRAVENOUS at 18:33

## 2023-11-20 RX ADMIN — FENTANYL CITRATE 50 MCG: 50 INJECTION, SOLUTION INTRAMUSCULAR; INTRAVENOUS at 18:02

## 2023-11-20 RX ADMIN — HYDROMORPHONE HYDROCHLORIDE 1 MG: 1 INJECTION, SOLUTION INTRAMUSCULAR; INTRAVENOUS; SUBCUTANEOUS at 02:15

## 2023-11-20 RX ADMIN — ROCURONIUM BROMIDE 10 MG: 10 INJECTION, SOLUTION INTRAVENOUS at 19:49

## 2023-11-20 RX ADMIN — HYDROMORPHONE HYDROCHLORIDE 0.5 MG: 1 INJECTION, SOLUTION INTRAMUSCULAR; INTRAVENOUS; SUBCUTANEOUS at 20:51

## 2023-11-20 RX ADMIN — Medication 200 MCG: at 18:02

## 2023-11-20 RX ADMIN — SUGAMMADEX 200 MG: 100 INJECTION, SOLUTION INTRAVENOUS at 20:34

## 2023-11-20 SDOH — SOCIAL STABILITY: SOCIAL INSECURITY: ARE YOU OR HAVE YOU BEEN THREATENED OR ABUSED PHYSICALLY, EMOTIONALLY, OR SEXUALLY BY ANYONE?: NO

## 2023-11-20 SDOH — SOCIAL STABILITY: SOCIAL INSECURITY: WERE YOU ABLE TO COMPLETE ALL THE BEHAVIORAL HEALTH SCREENINGS?: YES

## 2023-11-20 SDOH — SOCIAL STABILITY: SOCIAL INSECURITY: DO YOU FEEL UNSAFE GOING BACK TO THE PLACE WHERE YOU ARE LIVING?: NO

## 2023-11-20 SDOH — SOCIAL STABILITY: SOCIAL INSECURITY: HAVE YOU HAD THOUGHTS OF HARMING ANYONE ELSE?: NO

## 2023-11-20 SDOH — SOCIAL STABILITY: SOCIAL INSECURITY: DO YOU FEEL ANYONE HAS EXPLOITED OR TAKEN ADVANTAGE OF YOU FINANCIALLY OR OF YOUR PERSONAL PROPERTY?: NO

## 2023-11-20 SDOH — SOCIAL STABILITY: SOCIAL INSECURITY: ARE THERE ANY APPARENT SIGNS OF INJURIES/BEHAVIORS THAT COULD BE RELATED TO ABUSE/NEGLECT?: NO

## 2023-11-20 SDOH — SOCIAL STABILITY: SOCIAL INSECURITY: HAS ANYONE EVER THREATENED TO HURT YOUR FAMILY OR YOUR PETS?: NO

## 2023-11-20 SDOH — SOCIAL STABILITY: SOCIAL INSECURITY: ABUSE: ADULT

## 2023-11-20 SDOH — HEALTH STABILITY: MENTAL HEALTH: CURRENT SMOKER: 0

## 2023-11-20 SDOH — SOCIAL STABILITY: SOCIAL INSECURITY: DOES ANYONE TRY TO KEEP YOU FROM HAVING/CONTACTING OTHER FRIENDS OR DOING THINGS OUTSIDE YOUR HOME?: NO

## 2023-11-20 ASSESSMENT — ACTIVITIES OF DAILY LIVING (ADL)
JUDGMENT_ADEQUATE_SAFELY_COMPLETE_DAILY_ACTIVITIES: YES
TOILETING: NEEDS ASSISTANCE
HEARING - LEFT EAR: FUNCTIONAL
ASSISTIVE_DEVICE: WALKER
DRESSING YOURSELF: NEEDS ASSISTANCE
LACK_OF_TRANSPORTATION: NO
WALKS IN HOME: NEEDS ASSISTANCE
GROOMING: NEEDS ASSISTANCE
BATHING: NEEDS ASSISTANCE
PATIENT'S MEMORY ADEQUATE TO SAFELY COMPLETE DAILY ACTIVITIES?: YES
FEEDING YOURSELF: INDEPENDENT
ADEQUATE_TO_COMPLETE_ADL: YES
HEARING - RIGHT EAR: FUNCTIONAL

## 2023-11-20 ASSESSMENT — COGNITIVE AND FUNCTIONAL STATUS - GENERAL
MOBILITY SCORE: 11
MOVING FROM LYING ON BACK TO SITTING ON SIDE OF FLAT BED WITH BEDRAILS: A LITTLE
PATIENT BASELINE BEDBOUND: NO
CLIMB 3 TO 5 STEPS WITH RAILING: TOTAL
MOBILITY SCORE: 11
CLIMB 3 TO 5 STEPS WITH RAILING: TOTAL
TURNING FROM BACK TO SIDE WHILE IN FLAT BAD: A LOT
DRESSING REGULAR LOWER BODY CLOTHING: A LOT
TOILETING: A LOT
DRESSING REGULAR UPPER BODY CLOTHING: A LITTLE
MOVING TO AND FROM BED TO CHAIR: A LOT
TOILETING: A LOT
MOVING TO AND FROM BED TO CHAIR: A LOT
HELP NEEDED FOR BATHING: A LOT
STANDING UP FROM CHAIR USING ARMS: A LOT
WALKING IN HOSPITAL ROOM: TOTAL
DAILY ACTIVITIY SCORE: 17
MOVING FROM LYING ON BACK TO SITTING ON SIDE OF FLAT BED WITH BEDRAILS: A LITTLE
DRESSING REGULAR UPPER BODY CLOTHING: A LITTLE
STANDING UP FROM CHAIR USING ARMS: A LOT
WALKING IN HOSPITAL ROOM: TOTAL
TURNING FROM BACK TO SIDE WHILE IN FLAT BAD: A LOT
HELP NEEDED FOR BATHING: A LOT
DRESSING REGULAR LOWER BODY CLOTHING: A LOT
DAILY ACTIVITIY SCORE: 17

## 2023-11-20 ASSESSMENT — PAIN SCALES - GENERAL
PAINLEVEL_OUTOF10: 0 - NO PAIN
PAINLEVEL_OUTOF10: 8
PAINLEVEL_OUTOF10: 10 - WORST POSSIBLE PAIN
PAINLEVEL_OUTOF10: 0 - NO PAIN
PAINLEVEL_OUTOF10: 5 - MODERATE PAIN
PAINLEVEL_OUTOF10: 0 - NO PAIN
PAINLEVEL_OUTOF10: 0 - NO PAIN

## 2023-11-20 ASSESSMENT — LIFESTYLE VARIABLES
SKIP TO QUESTIONS 9-10: 1
HAVE PEOPLE ANNOYED YOU BY CRITICIZING YOUR DRINKING: NO
EVER FELT BAD OR GUILTY ABOUT YOUR DRINKING: NO
AUDIT-C TOTAL SCORE: 0
REASON UNABLE TO ASSESS: NO
AUDIT-C TOTAL SCORE: 0
HOW MANY STANDARD DRINKS CONTAINING ALCOHOL DO YOU HAVE ON A TYPICAL DAY: PATIENT DOES NOT DRINK
EVER HAD A DRINK FIRST THING IN THE MORNING TO STEADY YOUR NERVES TO GET RID OF A HANGOVER: NO
HOW OFTEN DO YOU HAVE A DRINK CONTAINING ALCOHOL: NEVER
HOW OFTEN DO YOU HAVE 6 OR MORE DRINKS ON ONE OCCASION: NEVER
HAVE YOU EVER FELT YOU SHOULD CUT DOWN ON YOUR DRINKING: NO

## 2023-11-20 ASSESSMENT — COLUMBIA-SUICIDE SEVERITY RATING SCALE - C-SSRS
6. HAVE YOU EVER DONE ANYTHING, STARTED TO DO ANYTHING, OR PREPARED TO DO ANYTHING TO END YOUR LIFE?: NO
2. HAVE YOU ACTUALLY HAD ANY THOUGHTS OF KILLING YOURSELF?: NO
1. IN THE PAST MONTH, HAVE YOU WISHED YOU WERE DEAD OR WISHED YOU COULD GO TO SLEEP AND NOT WAKE UP?: NO

## 2023-11-20 ASSESSMENT — PATIENT HEALTH QUESTIONNAIRE - PHQ9
SUM OF ALL RESPONSES TO PHQ9 QUESTIONS 1 & 2: 0
2. FEELING DOWN, DEPRESSED OR HOPELESS: NOT AT ALL
1. LITTLE INTEREST OR PLEASURE IN DOING THINGS: NOT AT ALL

## 2023-11-20 ASSESSMENT — PAIN DESCRIPTION - ORIENTATION
ORIENTATION: LOWER
ORIENTATION: LEFT;LOWER

## 2023-11-20 ASSESSMENT — PAIN - FUNCTIONAL ASSESSMENT
PAIN_FUNCTIONAL_ASSESSMENT: WONG-BAKER FACES
PAIN_FUNCTIONAL_ASSESSMENT: 0-10
PAIN_FUNCTIONAL_ASSESSMENT: 0-10
PAIN_FUNCTIONAL_ASSESSMENT: WONG-BAKER FACES
PAIN_FUNCTIONAL_ASSESSMENT: 0-10

## 2023-11-20 ASSESSMENT — PAIN DESCRIPTION - LOCATION
LOCATION: BACK
LOCATION: BACK

## 2023-11-20 NOTE — ED PROVIDER NOTES
EMERGENCY DEPARTMENT ENCOUNTER      Pt Name: Gm Thrasher  MRN: [id number]  Birthdate 1965  Date of evaluation: 11/19/2023  Provider: Harshad Rios DO    CHIEF COMPLAINT       Chief Complaint   Patient presents with    Wound Check     Patient presents to the ED by EMS from [facility] for a wound check. Per EMS, patient was found at home by his sister in bed, appearing to not have gotten out of bed in a few days. Patient is s/p back surgery 9/29. Patient reports purulent drainage from left lateral and left lower back wounds. Patient denies fevers, chills, CP, SOB. Patient is A&Ox4.         HISTORY OF PRESENT ILLNESS    58-year-old male with a history of drug abuse, diabetes, hypertension, coronary artery disease, comes emergency room today for complications of a lumbar spinal fusion.  He had the surgery done about 2 months ago by Dr. Amezcua.  He saw him in the office on November 9 for wound D. Hyson's, purulent discharge, Dr. Amezcua started him on an oral antibiotic but he presented to [facility] ER today with confusion, altered mental status, and was endorsing weakness, decreased appetite.  Patient was hypotensive on initial arrival to the ER, received 2 L of IV fluids, and a dose of Rocephin.  He was ultimately transferred here for further management.  Dr. Morgan recommended MRI L-spine with and without contrast, CRP, vancomycin and Zosyn.      History provided by:  Patient      Nursing Notes were reviewed.    PAST MEDICAL HISTORY     Past Medical History:   Diagnosis Date    Borderline diabetes     Emphysema lung (CMS/HCC) 09/20/2023    H/O chest x-ray     1/20: Impression: Stable, enlarged cardiac mediastinal silhouette with mild central pulmonary vascular congestion. Nonspecific bibasilar airspace disease, worsened in overall appearance compared with the previous study, findings can be seen in  infiltrate/pneumonia and/or atelectasis.    H/O CT scan     CT Ankle: 1/20: A vertically oriented  nondisplaced oblique fracture of the medial ankle mortise extends into the distal tibial metadiaphysis.  Several small old avulsion fracture fragments are noted inferior to both malleoli; as well as soft tissue swelling about the ankle.  There is no other fracture, radiodense foreign bodies, pathologic calcifications, or worrisome bone destruction identified.    H/O CT scan 10/11/2023    1. Acute minimally comminuted and displaced fractures through distal tibia and fibula as described above. 2. There are also findings consistent with remote trauma with significant cortical remodeling of the distal tibia with associated incongruity of the distal tibial articular surface as described above. 3. Moderate arthrosis of the tibiotalar and subtalar joints. 4. Soft tissue swelling about th    H/O CT scan of abdomen     2016: Diffuse urinary bladder wall thickening, Small atrophic left kidney with pyelocalyceal ectasia and severe cortical thinning, , Moderate pyelocalyceal ectasia of the right kidney with cortical thinning; unchanged    H/O CT scan of brain     11/22: normal 1/20: FINDINGS:  There is no intracranial hemorrhage, mass effect, midline shift, extra-axial collection, evidence of hydrocephalus, recent ischemic infarct, or skull fracture identified.    There is no significant atrophy or white matter changes, for age.  Moderate mucosal thickening within the paranasal sinuses again noted. The mastoid air cells are clear    H/O CT scan of brain 10/10/2023    There is some subtle increased attenuation along the right  tentorium cerebelli.  This is suspicious for a acute  subdural hematoma.    H/O CT scan of chest     10/19: 1. Bibasilar infiltrate/pneumonia with patchy pneumonia scattered throughout the left lung and bilateral pleural effusions.  2. Vascular calcifications left anterior descending coronary artery with mild to moderate cardiomegaly.  3. Moderate to moderately severe diffuse fatty infiltration of liver.  10/21: Bilateral groundglass opacities as discussed. These findings are nonspecific    H/O CT scan of chest 10/10/2023    NO PE. Somewhat patchy subpleural curvilinear markings are seen in the  bilateral lower lobes, overall morphology favoring atelectasis or infectious  infiltrate.  Scattered central small cysts are observed.    H/O CT scan of head 10/12/2023    In comparison to prior CT dated 10/03/2023, there has been interval improvement in the right frontal subarachnoid hemorrhage and diffuse subdural hemorrhage along the tentorium and overlying the right cerebellar hemisphere as well as resolution of right parietal lobe intraparenchymal hemorrhage.    H/O diagnostic ultrasound     RUQ US 2013: Cholelithiasis and mild gallbladder distention, Increased liver echogenicity    H/O diagnostic ultrasound 10/10/2023    renal - Severe right hydronephrosis, similar in appearance to CT lumbar spine examination. Bilateral ureteral jets visualized.    Mild asymmetric enlargement of the right kidney with cortical renal scarring involving the left kidney.    H/O echocardiogram     1/20: Normal LV and RV.  No significant valve disease.  Normal estimated PA pressure.  Normal diastolic filling pattern.    H/O magnetic resonance imaging of cervical spine     2003: CENTRAL STENOSIS WORST C3-C4 1/21: Multilevel degenerative changes with disc height loss, disc osteophyte complexes, facet/uncovertebral degenerative changes. Moderate to severe canal and bilateral foraminal narrowing at C3-C4, C4-C5, C5-C6, C6-C7 grossly similar to  prior cervical MRI    H/O magnetic resonance imaging of lumbar spine     2012:  NEW RIGHT POSTERIOR EXTRUSION L4-5, WITH CONTACT OF THE EXITING RIGHT L5 AND DESCENDING RIGHT S1 NERVES. s/p laminectomy L5 2019 ([facility]): NARROWING OF MULTIPLE LUMBAR DISC LEVELS WITH FAIRLY ADVANCED DEGENERATIVE CHANGES. SEE INDIVIDUAL LEVELS ABOVE.  MILD LUMBAR CANAL STENOSIS AT THE L4-5 LEVEL.  NARROWING OF NEURAL FORAMINA,     H/O magnetic resonance imaging of lumbar spine 10/10/2023    MR findings worrisome for retroperitoneal abscess, possibly extending into  the L4-5 disc space.   Apparent clumping of the proximal cauda equina nerve roots versus mass effect  due to an intrathecal fluid collection.  Recommend MRI of the T-spine for  further evaluation    H/O x-ray of lower extremity 10/10/2023    XR ankle: NEW ACUTE OBLIQUE FRACTURE OF THE DISTAL RIGHT TIBIA WITH A  OLD VERTICAL FRACTURE OF THE TIBIA.   THERE IS A NEW COMMINUTED FRACTURE OF THE DISTAL RIGHT FIBULA.    High cholesterol          SURGICAL HISTORY       Past Surgical History:   Procedure Laterality Date    ANKLE SURGERY  10/17/2023    Insertion Intramedullary RIGHT Nail Tibia by Dr Vic Hale    CARDIAC CATHETERIZATION      8/2015: The coronary anatomy is R dominant.  L main coronary artery was normal.  Left anterior descending artery presents luminal irregularities.  Circumflex coronary artery normal.  R coronary artery presents luminal irregularities.  Mid RCA lesion 40% stenosis.  LVEF 50%    CARDIAC CATHETERIZATION Right 10/11/2023    Procedure: Left Heart Cath;  Surgeon: Jonathan GASPAR MD;  Location: [facility];  Service: Cardiovascular;  Laterality: Right;  radial    CARDIAC CATHETERIZATION N/A 10/13/2023    Procedure: PCI RCA, right radial 6Fr;  Surgeon: Juvenal Le MD;  Location: [facility] Cardiac Cath Lab;  Service: Cardiovascular;  Laterality: N/A;    CERVICAL LAMINECTOMY      C3-C4, s/p cervical laminectomy.    ESOPHAGOGASTRODUODENOSCOPY      12/15: Localized mild erythema was found at the gastroesophageal junction    KIDNEY SURGERY  09/12/2013    Kidney Surgery    LUMBAR FUSION  10/06/2023    1. L3-4, L4-5 extreme lateral interbody fusion 2. L3-5 laminectomy, L5-S1 revision laminectomy, L5-S1 transforaminal lumbar interbody fusion, L3-S1 instrumented posterolateral fusion, repair of dural tear    LUMBAR FUSION  09/30/2023    L3-4 & L4-5  XLIF(NUVASIVE), L5-S1 TLIF(MEDTRONIC), L3-S1 POSTEROLATERAL FUSION, L3-5    LUMBAR LAMINECTOMY      s/p L5 laminectomy x2;  residual left foot drop    OTHER SURGICAL HISTORY  01/28/2020    Appendectomy    OTHER SURGICAL HISTORY  01/28/2020    Cholecystectomy    OTHER SURGICAL HISTORY  01/28/2020    [place] tooth extraction    OTHER SURGICAL HISTORY  01/28/2020    Tonsillectomy         CURRENT MEDICATIONS       Previous Medications    ALLOPURINOL (ZYLOPRIM) 100 MG TABLET    Take 1 tablet (100 mg) by mouth once daily as needed.    ASPIRIN 81 MG CHEWABLE TABLET    Chew 1 tablet (81 mg) once daily. Do not start before October 21, 2023.    ATORVASTATIN (LIPITOR) 80 MG TABLET    Take 1 tablet (80 mg) by mouth once daily.    CARVEDILOL (COREG) 12.5 MG TABLET    Take 1 tablet (12.5 mg) by mouth 2 times a day.    CLOPIDOGREL (PLAVIX) 75 MG TABLET    Take 1 tablet (75 mg) by mouth once daily. Do not start before October 21, 2023.    CYANOCOBALAMIN (VITAMIN B-12) 1,000 MCG/ML ORAL LIQUID    Take 1 mL (1,000 mcg) by mouth. Daily for 1 wk. Then 1 ml weekly for a month. Then 1 ml monthly    ERGOCALCIFEROL (VITAMIN D-2) 1.25 MG (44503 UT) CAPSULE    Take 1 capsule (50,000 Units) by mouth 1 (one) time per week.    ESCITALOPRAM (LEXAPRO) 20 MG TABLET    Take 1 tablet (20 mg) by mouth once daily.    LEVETIRACETAM (KEPPRA) 500 MG TABLET    Take 1 tablet (500 mg) by mouth 2 times a day for 7 days.    METFORMIN (GLUCOPHAGE) 500 MG TABLET    Take 2 tablets (1,000 mg) by mouth 2 times a day with meals.    [facility]    Knee walker for non weight bearing right leg post right ankle surgery    NALOXONE (NARCAN) 4 MG/0.1 ML NASAL SPRAY    Administer 1 spray (4 mg) into affected nostril(s).  CALL 911 MAY REPEAT ONCE    PANTOPRAZOLE (PROTONIX) 40 MG EC TABLET    Take 1 tablet (40 mg) by mouth once daily in the morning. Take before meals for 14 days. Do not crush, chew, or split. Do not start before October 6, 2023.     PREGABALIN (LYRICA) 300 MG CAPSULE    Take 1 capsule (300 mg) by mouth 2 times a day.    SEMAGLUTIDE 0.25 MG OR 0.5 MG (2 MG/3 ML) PEN INJECTOR    Inject 0.5 mg under the skin 1 (one) time per week.    VASCEPA 1 GRAM CAPSULE    Take 2 capsules (2 g) by mouth 2 times a day with meals.       ALLERGIES     Metoprolol and Mirtazapine    FAMILY HISTORY       Family History   Problem Relation Name Age of Onset    No Known Problems Mother          F: ETOHIsm, DM, COPD M: Lung CA, CAD B: CA metastatic lung () S; ETOH ism S:          SOCIAL HISTORY       Social History     Socioeconomic History    Marital status: Legally      Spouse name: None    Number of children: None    Years of education: None    Highest education level: None   Occupational History    None   Tobacco Use    Smoking status: Every Day     Packs/day: 2     Types: Cigarettes    Smokeless tobacco: Never    Tobacco comments:     Smoking cessation program ordered   Vaping Use    Vaping Use: Never used   Substance and Sexual Activity    Alcohol use: Not Currently    Drug use: Not Currently     Types: Amphetamines, Cocaine    Sexual activity: Defer   Other Topics Concern    None   Social History Narrative     (Ling)    no kids    Doesnt work/Disbility    Smoker: started age 24    Smokes 1-2ppd    On suboxone - pain management    No ETOH    -------    Family History:    F: ETOHIsm, DM, COPD    M: Lung CA, CAD    B: CA metastatic lung ()    S; ETOH ism    S:     Social Determinants of Health     Financial Resource Strain: Low Risk  (10/18/2023)    Overall Financial Resource Strain (CARDIA)     Difficulty of Paying Living Expenses: Not hard at all   Food Insecurity: No Food Insecurity (10/18/2023)    Hunger Vital Sign     Worried About Running Out of Food in the Last Year: Never true     Ran Out of Food in the Last Year: Never true   Transportation Needs: No Transportation Needs (10/18/2023)    PRAPARE - Transportation     Lack of  Transportation (Medical): No     Lack of Transportation (Non-Medical): No   Physical Activity: Not on file   Stress: Not on file   Social Connections: Not on file   Intimate Partner Violence: Not on file   Housing Stability: Low Risk  (10/18/2023)    Housing Stability Vital Sign     Unable to Pay for Housing in the Last Year: No     Number of Places Lived in the Last Year: 1     Unstable Housing in the Last Year: No       SCREENINGS                        PHYSICAL EXAM    (up to 7 for level 4, 8 or more for level 5)     ED Triage Vitals   Temp Pulse Resp BP   -- -- -- --      SpO2 Temp src Heart Rate Source Patient Position   -- -- -- --      BP Location FiO2 (%)     -- --       Physical Exam  Vitals and nursing note reviewed.   Constitutional:       General: He is not in acute distress.  HENT:      Head: Normocephalic and atraumatic.   Eyes:      General: No scleral icterus.        Right eye: No discharge.         Left eye: No discharge.      Conjunctiva/sclera: Conjunctivae normal.   Cardiovascular:      Rate and Rhythm: Normal rate and regular rhythm.      Pulses: Normal pulses.   Pulmonary:      Effort: Pulmonary effort is normal.   Abdominal:      General: Abdomen is flat.      Palpations: Abdomen is soft.      Tenderness: There is no abdominal tenderness. There is no guarding or rebound.   Musculoskeletal:         General: No deformity.      Right lower leg: No edema.      Left lower leg: No edema.      Comments: Is a well-healing surgical incision midline on his lumbar spine.  There is purulent drainage leaking out of the surgical incision.  The area is not warm, erythematous.   Skin:     General: Skin is warm and dry.   Neurological:      Mental Status: He is oriented to person, place, and time. Mental status is at baseline.   Psychiatric:         Mood and Affect: Mood normal.         Behavior: Behavior normal.          DIAGNOSTIC RESULTS     LABS:  Labs Reviewed   C-REACTIVE PROTEIN - Abnormal       Result  Value    C-Reactive Protein 18.12 (*)    SEDIMENTATION RATE, AUTOMATED - Abnormal    Sedimentation Rate 55 (*)    URINALYSIS WITH REFLEX MICROSCOPIC AND CULTURE       All other labs were within normal range or not returned as of this dictation.    Imaging  MR lumbar spine wo IV contrast   Final Result   Evaluation is severely limited by motion artifact.        Redemonstration of findings lumbosacral fusion and laminectomies.   There is redemonstration of a multiloculated collection in the   midline/left paramedian posterior subcutaneous soft tissues deep to   the incision, and more deeply within the laminectomy bed abutting and   within the dorsal epidural fat. The sterility of this collection   cannot be determined by imaging.        The collection also extends about the posterior elements of the L2   vertebra. A rather large volume of air is newly present in the   collection, probably secondary to recent intervention/communication   with the atmosphere although infection with gas producing organism   cannot be excluded.        The dominant locule of the collection is in the left paramedian   posterior soft tissues, centered about the level of the L2-L3 disc   space, measures a proximally 2.3 x 2.9 cm in transaxial diameters,   and up to 7.8 cm in cc length. The next largest locule of the   collection, centered about the L3-L4 disc space, within the   laminectomy bed measures approximately 2.0 x 2.0 cm in transaxial   diameters, and roughly 8.7 cm in CC length. An additional smaller   collection centered at the mid L5 level measures up to 1.7 x 1.2 cm   in transaxial diameters in the dorsal epidural fat.        Mass effect from the collection and associated edema in the   surrounding soft tissues is again noted to cause moderate to marked   narrowing of the thecal sac from L2-L3 through L5-S1, worst at the   L5-S1 level where there is effacement of CSF and compression of cauda   equina nerve roots, secondary to  combination of grade 1   retrolisthesis, disc osteophyte bulging, dorsal epidural lipomatosis   with edematous fat.        I personally reviewed the images/study and I agree with the findings   as stated. This study was interpreted at [facility], [place], [place].        MACRO:   None        Signed by: Asad Castillo 11/20/2023 5:24 AM   Dictation workstation:   [id number]           Procedures  Procedures     EMERGENCY DEPARTMENT COURSE/MDM:     ED Course as of 11/20/23 0602 Mon Nov 20, 2023   0508 Dr. Morgan of neurosurgery reviewed the imaging, said no emergent indication for surgery however he will take the patient to the OR at the end of the day today.  He recommended n.p.o., continue antibiotics, change dressing [RD]      ED Course User Index  [RD] Harshad Rios,          Diagnoses as of 11/20/23 0602   Wound dehiscence   Cauda equina compression (CMS/HCC)   Epidural abscess        Medical Decision Making  58-year-old male presents emergency department today for an MRI, neurosurgery consultation.  He was transferred from [facility].  Patient was initiated on vanc, Zosyn on arrival to the emergency department.  CRP, ESR were ordered as well.  MRI L-spine with and without contrast was ordered as well.  Patient was unable to tolerate the MRI, was brought back prior to the contrasted study be completed.  At this time I did give him a milligram of lorazepam however he was still unable to tolerate the MRI.  Dr. Morgan of neurosurgery did review the imaging, and scheduled the patient for the OR later in the day.  He said he was able to get an adequate read on the MRI without contrast, and did not need the MRI with contrast at this point.  Radiology did read as concern for a fluid collection in the spinal canal causing cauda equina syndrome.  We did do a bladder scan, patient had over 999 cc of urine in his bladder.  I did speak with Dr. Morgan again and informed him that  radiology was reading the MRI as cauda equina syndrome, and Dr. Morgan was aware of this.  A Navarro catheter was ordered, urinalysis was sent off.  Patient was admitted to the teaching service for further management.      I saw and evaluated the patient. I personally obtained the key and critical portions of the history and physical exam or was physically present for key and critical portions performed by the resident/fellow. I reviewed the resident/fellow's documentation and discussed the patient with the resident/fellow. I agree with the resident/fellow's medical decision making as documented in the note with the exception/addition of the following:  This is a 58 years old male patient presented to the emergency department with a chief complaint of oozing/discharge from the back wound.  Stated that he had a surgery for herniated disc 8 weeks ago.  Denies any headache, lightheadedness, dizziness, chest pain, shortness of breath, cough, abdominal pain, saddle anesthesia, loss of control over bladder or bowel.  Denies any numbness or focal weakness to the upper or lower extremities.    Review of systems: As stated above in the HPI section.    Physical exam revealed a 58 years old male patient who appears to be older than his stated age.  He has intact neurovascular exam x4 extremity, intact motor function.  Back reveals discharge from the surgical site.  Presentation is concerning for postoperative infection.  We consulted with the neurosurgery team who recommended obtaining an MRI.  Patient came from [facility] emergency department.  We will obtain the MRI in the emergency department and will call neurosurgery team if needed.  We will cover the patient empirically with vancomycin and Zosyn.Discussed the case with neurosurgery on-call.  Recommended obtaining MRI with and without contrast.  We could not obtain the IV contrast part of the study, patient came back to the emergency department, will use Ativan to  sedate the patient and will try to get the IV contrast part of the MRI and admit the patient to the hospital service.          Meet Lea,      Amount and/or Complexity of Data Reviewed  Labs: ordered. Decision-making details documented in ED Course.  Radiology: ordered. Decision-making details documented in ED Course.        Patient and or family in agreement and understanding of treatment plan.  All questions answered.      I reviewed the case with the attending ED physician. The attending ED physician agrees with the plan. Patient and/or patient´s representative was counseled regarding labs, imaging, likely diagnosis, and plan. All questions were answered.    ED Medications administered this visit:    Medications   vancomycin in sodium chloride 0.9 % (Vancocin) IVPB 2,000 mg (2,000 mg intravenous New Bag 11/20/23 0508)   vancomycin (Vancocin) in sodium chloride 0.9% 500 mL IV  - Omnicell Override Pull (has no administration in time range)   piperacillin-tazobactam-dextrose (Zosyn) IV 3.375 g (has no administration in time range)   vancomycin in sodium chloride 0.9 % (Vancocin) IVPB 2,000 mg (has no administration in time range)   HYDROmorphone (Dilaudid) injection 1 mg (has no administration in time range)   HYDROmorphone (Dilaudid) injection 0.5 mg (has no administration in time range)   piperacillin-tazobactam-dextrose (Zosyn) IV 3.375 g (0 g intravenous Stopped 11/20/23 0359)   HYDROmorphone (Dilaudid) injection 1 mg (1 mg intravenous Given 11/20/23 0215)   LORazepam (Ativan) injection 1 mg (1 mg intravenous Given 11/20/23 0409)       New Prescriptions from this visit:    New Prescriptions    No medications on file       Follow-up:  No follow-up provider specified.      Final Impression:   1. Wound dehiscence    2. Cauda equina compression (CMS/HCC)    3. Epidural abscess          (Please note that portions of this note were completed with a voice recognition program.  Efforts were made to edit the  dictations but occasionally words are mis-transcribed.)     Meet Lea,   11/20/23 0437       Harshad Rios,   Resident  11/20/23 0602       Meet Lea,   11/20/23 0616

## 2023-11-20 NOTE — ANESTHESIA PROCEDURE NOTES
Airway  Date/Time: 11/20/2023 6:05 PM  Urgency: elective    Airway not difficult    Staffing  Performed: FREDY   Authorized by: Kirk Kinsey DO    Performed by: FREDY Mcneil  Patient location during procedure: OR    Indications and Patient Condition  Indications for airway management: anesthesia  Spontaneous ventilation: present  Sedation level: deep  Preoxygenated: yes  Patient position: sniffing  Mask difficulty assessment: 0 - not attempted    Final Airway Details  Final airway type: endotracheal airway      Successful airway: ETT  Cuffed: yes   Successful intubation technique: direct laryngoscopy  Endotracheal tube insertion site: oral  Blade: Obie  Blade size: #4  ETT size (mm): 7.0  Cormack-Lehane Classification: grade I - full view of glottis  Placement verified by: chest auscultation and capnometry   Measured from: gums  ETT to gums (cm): 22  Number of attempts at approach: 1  Number of other approaches attempted: 0

## 2023-11-20 NOTE — CONSULTS
Infectious Disease Consult    PATIENT NAME: Gm Thrasher    MRN: [id number]  SERVICE DATE:  11/20/2023   SERVICE TIME:  2:34 PM    SIGNATURE: Patricio Reveles MD    PRIMARY CARE PHYSICIAN: Marsha Francis MD  REASON FOR CONSULT: Possible epidural abscess as a possible complication of lumbar decompression surgery 9/29/2023  REQUESTING PHYSICIAN: Clementina Ron DO      HPI  Gm Esposito is a 58 year old male with PMHx listed below who underwent lumbar decompression and spinal fusion complicated by postoperative subarachnoid hemorrhage and subdural hematoma completed by Dr. Amezcua on 9/29/2023 with recent admission one month ago due to concern for psoas abscess who presented to the [facility] ED from [facility] due to concern for post operative infection. The patient was recently seen by neurosurgery on 11/9 for wound drainage and was started on a course of Bactrim.  Per ED report, the patient was seen at [facility] for fatigue, generalized weakness, and reduced p.o. intake.  The patient denies any fever, chills, nausea, vomiting, diarrhea, focal weakness, paresthesias, bladder/bowel incontinence.  ED report, the patient was confused and hypotensive at [facility].  He was given 2 L of IV fluids and Rocephin.  He was then transferred to [facility] for further management.   Upon presenting to ED, he had elevated /113, afebrile, labs revealed Lactate 1.8. CMP with Na 134, BUN 15, Cr 1.22. Alk phos 226, AST 62. CBC without leukocytosis. Hgb 10.7 which appears his baseline. ESR and CRP both elevated at 55 and 18.12 respectively.    MRI revealed redemonstration of findings of the lumbosacral fusion and laminectomies with redemonstration of multiloculated collection in the midline/paramedian posterior subcutaneous soft tissues deep to the incision and more deeply within the laminectomy bed abutting and within the dorsal epidural fat.  The collection also extends about the posterior elements of the L2 vertebrae.  A rather large  volume of air is newly present in the collection possibly secondary to recent intervention/communication with the atmosphere although infection with gas producing organism cannot be excluded.  The dominant locule of the collection is in the left paramedian posterior soft tissues, centered about the level of the L2-L3 disc space, measures approximately 2.3 x 2.9 cm in transaxial diameters, and up to 7.8 cm in CC length.  The next largest locule of the collection, centered about the 3 through L4 disc base, within the laminectomy bed measures approximately 2 x 2 cm and roughly 8 point centimeters in CC length.  An additional smaller collection centered at the mid L5 level measures up to 1.7 x 1.2 cm in diameter.  This effect from the collection and associated edema in the surrounding soft tissues is again noted to cause moderate to marked narrowing of the thecal sac from L2-L3 through L5-S1 where there is effacement of CSF and compression of cauda equina nerve roots.   Patient was then started on Vanco and Zosyn with planned to be taken to the OR later this afternoon.  ID was consulted for possible epidural abscess and further management with antibiotics.    PAST MEDICAL HISTORY:   Past Medical History:   Diagnosis Date    Borderline diabetes     Emphysema lung (CMS/HCC) 09/20/2023    H/O chest x-ray     1/20: Impression: Stable, enlarged cardiac mediastinal silhouette with mild central pulmonary vascular congestion. Nonspecific bibasilar airspace disease, worsened in overall appearance compared with the previous study, findings can be seen in  infiltrate/pneumonia and/or atelectasis.    H/O CT scan     CT Ankle: 1/20: A vertically oriented nondisplaced oblique fracture of the medial ankle mortise extends into the distal tibial metadiaphysis.  Several small old avulsion fracture fragments are noted inferior to both malleoli; as well as soft tissue swelling about the ankle.  There is no other fracture, radiodense  foreign bodies, pathologic calcifications, or worrisome bone destruction identified.    H/O CT scan 10/11/2023    1. Acute minimally comminuted and displaced fractures through distal tibia and fibula as described above. 2. There are also findings consistent with remote trauma with significant cortical remodeling of the distal tibia with associated incongruity of the distal tibial articular surface as described above. 3. Moderate arthrosis of the tibiotalar and subtalar joints. 4. Soft tissue swelling about th    H/O CT scan of abdomen     2016: Diffuse urinary bladder wall thickening, Small atrophic left kidney with pyelocalyceal ectasia and severe cortical thinning, , Moderate pyelocalyceal ectasia of the right kidney with cortical thinning; unchanged    H/O CT scan of brain     11/22: normal 1/20: FINDINGS:  There is no intracranial hemorrhage, mass effect, midline shift, extra-axial collection, evidence of hydrocephalus, recent ischemic infarct, or skull fracture identified.    There is no significant atrophy or white matter changes, for age.  Moderate mucosal thickening within the paranasal sinuses again noted. The mastoid air cells are clear    H/O CT scan of brain 10/10/2023    There is some subtle increased attenuation along the right  tentorium cerebelli.  This is suspicious for a acute  subdural hematoma.    H/O CT scan of chest     10/19: 1. Bibasilar infiltrate/pneumonia with patchy pneumonia scattered throughout the left lung and bilateral pleural effusions.  2. Vascular calcifications left anterior descending coronary artery with mild to moderate cardiomegaly.  3. Moderate to moderately severe diffuse fatty infiltration of liver. 10/21: Bilateral groundglass opacities as discussed. These findings are nonspecific    H/O CT scan of chest 10/10/2023    NO PE. Somewhat patchy subpleural curvilinear markings are seen in the  bilateral lower lobes, overall morphology favoring atelectasis or infectious   infiltrate.  Scattered central small cysts are observed.    H/O CT scan of head 10/12/2023    In comparison to prior CT dated 10/03/2023, there has been interval improvement in the right frontal subarachnoid hemorrhage and diffuse subdural hemorrhage along the tentorium and overlying the right cerebellar hemisphere as well as resolution of right parietal lobe intraparenchymal hemorrhage.    H/O diagnostic ultrasound     RUQ US 2013: Cholelithiasis and mild gallbladder distention, Increased liver echogenicity    H/O diagnostic ultrasound 10/10/2023    renal - Severe right hydronephrosis, similar in appearance to CT lumbar spine examination. Bilateral ureteral jets visualized.    Mild asymmetric enlargement of the right kidney with cortical renal scarring involving the left kidney.    H/O echocardiogram     1/20: Normal LV and RV.  No significant valve disease.  Normal estimated PA pressure.  Normal diastolic filling pattern.    H/O magnetic resonance imaging of cervical spine     2003: CENTRAL STENOSIS WORST C3-C4 1/21: Multilevel degenerative changes with disc height loss, disc osteophyte complexes, facet/uncovertebral degenerative changes. Moderate to severe canal and bilateral foraminal narrowing at C3-C4, C4-C5, C5-C6, C6-C7 grossly similar to  prior cervical MRI    H/O magnetic resonance imaging of lumbar spine     2012:  NEW RIGHT POSTERIOR EXTRUSION L4-5, WITH CONTACT OF THE EXITING RIGHT L5 AND DESCENDING RIGHT S1 NERVES. s/p laminectomy L5 2019 ([facility]): NARROWING OF MULTIPLE LUMBAR DISC LEVELS WITH FAIRLY ADVANCED DEGENERATIVE CHANGES. SEE INDIVIDUAL LEVELS ABOVE.  MILD LUMBAR CANAL STENOSIS AT THE L4-5 LEVEL.  NARROWING OF NEURAL FORAMINA,    H/O magnetic resonance imaging of lumbar spine 10/10/2023    MR findings worrisome for retroperitoneal abscess, possibly extending into  the L4-5 disc space.   Apparent clumping of the proximal cauda equina nerve roots versus mass effect  due to an intrathecal fluid  collection.  Recommend MRI of the T-spine for  further evaluation    H/O x-ray of lower extremity 10/10/2023    XR ankle: NEW ACUTE OBLIQUE FRACTURE OF THE DISTAL RIGHT TIBIA WITH A  OLD VERTICAL FRACTURE OF THE TIBIA.   THERE IS A NEW COMMINUTED FRACTURE OF THE DISTAL RIGHT FIBULA.    High cholesterol      PAST SURGICAL HISTORY:   Past Surgical History:   Procedure Laterality Date    ANKLE SURGERY  10/17/2023    Insertion Intramedullary RIGHT Nail Tibia by Dr Vic Hale    CARDIAC CATHETERIZATION      8/2015: The coronary anatomy is R dominant.  L main coronary artery was normal.  Left anterior descending artery presents luminal irregularities.  Circumflex coronary artery normal.  R coronary artery presents luminal irregularities.  Mid RCA lesion 40% stenosis.  LVEF 50%    CARDIAC CATHETERIZATION Right 10/11/2023    Procedure: Left Heart Cath;  Surgeon: Jonathan GASPAR MD;  Location: [facility] Cardiac Cath Lab;  Service: Cardiovascular;  Laterality: Right;  radial    CARDIAC CATHETERIZATION N/A 10/13/2023    Procedure: PCI RCA, right radial 6Fr;  Surgeon: Juvenal Le MD;  Location: [facility] Cardiac Cath Lab;  Service: Cardiovascular;  Laterality: N/A;    CERVICAL LAMINECTOMY      C3-C4, s/p cervical laminectomy.    ESOPHAGOGASTRODUODENOSCOPY      12/15: Localized mild erythema was found at the gastroesophageal junction    KIDNEY SURGERY  09/12/2013    Kidney Surgery    LUMBAR FUSION  10/06/2023    1. L3-4, L4-5 extreme lateral interbody fusion 2. L3-5 laminectomy, L5-S1 revision laminectomy, L5-S1 transforaminal lumbar interbody fusion, L3-S1 instrumented posterolateral fusion, repair of dural tear    LUMBAR FUSION  09/30/2023    L3-4 & L4-5 XLIF(NUVASIVE), L5-S1 TLIF([employer]TRONIC), L3-S1 POSTEROLATERAL FUSION, L3-5    LUMBAR LAMINECTOMY      s/p L5 laminectomy x2;  residual left foot drop    OTHER SURGICAL HISTORY  01/28/2020    Appendectomy    OTHER SURGICAL HISTORY  01/28/2020    Cholecystectomy    OTHER SURGICAL HISTORY   2020    [place] tooth extraction    OTHER SURGICAL HISTORY  2020    Tonsillectomy     FAMILY HISTORY:   Family History   Problem Relation Name Age of Onset    No Known Problems Mother          F: ETOHIsm, DM, COPD M: Lung CA, CAD B: CA metastatic lung () S; ETOH ism S:     SOCIAL HISTORY:   Social History     Tobacco Use    Smoking status: Every Day     Packs/day: 2     Types: Cigarettes    Smokeless tobacco: Never    Tobacco comments:     Smoking cessation program ordered   Vaping Use    Vaping Use: Never used   Substance Use Topics    Alcohol use: Not Currently    Drug use: Not Currently     Types: Amphetamines, Cocaine     CURRENT ALLERGIES:   Allergies as of 2023 - Reviewed 2023   Allergen Reaction Noted    Metoprolol Unknown 2023    Mirtazapine Unknown 2023     MEDICATIONS:    Current Facility-Administered Medications:     HYDROmorphone (Dilaudid) injection 0.5 mg, 0.5 mg, intravenous, q4h PRN, Sona Michaels DO    HYDROmorphone (Dilaudid) injection 1 mg, 1 mg, intravenous, q4h PRN, Sona Michaels DO    oxyCODONE (Roxicodone) immediate release tablet 10 mg, 10 mg, oral, q6h PRN, Sona Michaels DO, 10 mg at 23 0934    oxyCODONE (Roxicodone) immediate release tablet 5 mg, 5 mg, oral, q6h PRN, Sona Michaels DO    piperacillin-tazobactam-dextrose (Zosyn) IV 3.375 g, 3.375 g, intravenous, q8h, Sona Michaels DO, Last Rate: 12.5 mL/hr at 23 1129, 3.375 g at 23 1129    polyethylene glycol (Glycolax, Miralax) packet 17 g, 17 g, oral, Daily, Sona Michaels DO, 17 g at 23 0934    vancomycin (Vancocin) in 0.9 % sodium chloride 250 mL IV 1,250 mg, 1,250 mg, intravenous, q12h, Sona Michaels DO    vancomycin (Vancocin) in sodium chloride 0.9% 500 mL IV  - Omnicell Override Pull, , , ,        COMPLETE REVIEW OF SYSTEMS:    Review of systems shows no findings other than what is described in the narrative above.  Specifically, the patient has  "had no significant changes in eyesight, no sore throat, no post nasal drip, no ear pains.  There has been no cough or chest pains, pleuritic or otherwise.  There has been no abdominal pain, no nausea or vomiting, nor diarrhea.   There has been no dysuria, urinary frequency, or flank pain.  There is nothing unusual in the joints or muscles, or any skin rashes or skin swellings or abscesses noted.   All other systems were reviewed and are negative.        PHYSICAL EXAM:  Patient Vitals for the past 24 hrs:   BP Temp Temp src Pulse Resp SpO2 Height Weight   11/20/23 0913 -- -- -- -- -- -- 1.803 m (5' 11\") 101 kg (222 lb 3.6 oz)   11/20/23 0800 116/76 36.9 °C (98.4 °F) Temporal 50 22 94 % -- --   11/20/23 0745 140/76 -- -- -- 18 -- -- --   11/20/23 0700 -- -- -- 84 -- 96 % -- --   11/20/23 0615 137/71 36.9 °C (98.4 °F) Temporal 83 17 94 % -- --   11/20/23 0600 -- -- -- 86 -- 96 % -- --   11/20/23 0500 -- -- -- 84 -- 93 % -- --   11/20/23 0452 125/75 -- -- 84 17 94 % -- --   11/20/23 0332 121/72 37 °C (98.6 °F) -- 83 17 95 % -- --   11/20/23 0151 121/72 36.4 °C (97.5 °F) Temporal 84 16 95 % 1.803 m (5' 11\") 111 kg (245 lb)     Body mass index is 30.99 kg/m².  Gen: NAD  Neck: symmetric, no mass  Cardiovascular: RRR  Respiratory: No distress   GI: Abd soft, nontender, non-distended  Extremities: no leg edema  Skin: wound dressing to the lumbar spine with bloody drainage noted.  No erythema or warmth   Neuro: alert and oriented times 3  : no cruz     Labs:  Lab Results   Component Value Date    WBC 7.7 10/20/2023    HGB 9.5 (L) 10/20/2023    HCT 29.8 (L) 10/20/2023    MCV 92 10/20/2023     10/20/2023     Lab Results   Component Value Date    GLUCOSE 91 10/20/2023    CALCIUM 8.4 (L) 10/20/2023     10/20/2023    K 3.5 10/20/2023    CO2 29 10/20/2023     10/20/2023    BUN 8 10/20/2023    CREATININE 0.87 10/20/2023   ESR: --  Lab Results   Component Value Date    SEDRATE 55 (H) 11/20/2023     Lab Results "   Component Value Date    CRP 18.12 (H) 11/20/2023     Lab Results   Component Value Date    ALT 15 10/10/2023    AST 28 10/10/2023    ALKPHOS 92 10/10/2023    BILITOT 0.9 10/10/2023       DATA:   Diagnostic tests reviewed for today's visit:    Labs this admission reviewed  Imagings this admission reviewed  Cultures: Reviewed        ASSESSMENT :   #Postoperative lumbar fluid collections, concern for epidural abscesses and cauda equina  #Postoperative site infection s/p drainage and Bactrim course 11/9/2023    PLAN:  -Continue Vanco and Zosyn  -Plan for intervention 11/20/2023 afternoon by neurosurgery  -Follow-up cultures  -OR drainage/fluids need to be cultured  -With previous Bactrim course there is a likelihood of negative blood cultures  -Patient will need PICC line in 2 days for long-term ABX use (likely require 6 weeks of antibiotics upon discharge)  -While on IV antibiotics please check weekly BUN/creatinine, CBC with differential, and LFTs    Will continue to follow     Thank you so much for this consultation     Discussed with attending,  Patricio Reveles M.D. PGY-2  Internal Medicine

## 2023-11-20 NOTE — ANESTHESIA PREPROCEDURE EVALUATION
Patient: Gm Thrasher    Procedure Information       Date/Time: 11/20/23 1615    Procedure: Lumbar wound exploration and washout    Location: [facility] / [facility]    Surgeons: Vitaly Amezcua MD PhD          Vitals:    11/20/23 1700   BP: 129/74   Pulse: 81   Resp: 20   Temp: 36.2 °C (97.2 °F)   SpO2: 96%       Past Surgical History:   Procedure Laterality Date    ANKLE SURGERY  10/17/2023    Insertion Intramedullary RIGHT Nail Tibia by Dr Vic Hale    CARDIAC CATHETERIZATION      8/2015: The coronary anatomy is R dominant.  L main coronary artery was normal.  Left anterior descending artery presents luminal irregularities.  Circumflex coronary artery normal.  R coronary artery presents luminal irregularities.  Mid RCA lesion 40% stenosis.  LVEF 50%    CARDIAC CATHETERIZATION Right 10/11/2023    Procedure: Left Heart Cath;  Surgeon: Jonathan GASPAR MD;  Location: [facility] Cardiac Cath Lab;  Service: Cardiovascular;  Laterality: Right;  radial    CARDIAC CATHETERIZATION N/A 10/13/2023    Procedure: PCI RCA, right radial 6Fr;  Surgeon: Juvenal Le MD;  Location: [facility] Cardiac Cath Lab;  Service: Cardiovascular;  Laterality: N/A;    CERVICAL LAMINECTOMY      C3-C4, s/p cervical laminectomy.    ESOPHAGOGASTRODUODENOSCOPY      12/15: Localized mild erythema was found at the gastroesophageal junction    KIDNEY SURGERY  09/12/2013    Kidney Surgery    LUMBAR FUSION  10/06/2023    1. L3-4, L4-5 extreme lateral interbody fusion 2. L3-5 laminectomy, L5-S1 revision laminectomy, L5-S1 transforaminal lumbar interbody fusion, L3-S1 instrumented posterolateral fusion, repair of dural tear    LUMBAR FUSION  09/30/2023    L3-4 & L4-5 XLIF(NUVASIVE), L5-S1 TLIF(MEDTRONIC), L3-S1 POSTEROLATERAL FUSION, L3-5    LUMBAR LAMINECTOMY      s/p L5 laminectomy x2;  residual left foot drop    OTHER SURGICAL HISTORY  01/28/2020    Appendectomy    OTHER SURGICAL HISTORY  01/28/2020    Cholecystectomy    OTHER SURGICAL HISTORY  01/28/2020     [place] tooth extraction    OTHER SURGICAL HISTORY  01/28/2020    Tonsillectomy     Past Medical History:   Diagnosis Date    Borderline diabetes     Emphysema lung (CMS/HCC) 09/20/2023    H/O chest x-ray     1/20: Impression: Stable, enlarged cardiac mediastinal silhouette with mild central pulmonary vascular congestion. Nonspecific bibasilar airspace disease, worsened in overall appearance compared with the previous study, findings can be seen in  infiltrate/pneumonia and/or atelectasis.    H/O CT scan     CT Ankle: 1/20: A vertically oriented nondisplaced oblique fracture of the medial ankle mortise extends into the distal tibial metadiaphysis.  Several small old avulsion fracture fragments are noted inferior to both malleoli; as well as soft tissue swelling about the ankle.  There is no other fracture, radiodense foreign bodies, pathologic calcifications, or worrisome bone destruction identified.    H/O CT scan 10/11/2023    1. Acute minimally comminuted and displaced fractures through distal tibia and fibula as described above. 2. There are also findings consistent with remote trauma with significant cortical remodeling of the distal tibia with associated incongruity of the distal tibial articular surface as described above. 3. Moderate arthrosis of the tibiotalar and subtalar joints. 4. Soft tissue swelling about th    H/O CT scan of abdomen     2016: Diffuse urinary bladder wall thickening, Small atrophic left kidney with pyelocalyceal ectasia and severe cortical thinning, , Moderate pyelocalyceal ectasia of the right kidney with cortical thinning; unchanged    H/O CT scan of brain     11/22: normal 1/20: FINDINGS:  There is no intracranial hemorrhage, mass effect, midline shift, extra-axial collection, evidence of hydrocephalus, recent ischemic infarct, or skull fracture identified.    There is no significant atrophy or white matter changes, for age.  Moderate mucosal thickening within the paranasal  sinuses again noted. The mastoid air cells are clear    H/O CT scan of brain 10/10/2023    There is some subtle increased attenuation along the right  tentorium cerebelli.  This is suspicious for a acute  subdural hematoma.    H/O CT scan of chest     10/19: 1. Bibasilar infiltrate/pneumonia with patchy pneumonia scattered throughout the left lung and bilateral pleural effusions.  2. Vascular calcifications left anterior descending coronary artery with mild to moderate cardiomegaly.  3. Moderate to moderately severe diffuse fatty infiltration of liver. 10/21: Bilateral groundglass opacities as discussed. These findings are nonspecific    H/O CT scan of chest 10/10/2023    NO PE. Somewhat patchy subpleural curvilinear markings are seen in the  bilateral lower lobes, overall morphology favoring atelectasis or infectious  infiltrate.  Scattered central small cysts are observed.    H/O CT scan of head 10/12/2023    In comparison to prior CT dated 10/03/2023, there has been interval improvement in the right frontal subarachnoid hemorrhage and diffuse subdural hemorrhage along the tentorium and overlying the right cerebellar hemisphere as well as resolution of right parietal lobe intraparenchymal hemorrhage.    H/O diagnostic ultrasound     RUQ US 2013: Cholelithiasis and mild gallbladder distention, Increased liver echogenicity    H/O diagnostic ultrasound 10/10/2023    renal - Severe right hydronephrosis, similar in appearance to CT lumbar spine examination. Bilateral ureteral jets visualized.    Mild asymmetric enlargement of the right kidney with cortical renal scarring involving the left kidney.    H/O echocardiogram     1/20: Normal LV and RV.  No significant valve disease.  Normal estimated PA pressure.  Normal diastolic filling pattern.    H/O magnetic resonance imaging of cervical spine     2003: CENTRAL STENOSIS WORST C3-C4 1/21: Multilevel degenerative changes with disc height loss, disc osteophyte complexes,  facet/uncovertebral degenerative changes. Moderate to severe canal and bilateral foraminal narrowing at C3-C4, C4-C5, C5-C6, C6-C7 grossly similar to  prior cervical MRI    H/O magnetic resonance imaging of lumbar spine     2012:  NEW RIGHT POSTERIOR EXTRUSION L4-5, WITH CONTACT OF THE EXITING RIGHT L5 AND DESCENDING RIGHT S1 NERVES. s/p laminectomy L5 2019 ([facility]): NARROWING OF MULTIPLE LUMBAR DISC LEVELS WITH FAIRLY ADVANCED DEGENERATIVE CHANGES. SEE INDIVIDUAL LEVELS ABOVE.  MILD LUMBAR CANAL STENOSIS AT THE L4-5 LEVEL.  NARROWING OF NEURAL FORAMINA,    H/O magnetic resonance imaging of lumbar spine 10/10/2023    MR findings worrisome for retroperitoneal abscess, possibly extending into  the L4-5 disc space.   Apparent clumping of the proximal cauda equina nerve roots versus mass effect  due to an intrathecal fluid collection.  Recommend MRI of the T-spine for  further evaluation    H/O x-ray of lower extremity 10/10/2023    XR ankle: NEW ACUTE OBLIQUE FRACTURE OF THE DISTAL RIGHT TIBIA WITH A  OLD VERTICAL FRACTURE OF THE TIBIA.   THERE IS A NEW COMMINUTED FRACTURE OF THE DISTAL RIGHT FIBULA.    High cholesterol        Current Facility-Administered Medications:     [Held by provider] aspirin chewable tablet 81 mg, 81 mg, oral, Daily, Brad Del Real MD    [Held by provider] buprenorphine-naloxone (Suboxone) 8-2 mg per SL tablet 3 tablet, 3 tablet, sublingual, Daily, Brad Del Real MD    [Held by provider] clopidogrel (Plavix) tablet 75 mg, 75 mg, oral, Daily, Brad Del Real MD    escitalopram (Lexapro) tablet 20 mg, 20 mg, oral, Daily, Brad Del Real MD    HYDROmorphone (Dilaudid) injection 0.5 mg, 0.5 mg, intravenous, q4h PRN, Sona Michaels DO    HYDROmorphone (Dilaudid) injection 1 mg, 1 mg, intravenous, q4h PRN, Sona Michaels DO    icosapent ethyL (Vascepa) capsule 2 g, 2 g, oral, BID with meals, Brad Del Real MD    oxyCODONE (Roxicodone) immediate release tablet 10 mg, 10 mg, oral, q6h PRN, Sona TO  DO Brando, 10 mg at 11/20/23 0934    oxyCODONE (Roxicodone) immediate release tablet 5 mg, 5 mg, oral, q6h PRN, Sona Michaels DO    piperacillin-tazobactam-dextrose (Zosyn) IV 3.375 g, 3.375 g, intravenous, q8h, Sona Michaels DO, Stopped at 11/20/23 1529    polyethylene glycol (Glycolax, Miralax) packet 17 g, 17 g, oral, Daily, Sona Michaels DO, 17 g at 11/20/23 0934    pregabalin (Lyrica) capsule 300 mg, 300 mg, oral, BID, Brad Del Real MD    promethazine (Phenergan) tablet 25 mg, 25 mg, oral, q6h PRN, Brad Del Real MD    tamsulosin (Flomax) 24 hr capsule 0.4 mg, 0.4 mg, oral, Daily, Brad Del Real MD    vancomycin (Vancocin) in 0.9 % sodium chloride 250 mL IV 1,250 mg, 1,250 mg, intravenous, q12h, Sona Michaels DO, Last Rate: 166.7 mL/hr at 11/20/23 1634, 1,250 mg at 11/20/23 1634  Prior to Admission medications    Medication Sig Start Date End Date Taking? Authorizing Provider   aspirin 81 mg chewable tablet Chew 1 tablet (81 mg) once daily. Do not start before October 21, 2023. 10/21/23 11/20/23  Evelyne Potts DO   atorvastatin (Lipitor) 80 mg tablet Take 1 tablet (80 mg) by mouth once daily. 10/20/23 11/19/23  Evelyne Potts DO   buprenorphine-naloxone (Suboxone) 8-2 mg SL film Place 3 Film under the tongue once daily.    Historical Provider, MD   carvedilol (Coreg) 12.5 mg tablet Take 1 tablet (12.5 mg) by mouth 2 times a day. 10/20/23 11/19/23  Evelyne Potts DO   clopidogrel (Plavix) 75 mg tablet Take 1 tablet (75 mg) by mouth once daily. Do not start before October 21, 2023. 10/21/23 11/20/23  Evelyne Potts DO   cyanocobalamin (Vitamin B-12) 1,000 mcg/mL oral liquid Take 1 mL (1,000 mcg) by mouth. Daily for 1 wk. Then 1 ml weekly for a month. Then 1 ml monthly    Historical Provider, MD   enoxaparin (Lovenox) 40 mg/0.4 mL syringe Inject 0.4 mL (40 mg) under the skin once daily. 10/20/23 11/19/23  Evelyne Potts DO   ergocalciferol (Vitamin D-2) 1.25 MG (97649 UT) capsule Take 1  capsule (50,000 Units) by mouth 1 (one) time per week. 5/26/21   Historical Provider, MD   escitalopram (Lexapro) 20 mg tablet Take 1 tablet (20 mg) by mouth once daily. 5/30/23   Historical Provider, MD   levETIRAcetam (Keppra) 500 mg tablet Take 1 tablet (500 mg) by mouth 2 times a day for 7 days. 10/5/23 10/12/23  Vitaly Amezcua MD PhD   metFORMIN (Glucophage) 500 mg tablet Take 2 tablets (1,000 mg) by mouth 2 times a day with meals. 6/7/23 6/6/24  ANGELINA Ordaz   miscellaneous medical supply misc Knee walker for non weight bearing right leg post right ankle surgery 10/30/23   ANGELINA Ordaz   naloxone (Narcan) 4 mg/0.1 mL nasal spray Administer 1 spray (4 mg) into affected nostril(s).  CALL 911 MAY REPEAT ONCE 10/5/23   Historical Provider, MD   pantoprazole (ProtoNix) 40 mg EC tablet Take 1 tablet (40 mg) by mouth once daily in the morning. Take before meals for 14 days. Do not crush, chew, or split. Do not start before October 6, 2023. 10/6/23 10/20/23  Nicholas Paredes PA-C   pregabalin (Lyrica) 300 mg capsule Take 1 capsule (300 mg) by mouth 2 times a day. 8/30/23 11/28/23  ANGELINA Ordaz   promethazine (Phenergan) 25 mg tablet Take 1 tablet (25 mg) by mouth every 6 hours if needed for nausea or vomiting.    Historical Provider, MD   semaglutide 0.25 mg or 0.5 mg (2 mg/3 mL) pen injector Inject 0.5 mg under the skin 1 (one) time per week. 10/30/23   ANGELINA Ordaz   sulfamethoxazole-trimethoprim (Bactrim DS) 800-160 mg tablet Take 1 tablet by mouth 2 times a day for 7 days. 11/9/23 11/16/23  Nicohlas Paredes PA-C   tamsulosin (Flomax) 0.4 mg 24 hr capsule Take 1 capsule (0.4 mg) by mouth once daily.    Historical Provider, MD   Vascepa 1 gram capsule Take 2 capsules (2 g) by mouth 2 times a day with meals. 6/7/23 6/6/24  Janet Giraldo, APRN-CNP   allopurinol (Zyloprim) 100 mg tablet Take 1 tablet (100 mg) by mouth once daily as needed.   11/20/23  Historical Provider, MD     Allergies   Allergen Reactions    Metoprolol Unknown    Mirtazapine Unknown     Social History     Tobacco Use    Smoking status: Every Day     Packs/day: 2     Types: Cigarettes    Smokeless tobacco: Never    Tobacco comments:     Smoking cessation program ordered   Substance Use Topics    Alcohol use: Not Currently         Chemistry    Lab Results   Component Value Date/Time     10/20/2023 0703    K 3.5 10/20/2023 0703     10/20/2023 0703    CO2 29 10/20/2023 0703    BUN 8 10/20/2023 0703    CREATININE 0.87 10/20/2023 0703    Lab Results   Component Value Date/Time    CALCIUM 8.4 (L) 10/20/2023 0703    ALKPHOS 92 10/10/2023 0926    AST 28 10/10/2023 0926    ALT 15 10/10/2023 0926    BILITOT 0.9 10/10/2023 0926          Lab Results   Component Value Date/Time    WBC 7.7 10/20/2023 0703    HGB 9.5 (L) 10/20/2023 0703    HCT 29.8 (L) 10/20/2023 0703     10/20/2023 0703     Lab Results   Component Value Date/Time    PROTIME 13.1 (H) 10/17/2023 0758    INR 1.2 (H) 10/17/2023 0758     Encounter Date: 10/10/23   Electrocardiogram 12 Lead   Result Value    Ventricular Rate 58    Atrial Rate 58    KY Interval 202    QRS Duration 98    QT Interval 430    QTC Calculation(Bazett) 422    P Axis 28    R Axis 22    T Axis 27    QRS Count 10    Q Onset 219    P Onset 118    P Offset 167    T Offset 434    QTC Fredericia 424    Narrative    Sinus bradycardia  Otherwise normal ECG  When compared with ECG of 10-OCT-2023 15:07,  No significant change was found  Confirmed by Jonathan Malone ([id number]) on 10/14/2023 3:57:57 PM        Relevant Problems   Cardiovascular   (+) CAD (coronary artery disease)      Endocrine   (+) Type 2 diabetes mellitus with hyperglycemia, without long-term current use of insulin (CMS/HCC)      Neuro/Psych   (+) Left lumbar radiculopathy      Pulmonary   (+) Emphysema lung (CMS/HCC)      Musculoskeletal   (+) Degenerative lumbar spinal stenosis   (+)  Herniation of lumbar intervertebral disc without myelopathy   (+) Lumbar canal stenosis   (+) Lumbar spondylosis       Clinical information reviewed:   Tobacco  Allergies  Meds   Med Hx  Surg Hx   Fam Hx  Soc Hx        NPO Detail:  No data recorded     Physical Exam    Airway  Mallampati: III  TM distance: >3 FB  Neck ROM: full     Cardiovascular - normal exam     Dental   Comments: Poor dentition   Pulmonary - normal exam     Abdominal - normal exam             Anesthesia Plan    ASA 3     general     The patient is not a current smoker.  Patient was previously instructed to abstain from smoking on day of procedure.  Patient did not smoke on day of procedure.  Education provided regarding risk of obstructive sleep apnea.  intravenous induction   Anesthetic plan and risks discussed with patient.  Use of blood products discussed with patient who.    Plan discussed with CRNA and CAA.

## 2023-11-20 NOTE — CONSULTS
Reason For Consult  Postoperative wound drainage    History Of Present Illness  Gm Thrasher is a 58 y.o. male presenting with postoperative wound concerns.  Patient presented to [facility] initially and transferred to [facility].  Patient also with reports of confusion and hypotension.  Neurosurgery placed on consult by identified multiple pockets of fluid collection and gaseous formation.  Of visit this morning patient is awake and cooperative.  He is moving extremities to command though he does have weakness identified with his lower extremities.  Discussed with surgeon and made n.p.o. for likely intervention today.     Past Medical History  He has a past medical history of Borderline diabetes, Emphysema lung (CMS/Formerly [facility]) (09/20/2023), H/O chest x-ray, H/O CT scan, H/O CT scan (10/11/2023), H/O CT scan of abdomen, H/O CT scan of brain, H/O CT scan of brain (10/10/2023), H/O CT scan of chest, H/O CT scan of chest (10/10/2023), H/O CT scan of head (10/12/2023), H/O diagnostic ultrasound, H/O diagnostic ultrasound (10/10/2023), H/O echocardiogram, H/O magnetic resonance imaging of cervical spine, H/O magnetic resonance imaging of lumbar spine, H/O magnetic resonance imaging of lumbar spine (10/10/2023), H/O x-ray of lower extremity (10/10/2023), and High cholesterol.    He has no past medical history of Arthritis, Asthma, Cancer (CMS/Formerly [facility]), or CHF (congestive heart failure) (CMS/HCC).    Surgical History  He has a past surgical history that includes Kidney surgery (09/12/2013); Lumbar fusion (10/06/2023); Other surgical history (01/28/2020); Other surgical history (01/28/2020); Other surgical history (01/28/2020); Other surgical history (01/28/2020); Cardiac catheterization; Cervical laminectomy; Esophagogastroduodenoscopy; Lumbar laminectomy; Lumbar fusion (09/30/2023); Cardiac catheterization (Right, 10/11/2023); Cardiac catheterization (N/A, 10/13/2023); and Ankle surgery (10/17/2023).     Social  "History  He reports that he has been smoking cigarettes. He has been smoking an average of 2 packs per day. He has never used smokeless tobacco. He reports that he does not currently use alcohol. He reports that he does not currently use drugs after having used the following drugs: Amphetamines and Cocaine.    Family History  Family History   Problem Relation Name Age of Onset    No Known Problems Mother          F: ETOHIsm, DM, COPD M: Lung CA, CAD B: CA metastatic lung () S; ETOH ism S:        Allergies  Metoprolol and Mirtazapine    Review of Systems   systems reviewed and negative other than what is listed in the history of present illness      Physical Exam  General: Well developed, awake/alert/oriented x3, no distress, alert and cooperative  Skin: Warm and dry, no lesions, no rashes  ENMT: Mucous membranes moist, no apparent injury, no lesions seen  Head/Neck: Neck Supple, no apparent injury  Respiratory/Thorax: Normal breath sounds with good chest expansion, thorax symmetric  Cardiovascular: No pitting edema, no JVD     Muscle Bulk: Normal and symmetric in all extremities    Posture:   -- Cervical: Normal  -- Thoracic: Normal  -- Lumbar : Normal  Paraspinal muscle spasm/tenderness absent.   Midline tenderness absent    Sensation: intact to light touch        Last Recorded Vitals  Blood pressure 116/76, pulse 50, temperature 36.9 °C (98.4 °F), temperature source Temporal, resp. rate 22, height 1.803 m (5' 11\"), weight 101 kg (222 lb 3.6 oz), SpO2 94 %.    Relevant Results  CT lumbar spine wo IV contrast    Result Date: 2023  Interpreted By:  Joe Harley, STUDY: CT LUMBAR SPINE WO IV CONTRAST;  2023 6:59 am   INDICATION: Signs/Symptoms:concern for wound infection.   COMPARISON: CT scan from 10/03/2023. Patient also had lumbar spine MRI earlier this morning..   ACCESSION NUMBER(S): [id number]   ORDERING CLINICIAN: DEAN OGLESBY   TECHNIQUE: Thin section axial images were obtained from " T11 down through the mid sacrum. Sagittal and coronal reconstruction images were generated. Soft tissue and bone windows were reviewed.   FINDINGS: VERTEBRAL BODIES AND POSTERIOR ELEMENTS: Previous laminectomy with posterior fusion from L3 through S1. There are pedicle screws with fixation rods in place at those levels. The fixation hardware is grossly intact. There are metallic disc spacers from L3-4 through L5-S1, intact. There is stable endplate osteophytosis from L3-4 through L5-S1 and also at L1-2 and T12-L1. Mild disc space narrowing at T12-L1 and L1-2, unchanged. There is no suspicious lucency adjacent to the pedicle screws. There is no gross focal interval bone destruction. There are edematous changes posteriorly in the paraspinal soft tissues and subcutaneous soft tissues, including abnormal gas density which has progressed since 10/03/2023. This gas extends from the subcutaneous soft tissues overlying the paraspinal muscles through the paraspinal muscles and adjacent to the left side of the spinous process and lamina at the L3-4 level, and then branches to the right and left to surround the facet joints at that level and extends caudally to the L4-5 facets. There is a punctate gas bubble posteriorly to the left of midline in the laminectomy defect at L5. The subcutaneous posterior midline gas collection with soft tissue edema extends caudally down to the S1 level. The edema/fluid in the subcutaneous soft tissues posteriorly extends from the L1-2 level down to the S1 level. Mass effect upon the thecal sac is difficult to ascertain due to non use of IV contrast and also due to beam hardening artifact from the fixation hardware, despite the use metal suppression technique. However, this is much better depicted in the MRI and the description is in the MRI report. There are sclerotic arthritic changes in both SI joints. Mild spur formation in both hips.   SPINAL CANAL: No focal disc herniation in this  unenhanced exam.   SOFT TISSUES: See above. Also, there is mild gas in the L5-S1 disc space which appears to be greater than it was previously, and there is mild soft tissue gas anterior to L5-S1 to the right of midline along with mild paraspinal soft tissue swelling at that level.   ABDOMEN/PELVIS: As on the prior exam, there is moderate to pronounced right-sided hydronephrosis ending at the right ureteropelvic junction, with gadolinium contrast material filling the dilated right renal collecting system and extending down the right ureter with some evident in the urinary bladder. The patient does have a Navarro catheter in place. The left kidney remains atrophic, and there is IV gadolinium contrast material in the left renal collecting system which is mildly prominent, also ending at the left ureterovesical junction. There is mild contrast evident in the nondilated left ureter. There are moderate aortoiliac calcifications. There is moderate stool throughout the imaged colon and rectum.       Lumbosacral spine surgical and arthritic changes as described. Mild DJD in both hips.   Abnormal soft tissue swelling/fluid in the posterior paraspinal musculature and overlying subcutaneous soft tissues, with progression associated gas density which extends along the left side of the L2 spinous process and then extends caudally surrounding the inter facet joints at L3-4 and L4-5. An infectious process cannot be excluded in this unenhanced exam. Suggest percutaneous needle aspiration to examine for abscess. Also, please refer to the MRI report for more information regarding the effect of this process on the thecal sac.   There are findings that are at least mildly suspicious for discitis at the L5-S1 level.   Navarro catheter in an otherwise nearly empty urinary bladder.   Stable atrophy of the left kidney with mild stable fullness of the left renal collecting system. Moderate to pronounced hydronephrosis on the right, perhaps due  to underlying right congenital UPJ obstruction.   Signed by: Joe Harley 11/20/2023 8:21 AM Dictation workstation:   [id number]    MR lumbar spine wo IV contrast    Result Date: 11/20/2023  Interpreted By:  Asad Chong, STUDY: MRI of the lumbar spine without IV contrast;  11/20/2023 4:56 am   INDICATION: Signs/Symptoms:concern for epidural abscess.   COMPARISON: MRI lumbar spine of 10/09/2022.   ACCESSION NUMBER(S): [id number]   ORDERING CLINICIAN: EVI ROSENBAUM   TECHNIQUE: Sagittal and axial STIR and T1-weighted MRI images of the lumbar spine were acquired using a spondylolysis protocol.  No contrast was administered.   FINDINGS: Evaluation is severely limited by motion artifact.   Redemonstration of findings lumbosacral fusion and laminectomies. There is redemonstration of a multiloculated collection in the midline/left paramedian posterior subcutaneous soft tissues deep to the incision, and more deeply within the laminectomy bed abutting and within the dorsal epidural fat. The collection also extends about the posterior elements of the L2 vertebra. A rather large volume of air is newly present in the collection, probably secondary to recent intervention/communication with the atmosphere although infection with gas producing organism can not be excluded. The dominant locule of the collection is in the left paramedian posterior soft tissues, centered about the level of the L2-L3 disc space, measures a proximally 2.3 x 2.9 cm in transaxial diameters, and up to 7.8 cm in cc length. The next largest locule of the collection, centered about the L3-L4 disc space, within the laminectomy bed measures approximately 2.0 x 2.0 cm in transaxial diameters, and roughly 8.7 cm in CC length. An additional smaller collection centered at the mid L5 level measures up to 1.7 x 1.2 cm in transaxial diameters in the dorsal epidural fat. Mass effect from the collection and associated edema in the surrounding soft tissues is again  noted to cause marked narrowing of the thecal sac from L2-L3 through L5-S1, worst at the L5-S1 level secondary to combination of grade 1 retrolisthesis, disc osteophyte bulging, dorsal epidural lipomatosis with edematous fat.   Vertebrae/Intervertebral Discs: Redemonstration mild grade 1 degenerative retrolisthesis at L4-L5. Alignment is otherwise maintained.   Cord: The lower thoracic cord appears unremarkable. The conus terminates at T12-L1.   Soft tissues: The prevertebral and posterior paraspinal soft tissues are unremarkable.   Similar bilateral hydronephrosis relative to CT lumbar spine of 10/03/2023.       Evaluation is severely limited by motion artifact.   Redemonstration of findings lumbosacral fusion and laminectomies. There is redemonstration of a multiloculated collection in the midline/left paramedian posterior subcutaneous soft tissues deep to the incision, and more deeply within the laminectomy bed abutting and within the dorsal epidural fat. The sterility of this collection cannot be determined by imaging.   The collection also extends about the posterior elements of the L2 vertebra. A rather large volume of air is newly present in the collection, probably secondary to recent intervention/communication with the atmosphere although infection with gas producing organism cannot be excluded.   The dominant locule of the collection is in the left paramedian posterior soft tissues, centered about the level of the L2-L3 disc space, measures a proximally 2.3 x 2.9 cm in transaxial diameters, and up to 7.8 cm in cc length. The next largest locule of the collection, centered about the L3-L4 disc space, within the laminectomy bed measures approximately 2.0 x 2.0 cm in transaxial diameters, and roughly 8.7 cm in CC length. An additional smaller collection centered at the mid L5 level measures up to 1.7 x 1.2 cm in transaxial diameters in the dorsal epidural fat.   Mass effect from the collection and associated  edema in the surrounding soft tissues is again noted to cause moderate to marked narrowing of the thecal sac from L2-L3 through L5-S1, worst at the L5-S1 level where there is effacement of CSF and compression of cauda equina nerve roots, secondary to combination of grade 1 retrolisthesis, disc osteophyte bulging, dorsal epidural lipomatosis with edematous fat.   I personally reviewed the images/study and I agree with the findings as stated. This study was interpreted at [place], [place].   MACRO: None   Signed by: Asad Chong 11/20/2023 5:24 AM Dictation workstation:   [id number]    XR chest 1 view    Result Date: 11/19/2023  EXAMINATION: ONE XRAY VIEW OF THE CHEST 11/19/2023 5:39 pm COMPARISON: Portable chest from 11/21/2022. HISTORY: ORDERING SYSTEM PROVIDED HISTORY: Sepsis TECHNOLOGIST PROVIDED HISTORY: Reason for exam:->Sepsis What reading provider will be dictating this exam?->CRC FINDINGS: The lungs remain clear. There is no sign of any infiltrate or effusion. The heart remains normal in size.  The mediastinum is normal in appearance. Again seen is a metal plate and anchoring screws in the inferior cervical spine from anterior cervical fusion.    No active disease seen in the chest.      Assessment/Plan     Concern for continued fluid collection  N.p.o., likely surgical intervention today  Further plan per attending         Nicholas Paredes PA-C

## 2023-11-20 NOTE — PROGRESS NOTES
"Vancomycin Dosing by Pharmacy- INITIAL    Gm Thrasher is a 58 y.o. year old male who Pharmacy has been consulted for vancomycin dosing for stable. Based on the patient's indication and renal status this patient will be dosed based on a goal AUC of 400-600.     Renal function is currently stable.    Visit Vitals  /76 (Patient Position: Lying)   Pulse 84   Temp 36.9 °C (98.4 °F) (Temporal)   Resp 18        Lab Results   Component Value Date    CREATININE 0.87 10/20/2023    CREATININE 0.87 10/19/2023    CREATININE 0.87 10/18/2023    CREATININE 0.90 10/17/2023        Patient weight is No results found for: \"PTWEIGHT\"    No results found for: \"CULTURE\"     I/O last 3 completed shifts:  In: - (0 mL/kg)   Out: 2400 (21.6 mL/kg) [Urine:2400 (0.6 mL/kg/hr)]  Weight: 111.1 kg   [unfilled]    No results found for: \"PATIENTTEMP\"       Assessment/Plan     Patient has already been given a loading dose of 2000 mg.  Will initiate vancomycin maintenance,  1250 mg every 12 hours.    This dosing regimen is predicted by [employer] to result in the following pharmacokinetic parameters:  Loading dose: N/A  Regimen: 1250 mg IV every 12 hours.  Start time: 17:08 on 11/20/2023  Exposure target: AUC24 (range)400-600 mg/L.hr   AUC24,ss: 460 mg/L.hr  Probability of AUC24 > 400: 65 %  Ctrough,ss: 14.4 mg/L  Probability of Ctrough,ss > 20: 24 %  Probability of nephrotoxicity (Lodise JOHN 2009): 10 %    Follow-up level will be ordered on 11/21 at 1st am draw unless clinically indicated sooner.  Will continue to monitor renal function daily while on vancomycin and order serum creatinine at least every 48 hours if not already ordered.  Follow for continued vancomycin needs, clinical response, and signs/symptoms of toxicity.       Toni Osborn, PharmD       "

## 2023-11-20 NOTE — ED NOTES
Pt came via EMS from home for \"failure to thrive\"   Sister came to check on him in his home and thought he seemed \"unwell\"   EMS disclosed that he was quite dirty and his living space was \"destitute\" when they picked him. Pt had recent back surgery and there is serosanguinous drainage coming from a small hole on the incision.    VSS upon assessment  Afebrile  Denies pain  Pt is seen \"grasping at air\" on assessment  When asked what he was doing he said, \"nothing, I'm just bored\"        Dominguez Rosales RN  11/19/23 [id number]

## 2023-11-20 NOTE — ED NOTES
Pt is still unable to provide urine and is refusing for me to put a catheter in.       Jessica Varghese RN  11/19/23 1330

## 2023-11-20 NOTE — NURSING NOTE
0820- Patient arrived on unit. Student at bedside talking with patient.     1650- Patient going down for surgery.    EOS- No acute changes throughout the shift. Patient received pain medication once. IV antibiotics given. Patient A+Ox2 and doctors aware. Safety maintained and call light within reach.

## 2023-11-20 NOTE — H&P
History Of Present Illness  Gm Thrasher is a 58 y.o. male with medical history evident for type 2 diabetes (last A1c 5.7) recent lumbar decompression and spinal fusion complicated by postoperative subarachnoid hemorrhage and subdural hematoma completed by Dr. Amezcua on 9/29/2023, BPH, neuropathy, hyperlipidemia who presented to the [facility] emergency department as transfer from [facility] due to concern for postoperative wound infection.  The patient was recently seen by neurosurgery on 11/9 for wound drainage and was started on a course of Bactrim.  Per ED report, the patient was seen at [facility] for fatigue, generalized weakness, and reduced p.o. intake.  The patient denies any fever, chills, nausea, vomiting, diarrhea, focal weakness, paresthesias, bladder/bowel incontinence.  ED report, the patient was confused and hypotensive at [facility].  He was given 2 L of IV fluids and Rocephin.  Patient was transferred to [facility] for further management.  Neurosurgery was consulted, Dr. Morgan, who recommended MRI of the lumbar spine with and without contrast, ESR/CRP, and vancomycin and Zosyn.  Patient denies any fever, chills,  Note, the patient was recently admitted from/10 through 10/20 after presenting with complaint of an isolated syncopal episode later found to have a new left psoas rim-enhancing fluid collection with potential extension into the L4-L5 region measuring 2.5 x 9.3 cm as well as bilateral hydronephrosis.  Started on vancomycin and Zosyn due to concerns for postsurgical abscess.  Neurosurgery remarked low concern for abscess in the setting of incision remaining clean dry and intact.  He was also found to have a right ankle fracture which he suffered from his fall.  Orthopedic surgery was consulted.  Patient also had a troponinemia during this hospitalization, elevated as high as 1400 requiring diagnostic catheterization revealing 90% stenosis of the RCA.   Orthopedic surgery, neurosurgery, and cardiology correlated and plan to start patient on heparin, dual antiplatelet therapy with repeat CT of the head to assess for possible acute bleed.  This was to assess if patient would be able to withstand PCI with stent placement.  CT head the following morning revealed no acute acute bleed or intracranial changes from prior.  This patient went for PCI and had stent placed to the RCA on 10/13.  Tolerated procedure well without any evidence of acute bleed or mental status changes.  He underwent ORIF of the right ankle on 10/17 and was discharged home on 10/20.  ED course  On arrival to our emergency department from [facility], patient's blood pressure 149/113, respiratory rate 22, heart rate 95, temperature 37.4. Lactate 1.8. CMP with Na 134, BUN 15, Cr 1.22. Alk phos 226, AST 62. CBC without leukocytosis. Hgb 10.7 which appears his baseline.   ESR and CRP both elevated at 55 and 18.12 respectively.  Patient underwent MRI with and without however was only able to tolerate part of the procedure which was only done without contrast.  MRI revealed redemonstration of findings of the lumbosacral fusion and laminectomies with redemonstration of multiloculated collection in the midline/paramedian posterior subcutaneous soft tissues deep to the incision and more deeply within the laminectomy bed abutting and within the dorsal epidural fat.  The collection also extends about the posterior elements of the L2 vertebrae.  A rather large volume of air is newly present in the collection possibly secondary to recent intervention/communication with the atmosphere although infection with gas producing organism cannot be excluded.  The dominant locule of the collection is in the left paramedian posterior soft tissues, centered about the level of the L2-L3 disc space, measures approximately 2.3 x 2.9 cm in transaxial diameters, and up to 7.8 cm in CC length.  The next largest locule of the collection,  centered about the 3 through L4 disc base, within the laminectomy bed measures approximately 2 x 2 cm and roughly 8 point centimeters in CC length.  An additional smaller collection centered at the mid L5 level measures up to 1.7 x 1.2 cm in diameter.  This effect from the collection and associated edema in the surrounding soft tissues is again noted to cause moderate to marked narrowing of the thecal sac from L2-L3 through L5-S1 where there is effacement of CSF and compression of cauda equina nerve roots. Neurosurgery, Dr. Morgan was notified and stated no need for further imaging.  Plans to take the patient to the OR today.  Recommends continuing vancomycin and Zosyn at this time.      Past Medical History  He has a past medical history of Borderline diabetes, Emphysema lung (CMS/[facility]) (09/20/2023), H/O chest x-ray, H/O CT scan, H/O CT scan (10/11/2023), H/O CT scan of abdomen, H/O CT scan of brain, H/O CT scan of brain (10/10/2023), H/O CT scan of chest, H/O CT scan of chest (10/10/2023), H/O CT scan of head (10/12/2023), H/O diagnostic ultrasound, H/O diagnostic ultrasound (10/10/2023), H/O echocardiogram, H/O magnetic resonance imaging of cervical spine, H/O magnetic resonance imaging of lumbar spine, H/O magnetic resonance imaging of lumbar spine (10/10/2023), H/O x-ray of lower extremity (10/10/2023), and High cholesterol.    He has no past medical history of Arthritis, Asthma, Cancer (CMS/[facility]), or CHF (congestive heart failure) (CMS/[facility]).    Surgical History  He has a past surgical history that includes Kidney surgery (09/12/2013); Lumbar fusion (10/06/2023); Other surgical history (01/28/2020); Other surgical history (01/28/2020); Other surgical history (01/28/2020); Other surgical history (01/28/2020); Cardiac catheterization; Cervical laminectomy; Esophagogastroduodenoscopy; Lumbar laminectomy; Lumbar fusion (09/30/2023); Cardiac catheterization (Right, 10/11/2023); Cardiac catheterization (N/A,  10/13/2023); and Ankle surgery (10/17/2023).     Social History  He reports that he has been smoking cigarettes. He has been smoking an average of 2 packs per day. He has never used smokeless tobacco. He reports that he does not currently use alcohol. He reports that he does not currently use drugs after having used the following drugs: Amphetamines and Cocaine.    Family History  Family History   Problem Relation Name Age of Onset    No Known Problems Mother          F: ETOHIsm, DM, COPD M: Lung CA, CAD B: CA metastatic lung () S; ETOH ism S:        Allergies  Metoprolol and Mirtazapine    Review of Systems   -----------------------------------------------------------------  Review of Systems     Constitutional: Denies  Fever, Chills    Eyes: Denies Blurry Vision, Vision Loss/ Change    ENMT: Denies Nasal Discharge, Nasal Congestion    Respiratory: Denies Dry Cough, Productive Cough, Wheezing, Shortness of Breath    Cardiac: Denies Chest Pain, Syncope, Palpitations    Gastrointestinal: Denies Nausea, Vomiting, Diarrhea, Constipation, Abdominal Pain    Genitourinary: Denies Discharge, Dysuria, Flank Pain    Musculoskeletal: Denies Pain, Swelling, Weakness    Neurological:  Denies Dizziness, Confusion, Headache, Syncope    Skin: Reports drainage from surgical site    Endocrine: Denies Sweat, Polyuria    Hematologic/Lymph:  Denies Night Sweats, Petechiae    Allergic/Immunologic: Denies Anaphylaxis    Physical Exam     General: Awake, alert/oriented x4, well developed, no acute distress  Head: Atraumatic/Normocephalic  Eyes: Normal external exam, EOMI, PERRLA  ENT: Oropharynx normal. Uvula midline, no tonsillar edema, moist mucous membranes  Cardiovascular: RRR, S1/S2, no murmurs, rubs, or gallops, radial pulses +2, no edema of extremities  Pulmonary: CTAB, no respiratory distress. No wheezes, rales, or ronchi  Abdomen: +BS, soft, non-tender, nondistended, no guarding or rebound, no masses noted  MSK: No joint  swelling, normal movements of all extremities. Range of motion- normal.  Extremities: FROM, no edema, wounds, or contusions  Skin-wound dressing to the lumbar spine with bloody drainage noted.  No erythema or warmth  Neuro: Alert/oriented x4, no focal motor or sensory deficits  Psychiatric: Judgment intact. Appropriate mood and behavior    Last Recorded Vitals  /75 (Patient Position: Lying)   Pulse 84   Temp 37 °C (98.6 °F)   Resp 17   Wt 111 kg (245 lb)   SpO2 94%     Relevant Results         Assessment/Plan   Principal Problem:    Wound dehiscence    Gm Esopsito is a 58 year old male with a history of recent lumbar decompression and spinal fusion complicated by postoperative subarachnoid hemorrhage and subdural hematoma completed by Dr. Amezcua on 9/29/2023 with recent admission one month ago due to concern for psoas abscess who presented to the [facility] ED from [facility] due to concern for post operative infection. MRI discussed above with plan to go to the OR today with Dr. Morgan      #Multiple lumbar post operative fluid collections with mass effect  #Concern for possible cauda equina  -MRI as discussed above  -Labs drawn at [facility]. Will obtain CBC, RFP, mag here  -Neurosurgery on consult, plan to take patient to OR today  -Patient received Rocephin at [facility] and 1L IVF  -Patient given Vanc and Zosyn in the [facility] ED  -Continue Vanc and Zosyn at this time  -Will make NPO with plans for surgery  -Bladder scan with >1L, will plan for cruz placement  -UA pending    #NIDDM2  -A1C 5.7  -daily RFP glucose    Chronic medical conditions  -Continue home meds once med rec is complete     Diet: NPO  DVT proph: SCD pending surgery   Code status: Full code    Assessment and plan to be discussed with attending physician,  Janet Negrete, DO  Internal Medicine, PGY2

## 2023-11-21 ENCOUNTER — APPOINTMENT (OUTPATIENT)
Dept: RADIOLOGY | Facility: HOSPITAL | Age: 58
DRG: 856 | End: 2023-11-21
Payer: COMMERCIAL

## 2023-11-21 ENCOUNTER — HOSPITAL ENCOUNTER (OUTPATIENT)
Dept: CARDIOLOGY | Facility: CLINIC | Age: 58
Discharge: HOME | End: 2023-11-21
Payer: COMMERCIAL

## 2023-11-21 LAB
ABO GROUP (TYPE) IN BLOOD: NORMAL
ALBUMIN SERPL BCP-MCNC: 2.2 G/DL (ref 3.4–5)
ANION GAP SERPL CALC-SCNC: 12 MMOL/L (ref 10–20)
ANTIBODY SCREEN: NORMAL
BLOOD EXPIRATION DATE: NORMAL
BLOOD EXPIRATION DATE: NORMAL
BUN SERPL-MCNC: 17 MG/DL (ref 6–23)
CALCIUM SERPL-MCNC: 7.7 MG/DL (ref 8.6–10.3)
CHLORIDE SERPL-SCNC: 98 MMOL/L (ref 98–107)
CO2 SERPL-SCNC: 26 MMOL/L (ref 21–32)
CREAT SERPL-MCNC: 0.9 MG/DL (ref 0.5–1.3)
DISPENSE STATUS: NORMAL
DISPENSE STATUS: NORMAL
EKG ATRIAL RATE: 92 BPM
EKG Q-T INTERVAL: 368 MS
EKG QRS DURATION: 102 MS
EKG QTC CALCULATION (BAZETT): 452 MS
EKG R AXIS: 40 DEGREES
EKG T AXIS: -19 DEGREES
EKG VENTRICULAR RATE: 91 BPM
ERYTHROCYTE [DISTWIDTH] IN BLOOD BY AUTOMATED COUNT: 15.1 % (ref 11.5–14.5)
ERYTHROCYTE [DISTWIDTH] IN BLOOD BY AUTOMATED COUNT: 15.2 % (ref 11.5–14.5)
ERYTHROCYTE [DISTWIDTH] IN BLOOD BY AUTOMATED COUNT: 15.3 % (ref 11.5–14.5)
GFR SERPL CREATININE-BSD FRML MDRD: >90 ML/MIN/1.73M*2
GLUCOSE BLD MANUAL STRIP-MCNC: 115 MG/DL (ref 74–99)
GLUCOSE BLD MANUAL STRIP-MCNC: 115 MG/DL (ref 74–99)
GLUCOSE BLD MANUAL STRIP-MCNC: 118 MG/DL (ref 74–99)
GLUCOSE BLD MANUAL STRIP-MCNC: 118 MG/DL (ref 74–99)
GLUCOSE BLD MANUAL STRIP-MCNC: 141 MG/DL (ref 74–99)
GLUCOSE SERPL-MCNC: 111 MG/DL (ref 74–99)
HCT VFR BLD AUTO: 19.9 % (ref 41–52)
HCT VFR BLD AUTO: 20.7 % (ref 41–52)
HCT VFR BLD AUTO: 23.7 % (ref 41–52)
HGB BLD-MCNC: 6.3 G/DL (ref 13.5–17.5)
HGB BLD-MCNC: 6.4 G/DL (ref 13.5–17.5)
HGB BLD-MCNC: 7.5 G/DL (ref 13.5–17.5)
HIV 1+2 AB+HIV1 P24 AG SERPL QL IA: NONREACTIVE
MAGNESIUM SERPL-MCNC: 1.79 MG/DL (ref 1.6–2.4)
MCH RBC QN AUTO: 28.7 PG (ref 26–34)
MCH RBC QN AUTO: 28.7 PG (ref 26–34)
MCH RBC QN AUTO: 29.2 PG (ref 26–34)
MCHC RBC AUTO-ENTMCNC: 30.9 G/DL (ref 32–36)
MCHC RBC AUTO-ENTMCNC: 31.6 G/DL (ref 32–36)
MCHC RBC AUTO-ENTMCNC: 31.7 G/DL (ref 32–36)
MCV RBC AUTO: 91 FL (ref 80–100)
MCV RBC AUTO: 92 FL (ref 80–100)
MCV RBC AUTO: 93 FL (ref 80–100)
NRBC BLD-RTO: 0 /100 WBCS (ref 0–0)
PHOSPHATE SERPL-MCNC: 3.8 MG/DL (ref 2.5–4.9)
PLATELET # BLD AUTO: 236 X10*3/UL (ref 150–450)
PLATELET # BLD AUTO: 303 X10*3/UL (ref 150–450)
PLATELET # BLD AUTO: 322 X10*3/UL (ref 150–450)
POTASSIUM SERPL-SCNC: 4.1 MMOL/L (ref 3.5–5.3)
PRODUCT BLOOD TYPE: 5100
PRODUCT BLOOD TYPE: 5100
PRODUCT CODE: NORMAL
PRODUCT CODE: NORMAL
RBC # BLD AUTO: 2.16 X10*6/UL (ref 4.5–5.9)
RBC # BLD AUTO: 2.23 X10*6/UL (ref 4.5–5.9)
RBC # BLD AUTO: 2.61 X10*6/UL (ref 4.5–5.9)
RH FACTOR (ANTIGEN D): NORMAL
SODIUM SERPL-SCNC: 132 MMOL/L (ref 136–145)
UNIT ABO: NORMAL
UNIT ABO: NORMAL
UNIT NUMBER: NORMAL
UNIT NUMBER: NORMAL
UNIT RH: NORMAL
UNIT RH: NORMAL
UNIT VOLUME: 350
UNIT VOLUME: 350
VANCOMYCIN SERPL-MCNC: 32.9 UG/ML (ref 5–20)
WBC # BLD AUTO: 10 X10*3/UL (ref 4.4–11.3)
WBC # BLD AUTO: 11.1 X10*3/UL (ref 4.4–11.3)
WBC # BLD AUTO: 9.2 X10*3/UL (ref 4.4–11.3)
XM INTEP: NORMAL
XM INTEP: NORMAL

## 2023-11-21 PROCEDURE — 85027 COMPLETE CBC AUTOMATED: CPT

## 2023-11-21 PROCEDURE — 82947 ASSAY GLUCOSE BLOOD QUANT: CPT

## 2023-11-21 PROCEDURE — 1200000002 HC GENERAL ROOM WITH TELEMETRY DAILY

## 2023-11-21 PROCEDURE — 87081 CULTURE SCREEN ONLY: CPT | Mod: STJLAB

## 2023-11-21 PROCEDURE — 2500000004 HC RX 250 GENERAL PHARMACY W/ HCPCS (ALT 636 FOR OP/ED): Performed by: STUDENT IN AN ORGANIZED HEALTH CARE EDUCATION/TRAINING PROGRAM

## 2023-11-21 PROCEDURE — A4217 STERILE WATER/SALINE, 500 ML: HCPCS | Performed by: STUDENT IN AN ORGANIZED HEALTH CARE EDUCATION/TRAINING PROGRAM

## 2023-11-21 PROCEDURE — 70450 CT HEAD/BRAIN W/O DYE: CPT | Performed by: RADIOLOGY

## 2023-11-21 PROCEDURE — 36415 COLL VENOUS BLD VENIPUNCTURE: CPT | Performed by: STUDENT IN AN ORGANIZED HEALTH CARE EDUCATION/TRAINING PROGRAM

## 2023-11-21 PROCEDURE — 85027 COMPLETE CBC AUTOMATED: CPT | Performed by: STUDENT IN AN ORGANIZED HEALTH CARE EDUCATION/TRAINING PROGRAM

## 2023-11-21 PROCEDURE — 99233 SBSQ HOSP IP/OBS HIGH 50: CPT | Performed by: INTERNAL MEDICINE

## 2023-11-21 PROCEDURE — 36415 COLL VENOUS BLD VENIPUNCTURE: CPT

## 2023-11-21 PROCEDURE — 87389 HIV-1 AG W/HIV-1&-2 AB AG IA: CPT | Mod: STJLAB

## 2023-11-21 PROCEDURE — 2500000004 HC RX 250 GENERAL PHARMACY W/ HCPCS (ALT 636 FOR OP/ED)

## 2023-11-21 PROCEDURE — 36430 TRANSFUSION BLD/BLD COMPNT: CPT

## 2023-11-21 PROCEDURE — 86920 COMPATIBILITY TEST SPIN: CPT

## 2023-11-21 PROCEDURE — P9016 RBC LEUKOCYTES REDUCED: HCPCS

## 2023-11-21 PROCEDURE — 80069 RENAL FUNCTION PANEL: CPT | Performed by: STUDENT IN AN ORGANIZED HEALTH CARE EDUCATION/TRAINING PROGRAM

## 2023-11-21 PROCEDURE — 70450 CT HEAD/BRAIN W/O DYE: CPT

## 2023-11-21 PROCEDURE — 86900 BLOOD TYPING SEROLOGIC ABO: CPT

## 2023-11-21 PROCEDURE — 90792 PSYCH DIAG EVAL W/MED SRVCS: CPT | Performed by: PSYCHIATRY & NEUROLOGY

## 2023-11-21 PROCEDURE — 83735 ASSAY OF MAGNESIUM: CPT | Performed by: STUDENT IN AN ORGANIZED HEALTH CARE EDUCATION/TRAINING PROGRAM

## 2023-11-21 PROCEDURE — 93005 ELECTROCARDIOGRAM TRACING: CPT

## 2023-11-21 PROCEDURE — 80202 ASSAY OF VANCOMYCIN: CPT

## 2023-11-21 PROCEDURE — 2500000001 HC RX 250 WO HCPCS SELF ADMINISTERED DRUGS (ALT 637 FOR MEDICARE OP): Performed by: STUDENT IN AN ORGANIZED HEALTH CARE EDUCATION/TRAINING PROGRAM

## 2023-11-21 RX ORDER — VANCOMYCIN HYDROCHLORIDE 1 G/200ML
1000 INJECTION, SOLUTION INTRAVENOUS EVERY 12 HOURS
Status: DISCONTINUED | OUTPATIENT
Start: 2023-11-21 | End: 2023-11-22

## 2023-11-21 RX ORDER — LIDOCAINE HYDROCHLORIDE 10 MG/ML
5 INJECTION, SOLUTION EPIDURAL; INFILTRATION; INTRACAUDAL; PERINEURAL ONCE
Status: DISCONTINUED | OUTPATIENT
Start: 2023-11-21 | End: 2023-11-29 | Stop reason: HOSPADM

## 2023-11-21 RX ORDER — HALOPERIDOL 5 MG/ML
5 INJECTION INTRAMUSCULAR EVERY 6 HOURS PRN
Status: DISCONTINUED | OUTPATIENT
Start: 2023-11-21 | End: 2023-11-29 | Stop reason: HOSPADM

## 2023-11-21 RX ADMIN — ICOSAPENT ETHYL 2 G: 1 CAPSULE ORAL at 08:59

## 2023-11-21 RX ADMIN — PREGABALIN 300 MG: 150 CAPSULE ORAL at 20:14

## 2023-11-21 RX ADMIN — ESCITALOPRAM OXALATE 20 MG: 20 TABLET, FILM COATED ORAL at 08:59

## 2023-11-21 RX ADMIN — ICOSAPENT ETHYL 2 G: 1 CAPSULE ORAL at 16:32

## 2023-11-21 RX ADMIN — VANCOMYCIN HYDROCHLORIDE 1000 MG: 1 INJECTION, SOLUTION INTRAVENOUS at 16:32

## 2023-11-21 RX ADMIN — OXYCODONE HYDROCHLORIDE 5 MG: 5 TABLET ORAL at 16:33

## 2023-11-21 RX ADMIN — VANCOMYCIN HYDROCHLORIDE 1250 MG: 1.25 INJECTION, POWDER, LYOPHILIZED, FOR SOLUTION INTRAVENOUS at 04:09

## 2023-11-21 RX ADMIN — TAMSULOSIN HYDROCHLORIDE 0.4 MG: 0.4 CAPSULE ORAL at 08:59

## 2023-11-21 RX ADMIN — PREGABALIN 300 MG: 150 CAPSULE ORAL at 08:59

## 2023-11-21 RX ADMIN — POLYETHYLENE GLYCOL 3350 17 G: 17 POWDER, FOR SOLUTION ORAL at 09:00

## 2023-11-21 ASSESSMENT — PAIN - FUNCTIONAL ASSESSMENT
PAIN_FUNCTIONAL_ASSESSMENT: 0-10

## 2023-11-21 ASSESSMENT — PAIN SCALES - GENERAL
PAINLEVEL_OUTOF10: 5 - MODERATE PAIN
PAINLEVEL_OUTOF10: 5 - MODERATE PAIN
PAINLEVEL_OUTOF10: 7
PAINLEVEL_OUTOF10: 0 - NO PAIN

## 2023-11-21 ASSESSMENT — COGNITIVE AND FUNCTIONAL STATUS - GENERAL
STANDING UP FROM CHAIR USING ARMS: A LOT
TURNING FROM BACK TO SIDE WHILE IN FLAT BAD: A LITTLE
CLIMB 3 TO 5 STEPS WITH RAILING: A LOT
WALKING IN HOSPITAL ROOM: A LOT
MOBILITY SCORE: 15
MOVING TO AND FROM BED TO CHAIR: A LOT

## 2023-11-21 ASSESSMENT — PAIN DESCRIPTION - DESCRIPTORS
DESCRIPTORS: ACHING
DESCRIPTORS: ACHING

## 2023-11-21 NOTE — CARE PLAN
The patient's goals for the shift include      The clinical goals for the shift include Pt will have HBG address with orders by end of shift 11/21/23    Pt had two units ordered for blood. - One unit given and second will be up shortly.

## 2023-11-21 NOTE — NURSING NOTE
End of shift note - Pt stable this shift. HBG was noted to be 6.3 this am. One unit of blood administered with no issues. Second unit on deck and ordered already. Pt confused on and off all day. Reoriented as needed. Pt alert at times as well. He tolerated antibiotics well. Pt up to commode with 2 person asst. He has been medicated per orders PRN. Pt wound vac canister changed and drsg intact. Hemavac also intact with minimal drainage. Pt safety maintained and call light in reach.

## 2023-11-21 NOTE — PROGRESS NOTES
Gm Thrasher is a 58 y.o. male on day 1 of admission presenting with Wound dehiscence.      Subjective     Patient was seen at 7 AM this morning and appeared disoriented. He was A&O X 0 but mental status improved to A&O X 3 during rounds. He slept well last night and reports he is doing fine. He denies any acute concerns. A brain attack was called at 12:45 AM last night due to altered mental status and left pupil dilation. Patient affirms symptoms of mild shortness of breath, and denies symptoms of acute pain, hearing or vision changes, headache, dizziness, seizures, fatigue, fever/chills, congestion, chest pain, dyspnea, nausea, vomiting, diarrhea, constipation, muscle, joint or back pain, increased urinary frequency, urinary urgency, pain/burning with urination, or hematuria.    During rounds he appeared oriented and signed permission for a blood transfusion due to low hgb of 6.3 declined from 8.9 the previous day. we discussed his positive fentanyl result on drug screen and patient reports that he received fentanyl during his ambulance ride. Patient reports that his wife is his power of .       Objective     Last Recorded Vitals  BP 97/61 (BP Location: Left arm, Patient Position: Lying)   Pulse 94   Temp 36 °C (96.8 °F) (Temporal)   Resp 18   Wt 101 kg (222 lb 3.6 oz)   SpO2 98%   Intake/Output last 3 Shifts:    Intake/Output Summary (Last 24 hours) at 11/21/2023 1155  Last data filed at 11/21/2023 0415  Gross per 24 hour   Intake 1625 ml   Output 1100 ml   Net 525 ml       Admission Weight  Weight: 111 kg (245 lb) (11/20/23 0151)    Daily Weight  11/20/23 : 101 kg (222 lb 3.6 oz)    Scheduled medications  [Held by provider] aspirin, 81 mg, oral, Daily  [Held by provider] buprenorphine-naloxone, 3 tablet, sublingual, Daily  [Held by provider] clopidogrel, 75 mg, oral, Daily  escitalopram, 20 mg, oral, Daily  icosapent ethyL, 2 g, oral, BID with meals  polyethylene glycol, 17 g, oral,  Daily  pregabalin, 300 mg, oral, BID  tamsulosin, 0.4 mg, oral, Daily  vancomycin, 1,000 mg, intravenous, q12h      Continuous medications     PRN medications  PRN medications: HYDROmorphone, oxyCODONE, oxyCODONE, promethazine     Results for orders placed or performed during the hospital encounter of 11/20/23 (from the past 24 hour(s))   Tissue/Wound Culture/Smear    Specimen: SPINE; Tissue   Result Value Ref Range    Gram Stain No organisms seen     Gram Stain (2+) Few Polymorphonuclear leukocytes    POCT GLUCOSE   Result Value Ref Range    POCT Glucose 115 (H) 74 - 99 mg/dL   POCT GLUCOSE   Result Value Ref Range    POCT Glucose 115 (H) 74 - 99 mg/dL   Renal Function Panel   Result Value Ref Range    Glucose 111 (H) 74 - 99 mg/dL    Sodium 132 (L) 136 - 145 mmol/L    Potassium 4.1 3.5 - 5.3 mmol/L    Chloride 98 98 - 107 mmol/L    Bicarbonate 26 21 - 32 mmol/L    Anion Gap 12 10 - 20 mmol/L    Urea Nitrogen 17 6 - 23 mg/dL    Creatinine 0.90 0.50 - 1.30 mg/dL    eGFR >90 >60 mL/min/1.73m*2    Calcium 7.7 (L) 8.6 - 10.3 mg/dL    Phosphorus 3.8 2.5 - 4.9 mg/dL    Albumin 2.2 (L) 3.4 - 5.0 g/dL   Magnesium   Result Value Ref Range    Magnesium 1.79 1.60 - 2.40 mg/dL   CBC   Result Value Ref Range    WBC 11.1 4.4 - 11.3 x10*3/uL    nRBC 0.0 0.0 - 0.0 /100 WBCs    RBC 2.16 (L) 4.50 - 5.90 x10*6/uL    Hemoglobin 6.3 (LL) 13.5 - 17.5 g/dL    Hematocrit 19.9 (L) 41.0 - 52.0 %    MCV 92 80 - 100 fL    MCH 29.2 26.0 - 34.0 pg    MCHC 31.7 (L) 32.0 - 36.0 g/dL    RDW 15.2 (H) 11.5 - 14.5 %    Platelets 322 150 - 450 x10*3/uL   Vancomycin   Result Value Ref Range    Vancomycin 32.9 (H) 5.0 - 20.0 ug/mL   POCT GLUCOSE   Result Value Ref Range    POCT Glucose 118 (H) 74 - 99 mg/dL   Prepare RBC: 2 Units   Result Value Ref Range    PRODUCT CODE C8826Z14     Unit Number [id number]     Unit ABO O     Unit RH POS     XM INTEP COMP     Dispense Status XM     Blood Expiration Date December 19, 2023 23:59 EST     PRODUCT BLOOD  TYPE 5100     UNIT VOLUME 350     PRODUCT CODE Q3039H80     Unit Number [id number]     Unit ABO O     Unit RH POS     XM INTEP COMP     Dispense Status XM     Blood Expiration Date December 19, 2023 23:59 EST     PRODUCT BLOOD TYPE 5100     UNIT VOLUME 350    Type and screen   Result Value Ref Range    ABO TYPE O     Rh TYPE POS     ANTIBODY SCREEN NEG    POCT GLUCOSE   Result Value Ref Range    POCT Glucose 118 (H) 74 - 99 mg/dL      Image Results  CT brain attack head wo IV contrast  Narrative: Interpreted By:  Martin Mathis,   STUDY:  CT BRAIN ATTACK HEAD WO IV CONTRAST;  11/21/2023 1:04 am      INDICATION:  Signs/Symptoms:Dilated pupil.      COMPARISON:  10/13/2023      ACCESSION NUMBER(S):  [id number]      ORDERING CLINICIAN:  NESHA GRIDER      TECHNIQUE:  Axial noncontrast CT images of the head. Sagittal and coronal  reformats were provided.      FINDINGS:  BRAIN: No acute intracranial hemorrhage. No mass effect or midline  shift. Gray-white matter interfaces are preserved. VENTRICLES and  EXTRA-AXIAL SPACES: Normal. EXTRACRANIAL SOFT TISSUES:  Within normal  limits. PARANASAL SINUSES/MASTOIDS: Mild paranasal sinus mucosal  thickening. Polypoid right maxillary sinus mucosal thickening.  Mastoids are clear. BONES AND ORBITS: No displaced skull fracture. No  suspicious osseous lesion.  Orbits are within normal limits. OTHER  FINDINGS: None.      Impression: 1. No acute intracranial hemorrhage or mass effect.      Martin Mathis discussed the significance and urgency of this  critical finding by telephone with  Dr. Lawson on 11/21/2023 at  4:11 am.  (**-RCF-**) Findings:  See findings.          Signed by: Martin Mathis 11/21/2023 4:11 AM  Dictation workstation:   [id number]      Physical Exam  Constitutional:       General: He is not in acute distress.     Appearance: He is obese. He is not ill-appearing or diaphoretic.   HENT:      Head: Normocephalic and atraumatic.   Eyes:       General: No scleral icterus.  Cardiovascular:      Rate and Rhythm: Normal rate and regular rhythm.      Heart sounds: Normal heart sounds. No murmur heard.     No friction rub. No gallop.   Pulmonary:      Effort: Pulmonary effort is normal. No respiratory distress.      Breath sounds: Normal breath sounds. No stridor. No wheezing, rhonchi or rales.   Abdominal:      General: Bowel sounds are normal.      Palpations: Abdomen is soft.   Musculoskeletal:         General: Signs of injury present.   Skin:     General: Skin is warm.      Coloration: Skin is not jaundiced.   Neurological:      Mental Status: He is disoriented.      Cranial Nerves: Cranial nerve deficit present.   Psychiatric:         Mood and Affect: Mood normal.     Relevant Results    Assessment/Plan      58 year-old male with a PMH with lumbar decompression and spinal fusion surgery complicated by postoperative subarachnoid hemorrhage and subdural hematoma performed (9/29/23), presented with wound dehiscence requiring lumbar exploration and washout. Neurosurgery on consult    This patient has a urinary catheter   Reason for the urinary catheter remaining today? urinary retention/bladder outlet obstruction, acute or chronic    Principal Problem:    Wound dehiscence  Active Problems:    CAD (coronary artery disease)    #Multiple lumbar post operative fluid collections with mass effect  -MRI and CT scan as discussed above  -Lumbar wound washout procedure performed by neurosurgeon Dr. Vitaly Amezcua   -Wound culture preliminary result revealed +2 staphylococcus aureus. Full results pending.  -Continue IV Vancomycin pending final wound culture results.    #AMS and left pupil dilation   -A brain attack was called at 12:45 AM due to AMS and left pupil dilation. CT scan of head w/o contrast revealed no intracranial abnormalities.  -Patient was A&O X 0 with left pupil dilation this morning, but mental status improved to A&O X 3 during rounds.  -Patient  reports no visual disturbances, pain, or acute concerns overall.  -Will perform neuro checks every 4 hours to monitor symptoms  -Consider neurology consult should symptoms worsen.  -Psych consulted, appreciate recs     #NIDDM2  -A1C 5.7  -daily RFP glucose       Diet: NPO  DVT proph: SCD   Code status: Full code        BLAYNE MEDRANO    Patient seen independent of medical student. The changes above have been made directly to the progress note    Staffed with Dr. Ron,  Ranjana Mathew MD  Internal Medicine, PGY-3

## 2023-11-21 NOTE — PROGRESS NOTES
"Gm Thrasher is a 58 y.o. male on day 1 of admission presenting with Wound dehiscence.    Subjective   POD1 wound exploration and washout  Patient improved versus preoperatively, brain attack called overnight secondary to dilated left pupil, and stable condition at time of exam  Continues with left lower extremity weakness which was identified preoperatively as well and may continue  Urinary catheter remains, consider neurogenic bladder and trial of void planning with urology consult as necessary after  accordion drain with significant output will remain       Objective     Lab Results   Component Value Date    WBC 11.1 11/21/2023    HGB 6.3 (LL) 11/21/2023    HCT 19.9 (L) 11/21/2023    MCV 92 11/21/2023     11/21/2023     Lab Results   Component Value Date    GLUCOSE 111 (H) 11/21/2023    CALCIUM 7.7 (L) 11/21/2023     (L) 11/21/2023    K 4.1 11/21/2023    CO2 26 11/21/2023    CL 98 11/21/2023    BUN 17 11/21/2023    CREATININE 0.90 11/21/2023          Physical Exam    Last Recorded Vitals  Blood pressure 95/56, pulse 85, temperature 35.7 °C (96.3 °F), temperature source Tympanic, resp. rate 18, height 1.803 m (5' 11\"), weight 101 kg (222 lb 3.6 oz), SpO2 97 %.  Intake/Output last 3 Shifts:  I/O last 3 completed shifts:  In: 1735 (17.2 mL/kg) [P.O.:60; I.V.:75 (0.7 mL/kg); IV Piggyback:1600]  Out: 4000 (39.7 mL/kg) [Urine:3750 (1 mL/kg/hr); Blood:250]  Weight: 100.8 kg     Relevant Results                        Assessment/Plan   Principal Problem:    Wound dehiscence  Active Problems:    CAD (coronary artery disease)                   Nicholas Paredes PA-C      "

## 2023-11-21 NOTE — CONSULTS
Reason for consult: AMS, opiate use    History Of Present Illness  Gm Thrasher is a 58 y.o. male presenting with altered mental status.  Patient is a poor historian secondary to his current mental status.  He is alert and oriented x1.  Patient does not know the circumstances of his current admission.  He was positive for fentanyl and opiates in the urine drug screen.  Patient denies using any drugs.  Patient denies using any alcohol prior to admission.  Patient has been getting irritable and agitated.  Poor eye contact throughout the interview.  Patient is not cooperative during the assessment.  Patient reports that he is  from his wife although they live at the same place.  He does not remember his wife's phone number.    Patient is currently not going through any withdrawal from opiates.     Past Medical History  He has a past medical history of Borderline diabetes, Emphysema lung (CMS/[facility]) (09/20/2023), H/O chest x-ray, H/O CT scan, H/O CT scan (10/11/2023), H/O CT scan of abdomen, H/O CT scan of brain, H/O CT scan of brain (10/10/2023), H/O CT scan of chest, H/O CT scan of chest (10/10/2023), H/O CT scan of head (10/12/2023), H/O diagnostic ultrasound, H/O diagnostic ultrasound (10/10/2023), H/O echocardiogram, H/O magnetic resonance imaging of cervical spine, H/O magnetic resonance imaging of lumbar spine, H/O magnetic resonance imaging of lumbar spine (10/10/2023), H/O x-ray of lower extremity (10/10/2023), and High cholesterol.    He has no past medical history of Arthritis, Asthma, Cancer (CMS/[facility]), or CHF (congestive heart failure) (CMS/[facility]).    Surgical History  He has a past surgical history that includes Kidney surgery (09/12/2013); Lumbar fusion (10/06/2023); Other surgical history (01/28/2020); Other surgical history (01/28/2020); Other surgical history (01/28/2020); Other surgical history (01/28/2020); Cardiac catheterization; Cervical laminectomy; Esophagogastroduodenoscopy; Lumbar  "laminectomy; Lumbar fusion (09/30/2023); Cardiac catheterization (Right, 10/11/2023); Cardiac catheterization (N/A, 10/13/2023); and Ankle surgery (10/17/2023).     Social History  He reports that he has been smoking cigarettes. He has been smoking an average of 2 packs per day. He has never used smokeless tobacco. He reports that he does not currently use alcohol. He reports that he does not currently use drugs after having used the following drugs: Amphetamines and Cocaine.     Allergies  Metoprolol and Mirtazapine    Review of Systems    Psychiatric ROS - Adult  Unable to obtain any details          Mental Status Exam  General: Alert and oriented x1  Appearance: Appeared to be of stated age  Attitude: Not cooperative  Motor Activity: Decreased  Speech: Incoherent  Mood: Irritable  Affect: Anxious  Thought Process: [place]  Thought Content: Denies suicidal thoughts  Thought Perception: Denies audiovisual hallucinations  Cognition: Unable to assess  Insight: Poor  Judgement: Poor      Last Recorded Vitals  Blood pressure 100/64, pulse 86, temperature 36.2 °C (97.2 °F), temperature source Tympanic, resp. rate 18, height 1.803 m (5' 11\"), weight 101 kg (222 lb 3.6 oz), SpO2 97 %.    Relevant Results  Results for orders placed or performed during the hospital encounter of 11/20/23 (from the past 96 hour(s))   C-reactive protein   Result Value Ref Range    C-Reactive Protein 18.12 (H) <1.00 mg/dL   Renal Function Panel   Result Value Ref Range    Glucose 88 74 - 99 mg/dL    Sodium 135 (L) 136 - 145 mmol/L    Potassium 3.9 3.5 - 5.3 mmol/L    Chloride 100 98 - 107 mmol/L    Bicarbonate 23 21 - 32 mmol/L    Anion Gap 16 10 - 20 mmol/L    Urea Nitrogen 14 6 - 23 mg/dL    Creatinine 0.92 0.50 - 1.30 mg/dL    eGFR >90 >60 mL/min/1.73m*2    Calcium 8.1 (L) 8.6 - 10.3 mg/dL    Phosphorus 4.0 2.5 - 4.9 mg/dL    Albumin 2.6 (L) 3.4 - 5.0 g/dL   Magnesium   Result Value Ref Range    Magnesium 1.75 1.60 - 2.40 mg/dL "   Sedimentation Rate   Result Value Ref Range    Sedimentation Rate 55 (H) 0 - 20 mm/h   CBC and Auto Differential   Result Value Ref Range    WBC 9.2 4.4 - 11.3 x10*3/uL    nRBC 0.0 0.0 - 0.0 /100 WBCs    RBC 3.10 (L) 4.50 - 5.90 x10*6/uL    Hemoglobin 8.9 (L) 13.5 - 17.5 g/dL    Hematocrit 28.1 (L) 41.0 - 52.0 %    MCV 91 80 - 100 fL    MCH 28.7 26.0 - 34.0 pg    MCHC 31.7 (L) 32.0 - 36.0 g/dL    RDW 15.2 (H) 11.5 - 14.5 %    Platelets 320 150 - 450 x10*3/uL    Neutrophils % 68.6 40.0 - 80.0 %    Immature Granulocytes %, Automated 1.9 (H) 0.0 - 0.9 %    Lymphocytes % 22.4 13.0 - 44.0 %    Monocytes % 6.4 2.0 - 10.0 %    Eosinophils % 0.3 0.0 - 6.0 %    Basophils % 0.4 0.0 - 2.0 %    Neutrophils Absolute 6.33 1.20 - 7.70 x10*3/uL    Immature Granulocytes Absolute, Automated 0.18 0.00 - 0.70 x10*3/uL    Lymphocytes Absolute 2.07 1.20 - 4.80 x10*3/uL    Monocytes Absolute 0.59 0.10 - 1.00 x10*3/uL    Eosinophils Absolute 0.03 0.00 - 0.70 x10*3/uL    Basophils Absolute 0.04 0.00 - 0.10 x10*3/uL   Urinalysis with Reflex Microscopic and Culture   Result Value Ref Range    Color, Urine Rosemarie (N) Straw, Yellow    Appearance, Urine Clear Clear    Specific Gravity, Urine 1.033 1.005 - 1.035    pH, Urine 6.0 5.0, 5.5, 6.0, 6.5, 7.0, 7.5, 8.0    Protein, Urine 30 (1+) (N) NEGATIVE mg/dL    Glucose, Urine NEGATIVE NEGATIVE mg/dL    Blood, Urine MODERATE (2+) (A) NEGATIVE    Ketones, Urine NEGATIVE NEGATIVE mg/dL    Bilirubin, Urine NEGATIVE NEGATIVE    Urobilinogen, Urine 4.0 (N) <2.0 mg/dL    Nitrite, Urine NEGATIVE NEGATIVE    Leukocyte Esterase, Urine NEGATIVE NEGATIVE   Extra Urine Gray Tube   Result Value Ref Range    Extra Tube Hold for add-ons.    Urinalysis Microscopic   Result Value Ref Range    WBC, Urine NONE 1-5, NONE /HPF    RBC, Urine >20 (A) NONE, 1-2, 3-5 /HPF   Blood Culture    Specimen: Peripheral Venipuncture; Blood culture   Result Value Ref Range    Blood Culture Loaded on Instrument - Culture in progress     Blood Culture    Specimen: Peripheral Venipuncture; Blood culture   Result Value Ref Range    Blood Culture Loaded on Instrument - Culture in progress    Tissue/Wound Culture/Smear    Specimen: Skin/Superficial Abscess; Tissue/Biopsy   Result Value Ref Range    Tissue/Wound Culture/Smear (2+) Few Staphylococcus aureus (A)     Gram Stain No polymorphonuclear leukocytes seen     Gram Stain No organisms seen    Drug Screen, Urine   Result Value Ref Range    Amphetamine Screen, Urine Presumptive Negative Presumptive Negative    Barbiturate Screen, Urine Presumptive Negative Presumptive Negative    Benzodiazepines Screen, Urine Presumptive Negative Presumptive Negative    Cannabinoid Screen, Urine Presumptive Negative Presumptive Negative    Cocaine Metabolite Screen, Urine Presumptive Negative Presumptive Negative    Fentanyl Screen, Urine Presumptive Positive (A) Presumptive Negative    Opiate Screen, Urine Presumptive Positive (A) Presumptive Negative    Oxycodone Screen, Urine Presumptive Negative Presumptive Negative    PCP Screen, Urine Presumptive Negative Presumptive Negative   Tissue/Wound Culture/Smear    Specimen: SPINE; Tissue   Result Value Ref Range    Gram Stain No organisms seen     Gram Stain (2+) Few Polymorphonuclear leukocytes    POCT GLUCOSE   Result Value Ref Range    POCT Glucose 115 (H) 74 - 99 mg/dL   POCT GLUCOSE   Result Value Ref Range    POCT Glucose 115 (H) 74 - 99 mg/dL   Renal Function Panel   Result Value Ref Range    Glucose 111 (H) 74 - 99 mg/dL    Sodium 132 (L) 136 - 145 mmol/L    Potassium 4.1 3.5 - 5.3 mmol/L    Chloride 98 98 - 107 mmol/L    Bicarbonate 26 21 - 32 mmol/L    Anion Gap 12 10 - 20 mmol/L    Urea Nitrogen 17 6 - 23 mg/dL    Creatinine 0.90 0.50 - 1.30 mg/dL    eGFR >90 >60 mL/min/1.73m*2    Calcium 7.7 (L) 8.6 - 10.3 mg/dL    Phosphorus 3.8 2.5 - 4.9 mg/dL    Albumin 2.2 (L) 3.4 - 5.0 g/dL   Magnesium   Result Value Ref Range    Magnesium 1.79 1.60 - 2.40 mg/dL   CBC    Result Value Ref Range    WBC 11.1 4.4 - 11.3 x10*3/uL    nRBC 0.0 0.0 - 0.0 /100 WBCs    RBC 2.16 (L) 4.50 - 5.90 x10*6/uL    Hemoglobin 6.3 (LL) 13.5 - 17.5 g/dL    Hematocrit 19.9 (L) 41.0 - 52.0 %    MCV 92 80 - 100 fL    MCH 29.2 26.0 - 34.0 pg    MCHC 31.7 (L) 32.0 - 36.0 g/dL    RDW 15.2 (H) 11.5 - 14.5 %    Platelets 322 150 - 450 x10*3/uL   Vancomycin   Result Value Ref Range    Vancomycin 32.9 (H) 5.0 - 20.0 ug/mL   POCT GLUCOSE   Result Value Ref Range    POCT Glucose 118 (H) 74 - 99 mg/dL   Prepare RBC: 2 Units   Result Value Ref Range    PRODUCT CODE L8474R35     Unit Number [id number]     Unit ABO O     Unit RH POS     XM INTEP COMP     Dispense Status XM     Blood Expiration Date December 19, 2023 23:59 EST     PRODUCT BLOOD TYPE 5100     UNIT VOLUME 350     PRODUCT CODE F4678S19     Unit Number [id number]     Unit ABO O     Unit RH POS     XM INTEP COMP     Dispense Status IS     Blood Expiration Date December 19, 2023 23:59 EST     PRODUCT BLOOD TYPE 5100     UNIT VOLUME 350    Type and screen   Result Value Ref Range    ABO TYPE O     Rh TYPE POS     ANTIBODY SCREEN NEG    POCT GLUCOSE   Result Value Ref Range    POCT Glucose 118 (H) 74 - 99 mg/dL     CT brain attack head wo IV contrast    Result Date: 11/21/2023  Interpreted By:  Martin Mathis, STUDY: CT BRAIN ATTACK HEAD WO IV CONTRAST;  11/21/2023 1:04 am   INDICATION: Signs/Symptoms:Dilated pupil.   COMPARISON: 10/13/2023   ACCESSION NUMBER(S): [id number]   ORDERING CLINICIAN: NESHA GRIDER   TECHNIQUE: Axial noncontrast CT images of the head. Sagittal and coronal reformats were provided.   FINDINGS: BRAIN: No acute intracranial hemorrhage. No mass effect or midline shift. Gray-white matter interfaces are preserved. VENTRICLES and EXTRA-AXIAL SPACES: Normal. EXTRACRANIAL SOFT TISSUES:  Within normal limits. PARANASAL SINUSES/MASTOIDS: Mild paranasal sinus mucosal thickening. Polypoid right maxillary sinus mucosal thickening.  Mastoids are clear. BONES AND ORBITS: No displaced skull fracture. No suspicious osseous lesion.  Orbits are within normal limits. OTHER FINDINGS: None.       1. No acute intracranial hemorrhage or mass effect.   Martin Mathis discussed the significance and urgency of this critical finding by telephone with  Dr. Lawson on 11/21/2023 at 4:11 am.  (**-RCF-**) Findings:  See findings.     Signed by: Martin Mathis 11/21/2023 4:11 AM Dictation workstation:   [id number]    CT lumbar spine wo IV contrast    Result Date: 11/20/2023  Interpreted By:  Joe Harley, STUDY: CT LUMBAR SPINE WO IV CONTRAST;  11/20/2023 6:59 am   INDICATION: Signs/Symptoms:concern for wound infection.   COMPARISON: CT scan from 10/03/2023. Patient also had lumbar spine MRI earlier this morning..   ACCESSION NUMBER(S): [id number]   ORDERING CLINICIAN: DEAN OGLESBY   TECHNIQUE: Thin section axial images were obtained from T11 down through the mid sacrum. Sagittal and coronal reconstruction images were generated. Soft tissue and bone windows were reviewed.   FINDINGS: VERTEBRAL BODIES AND POSTERIOR ELEMENTS: Previous laminectomy with posterior fusion from L3 through S1. There are pedicle screws with fixation rods in place at those levels. The fixation hardware is grossly intact. There are metallic disc spacers from L3-4 through L5-S1, intact. There is stable endplate osteophytosis from L3-4 through L5-S1 and also at L1-2 and T12-L1. Mild disc space narrowing at T12-L1 and L1-2, unchanged. There is no suspicious lucency adjacent to the pedicle screws. There is no gross focal interval bone destruction. There are edematous changes posteriorly in the paraspinal soft tissues and subcutaneous soft tissues, including abnormal gas density which has progressed since 10/03/2023. This gas extends from the subcutaneous soft tissues overlying the paraspinal muscles through the paraspinal muscles and adjacent to the left side of the spinous  process and lamina at the L3-4 level, and then branches to the right and left to surround the facet joints at that level and extends caudally to the L4-5 facets. There is a punctate gas bubble posteriorly to the left of midline in the laminectomy defect at L5. The subcutaneous posterior midline gas collection with soft tissue edema extends caudally down to the S1 level. The edema/fluid in the subcutaneous soft tissues posteriorly extends from the L1-2 level down to the S1 level. Mass effect upon the thecal sac is difficult to ascertain due to non use of IV contrast and also due to beam hardening artifact from the fixation hardware, despite the use metal suppression technique. However, this is much better depicted in the MRI and the description is in the MRI report. There are sclerotic arthritic changes in both SI joints. Mild spur formation in both hips.   SPINAL CANAL: No focal disc herniation in this unenhanced exam.   SOFT TISSUES: See above. Also, there is mild gas in the L5-S1 disc space which appears to be greater than it was previously, and there is mild soft tissue gas anterior to L5-S1 to the right of midline along with mild paraspinal soft tissue swelling at that level.   ABDOMEN/PELVIS: As on the prior exam, there is moderate to pronounced right-sided hydronephrosis ending at the right ureteropelvic junction, with gadolinium contrast material filling the dilated right renal collecting system and extending down the right ureter with some evident in the urinary bladder. The patient does have a Navarro catheter in place. The left kidney remains atrophic, and there is IV gadolinium contrast material in the left renal collecting system which is mildly prominent, also ending at the left ureterovesical junction. There is mild contrast evident in the nondilated left ureter. There are moderate aortoiliac calcifications. There is moderate stool throughout the imaged colon and rectum.       Lumbosacral spine surgical  and arthritic changes as described. Mild DJD in both hips.   Abnormal soft tissue swelling/fluid in the posterior paraspinal musculature and overlying subcutaneous soft tissues, with progression associated gas density which extends along the left side of the L2 spinous process and then extends caudally surrounding the inter facet joints at L3-4 and L4-5. An infectious process cannot be excluded in this unenhanced exam. Suggest percutaneous needle aspiration to examine for abscess. Also, please refer to the MRI report for more information regarding the effect of this process on the thecal sac.   There are findings that are at least mildly suspicious for discitis at the L5-S1 level.   Navarro catheter in an otherwise nearly empty urinary bladder.   Stable atrophy of the left kidney with mild stable fullness of the left renal collecting system. Moderate to pronounced hydronephrosis on the right, perhaps due to underlying right congenital UPJ obstruction.   Signed by: Joe Harley 11/20/2023 8:21 AM Dictation workstation:   [id number]    MR lumbar spine wo IV contrast    Result Date: 11/20/2023  Interpreted By:  Asad Chong, STUDY: MRI of the lumbar spine without IV contrast;  11/20/2023 4:56 am   INDICATION: Signs/Symptoms:concern for epidural abscess.   COMPARISON: MRI lumbar spine of 10/09/2022.   ACCESSION NUMBER(S): [id number]   ORDERING CLINICIAN: EVI ROSENBAUM   TECHNIQUE: Sagittal and axial STIR and T1-weighted MRI images of the lumbar spine were acquired using a spondylolysis protocol.  No contrast was administered.   FINDINGS: Evaluation is severely limited by motion artifact.   Redemonstration of findings lumbosacral fusion and laminectomies. There is redemonstration of a multiloculated collection in the midline/left paramedian posterior subcutaneous soft tissues deep to the incision, and more deeply within the laminectomy bed abutting and within the dorsal epidural fat. The collection also extends about  the posterior elements of the L2 vertebra. A rather large volume of air is newly present in the collection, probably secondary to recent intervention/communication with the atmosphere although infection with gas producing organism can not be excluded. The dominant locule of the collection is in the left paramedian posterior soft tissues, centered about the level of the L2-L3 disc space, measures a proximally 2.3 x 2.9 cm in transaxial diameters, and up to 7.8 cm in cc length. The next largest locule of the collection, centered about the L3-L4 disc space, within the laminectomy bed measures approximately 2.0 x 2.0 cm in transaxial diameters, and roughly 8.7 cm in CC length. An additional smaller collection centered at the mid L5 level measures up to 1.7 x 1.2 cm in transaxial diameters in the dorsal epidural fat. Mass effect from the collection and associated edema in the surrounding soft tissues is again noted to cause marked narrowing of the thecal sac from L2-L3 through L5-S1, worst at the L5-S1 level secondary to combination of grade 1 retrolisthesis, disc osteophyte bulging, dorsal epidural lipomatosis with edematous fat.   Vertebrae/Intervertebral Discs: Redemonstration mild grade 1 degenerative retrolisthesis at L4-L5. Alignment is otherwise maintained.   Cord: The lower thoracic cord appears unremarkable. The conus terminates at T12-L1.   Soft tissues: The prevertebral and posterior paraspinal soft tissues are unremarkable.   Similar bilateral hydronephrosis relative to CT lumbar spine of 10/03/2023.       Evaluation is severely limited by motion artifact.   Redemonstration of findings lumbosacral fusion and laminectomies. There is redemonstration of a multiloculated collection in the midline/left paramedian posterior subcutaneous soft tissues deep to the incision, and more deeply within the laminectomy bed abutting and within the dorsal epidural fat. The sterility of this collection cannot be determined by  imaging.   The collection also extends about the posterior elements of the L2 vertebra. A rather large volume of air is newly present in the collection, probably secondary to recent intervention/communication with the atmosphere although infection with gas producing organism cannot be excluded.   The dominant locule of the collection is in the left paramedian posterior soft tissues, centered about the level of the L2-L3 disc space, measures a proximally 2.3 x 2.9 cm in transaxial diameters, and up to 7.8 cm in cc length. The next largest locule of the collection, centered about the L3-L4 disc space, within the laminectomy bed measures approximately 2.0 x 2.0 cm in transaxial diameters, and roughly 8.7 cm in CC length. An additional smaller collection centered at the mid L5 level measures up to 1.7 x 1.2 cm in transaxial diameters in the dorsal epidural fat.   Mass effect from the collection and associated edema in the surrounding soft tissues is again noted to cause moderate to marked narrowing of the thecal sac from L2-L3 through L5-S1, worst at the L5-S1 level where there is effacement of CSF and compression of cauda equina nerve roots, secondary to combination of grade 1 retrolisthesis, disc osteophyte bulging, dorsal epidural lipomatosis with edematous fat.   I personally reviewed the images/study and I agree with the findings as stated. This study was interpreted at [facility], [place], [place].   MACRO: None   Signed by: Asad Chong 11/20/2023 5:24 AM Dictation workstation:   [id number]    XR chest 1 view    Result Date: 11/19/2023  EXAMINATION: ONE XRAY VIEW OF THE CHEST 11/19/2023 5:39 pm COMPARISON: Portable chest from 11/21/2022. HISTORY: ORDERING SYSTEM PROVIDED HISTORY: Sepsis TECHNOLOGIST PROVIDED HISTORY: Reason for exam:->Sepsis What reading provider will be dictating this exam?->CRC FINDINGS: The lungs remain clear. There is no sign of any infiltrate or effusion. The  heart remains normal in size.  The mediastinum is normal in appearance. Again seen is a metal plate and anchoring screws in the inferior cervical spine from anterior cervical fusion.    No active disease seen in the chest.    XR tibia fibula right 2 views    Result Date: 11/6/2023  Interpreted By:  Vic Araya, STUDY: XR TIBIA FIBULA RIGHT 2 VIEWS;  ;  11/6/2023 11:05 am   INDICATION: Signs/Symptoms:pain.   ACCESSION NUMBER(S): [id number]   ORDERING CLINICIAN: VIC ARAYA   FINDINGS: No acute fracture dislocations of the right tibia. Well-aligned tibial and fibula fracture status post nailing. No interval change compared to fluoroscopic imaging.       Signed by: Vic Araya 11/6/2023 2:39 PM Dictation workstation:   [id number]       Assessment/Plan   Delirium  ?  Opiate abuse to be ruled out  Principal Problem:    Wound dehiscence  Active Problems:    CAD (coronary artery disease)      Patient denies abusing any opiates although his U tox is positive for fentanyl and opiates.  PDMP search shows prescription for oxycodone 5 mg with a 7-day supply of 28 tablets that was filled on October 30 and Suboxone filled on October 24.  It also showed patient on Suboxone for many years  Patient is currently receiving Dilaudid and oxycodone as needed medication for pain.  I agree with holding of the Suboxone while he is getting the pain treated with opiates although please bear in mind that he has a history of opiate addiction on Suboxone maintenance for many years  I will continue to follow and make further recommendation if needed  Regarding his altered mental status, patient would benefit from as needed Haldol for any agitation 5 mg every 6 hours as needed           Robbi Duval MD

## 2023-11-21 NOTE — PROGRESS NOTES
Gm Thrasher is a 58 y.o. male on day 1 of admission presenting with Wound dehiscence.      HBG was reported this am to be 6.3. Two units have been ordered. Then  first bag of 2 is up and running. No issues during the first 15 min and no reaction observed. Pt tolerating well. Rate increased at this time 1320p.     1600 - Pt Blood done and he tolerated well.             Kimmy Mcdermott RN

## 2023-11-21 NOTE — PROGRESS NOTES
Pt was found in bed by his sister, and may have been there for a few days.  Pt had a recent lumbar decompression and spinal fusion, complicated by postoperative subarachnoid hemorrhage.  During this admission, pt had a brain attack and is currently confused, also tested positive for fentanyl. Spoke with pt's wife.  She does not currently live with pt, but between herself and pt's sister they try to check on pt daily.  There is concern that pt cannot take care of himself at home due to medical issues, but is also not a candidate for therapy due to being non-weight bearing.  Informed [facility], and inquired if a therapy consult was appropriate.  Psych is on consult.  SW will follow for psych recommendations, and meet with pt when he is able to have a meaningful conversation.    11:44am:  Per T[facility], pt will need snf placement for IV antibiotics and wound vac.  Spoke with pt's wife, who is requesting a snf list emailed to [email or web address].  List sent.      MANSI Dubon

## 2023-11-21 NOTE — PROGRESS NOTES
INPATIENT PROGRESS NOTES    PATIENT NAME: Gm Thrasher    MRN: [id number]  SERVICE DATE:  11/21/2023   SERVICE TIME:  1:47 PM    SIGNATURE: Patricio Reveles MD      SUBJECTIVE  Patient seen and examined at bedside, he underwent lumbar with exploration and washout and found to have significant epidural phlegmon yesterday.  Overnight, a brain attack was called at 1245 am for AMS and dilated left pupil which after examining and further investigation, CT head were all negative for acute findings.  Patient remained in stable condition.      OBJECTIVE  PHYSICAL EXAM:   Patient Vitals for the past 24 hrs:   BP Temp Temp src Pulse Resp SpO2   11/21/23 1315 83/56 35.9 °C (96.6 °F) Tympanic 88 18 96 %   11/21/23 1259 103/59 35.8 °C (96.4 °F) Tympanic 90 17 97 %   11/21/23 1200 96/58 36.3 °C (97.3 °F) Temporal 91 18 96 %   11/21/23 0800 97/61 36 °C (96.8 °F) Temporal 94 18 98 %   11/21/23 0353 140/83 36.9 °C (98.4 °F) Temporal 96 14 100 %   11/21/23 0051 -- 35.9 °C (96.6 °F) -- 98 14 100 %   11/21/23 0040 (!) 138/98 35.7 °C (96.3 °F) Temporal 96 16 100 %   11/21/23 0000 101/70 36 °C (96.8 °F) Temporal 95 18 100 %   11/20/23 2130 105/79 35.9 °C (96.6 °F) Temporal 93 18 91 %   11/20/23 2115 96/64 -- -- 90 20 94 %   11/20/23 2100 105/61 -- -- 92 21 94 %   11/20/23 2044 109/71 36 °C (96.8 °F) Temporal 97 (!) 28 93 %   11/20/23 1700 129/74 36.2 °C (97.2 °F) Temporal 81 20 96 %   11/20/23 1600 111/75 36.4 °C (97.5 °F) Temporal 87 20 94 %         Gen: NAD  Neck: symmetric, no mass  Cardiovascular: RRR  Respiratory: No distress   GI: Abd soft, nontender, non-distended  Extremities: no leg edema  Skin: wound dressing to the lumbar spine with no drainage, erythema or warmth   Neuro: alert and oriented times 3    Labs:  Lab Results   Component Value Date    WBC 11.1 11/21/2023    HGB 6.3 (LL) 11/21/2023    HCT 19.9 (L) 11/21/2023    MCV 92 11/21/2023     11/21/2023     Lab Results   Component Value Date    GLUCOSE 111 (H) 11/21/2023     CALCIUM 7.7 (L) 11/21/2023     (L) 11/21/2023    K 4.1 11/21/2023    CO2 26 11/21/2023    CL 98 11/21/2023    BUN 17 11/21/2023    CREATININE 0.90 11/21/2023   ESR: --  Lab Results   Component Value Date    SEDRATE 55 (H) 11/20/2023     Lab Results   Component Value Date    CRP 18.12 (H) 11/20/2023     Lab Results   Component Value Date    ALT 15 10/10/2023    AST 28 10/10/2023    ALKPHOS 92 10/10/2023    BILITOT 0.9 10/10/2023         DATA:   Diagnostic tests reviewed for today's visit:    Labs this admission reviewed  Imagings this admission reviewed  Cultures: Reviewed        ASSESSMENT :   #Postoperative lumbar epidural phlegmon s/p wound exploration and washout on 11/20/2023  #Postoperative site infection s/p drainage and Bactrim course 11/9/2023  -Tissue culture from 11/20/2023 prior to surgery grew Staph aureus    PLAN:  -Zosyn discontinued after tissue culture growing Staph aureus  -Continue Vanco  -HIV with reflex was ordered  -Follow-up blood and ordered drainage/fluid cultures  -With previous Bactrim course there is a likelihood of negative blood cultures  -Ordered PICC line long-term ABX use (likely require 6 weeks of antibiotics upon discharge)  -Patient might need to go to SNF after discharge for rehab and ABX treatment through PICC line  -While on IV antibiotics please check weekly BUN/creatinine, CBC with differential, and LFTs     Will continue to follow     Discussed with attending,  Patricio Reveles M.D. PGY-2  Internal Medicine

## 2023-11-21 NOTE — OP NOTE
Lumbar wound exploration and washout Operative Note     Date: 2023  OR Location: [facility] OR    Name: Gm Thrasher : 1965, Age: 58 y.o., MRN: [id number], Sex: male    Diagnosis  Pre-op Diagnosis     * Wound dehiscence [T81.30XA] Post-op Diagnosis     * Wound dehiscence [T81.30XA]     Procedures  Lumbar wound exploration and washout  19837 - AK EXPLORATION SPINAL FUSION      Surgeons      * Vitaly Amezcua - Primary    Resident/Fellow/Other Assistant:  Surgeon(s) and Role:    Procedure Summary  Anesthesia: General  ASA: III  Anesthesia Staff: Anesthesiologist: Kirk Kinsey DO  CRNA: JARED Mahoney-CRNA  C-AA: FREDY Mcneil  Estimated Blood Loss: 100mL  Intra-op Medications:   Medication Name Total Dose   vancomycin (Vancocin) in 0.9 % sodium chloride 250 mL IV 1,250 mg 250.05 mL              Anesthesia Record               Intraprocedure I/O Totals          Intake    LR 1000.00 mL    electrolyte-A (Plasmalyte-A) 300.00 mL    Propofol Drip 0.00 mL    The total shown is the total volume documented since Anesthesia Start was filed.    Total Intake 1300 mL       Output    Urine 500 mL    Est. Blood Loss 250 mL    Total Output 750 mL       Net    Net Volume 550 mL          Specimen:   ID Type Source Tests Collected by Time   A : SUPRAFASCIAL LUMBAR SPINE Tissue SPINE TISSUE/WOUND CULTURE/SMEAR Vitaly Amezcua MD PhD 2023 1834   B : EPIDURAL PHLEGMON Tissue SOFT TISSUE RESECTION TISSUE/WOUND CULTURE/SMEAR Vitaly Amezcua MD PhD 2023 1905        Staff:   Circulator: Erin Thurston RN  Scrub Person: Esther Warren         Drains and/or Catheters:   Closed/Suction Drain Medial;Right Back Other (Comment) 15 Fr. (Active)   Site Description Ecchymotic;Reddened 23 1055   Dressing Status Clean;Dry 23 1600   Drainage Appearance Bright red 23 1600   Status Suction-low intermittent 23 1600   Output (mL) 150 mL 23 1600       Urethral Catheter Coude 16 Fr.  (Active)   Site Assessment Clean 11/21/23 1405   Collection Container Standard drainage bag 11/20/23 0627   Securement Method Securing device (Describe) 11/20/23 0627   Reason for Continuing Urinary Catheterization acute urinary retention 11/21/23 1000   Output (mL) 600 mL 11/21/23 1600       [REMOVED] Lumbar Drain (Removed)       [REMOVED] Lumbar Drain (Removed)       INDICATIONS FOR THE PROCEDURE: Gm Thrasher is an 58 y.o. male who is having surgery for Wound dehiscence [T81.30XA].     DESCRIPTION OF THE OPERATION:   The patient was brought to the operating room theater. A verbal huddle was performed confirming the patient by name, date of birth, medical record number, site of surgery. After all team members were in agreement, they underwent anesthesia induction without complication. Two large bore IVs were placed as well as endotracheal tube. Perioperative antibiotic administration was confirmed. The patient was flipped prone onto a rubén table with a vidya frame and their back was prepped and draped in a sterile fashion.    The skin was then infiltrated with local anesthetic and we then began the procedure by opening the skin with a 10 blade and using combination of Bovie electrocautery and blunt dissection we exposed the prior instrumentation and bony landmarks as well as the dura underlying the prior laminectomy defect.     No gross purulence noted within the surgical site. A pocket of serous fluid was noted both above and below the fascia, which was sampled for culture.. Linear fascial defect noted in midline along the superior aspect of the surgical incision. This was opened in its entirety and carried down to bone. Dense collection of epidural phlegmon was noted overlying the laminectomy defect and bony landmarks, which was also sampled and sent for culture. No further infectious material was noted. No CSF leakage was noted from the site of prior durotomy.    Incisional tissues were cleansed with  pulse evacuator and 3L normal saline. Next we copiously irrigated the incision with Irrisept and antibiotic infused normal saline, then began our closure. The muscle and fascia were approximated with 0 vicryl and 2-0 vicryl sutures, and the skin was approximated with staples. Prevene wound vacuum was placed over incision. The patient returned to anesthesia care and was extubated and returned to the PACU in stable condition.     Disposition: PACU - hemodynamically stable.    Condition: stable    Attending Attestation: I was present for the critical portions of the procedure.      Vitaly Amezcua MD PhD

## 2023-11-21 NOTE — CARE PLAN
The patient's goals for the shift include      The clinical goals for the shift include see care plan

## 2023-11-21 NOTE — ANESTHESIA POSTPROCEDURE EVALUATION
Patient: Gm Thrasher    Procedure Summary       Date: 11/20/23 Room / Location: [facility] OR 06 / Virtual [facility] OR    Anesthesia Start: 1754 Anesthesia Stop:     Procedure: Lumbar wound exploration and washout Diagnosis:       Wound dehiscence      (Wound dehiscence [T81.30XA])    Surgeons: Vitaly Amezcua MD PhD Responsible Provider: Kirk Kinsey DO    Anesthesia Type: general ASA Status: 3            Anesthesia Type: general    Vitals Value Taken Time   /71 11/20/23 2044   Temp 36.0 11/20/23 2051   Pulse 91 11/20/23 2049   Resp 32 11/20/23 2049   SpO2 99 % 11/20/23 2049   Vitals shown include unvalidated device data.    Anesthesia Post Evaluation    Patient location during evaluation: PACU  Patient participation: complete - patient cannot participate  Level of consciousness: confused  Pain management: inadequate  Airway patency: patent  Cardiovascular status: acceptable  Respiratory status: acceptable  Hydration status: acceptable  Postoperative Nausea and Vomiting: none  Comments: Report to PACU RN        No notable events documented.

## 2023-11-21 NOTE — PROGRESS NOTES
"Vancomycin Dosing by Pharmacy- FOLLOW UP    Gm Thrasher is a 58 y.o. year old male who Pharmacy has been consulted for vancomycin dosing for cellulitis, skin and soft tissue. Based on the patient's indication and renal status this patient is being dosed based on a goal AUC of 400-600.     Renal function is currently stable.    Current vancomycin dose: 1250 mg given every 12 hours    Most recent random level: 32.9 mcg/mL    Visit Vitals  BP 97/61 (BP Location: Left arm, Patient Position: Lying)   Pulse 94   Temp 36 °C (96.8 °F) (Temporal)   Resp 18        Lab Results   Component Value Date    CREATININE 0.90 11/21/2023    CREATININE 0.92 11/20/2023    CREATININE 0.87 10/20/2023    CREATININE 0.87 10/19/2023        Patient weight is No results found for: \"PTWEIGHT\"    No results found for: \"CULTURE\"     I/O last 3 completed shifts:  In: 1735 (17.2 mL/kg) [P.O.:60; I.V.:75 (0.7 mL/kg); IV Piggyback:1600]  Out: 4000 (39.7 mL/kg) [Urine:3750 (1 mL/kg/hr); Blood:250]  Weight: 100.8 kg   [unfilled]    No results found for: \"PATIENTTEMP\"     Assessment/Plan    Above goal AUC. Orders placed for new vancomcyin regimen of 1000 mg every 12 hours to begin at 11/21.    This dosing regimen is predicted by [employer] to result in the following pharmacokinetic parameters:  Loading dose: N/A  Regimen: 1000 mg IV every 12 hours.  Start time: 16:09 on 11/21/2023  Exposure target: AUC24 (range)400-600 mg/L.hr   AUC24,ss: 482 mg/L.hr  Probability of AUC24 > 400: 79 %  Ctrough,ss: 14.3 mg/L  Probability of Ctrough,ss > 20: 16 %  Probability of nephrotoxicity (Lodise JOHN 2009): 9 %    The next level will be obtained on 11/22 at 1200. May be obtained sooner if clinically indicated.   Will continue to monitor renal function daily while on vancomycin and order serum creatinine at least every 48 hours if not already ordered.  Follow for continued vancomycin needs, clinical response, and signs/symptoms of toxicity.       Soni Glass, " RPh

## 2023-11-21 NOTE — SIGNIFICANT EVENT
Brain attack called at 12:45AM for AMS and dilated L pupil. Per bedside RN, arrived from post op after lumbar wound exploration and washout and found to have significant epidural phlegmon today around 9:48PM. Has history of type 2 diabetes (last A1c 5.7) recent lumbar decompression and spinal fusion complicated by postoperative subarachnoid hemorrhage and subdural hematoma completed by Dr. Amezcua on 9/29/2023, BPH, neuropathy, hyperlipidemia. Has no history of seizures, has never been told has an uneven/unequal pupil. As documented on h&P suspected substance use disorder and has known tremor disorder. Drug screen positive for fentanyl and opiates on admission.    Last known normal is unclear as has been in surgery for majority of afternoon/evening. Per notes, arrived to PACU altered and confused after waking up from anesthesia. Vitals: 35.9C, HR 98. /98, satting 100%2L. On exam NIH 0, answers questions appropriately but appears intermittently confused. Left pupil ~5mm in size, sluggish. R pupil 3mm, reactive. Per event log, received pregabalin and dilaudid around 20:50 otherwise no new meds. Arrived to CT for brain attack, which was negative for acute findings, transported back to [address] [place] [address] in stable condition, with Q4 hour neurochecks.    Rox Negrete DO

## 2023-11-21 NOTE — NURSING NOTE
Rapid response/Brain attack called for unequal pupils. Lt 5 sluggish, Rt 4 sluggish. Vital signs stable. Accucheck 112, patient transported to CT of the head accompanied by this rapid response nurse and patient's bedside nurse. Patient tolerated scan well. Patient to remain on the division per Dr. Rox Negrete

## 2023-11-21 NOTE — BRIEF OP NOTE
Date: 2023  OR Location: [facility] OR    Name: Gm Thrasher : 1965, Age: 58 y.o., MRN: [id number], Sex: male    Diagnosis  Pre-op Diagnosis     * Wound dehiscence [T81.30XA] Post-op Diagnosis     * Wound dehiscence [T81.30XA]     Procedures  Lumbar wound exploration and washout  [id number] - ID EXPLORATION SPINAL FUSION      Surgeons      * Vitaly Amezcua - Primary    Resident/Fellow/Other Assistant:  Surgeon(s) and Role: Adalberto Conn - Resident Assisting    Procedure Summary  Anesthesia: General  ASA: III  Anesthesia Staff: Anesthesiologist: Kirk Kinsey DO  CRNA: JARED Mahoney-CRNA  C-AA: FREDY Mcneil  Estimated Blood Loss: 250mL  Intra-op Medications:   Medication Name Total Dose   vancomycin (Vancocin) in 0.9 % sodium chloride 250 mL IV 1,250 mg 405.64 mL              Anesthesia Record               Intraprocedure I/O Totals          Intake    LR 1000.00 mL    electrolyte-A (Plasmalyte-A) 300.00 mL    Propofol Drip 0.00 mL    The total shown is the total volume documented since Anesthesia Start was filed.    Total Intake 1300 mL       Output    Urine 500 mL    Est. Blood Loss 250 mL    Total Output 750 mL       Net    Net Volume 550 mL          Specimen:   ID Type Source Tests Collected by Time   A : SUPRAFASCIAL LUMBAR SPINE Tissue SPINE TISSUE/WOUND CULTURE/SMEAR Vitaly Amezcua MD PhD 2023   B : EPIDURAL PHLEGMON Tissue SOFT TISSUE RESECTION TISSUE/WOUND CULTURE/SMEAR Vitaly Amezcua MD PhD 2023 1909        Staff:   Circulator: Erin Thurston RN  Scrub Person: Esther Warren          Findings: no germania purulence, significant epidural phlegmon    Complications:  None; patient tolerated the procedure well.     Disposition: PACU - hemodynamically stable.  Condition: stable  Specimens Collected:   ID Type Source Tests Collected by Time   A : SUPRAFASCIAL LUMBAR SPINE Tissue SPINE TISSUE/WOUND CULTURE/SMEAR Vitaly Amezcua MD PhD 2023   B :  EPIDURAL PHLEGMON Tissue SOFT TISSUE RESECTION TISSUE/WOUND CULTURE/SMEAR Vitaly Amezcua MD PhD 11/20/2023 1905     Attending Attestation:     Vitaly Amezcua  Phone Number: [phone]

## 2023-11-21 NOTE — NURSING NOTE
Pt. Came back from PACU disoriented x4. Wound vac was not properly suctioning, RN on 3N fixed it, picture in media files.   It's a small person vac, and the cartridge is now almost full, requested another one.    Brain attack called on pt 0041 due to uneven pupils. L pupil is a 6 and brisk, R is a 3 and sluggish. MRI was negative. Pt is now Q4 neuros  PT is still confused and trying to pull out his cruz.   Skin is dusky looking.  Tele may show Afib, but pt was moving.  Pt. Has been fidgeting, talking to himself, grabbing at thing in the air, that's not there  Bed alarm on, bed in lowest position, call light within reach. Team notified of RN findings.

## 2023-11-22 ENCOUNTER — APPOINTMENT (OUTPATIENT)
Dept: RADIOLOGY | Facility: HOSPITAL | Age: 58
DRG: 856 | End: 2023-11-22
Payer: COMMERCIAL

## 2023-11-22 LAB
ALBUMIN SERPL BCP-MCNC: 2.2 G/DL (ref 3.4–5)
ALP SERPL-CCNC: 98 U/L (ref 33–120)
ALT SERPL W P-5'-P-CCNC: 13 U/L (ref 10–52)
ANION GAP SERPL CALC-SCNC: 11 MMOL/L (ref 10–20)
AST SERPL W P-5'-P-CCNC: 28 U/L (ref 9–39)
BACTERIA SPEC CULT: ABNORMAL
BILIRUB SERPL-MCNC: 0.7 MG/DL (ref 0–1.2)
BUN SERPL-MCNC: 11 MG/DL (ref 6–23)
CALCIUM SERPL-MCNC: 7.8 MG/DL (ref 8.6–10.3)
CHLORIDE SERPL-SCNC: 100 MMOL/L (ref 98–107)
CO2 SERPL-SCNC: 27 MMOL/L (ref 21–32)
CREAT SERPL-MCNC: 0.76 MG/DL (ref 0.5–1.3)
ERYTHROCYTE [DISTWIDTH] IN BLOOD BY AUTOMATED COUNT: 15.3 % (ref 11.5–14.5)
ERYTHROCYTE [DISTWIDTH] IN BLOOD BY AUTOMATED COUNT: 15.3 % (ref 11.5–14.5)
GFR SERPL CREATININE-BSD FRML MDRD: >90 ML/MIN/1.73M*2
GLUCOSE BLD MANUAL STRIP-MCNC: 101 MG/DL (ref 74–99)
GLUCOSE BLD MANUAL STRIP-MCNC: 95 MG/DL (ref 74–99)
GLUCOSE BLD MANUAL STRIP-MCNC: 95 MG/DL (ref 74–99)
GLUCOSE SERPL-MCNC: 95 MG/DL (ref 74–99)
GRAM STN SPEC: ABNORMAL
GRAM STN SPEC: ABNORMAL
HCT VFR BLD AUTO: 23.2 % (ref 41–52)
HCT VFR BLD AUTO: 25.5 % (ref 41–52)
HGB BLD-MCNC: 7.5 G/DL (ref 13.5–17.5)
HGB BLD-MCNC: 8.2 G/DL (ref 13.5–17.5)
MCH RBC QN AUTO: 28.7 PG (ref 26–34)
MCH RBC QN AUTO: 28.9 PG (ref 26–34)
MCHC RBC AUTO-ENTMCNC: 32.2 G/DL (ref 32–36)
MCHC RBC AUTO-ENTMCNC: 32.3 G/DL (ref 32–36)
MCV RBC AUTO: 89 FL (ref 80–100)
MCV RBC AUTO: 90 FL (ref 80–100)
NRBC BLD-RTO: 0 /100 WBCS (ref 0–0)
NRBC BLD-RTO: 0 /100 WBCS (ref 0–0)
PLATELET # BLD AUTO: 324 X10*3/UL (ref 150–450)
PLATELET # BLD AUTO: 351 X10*3/UL (ref 150–450)
POTASSIUM SERPL-SCNC: 3.5 MMOL/L (ref 3.5–5.3)
PROT SERPL-MCNC: 6 G/DL (ref 6.4–8.2)
RBC # BLD AUTO: 2.61 X10*6/UL (ref 4.5–5.9)
RBC # BLD AUTO: 2.84 X10*6/UL (ref 4.5–5.9)
SODIUM SERPL-SCNC: 134 MMOL/L (ref 136–145)
VANCOMYCIN SERPL-MCNC: 20.8 UG/ML (ref 5–20)
WBC # BLD AUTO: 8.2 X10*3/UL (ref 4.4–11.3)
WBC # BLD AUTO: 9.4 X10*3/UL (ref 4.4–11.3)

## 2023-11-22 PROCEDURE — 80202 ASSAY OF VANCOMYCIN: CPT

## 2023-11-22 PROCEDURE — 36569 INSJ PICC 5 YR+ W/O IMAGING: CPT

## 2023-11-22 PROCEDURE — 2780000003 HC OR 278 NO HCPCS

## 2023-11-22 PROCEDURE — 85027 COMPLETE CBC AUTOMATED: CPT

## 2023-11-22 PROCEDURE — 80053 COMPREHEN METABOLIC PANEL: CPT

## 2023-11-22 PROCEDURE — 1200000002 HC GENERAL ROOM WITH TELEMETRY DAILY

## 2023-11-22 PROCEDURE — 99024 POSTOP FOLLOW-UP VISIT: CPT | Performed by: NEUROLOGICAL SURGERY

## 2023-11-22 PROCEDURE — C1751 CATH, INF, PER/CENT/MIDLINE: HCPCS

## 2023-11-22 PROCEDURE — 2500000004 HC RX 250 GENERAL PHARMACY W/ HCPCS (ALT 636 FOR OP/ED)

## 2023-11-22 PROCEDURE — 36415 COLL VENOUS BLD VENIPUNCTURE: CPT

## 2023-11-22 PROCEDURE — 70450 CT HEAD/BRAIN W/O DYE: CPT

## 2023-11-22 PROCEDURE — 93010 ELECTROCARDIOGRAM REPORT: CPT | Performed by: INTERNAL MEDICINE

## 2023-11-22 PROCEDURE — 2500000001 HC RX 250 WO HCPCS SELF ADMINISTERED DRUGS (ALT 637 FOR MEDICARE OP): Performed by: STUDENT IN AN ORGANIZED HEALTH CARE EDUCATION/TRAINING PROGRAM

## 2023-11-22 PROCEDURE — 99024 POSTOP FOLLOW-UP VISIT: CPT | Performed by: STUDENT IN AN ORGANIZED HEALTH CARE EDUCATION/TRAINING PROGRAM

## 2023-11-22 PROCEDURE — 82947 ASSAY GLUCOSE BLOOD QUANT: CPT

## 2023-11-22 PROCEDURE — 2500000004 HC RX 250 GENERAL PHARMACY W/ HCPCS (ALT 636 FOR OP/ED): Performed by: STUDENT IN AN ORGANIZED HEALTH CARE EDUCATION/TRAINING PROGRAM

## 2023-11-22 PROCEDURE — 2500000004 HC RX 250 GENERAL PHARMACY W/ HCPCS (ALT 636 FOR OP/ED): Performed by: INTERNAL MEDICINE

## 2023-11-22 PROCEDURE — 70450 CT HEAD/BRAIN W/O DYE: CPT | Performed by: RADIOLOGY

## 2023-11-22 PROCEDURE — 02HV33Z INSERTION OF INFUSION DEVICE INTO SUPERIOR VENA CAVA, PERCUTANEOUS APPROACH: ICD-10-PCS

## 2023-11-22 PROCEDURE — 36569 INSJ PICC 5 YR+ W/O IMAGING: CPT | Performed by: INTERNAL MEDICINE

## 2023-11-22 PROCEDURE — 99232 SBSQ HOSP IP/OBS MODERATE 35: CPT | Performed by: PSYCHIATRY & NEUROLOGY

## 2023-11-22 PROCEDURE — 99233 SBSQ HOSP IP/OBS HIGH 50: CPT

## 2023-11-22 RX ORDER — CEFAZOLIN SODIUM 2 G/100ML
2 INJECTION, SOLUTION INTRAVENOUS EVERY 8 HOURS
Status: DISCONTINUED | OUTPATIENT
Start: 2023-11-22 | End: 2023-11-29 | Stop reason: HOSPADM

## 2023-11-22 RX ORDER — NAPROXEN SODIUM 220 MG/1
81 TABLET, FILM COATED ORAL DAILY
Status: DISCONTINUED | OUTPATIENT
Start: 2023-11-23 | End: 2023-11-29 | Stop reason: HOSPADM

## 2023-11-22 RX ORDER — CLOPIDOGREL BISULFATE 75 MG/1
75 TABLET ORAL DAILY
Status: DISCONTINUED | OUTPATIENT
Start: 2023-11-27 | End: 2023-11-29 | Stop reason: HOSPADM

## 2023-11-22 RX ORDER — CEFAZOLIN SODIUM 2 G/50ML
2 SOLUTION INTRAVENOUS EVERY 8 HOURS
Status: DISCONTINUED | OUTPATIENT
Start: 2023-11-22 | End: 2023-11-22

## 2023-11-22 RX ORDER — MAGNESIUM SULFATE HEPTAHYDRATE 40 MG/ML
2 INJECTION, SOLUTION INTRAVENOUS ONCE
Status: COMPLETED | OUTPATIENT
Start: 2023-11-22 | End: 2023-11-22

## 2023-11-22 RX ADMIN — VANCOMYCIN HYDROCHLORIDE 1000 MG: 1 INJECTION, SOLUTION INTRAVENOUS at 05:17

## 2023-11-22 RX ADMIN — POLYETHYLENE GLYCOL 3350 17 G: 17 POWDER, FOR SOLUTION ORAL at 09:47

## 2023-11-22 RX ADMIN — ESCITALOPRAM OXALATE 20 MG: 20 TABLET, FILM COATED ORAL at 09:47

## 2023-11-22 RX ADMIN — PREGABALIN 300 MG: 150 CAPSULE ORAL at 09:47

## 2023-11-22 RX ADMIN — MAGNESIUM SULFATE HEPTAHYDRATE 2 G: 40 INJECTION, SOLUTION INTRAVENOUS at 21:55

## 2023-11-22 RX ADMIN — TAMSULOSIN HYDROCHLORIDE 0.4 MG: 0.4 CAPSULE ORAL at 09:47

## 2023-11-22 RX ADMIN — PREGABALIN 300 MG: 150 CAPSULE ORAL at 20:14

## 2023-11-22 RX ADMIN — CEFAZOLIN SODIUM 2 G: 2 INJECTION, SOLUTION INTRAVENOUS at 17:31

## 2023-11-22 ASSESSMENT — COGNITIVE AND FUNCTIONAL STATUS - GENERAL
DAILY ACTIVITIY SCORE: 15
MOVING FROM LYING ON BACK TO SITTING ON SIDE OF FLAT BED WITH BEDRAILS: A LITTLE
EATING MEALS: A LITTLE
PERSONAL GROOMING: A LOT
CLIMB 3 TO 5 STEPS WITH RAILING: TOTAL
DRESSING REGULAR UPPER BODY CLOTHING: A LOT
MOVING TO AND FROM BED TO CHAIR: A LOT
DAILY ACTIVITIY SCORE: 16
MOBILITY SCORE: 14
TURNING FROM BACK TO SIDE WHILE IN FLAT BAD: A LITTLE
TURNING FROM BACK TO SIDE WHILE IN FLAT BAD: A LITTLE
DRESSING REGULAR UPPER BODY CLOTHING: A LOT
TOILETING: A LOT
STANDING UP FROM CHAIR USING ARMS: A LOT
HELP NEEDED FOR BATHING: A LITTLE
PERSONAL GROOMING: A LOT
CLIMB 3 TO 5 STEPS WITH RAILING: TOTAL
TOILETING: A LOT
WALKING IN HOSPITAL ROOM: A LOT
WALKING IN HOSPITAL ROOM: A LOT
EATING MEALS: A LITTLE
STANDING UP FROM CHAIR USING ARMS: A LOT
MOBILITY SCORE: 13
MOVING TO AND FROM BED TO CHAIR: A LOT
DRESSING REGULAR LOWER BODY CLOTHING: A LITTLE
DRESSING REGULAR LOWER BODY CLOTHING: A LITTLE

## 2023-11-22 ASSESSMENT — PAIN - FUNCTIONAL ASSESSMENT: PAIN_FUNCTIONAL_ASSESSMENT: 0-10

## 2023-11-22 ASSESSMENT — PAIN SCALES - GENERAL
PAINLEVEL_OUTOF10: 2
PAINLEVEL_OUTOF10: 0 - NO PAIN

## 2023-11-22 NOTE — NURSING NOTE
2200    2nd unit of blood complete. Patient in bed and repositioned for comfort. Patient currently calm watching tv    2300    Wound vac to back alarming blocking detected. Red blood pooled under dressing. Patient repositioned for comfort. Provider notified

## 2023-11-22 NOTE — PROGRESS NOTES
"Gm Thrasher is a 58 y.o. male on day 2 of admission presenting with Wound dehiscence.  This is postop day #2 from his wound exploration with washout and reclosure.  Subjective   The patient is feeling well.  He is not complaining of pain.  His legs feel normal he said.  He does not have a headache.  He had a bowel movement in bed.  He was under the impression that the chucks underneath him were intended to allow him to do that.  The nurse and I instructed him that he should ask for a bedpan in the future.       Objective     Physical Exam: He is awake alert oriented x2.  His back is draining serosanguineous fluid.  The ABD that I removed does not show any sign of haloing.  So although the deep drain was thought to be draining spinal fluid earlier today the superficial portion of his wound seems to be draining serosanguineous fluid.  He is moving his lower extremities his arms are functioning fine we will continue to keep the superficial drain in for another day at least.    Last Recorded Vitals  Blood pressure 171/83, pulse 80, temperature 36.7 °C (98.1 °F), temperature source Temporal, resp. rate 18, height 1.803 m (5' 11\"), weight 101 kg (222 lb 3.6 oz), SpO2 96 %.  Intake/Output last 3 Shifts:  I/O last 3 completed shifts:  In: 3129.6 (31 mL/kg) [P.O.:820; I.V.:75 (0.7 mL/kg); Blood:734.6; IV Piggyback:1500]  Out: 1915 (19 mL/kg) [Urine:1450 (0.4 mL/kg/hr); Drains:215; Blood:250]  Weight: 100.8 kg     Relevant Results              This patient has a central line   Reason for the central line remaining today? Parenteral medication                 Assessment/Plan   Principal Problem:    Wound dehiscence  Active Problems:    CAD (coronary artery disease)    Wound breakdown with purulent drainage from lumbar wound.  Patient is postop day #2.  He has no complaints of headache.  The fluid on his dressing right now is serosanguineous and not haloing.  We will keep the superficial drain in for now.  But we need to " start getting him up with physical therapy to try to become more ambulatory.       I spent 20 minutes in the professional and overall care of this patient.      Rocco Morgan MD

## 2023-11-22 NOTE — NURSING NOTE
0806 Dr. Mathew at bedside. New order for stat ct of head- according to nursing report patient pulled out preevena drain last night.   0913 patient transferring off unit for stat ct of head   0939 patient arrived back on unit   0955 picc line being placed at bedside   1050 dressing to lumbar region of spine saturated with serosang drainage. New dry dressing applied. Message update sent to Dr. King   1500 moderate amt of serosag on dressing to back. New dry dressing applied .  1530 neurosurgery in to see patient at bedside. Updated on lumbar incision drainage.   1800 patient started on ancef, no adverse effects noted. Dressing to lumbar spine currently dry and intact.  Hemovac drain in place drained 5ml of bloody drainage this shift

## 2023-11-22 NOTE — PROCEDURES
Pre-Procedure Checklist:  Emergent Line Insertion: No  Type of Line to be Placed: PICC  Consent Obtained: Yes  Emergency Medication Necessary: No  Patient Identified with 2 Independent Identifiers: Yes  Review of Allergies, Anticoagulation, Relevant Labs, ECG/Telemetry: Yes  Risks/Benefits/Alternatives Discussed with Patient/POA/Legal Representative: Yes  Stop Sign on Door: Yes  Time Out Performed: Yes  Catheter Exchange: No    Positioning Checklist:  All People, Including Patient, in the Room with Cap and Mask: Yes  Fluoroscopy Used to Identify Vessel and Guide Insertion: No   Sterile Cover Used: Yes  Full Barrier Precautions Followed (Mask, Cap, Gown, Gloves): Yes  Hands Washed: Yes  Monitors Attached with Sound Alarms On: No  Full Body Sterile Drape (Head-to-Toe) Used to Cover Patient: Yes  Trendelenburg Position (For IJ and Subclavian): No  CHG Skin Prep Used and Allowed to Air Dry to Skin Procedure: Yes    Procedure Checklist:  Blood Aspirated From All Lumens, All Ports Subsequently Flushed: Yes  Catheter Caps Placed on All Lumens; Lumens Clamped: Yes  Maintain Guidewire Control Throughout, Ensuring Guidewire Removal: Yes  Maintain Sterile Field Throughout Insertion: Yes  Catheter Secured: Yes  Confirmatory Test of Venous Placement: Non-Pulsatile Blood    Post Procedure Checklist:  Date and Time Written on Dressing: Yes  Sharp and Wire Count and Safe Disposal of all Sharps/Wires: Yes  Sterile Dressing Applied Per Protocol: Yes  X-ray Ordered or ECG Image: Yes    PICC Insertion Details:  Size (Fr): 4  Lumen Type: Bard  Catheter to Vein Ratio Less Than 50%: Yes  Total Length (cm): 51  External Length (cm): 0  Orientation: Left  Location: Basilic vein  Site Prep: Chlorohexidine; Usual sterile procedure followed  Local Anesthetic: Injectable/Subcutaneous  Indication: IV antibiotics  Insertion Team Members in the Room: Nurse,   Initial Extremity Circumference (cm): 34  Patient Tolerance: Tolerated Well, Age  Appropriate  Comfort Measures: Subcutaneous anesthetic; Verbal  Procedure Location: Bedside  Safety Measures: Patient specific safety measures addressed with RN  Estimated Blood Loss (mL): 0  Vessel Fully Compressible Proximally and Distally to Insertion Site: Yes  Brisk Blood Return Obtained and Line Draws Easily: Yes  Tip Location: Cavoatrial Junction  Line Confirmation: Intravascular ECG  Lot #: [id number]  : Bard  PICC Line Exp Date: 12 31 2024  Securement: Stat Lock  Post Procedure Checklist: Handoff with RN; Obtain all new IV tubing prior to use; Bed at lowest level and wheels locked; Line discharge information at bedside.  Additional Details: Line was inserted using Modified Seldinger's Technique.   Placed by: STERLING Diana RN

## 2023-11-22 NOTE — PROGRESS NOTES
"Neurological Surgery  Daily Progress Note    History of Present Illness and Assessment and Plan   Gm Thrasher is a 58 y.o. male who is s/p wound washout    Seen and examined at bedside today, AMS persists, Aox1-2 otherwise intact neurologically    I inspected the negative pressure wound vac. High output overnight, canister was changed. Output ~150-200cc, fluid was serous but concern for possible CSF. Wound vac removed, incision inspected, no signs of drainage at bedside, dry dressing placed.       Plan  S/p wound washout, cultures pending, NGTD  CSF leak at initial surgery, no active leak noted in redo surgery but concern given high output from wound vac  Cont monitor incision  Empiric abx until culture finalized  Rpt CT this AM given AMS  Cont medical care  Will continue to follow closely  Dry dressing changes BID   PT/OT as tolerated      Physical Exam    General: Well developed, awake/alert/oriented x1-2, no distress, alert and cooperative  Skin: Warm and dry, no lesions, no rashes  ENMT: Mucous membranes moist, no apparent injury, no lesions seen  Head/Neck: Neck Supple, no apparent injury  Respiratory/Thorax: Normal breath sounds with good chest expansion, thorax symmetric  Cardiovascular: No pitting edema, no JVD    Motor Strength: 5/5 Throughout all extremities    Muscle Bulk: Normal and symmetric in all extremities    Last Recorded Vitals  Blood pressure 102/58, pulse 71, temperature 36.5 °C (97.7 °F), temperature source Temporal, resp. rate 18, height 1.803 m (5' 11\"), weight 101 kg (222 lb 3.6 oz), SpO2 100 %.    Relevant Results  Scheduled medications  [Held by provider] aspirin, 81 mg, oral, Daily  [Held by provider] buprenorphine-naloxone, 3 tablet, sublingual, Daily  [Held by provider] clopidogrel, 75 mg, oral, Daily  escitalopram, 20 mg, oral, Daily  icosapent ethyL, 2 g, oral, BID with meals  lidocaine, 5 mL, infiltration, Once  polyethylene glycol, 17 g, oral, Daily  pregabalin, 300 mg, oral, " BID  tamsulosin, 0.4 mg, oral, Daily  vancomycin, 1,000 mg, intravenous, q12h      Continuous medications     PRN medications  PRN medications: alteplase, haloperidol lactate, HYDROmorphone, oxyCODONE, oxyCODONE, promethazine    Results for orders placed or performed during the hospital encounter of 11/20/23 (from the past 96 hour(s))   C-reactive protein   Result Value Ref Range    C-Reactive Protein 18.12 (H) <1.00 mg/dL   Renal Function Panel   Result Value Ref Range    Glucose 88 74 - 99 mg/dL    Sodium 135 (L) 136 - 145 mmol/L    Potassium 3.9 3.5 - 5.3 mmol/L    Chloride 100 98 - 107 mmol/L    Bicarbonate 23 21 - 32 mmol/L    Anion Gap 16 10 - 20 mmol/L    Urea Nitrogen 14 6 - 23 mg/dL    Creatinine 0.92 0.50 - 1.30 mg/dL    eGFR >90 >60 mL/min/1.73m*2    Calcium 8.1 (L) 8.6 - 10.3 mg/dL    Phosphorus 4.0 2.5 - 4.9 mg/dL    Albumin 2.6 (L) 3.4 - 5.0 g/dL   Magnesium   Result Value Ref Range    Magnesium 1.75 1.60 - 2.40 mg/dL   Sedimentation Rate   Result Value Ref Range    Sedimentation Rate 55 (H) 0 - 20 mm/h   CBC and Auto Differential   Result Value Ref Range    WBC 9.2 4.4 - 11.3 x10*3/uL    nRBC 0.0 0.0 - 0.0 /100 WBCs    RBC 3.10 (L) 4.50 - 5.90 x10*6/uL    Hemoglobin 8.9 (L) 13.5 - 17.5 g/dL    Hematocrit 28.1 (L) 41.0 - 52.0 %    MCV 91 80 - 100 fL    MCH 28.7 26.0 - 34.0 pg    MCHC 31.7 (L) 32.0 - 36.0 g/dL    RDW 15.2 (H) 11.5 - 14.5 %    Platelets 320 150 - 450 x10*3/uL    Neutrophils % 68.6 40.0 - 80.0 %    Immature Granulocytes %, Automated 1.9 (H) 0.0 - 0.9 %    Lymphocytes % 22.4 13.0 - 44.0 %    Monocytes % 6.4 2.0 - 10.0 %    Eosinophils % 0.3 0.0 - 6.0 %    Basophils % 0.4 0.0 - 2.0 %    Neutrophils Absolute 6.33 1.20 - 7.70 x10*3/uL    Immature Granulocytes Absolute, Automated 0.18 0.00 - 0.70 x10*3/uL    Lymphocytes Absolute 2.07 1.20 - 4.80 x10*3/uL    Monocytes Absolute 0.59 0.10 - 1.00 x10*3/uL    Eosinophils Absolute 0.03 0.00 - 0.70 x10*3/uL    Basophils Absolute 0.04 0.00 - 0.10  x10*3/uL   Urinalysis with Reflex Microscopic and Culture   Result Value Ref Range    Color, Urine Rosemarie (N) Straw, Yellow    Appearance, Urine Clear Clear    Specific Gravity, Urine 1.033 1.005 - 1.035    pH, Urine 6.0 5.0, 5.5, 6.0, 6.5, 7.0, 7.5, 8.0    Protein, Urine 30 (1+) (N) NEGATIVE mg/dL    Glucose, Urine NEGATIVE NEGATIVE mg/dL    Blood, Urine MODERATE (2+) (A) NEGATIVE    Ketones, Urine NEGATIVE NEGATIVE mg/dL    Bilirubin, Urine NEGATIVE NEGATIVE    Urobilinogen, Urine 4.0 (N) <2.0 mg/dL    Nitrite, Urine NEGATIVE NEGATIVE    Leukocyte Esterase, Urine NEGATIVE NEGATIVE   Extra Urine Gray Tube   Result Value Ref Range    Extra Tube Hold for add-ons.    Urinalysis Microscopic   Result Value Ref Range    WBC, Urine NONE 1-5, NONE /HPF    RBC, Urine >20 (A) NONE, 1-2, 3-5 /HPF   Blood Culture    Specimen: Peripheral Venipuncture; Blood culture   Result Value Ref Range    Blood Culture No growth at 1 day    Blood Culture    Specimen: Peripheral Venipuncture; Blood culture   Result Value Ref Range    Blood Culture No growth at 1 day    Tissue/Wound Culture/Smear    Specimen: Skin/Superficial Abscess; Tissue/Biopsy   Result Value Ref Range    Tissue/Wound Culture/Smear (2+) Few Staphylococcus aureus (A)     Gram Stain No polymorphonuclear leukocytes seen     Gram Stain No organisms seen    Drug Screen, Urine   Result Value Ref Range    Amphetamine Screen, Urine Presumptive Negative Presumptive Negative    Barbiturate Screen, Urine Presumptive Negative Presumptive Negative    Benzodiazepines Screen, Urine Presumptive Negative Presumptive Negative    Cannabinoid Screen, Urine Presumptive Negative Presumptive Negative    Cocaine Metabolite Screen, Urine Presumptive Negative Presumptive Negative    Fentanyl Screen, Urine Presumptive Positive (A) Presumptive Negative    Opiate Screen, Urine Presumptive Positive (A) Presumptive Negative    Oxycodone Screen, Urine Presumptive Negative Presumptive Negative    PCP  Screen, Urine Presumptive Negative Presumptive Negative   Tissue/Wound Culture/Smear    Specimen: SPINE; Tissue   Result Value Ref Range    Tissue/Wound Culture/Smear Culture in progress     Gram Stain No organisms seen     Gram Stain (2+) Few Polymorphonuclear leukocytes    Tissue/Wound Culture/Smear    Specimen: SOFT TISSUE RESECTION   Result Value Ref Range    Gram Stain No polymorphonuclear leukocytes seen     Gram Stain No organisms seen    POCT GLUCOSE   Result Value Ref Range    POCT Glucose 115 (H) 74 - 99 mg/dL   POCT GLUCOSE   Result Value Ref Range    POCT Glucose 115 (H) 74 - 99 mg/dL   Renal Function Panel   Result Value Ref Range    Glucose 111 (H) 74 - 99 mg/dL    Sodium 132 (L) 136 - 145 mmol/L    Potassium 4.1 3.5 - 5.3 mmol/L    Chloride 98 98 - 107 mmol/L    Bicarbonate 26 21 - 32 mmol/L    Anion Gap 12 10 - 20 mmol/L    Urea Nitrogen 17 6 - 23 mg/dL    Creatinine 0.90 0.50 - 1.30 mg/dL    eGFR >90 >60 mL/min/1.73m*2    Calcium 7.7 (L) 8.6 - 10.3 mg/dL    Phosphorus 3.8 2.5 - 4.9 mg/dL    Albumin 2.2 (L) 3.4 - 5.0 g/dL   Magnesium   Result Value Ref Range    Magnesium 1.79 1.60 - 2.40 mg/dL   CBC   Result Value Ref Range    WBC 11.1 4.4 - 11.3 x10*3/uL    nRBC 0.0 0.0 - 0.0 /100 WBCs    RBC 2.16 (L) 4.50 - 5.90 x10*6/uL    Hemoglobin 6.3 (LL) 13.5 - 17.5 g/dL    Hematocrit 19.9 (L) 41.0 - 52.0 %    MCV 92 80 - 100 fL    MCH 29.2 26.0 - 34.0 pg    MCHC 31.7 (L) 32.0 - 36.0 g/dL    RDW 15.2 (H) 11.5 - 14.5 %    Platelets 322 150 - 450 x10*3/uL   Vancomycin   Result Value Ref Range    Vancomycin 32.9 (H) 5.0 - 20.0 ug/mL   POCT GLUCOSE   Result Value Ref Range    POCT Glucose 118 (H) 74 - 99 mg/dL   Prepare RBC: 2 Units   Result Value Ref Range    PRODUCT CODE T8811A38     Unit Number [id number]     Unit ABO O     Unit RH POS     XM INTEP COMP     Dispense Status TR     Blood Expiration Date December 19, 2023 23:59 EST     PRODUCT BLOOD TYPE 5100     UNIT VOLUME 350     PRODUCT CODE H7833X71      Unit Number [id number]     Unit ABO O     Unit RH POS     XM INTEP COMP     Dispense Status TR     Blood Expiration Date December 19, 2023 23:59 EST     PRODUCT BLOOD TYPE 5100     UNIT VOLUME 350    Type and screen   Result Value Ref Range    ABO TYPE O     Rh TYPE POS     ANTIBODY SCREEN NEG    POCT GLUCOSE   Result Value Ref Range    POCT Glucose 118 (H) 74 - 99 mg/dL   CBC   Result Value Ref Range    WBC 10.0 4.4 - 11.3 x10*3/uL    nRBC 0.0 0.0 - 0.0 /100 WBCs    RBC 2.23 (L) 4.50 - 5.90 x10*6/uL    Hemoglobin 6.4 (LL) 13.5 - 17.5 g/dL    Hematocrit 20.7 (L) 41.0 - 52.0 %    MCV 93 80 - 100 fL    MCH 28.7 26.0 - 34.0 pg    MCHC 30.9 (L) 32.0 - 36.0 g/dL    RDW 15.3 (H) 11.5 - 14.5 %    Platelets 236 150 - 450 x10*3/uL   HIV 1/2 Antigen/Antibody Screen with Reflex to Confirmation   Result Value Ref Range    HIV 1/2 Antigen/Antibody Screen with Reflex to Confirmation Nonreactive Nonreactive   POCT GLUCOSE   Result Value Ref Range    POCT Glucose 141 (H) 74 - 99 mg/dL   CBC   Result Value Ref Range    WBC 9.2 4.4 - 11.3 x10*3/uL    nRBC 0.0 0.0 - 0.0 /100 WBCs    RBC 2.61 (L) 4.50 - 5.90 x10*6/uL    Hemoglobin 7.5 (L) 13.5 - 17.5 g/dL    Hematocrit 23.7 (L) 41.0 - 52.0 %    MCV 91 80 - 100 fL    MCH 28.7 26.0 - 34.0 pg    MCHC 31.6 (L) 32.0 - 36.0 g/dL    RDW 15.1 (H) 11.5 - 14.5 %    Platelets 303 150 - 450 x10*3/uL   CBC   Result Value Ref Range    WBC 8.2 4.4 - 11.3 x10*3/uL    nRBC 0.0 0.0 - 0.0 /100 WBCs    RBC 2.61 (L) 4.50 - 5.90 x10*6/uL    Hemoglobin 7.5 (L) 13.5 - 17.5 g/dL    Hematocrit 23.2 (L) 41.0 - 52.0 %    MCV 89 80 - 100 fL    MCH 28.7 26.0 - 34.0 pg    MCHC 32.3 32.0 - 36.0 g/dL    RDW 15.3 (H) 11.5 - 14.5 %    Platelets 324 150 - 450 x10*3/uL   Comprehensive metabolic panel   Result Value Ref Range    Glucose 95 74 - 99 mg/dL    Sodium 134 (L) 136 - 145 mmol/L    Potassium 3.5 3.5 - 5.3 mmol/L    Chloride 100 98 - 107 mmol/L    Bicarbonate 27 21 - 32 mmol/L    Anion Gap 11 10 - 20 mmol/L     Urea Nitrogen 11 6 - 23 mg/dL    Creatinine 0.76 0.50 - 1.30 mg/dL    eGFR >90 >60 mL/min/1.73m*2    Calcium 7.8 (L) 8.6 - 10.3 mg/dL    Albumin 2.2 (L) 3.4 - 5.0 g/dL    Alkaline Phosphatase 98 33 - 120 U/L    Total Protein 6.0 (L) 6.4 - 8.2 g/dL    AST 28 9 - 39 U/L    Bilirubin, Total 0.7 0.0 - 1.2 mg/dL    ALT 13 10 - 52 U/L   POCT GLUCOSE   Result Value Ref Range    POCT Glucose 95 74 - 99 mg/dL         Intake/Output Summary (Last 24 hours) at 11/22/2023 0837  Last data filed at 11/22/2023 0000  Gross per 24 hour   Intake 1754.58 ml   Output 750 ml   Net 1004.58 ml                  Signature    Osei King M.D.  Director of Spine [place]  [facility]   of Neurological Surgery  [employer]  Office: [phone]  Fax: [phone]

## 2023-11-22 NOTE — PROGRESS NOTES
SW called and spoke with pt wife Harriet to ask about SNF choices. Harriet states that she has not had a chance to review list and asked that SW call back this afternoon. SW to call Harriet back later this afternoon.  ADDED [id number]: SW called and spoke with wife- choices in order of preference: 1. [facility] 2. [facility] and 3. [facility]  ADDED [id number]: Received message from message from [facility] that all 3 facilities can not accept. SW called wife to make aware and ask for additional choices. Wife stated that she wanted to call [facility] and ask them why they could not accept. Wife did ok referral being sent to [facility]. When asked about additional choices, wife said no stating that she would just have to take pt home if that was the case. She does not want [facility].

## 2023-11-22 NOTE — PROGRESS NOTES
Pt will needs for SNF at d/c for long term IV antibiotics. Updated by SW that pt's wife gave SNF choices in order of preference: [facility], [facility], and [facility]. Referrals sent. [facility] already responded that they are unable to accept. Awaiting responses from the others. Pt will need precert.     1443- None of the 3 SNFs that referrals were sent to are able to accept. SW called pt's wife who states referral can be sent to [facility]. Referral sent, pending acceptance. Per SW, patient's wife will also call [facility] SNF as she used to work there in hopes she can get them to accept.     1535- [facility] unable to accept. SW aware and to contact pt's wife for further SNF choices. Therapy evals are pending.       Janet Matta RN

## 2023-11-22 NOTE — PROGRESS NOTES
INPATIENT PROGRESS NOTES    PATIENT NAME: Gm Thrasher    MRN: [id number]  SERVICE DATE:  11/22/2023   SERVICE TIME:  1:49 PM    SIGNATURE: Patricio Reveles MD      SUBJECTIVE  Afebrile  No events overnight       OBJECTIVE  PHYSICAL EXAM:   Patient Vitals for the past 24 hrs:   BP Temp Temp src Pulse Resp SpO2   11/22/23 1200 171/83 36.7 °C (98.1 °F) Temporal 80 18 96 %   11/22/23 0800 128/71 36.4 °C (97.5 °F) Temporal 77 18 98 %   11/22/23 0400 102/58 36.5 °C (97.7 °F) Temporal 71 18 100 %   11/22/23 0000 90/66 36.5 °C (97.7 °F) Temporal 75 18 98 %   11/21/23 2200 100/58 36.4 °C (97.5 °F) Temporal 76 18 --   11/21/23 2000 101/62 36.4 °C (97.5 °F) Temporal 79 18 --   11/21/23 1935 80/65 36.4 °C (97.5 °F) Tympanic 81 18 --   11/21/23 1905 104/58 36.4 °C (97.5 °F) Tympanic 86 20 96 %   11/21/23 1904 104/58 36.4 °C (97.5 °F) Tympanic 86 20 96 %   11/21/23 1849 101/58 36.5 °C (97.7 °F) Tympanic 77 20 97 %   11/21/23 1545 104/86 36.1 °C (97 °F) Tympanic 97 20 98 %   11/21/23 1530 106/82 35.9 °C (96.6 °F) Tympanic 98 20 99 %   11/21/23 1430 100/64 36.2 °C (97.2 °F) Tympanic 86 18 97 %   11/21/23 1400 92/88 36.1 °C (97 °F) Tympanic 92 20 97 %         Gen: NAD  Neck: symmetric, no mass  Cardiovascular: RRR  Respiratory: No distress   GI: Abd soft, nontender, non-distended  Extremities: no leg edema  Skin: wound vac to the lumbar spine with drainage , no erythema or warmth   Neuro: alert and oriented times 3    Labs:  Lab Results   Component Value Date    WBC 8.2 11/22/2023    HGB 7.5 (L) 11/22/2023    HCT 23.2 (L) 11/22/2023    MCV 89 11/22/2023     11/22/2023     Lab Results   Component Value Date    GLUCOSE 95 11/22/2023    CALCIUM 7.8 (L) 11/22/2023     (L) 11/22/2023    K 3.5 11/22/2023    CO2 27 11/22/2023     11/22/2023    BUN 11 11/22/2023    CREATININE 0.76 11/22/2023   ESR: --  Lab Results   Component Value Date    SEDRATE 55 (H) 11/20/2023     Lab Results   Component Value Date    CRP 18.12 (H)  11/20/2023     Lab Results   Component Value Date    ALT 13 11/22/2023    AST 28 11/22/2023    ALKPHOS 98 11/22/2023    BILITOT 0.7 11/22/2023         DATA:   Diagnostic tests reviewed for today's visit:    Labs this admission reviewed  Imagings this admission reviewed  Cultures: Reviewed        ASSESSMENT :   #Postoperative lumbar epidural phlegmon s/p wound exploration and washout on 11/20/2023  #Postoperative site infection s/p drainage and Bactrim course 11/9/2023  -Tissue culture from 11/20/2023 prior to surgery grew Staph aureus    PLAN:  -Continue Vanco for now until further culture information is available  -If MSSA positive, will consider switching to cefazolin  -No indication for rifampin potential DDI  -Blood cultures negative to date  -HIV negative  -Follow-up blood and ordered drainage/fluid cultures  -With previous Bactrim course there is a likelihood of negative blood cultures  -Ordered PICC line long-term ABX use (6 weeks of antibiotics upon discharge with end date 1/1/2024)  -Patient might need to go to SNF after discharge for rehab and ABX treatment through PICC line  -While on IV antibiotics please check weekly BUN/creatinine, CBC with differential, and LFTs     Will continue to follow     Discussed with attending,  Patricio Reveles M.D. PGY-2  Internal Medicine    This note has been transcribed using Dragon voice recognition system and there is a possibility of unintentional typing misprints.  Any information found to be copied from previous providers is done in the best interest of the patient to provide accurate, quality, and continuity of care.

## 2023-11-22 NOTE — CARE PLAN
The patient's goals for the shift include      The clinical goals for the shift include Pt will have [facility] address with orders by end of shift 11/21/23      Problem: Pain - Adult  Goal: Verbalizes/displays adequate comfort level or baseline comfort level  Outcome: Progressing     Problem: Discharge Planning  Goal: Discharge to home or other facility with appropriate resources  Outcome: Progressing     Problem: Chronic Conditions and Co-morbidities  Goal: Patient's chronic conditions and co-morbidity symptoms are monitored and maintained or improved  Outcome: Progressing     Problem: Diabetes  Goal: Achieve decreasing blood glucose levels by end of shift  Outcome: Met  Goal: Increase stability of blood glucose readings by end of shift  Outcome: Met  Goal: Decrease in ketones present in urine by end of shift  Outcome: Met  Goal: Maintain electrolyte levels within acceptable range throughout shift  Outcome: Met  Goal: Maintain glucose levels >70mg/dl to <250mg/dl throughout shift  Outcome: Met  Goal: No changes in neurological exam by end of shift  Outcome: Met  Goal: Learn about and adhere to nutrition recommendations by end of shift  Outcome: Met  Goal: Vital signs within normal range for age by end of shift  Outcome: Met  Goal: Increase self care and/or family involovement by end of shift  Outcome: Met  Goal: Receive DSME education by end of shift  Outcome: Met     Problem: Pain  Goal: Takes deep breaths with improved pain control throughout the shift  Outcome: Met  Goal: Turns in bed with improved pain control throughout the shift  Outcome: Met  Goal: Walks with improved pain control throughout the shift  Outcome: Met  Goal: Performs ADL's with improved pain control throughout shift  Outcome: Met  Goal: Participates in PT with improved pain control throughout the shift  Outcome: Met  Goal: Free from opioid side effects throughout the shift  Outcome: Met  Goal: Free from acute confusion related to pain meds throughout  the shift  Outcome: Met

## 2023-11-22 NOTE — PROGRESS NOTES
Gm Thrasher is a 58 y.o. male on day 2 of admission presenting with Wound dehiscence.      Subjective   Patient seen and examined at bedside. Much more oriented today than on previous visits. AO x3, able to answer my questions. No current complaints at this time. Overnight, had lots of drainage from wound vac. Neurosurgery evaluated patient this AM, ordered CT head due to suspicion of midline shift. CT head negative. Will get afternoon CBC in order to track hemoglobin, received 2 units yesterday      Objective     Last Recorded Vitals  /83 (BP Location: Left arm, Patient Position: Lying)   Pulse 80   Temp 36.7 °C (98.1 °F) (Temporal)   Resp 18   Wt 101 kg (222 lb 3.6 oz)   SpO2 96%   Intake/Output last 3 Shifts:    Intake/Output Summary (Last 24 hours) at 11/22/2023 1317  Last data filed at 11/22/2023 1200  Gross per 24 hour   Intake 1514.58 ml   Output 1465 ml   Net 49.58 ml       Admission Weight  Weight: 111 kg (245 lb) (11/20/23 0151)    Daily Weight  11/20/23 : 101 kg (222 lb 3.6 oz)    Image Results  Bedside PICC Imaging  These images are not reportable by radiology and will not be interpreted   by  Radiologists.  CT head wo IV contrast  Narrative: Interpreted By:  Poncho Castellon,   STUDY:  CT HEAD WO IV CONTRAST;  11/22/2023 9:32 am      INDICATION:  Signs/Symptoms:s/p spinal surgery with high amount of drainage from  wound vac.      COMPARISON:  11/21/2023      ACCESSION NUMBER(S):  [id number]      ORDERING CLINICIAN:  KARYNA NAIK      TECHNIQUE:  Noncontrast axial CT scan of head was performed. Angled reformats in  brain and bone windows were generated. The images were reviewed in  bone, brain, blood and soft tissue windows.      FINDINGS:  No acute edema. No acute hemorrhage. No mass effect. Ventricular  system is normal for age. No extra-axial fluid collections. Orbits  are normal. Mucoperiosteal thickening right maxillary sinus.          Impression: No acute findings.      Signed  by: Poncho Cande 11/22/2023 10:34 AM  Dictation workstation:   [id number]      Physical Exam  Constitutional:       General: He is not in acute distress.     Appearance: He is obese. He is not ill-appearing or diaphoretic.   HENT:      Head: Normocephalic and atraumatic.   Eyes:      General: No scleral icterus.  Cardiovascular:      Rate and Rhythm: Normal rate and regular rhythm.      Heart sounds: Normal heart sounds. No murmur heard.     No friction rub. No gallop.   Pulmonary:      Effort: Pulmonary effort is normal. No respiratory distress.      Breath sounds: Normal breath sounds. No stridor. No wheezing, rhonchi or rales.   Abdominal:      General: Bowel sounds are normal.      Palpations: Abdomen is soft.   Musculoskeletal:         General: Signs of injury present.   Skin:     General: Skin is warm.      Coloration: Skin is not jaundiced.   Neurological:      Mental Status: He is oriented     Cranial Nerves: Cranial nerve deficit present.   Psychiatric:         Mood and Affect: Mood normal.        Relevant Results        Scheduled medications  [Held by provider] aspirin, 81 mg, oral, Daily  [Held by provider] buprenorphine-naloxone, 3 tablet, sublingual, Daily  [Held by provider] clopidogrel, 75 mg, oral, Daily  escitalopram, 20 mg, oral, Daily  [Held by provider] icosapent ethyL, 2 g, oral, BID with meals  lidocaine, 5 mL, infiltration, Once  polyethylene glycol, 17 g, oral, Daily  pregabalin, 300 mg, oral, BID  tamsulosin, 0.4 mg, oral, Daily  vancomycin, 1,000 mg, intravenous, q12h      Continuous medications     PRN medications  PRN medications: alteplase, haloperidol lactate, HYDROmorphone, oxyCODONE, oxyCODONE, promethazine  Results for orders placed or performed during the hospital encounter of 11/20/23 (from the past 24 hour(s))   CBC   Result Value Ref Range    WBC 10.0 4.4 - 11.3 x10*3/uL    nRBC 0.0 0.0 - 0.0 /100 WBCs    RBC 2.23 (L) 4.50 - 5.90 x10*6/uL    Hemoglobin 6.4 (LL) 13.5 - 17.5  g/dL    Hematocrit 20.7 (L) 41.0 - 52.0 %    MCV 93 80 - 100 fL    MCH 28.7 26.0 - 34.0 pg    MCHC 30.9 (L) 32.0 - 36.0 g/dL    RDW 15.3 (H) 11.5 - 14.5 %    Platelets 236 150 - 450 x10*3/uL   HIV 1/2 Antigen/Antibody Screen with Reflex to Confirmation   Result Value Ref Range    HIV 1/2 Antigen/Antibody Screen with Reflex to Confirmation Nonreactive Nonreactive   POCT GLUCOSE   Result Value Ref Range    POCT Glucose 141 (H) 74 - 99 mg/dL   CBC   Result Value Ref Range    WBC 9.2 4.4 - 11.3 x10*3/uL    nRBC 0.0 0.0 - 0.0 /100 WBCs    RBC 2.61 (L) 4.50 - 5.90 x10*6/uL    Hemoglobin 7.5 (L) 13.5 - 17.5 g/dL    Hematocrit 23.7 (L) 41.0 - 52.0 %    MCV 91 80 - 100 fL    MCH 28.7 26.0 - 34.0 pg    MCHC 31.6 (L) 32.0 - 36.0 g/dL    RDW 15.1 (H) 11.5 - 14.5 %    Platelets 303 150 - 450 x10*3/uL   CBC   Result Value Ref Range    WBC 8.2 4.4 - 11.3 x10*3/uL    nRBC 0.0 0.0 - 0.0 /100 WBCs    RBC 2.61 (L) 4.50 - 5.90 x10*6/uL    Hemoglobin 7.5 (L) 13.5 - 17.5 g/dL    Hematocrit 23.2 (L) 41.0 - 52.0 %    MCV 89 80 - 100 fL    MCH 28.7 26.0 - 34.0 pg    MCHC 32.3 32.0 - 36.0 g/dL    RDW 15.3 (H) 11.5 - 14.5 %    Platelets 324 150 - 450 x10*3/uL   Comprehensive metabolic panel   Result Value Ref Range    Glucose 95 74 - 99 mg/dL    Sodium 134 (L) 136 - 145 mmol/L    Potassium 3.5 3.5 - 5.3 mmol/L    Chloride 100 98 - 107 mmol/L    Bicarbonate 27 21 - 32 mmol/L    Anion Gap 11 10 - 20 mmol/L    Urea Nitrogen 11 6 - 23 mg/dL    Creatinine 0.76 0.50 - 1.30 mg/dL    eGFR >90 >60 mL/min/1.73m*2    Calcium 7.8 (L) 8.6 - 10.3 mg/dL    Albumin 2.2 (L) 3.4 - 5.0 g/dL    Alkaline Phosphatase 98 33 - 120 U/L    Total Protein 6.0 (L) 6.4 - 8.2 g/dL    AST 28 9 - 39 U/L    Bilirubin, Total 0.7 0.0 - 1.2 mg/dL    ALT 13 10 - 52 U/L   POCT GLUCOSE   Result Value Ref Range    POCT Glucose 95 74 - 99 mg/dL   POCT GLUCOSE   Result Value Ref Range    POCT Glucose 101 (H) 74 - 99 mg/dL   Vancomycin   Result Value Ref Range    Vancomycin 20.8 (H)  5.0 - 20.0 ug/mL   Bedside PICC Imaging    Result Date: 11/22/2023  These images are not reportable by radiology and will not be interpreted by  Radiologists.    CT head wo IV contrast    Result Date: 11/22/2023  Interpreted By:  Poncho Castellon, STUDY: CT HEAD WO IV CONTRAST;  11/22/2023 9:32 am   INDICATION: Signs/Symptoms:s/p spinal surgery with high amount of drainage from wound vac.   COMPARISON: 11/21/2023   ACCESSION NUMBER(S): [id number]   ORDERING CLINICIAN: KARYNA NAIK   TECHNIQUE: Noncontrast axial CT scan of head was performed. Angled reformats in brain and bone windows were generated. The images were reviewed in bone, brain, blood and soft tissue windows.   FINDINGS: No acute edema. No acute hemorrhage. No mass effect. Ventricular system is normal for age. No extra-axial fluid collections. Orbits are normal. Mucoperiosteal thickening right maxillary sinus.         No acute findings.   Signed by: Poncho Castellon 11/22/2023 10:34 AM Dictation workstation:   [id number]    CT brain attack head wo IV contrast    Result Date: 11/21/2023  Interpreted By:  Martin Mathis, STUDY: CT BRAIN ATTACK HEAD WO IV CONTRAST;  11/21/2023 1:04 am   INDICATION: Signs/Symptoms:Dilated pupil.   COMPARISON: 10/13/2023   ACCESSION NUMBER(S): [id number]   ORDERING CLINICIAN: NESHA GRIDER   TECHNIQUE: Axial noncontrast CT images of the head. Sagittal and coronal reformats were provided.   FINDINGS: BRAIN: No acute intracranial hemorrhage. No mass effect or midline shift. Gray-white matter interfaces are preserved. VENTRICLES and EXTRA-AXIAL SPACES: Normal. EXTRACRANIAL SOFT TISSUES:  Within normal limits. PARANASAL SINUSES/MASTOIDS: Mild paranasal sinus mucosal thickening. Polypoid right maxillary sinus mucosal thickening. Mastoids are clear. BONES AND ORBITS: No displaced skull fracture. No suspicious osseous lesion.  Orbits are within normal limits. OTHER FINDINGS: None.       1. No acute intracranial hemorrhage  or mass effect.   Martin Mathis discussed the significance and urgency of this critical finding by telephone with  Dr. Lawson on 11/21/2023 at 4:11 am.  (**-RCF-**) Findings:  See findings.     Signed by: Martin Mathis 11/21/2023 4:11 AM Dictation workstation:   [id number]         Assessment/Plan     Principal Problem:    Wound dehiscence  Active Problems:    CAD (coronary artery disease)    58 year-old male with a PMH with lumbar decompression and spinal fusion surgery complicated by postoperative subarachnoid hemorrhage and subdural hematoma performed (9/29/23), presented with wound dehiscence requiring lumbar exploration and washout. Neurosurgery on consult     #Multiple lumbar post operative fluid collections with mass effect  -MRI and CT scan as discussed above. Repeat CT head imaging ordered due to concern for midline shift, results negative for any acute pathology  -Lumbar wound washout procedure performed by neurosurgeon Dr. Vitaly Amezcua   -Wound culture preliminary result revealed +2 staphylococcus aureus. Blood cultures NTD  -Continue IV Vancomycin pending final wound culture results, PICC line placed     #Left pupil dilation   -A brain attack was called at 12:45 AM 11/21 due to AMS and left pupil dilation. CT scan of head w/o contrast revealed no intracranial abnormalities.  -Patient was AOx0 with left pupil dilation, but mental status improved to AOx3.  -No visual disturbances, pain, or acute concerns overall.  -Will perform neuro checks every 4 hours to monitor symptoms  -Psych consulted, appreciate recs    #H/o NSTEMI (10/13/23)  -PCI to RCA  -ASA and Plavix have been held during this admission due to operation  -Resume ASA POD #3 (11/23)  -Resume Plavix POD #7 (11/27)     #NIDDM2  -A1C 5.7  -daily RFP glucose      Diet: Diabetic  DVT Ppx: SCD   Code: Full    Dispo: 58 year old male admitted for lumbar decompression and spinal fusion complications. Neurosurgery on consult. Anticipate  2 more midnights in the hospital    Staffed with Dr. Ariadne Mathew MD  Internal Medicine, PGY-3

## 2023-11-22 NOTE — PROGRESS NOTES
"Gm Thrasher is a 58 y.o. male on day 2 of admission presenting with Wound dehiscence.    SUBJECTIVE:  Pt report feeling better  Mood better, anxious about discharge  Has been cooperative with all care  Pt denies abusing any of his prescribed medication  Has been on Suboxone for many years for opiate dependence and has been sober for at least 2 years  Pt is currently oxycodone for pain relief.  No SI or HI  No AH or VH    Exam:  Vital Signs: /83 (BP Location: Left arm, Patient Position: Lying)   Pulse 80   Temp 36.7 °C (98.1 °F) (Temporal)   Resp 18   Ht 1.803 m (5' 11\")   Wt 101 kg (222 lb 3.6 oz)   SpO2 96%   BMI 30.99 kg/m²   Musculoskeletal: Muscle tone: WNL  Gait/Station: normal      Mental Status Exam:    Speech: normal pitch  Mood: not depressed  Affect: appropriate  Thought Process: Linear, goal directed  Thought Associations: No loosening of associations  Thought Content: normal  Perception: No perceptual abnormalities noted  Level of Consciousness: Alert  Orientation: Alert and oriented to person, place, time and situation  Attention and Concentration: Intact  Cognition: intact  Insight: fair  Judgment: fair     Results for orders placed or performed during the hospital encounter of 11/20/23 (from the past 96 hour(s))   C-reactive protein   Result Value Ref Range    C-Reactive Protein 18.12 (H) <1.00 mg/dL   Renal Function Panel   Result Value Ref Range    Glucose 88 74 - 99 mg/dL    Sodium 135 (L) 136 - 145 mmol/L    Potassium 3.9 3.5 - 5.3 mmol/L    Chloride 100 98 - 107 mmol/L    Bicarbonate 23 21 - 32 mmol/L    Anion Gap 16 10 - 20 mmol/L    Urea Nitrogen 14 6 - 23 mg/dL    Creatinine 0.92 0.50 - 1.30 mg/dL    eGFR >90 >60 mL/min/1.73m*2    Calcium 8.1 (L) 8.6 - 10.3 mg/dL    Phosphorus 4.0 2.5 - 4.9 mg/dL    Albumin 2.6 (L) 3.4 - 5.0 g/dL   Magnesium   Result Value Ref Range    Magnesium 1.75 1.60 - 2.40 mg/dL   Sedimentation Rate   Result Value Ref Range    Sedimentation Rate 55 (H) 0 " - 20 mm/h   CBC and Auto Differential   Result Value Ref Range    WBC 9.2 4.4 - 11.3 x10*3/uL    nRBC 0.0 0.0 - 0.0 /100 WBCs    RBC 3.10 (L) 4.50 - 5.90 x10*6/uL    Hemoglobin 8.9 (L) 13.5 - 17.5 g/dL    Hematocrit 28.1 (L) 41.0 - 52.0 %    MCV 91 80 - 100 fL    MCH 28.7 26.0 - 34.0 pg    MCHC 31.7 (L) 32.0 - 36.0 g/dL    RDW 15.2 (H) 11.5 - 14.5 %    Platelets 320 150 - 450 x10*3/uL    Neutrophils % 68.6 40.0 - 80.0 %    Immature Granulocytes %, Automated 1.9 (H) 0.0 - 0.9 %    Lymphocytes % 22.4 13.0 - 44.0 %    Monocytes % 6.4 2.0 - 10.0 %    Eosinophils % 0.3 0.0 - 6.0 %    Basophils % 0.4 0.0 - 2.0 %    Neutrophils Absolute 6.33 1.20 - 7.70 x10*3/uL    Immature Granulocytes Absolute, Automated 0.18 0.00 - 0.70 x10*3/uL    Lymphocytes Absolute 2.07 1.20 - 4.80 x10*3/uL    Monocytes Absolute 0.59 0.10 - 1.00 x10*3/uL    Eosinophils Absolute 0.03 0.00 - 0.70 x10*3/uL    Basophils Absolute 0.04 0.00 - 0.10 x10*3/uL   Urinalysis with Reflex Microscopic and Culture   Result Value Ref Range    Color, Urine Rosemarie (N) Straw, Yellow    Appearance, Urine Clear Clear    Specific Gravity, Urine 1.033 1.005 - 1.035    pH, Urine 6.0 5.0, 5.5, 6.0, 6.5, 7.0, 7.5, 8.0    Protein, Urine 30 (1+) (N) NEGATIVE mg/dL    Glucose, Urine NEGATIVE NEGATIVE mg/dL    Blood, Urine MODERATE (2+) (A) NEGATIVE    Ketones, Urine NEGATIVE NEGATIVE mg/dL    Bilirubin, Urine NEGATIVE NEGATIVE    Urobilinogen, Urine 4.0 (N) <2.0 mg/dL    Nitrite, Urine NEGATIVE NEGATIVE    Leukocyte Esterase, Urine NEGATIVE NEGATIVE   Extra Urine Gray Tube   Result Value Ref Range    Extra Tube Hold for add-ons.    Urinalysis Microscopic   Result Value Ref Range    WBC, Urine NONE 1-5, NONE /HPF    RBC, Urine >20 (A) NONE, 1-2, 3-5 /HPF   Blood Culture    Specimen: Peripheral Venipuncture; Blood culture   Result Value Ref Range    Blood Culture No growth at 1 day    Blood Culture    Specimen: Peripheral Venipuncture; Blood culture   Result Value Ref Range     Blood Culture No growth at 1 day    Tissue/Wound Culture/Smear    Specimen: Skin/Superficial Abscess; Tissue/Biopsy   Result Value Ref Range    Tissue/Wound Culture/Smear (2+) Few Staphylococcus aureus (A)     Gram Stain No polymorphonuclear leukocytes seen     Gram Stain No organisms seen    Drug Screen, Urine   Result Value Ref Range    Amphetamine Screen, Urine Presumptive Negative Presumptive Negative    Barbiturate Screen, Urine Presumptive Negative Presumptive Negative    Benzodiazepines Screen, Urine Presumptive Negative Presumptive Negative    Cannabinoid Screen, Urine Presumptive Negative Presumptive Negative    Cocaine Metabolite Screen, Urine Presumptive Negative Presumptive Negative    Fentanyl Screen, Urine Presumptive Positive (A) Presumptive Negative    Opiate Screen, Urine Presumptive Positive (A) Presumptive Negative    Oxycodone Screen, Urine Presumptive Negative Presumptive Negative    PCP Screen, Urine Presumptive Negative Presumptive Negative   Tissue/Wound Culture/Smear    Specimen: SPINE; Tissue   Result Value Ref Range    Tissue/Wound Culture/Smear (1+) Rare Staphylococcus aureus (A)     Gram Stain No organisms seen     Gram Stain (2+) Few Polymorphonuclear leukocytes    Tissue/Wound Culture/Smear    Specimen: SOFT TISSUE RESECTION   Result Value Ref Range    Tissue/Wound Culture/Smear (2+) Few Staphylococcus aureus (A)     Gram Stain No polymorphonuclear leukocytes seen     Gram Stain No organisms seen    POCT GLUCOSE   Result Value Ref Range    POCT Glucose 115 (H) 74 - 99 mg/dL   POCT GLUCOSE   Result Value Ref Range    POCT Glucose 115 (H) 74 - 99 mg/dL   Renal Function Panel   Result Value Ref Range    Glucose 111 (H) 74 - 99 mg/dL    Sodium 132 (L) 136 - 145 mmol/L    Potassium 4.1 3.5 - 5.3 mmol/L    Chloride 98 98 - 107 mmol/L    Bicarbonate 26 21 - 32 mmol/L    Anion Gap 12 10 - 20 mmol/L    Urea Nitrogen 17 6 - 23 mg/dL    Creatinine 0.90 0.50 - 1.30 mg/dL    eGFR >90 >60  mL/min/1.73m*2    Calcium 7.7 (L) 8.6 - 10.3 mg/dL    Phosphorus 3.8 2.5 - 4.9 mg/dL    Albumin 2.2 (L) 3.4 - 5.0 g/dL   Magnesium   Result Value Ref Range    Magnesium 1.79 1.60 - 2.40 mg/dL   CBC   Result Value Ref Range    WBC 11.1 4.4 - 11.3 x10*3/uL    nRBC 0.0 0.0 - 0.0 /100 WBCs    RBC 2.16 (L) 4.50 - 5.90 x10*6/uL    Hemoglobin 6.3 (LL) 13.5 - 17.5 g/dL    Hematocrit 19.9 (L) 41.0 - 52.0 %    MCV 92 80 - 100 fL    MCH 29.2 26.0 - 34.0 pg    MCHC 31.7 (L) 32.0 - 36.0 g/dL    RDW 15.2 (H) 11.5 - 14.5 %    Platelets 322 150 - 450 x10*3/uL   Vancomycin   Result Value Ref Range    Vancomycin 32.9 (H) 5.0 - 20.0 ug/mL   POCT GLUCOSE   Result Value Ref Range    POCT Glucose 118 (H) 74 - 99 mg/dL   Prepare RBC: 2 Units   Result Value Ref Range    PRODUCT CODE E4857Y15     Unit Number [id number]     Unit ABO O     Unit RH POS     XM INTEP COMP     Dispense Status TR     Blood Expiration Date December 19, 2023 23:59 EST     PRODUCT BLOOD TYPE 5100     UNIT VOLUME 350     PRODUCT CODE O5983O92     Unit Number [id number]     Unit ABO O     Unit RH POS     XM INTEP COMP     Dispense Status TR     Blood Expiration Date December 19, 2023 23:59 EST     PRODUCT BLOOD TYPE 5100     UNIT VOLUME 350    Type and screen   Result Value Ref Range    ABO TYPE O     Rh TYPE POS     ANTIBODY SCREEN NEG    POCT GLUCOSE   Result Value Ref Range    POCT Glucose 118 (H) 74 - 99 mg/dL   CBC   Result Value Ref Range    WBC 10.0 4.4 - 11.3 x10*3/uL    nRBC 0.0 0.0 - 0.0 /100 WBCs    RBC 2.23 (L) 4.50 - 5.90 x10*6/uL    Hemoglobin 6.4 (LL) 13.5 - 17.5 g/dL    Hematocrit 20.7 (L) 41.0 - 52.0 %    MCV 93 80 - 100 fL    MCH 28.7 26.0 - 34.0 pg    MCHC 30.9 (L) 32.0 - 36.0 g/dL    RDW 15.3 (H) 11.5 - 14.5 %    Platelets 236 150 - 450 x10*3/uL   HIV 1/2 Antigen/Antibody Screen with Reflex to Confirmation   Result Value Ref Range    HIV 1/2 Antigen/Antibody Screen with Reflex to Confirmation Nonreactive Nonreactive   POCT GLUCOSE   Result  Value Ref Range    POCT Glucose 141 (H) 74 - 99 mg/dL   CBC   Result Value Ref Range    WBC 9.2 4.4 - 11.3 x10*3/uL    nRBC 0.0 0.0 - 0.0 /100 WBCs    RBC 2.61 (L) 4.50 - 5.90 x10*6/uL    Hemoglobin 7.5 (L) 13.5 - 17.5 g/dL    Hematocrit 23.7 (L) 41.0 - 52.0 %    MCV 91 80 - 100 fL    MCH 28.7 26.0 - 34.0 pg    MCHC 31.6 (L) 32.0 - 36.0 g/dL    RDW 15.1 (H) 11.5 - 14.5 %    Platelets 303 150 - 450 x10*3/uL   CBC   Result Value Ref Range    WBC 8.2 4.4 - 11.3 x10*3/uL    nRBC 0.0 0.0 - 0.0 /100 WBCs    RBC 2.61 (L) 4.50 - 5.90 x10*6/uL    Hemoglobin 7.5 (L) 13.5 - 17.5 g/dL    Hematocrit 23.2 (L) 41.0 - 52.0 %    MCV 89 80 - 100 fL    MCH 28.7 26.0 - 34.0 pg    MCHC 32.3 32.0 - 36.0 g/dL    RDW 15.3 (H) 11.5 - 14.5 %    Platelets 324 150 - 450 x10*3/uL   Comprehensive metabolic panel   Result Value Ref Range    Glucose 95 74 - 99 mg/dL    Sodium 134 (L) 136 - 145 mmol/L    Potassium 3.5 3.5 - 5.3 mmol/L    Chloride 100 98 - 107 mmol/L    Bicarbonate 27 21 - 32 mmol/L    Anion Gap 11 10 - 20 mmol/L    Urea Nitrogen 11 6 - 23 mg/dL    Creatinine 0.76 0.50 - 1.30 mg/dL    eGFR >90 >60 mL/min/1.73m*2    Calcium 7.8 (L) 8.6 - 10.3 mg/dL    Albumin 2.2 (L) 3.4 - 5.0 g/dL    Alkaline Phosphatase 98 33 - 120 U/L    Total Protein 6.0 (L) 6.4 - 8.2 g/dL    AST 28 9 - 39 U/L    Bilirubin, Total 0.7 0.0 - 1.2 mg/dL    ALT 13 10 - 52 U/L   POCT GLUCOSE   Result Value Ref Range    POCT Glucose 95 74 - 99 mg/dL   POCT GLUCOSE   Result Value Ref Range    POCT Glucose 101 (H) 74 - 99 mg/dL   Vancomycin   Result Value Ref Range    Vancomycin 20.8 (H) 5.0 - 20.0 ug/mL   CBC   Result Value Ref Range    WBC 9.4 4.4 - 11.3 x10*3/uL    nRBC 0.0 0.0 - 0.0 /100 WBCs    RBC 2.84 (L) 4.50 - 5.90 x10*6/uL    Hemoglobin 8.2 (L) 13.5 - 17.5 g/dL    Hematocrit 25.5 (L) 41.0 - 52.0 %    MCV 90 80 - 100 fL    MCH 28.9 26.0 - 34.0 pg    MCHC 32.2 32.0 - 36.0 g/dL    RDW 15.3 (H) 11.5 - 14.5 %    Platelets 351 150 - 450 x10*3/uL              Impression:    Opiate dependence in remission on suboxone treatment       Recommendations:    1. Pt is currently needing Opiates for pain relief and so suboxone is being held  2. Can restart suboxone when is acute pain issues are resolved  3. Pt can follow up with his suboxone provider for treatment on discharge  4. Psych signing off    Please call me if needed  Thank you for allowing us to participate in the care of this patient.       Robbi Duval MD  11/22/2023  2:30 PM

## 2023-11-22 NOTE — DOCUMENTATION CLARIFICATION NOTE
"    PATIENT:               JESUS METCALF  ACCT #:                  [id number]  MRN:                       [id number]  :                       1965  ADMIT DATE:       2023 1:26 AM  DISCH DATE:  RESPONDING PROVIDER #:        [id number]          PROVIDER RESPONSE TEXT:    Multifactorial encephalopathy 2/2 Metabolic encephalopathy 2/2 postoperative site infection and toxic encephalopathy 2/2 opiate abuse    CDI QUERY TEXT:    [facility]_Altered Mental Status    Instruction:    Based on your assessment of the patient and the clinical information, please provide the requested documentation by clicking on the appropriate radio button and enter any additional information if prompted.    Question: Please further clarify the most likely etiology of the altered mental status as    When answering this query, please exercise your independent professional judgment. The fact that a question is being asked, does not imply that any particular answer is desired or expected.    The patient's clinical indicators include:  Clinical Information: 59 y/o M admitted with postoperative wound infection    Clinical Indicators:    ED note  Dr. Rios: \"presents to the ED for a wound check. Dr. Amezcua started him on an oral antibiotic but he presented today with confusion, altered mental status.  Wound dehiscence, epidural abscess\"    H/P  Dr. Negrete: \"Multiple lumbar post operative fluid collections.  During my examination, patient was mildly confused and was shaking however he states that he has a tremor.  He does have a history of supposed to abuse in the past, we did obtain a urine drug screen which was positive for opiates and fentanyl, he has received IV narcotic medication\"    ID consult  and PN  Dr. Browning: \"Postoperative lumbar fluid collections, Postoperative site infection.  Neuro: alert oriented times 3\"    significant event : \"Brain attack for AMS and dilated L pupil. Arrived to PACU altered and confused.  " "On exam NIH 0, answers questions appropriately but appears intermittently confused. CT brain attack negative\"    nursing note 11/21: \"Pt is still confused and trying to pull out his cruz. Pt. Has been fidgeting, talking to himself, grabbing at thing in the air, that's not there\"    MD PN 11/21 Dr. carey: \"AMS.  Patient appears more alert today.  Patient was + fentanyl on UDS, he adamantly declines drug abuse but does have a hx. We did search records at [facility]EMS and did not see fentanyl ordered besides at surgery last night which was after the UDS was done\"    psychiatry consult 11/21 Dr. Duval: \"Delirium, ?  Opiate abuse to be ruled out.  Patient is a poor historian secondary to his current mental status.  He is alert and oriented x1.  He was positive for fentanyl and opiates in the urine drug screen.  Patient has been getting irritable and agitated\"    Treatment: psychiatry consult, IV ativan, IV zosyn, IV vancomycin, PRN haldol    Risk Factors: post op infection, AMS, delirium, opiate abuse  Options provided:  -- Metabolic encephalopathy 2/2 postoperative site infection  -- Toxic encephalopathy 2/2 opiate abuse  -- Multifactorial encephalopathy 2/2 Metabolic encephalopathy 2/2 postoperative site infection and toxic encephalopathy 2/2 opiate abuse  -- Other - I will add my own diagnosis  -- Refer to Clinical Documentation Reviewer    Query created by: Malka Velez on 11/22/2023 9:00 AM      Electronically signed by:  COLBY CAREY DO 11/22/2023 12:41 PM          "

## 2023-11-22 NOTE — ED NOTES
[facility]   Emergency Department Culture Follow-Up       Blaise Lozano (CSN: [id number]) was seen and evaluated at [facility] Emergency Department on 11/19 by provider Dr. Thierry Carbajal. CULTURE RESULT TYPE: A wound culture was positive and is growing see below. Susceptibility     Enterobacter cloacae complex     BACTERIAL SUSCEPTIBILITY PANEL BY BISI BACTERIAL SUSCEPTIBILITY PANEL BY MOODY GARCÍA      ceFAZolin >=64 mcg/mL Resistant      cefTRIAXone   Sensitive     gentamicin <=1 mcg/mL Sensitive      levofloxacin <=0.12 mcg/mL Sensitive      piperacillin-tazobactam <=4 mcg/mL Sensitive      trimethoprim-sulfamethoxazole >=320 mcg/mL Resistant          Treatment Course: The patient was treated in the ED and transferred to [facility]. Recommendation:    Results forwarded. Follow-Up:    The patient's care facility was contacted and notified of the results. Results were faxed by: this pharmacist to facility: [facility]. Facility fax number: [phone].     Thank you,    Dimas Bishop, PharmD 11/22/2023 5:19 PM  Clinical Pharmacy Specialist, Emergency Medicine  [phone]

## 2023-11-22 NOTE — PROGRESS NOTES
Vancomycin Dosing by Pharmacy- Cessation of Therapy    Consult to pharmacy for vancomycin dosing has been discontinued by the prescriber, pharmacy will sign off at this time.    Please call pharmacy if there are further questions or re-enter a consult if vancomycin is resumed.     Yocasta More, PharmD

## 2023-11-22 NOTE — CARE PLAN
Problem: Pain - Adult  Goal: Verbalizes/displays adequate comfort level or baseline comfort level  Outcome: Met     Problem: Skin  Goal: Participates in plan/prevention/treatment measures  Outcome: Met  Goal: Prevent/manage excess moisture  11/22/2023 1757 by Manav Reynoso RN  Outcome: Met  11/22/2023 1130 by Manav Reynoso RN  Flowsheets (Taken 11/22/2023 1130)  Prevent/manage excess moisture: Follow provider orders for dressing changes     Problem: Fall/Injury  Goal: Verbalize understanding of personal risk factors for fall in the hospital  Outcome: Met       The clinical goals for the shift include patient will remain stable this shift    Over the shift, the patient did make progress toward the following goals.

## 2023-11-22 NOTE — NURSING NOTE
Dr. King in to round on patient and pulled preveena drain at bedside. Dry dressing applied to back by Dr. King. Patient in bed resting respirations even and un-labored. Teach team updated on patient care

## 2023-11-23 LAB
ALBUMIN SERPL BCP-MCNC: 2.3 G/DL (ref 3.4–5)
ALP SERPL-CCNC: 95 U/L (ref 33–120)
ALT SERPL W P-5'-P-CCNC: 12 U/L (ref 10–52)
ANION GAP SERPL CALC-SCNC: 12 MMOL/L (ref 10–20)
AST SERPL W P-5'-P-CCNC: 28 U/L (ref 9–39)
BACTERIA SPEC CULT: ABNORMAL
BACTERIA SPEC CULT: ABNORMAL
BILIRUB SERPL-MCNC: 0.5 MG/DL (ref 0–1.2)
BUN SERPL-MCNC: 9 MG/DL (ref 6–23)
CALCIUM SERPL-MCNC: 7.9 MG/DL (ref 8.6–10.3)
CHLORIDE SERPL-SCNC: 98 MMOL/L (ref 98–107)
CO2 SERPL-SCNC: 27 MMOL/L (ref 21–32)
CREAT SERPL-MCNC: 0.75 MG/DL (ref 0.5–1.3)
ERYTHROCYTE [DISTWIDTH] IN BLOOD BY AUTOMATED COUNT: 14.7 % (ref 11.5–14.5)
GFR SERPL CREATININE-BSD FRML MDRD: >90 ML/MIN/1.73M*2
GLUCOSE SERPL-MCNC: 82 MG/DL (ref 74–99)
GRAM STN SPEC: ABNORMAL
HCT VFR BLD AUTO: 23.3 % (ref 41–52)
HGB BLD-MCNC: 7.5 G/DL (ref 13.5–17.5)
MAGNESIUM SERPL-MCNC: 2.13 MG/DL (ref 1.6–2.4)
MCH RBC QN AUTO: 29.4 PG (ref 26–34)
MCHC RBC AUTO-ENTMCNC: 32.2 G/DL (ref 32–36)
MCV RBC AUTO: 91 FL (ref 80–100)
NRBC BLD-RTO: 0 /100 WBCS (ref 0–0)
PLATELET # BLD AUTO: 327 X10*3/UL (ref 150–450)
POTASSIUM SERPL-SCNC: 3.4 MMOL/L (ref 3.5–5.3)
PROT SERPL-MCNC: 6.4 G/DL (ref 6.4–8.2)
RBC # BLD AUTO: 2.55 X10*6/UL (ref 4.5–5.9)
SODIUM SERPL-SCNC: 134 MMOL/L (ref 136–145)
STAPHYLOCOCCUS SPEC CULT: NORMAL
WBC # BLD AUTO: 7.2 X10*3/UL (ref 4.4–11.3)

## 2023-11-23 PROCEDURE — 1200000002 HC GENERAL ROOM WITH TELEMETRY DAILY

## 2023-11-23 PROCEDURE — 2500000004 HC RX 250 GENERAL PHARMACY W/ HCPCS (ALT 636 FOR OP/ED): Performed by: STUDENT IN AN ORGANIZED HEALTH CARE EDUCATION/TRAINING PROGRAM

## 2023-11-23 PROCEDURE — 85027 COMPLETE CBC AUTOMATED: CPT

## 2023-11-23 PROCEDURE — 2500000004 HC RX 250 GENERAL PHARMACY W/ HCPCS (ALT 636 FOR OP/ED): Performed by: INTERNAL MEDICINE

## 2023-11-23 PROCEDURE — 99232 SBSQ HOSP IP/OBS MODERATE 35: CPT

## 2023-11-23 PROCEDURE — 83735 ASSAY OF MAGNESIUM: CPT

## 2023-11-23 PROCEDURE — 80053 COMPREHEN METABOLIC PANEL: CPT

## 2023-11-23 PROCEDURE — 2500000001 HC RX 250 WO HCPCS SELF ADMINISTERED DRUGS (ALT 637 FOR MEDICARE OP): Performed by: INTERNAL MEDICINE

## 2023-11-23 PROCEDURE — 2500000001 HC RX 250 WO HCPCS SELF ADMINISTERED DRUGS (ALT 637 FOR MEDICARE OP): Performed by: STUDENT IN AN ORGANIZED HEALTH CARE EDUCATION/TRAINING PROGRAM

## 2023-11-23 RX ADMIN — CEFAZOLIN SODIUM 2 G: 2 INJECTION, SOLUTION INTRAVENOUS at 09:32

## 2023-11-23 RX ADMIN — POLYETHYLENE GLYCOL 3350 17 G: 17 POWDER, FOR SOLUTION ORAL at 09:32

## 2023-11-23 RX ADMIN — ESCITALOPRAM OXALATE 20 MG: 20 TABLET, FILM COATED ORAL at 09:32

## 2023-11-23 RX ADMIN — TAMSULOSIN HYDROCHLORIDE 0.4 MG: 0.4 CAPSULE ORAL at 09:32

## 2023-11-23 RX ADMIN — PREGABALIN 300 MG: 150 CAPSULE ORAL at 09:32

## 2023-11-23 RX ADMIN — CEFAZOLIN SODIUM 2 G: 2 INJECTION, SOLUTION INTRAVENOUS at 17:20

## 2023-11-23 RX ADMIN — PREGABALIN 300 MG: 150 CAPSULE ORAL at 21:53

## 2023-11-23 RX ADMIN — ASPIRIN 81 MG CHEWABLE TABLET 81 MG: 81 TABLET CHEWABLE at 09:32

## 2023-11-23 RX ADMIN — CEFAZOLIN SODIUM 2 G: 2 INJECTION, SOLUTION INTRAVENOUS at 02:04

## 2023-11-23 ASSESSMENT — COGNITIVE AND FUNCTIONAL STATUS - GENERAL
HELP NEEDED FOR BATHING: A LITTLE
MOBILITY SCORE: 14
TURNING FROM BACK TO SIDE WHILE IN FLAT BAD: A LITTLE
MOVING FROM LYING ON BACK TO SITTING ON SIDE OF FLAT BED WITH BEDRAILS: A LITTLE
WALKING IN HOSPITAL ROOM: A LOT
TOILETING: A LITTLE
EATING MEALS: A LITTLE
MOVING TO AND FROM BED TO CHAIR: A LOT
CLIMB 3 TO 5 STEPS WITH RAILING: A LOT
DRESSING REGULAR LOWER BODY CLOTHING: A LITTLE
EATING MEALS: A LITTLE
WALKING IN HOSPITAL ROOM: A LOT
DRESSING REGULAR UPPER BODY CLOTHING: A LITTLE
MOVING TO AND FROM BED TO CHAIR: A LOT
STANDING UP FROM CHAIR USING ARMS: A LOT
DAILY ACTIVITIY SCORE: 18
TOILETING: A LOT
DAILY ACTIVITIY SCORE: 17
TURNING FROM BACK TO SIDE WHILE IN FLAT BAD: A LITTLE
HELP NEEDED FOR BATHING: A LITTLE
PERSONAL GROOMING: A LITTLE
CLIMB 3 TO 5 STEPS WITH RAILING: TOTAL
DRESSING REGULAR UPPER BODY CLOTHING: A LITTLE
DRESSING REGULAR LOWER BODY CLOTHING: A LITTLE
STANDING UP FROM CHAIR USING ARMS: A LOT
PERSONAL GROOMING: A LITTLE
MOBILITY SCORE: 14

## 2023-11-23 ASSESSMENT — PAIN - FUNCTIONAL ASSESSMENT
PAIN_FUNCTIONAL_ASSESSMENT: 0-10
PAIN_FUNCTIONAL_ASSESSMENT: 0-10

## 2023-11-23 ASSESSMENT — PAIN SCALES - GENERAL
PAINLEVEL_OUTOF10: 0 - NO PAIN
PAINLEVEL_OUTOF10: 0 - NO PAIN

## 2023-11-23 NOTE — NURSING NOTE
Spoke with Dr. Morgan at this time to gain clarification on hemovac. No order currently active noting hemovac or compression. Dr. Morgan stating it should remain 1/4 of the way compressed.

## 2023-11-23 NOTE — PROGRESS NOTES
Gm Thrasher is a 58 y.o. male on day 3 of admission presenting with Wound dehiscence.      Subjective   -Patient seen this morning resting comfortably in the chair  -Patient's VSS WNL overnight with exception of hypotension 90 over 50s  -Patient had no acute events overnight, but had asymptomatic runs of V. tach  -Patient's labs significant for potassium 3.4, Hgb 7.5  -Patient had no pain, and no complaints this morning, surgical drain removed, Navarro catheter removed    Objective     Last Recorded Vitals  /71 (BP Location: Right arm)   Pulse 71   Temp 36.2 °C (97.2 °F)   Resp 18   Wt 101 kg (222 lb 3.6 oz)   SpO2 95%   Intake/Output last 3 Shifts:    Intake/Output Summary (Last 24 hours) at 11/23/2023 1646  Last data filed at 11/23/2023 1002  Gross per 24 hour   Intake 1160 ml   Output 1260 ml   Net -100 ml       Admission Weight  Weight: 111 kg (245 lb) (11/20/23 0151)    Daily Weight  11/20/23 : 101 kg (222 lb 3.6 oz)    Image Results  Bedside PICC Imaging  These images are not reportable by radiology and will not be interpreted   by  Radiologists.  CT head wo IV contrast  Narrative: Interpreted By:  Poncho Castellon,   STUDY:  CT HEAD WO IV CONTRAST;  11/22/2023 9:32 am      INDICATION:  Signs/Symptoms:s/p spinal surgery with high amount of drainage from  wound vac.      COMPARISON:  11/21/2023      ACCESSION NUMBER(S):  [id number]      ORDERING CLINICIAN:  KARYNA NAIK      TECHNIQUE:  Noncontrast axial CT scan of head was performed. Angled reformats in  brain and bone windows were generated. The images were reviewed in  bone, brain, blood and soft tissue windows.      FINDINGS:  No acute edema. No acute hemorrhage. No mass effect. Ventricular  system is normal for age. No extra-axial fluid collections. Orbits  are normal. Mucoperiosteal thickening right maxillary sinus.          Impression: No acute findings.      Signed by: Poncho Castellon 11/22/2023 10:34 AM  Dictation workstation:    [id number]      Physical Exam  General: Not in acute distress, A&O x 3, alert, cooperative, well-developed  HEENT: Normocephalic, atraumatic, EOMI, moist mucous membranes, left pupil mildly dilated  Neck: Neck supple, trachea midline, no evidence of trauma  Cardiovascular: RRR, S1 and S2 appreciated, no murmurs rubs gallops appreciated, distal pulses 2+ bilaterally (radial and dorsalis pedis)  Respiratory: Vesicular breath sounds appreciated bilaterally, no adventitious sounds appreciated, no increased work of breathing  GI: Abdomen soft, nondistended, nontender to palpation, bowel sounds present  Extremities: No edema appreciated in lower extremities bilaterally, no cyanosis  Back: Surgical incision dressed appropriately, with mild serosanguineous drainage, no signs of infection  Neuro: A&O X3, no focal deficits, strength and sensation intact bilaterally, deficit in cranial nerve I  Skin: Warm and dry, without lesions or rashes         Relevant Results        Scheduled medications  aspirin, 81 mg, oral, Daily  [Held by provider] buprenorphine-naloxone, 3 tablet, sublingual, Daily  ceFAZolin, 2 g, intravenous, q8h  [START ON 11/27/2023] clopidogrel, 75 mg, oral, Daily  escitalopram, 20 mg, oral, Daily  [Held by provider] icosapent ethyL, 2 g, oral, BID with meals  lidocaine, 5 mL, infiltration, Once  polyethylene glycol, 17 g, oral, Daily  pregabalin, 300 mg, oral, BID  tamsulosin, 0.4 mg, oral, Daily      Continuous medications     PRN medications  PRN medications: alteplase, haloperidol lactate, HYDROmorphone, oxyCODONE, oxyCODONE, promethazine  Results for orders placed or performed during the hospital encounter of 11/20/23 (from the past 24 hour(s))   CBC   Result Value Ref Range    WBC 7.2 4.4 - 11.3 x10*3/uL    nRBC 0.0 0.0 - 0.0 /100 WBCs    RBC 2.55 (L) 4.50 - 5.90 x10*6/uL    Hemoglobin 7.5 (L) 13.5 - 17.5 g/dL    Hematocrit 23.3 (L) 41.0 - 52.0 %    MCV 91 80 - 100 fL    MCH 29.4 26.0 - 34.0 pg    MCHC  32.2 32.0 - 36.0 g/dL    RDW 14.7 (H) 11.5 - 14.5 %    Platelets 327 150 - 450 x10*3/uL   Comprehensive metabolic panel   Result Value Ref Range    Glucose 82 74 - 99 mg/dL    Sodium 134 (L) 136 - 145 mmol/L    Potassium 3.4 (L) 3.5 - 5.3 mmol/L    Chloride 98 98 - 107 mmol/L    Bicarbonate 27 21 - 32 mmol/L    Anion Gap 12 10 - 20 mmol/L    Urea Nitrogen 9 6 - 23 mg/dL    Creatinine 0.75 0.50 - 1.30 mg/dL    eGFR >90 >60 mL/min/1.73m*2    Calcium 7.9 (L) 8.6 - 10.3 mg/dL    Albumin 2.3 (L) 3.4 - 5.0 g/dL    Alkaline Phosphatase 95 33 - 120 U/L    Total Protein 6.4 6.4 - 8.2 g/dL    AST 28 9 - 39 U/L    Bilirubin, Total 0.5 0.0 - 1.2 mg/dL    ALT 12 10 - 52 U/L   Magnesium   Result Value Ref Range    Magnesium 2.13 1.60 - 2.40 mg/dL   Bedside PICC Imaging    Result Date: 11/22/2023  These images are not reportable by radiology and will not be interpreted by  Radiologists.    CT head wo IV contrast    Result Date: 11/22/2023  Interpreted By:  Poncho Castellon, STUDY: CT HEAD WO IV CONTRAST;  11/22/2023 9:32 am   INDICATION: Signs/Symptoms:s/p spinal surgery with high amount of drainage from wound vac.   COMPARISON: 11/21/2023   ACCESSION NUMBER(S): [id number]   ORDERING CLINICIAN: KARYNA NAIK   TECHNIQUE: Noncontrast axial CT scan of head was performed. Angled reformats in brain and bone windows were generated. The images were reviewed in bone, brain, blood and soft tissue windows.   FINDINGS: No acute edema. No acute hemorrhage. No mass effect. Ventricular system is normal for age. No extra-axial fluid collections. Orbits are normal. Mucoperiosteal thickening right maxillary sinus.         No acute findings.   Signed by: Poncho Castellon 11/22/2023 10:34 AM Dictation workstation:   [id number]    CT brain attack head wo IV contrast    Result Date: 11/21/2023  Interpreted By:  Martin Mathis, STUDY: CT BRAIN ATTACK HEAD WO IV CONTRAST;  11/21/2023 1:04 am   INDICATION: Signs/Symptoms:Dilated pupil.    COMPARISON: 10/13/2023   ACCESSION NUMBER(S): [id number]   ORDERING CLINICIAN: NESHA GRIDER   TECHNIQUE: Axial noncontrast CT images of the head. Sagittal and coronal reformats were provided.   FINDINGS: BRAIN: No acute intracranial hemorrhage. No mass effect or midline shift. Gray-white matter interfaces are preserved. VENTRICLES and EXTRA-AXIAL SPACES: Normal. EXTRACRANIAL SOFT TISSUES:  Within normal limits. PARANASAL SINUSES/MASTOIDS: Mild paranasal sinus mucosal thickening. Polypoid right maxillary sinus mucosal thickening. Mastoids are clear. BONES AND ORBITS: No displaced skull fracture. No suspicious osseous lesion.  Orbits are within normal limits. OTHER FINDINGS: None.       1. No acute intracranial hemorrhage or mass effect.   Martin Mathis discussed the significance and urgency of this critical finding by telephone with  Dr. Lawson on 11/21/2023 at 4:11 am.  (**-RCF-**) Findings:  See findings.     Signed by: Martin Mathis 11/21/2023 4:11 AM Dictation workstation:   [id number]         Assessment/Plan     Principal Problem:    Wound dehiscence  Active Problems:    CAD (coronary artery disease)    Patient is a 58 year-old male with past medical history of MDD, T2 DM, BPH, hypertension, hyperlipidemia, JEMAL, chronic pain in neck and back, lumbar radiculopathy, and heroin use who is transferred from [facility] in the setting of recent lumbar surgery with subarachnoid hemorrhage/subdural hematoma (9/22/2023) presented with wound dehiscence requiring lumbar exploration and washout.  Patient was admitted to medicine for evaluation and management of incisional infection. Neurosurgery was consulted and is following.     Multiple lumbar post operative fluid collections with mass effect  -MRI and CT scan as discussed above. Repeat CT head imaging ordered due to concern for midline shift, results negative for any acute pathology  -Lumbar wound washout procedure performed by neurosurgeon   Vitaly Amezcua   -Wound culture preliminary result revealed +2 staphylococcus aureus. Blood cultures NTD  -Continue IV cefazolin pending final wound culture results, PICC line placed  --> ID recommendations 6 weeks of antibiotics via PICC line (cefazolin), stop date 1/1/2024  ---> Follow-up outpatient with Dr. Browning in 6 weeks     2.Left pupil dilation   -A brain attack was called at 12:45 AM 11/21 due to AMS and left pupil dilation. CT scan of head w/o contrast revealed no intracranial abnormalities.  -Patient was AOx0 with left pupil dilation, but mental status improved to AOx3.  -No visual disturbances, pain, or acute concerns overall.  -Will perform neuro checks every 4 hours to monitor symptoms  -Psych consulted  --> Psych recommendations to continue Suboxone on discharge and follow-up with Suboxone prescriber.    3. H/o NSTEMI (10/13/23)  -PCI to RCA  -ASA and Plavix have been held during this admission due to operation  -ASA resumed today POD #3  -Resume Plavix POD #7 (11/27)     #NIDDM2  -A1C 5.7  -daily RFP glucose    IVF: None at this time  Diet: Diabetic + cardiac  DVT Ppx: SCD  Dispo: Anticipate 2 additional days hospital stay    Code status: Full code    Assessment and plan discussed with senior resident and attending.    Brad Del Real M.D.  Internal Medicine PGY-1

## 2023-11-23 NOTE — PROGRESS NOTES
INPATIENT PROGRESS NOTES    PATIENT NAME: Gm Thrasher    MRN: [id number]  SERVICE DATE:  11/23/2023   SERVICE TIME:  10:41 AM    SIGNATURE: Yohana Browning MD      SUBJECTIVE  Afebrile  No events overnight       OBJECTIVE  PHYSICAL EXAM:   Patient Vitals for the past 24 hrs:   BP Temp Temp src Pulse Resp SpO2   11/23/23 0318 93/53 36.9 °C (98.4 °F) -- 74 -- 96 %   11/23/23 0000 117/71 36.2 °C (97.2 °F) Temporal 78 19 98 %   11/22/23 2048 123/76 -- -- 80 -- --   11/22/23 2003 -- 37.1 °C (98.8 °F) -- -- -- 98 %   11/22/23 1600 116/68 36.6 °C (97.9 °F) Temporal 81 18 100 %   11/22/23 1200 171/83 36.7 °C (98.1 °F) Temporal 80 18 96 %         Gen: NAD  Neck: symmetric, no mass  Cardiovascular: RRR  Respiratory: No distress   GI: Abd soft, nontender, non-distended  Extremities: no leg edema  Skin: Warm and dry.  Neuro: alert and oriented times 3    Labs:  Lab Results   Component Value Date    WBC 7.2 11/23/2023    HGB 7.5 (L) 11/23/2023    HCT 23.3 (L) 11/23/2023    MCV 91 11/23/2023     11/23/2023     Lab Results   Component Value Date    GLUCOSE 82 11/23/2023    CALCIUM 7.9 (L) 11/23/2023     (L) 11/23/2023    K 3.4 (L) 11/23/2023    CO2 27 11/23/2023    CL 98 11/23/2023    BUN 9 11/23/2023    CREATININE 0.76 11/22/2023   ESR: --  Lab Results   Component Value Date    SEDRATE 55 (H) 11/20/2023     Lab Results   Component Value Date    CRP 18.12 (H) 11/20/2023     Lab Results   Component Value Date    ALT 12 11/23/2023    AST 28 11/23/2023    ALKPHOS 95 11/23/2023    BILITOT 0.5 11/23/2023         DATA:   Diagnostic tests reviewed for today's visit:    Labs this admission reviewed  Imagings this admission reviewed  Cultures: Reviewed        ASSESSMENT :   -Lumbar spine epidural phlegmon status post washout on 11/20 with culture growing MSSA  -History of diabetes, subdural hematoma, neuropathy, BPH, drug abuse    PLAN:  -Continue Cefazolin 2 g every 8 hours  -Stop date 1/1/2024  -Closely monitor side effects  including rash, diarrhea, JEMAL, CDI, etc.  -Follow-up with me in 6-week  -Awaiting discharge to SN[facility]    Yohana Browning MD.   Infectious Diseases Attending

## 2023-11-23 NOTE — PROGRESS NOTES
"Gm Thrasher is a 58 y.o. male on day 3 of admission presenting with Wound dehiscence.    Subjective     No concerns today. Patient reports mild incisional tenderness controlled with medication. Dressing replaced since yesterday, no strikethrough noted. Drain with scant serosanguinous output. Pending significant bowel movement.       Objective     Awake, Ox2  Fcx4 5/5  SILT  Incision c/d/i    Last Recorded Vitals  Blood pressure 93/53, pulse 74, temperature 36.9 °C (98.4 °F), resp. rate 19, height 1.803 m (5' 11\"), weight 101 kg (222 lb 3.6 oz), SpO2 96 %.  Intake/Output last 3 Shifts:  I/O last 3 completed shifts:  In: 2024.6 (20.1 mL/kg) [P.O.:1540; Blood:384.6; IV Piggyback:100]  Out: 1875 (18.6 mL/kg) [Urine:1800 (0.5 mL/kg/hr); Drains:75]  Weight: 100.8 kg     Relevant Results                This patient has a urinary catheter   Reason for the urinary catheter remaining today? Urine catheter unnecessary, will be removed today       Assessment/Plan   Principal Problem:    Wound dehiscence  Active Problems:    CAD (coronary artery disease)    Patient is a 58 year old male p/w wound dehiscence, 11/20 s/p wound washout.    Floor  Will discontinue drain today; maintain dressing to monitor for leakage  ID ravindras - jian, plan for PICC placement with abx stop date 1/1/2024   PTOT           Adalberto Conn MD      "

## 2023-11-23 NOTE — NURSING NOTE
Patient has been periodically having runs of vtach since 2am. Teaching doctor on floor at this time, aware. Vitals stable, Patient asymptomatic. Is currently sleeping comfortably. Patient has been having abdominal cramping and urgency with feeling urge to have BM. Is producing scant loose stools. Did discuss concerns for withdrawal from opiates with MD. Last dose of oxy was 11/21 1633.

## 2023-11-23 NOTE — NURSING NOTE
0900 neurosurgery at bedside   1020 hemovac removed by neurosurgery at bedside. Patient tolerated well.   1200 cruz removed  1452 dressing changed to lower back, small amt of serosang on old dressing. New dry dressing applied.    Eos patient stable this shift. Neuro checks stable patient a&ox4. Denies any pain or discomfort. Cruz removed this shift, patient has output post removal. Dressing to lumbar spine dry and intact. Continues on ancef, no adverse effects

## 2023-11-23 NOTE — NURSING NOTE
Patient noted to have had several episodes of V tach runs since start of shift. Is asymptomatic. VS WNL at this time. EKG performed, NSR. Teaching resident notified. Patient has been asking frequently to get up to BSC to have scant loose stools.

## 2023-11-24 LAB
ALBUMIN SERPL BCP-MCNC: 2.4 G/DL (ref 3.4–5)
ANION GAP SERPL CALC-SCNC: 12 MMOL/L (ref 10–20)
BACTERIA BLD CULT ORG #2: NORMAL
BACTERIA BLD CULT: NORMAL
BUN SERPL-MCNC: 7 MG/DL (ref 6–23)
CALCIUM SERPL-MCNC: 8 MG/DL (ref 8.6–10.3)
CHLORIDE SERPL-SCNC: 97 MMOL/L (ref 98–107)
CO2 SERPL-SCNC: 26 MMOL/L (ref 21–32)
CREAT SERPL-MCNC: 0.85 MG/DL (ref 0.5–1.3)
ERYTHROCYTE [DISTWIDTH] IN BLOOD BY AUTOMATED COUNT: 14.9 % (ref 11.5–14.5)
GFR SERPL CREATININE-BSD FRML MDRD: >90 ML/MIN/1.73M*2
GLUCOSE SERPL-MCNC: 124 MG/DL (ref 74–99)
HCT VFR BLD AUTO: 24.4 % (ref 41–52)
HGB BLD-MCNC: 7.7 G/DL (ref 13.5–17.5)
MAGNESIUM SERPL-MCNC: 1.89 MG/DL (ref 1.6–2.4)
MCH RBC QN AUTO: 28.7 PG (ref 26–34)
MCHC RBC AUTO-ENTMCNC: 31.6 G/DL (ref 32–36)
MCV RBC AUTO: 91 FL (ref 80–100)
NRBC BLD-RTO: 0 /100 WBCS (ref 0–0)
PHOSPHATE SERPL-MCNC: 4.2 MG/DL (ref 2.5–4.9)
PLATELET # BLD AUTO: 315 X10*3/UL (ref 150–450)
POTASSIUM SERPL-SCNC: 3.1 MMOL/L (ref 3.5–5.3)
RBC # BLD AUTO: 2.68 X10*6/UL (ref 4.5–5.9)
SODIUM SERPL-SCNC: 132 MMOL/L (ref 136–145)
WBC # BLD AUTO: 6.8 X10*3/UL (ref 4.4–11.3)

## 2023-11-24 PROCEDURE — 99233 SBSQ HOSP IP/OBS HIGH 50: CPT

## 2023-11-24 PROCEDURE — 2500000001 HC RX 250 WO HCPCS SELF ADMINISTERED DRUGS (ALT 637 FOR MEDICARE OP): Performed by: STUDENT IN AN ORGANIZED HEALTH CARE EDUCATION/TRAINING PROGRAM

## 2023-11-24 PROCEDURE — 1200000002 HC GENERAL ROOM WITH TELEMETRY DAILY

## 2023-11-24 PROCEDURE — 2500000004 HC RX 250 GENERAL PHARMACY W/ HCPCS (ALT 636 FOR OP/ED)

## 2023-11-24 PROCEDURE — 80069 RENAL FUNCTION PANEL: CPT

## 2023-11-24 PROCEDURE — 2500000004 HC RX 250 GENERAL PHARMACY W/ HCPCS (ALT 636 FOR OP/ED): Performed by: STUDENT IN AN ORGANIZED HEALTH CARE EDUCATION/TRAINING PROGRAM

## 2023-11-24 PROCEDURE — 83735 ASSAY OF MAGNESIUM: CPT

## 2023-11-24 PROCEDURE — 2500000001 HC RX 250 WO HCPCS SELF ADMINISTERED DRUGS (ALT 637 FOR MEDICARE OP): Performed by: INTERNAL MEDICINE

## 2023-11-24 PROCEDURE — 2500000004 HC RX 250 GENERAL PHARMACY W/ HCPCS (ALT 636 FOR OP/ED): Performed by: INTERNAL MEDICINE

## 2023-11-24 PROCEDURE — 97162 PT EVAL MOD COMPLEX 30 MIN: CPT | Mod: GP

## 2023-11-24 PROCEDURE — 85027 COMPLETE CBC AUTOMATED: CPT

## 2023-11-24 RX ORDER — POTASSIUM CHLORIDE 20 MEQ/1
40 TABLET, EXTENDED RELEASE ORAL ONCE
Status: COMPLETED | OUTPATIENT
Start: 2023-11-24 | End: 2023-11-24

## 2023-11-24 RX ADMIN — PREGABALIN 300 MG: 150 CAPSULE ORAL at 20:36

## 2023-11-24 RX ADMIN — POLYETHYLENE GLYCOL 3350 17 G: 17 POWDER, FOR SOLUTION ORAL at 09:52

## 2023-11-24 RX ADMIN — PREGABALIN 300 MG: 150 CAPSULE ORAL at 09:51

## 2023-11-24 RX ADMIN — TAMSULOSIN HYDROCHLORIDE 0.4 MG: 0.4 CAPSULE ORAL at 09:52

## 2023-11-24 RX ADMIN — POTASSIUM CHLORIDE 40 MEQ: 1500 TABLET, EXTENDED RELEASE ORAL at 09:51

## 2023-11-24 RX ADMIN — OXYCODONE HYDROCHLORIDE 10 MG: 10 TABLET ORAL at 16:37

## 2023-11-24 RX ADMIN — ASPIRIN 81 MG CHEWABLE TABLET 81 MG: 81 TABLET CHEWABLE at 09:51

## 2023-11-24 RX ADMIN — CEFAZOLIN SODIUM 2 G: 2 INJECTION, SOLUTION INTRAVENOUS at 01:38

## 2023-11-24 RX ADMIN — ESCITALOPRAM OXALATE 20 MG: 20 TABLET, FILM COATED ORAL at 09:51

## 2023-11-24 RX ADMIN — CEFAZOLIN SODIUM 2 G: 2 INJECTION, SOLUTION INTRAVENOUS at 10:06

## 2023-11-24 RX ADMIN — OXYCODONE HYDROCHLORIDE 10 MG: 10 TABLET ORAL at 11:01

## 2023-11-24 RX ADMIN — CEFAZOLIN SODIUM 2 G: 2 INJECTION, SOLUTION INTRAVENOUS at 16:33

## 2023-11-24 ASSESSMENT — PAIN DESCRIPTION - ORIENTATION
ORIENTATION: LOWER
ORIENTATION: RIGHT

## 2023-11-24 ASSESSMENT — COGNITIVE AND FUNCTIONAL STATUS - GENERAL
STANDING UP FROM CHAIR USING ARMS: A LITTLE
MOVING TO AND FROM BED TO CHAIR: A LITTLE
TURNING FROM BACK TO SIDE WHILE IN FLAT BAD: A LITTLE
MOBILITY SCORE: 14
WALKING IN HOSPITAL ROOM: TOTAL
CLIMB 3 TO 5 STEPS WITH RAILING: TOTAL
MOVING FROM LYING ON BACK TO SITTING ON SIDE OF FLAT BED WITH BEDRAILS: A LITTLE

## 2023-11-24 ASSESSMENT — PAIN DESCRIPTION - LOCATION
LOCATION: BACK
LOCATION: KNEE

## 2023-11-24 ASSESSMENT — PAIN - FUNCTIONAL ASSESSMENT
PAIN_FUNCTIONAL_ASSESSMENT: 0-10

## 2023-11-24 ASSESSMENT — PAIN SCALES - GENERAL
PAINLEVEL_OUTOF10: 8

## 2023-11-24 ASSESSMENT — ACTIVITIES OF DAILY LIVING (ADL)
ADLS_ADDRESSED: NO
ADL_ASSISTANCE: NEEDS ASSISTANCE

## 2023-11-24 NOTE — NURSING NOTE
10:00 Dr Del Real notified of bladder scan results of 478 ml retaining. Pt states that he voided 400 post scan. Will re-scan pt in an hour to determine further POC. changed pt dressing d/t large amount of drainage and leakage onto bed. Incision well approximated with staples, serosanguinous drainage leaking from distal site. New dressing clean, dry and intact at this time. Pt states that pain is minimal. Bedding changed, new gown applied. Bed alarm active.     11:07 pt up to chair, medicated with OXY 10mg for 8/10 right knee pain. Chair alarm active. Call light w/I reach.   11:09 paper script on chart from Dr Browning.

## 2023-11-24 NOTE — PROGRESS NOTES
INPATIENT PROGRESS NOTES    PATIENT NAME: Gm Thrasher    MRN: [id number]  SERVICE DATE:  11/24/2023   SERVICE TIME:  12:42 PM    SIGNATURE: Yohana Browning MD      SUBJECTIVE  Afebrile  No events overnight       OBJECTIVE  PHYSICAL EXAM:   Patient Vitals for the past 24 hrs:   BP Temp Temp src Pulse Resp SpO2   11/24/23 0800 (!) 92/43 36.8 °C (98.2 °F) -- 71 18 93 %   11/24/23 0400 102/66 36.5 °C (97.7 °F) Temporal 68 18 98 %   11/24/23 0000 128/63 36.4 °C (97.5 °F) Temporal 72 18 99 %   11/23/23 2000 125/59 36.1 °C (97 °F) Temporal 83 18 98 %   11/23/23 1600 96/51 36.2 °C (97.2 °F) -- 73 18 96 %         Gen: NAD  Neck: symmetric, no mass  Cardiovascular: RRR  Respiratory: No distress   GI: Abd soft, nontender, non-distended  Extremities: no leg edema  Skin: Warm and dry.  Neuro: alert and oriented times 3    Labs:  Lab Results   Component Value Date    WBC 6.8 11/24/2023    HGB 7.7 (L) 11/24/2023    HCT 24.4 (L) 11/24/2023    MCV 91 11/24/2023     11/24/2023     Lab Results   Component Value Date    GLUCOSE 124 (H) 11/24/2023    CALCIUM 8.0 (L) 11/24/2023     (L) 11/24/2023    K 3.1 (L) 11/24/2023    CO2 26 11/24/2023    CL 97 (L) 11/24/2023    BUN 7 11/24/2023    CREATININE 0.85 11/24/2023   ESR: --  Lab Results   Component Value Date    SEDRATE 55 (H) 11/20/2023     Lab Results   Component Value Date    CRP 18.12 (H) 11/20/2023     Lab Results   Component Value Date    ALT 12 11/23/2023    AST 28 11/23/2023    ALKPHOS 95 11/23/2023    BILITOT 0.5 11/23/2023         DATA:   Diagnostic tests reviewed for today's visit:    Labs this admission reviewed  Imagings this admission reviewed  Cultures: Reviewed        ASSESSMENT :   -Lumbar spine epidural phlegmon status post washout on 11/20 with culture growing MSSA  -History of diabetes, subdural hematoma, neuropathy, BPH, drug abuse    PLAN:  -Continue Cefazolin 2 g every 8 hours  -Stop date 1/1/2024  -Prescription was given to the nurse at  bedside  -Closely monitor side effects including rash, diarrhea, JEMAL, CDI, etc.  -Follow-up with me in 6-week    Yohana Browning MD.   Infectious Diseases Attending

## 2023-11-24 NOTE — PROGRESS NOTES
Physical Therapy    Physical Therapy Evaluation    Patient Name: Gm Thrasher  MRN: [id number]  Today's Date: 11/24/2023   Time Calculation  Start Time: 1033  Stop Time: 1049  Time Calculation (min): 16 min    Assessment/Plan   PT Assessment  PT Assessment Results: Decreased strength, Decreased endurance, Impaired balance, Decreased mobility, Decreased coordination, Decreased cognition, Impaired judgement, Decreased safety awareness, Pain  Rehab Prognosis: Fair  Barriers to Discharge: medical clearance  Evaluation/Treatment Tolerance: Patient limited by fatigue  Barriers to Participation: Ability to acquire knowledge, Comorbidities, Insight into problems  End of Session Communication: Physician, Bedside nurse  Assessment Comment: Pt is a 59 y/o M presenting from OSH for wound dehiscence in the setting of recent lumbar decompression sx 9/29/23. Recent history of SAH/SDH 9/22/23 and R ankle surgical fixation 10/17/23. At time of evaluation, pt presents with reduced strength, endurance, and balance from baseline function. Pt with decreased safety insight/cognition and reduced ability to maintain RLE NWB. Would benefit from further skilled PT at moderate intensity to optimize strength and function.  End of Session Patient Position: Up in chair, Alarm on  IP OR SWING BED PT PLAN  Inpatient or Swing Bed: Inpatient  PT Plan  Treatment/Interventions: Bed mobility, Transfer training, Gait training, Balance training, Strengthening, Endurance training, Therapeutic exercise, Therapeutic activity, Positioning  PT Plan: Skilled PT  PT Frequency: 3 times per week  PT Discharge Recommendations: Moderate intensity level of continued care  PT - OK to Discharge: Yes (to next level of care when cleared by medical team)      Subjective   General Visit Information:  General  Reason for Referral: Pt is a 59 y/o M transferred from [facility] in the setting of recent lumbar surgery with subarachnoid hemorrhage/subdural hematoma  "(9/22/2023) presented with wound dehiscence requiring lumbar exploration and washout  11/20. Noted to have recent surgical fixation of R ankle fracture  10/17 by Dr. Hale. Brain attack was called on 11/21 for AMS and dialated L pupil, head CT negative.  Referred By: Clementina Ron DO  Past Medical History Relevant to Rehab: T2DM, drug use, chronic neck/back pain, SAH/SDH (9/22/23)  Family/Caregiver Present: No  Prior to Session Communication: Bedside nurse  Patient Position Received: Bed, 3 rail up, Alarm on  General Comment: Appearance/lines: resting in bed upon arrival, bandage over lumbar incision, IV.  Home Living:  Home Living  Type of Home: House  Lives With: Alone  Home Adaptive Equipment: Walker rolling or standard, Wheelchair-manual (bedside commode)  Home Layout: One level  Home Access: Stairs to enter with rails  Entrance Stairs-Rails: None  Entrance Stairs-Number of Steps: 1  Prior Level of Function:  Prior Function Per Pt/Caregiver Report  Level of [place]: Needs assistance with ADLs, Needs assistance with homemaking (vague/questionable historian; states he was mostly in bed or using the walker to transfer to bedside commode or W/C. Sister was coming once a day to empty the bedside commode. Requiring assist for groceries and transportation.)  Receives Help From:  (sister and \"soon to be ex-wife\")  ADL Assistance: Needs assistance  Homemaking Assistance: Needs assistance  Ambulatory Assistance:  (vague historian, appears to have had difficulty maintaining RLE NWB)  Precautions:  Precautions  LE Weight Bearing Status: Right Non-Weight Bearing  Medical Precautions: Fall precautions  Precautions Comment: ambulate, log roll for recent spinal sx  Vital Signs:       Objective   Pain:  Pain Assessment  Pain Assessment: 0-10  Pain Score: 8  Pain Location: Knee (Pt states \"they put a niyah in from my knee to my ankle\" referring to surgery in October)  Pain Orientation: Right  Pain Interventions:  (RN " "informed)  Cognition:  Cognition  Overall Cognitive Status: Impaired at baseline  Orientation Level: Oriented X4 (states \"the month before December\")  Safety Judgment: Decreased awareness of need for safety precautions (confirms that he is NWB to RLE, however implies that he has been placing weight on RLE at home)  Cognition Comments: questionable historian at times    General Assessments:     Activity Tolerance  Endurance: Tolerates less than 10 min exercise, no significant change in vital signs    Sensation  Light Touch: No apparent deficits    Coordination  Movements are Fluid and Coordinated: Yes    Postural Control  Postural Control: Within Functional Limits    Static Sitting Balance  Static Sitting-Balance Support: Bilateral upper extremity supported  Static Sitting-Level of Assistance: Contact guard    Static Standing Balance  Static Standing-Balance Support: Bilateral upper extremity supported  Static Standing-Level of Assistance: Minimum assistance  Functional Assessments:  Bed Mobility  Bed Mobility: Yes  Bed Mobility 1  Bed Mobility 1: Supine to sitting  Level of Assistance 1: Contact guard  Bed Mobility Comments 1: uses bedrails, HOB elevated; completes via log roll    Transfers  Transfer: Yes  Transfer 1  Transfer From 1: Sit to  Transfer to 1: Stand  Technique 1: Sit to stand, Stand to sit  Transfer Device 1: Walker  Transfer Level of Assistance 1: Moderate assistance (from bed height elevated)  Trials/Comments 1: cues to scoot forward to EOB, cues for hand placement and sequencing, cues to push through UEs and LLE to maintain RLE NWB throughout transfer  Transfers 2  Transfer From 2: Bed to  Transfer to 2: Chair with arms  Technique 2: Stand pivot  Transfer Device 2: Walker  Transfer Level of Assistance 2: Minimum assistance  Trials/Comments 2: pt able to pivot on LLE, heavy cues for RLE NWB, noted difficulty to maintain 100% compliance to WB status. cues provided for seqeuncing    Ambulation/Gait " Training  Ambulation/Gait Training Performed: No (unable to complete and maintain WB status)  Extremity/Trunk Assessments:  RLE   RLE : Within Functional Limits (assessed functionally)  LLE   LLE : Within Functional Limits (assessed functionally)  Outcome Measures:  [facility] Basic Mobility  Turning from your back to your side while in a flat bed without using bedrails: A little  Moving from lying on your back to sitting on the side of a flat bed without using bedrails: A little  Moving to and from bed to chair (including a wheelchair): A little  Standing up from a chair using your arms (e.g. wheelchair or bedside chair): A little  To walk in hospital room: Total  Climbing 3-5 steps with railing: Total  Basic Mobility - Total Score: 14    Encounter Problems       Encounter Problems (Active)       Balance       STG - Maintains static standing balance with upper extremity support (Progressing)       Start:  11/24/23    Expected End:  12/08/23       X 2-3 min with 100% compliance RLE NWB, CGA         STG - Maintains static sitting balance without upper extremity support (Progressing)       Start:  11/24/23    Expected End:  12/08/23       Sitting EOB x10 min while completing dynamic activity task, with close sup             Mobility       STG - Patient will ambulate (Progressing)       Start:  11/24/23    Expected End:  12/08/23       With 2WW, hop to pattern x 10 ft (with 100% compliance to RLE NWB status) , close supervision             Pain - Adult          Transfers       STG - Transfer from bed to chair (Progressing)       Start:  11/24/23    Expected End:  12/08/23       With 2WW and CGA, 100% compliance RLE NWB         STG - Patient will transfer sit to and from stand (Progressing)       Start:  11/24/23    Expected End:  12/08/23       With 2WW, close sup (100% compliance NWB RLE)         STG - Patient maintains weight bearing status during transfers (Progressing)       Start:  11/24/23    Expected End:  12/08/23                    Education Documentation  Precautions, taught by Dee Padron, PT at 11/24/2023  2:11 PM.  Learner: Patient  Readiness: Acceptance  Method: Explanation, Demonstration  Response: Needs Reinforcement    Mobility Training, taught by Dee Padron, PT at 11/24/2023  2:11 PM.  Learner: Patient  Readiness: Acceptance  Method: Explanation, Demonstration  Response: Needs Reinforcement    Education Comments  No comments found.

## 2023-11-24 NOTE — PROGRESS NOTES
Gm Thrasher is a 58 y.o. male on day 4 of admission presenting with Wound dehiscence.      Subjective   -Patient seen this morning resting comfortably in bed  -Patient's VSS WNL overnight with exception of hypotension 90 over 40s  -Patient's labs significant for potassium 3.1, Hgb 7.7, potassium repleted  -Patient had no pain, and no complaints this morning    Objective     Last Recorded Vitals  BP (!) 92/43   Pulse 71   Temp 36.8 °C (98.2 °F)   Resp 18   Wt 101 kg (222 lb 3.6 oz)   SpO2 93%   Intake/Output last 3 Shifts:    Intake/Output Summary (Last 24 hours) at 11/24/2023 1108  Last data filed at 11/24/2023 0400  Gross per 24 hour   Intake 870 ml   Output 675 ml   Net 195 ml       Admission Weight  Weight: 111 kg (245 lb) (11/20/23 0151)    Daily Weight  11/20/23 : 101 kg (222 lb 3.6 oz)    Image Results  Bedside PICC Imaging  These images are not reportable by radiology and will not be interpreted   by  Radiologists.  CT head wo IV contrast  Narrative: Interpreted By:  Poncho Castellon,   STUDY:  CT HEAD WO IV CONTRAST;  11/22/2023 9:32 am      INDICATION:  Signs/Symptoms:s/p spinal surgery with high amount of drainage from  wound vac.      COMPARISON:  11/21/2023      ACCESSION NUMBER(S):  [id number]      ORDERING CLINICIAN:  KARYNA NAIK      TECHNIQUE:  Noncontrast axial CT scan of head was performed. Angled reformats in  brain and bone windows were generated. The images were reviewed in  bone, brain, blood and soft tissue windows.      FINDINGS:  No acute edema. No acute hemorrhage. No mass effect. Ventricular  system is normal for age. No extra-axial fluid collections. Orbits  are normal. Mucoperiosteal thickening right maxillary sinus.          Impression: No acute findings.      Signed by: Poncho Castellon 11/22/2023 10:34 AM  Dictation workstation:   [id number]      Physical Exam  General: Not in acute distress, A&O x 3, alert, cooperative, well-developed  HEENT: Normocephalic, atraumatic,  EOMI, moist mucous membranes, left pupil mildly dilated  Neck: Neck supple, trachea midline, no evidence of trauma  Cardiovascular: RRR, S1 and S2 appreciated, no murmurs rubs gallops appreciated, distal pulses 2+ bilaterally (radial and dorsalis pedis)  Respiratory: Vesicular breath sounds appreciated bilaterally, no adventitious sounds appreciated, no increased work of breathing  GI: Abdomen soft, nondistended, nontender to palpation, bowel sounds present  Extremities: No edema appreciated in lower extremities bilaterally, no cyanosis  Back: Surgical incision dressed appropriately, with increased serosanguineous drainage soaking the bandaging, no signs of infection  Neuro: A&O X3, no focal deficits, strength and sensation intact bilaterally, deficit in cranial nerve I  Skin: Warm and dry, without lesions or rashes         Relevant Results        Scheduled medications  aspirin, 81 mg, oral, Daily  buprenorphine-naloxone, 3 tablet, sublingual, Daily  ceFAZolin, 2 g, intravenous, q8h  [START ON 11/27/2023] clopidogrel, 75 mg, oral, Daily  escitalopram, 20 mg, oral, Daily  [Held by provider] icosapent ethyL, 2 g, oral, BID with meals  lidocaine, 5 mL, infiltration, Once  polyethylene glycol, 17 g, oral, Daily  pregabalin, 300 mg, oral, BID  tamsulosin, 0.4 mg, oral, Daily      Continuous medications     PRN medications  PRN medications: alteplase, haloperidol lactate, HYDROmorphone, oxyCODONE, oxyCODONE, promethazine  Results for orders placed or performed during the hospital encounter of 11/20/23 (from the past 24 hour(s))   Renal Function Panel   Result Value Ref Range    Glucose 124 (H) 74 - 99 mg/dL    Sodium 132 (L) 136 - 145 mmol/L    Potassium 3.1 (L) 3.5 - 5.3 mmol/L    Chloride 97 (L) 98 - 107 mmol/L    Bicarbonate 26 21 - 32 mmol/L    Anion Gap 12 10 - 20 mmol/L    Urea Nitrogen 7 6 - 23 mg/dL    Creatinine 0.85 0.50 - 1.30 mg/dL    eGFR >90 >60 mL/min/1.73m*2    Calcium 8.0 (L) 8.6 - 10.3 mg/dL     Phosphorus 4.2 2.5 - 4.9 mg/dL    Albumin 2.4 (L) 3.4 - 5.0 g/dL   Magnesium   Result Value Ref Range    Magnesium 1.89 1.60 - 2.40 mg/dL   CBC   Result Value Ref Range    WBC 6.8 4.4 - 11.3 x10*3/uL    nRBC 0.0 0.0 - 0.0 /100 WBCs    RBC 2.68 (L) 4.50 - 5.90 x10*6/uL    Hemoglobin 7.7 (L) 13.5 - 17.5 g/dL    Hematocrit 24.4 (L) 41.0 - 52.0 %    MCV 91 80 - 100 fL    MCH 28.7 26.0 - 34.0 pg    MCHC 31.6 (L) 32.0 - 36.0 g/dL    RDW 14.9 (H) 11.5 - 14.5 %    Platelets 315 150 - 450 x10*3/uL   Bedside PICC Imaging    Result Date: 11/22/2023  These images are not reportable by radiology and will not be interpreted by  Radiologists.    CT head wo IV contrast    Result Date: 11/22/2023  Interpreted By:  Poncho Castellon, STUDY: CT HEAD WO IV CONTRAST;  11/22/2023 9:32 am   INDICATION: Signs/Symptoms:s/p spinal surgery with high amount of drainage from wound vac.   COMPARISON: 11/21/2023   ACCESSION NUMBER(S): [id number]   ORDERING CLINICIAN: KARYNA NAIK   TECHNIQUE: Noncontrast axial CT scan of head was performed. Angled reformats in brain and bone windows were generated. The images were reviewed in bone, brain, blood and soft tissue windows.   FINDINGS: No acute edema. No acute hemorrhage. No mass effect. Ventricular system is normal for age. No extra-axial fluid collections. Orbits are normal. Mucoperiosteal thickening right maxillary sinus.         No acute findings.   Signed by: Poncho Castellon 11/22/2023 10:34 AM Dictation workstation:   [id number]    CT brain attack head wo IV contrast    Result Date: 11/21/2023  Interpreted By:  Martin Mathis, STUDY: CT BRAIN ATTACK HEAD WO IV CONTRAST;  11/21/2023 1:04 am   INDICATION: Signs/Symptoms:Dilated pupil.   COMPARISON: 10/13/2023   ACCESSION NUMBER(S): [id number]   ORDERING CLINICIAN: NESHA GRIDER   TECHNIQUE: Axial noncontrast CT images of the head. Sagittal and coronal reformats were provided.   FINDINGS: BRAIN: No acute intracranial hemorrhage. No mass  effect or midline shift. Gray-white matter interfaces are preserved. VENTRICLES and EXTRA-AXIAL SPACES: Normal. EXTRACRANIAL SOFT TISSUES:  Within normal limits. PARANASAL SINUSES/MASTOIDS: Mild paranasal sinus mucosal thickening. Polypoid right maxillary sinus mucosal thickening. Mastoids are clear. BONES AND ORBITS: No displaced skull fracture. No suspicious osseous lesion.  Orbits are within normal limits. OTHER FINDINGS: None.       1. No acute intracranial hemorrhage or mass effect.   Martin Mathis discussed the significance and urgency of this critical finding by telephone with  Dr. Lawson on 11/21/2023 at 4:11 am.  (**-RCF-**) Findings:  See findings.     Signed by: Martin Mathis 11/21/2023 4:11 AM Dictation workstation:   [id number]         Assessment/Plan     Principal Problem:    Wound dehiscence  Active Problems:    CAD (coronary artery disease)    Patient is a 58 year-old male with past medical history of MDD, T2 DM, BPH, hypertension, hyperlipidemia, JEMAL, chronic pain in neck and back, lumbar radiculopathy, and heroin use who is transferred from [facility] in the setting of recent lumbar surgery with subarachnoid hemorrhage/subdural hematoma (9/22/2023) presented with wound dehiscence requiring lumbar exploration and washout.  Patient was admitted to medicine for evaluation and management of incisional infection. Neurosurgery was consulted and is following.     Multiple lumbar post operative fluid collections with mass effect  -MRI and CT scan as discussed above. Repeat CT head imaging ordered due to concern for midline shift, results negative for any acute pathology  -Lumbar wound washout procedure performed by neurosurgeon Dr. Vitaly Amezcua   -Wound culture preliminary result revealed +2 staphylococcus aureus. Blood cultures NTD  -Continue IV cefazolin pending final wound culture results, PICC line placed  --> ID recommendations 6 weeks of antibiotics via PICC line (cefazolin),  stop date 1/1/2024  ---> Follow-up outpatient with Dr. Browning in 6 weeks     2.Left pupil dilation   -A brain attack was called at 12:45 AM 11/21 due to AMS and left pupil dilation. CT scan of head w/o contrast revealed no intracranial abnormalities.  -Patient was AOx0 with left pupil dilation, but mental status improved to AOx3.  -No visual disturbances, pain, or acute concerns overall.  -Psych consulted  --> Psych recommendations to continue Suboxone on discharge and follow-up with Suboxone prescriber.    3. H/o NSTEMI (10/13/23)  -PCI to RCA  -ASA and Plavix have been held during this admission due to operation  -ASA resumed 11/23/2023 POD #3  -Resume Plavix POD #7 (11/27)    4.  Chronic methadone use:  -Home methadone resumed on 11/24/2023  -Patient has needed less and less pain management, however if patient does have breakthrough pain consider Toradol.     #NIDDM2  -A1C 5.7  -daily RFP glucose    IVF: None at this time  Diet: Diabetic + cardiac  DVT Ppx: SCD  Dispo: Anticipate 2 additional days hospital stay    Code status: Full code    Assessment and plan discussed with senior resident and attending.    Brad Del Real M.D.  Internal Medicine PGY-1

## 2023-11-24 NOTE — PROGRESS NOTES
Updated by SW that pt's wife requested referral to [facility] in [place]. Referral sent. Pending acceptance. Pt will need precert.     1339- [facility] can accept and SNF will start precert. Updated SW.     Janet Matta RN

## 2023-11-24 NOTE — PROGRESS NOTES
"Gm Thrasher is a 58 y.o. male on day 4 of admission presenting with Wound dehiscence.    Subjective     No concerns today. No additional leakage noted.       Objective     Awake, Ox3  Fcx4 5/5  SILT  Incision c/d/i    Last Recorded Vitals  Blood pressure 102/66, pulse 68, temperature 36.5 °C (97.7 °F), temperature source Temporal, resp. rate 18, height 1.803 m (5' 11\"), weight 101 kg (222 lb 3.6 oz), SpO2 98 %.  Intake/Output last 3 Shifts:  I/O last 3 completed shifts:  In: 1880 (18.7 mL/kg) [P.O.:1680; IV Piggyback:200]  Out: 1830 (18.2 mL/kg) [Urine:1825 (0.5 mL/kg/hr); Drains:5]  Weight: 100.8 kg     Relevant Results                This patient has a urinary catheter   Reason for the urinary catheter remaining today? Urine catheter unnecessary, will be removed today       Assessment/Plan   Principal Problem:    Wound dehiscence  Active Problems:    CAD (coronary artery disease)    Patient is a 58 year old male p/w wound dehiscence, 11/20 s/p wound washout.    Floor  ID recs - ancef, plan for PICC placement with abx stop date 1/1/2024   PTOT-SNF    Stable for discharge from surgical standpoint.           Adalberto Conn MD        "

## 2023-11-24 NOTE — PROGRESS NOTES
Per [facility] and notes, no skilled facility has accepted yet. Placed call to patient's wife, Harriet, to discuss. A gentleman answered phone and provided this worker with other numbers. Reached wife at [phone]. She said patient only wants a facility in [place] he can smoke at. Discussed that most of the facilities have declined him. She agreed for referral to be sent to remaining facility in [place]. She said if we cannot find a facility in [place] to accept she will take him home and do IV antibiotics at home because she is a nurse. Explained that physician will not allow due to his history.   Referral sent to [facility] as it is the last facility in [place] within insurance network.     1355  [facility] can accept and will start precert.  Placed call to patient's wife and updated her they can accept.  She is worried he will not agree to go or stay for the duration of antibiotics. Explained again that he will not be sent home with picc line. Offered to have attending call her to discuss but she would like to speak with ID since they are managing antibiotics.  Message sent to ID with wife's info. [facility] also updated.     MANSI Garner

## 2023-11-25 LAB
ALBUMIN SERPL BCP-MCNC: 2.5 G/DL (ref 3.4–5)
ANION GAP SERPL CALC-SCNC: 12 MMOL/L (ref 10–20)
BUN SERPL-MCNC: 6 MG/DL (ref 6–23)
CALCIUM SERPL-MCNC: 8.4 MG/DL (ref 8.6–10.3)
CHLORIDE SERPL-SCNC: 99 MMOL/L (ref 98–107)
CO2 SERPL-SCNC: 28 MMOL/L (ref 21–32)
CREAT SERPL-MCNC: 0.87 MG/DL (ref 0.5–1.3)
CULTURE WOUND: ABNORMAL
ERYTHROCYTE [DISTWIDTH] IN BLOOD BY AUTOMATED COUNT: 15 % (ref 11.5–14.5)
GFR SERPL CREATININE-BSD FRML MDRD: >90 ML/MIN/1.73M*2
GLUCOSE SERPL-MCNC: 92 MG/DL (ref 74–99)
HCT VFR BLD AUTO: 25.1 % (ref 41–52)
HGB BLD-MCNC: 7.9 G/DL (ref 13.5–17.5)
MAGNESIUM SERPL-MCNC: 1.94 MG/DL (ref 1.6–2.4)
MCH RBC QN AUTO: 28.7 PG (ref 26–34)
MCHC RBC AUTO-ENTMCNC: 31.5 G/DL (ref 32–36)
MCV RBC AUTO: 91 FL (ref 80–100)
NRBC BLD-RTO: 0 /100 WBCS (ref 0–0)
ORGANISM: ABNORMAL
ORGANISM: ABNORMAL
PHOSPHATE SERPL-MCNC: 4.2 MG/DL (ref 2.5–4.9)
PLATELET # BLD AUTO: 324 X10*3/UL (ref 150–450)
POTASSIUM SERPL-SCNC: 3.6 MMOL/L (ref 3.5–5.3)
RBC # BLD AUTO: 2.75 X10*6/UL (ref 4.5–5.9)
SODIUM SERPL-SCNC: 135 MMOL/L (ref 136–145)
WBC # BLD AUTO: 6.6 X10*3/UL (ref 4.4–11.3)

## 2023-11-25 PROCEDURE — 2500000004 HC RX 250 GENERAL PHARMACY W/ HCPCS (ALT 636 FOR OP/ED): Performed by: STUDENT IN AN ORGANIZED HEALTH CARE EDUCATION/TRAINING PROGRAM

## 2023-11-25 PROCEDURE — 83735 ASSAY OF MAGNESIUM: CPT

## 2023-11-25 PROCEDURE — 99233 SBSQ HOSP IP/OBS HIGH 50: CPT

## 2023-11-25 PROCEDURE — 2500000001 HC RX 250 WO HCPCS SELF ADMINISTERED DRUGS (ALT 637 FOR MEDICARE OP): Performed by: INTERNAL MEDICINE

## 2023-11-25 PROCEDURE — 85027 COMPLETE CBC AUTOMATED: CPT

## 2023-11-25 PROCEDURE — 2500000001 HC RX 250 WO HCPCS SELF ADMINISTERED DRUGS (ALT 637 FOR MEDICARE OP): Performed by: STUDENT IN AN ORGANIZED HEALTH CARE EDUCATION/TRAINING PROGRAM

## 2023-11-25 PROCEDURE — 80069 RENAL FUNCTION PANEL: CPT

## 2023-11-25 PROCEDURE — 2500000004 HC RX 250 GENERAL PHARMACY W/ HCPCS (ALT 636 FOR OP/ED): Performed by: INTERNAL MEDICINE

## 2023-11-25 PROCEDURE — 2500000004 HC RX 250 GENERAL PHARMACY W/ HCPCS (ALT 636 FOR OP/ED)

## 2023-11-25 PROCEDURE — 1200000002 HC GENERAL ROOM WITH TELEMETRY DAILY

## 2023-11-25 PROCEDURE — 2500000002 HC RX 250 W HCPCS SELF ADMINISTERED DRUGS (ALT 637 FOR MEDICARE OP, ALT 636 FOR OP/ED): Performed by: STUDENT IN AN ORGANIZED HEALTH CARE EDUCATION/TRAINING PROGRAM

## 2023-11-25 RX ORDER — KETOROLAC TROMETHAMINE 30 MG/ML
30 INJECTION, SOLUTION INTRAMUSCULAR; INTRAVENOUS ONCE
Status: COMPLETED | OUTPATIENT
Start: 2023-11-25 | End: 2023-11-25

## 2023-11-25 RX ADMIN — CEFAZOLIN SODIUM 2 G: 2 INJECTION, SOLUTION INTRAVENOUS at 09:53

## 2023-11-25 RX ADMIN — PREGABALIN 300 MG: 150 CAPSULE ORAL at 20:33

## 2023-11-25 RX ADMIN — BUPRENORPHINE AND NALOXONE 3 TABLET: 8; 2 TABLET SUBLINGUAL at 08:46

## 2023-11-25 RX ADMIN — OXYCODONE HYDROCHLORIDE 10 MG: 10 TABLET ORAL at 01:09

## 2023-11-25 RX ADMIN — ASPIRIN 81 MG CHEWABLE TABLET 81 MG: 81 TABLET CHEWABLE at 08:46

## 2023-11-25 RX ADMIN — KETOROLAC TROMETHAMINE 30 MG: 30 INJECTION, SOLUTION INTRAMUSCULAR; INTRAVENOUS at 23:09

## 2023-11-25 RX ADMIN — CEFAZOLIN SODIUM 2 G: 2 INJECTION, SOLUTION INTRAVENOUS at 01:09

## 2023-11-25 RX ADMIN — PREGABALIN 300 MG: 150 CAPSULE ORAL at 08:47

## 2023-11-25 RX ADMIN — TAMSULOSIN HYDROCHLORIDE 0.4 MG: 0.4 CAPSULE ORAL at 08:47

## 2023-11-25 RX ADMIN — CEFAZOLIN SODIUM 2 G: 2 INJECTION, SOLUTION INTRAVENOUS at 16:47

## 2023-11-25 RX ADMIN — POLYETHYLENE GLYCOL 3350 17 G: 17 POWDER, FOR SOLUTION ORAL at 08:46

## 2023-11-25 RX ADMIN — ESCITALOPRAM OXALATE 20 MG: 20 TABLET, FILM COATED ORAL at 08:47

## 2023-11-25 ASSESSMENT — PAIN SCALES - GENERAL
PAINLEVEL_OUTOF10: 9
PAINLEVEL_OUTOF10: 8
PAINLEVEL_OUTOF10: 8
PAINLEVEL_OUTOF10: 6

## 2023-11-25 ASSESSMENT — COGNITIVE AND FUNCTIONAL STATUS - GENERAL
HELP NEEDED FOR BATHING: A LITTLE
DRESSING REGULAR LOWER BODY CLOTHING: A LITTLE
MOVING TO AND FROM BED TO CHAIR: A LITTLE
CLIMB 3 TO 5 STEPS WITH RAILING: A LITTLE
MOBILITY SCORE: 20
WALKING IN HOSPITAL ROOM: A LITTLE
STANDING UP FROM CHAIR USING ARMS: A LITTLE
DAILY ACTIVITIY SCORE: 22

## 2023-11-25 ASSESSMENT — PAIN - FUNCTIONAL ASSESSMENT
PAIN_FUNCTIONAL_ASSESSMENT: 0-10
PAIN_FUNCTIONAL_ASSESSMENT: 0-10

## 2023-11-25 NOTE — CARE PLAN
The patient's goals for the shift include      The clinical goals for the shift include pt pain will be tolerable by the end of shift      Problem: Discharge Planning  Goal: Discharge to home or other facility with appropriate resources  Outcome: Progressing     Problem: Chronic Conditions and Co-morbidities  Goal: Patient's chronic conditions and co-morbidity symptoms are monitored and maintained or improved  Outcome: Progressing     Problem: Skin  Goal: Decreased wound size/increased tissue granulation at next dressing change  Outcome: Progressing  Goal: Prevent/minimize sheer/friction injuries  Outcome: Progressing  Goal: Promote/optimize nutrition  Outcome: Progressing  Goal: Promote skin healing  Outcome: Progressing     Problem: Fall/Injury  Goal: Verbalize understanding of risk factor reduction measures to prevent injury from fall in the home  Outcome: Progressing  Goal: Use assistive devices by end of the shift  Outcome: Progressing

## 2023-11-25 NOTE — HOSPITAL COURSE
58 year-old male with past medical history of MDD, T2 DM, BPH, hypertension, hyperlipidemia, JEMAL, chronic pain in neck and back, lumbar radiculopathy, and heroin use who is transferred from [facility] in the setting of recent lumbar surgery with subarachnoid hemorrhage/subdural hematoma (9/22/2023) presented with wound dehiscence requiring lumbar exploration and washout on 11/20. Wound culture 11/20: MSSA, but BCNTD.  PICC line placed and being discharged to facility on IV ABX.  He was borderline hypotensive throughout admission, home coreg held at discharge    Items to follow up on  - Per ID: PICC line placed 11/22, Cefazolin x 6 weeks, stop date 1/1/2024  - Resume Plavix on 11/27  - Follow-up outpatient with Dr. Browning (ID) in 6 weeks  - Follow up with suboxone provider to continue treatment for opioid dependence  - R ankle fracture on 10/16/23, underwent R distal tibial nail 10/17 by Dr. Hale   - Non weight bearing until 12/6/23 on RLE, repeat 2 view xrays of R tibia at that time  - Non weightbearing boot ordered for RLE   - Follow up with neurosurgery (Dr. Amezcua) in 4 weeks

## 2023-11-25 NOTE — NURSING NOTE
"08:45 cleaned incision site and changed dressing d/t being saturated, serosanguinous drainage from mid upper incision site. Well approximated, staples in place. Pt assisted up to chair, new gown and new bedding applied.  Chair alarm active. Call light w/I reach. Will continue to monitor.  12:13 notified Dr Zambrano pt bp 82/50 , manual. Pt reports \"I feel fine\".  1720 assisted pt back to bed. Bed alarm active. Call light w/I reach.   "

## 2023-11-25 NOTE — PROGRESS NOTES
Gm Thrasher is a 58 y.o. male on day 5 of admission presenting with Wound dehiscence.      Subjective   Seen & examined at bedside.  C/O right knee pain (Sub-acute).  Denies fevers, chills, night sweats or palpitations       Objective     Last Recorded Vitals  /62 (BP Location: Right arm, Patient Position: Lying)   Pulse 71   Temp 36.6 °C (97.9 °F) (Temporal)   Resp 16   Wt 101 kg (222 lb 3.6 oz)   SpO2 99%   Intake/Output last 3 Shifts:    Intake/Output Summary (Last 24 hours) at 11/25/2023 0815  Last data filed at 11/25/2023 0109  Gross per 24 hour   Intake 960 ml   Output 2000 ml   Net -1040 ml       Admission Weight  Weight: 111 kg (245 lb) (11/20/23 0151)    Daily Weight  11/20/23 : 101 kg (222 lb 3.6 oz)    Image Results  Bedside PICC Imaging  These images are not reportable by radiology and will not be interpreted   by  Radiologists.  CT head wo IV contrast  Narrative: Interpreted By:  Poncho Castellon,   STUDY:  CT HEAD WO IV CONTRAST;  11/22/2023 9:32 am      INDICATION:  Signs/Symptoms:s/p spinal surgery with high amount of drainage from  wound vac.      COMPARISON:  11/21/2023      ACCESSION NUMBER(S):  [id number]      ORDERING CLINICIAN:  KARYNA NAIK      TECHNIQUE:  Noncontrast axial CT scan of head was performed. Angled reformats in  brain and bone windows were generated. The images were reviewed in  bone, brain, blood and soft tissue windows.      FINDINGS:  No acute edema. No acute hemorrhage. No mass effect. Ventricular  system is normal for age. No extra-axial fluid collections. Orbits  are normal. Mucoperiosteal thickening right maxillary sinus.          Impression: No acute findings.      Signed by: Poncho Castellon 11/22/2023 10:34 AM  Dictation workstation:   [id number]      Physical Exam  General: Not in acute distress, A&O x 3, alert, cooperative, well-developed  HEENT: Normocephalic, atraumatic, EOMI, moist mucous membranes, left pupil mildly dilated  Neck: Neck supple,  trachea midline, no evidence of trauma  Cardiovascular: RRR, S1 and S2 appreciated, no murmurs rubs gallops appreciated, distal pulses 2+ bilaterally (radial and dorsalis pedis)  Respiratory: Vesicular breath sounds appreciated bilaterally, no adventitious sounds appreciated, no increased work of breathing  GI: Abdomen soft, nondistended, nontender to palpation, bowel sounds present  Extremities: No edema appreciated in lower extremities bilaterally, no cyanosis  Back: Surgical incision dressed appropriately, with decreased serosanguineous drainage soaking the bandaging, no signs of infection  Neuro: A&O X3, no focal deficits, strength and sensation intact bilaterally, deficit in cranial nerve I  Skin: Warm and dry, without lesions or rashes  Relevant Results             Scheduled medications  aspirin, 81 mg, oral, Daily  buprenorphine-naloxone, 3 tablet, sublingual, Daily  ceFAZolin, 2 g, intravenous, q8h  [START ON 11/27/2023] clopidogrel, 75 mg, oral, Daily  escitalopram, 20 mg, oral, Daily  [Held by provider] icosapent ethyL, 2 g, oral, BID with meals  lidocaine, 5 mL, infiltration, Once  polyethylene glycol, 17 g, oral, Daily  pregabalin, 300 mg, oral, BID  tamsulosin, 0.4 mg, oral, Daily      Continuous medications     PRN medications  PRN medications: alteplase, haloperidol lactate, HYDROmorphone, oxyCODONE, oxyCODONE, promethazine     Bedside PICC Imaging    Result Date: 11/22/2023  These images are not reportable by radiology and will not be interpreted by  Radiologists.    CT head wo IV contrast    Result Date: 11/22/2023  Interpreted By:  Poncho Castellon, STUDY: CT HEAD WO IV CONTRAST;  11/22/2023 9:32 am   INDICATION: Signs/Symptoms:s/p spinal surgery with high amount of drainage from wound vac.   COMPARISON: 11/21/2023   ACCESSION NUMBER(S): [id number]   ORDERING CLINICIAN: KARYNA NAIK   TECHNIQUE: Noncontrast axial CT scan of head was performed. Angled reformats in brain and bone windows were  generated. The images were reviewed in bone, brain, blood and soft tissue windows.   FINDINGS: No acute edema. No acute hemorrhage. No mass effect. Ventricular system is normal for age. No extra-axial fluid collections. Orbits are normal. Mucoperiosteal thickening right maxillary sinus.         No acute findings.   Signed by: Poncho Castellon 11/22/2023 10:34 AM Dictation workstation:   [id number]    CT lumbar spine w IV contrast    Result Date: 11/21/2023  EXAMINATION: CT OF THE LUMBAR SPINE WITH CONTRAST  11/19/2023 6:12 pm TECHNIQUE: CT of the lumbar spine was performed with the administration of intravenous contrast. Multiplanar reformatted images are provided for review. Automated exposure control, iterative reconstruction, and/or weight based adjustment of the mA/kV was utilized to reduce the radiation dose to as low as reasonably achievable. COMPARISON: None HISTORY: ORDERING SYSTEM PROVIDED HISTORY: back pain TECHNOLOGIST PROVIDED HISTORY: Reason for exam:->back pain Decision Support Exception - unselect if not a suspected or confirmed emergency medical condition->Emergency Medical Condition (MA) What reading provider will be dictating this exam?->CRC FINDINGS: BONES/ALIGNMENT:  There is normal alignment of the spine. The vertebral body heights are maintained. No osseous destructive lesion is seen.  Bilateral posteriorly placed pedicle screws, L3 through S1, secured to bilateral vertically oriented rods. SOFT TISSUES: No paraspinal mass is seen. No area of abnormal contrast enhancement. L1-L2: There is no significant disc protrusion, central spinal canal stenosis or neural foraminal narrowing. L2-L3: There is no significant disc protrusion, central spinal canal stenosis or neural foraminal narrowing. L3-L4: There is no significant disc protrusion, central spinal canal stenosis or neural foraminal narrowing.  L3-4 intervertebral disc space device L4-L5: There is no significant disc protrusion, central spinal  canal stenosis or neural foraminal narrowing.  L4-5 intervertebral disc space device. L5-S1: There is no significant disc protrusion, central spinal canal stenosis or neural foraminal narrowing.  L5-S1 intervertebral disc space device.    Internal fixation L3 through S1 with intervertebral disc space devices as discussed. No acute findings.    CT brain attack head wo IV contrast    Result Date: 11/21/2023  Interpreted By:  Martin Mathis, STUDY: CT BRAIN ATTACK HEAD WO IV CONTRAST;  11/21/2023 1:04 am   INDICATION: Signs/Symptoms:Dilated pupil.   COMPARISON: 10/13/2023   ACCESSION NUMBER(S): [id number]   ORDERING CLINICIAN: NESHA GRIDER   TECHNIQUE: Axial noncontrast CT images of the head. Sagittal and coronal reformats were provided.   FINDINGS: BRAIN: No acute intracranial hemorrhage. No mass effect or midline shift. Gray-white matter interfaces are preserved. VENTRICLES and EXTRA-AXIAL SPACES: Normal. EXTRACRANIAL SOFT TISSUES:  Within normal limits. PARANASAL SINUSES/MASTOIDS: Mild paranasal sinus mucosal thickening. Polypoid right maxillary sinus mucosal thickening. Mastoids are clear. BONES AND ORBITS: No displaced skull fracture. No suspicious osseous lesion.  Orbits are within normal limits. OTHER FINDINGS: None.       1. No acute intracranial hemorrhage or mass effect.   Martin Mathis discussed the significance and urgency of this critical finding by telephone with  Dr. Lawson on 11/21/2023 at 4:11 am.  (**-RCF-**) Findings:  See findings.     Signed by: Martin Mathis 11/21/2023 4:11 AM Dictation workstation:   [id number]    CT lumbar spine wo IV contrast    Result Date: 11/20/2023  Interpreted By:  Joe Harley, STUDY: CT LUMBAR SPINE WO IV CONTRAST;  11/20/2023 6:59 am   INDICATION: Signs/Symptoms:concern for wound infection.   COMPARISON: CT scan from 10/03/2023. Patient also had lumbar spine MRI earlier this morning..   ACCESSION NUMBER(S): [id number]   ORDERING CLINICIAN:  DEAN OGLESBY   TECHNIQUE: Thin section axial images were obtained from T11 down through the mid sacrum. Sagittal and coronal reconstruction images were generated. Soft tissue and bone windows were reviewed.   FINDINGS: VERTEBRAL BODIES AND POSTERIOR ELEMENTS: Previous laminectomy with posterior fusion from L3 through S1. There are pedicle screws with fixation rods in place at those levels. The fixation hardware is grossly intact. There are metallic disc spacers from L3-4 through L5-S1, intact. There is stable endplate osteophytosis from L3-4 through L5-S1 and also at L1-2 and T12-L1. Mild disc space narrowing at T12-L1 and L1-2, unchanged. There is no suspicious lucency adjacent to the pedicle screws. There is no gross focal interval bone destruction. There are edematous changes posteriorly in the paraspinal soft tissues and subcutaneous soft tissues, including abnormal gas density which has progressed since 10/03/2023. This gas extends from the subcutaneous soft tissues overlying the paraspinal muscles through the paraspinal muscles and adjacent to the left side of the spinous process and lamina at the L3-4 level, and then branches to the right and left to surround the facet joints at that level and extends caudally to the L4-5 facets. There is a punctate gas bubble posteriorly to the left of midline in the laminectomy defect at L5. The subcutaneous posterior midline gas collection with soft tissue edema extends caudally down to the S1 level. The edema/fluid in the subcutaneous soft tissues posteriorly extends from the L1-2 level down to the S1 level. Mass effect upon the thecal sac is difficult to ascertain due to non use of IV contrast and also due to beam hardening artifact from the fixation hardware, despite the use metal suppression technique. However, this is much better depicted in the MRI and the description is in the MRI report. There are sclerotic arthritic changes in both SI joints. Mild spur  formation in both hips.   SPINAL CANAL: No focal disc herniation in this unenhanced exam.   SOFT TISSUES: See above. Also, there is mild gas in the L5-S1 disc space which appears to be greater than it was previously, and there is mild soft tissue gas anterior to L5-S1 to the right of midline along with mild paraspinal soft tissue swelling at that level.   ABDOMEN/PELVIS: As on the prior exam, there is moderate to pronounced right-sided hydronephrosis ending at the right ureteropelvic junction, with gadolinium contrast material filling the dilated right renal collecting system and extending down the right ureter with some evident in the urinary bladder. The patient does have a Navarro catheter in place. The left kidney remains atrophic, and there is IV gadolinium contrast material in the left renal collecting system which is mildly prominent, also ending at the left ureterovesical junction. There is mild contrast evident in the nondilated left ureter. There are moderate aortoiliac calcifications. There is moderate stool throughout the imaged colon and rectum.       Lumbosacral spine surgical and arthritic changes as described. Mild DJD in both hips.   Abnormal soft tissue swelling/fluid in the posterior paraspinal musculature and overlying subcutaneous soft tissues, with progression associated gas density which extends along the left side of the L2 spinous process and then extends caudally surrounding the inter facet joints at L3-4 and L4-5. An infectious process cannot be excluded in this unenhanced exam. Suggest percutaneous needle aspiration to examine for abscess. Also, please refer to the MRI report for more information regarding the effect of this process on the thecal sac.   There are findings that are at least mildly suspicious for discitis at the L5-S1 level.   Navarro catheter in an otherwise nearly empty urinary bladder.   Stable atrophy of the left kidney with mild stable fullness of the left renal collecting  "system. Moderate to pronounced hydronephrosis on the right, perhaps due to underlying right congenital UPJ obstruction.   Signed by: Joe Harley 11/20/2023 8:21 AM Dictation workstation:   [id number]    CT head wo IV contrast    Result Date: 11/20/2023  EXAMINATION: CT OF THE HEAD WITHOUT CONTRAST  11/19/2023 6:12 pm TECHNIQUE: CT of the head was performed without the administration of intravenous contrast. Automated exposure control, iterative reconstruction, and/or weight based adjustment of the mA/kV was utilized to reduce the radiation dose to as low as reasonably achievable. COMPARISON: None. HISTORY: ORDERING SYSTEM PROVIDED HISTORY: confusion TECHNOLOGIST PROVIDED HISTORY: Reason for exam:->confusion Has a \"code stroke\" or \"stroke alert\" been called?->No Decision Support Exception - unselect if not a suspected or confirmed emergency medical condition->Emergency Medical Condition (MA) What reading provider will be dictating this exam?->CRC FINDINGS: BRAIN/VENTRICLES: There is no acute intracranial hemorrhage, mass effect or midline shift.  No abnormal extra-axial fluid collection.  The gray-white differentiation is maintained without evidence of an acute infarct.  There is no evidence of hydrocephalus. ORBITS: The visualized portion of the orbits demonstrate no acute abnormality. SINUSES: The visualized paranasal sinuses and mastoid air cells demonstrate no acute abnormality. SOFT TISSUES/SKULL:  No acute abnormality of the visualized skull or soft tissues.    No acute intracranial abnormality.    MR lumbar spine wo IV contrast    Result Date: 11/20/2023  Interpreted By:  Asad Chong, STUDY: MRI of the lumbar spine without IV contrast;  11/20/2023 4:56 am   INDICATION: Signs/Symptoms:concern for epidural abscess.   COMPARISON: MRI lumbar spine of 10/09/2022.   ACCESSION NUMBER(S): [id number]   ORDERING CLINICIAN: EVI ROSENBAUM   TECHNIQUE: Sagittal and axial STIR and T1-weighted MRI images of the lumbar " spine were acquired using a spondylolysis protocol.  No contrast was administered.   FINDINGS: Evaluation is severely limited by motion artifact.   Redemonstration of findings lumbosacral fusion and laminectomies. There is redemonstration of a multiloculated collection in the midline/left paramedian posterior subcutaneous soft tissues deep to the incision, and more deeply within the laminectomy bed abutting and within the dorsal epidural fat. The collection also extends about the posterior elements of the L2 vertebra. A rather large volume of air is newly present in the collection, probably secondary to recent intervention/communication with the atmosphere although infection with gas producing organism can not be excluded. The dominant locule of the collection is in the left paramedian posterior soft tissues, centered about the level of the L2-L3 disc space, measures a proximally 2.3 x 2.9 cm in transaxial diameters, and up to 7.8 cm in cc length. The next largest locule of the collection, centered about the L3-L4 disc space, within the laminectomy bed measures approximately 2.0 x 2.0 cm in transaxial diameters, and roughly 8.7 cm in CC length. An additional smaller collection centered at the mid L5 level measures up to 1.7 x 1.2 cm in transaxial diameters in the dorsal epidural fat. Mass effect from the collection and associated edema in the surrounding soft tissues is again noted to cause marked narrowing of the thecal sac from L2-L3 through L5-S1, worst at the L5-S1 level secondary to combination of grade 1 retrolisthesis, disc osteophyte bulging, dorsal epidural lipomatosis with edematous fat.   Vertebrae/Intervertebral Discs: Redemonstration mild grade 1 degenerative retrolisthesis at L4-L5. Alignment is otherwise maintained.   Cord: The lower thoracic cord appears unremarkable. The conus terminates at T12-L1.   Soft tissues: The prevertebral and posterior paraspinal soft tissues are unremarkable.   Similar  bilateral hydronephrosis relative to CT lumbar spine of 10/03/2023.       Evaluation is severely limited by motion artifact.   Redemonstration of findings lumbosacral fusion and laminectomies. There is redemonstration of a multiloculated collection in the midline/left paramedian posterior subcutaneous soft tissues deep to the incision, and more deeply within the laminectomy bed abutting and within the dorsal epidural fat. The sterility of this collection cannot be determined by imaging.   The collection also extends about the posterior elements of the L2 vertebra. A rather large volume of air is newly present in the collection, probably secondary to recent intervention/communication with the atmosphere although infection with gas producing organism cannot be excluded.   The dominant locule of the collection is in the left paramedian posterior soft tissues, centered about the level of the L2-L3 disc space, measures a proximally 2.3 x 2.9 cm in transaxial diameters, and up to 7.8 cm in cc length. The next largest locule of the collection, centered about the L3-L4 disc space, within the laminectomy bed measures approximately 2.0 x 2.0 cm in transaxial diameters, and roughly 8.7 cm in CC length. An additional smaller collection centered at the mid L5 level measures up to 1.7 x 1.2 cm in transaxial diameters in the dorsal epidural fat.   Mass effect from the collection and associated edema in the surrounding soft tissues is again noted to cause moderate to marked narrowing of the thecal sac from L2-L3 through L5-S1, worst at the L5-S1 level where there is effacement of CSF and compression of cauda equina nerve roots, secondary to combination of grade 1 retrolisthesis, disc osteophyte bulging, dorsal epidural lipomatosis with edematous fat.   I personally reviewed the images/study and I agree with the findings as stated. This study was interpreted at [facility], [place], [place].   MACRO:  None   Signed by: Asad Chong 11/20/2023 5:24 AM Dictation workstation:   [id number]    XR chest 1 view    Result Date: 11/19/2023  EXAMINATION: ONE XRAY VIEW OF THE CHEST 11/19/2023 5:39 pm COMPARISON: Portable chest from 11/21/2022. HISTORY: ORDERING SYSTEM PROVIDED HISTORY: Sepsis TECHNOLOGIST PROVIDED HISTORY: Reason for exam:->Sepsis What reading provider will be dictating this exam?->CRC FINDINGS: The lungs remain clear. There is no sign of any infiltrate or effusion. The heart remains normal in size.  The mediastinum is normal in appearance. Again seen is a metal plate and anchoring screws in the inferior cervical spine from anterior cervical fusion.    No active disease seen in the chest.    XR tibia fibula right 2 views    Result Date: 11/6/2023  Interpreted By:  Vic Araya, STUDY: XR TIBIA FIBULA RIGHT 2 VIEWS;  ;  11/6/2023 11:05 am   INDICATION: Signs/Symptoms:pain.   ACCESSION NUMBER(S): [id number]   ORDERING CLINICIAN: VIC ARAYA   FINDINGS: No acute fracture dislocations of the right tibia. Well-aligned tibial and fibula fracture status post nailing. No interval change compared to fluoroscopic imaging.       Signed by: Vic Araya 11/6/2023 2:39 PM Dictation workstation:   [id number]      Results for orders placed or performed during the hospital encounter of 11/20/23 (from the past 24 hour(s))   CBC   Result Value Ref Range    WBC 6.6 4.4 - 11.3 x10*3/uL    nRBC 0.0 0.0 - 0.0 /100 WBCs    RBC 2.75 (L) 4.50 - 5.90 x10*6/uL    Hemoglobin 7.9 (L) 13.5 - 17.5 g/dL    Hematocrit 25.1 (L) 41.0 - 52.0 %    MCV 91 80 - 100 fL    MCH 28.7 26.0 - 34.0 pg    MCHC 31.5 (L) 32.0 - 36.0 g/dL    RDW 15.0 (H) 11.5 - 14.5 %    Platelets 324 150 - 450 x10*3/uL        Assessment/Plan        This patient has a central line   Reason for the central line remaining today? Parenteral medication            Principal Problem:    Wound dehiscence  Active Problems:    CAD (coronary artery disease)    Patient is a 58  year-old male with past medical history of MDD, T2 DM, BPH, hypertension, hyperlipidemia, JEMAL, chronic pain in neck and back, lumbar radiculopathy, and heroin use who is transferred from [facility] in the setting of recent lumbar surgery with subarachnoid hemorrhage/subdural hematoma (9/22/2023) presented with wound dehiscence requiring lumbar exploration and washout.  Patient was admitted to medicine for evaluation and management of incisional infection. Neurosurgery was consulted and is following.     Multiple lumbar post operative fluid collections with mass effect  - MRI and CT scan as discussed above. Repeat CT head imaging ordered due to concern for midline shift, results negative for any acute pathology  - Lumbar wound washout procedure performed by NSGY: Dr. Vitaly Amezcua   - Wound culture 11/20: MSSA, but BCNTD  - Per ID: PICC line placed 11/22, Cefazolin x 6 weeks, stop date 1/1/2024  - Follow-up outpatient with Dr. Browning in 6 weeks     2.Left pupil dilation   -A brain attack was called at 12:45 AM 11/21 due to AMS and left pupil dilation. CT scan of head w/o contrast revealed no intracranial abnormalities.  -Patient was AOx0 with left pupil dilation, but mental status improved to AOx3.  -No visual disturbances, pain, or acute concerns overall.  -Psych consulted: continue Suboxone on D/C & FU with Suboxone prescriber.     3. S/P R distal tibial nail 10/17/23  R ankle fracture on 10/16/23, underwent R distal tibial nail 10/17 by Dr. Hale  - FU note on 11/06 needs to be non-weightbearing for 4 weeks on R leg  - Non weight bearing until 12/6/23 on RLE, repeat 2 view xrays of R tibia at that time  - Non weightbearing boot ordered for RLE     4. H/O NSTEMI (10/13/23)  - PCI to RCA  - ASA and Plavix have been held during this admission due to operation  - ASA resumed 11/23/2023 POD #3  - Resume Plavix POD #7 (11/27)     5.  Hx of Chronic opioid dependence on Suboxone:  - Home Suboxone resumed on  11/24/2023  - Patient has needed less and less pain management, however if patient does have breakthrough pain consider Toradol.     6.NIDDM2  -A1C 5.7     IVF: None at this time  Diet: Diabetic + cardiac  DVT Ppx: SCD  Dispo: Anticipate 2 additional days hospital stay     Code status: Full code     Assessment and plan discussed with attending.    Dispo: Medically optimized for discharge, cleared by ID & NSGY, awaiting placement       Lucio Solis DO

## 2023-11-25 NOTE — PROGRESS NOTES
"mG Thrasher is a 58 y.o. male on day 5 of admission presenting with Wound dehiscence.    Subjective     No concerns today. Minimal additional leakage noted.       Objective     Awake, Ox3  Fcx4 5/5  SILT  Incision c/d/i    Last Recorded Vitals  Blood pressure 92/71, pulse 75, temperature 36.2 °C (97.2 °F), resp. rate 18, height 1.803 m (5' 11\"), weight 101 kg (222 lb 3.6 oz), SpO2 99 %.  Intake/Output last 3 Shifts:  I/O last 3 completed shifts:  In: 1490 (14.8 mL/kg) [P.O.:1190; IV Piggyback:300]  Out: 2100 (20.8 mL/kg) [Urine:2100 (0.6 mL/kg/hr)]  Weight: 100.8 kg     Relevant Results                 Assessment/Plan   Principal Problem:    Wound dehiscence  Active Problems:    CAD (coronary artery disease)    Patient is a 58 year old male p/w wound dehiscence, 11/20 s/p wound washout.    Floor  ID recs - ancef, abx stop date 1/1/2024; PICC in place  PTOT-SNF    Stable for discharge from surgical standpoint.           Adalbreto Conn MD      "

## 2023-11-26 LAB — GLUCOSE BLD MANUAL STRIP-MCNC: 98 MG/DL (ref 74–99)

## 2023-11-26 PROCEDURE — 2500000004 HC RX 250 GENERAL PHARMACY W/ HCPCS (ALT 636 FOR OP/ED): Performed by: STUDENT IN AN ORGANIZED HEALTH CARE EDUCATION/TRAINING PROGRAM

## 2023-11-26 PROCEDURE — 2500000004 HC RX 250 GENERAL PHARMACY W/ HCPCS (ALT 636 FOR OP/ED): Performed by: INTERNAL MEDICINE

## 2023-11-26 PROCEDURE — 99239 HOSP IP/OBS DSCHRG MGMT >30: CPT

## 2023-11-26 PROCEDURE — 2500000002 HC RX 250 W HCPCS SELF ADMINISTERED DRUGS (ALT 637 FOR MEDICARE OP, ALT 636 FOR OP/ED)

## 2023-11-26 PROCEDURE — 2500000001 HC RX 250 WO HCPCS SELF ADMINISTERED DRUGS (ALT 637 FOR MEDICARE OP): Performed by: STUDENT IN AN ORGANIZED HEALTH CARE EDUCATION/TRAINING PROGRAM

## 2023-11-26 PROCEDURE — 99024 POSTOP FOLLOW-UP VISIT: CPT | Performed by: STUDENT IN AN ORGANIZED HEALTH CARE EDUCATION/TRAINING PROGRAM

## 2023-11-26 PROCEDURE — 2500000001 HC RX 250 WO HCPCS SELF ADMINISTERED DRUGS (ALT 637 FOR MEDICARE OP): Performed by: INTERNAL MEDICINE

## 2023-11-26 PROCEDURE — 82947 ASSAY GLUCOSE BLOOD QUANT: CPT

## 2023-11-26 PROCEDURE — 1200000002 HC GENERAL ROOM WITH TELEMETRY DAILY

## 2023-11-26 RX ORDER — BUPRENORPHINE HYDROCHLORIDE AND NALOXONE HYDROCHLORIDE DIHYDRATE 8; 2 MG/1; MG/1
1 TABLET SUBLINGUAL 2 TIMES DAILY
Status: DISCONTINUED | OUTPATIENT
Start: 2023-11-26 | End: 2023-11-29 | Stop reason: HOSPADM

## 2023-11-26 RX ORDER — CEFAZOLIN SODIUM/D5W 2 G/100 ML
2 PLASTIC BAG, INJECTION (ML) INTRAVENOUS EVERY 8 HOURS
Start: 2023-11-26 | End: 2024-01-04 | Stop reason: ALTCHOICE

## 2023-11-26 RX ORDER — BUPRENORPHINE AND NALOXONE 8; 2 MG/1; MG/1
1 FILM, SOLUBLE BUCCAL; SUBLINGUAL 2 TIMES DAILY
Status: DISCONTINUED | OUTPATIENT
Start: 2023-11-26 | End: 2023-11-26

## 2023-11-26 RX ORDER — BUPRENORPHINE HYDROCHLORIDE AND NALOXONE HYDROCHLORIDE DIHYDRATE 8; 2 MG/1; MG/1
1 TABLET SUBLINGUAL ONCE
Status: COMPLETED | OUTPATIENT
Start: 2023-11-26 | End: 2023-11-26

## 2023-11-26 RX ORDER — CLOPIDOGREL BISULFATE 75 MG/1
75 TABLET ORAL DAILY
Qty: 30 TABLET | Refills: 0
Start: 2023-11-27 | End: 2023-12-27

## 2023-11-26 RX ORDER — NAPROXEN SODIUM 220 MG/1
81 TABLET, FILM COATED ORAL DAILY
Refills: 0
Start: 2023-11-26

## 2023-11-26 RX ADMIN — CEFAZOLIN SODIUM 2 G: 2 INJECTION, SOLUTION INTRAVENOUS at 09:48

## 2023-11-26 RX ADMIN — TAMSULOSIN HYDROCHLORIDE 0.4 MG: 0.4 CAPSULE ORAL at 09:49

## 2023-11-26 RX ADMIN — BUPRENORPHINE AND NALOXONE 1 TABLET: 8; 2 TABLET SUBLINGUAL at 09:49

## 2023-11-26 RX ADMIN — CEFAZOLIN SODIUM 2 G: 2 INJECTION, SOLUTION INTRAVENOUS at 17:48

## 2023-11-26 RX ADMIN — PREGABALIN 300 MG: 150 CAPSULE ORAL at 20:20

## 2023-11-26 RX ADMIN — ASPIRIN 81 MG CHEWABLE TABLET 81 MG: 81 TABLET CHEWABLE at 09:49

## 2023-11-26 RX ADMIN — ESCITALOPRAM OXALATE 20 MG: 20 TABLET, FILM COATED ORAL at 09:49

## 2023-11-26 RX ADMIN — BUPRENORPHINE AND NALOXONE 1 TABLET: 8; 2 TABLET SUBLINGUAL at 20:20

## 2023-11-26 RX ADMIN — CEFAZOLIN SODIUM 2 G: 2 INJECTION, SOLUTION INTRAVENOUS at 01:10

## 2023-11-26 RX ADMIN — PREGABALIN 300 MG: 150 CAPSULE ORAL at 09:49

## 2023-11-26 ASSESSMENT — PAIN SCALES - GENERAL
PAINLEVEL_OUTOF10: 8
PAINLEVEL_OUTOF10: 2
PAINLEVEL_OUTOF10: 8

## 2023-11-26 ASSESSMENT — COGNITIVE AND FUNCTIONAL STATUS - GENERAL
DAILY ACTIVITIY SCORE: 21
DRESSING REGULAR UPPER BODY CLOTHING: A LITTLE
STANDING UP FROM CHAIR USING ARMS: A LITTLE
HELP NEEDED FOR BATHING: A LITTLE
DRESSING REGULAR LOWER BODY CLOTHING: A LITTLE
WALKING IN HOSPITAL ROOM: A LITTLE
DRESSING REGULAR LOWER BODY CLOTHING: A LITTLE
MOVING TO AND FROM BED TO CHAIR: A LITTLE
TOILETING: A LITTLE
CLIMB 3 TO 5 STEPS WITH RAILING: A LOT
WALKING IN HOSPITAL ROOM: A LITTLE
MOBILITY SCORE: 19
STANDING UP FROM CHAIR USING ARMS: A LITTLE
HELP NEEDED FOR BATHING: A LITTLE
MOBILITY SCORE: 19
DRESSING REGULAR UPPER BODY CLOTHING: A LITTLE
CLIMB 3 TO 5 STEPS WITH RAILING: A LOT
MOVING TO AND FROM BED TO CHAIR: A LITTLE
DAILY ACTIVITIY SCORE: 20

## 2023-11-26 ASSESSMENT — PAIN - FUNCTIONAL ASSESSMENT
PAIN_FUNCTIONAL_ASSESSMENT: 0-10

## 2023-11-26 NOTE — CARE PLAN
The patient's goals for the shift include      The clinical goals for the shift include pt pain will decrease this shift      Problem: Discharge Planning  Goal: Discharge to home or other facility with appropriate resources  Outcome: Progressing     Problem: Chronic Conditions and Co-morbidities  Goal: Patient's chronic conditions and co-morbidity symptoms are monitored and maintained or improved  Outcome: Progressing     Problem: Skin  Goal: Decreased wound size/increased tissue granulation at next dressing change  Outcome: Progressing  Goal: Prevent/minimize sheer/friction injuries  Outcome: Progressing  Goal: Promote/optimize nutrition  Outcome: Progressing  Goal: Promote skin healing  Outcome: Progressing     Problem: Fall/Injury  Goal: Verbalize understanding of risk factor reduction measures to prevent injury from fall in the home  Outcome: Progressing  Goal: Use assistive devices by end of the shift  Outcome: Progressing  Goal: Be free from injury by end of the shift  Outcome: Progressing     Problem: Safety - Adult  Goal: Free from fall injury  Outcome: Progressing

## 2023-11-26 NOTE — DISCHARGE INSTRUCTIONS
Items to follow up on  - Per ID: PICC line placed 11/22, Cefazolin x 6 weeks, stop date 1/1/2024  - Resume Plavix on 11/27  - Follow-up outpatient with Dr. Browning (ID) in 6 weeks  - Follow up with suboxone provider to continue treatment for opioid dependence  - R ankle fracture on 10/16/23, underwent R distal tibial nail 10/17 by Dr. Hale   - Non weight bearing until 12/6/23 on RLE, repeat 2 view xrays of R tibia at that time, Follow up with Dr. Hale  - Follow up with neurosurgery (Dr. Amezcua) in 4 weeks

## 2023-11-26 NOTE — DISCHARGE SUMMARY
Discharge Diagnosis  Wound dehiscence    Issues Requiring Follow-Up    Items to follow up on  - Per ID: PICC line placed 11/22, Cefazolin x 6 weeks, stop date 1/1/2024  - Resume Plavix on 11/27  - Follow-up outpatient with Dr. Browning (ID) in 6 weeks  - Follow up with suboxone provider to continue treatment for opioid dependence  - R ankle fracture on 10/16/23, underwent R distal tibial nail 10/17 by Dr. Hale   - Non weight bearing until 12/6/23 on RLE, repeat 2 view xrays of R tibia at that time  - Non weightbearing boot ordered for RLE   - Follow up with neurosurgery (Dr. Amezcua) in 4 weeks    Discharge Meds     Your medication list        START taking these medications        Instructions Last Dose Given Next Dose Due   ceFAZolin in dextrose 5 % 2 gram/100 mL solution  Commonly known as: Ancef      Infuse 100 mL (2 g) at 200 mL/hr over 30 minutes into a venous catheter every 8 hours.              CONTINUE taking these medications        Instructions Last Dose Given Next Dose Due   aspirin 81 mg chewable tablet      Chew 1 tablet (81 mg) once daily.       atorvastatin 80 mg tablet  Commonly known as: Lipitor      Take 1 tablet (80 mg) by mouth once daily.       clopidogrel 75 mg tablet  Commonly known as: Plavix  Start taking on: November 27, 2023      Take 1 tablet (75 mg) by mouth once daily. Do not start before November 27, 2023.       cyanocobalamin 1,000 mcg/mL oral liquid  Commonly known as: Vitamin B-12           ergocalciferol 1.25 MG (57925 UT) capsule  Commonly known as: Vitamin D-2           escitalopram 20 mg tablet  Commonly known as: Lexapro           metFORMIN 500 mg tablet  Commonly known as: Glucophage      Take 2 tablets (1,000 mg) by mouth 2 times a day with meals.       miscellaneous medical supply misc      Knee walker for non weight bearing right leg post right ankle surgery       naloxone 4 mg/0.1 mL nasal spray  Commonly known as: Narcan           pantoprazole 40 mg EC tablet  Commonly  known as: ProtoNix      Take 1 tablet (40 mg) by mouth once daily in the morning. Take before meals for 14 days. Do not crush, chew, or split. Do not start before October 6, 2023.       pregabalin 300 mg capsule  Commonly known as: Lyrica      Take 1 capsule (300 mg) by mouth 2 times a day.       promethazine 25 mg tablet  Commonly known as: Phenergan           semaglutide 0.25 mg or 0.5 mg (2 mg/3 mL) pen injector      Inject 0.5 mg under the skin 1 (one) time per week.       Suboxone 8-2 mg SL film  Generic drug: buprenorphine-naloxone           tamsulosin 0.4 mg 24 hr capsule  Commonly known as: Flomax           Vascepa 1 gram capsule  Generic drug: icosapent ethyL      Take 2 capsules (2 g) by mouth 2 times a day with meals.              STOP taking these medications      carvedilol 12.5 mg tablet  Commonly known as: Coreg        enoxaparin 40 mg/0.4 mL syringe  Commonly known as: Lovenox        levETIRAcetam 500 mg tablet  Commonly known as: Keppra        sulfamethoxazole-trimethoprim 800-160 mg tablet  Commonly known as: Bactrim DS                  Where to Get Your Medications        Information about where to get these medications is not yet available    Ask your nurse or doctor about these medications  aspirin 81 mg chewable tablet  ceFAZolin in dextrose 5 % 2 gram/100 mL solution  clopidogrel 75 mg tablet         Test Results Pending At Discharge  Pending Labs       No current pending labs.            Hospital Course   58 year-old male with past medical history of MDD, T2 DM, BPH, hypertension, hyperlipidemia, JEMAL, chronic pain in neck and back, lumbar radiculopathy, and heroin use who is transferred from [facility] in the setting of recent lumbar surgery with subarachnoid hemorrhage/subdural hematoma (9/22/2023) presented with wound dehiscence requiring lumbar exploration and washout on 11/20. Wound culture 11/20: MSSA, but BCNTD.  PICC line placed and being discharged to facility on IV ABX.  He was  borderline hypotensive throughout admission, home coreg held at discharge    Items to follow up on  - Per ID: PICC line placed 11/22, Cefazolin x 6 weeks, stop date 1/1/2024  - Resume Plavix on 11/27  - Follow-up outpatient with Dr. Browning (ID) in 6 weeks  - Follow up with suboxone provider to continue treatment for opioid dependence  - R ankle fracture on 10/16/23, underwent R distal tibial nail 10/17 by Dr. Hale   - Non weight bearing until 12/6/23 on RLE, repeat 2 view xrays of R tibia at that time  - Non weightbearing boot ordered for RLE   - Follow up with neurosurgery (Dr. Amezcua) in 4 weeks    Pertinent Physical Exam At Time of Discharge  Physical Exam  General: Not in acute distress, A&O x 3, alert, cooperative, well-developed  HEENT: Normocephalic, atraumatic, EOMI, moist mucous membranes, left pupil mildly dilated  Neck: Neck supple, trachea midline, no evidence of trauma  Cardiovascular: RRR, S1 and S2 appreciated, no murmurs rubs gallops appreciated, distal pulses 2+ bilaterally (radial and dorsalis pedis)  Respiratory: Vesicular breath sounds appreciated bilaterally, no adventitious sounds appreciated, no increased work of breathing  GI: Abdomen soft, nondistended, nontender to palpation, bowel sounds present  Extremities: No edema appreciated in lower extremities bilaterally, no cyanosis  Back: Surgical incision dressed appropriately, with scant serosanguineous drainage on dressing, staples intact without surrounding erythema, no germania drainage seen, no signs of infection  Neuro: A&O X3, no focal deficits, strength and sensation intact bilaterally, deficit in cranial nerve I  Skin: Warm and dry, without lesions or rashes  Relevant Results  Outpatient Follow-Up  Future Appointments   Date Time Provider Department Center   12/4/2023  3:45 PM Vic Hale MD [id number] [place]   12/8/2023  9:00 AM JARED Cota-CNP [id number] [place]   12/28/2023 11:00 AM Vitaly Amezcua MD PhD [id number]  Glen Solis, DO

## 2023-11-26 NOTE — NURSING NOTE
Patient complained of 8/10 pain at start of shift and a one time dose of Toradol was ordered and given, that did give the patient some relief. Patient is concerned about his Suboxone, he believes he is supposed to have it ordered twice a day instead once daily. Patient neuro checks were unchanged. Patient vital signs stable.

## 2023-11-26 NOTE — PROGRESS NOTES
"Gm Thrasher is a 58 y.o. male on day 6 of admission presenting with Wound dehiscence.    Subjective     No concerns today. Minimal additional leakage noted.       Objective     Awake, Ox3  Fcx4 5/5  SILT  Incision c/d/i    Last Recorded Vitals  Blood pressure 100/77, pulse 72, temperature 35.7 °C (96.3 °F), resp. rate 16, height 1.803 m (5' 11\"), weight 101 kg (222 lb 3.6 oz), SpO2 99 %.  Intake/Output last 3 Shifts:  I/O last 3 completed shifts:  In: 320 (3.2 mL/kg) [P.O.:50; I.V.:70 (0.7 mL/kg); IV Piggyback:200]  Out: 1475 (14.6 mL/kg) [Urine:1475 (0.4 mL/kg/hr)]  Weight: 100.8 kg     Relevant Results                 Assessment/Plan   Principal Problem:    Wound dehiscence  Active Problems:    CAD (coronary artery disease)    Patient is a 58 year old male p/w wound dehiscence, 11/20 s/p wound washout.    Floor  ID recs - ancef, abx stop date 1/1/2024; PICC in place  PTOT-SNF    Patient to be discharged to SNF today. Outpatient follow-up arranged.           Adalberto Conn MD    "

## 2023-11-26 NOTE — NURSING NOTE
EOS- No acute changes throughout the shift. Patient up in chair for a couple hours. IV antibiotics given. Neurological checks remain unchanged. Safety maintained and call light within reach.

## 2023-11-27 LAB
ALBUMIN SERPL BCP-MCNC: 2.6 G/DL (ref 3.4–5)
ANION GAP SERPL CALC-SCNC: 12 MMOL/L (ref 10–20)
BUN SERPL-MCNC: 6 MG/DL (ref 6–23)
CALCIUM SERPL-MCNC: 8.5 MG/DL (ref 8.6–10.3)
CHLORIDE SERPL-SCNC: 94 MMOL/L (ref 98–107)
CO2 SERPL-SCNC: 31 MMOL/L (ref 21–32)
CREAT SERPL-MCNC: 0.98 MG/DL (ref 0.5–1.3)
CULTURE WOUND: ABNORMAL
ERYTHROCYTE [DISTWIDTH] IN BLOOD BY AUTOMATED COUNT: 15.1 % (ref 11.5–14.5)
GFR SERPL CREATININE-BSD FRML MDRD: >90 ML/MIN/1.73M*2
GLUCOSE SERPL-MCNC: 104 MG/DL (ref 74–99)
HCT VFR BLD AUTO: 26 % (ref 41–52)
HGB BLD-MCNC: 8.1 G/DL (ref 13.5–17.5)
MAGNESIUM SERPL-MCNC: 1.78 MG/DL (ref 1.6–2.4)
MCH RBC QN AUTO: 28.8 PG (ref 26–34)
MCHC RBC AUTO-ENTMCNC: 31.2 G/DL (ref 32–36)
MCV RBC AUTO: 93 FL (ref 80–100)
NRBC BLD-RTO: 0 /100 WBCS (ref 0–0)
ORGANISM: ABNORMAL
ORGANISM: ABNORMAL
PHOSPHATE SERPL-MCNC: 3.2 MG/DL (ref 2.5–4.9)
PLATELET # BLD AUTO: 330 X10*3/UL (ref 150–450)
POTASSIUM SERPL-SCNC: 3.8 MMOL/L (ref 3.5–5.3)
RBC # BLD AUTO: 2.81 X10*6/UL (ref 4.5–5.9)
SODIUM SERPL-SCNC: 133 MMOL/L (ref 136–145)
WBC # BLD AUTO: 6.3 X10*3/UL (ref 4.4–11.3)

## 2023-11-27 PROCEDURE — 99232 SBSQ HOSP IP/OBS MODERATE 35: CPT

## 2023-11-27 PROCEDURE — 2500000004 HC RX 250 GENERAL PHARMACY W/ HCPCS (ALT 636 FOR OP/ED): Performed by: INTERNAL MEDICINE

## 2023-11-27 PROCEDURE — 80069 RENAL FUNCTION PANEL: CPT

## 2023-11-27 PROCEDURE — 2500000001 HC RX 250 WO HCPCS SELF ADMINISTERED DRUGS (ALT 637 FOR MEDICARE OP): Performed by: STUDENT IN AN ORGANIZED HEALTH CARE EDUCATION/TRAINING PROGRAM

## 2023-11-27 PROCEDURE — 2500000004 HC RX 250 GENERAL PHARMACY W/ HCPCS (ALT 636 FOR OP/ED): Performed by: STUDENT IN AN ORGANIZED HEALTH CARE EDUCATION/TRAINING PROGRAM

## 2023-11-27 PROCEDURE — 2500000001 HC RX 250 WO HCPCS SELF ADMINISTERED DRUGS (ALT 637 FOR MEDICARE OP): Performed by: INTERNAL MEDICINE

## 2023-11-27 PROCEDURE — 1200000002 HC GENERAL ROOM WITH TELEMETRY DAILY

## 2023-11-27 PROCEDURE — 85027 COMPLETE CBC AUTOMATED: CPT

## 2023-11-27 PROCEDURE — 83735 ASSAY OF MAGNESIUM: CPT

## 2023-11-27 PROCEDURE — 2500000002 HC RX 250 W HCPCS SELF ADMINISTERED DRUGS (ALT 637 FOR MEDICARE OP, ALT 636 FOR OP/ED)

## 2023-11-27 RX ADMIN — PREGABALIN 300 MG: 150 CAPSULE ORAL at 09:51

## 2023-11-27 RX ADMIN — BUPRENORPHINE AND NALOXONE 1 TABLET: 8; 2 TABLET SUBLINGUAL at 20:30

## 2023-11-27 RX ADMIN — CEFAZOLIN SODIUM 2 G: 2 INJECTION, SOLUTION INTRAVENOUS at 17:39

## 2023-11-27 RX ADMIN — ASPIRIN 81 MG CHEWABLE TABLET 81 MG: 81 TABLET CHEWABLE at 09:51

## 2023-11-27 RX ADMIN — CLOPIDOGREL BISULFATE 75 MG: 75 TABLET ORAL at 09:51

## 2023-11-27 RX ADMIN — CEFAZOLIN SODIUM 2 G: 2 INJECTION, SOLUTION INTRAVENOUS at 00:48

## 2023-11-27 RX ADMIN — TAMSULOSIN HYDROCHLORIDE 0.4 MG: 0.4 CAPSULE ORAL at 09:51

## 2023-11-27 RX ADMIN — PREGABALIN 300 MG: 150 CAPSULE ORAL at 20:30

## 2023-11-27 RX ADMIN — ESCITALOPRAM OXALATE 20 MG: 20 TABLET, FILM COATED ORAL at 09:51

## 2023-11-27 RX ADMIN — BUPRENORPHINE AND NALOXONE 1 TABLET: 8; 2 TABLET SUBLINGUAL at 09:51

## 2023-11-27 RX ADMIN — CEFAZOLIN SODIUM 2 G: 2 INJECTION, SOLUTION INTRAVENOUS at 09:51

## 2023-11-27 ASSESSMENT — PAIN SCALES - GENERAL
PAINLEVEL_OUTOF10: 0 - NO PAIN
PAINLEVEL_OUTOF10: 2
PAINLEVEL_OUTOF10: 5 - MODERATE PAIN
PAINLEVEL_OUTOF10: 0 - NO PAIN
PAINLEVEL_OUTOF10: 5 - MODERATE PAIN

## 2023-11-27 ASSESSMENT — COGNITIVE AND FUNCTIONAL STATUS - GENERAL
MOVING TO AND FROM BED TO CHAIR: A LITTLE
WALKING IN HOSPITAL ROOM: A LITTLE
DRESSING REGULAR LOWER BODY CLOTHING: A LITTLE
DRESSING REGULAR UPPER BODY CLOTHING: A LITTLE
MOBILITY SCORE: 19
STANDING UP FROM CHAIR USING ARMS: A LITTLE
HELP NEEDED FOR BATHING: A LITTLE
CLIMB 3 TO 5 STEPS WITH RAILING: A LOT
DAILY ACTIVITIY SCORE: 21

## 2023-11-27 ASSESSMENT — PAIN - FUNCTIONAL ASSESSMENT
PAIN_FUNCTIONAL_ASSESSMENT: 0-10

## 2023-11-27 NOTE — PROGRESS NOTES
" Gm Thrasher is a 58 y.o. male on day 7 of admission presenting with Wound dehiscence.    Subjective     No concerns today. Minimal additional leakage noted.       Objective     Awake, Ox3  Fcx4 5/5  SILT  Incision c/d/i    Last Recorded Vitals  Blood pressure 90/64, pulse 74, temperature 37.2 °C (99 °F), resp. rate 16, height 1.803 m (5' 11\"), weight 101 kg (222 lb 3.6 oz), SpO2 97 %.  Intake/Output last 3 Shifts:  I/O last 3 completed shifts:  In: 1720 (17.1 mL/kg) [P.O.:1520; IV Piggyback:200]  Out: 2550 (25.3 mL/kg) [Urine:2550 (0.7 mL/kg/hr)]  Weight: 100.8 kg     Relevant Results                 Assessment/Plan   Principal Problem:    Wound dehiscence  Active Problems:    CAD (coronary artery disease)    Patient is a 58 year old male p/w wound dehiscence, 11/20 s/p wound washout.    No changes to recs from prior  Floor  ID recs - ancef, abx stop date 1/1/2024; PICC in place  PTOT-SNF    Patient to be discharged to SNF today. Outpatient follow-up arranged.           Nicholas Paredes PA-C      "

## 2023-11-27 NOTE — PROGRESS NOTES
[facility] [place]  Department of Neurological Surgery  Post Operative Patient Visit      History of Present Illness:  Gm Thrasher is a 58 y.o. year old male who presents to the spine clinic in a post operative visit.     They are status post L3-4 and L4-5 XLIF, L5-S1 TLIF and L3-S1 posterior decompression and fusion on September 29, 2023.  At this time, he reports some expected low back discomfort.  He denies any radicular symptoms.  He did have a fall after his discharge home, and suffered a fracture.  He is currently wearing a boot.  He he was also hospitalized with a MI since his surgery.    14/14 systems reviewed and negative other than what is listed in the history of present illness    Patient Active Problem List   Diagnosis    CAD (coronary artery disease)    H/O fracture of ankle    Type 2 diabetes mellitus with hyperglycemia, without long-term current use of insulin (CMS/[facility])    Lumbar post-laminectomy syndrome    Lumbar spondylosis    Neurogenic claudication due to lumbar spinal stenosis    H/O non-ST elevation myocardial infarction (NSTEMI)    Abnormal drug screen    IFG (impaired fasting glucose)    Degenerative lumbar spinal stenosis    Left lumbar radiculopathy    Lumbar canal stenosis    Leukocytosis    Herniation of lumbar intervertebral disc without myelopathy    Emphysema lung (CMS/HCC)    Mood disorder (CMS/HCC)    Other long term (current) drug therapy    Spondylolisthesis, lumbosacral region    History of rhabdomyolysis    S/P surgical manipulation of ankle joint    Wound dehiscence     Past Medical History:   Diagnosis Date    Borderline diabetes     Emphysema lung (CMS/HCC) 09/20/2023    H/O chest x-ray     1/20: Impression: Stable, enlarged cardiac mediastinal silhouette with mild central pulmonary vascular congestion. Nonspecific bibasilar airspace disease, worsened in overall appearance compared with the previous study, findings can be seen in  infiltrate/pneumonia  and/or atelectasis.    H/O CT scan     CT Ankle: 1/20: A vertically oriented nondisplaced oblique fracture of the medial ankle mortise extends into the distal tibial metadiaphysis.  Several small old avulsion fracture fragments are noted inferior to both malleoli; as well as soft tissue swelling about the ankle.  There is no other fracture, radiodense foreign bodies, pathologic calcifications, or worrisome bone destruction identified.    H/O CT scan 10/11/2023    1. Acute minimally comminuted and displaced fractures through distal tibia and fibula as described above. 2. There are also findings consistent with remote trauma with significant cortical remodeling of the distal tibia with associated incongruity of the distal tibial articular surface as described above. 3. Moderate arthrosis of the tibiotalar and subtalar joints. 4. Soft tissue swelling about th    H/O CT scan of abdomen     2016: Diffuse urinary bladder wall thickening, Small atrophic left kidney with pyelocalyceal ectasia and severe cortical thinning, , Moderate pyelocalyceal ectasia of the right kidney with cortical thinning; unchanged    H/O CT scan of brain     11/22: normal 1/20: FINDINGS:  There is no intracranial hemorrhage, mass effect, midline shift, extra-axial collection, evidence of hydrocephalus, recent ischemic infarct, or skull fracture identified.    There is no significant atrophy or white matter changes, for age.  Moderate mucosal thickening within the paranasal sinuses again noted. The mastoid air cells are clear    H/O CT scan of brain 10/10/2023    There is some subtle increased attenuation along the right  tentorium cerebelli.  This is suspicious for a acute  subdural hematoma.    H/O CT scan of chest     10/19: 1. Bibasilar infiltrate/pneumonia with patchy pneumonia scattered throughout the left lung and bilateral pleural effusions.  2. Vascular calcifications left anterior descending coronary artery with mild to moderate  cardiomegaly.  3. Moderate to moderately severe diffuse fatty infiltration of liver. 10/21: Bilateral groundglass opacities as discussed. These findings are nonspecific    H/O CT scan of chest 10/10/2023    NO PE. Somewhat patchy subpleural curvilinear markings are seen in the  bilateral lower lobes, overall morphology favoring atelectasis or infectious  infiltrate.  Scattered central small cysts are observed.    H/O CT scan of head 10/12/2023    In comparison to prior CT dated 10/03/2023, there has been interval improvement in the right frontal subarachnoid hemorrhage and diffuse subdural hemorrhage along the tentorium and overlying the right cerebellar hemisphere as well as resolution of right parietal lobe intraparenchymal hemorrhage.    H/O diagnostic ultrasound     RUQ US 2013: Cholelithiasis and mild gallbladder distention, Increased liver echogenicity    H/O diagnostic ultrasound 10/10/2023    renal - Severe right hydronephrosis, similar in appearance to CT lumbar spine examination. Bilateral ureteral jets visualized.    Mild asymmetric enlargement of the right kidney with cortical renal scarring involving the left kidney.    H/O echocardiogram     1/20: Normal LV and RV.  No significant valve disease.  Normal estimated PA pressure.  Normal diastolic filling pattern.    H/O magnetic resonance imaging of cervical spine     2003: CENTRAL STENOSIS WORST C3-C4 1/21: Multilevel degenerative changes with disc height loss, disc osteophyte complexes, facet/uncovertebral degenerative changes. Moderate to severe canal and bilateral foraminal narrowing at C3-C4, C4-C5, C5-C6, C6-C7 grossly similar to  prior cervical MRI    H/O magnetic resonance imaging of lumbar spine     2012:  NEW RIGHT POSTERIOR EXTRUSION L4-5, WITH CONTACT OF THE EXITING RIGHT L5 AND DESCENDING RIGHT S1 NERVES. s/p laminectomy L5 2019 ([facility]): NARROWING OF MULTIPLE LUMBAR DISC LEVELS WITH FAIRLY ADVANCED DEGENERATIVE CHANGES. SEE INDIVIDUAL LEVELS  ABOVE.  MILD LUMBAR CANAL STENOSIS AT THE L4-5 LEVEL.  NARROWING OF NEURAL FORAMINA,    H/O magnetic resonance imaging of lumbar spine 10/10/2023    MR findings worrisome for retroperitoneal abscess, possibly extending into  the L4-5 disc space.   Apparent clumping of the proximal cauda equina nerve roots versus mass effect  due to an intrathecal fluid collection.  Recommend MRI of the T-spine for  further evaluation    H/O x-ray of lower extremity 10/10/2023    XR ankle: NEW ACUTE OBLIQUE FRACTURE OF THE DISTAL RIGHT TIBIA WITH A  OLD VERTICAL FRACTURE OF THE TIBIA.   THERE IS A NEW COMMINUTED FRACTURE OF THE DISTAL RIGHT FIBULA.    High cholesterol      Past Surgical History:   Procedure Laterality Date    ANKLE SURGERY  10/17/2023    Insertion Intramedullary RIGHT Nail Tibia by Dr Vic Hale    CARDIAC CATHETERIZATION      8/2015: The coronary anatomy is R dominant.  L main coronary artery was normal.  Left anterior descending artery presents luminal irregularities.  Circumflex coronary artery normal.  R coronary artery presents luminal irregularities.  Mid RCA lesion 40% stenosis.  LVEF 50%    CARDIAC CATHETERIZATION Right 10/11/2023    Procedure: Left Heart Cath;  Surgeon: Jonathan GASPAR MD;  Location: [facility] Cardiac Cath Lab;  Service: Cardiovascular;  Laterality: Right;  radial    CARDIAC CATHETERIZATION N/A 10/13/2023    Procedure: PCI RCA, right radial 6Fr;  Surgeon: Juvenal Le MD;  Location: [facility] Cardiac Cath Lab;  Service: Cardiovascular;  Laterality: N/A;    CERVICAL LAMINECTOMY      C3-C4, s/p cervical laminectomy.    ESOPHAGOGASTRODUODENOSCOPY      12/15: Localized mild erythema was found at the gastroesophageal junction    KIDNEY SURGERY  09/12/2013    Kidney Surgery    LUMBAR FUSION  10/06/2023    1. L3-4, L4-5 extreme lateral interbody fusion 2. L3-5 laminectomy, L5-S1 revision laminectomy, L5-S1 transforaminal lumbar interbody fusion, L3-S1 instrumented posterolateral fusion, repair of dural tear     LUMBAR FUSION  09/30/2023    L3-4 & L4-5 XLIF(NUVASIVE), L5-S1 TLIF(MEDTRONIC), L3-S1 POSTEROLATERAL FUSION, L3-5    LUMBAR LAMINECTOMY      s/p L5 laminectomy x2;  residual left foot drop    OTHER SURGICAL HISTORY  01/28/2020    Appendectomy    OTHER SURGICAL HISTORY  01/28/2020    Cholecystectomy    OTHER SURGICAL HISTORY  01/28/2020    [place] tooth extraction    OTHER SURGICAL HISTORY  01/28/2020    Tonsillectomy     Social History     Tobacco Use    Smoking status: Every Day     Packs/day: 2     Types: Cigarettes    Smokeless tobacco: Never    Tobacco comments:     Smoking cessation program ordered   Substance Use Topics    Alcohol use: Not Currently     family history includes No Known Problems in his mother.  No current facility-administered medications for this visit.    Current Outpatient Medications:     aspirin 81 mg chewable tablet, Chew 1 tablet (81 mg) once daily., Disp: , Rfl: 0    ceFAZolin in dextrose 5 % (Ancef) 2 gram/100 mL solution, Infuse 100 mL (2 g) at 200 mL/hr over 30 minutes into a venous catheter every 8 hours., Disp: , Rfl:     clopidogrel (Plavix) 75 mg tablet, Take 1 tablet (75 mg) by mouth once daily. Do not start before November 27, 2023., Disp: 30 tablet, Rfl: 0    Facility-Administered Medications Ordered in Other Visits:     alteplase (Cathflo Activase) injection 2 mg, 2 mg, intra-catheter, PRN, Patricio Reveles MD    aspirin chewable tablet 81 mg, 81 mg, oral, Daily, Clementina Ron DO, 81 mg at 11/26/23 0949    buprenorphine-naloxone (Suboxone) 8-2 mg per SL tablet 1 tablet, 1 tablet, sublingual, BID, Lucio Solis DO, 1 tablet at 11/26/23 2020    ceFAZolin in dextrose (iso-os) (Ancef) IVPB 2 g, 2 g, intravenous, q8h, Yohana Browning MD, Stopped at 11/27/23 0118    clopidogrel (Plavix) tablet 75 mg, 75 mg, oral, Daily, Clementina Ron DO    escitalopram (Lexapro) tablet 20 mg, 20 mg, oral, Daily, Adalberto Conn MD, 20 mg at 11/26/23 0949    haloperidol lactate  (Haldol) injection 5 mg, 5 mg, intramuscular, q6h PRN, Robbi Duval MD    [Held by provider] icosapent ethyL (Vascepa) capsule 2 g, 2 g, oral, BID with meals, Adalberto Conn MD, 2 g at 11/21/23 1632    lidocaine PF (Xylocaine) 10 mg/mL (1 %) injection 50 mg, 5 mL, infiltration, Once, Patricio Reveles MD    polyethylene glycol (Glycolax, Miralax) packet 17 g, 17 g, oral, Daily, Adalberto Conn MD, 17 g at 11/25/23 0846    pregabalin (Lyrica) capsule 300 mg, 300 mg, oral, BID, Adalberto Conn MD, 300 mg at 11/26/23 2020    promethazine (Phenergan) tablet 25 mg, 25 mg, oral, q6h PRN, Adalberto Conn MD    tamsulosin (Flomax) 24 hr capsule 0.4 mg, 0.4 mg, oral, Daily, Adlaberto Conn MD, 0.4 mg at 11/26/23 0949  Allergies   Allergen Reactions    Metoprolol Unknown    Mirtazapine Unknown       Physical Examination:    General: Well developed, awake/alert/oriented x3, no distress, alert and cooperative  Skin: Warm and dry, no lesions, no rashes  ENMT: Mucous membranes moist, no apparent injury, no lesions seen  Head/Neck: Neck Supple, no apparent injury  Respiratory/Thorax: Normal breath sounds with good chest expansion, thorax symmetric  Cardiovascular: No pitting edema, no JVD    Motor Strength: 5/5 Throughout all extremities    Muscle Bulk: Normal and symmetric in all extremities    Posture:   -- Cervical: Normal  -- Thoracic: Normal  -- Lumbar : Normal  Paraspinal muscle spasm/tenderness absent.     Sensation: intact to light touch  Well-healed lumbar incision.  There is some dehiscence of his left flank incision, but no erythema or drainage.    Results  I personally reviewed and interpreted the imaging results which included x-rays of the lumbar spine performed on October 1, 2023.  These show stable and well-positioned lumbar instrumentation.    Assessment/Plan   Gm Thrasher is a 58 y.o. year old male who presents to the spine clinic in a post operative visit. Since surgery they  are recovering well.  He has some expected low back discomfort.  He denies any radicular symptoms.  His x-rays look good.  His lumbar incision is well-healed, and his sutures were removed today.  He has some dehiscence of his left flank incision, for which I am prescribing Bactrim for 1 week.  We will plan to see the patient back in clinic in another 6 weeks to assess his progress.  He will have new x-rays at that time.      The above clinical summary has been dictated with voice recognition software. It has not been proofread for grammatical errors, typographical mistakes, or other semantic inconsistencies.    Thank you for visiting our office today. It was our pleasure to take part in your healthcare.     Do not hesitate to call with any questions regarding your plan of care after leaving at [phone] M-F 8am-4pm.     To clinicians, thank you very much for this kind referral. It is a privilege to partner with you in the care of your patients. My office would be delighted to assist you with any further consultations or with questions regarding the plan of care outlined. Do not hesitate to call the office or contact me directly.       Sincerely,      Vitaly Amezcua MD PhD  Attending Neurosurgeon  [facility]   of Neurological Surgery  [facility]    [address]  [address]  [address] [place] [address]  [address]  [place] [address]    Office: [phone]  Fax: [phone]        Scribe Attestation  By signing my name below, IIsabella Scribe   attest that this documentation has been prepared under the direction and in the presence of Vitaly Amezcua MD PhD.

## 2023-11-27 NOTE — PROGRESS NOTES
Gm Thrasher is a 58 y.o. male on day 7 of admission presenting with Wound dehiscence.      Subjective   Seen and examined at bedside, resting comfortably, no complaints.  Had a soft BP this AM (96/64) but asymptomatic.  Denies fevers, chills, light headedness/dizziness, N/V or headache.  Mentating appropriately on exam       Objective     Last Recorded Vitals  BP 90/64   Pulse 74   Temp 37.2 °C (99 °F)   Resp 16   Wt 101 kg (222 lb 3.6 oz)   SpO2 97%   Intake/Output last 3 Shifts:    Intake/Output Summary (Last 24 hours) at 11/27/2023 1106  Last data filed at 11/27/2023 0800  Gross per 24 hour   Intake 1060 ml   Output 2350 ml   Net -1290 ml       Admission Weight  Weight: 111 kg (245 lb) (11/20/23 0151)    Daily Weight  11/20/23 : 101 kg (222 lb 3.6 oz)    Image Results  Bedside PICC Imaging  These images are not reportable by radiology and will not be interpreted   by  Radiologists.  CT head wo IV contrast  Narrative: Interpreted By:  Poncho Castellon,   STUDY:  CT HEAD WO IV CONTRAST;  11/22/2023 9:32 am      INDICATION:  Signs/Symptoms:s/p spinal surgery with high amount of drainage from  wound vac.      COMPARISON:  11/21/2023      ACCESSION NUMBER(S):  [id number]      ORDERING CLINICIAN:  KARYNA NAIK      TECHNIQUE:  Noncontrast axial CT scan of head was performed. Angled reformats in  brain and bone windows were generated. The images were reviewed in  bone, brain, blood and soft tissue windows.      FINDINGS:  No acute edema. No acute hemorrhage. No mass effect. Ventricular  system is normal for age. No extra-axial fluid collections. Orbits  are normal. Mucoperiosteal thickening right maxillary sinus.          Impression: No acute findings.      Signed by: Poncho Castellon 11/22/2023 10:34 AM  Dictation workstation:   [id number]      Physical Exam  General: Not in acute distress, A&O x 3, alert, cooperative, well-developed  HEENT: Normocephalic, atraumatic, EOMI, moist mucous membranes, left  pupil mildly dilated  Neck: Neck supple, trachea midline, no evidence of trauma  Cardiovascular: RRR, S1 and S2 appreciated, no murmurs rubs gallops appreciated, distal pulses 2+ bilaterally (radial and dorsalis pedis)  Respiratory: Vesicular breath sounds appreciated bilaterally, no adventitious sounds appreciated, no increased work of breathing  GI: Abdomen soft, nondistended, nontender to palpation, bowel sounds present  Extremities: No edema appreciated in lower extremities bilaterally, no cyanosis  Back: Surgical incision dressed appropriately, with scant serosanguineous drainage on dressing, staples intact without surrounding erythema, no germania drainage seen, no signs of infection  Neuro: A&O X3, no focal deficits, strength and sensation intact bilaterally, deficit in cranial nerve I  Skin: Warm and dry, without lesions or rashes  Relevant Results      Bedside PICC Imaging    Result Date: 11/22/2023  These images are not reportable by radiology and will not be interpreted by  Radiologists.    CT head wo IV contrast    Result Date: 11/22/2023  Interpreted By:  Poncho Castellon, STUDY: CT HEAD WO IV CONTRAST;  11/22/2023 9:32 am   INDICATION: Signs/Symptoms:s/p spinal surgery with high amount of drainage from wound vac.   COMPARISON: 11/21/2023   ACCESSION NUMBER(S): [id number]   ORDERING CLINICIAN: KARYNA NAIK   TECHNIQUE: Noncontrast axial CT scan of head was performed. Angled reformats in brain and bone windows were generated. The images were reviewed in bone, brain, blood and soft tissue windows.   FINDINGS: No acute edema. No acute hemorrhage. No mass effect. Ventricular system is normal for age. No extra-axial fluid collections. Orbits are normal. Mucoperiosteal thickening right maxillary sinus.         No acute findings.   Signed by: Poncho Castellon 11/22/2023 10:34 AM Dictation workstation:   [id number]    CT lumbar spine w IV contrast    Result Date: 11/21/2023  EXAMINATION: CT OF THE LUMBAR  SPINE WITH CONTRAST  11/19/2023 6:12 pm TECHNIQUE: CT of the lumbar spine was performed with the administration of intravenous contrast. Multiplanar reformatted images are provided for review. Automated exposure control, iterative reconstruction, and/or weight based adjustment of the mA/kV was utilized to reduce the radiation dose to as low as reasonably achievable. COMPARISON: None HISTORY: ORDERING SYSTEM PROVIDED HISTORY: back pain TECHNOLOGIST PROVIDED HISTORY: Reason for exam:->back pain Decision Support Exception - unselect if not a suspected or confirmed emergency medical condition->Emergency Medical Condition (MA) What reading provider will be dictating this exam?->CRC FINDINGS: BONES/ALIGNMENT:  There is normal alignment of the spine. The vertebral body heights are maintained. No osseous destructive lesion is seen.  Bilateral posteriorly placed pedicle screws, L3 through S1, secured to bilateral vertically oriented rods. SOFT TISSUES: No paraspinal mass is seen. No area of abnormal contrast enhancement. L1-L2: There is no significant disc protrusion, central spinal canal stenosis or neural foraminal narrowing. L2-L3: There is no significant disc protrusion, central spinal canal stenosis or neural foraminal narrowing. L3-L4: There is no significant disc protrusion, central spinal canal stenosis or neural foraminal narrowing.  L3-4 intervertebral disc space device L4-L5: There is no significant disc protrusion, central spinal canal stenosis or neural foraminal narrowing.  L4-5 intervertebral disc space device. L5-S1: There is no significant disc protrusion, central spinal canal stenosis or neural foraminal narrowing.  L5-S1 intervertebral disc space device.    Internal fixation L3 through S1 with intervertebral disc space devices as discussed. No acute findings.    CT brain attack head wo IV contrast    Result Date: 11/21/2023  Interpreted By:  Martin Mathis, STUDY: CT BRAIN ATTACK HEAD WO IV CONTRAST;   11/21/2023 1:04 am   INDICATION: Signs/Symptoms:Dilated pupil.   COMPARISON: 10/13/2023   ACCESSION NUMBER(S): [id number]   ORDERING CLINICIAN: NESHA GRIDER   TECHNIQUE: Axial noncontrast CT images of the head. Sagittal and coronal reformats were provided.   FINDINGS: BRAIN: No acute intracranial hemorrhage. No mass effect or midline shift. Gray-white matter interfaces are preserved. VENTRICLES and EXTRA-AXIAL SPACES: Normal. EXTRACRANIAL SOFT TISSUES:  Within normal limits. PARANASAL SINUSES/MASTOIDS: Mild paranasal sinus mucosal thickening. Polypoid right maxillary sinus mucosal thickening. Mastoids are clear. BONES AND ORBITS: No displaced skull fracture. No suspicious osseous lesion.  Orbits are within normal limits. OTHER FINDINGS: None.       1. No acute intracranial hemorrhage or mass effect.   Martin Mathis discussed the significance and urgency of this critical finding by telephone with  Dr. Lawson on 11/21/2023 at 4:11 am.  (**-RCF-**) Findings:  See findings.     Signed by: Martin Mathis 11/21/2023 4:11 AM Dictation workstation:   [id number]    CT lumbar spine wo IV contrast    Result Date: 11/20/2023  Interpreted By:  Joe Harley, STUDY: CT LUMBAR SPINE WO IV CONTRAST;  11/20/2023 6:59 am   INDICATION: Signs/Symptoms:concern for wound infection.   COMPARISON: CT scan from 10/03/2023. Patient also had lumbar spine MRI earlier this morning..   ACCESSION NUMBER(S): [id number]   ORDERING CLINICIAN: DEAN OGLESBY   TECHNIQUE: Thin section axial images were obtained from T11 down through the mid sacrum. Sagittal and coronal reconstruction images were generated. Soft tissue and bone windows were reviewed.   FINDINGS: VERTEBRAL BODIES AND POSTERIOR ELEMENTS: Previous laminectomy with posterior fusion from L3 through S1. There are pedicle screws with fixation rods in place at those levels. The fixation hardware is grossly intact. There are metallic disc spacers from L3-4 through L5-S1,  intact. There is stable endplate osteophytosis from L3-4 through L5-S1 and also at L1-2 and T12-L1. Mild disc space narrowing at T12-L1 and L1-2, unchanged. There is no suspicious lucency adjacent to the pedicle screws. There is no gross focal interval bone destruction. There are edematous changes posteriorly in the paraspinal soft tissues and subcutaneous soft tissues, including abnormal gas density which has progressed since 10/03/2023. This gas extends from the subcutaneous soft tissues overlying the paraspinal muscles through the paraspinal muscles and adjacent to the left side of the spinous process and lamina at the L3-4 level, and then branches to the right and left to surround the facet joints at that level and extends caudally to the L4-5 facets. There is a punctate gas bubble posteriorly to the left of midline in the laminectomy defect at L5. The subcutaneous posterior midline gas collection with soft tissue edema extends caudally down to the S1 level. The edema/fluid in the subcutaneous soft tissues posteriorly extends from the L1-2 level down to the S1 level. Mass effect upon the thecal sac is difficult to ascertain due to non use of IV contrast and also due to beam hardening artifact from the fixation hardware, despite the use metal suppression technique. However, this is much better depicted in the MRI and the description is in the MRI report. There are sclerotic arthritic changes in both SI joints. Mild spur formation in both hips.   SPINAL CANAL: No focal disc herniation in this unenhanced exam.   SOFT TISSUES: See above. Also, there is mild gas in the L5-S1 disc space which appears to be greater than it was previously, and there is mild soft tissue gas anterior to L5-S1 to the right of midline along with mild paraspinal soft tissue swelling at that level.   ABDOMEN/PELVIS: As on the prior exam, there is moderate to pronounced right-sided hydronephrosis ending at the right ureteropelvic junction,  with gadolinium contrast material filling the dilated right renal collecting system and extending down the right ureter with some evident in the urinary bladder. The patient does have a Navarro catheter in place. The left kidney remains atrophic, and there is IV gadolinium contrast material in the left renal collecting system which is mildly prominent, also ending at the left ureterovesical junction. There is mild contrast evident in the nondilated left ureter. There are moderate aortoiliac calcifications. There is moderate stool throughout the imaged colon and rectum.       Lumbosacral spine surgical and arthritic changes as described. Mild DJD in both hips.   Abnormal soft tissue swelling/fluid in the posterior paraspinal musculature and overlying subcutaneous soft tissues, with progression associated gas density which extends along the left side of the L2 spinous process and then extends caudally surrounding the inter facet joints at L3-4 and L4-5. An infectious process cannot be excluded in this unenhanced exam. Suggest percutaneous needle aspiration to examine for abscess. Also, please refer to the MRI report for more information regarding the effect of this process on the thecal sac.   There are findings that are at least mildly suspicious for discitis at the L5-S1 level.   Navarro catheter in an otherwise nearly empty urinary bladder.   Stable atrophy of the left kidney with mild stable fullness of the left renal collecting system. Moderate to pronounced hydronephrosis on the right, perhaps due to underlying right congenital UPJ obstruction.   Signed by: Joe Harley 11/20/2023 8:21 AM Dictation workstation:   [id number]    CT head wo IV contrast    Result Date: 11/20/2023  EXAMINATION: CT OF THE HEAD WITHOUT CONTRAST  11/19/2023 6:12 pm TECHNIQUE: CT of the head was performed without the administration of intravenous contrast. Automated exposure control, iterative reconstruction, and/or weight based adjustment  "of the mA/kV was utilized to reduce the radiation dose to as low as reasonably achievable. COMPARISON: None. HISTORY: ORDERING SYSTEM PROVIDED HISTORY: confusion TECHNOLOGIST PROVIDED HISTORY: Reason for exam:->confusion Has a \"code stroke\" or \"stroke alert\" been called?->No Decision Support Exception - unselect if not a suspected or confirmed emergency medical condition->Emergency Medical Condition (MA) What reading provider will be dictating this exam?->CRC FINDINGS: BRAIN/VENTRICLES: There is no acute intracranial hemorrhage, mass effect or midline shift.  No abnormal extra-axial fluid collection.  The gray-white differentiation is maintained without evidence of an acute infarct.  There is no evidence of hydrocephalus. ORBITS: The visualized portion of the orbits demonstrate no acute abnormality. SINUSES: The visualized paranasal sinuses and mastoid air cells demonstrate no acute abnormality. SOFT TISSUES/SKULL:  No acute abnormality of the visualized skull or soft tissues.    No acute intracranial abnormality.    MR lumbar spine wo IV contrast    Result Date: 11/20/2023  Interpreted By:  Asad Chong, STUDY: MRI of the lumbar spine without IV contrast;  11/20/2023 4:56 am   INDICATION: Signs/Symptoms:concern for epidural abscess.   COMPARISON: MRI lumbar spine of 10/09/2022.   ACCESSION NUMBER(S): [id number]   ORDERING CLINICIAN: EVI ROSENBAUM   TECHNIQUE: Sagittal and axial STIR and T1-weighted MRI images of the lumbar spine were acquired using a spondylolysis protocol.  No contrast was administered.   FINDINGS: Evaluation is severely limited by motion artifact.   Redemonstration of findings lumbosacral fusion and laminectomies. There is redemonstration of a multiloculated collection in the midline/left paramedian posterior subcutaneous soft tissues deep to the incision, and more deeply within the laminectomy bed abutting and within the dorsal epidural fat. The collection also extends about the posterior " elements of the L2 vertebra. A rather large volume of air is newly present in the collection, probably secondary to recent intervention/communication with the atmosphere although infection with gas producing organism can not be excluded. The dominant locule of the collection is in the left paramedian posterior soft tissues, centered about the level of the L2-L3 disc space, measures a proximally 2.3 x 2.9 cm in transaxial diameters, and up to 7.8 cm in cc length. The next largest locule of the collection, centered about the L3-L4 disc space, within the laminectomy bed measures approximately 2.0 x 2.0 cm in transaxial diameters, and roughly 8.7 cm in CC length. An additional smaller collection centered at the mid L5 level measures up to 1.7 x 1.2 cm in transaxial diameters in the dorsal epidural fat. Mass effect from the collection and associated edema in the surrounding soft tissues is again noted to cause marked narrowing of the thecal sac from L2-L3 through L5-S1, worst at the L5-S1 level secondary to combination of grade 1 retrolisthesis, disc osteophyte bulging, dorsal epidural lipomatosis with edematous fat.   Vertebrae/Intervertebral Discs: Redemonstration mild grade 1 degenerative retrolisthesis at L4-L5. Alignment is otherwise maintained.   Cord: The lower thoracic cord appears unremarkable. The conus terminates at T12-L1.   Soft tissues: The prevertebral and posterior paraspinal soft tissues are unremarkable.   Similar bilateral hydronephrosis relative to CT lumbar spine of 10/03/2023.       Evaluation is severely limited by motion artifact.   Redemonstration of findings lumbosacral fusion and laminectomies. There is redemonstration of a multiloculated collection in the midline/left paramedian posterior subcutaneous soft tissues deep to the incision, and more deeply within the laminectomy bed abutting and within the dorsal epidural fat. The sterility of this collection cannot be determined by imaging.   The  collection also extends about the posterior elements of the L2 vertebra. A rather large volume of air is newly present in the collection, probably secondary to recent intervention/communication with the atmosphere although infection with gas producing organism cannot be excluded.   The dominant locule of the collection is in the left paramedian posterior soft tissues, centered about the level of the L2-L3 disc space, measures a proximally 2.3 x 2.9 cm in transaxial diameters, and up to 7.8 cm in cc length. The next largest locule of the collection, centered about the L3-L4 disc space, within the laminectomy bed measures approximately 2.0 x 2.0 cm in transaxial diameters, and roughly 8.7 cm in CC length. An additional smaller collection centered at the mid L5 level measures up to 1.7 x 1.2 cm in transaxial diameters in the dorsal epidural fat.   Mass effect from the collection and associated edema in the surrounding soft tissues is again noted to cause moderate to marked narrowing of the thecal sac from L2-L3 through L5-S1, worst at the L5-S1 level where there is effacement of CSF and compression of cauda equina nerve roots, secondary to combination of grade 1 retrolisthesis, disc osteophyte bulging, dorsal epidural lipomatosis with edematous fat.   I personally reviewed the images/study and I agree with the findings as stated. This study was interpreted at [facility], [place], [place].   MACRO: None   Signed by: Asad Chong 11/20/2023 5:24 AM Dictation workstation:   [id number]    XR chest 1 view    Result Date: 11/19/2023  EXAMINATION: ONE XRAY VIEW OF THE CHEST 11/19/2023 5:39 pm COMPARISON: Portable chest from 11/21/2022. HISTORY: ORDERING SYSTEM PROVIDED HISTORY: Sepsis TECHNOLOGIST PROVIDED HISTORY: Reason for exam:->Sepsis What reading provider will be dictating this exam?->CRC FINDINGS: The lungs remain clear. There is no sign of any infiltrate or effusion. The heart remains  normal in size.  The mediastinum is normal in appearance. Again seen is a metal plate and anchoring screws in the inferior cervical spine from anterior cervical fusion.    No active disease seen in the chest.    XR tibia fibula right 2 views    Result Date: 11/6/2023  Interpreted By:  Vic Araya, STUDY: XR TIBIA FIBULA RIGHT 2 VIEWS;  ;  11/6/2023 11:05 am   INDICATION: Signs/Symptoms:pain.   ACCESSION NUMBER(S): [id number]   ORDERING CLINICIAN: VIC ARAYA   FINDINGS: No acute fracture dislocations of the right tibia. Well-aligned tibial and fibula fracture status post nailing. No interval change compared to fluoroscopic imaging.       Signed by: Vic Araya 11/6/2023 2:39 PM Dictation workstation:   [id number]         Results for orders placed or performed during the hospital encounter of 11/20/23 (from the past 24 hour(s))   POCT GLUCOSE   Result Value Ref Range    POCT Glucose 98 74 - 99 mg/dL      Scheduled medications  aspirin, 81 mg, oral, Daily  buprenorphine-naloxone, 1 tablet, sublingual, BID  ceFAZolin, 2 g, intravenous, q8h  clopidogrel, 75 mg, oral, Daily  escitalopram, 20 mg, oral, Daily  [Held by provider] icosapent ethyL, 2 g, oral, BID with meals  lidocaine, 5 mL, infiltration, Once  polyethylene glycol, 17 g, oral, Daily  pregabalin, 300 mg, oral, BID  tamsulosin, 0.4 mg, oral, Daily      Continuous medications     PRN medications  PRN medications: alteplase, haloperidol lactate, promethazine     Assessment/Plan        This patient has a central line   Reason for the central line remaining today? Parenteral medication    Principal Problem:    Wound dehiscence  Active Problems:    CAD (coronary artery disease)    Patient is a 58 year-old male with past medical history of MDD, T2 DM, BPH, hypertension, hyperlipidemia, JEMAL, chronic pain in neck and back, lumbar radiculopathy, and heroin use who is transferred from [facility] in the setting of recent lumbar surgery with subarachnoid  hemorrhage/subdural hematoma (9/22/2023) presented with wound dehiscence requiring lumbar exploration and washout.  Patient was admitted to medicine for evaluation and management of incisional infection. Neurosurgery was consulted and is following.     Multiple lumbar post operative fluid collections with mass effect  - MRI and CT scan as discussed above. Repeat CT head imaging ordered due to concern for midline shift, results negative for any acute pathology  - Lumbar wound washout procedure performed by NSGY: Dr. Vitaly Amezcua   - Wound culture 11/20: MSSA, but BCNTD  - Per ID: PICC line placed 11/22, Cefazolin x 6 weeks, stop date 1/1/2024  - Follow-up outpatient with Dr. Browning in 6 weeks     2.Left pupil dilation   -A brain attack was called at 12:45 AM 11/21 due to AMS and left pupil dilation. CT scan of head w/o contrast revealed no intracranial abnormalities.  -Patient was AOx0 with left pupil dilation, but mental status improved to AOx3.  -No visual disturbances, pain, or acute concerns overall.  -Psych consulted: continue Suboxone on D/C & FU with Suboxone prescriber.     3. S/P R distal tibial nail 10/17/23  R ankle fracture on 10/16/23, underwent R distal tibial nail 10/17 by Dr. Hale  - FU note on 11/06 needs to be non-weightbearing for 4 weeks on R leg  - Non weight bearing until 12/6/23 on RLE, repeat 2 view xrays of R tibia at that time  - Non weightbearing boot ordered for RLE      4. H/O NSTEMI (10/13/23)  - PCI to RCA  - ASA and Plavix have been held during this admission due to operation  - ASA resumed 11/23/2023 POD #3  - Resume Plavix POD #7 (11/27) (resumed)     5.  Hx of Chronic opioid dependence on Suboxone:  - Home Suboxone resumed on 11/24/2023  - Patient has needed less and less pain management, however if patient does have breakthrough pain consider Toradol.     6.NIDDM2  -A1C 5.7     IVF: None at this time  Diet: Diabetic + cardiac  DVT Ppx: SCD  Dispo: Anticipate 2 additional days  hospital stay     Code status: Full code     Assessment and plan discussed with attending.     Dispo: Medically optimized for discharge, cleared by ID & NSGY, awaiting placement     Lucio JUDITH Solis DO

## 2023-11-27 NOTE — NURSING NOTE
EOS:   1900- assumed care of pt    2000- neurological exam and initial assessment completed at this time. PM medications administered to pt this hour without issue.    2300- IV cefazolin piggyback administered this hour, pt ambulated with 1x and walker to chair for infusion.    0000- pt ambulated from chair to bed with 1x walker without issue, neurological exam and vitals obtained at this time, no deviation from baseline with neurological assessment.     0400-vitals and neurological assessment completed at this time, no deviation from baseline.    0700- pt with no acute events this shift. Pt remained on RA, vitals stable. Pt with no further reports of pain/discomfort since administration of suboxone yesterday PM. Pt with continent use of urinal this shift with no BM. Pt call light within reach at all times, bed alarm active, safety maintained.

## 2023-11-27 NOTE — CARE PLAN
The patient's goals for the shift include      The clinical goals for the shift include see care plan      Problem: Discharge Planning  Goal: Discharge to home or other facility with appropriate resources  Outcome: Progressing     Problem: Chronic Conditions and Co-morbidities  Goal: Patient's chronic conditions and co-morbidity symptoms are monitored and maintained or improved  Outcome: Progressing     Problem: Skin  Goal: Decreased wound size/increased tissue granulation at next dressing change  Outcome: Progressing  Flowsheets (Taken 11/27/2023 0508)  Decreased wound size/increased tissue granulation at next dressing change: Promote sleep for wound healing  Goal: Prevent/minimize sheer/friction injuries  Outcome: Progressing  Flowsheets (Taken 11/27/2023 0508)  Prevent/minimize sheer/friction injuries: Use pull sheet  Goal: Promote/optimize nutrition  Outcome: Progressing  Flowsheets (Taken 11/27/2023 0508)  Promote/optimize nutrition: Monitor/record intake including meals  Goal: Promote skin healing  Outcome: Progressing  Flowsheets (Taken 11/27/2023 0508)  Promote skin healing: Assess skin/pad under line(s)/device(s)     Problem: Fall/Injury  Goal: Verbalize understanding of risk factor reduction measures to prevent injury from fall in the home  Outcome: Progressing     Problem: Safety - Adult  Goal: Free from fall injury  Outcome: Progressing

## 2023-11-27 NOTE — NURSING NOTE
0830- Dr Solis notified about patient blood pressure. Patient asymptomatic and will continue to monitor. Also no need for neurological checks per Dr Solis so order discontinued. Walking boot ordered for patient.     EOS- No acute changes throughout the shift. Patient up in chair this afternoon. IV antibiotics given. Pain maintained with suboxone. Safety maintained and call light within reach.

## 2023-11-27 NOTE — PROGRESS NOTES
INPATIENT PROGRESS NOTES    PATIENT NAME: Gm Thrasher    MRN: [id number]  SERVICE DATE:  11/27/2023   SERVICE TIME:  1:13 PM    SIGNATURE: Yohana Browning MD      SUBJECTIVE  Afebrile  No events overnight       OBJECTIVE  PHYSICAL EXAM:   Patient Vitals for the past 24 hrs:   BP Temp Temp src Pulse Resp SpO2   11/27/23 0800 90/64 37.2 °C (99 °F) -- 74 16 97 %   11/27/23 0400 115/77 36.7 °C (98.1 °F) Temporal 77 16 96 %   11/27/23 0000 99/64 36.7 °C (98.1 °F) Temporal 78 16 95 %   11/26/23 2000 115/62 37.3 °C (99.1 °F) Temporal 90 14 96 %   11/26/23 1600 111/56 36.1 °C (97 °F) -- 76 16 100 %         Gen: NAD  Neck: symmetric, no mass  Cardiovascular: RRR  Respiratory: No distress   Extremities: no leg edema      Labs:  Lab Results   Component Value Date    WBC 6.3 11/27/2023    HGB 8.1 (L) 11/27/2023    HCT 26.0 (L) 11/27/2023    MCV 93 11/27/2023     11/27/2023     Lab Results   Component Value Date    GLUCOSE 104 (H) 11/27/2023    CALCIUM 8.5 (L) 11/27/2023     (L) 11/27/2023    K 3.8 11/27/2023    CO2 31 11/27/2023    CL 94 (L) 11/27/2023    BUN 6 11/27/2023    CREATININE 0.98 11/27/2023   ESR: --  Lab Results   Component Value Date    SEDRATE 55 (H) 11/20/2023     Lab Results   Component Value Date    CRP 18.12 (H) 11/20/2023     Lab Results   Component Value Date    ALT 12 11/23/2023    AST 28 11/23/2023    ALKPHOS 95 11/23/2023    BILITOT 0.5 11/23/2023         DATA:   Diagnostic tests reviewed for today's visit:    Labs this admission reviewed  Imagings this admission reviewed  Cultures: Reviewed          ASSESSMENT :   -Lumbar spine epidural phlegmon status post washout on 11/20 with culture growing MSSA  -History of diabetes, subdural hematoma, neuropathy, BPH, drug abuse     PLAN:  -Continue Cefazolin 2 g every 8 hours  -Stop date 1/1/2024  -Closely monitor side effects including rash, diarrhea, JEMAL, CDI, etc.  -Follow-up with me in 6-week    Yohana Browning MD.   Infectious Diseases Attending

## 2023-11-27 NOTE — PROGRESS NOTES
Precert still pending per [facility]. Updates and gold form sent at the SNF request. And request sent to the [employer] to complete the 7000 as requested by [facility]. Updated SNF that patient is medically ready to d/c once auth is obtained.     1445- Precert still pending per [facility]. [facility] sent updates to insurance company.     Janet Matta RN

## 2023-11-28 LAB
ATRIAL RATE: 96 BPM
GLUCOSE BLD MANUAL STRIP-MCNC: 103 MG/DL (ref 74–99)
P AXIS: -11 DEGREES
P OFFSET: 201 MS
P ONSET: 150 MS
PR INTERVAL: 128 MS
Q ONSET: 214 MS
QRS COUNT: 16 BEATS
QRS DURATION: 86 MS
QT INTERVAL: 330 MS
QTC CALCULATION(BAZETT): 416 MS
QTC FREDERICIA: 386 MS
R AXIS: 18 DEGREES
T AXIS: 6 DEGREES
T OFFSET: 379 MS
VENTRICULAR RATE: 96 BPM

## 2023-11-28 PROCEDURE — 2500000004 HC RX 250 GENERAL PHARMACY W/ HCPCS (ALT 636 FOR OP/ED): Performed by: INTERNAL MEDICINE

## 2023-11-28 PROCEDURE — 97116 GAIT TRAINING THERAPY: CPT | Mod: GP,CQ

## 2023-11-28 PROCEDURE — 2500000001 HC RX 250 WO HCPCS SELF ADMINISTERED DRUGS (ALT 637 FOR MEDICARE OP): Performed by: STUDENT IN AN ORGANIZED HEALTH CARE EDUCATION/TRAINING PROGRAM

## 2023-11-28 PROCEDURE — 99233 SBSQ HOSP IP/OBS HIGH 50: CPT

## 2023-11-28 PROCEDURE — 2500000004 HC RX 250 GENERAL PHARMACY W/ HCPCS (ALT 636 FOR OP/ED): Performed by: STUDENT IN AN ORGANIZED HEALTH CARE EDUCATION/TRAINING PROGRAM

## 2023-11-28 PROCEDURE — 2500000001 HC RX 250 WO HCPCS SELF ADMINISTERED DRUGS (ALT 637 FOR MEDICARE OP): Performed by: INTERNAL MEDICINE

## 2023-11-28 PROCEDURE — 82947 ASSAY GLUCOSE BLOOD QUANT: CPT

## 2023-11-28 PROCEDURE — 1200000002 HC GENERAL ROOM WITH TELEMETRY DAILY

## 2023-11-28 PROCEDURE — 2500000002 HC RX 250 W HCPCS SELF ADMINISTERED DRUGS (ALT 637 FOR MEDICARE OP, ALT 636 FOR OP/ED)

## 2023-11-28 RX ADMIN — PREGABALIN 300 MG: 150 CAPSULE ORAL at 20:25

## 2023-11-28 RX ADMIN — CEFAZOLIN SODIUM 2 G: 2 INJECTION, SOLUTION INTRAVENOUS at 01:27

## 2023-11-28 RX ADMIN — BUPRENORPHINE AND NALOXONE 1 TABLET: 8; 2 TABLET SUBLINGUAL at 20:26

## 2023-11-28 RX ADMIN — CEFAZOLIN SODIUM 2 G: 2 INJECTION, SOLUTION INTRAVENOUS at 17:43

## 2023-11-28 RX ADMIN — CLOPIDOGREL BISULFATE 75 MG: 75 TABLET ORAL at 08:27

## 2023-11-28 RX ADMIN — TAMSULOSIN HYDROCHLORIDE 0.4 MG: 0.4 CAPSULE ORAL at 08:27

## 2023-11-28 RX ADMIN — ESCITALOPRAM OXALATE 20 MG: 20 TABLET, FILM COATED ORAL at 08:27

## 2023-11-28 RX ADMIN — BUPRENORPHINE AND NALOXONE 1 TABLET: 8; 2 TABLET SUBLINGUAL at 08:27

## 2023-11-28 RX ADMIN — CEFAZOLIN SODIUM 2 G: 2 INJECTION, SOLUTION INTRAVENOUS at 09:34

## 2023-11-28 RX ADMIN — PREGABALIN 300 MG: 150 CAPSULE ORAL at 08:27

## 2023-11-28 RX ADMIN — ASPIRIN 81 MG CHEWABLE TABLET 81 MG: 81 TABLET CHEWABLE at 08:27

## 2023-11-28 ASSESSMENT — COGNITIVE AND FUNCTIONAL STATUS - GENERAL
DRESSING REGULAR LOWER BODY CLOTHING: A LITTLE
MOVING FROM LYING ON BACK TO SITTING ON SIDE OF FLAT BED WITH BEDRAILS: A LITTLE
HELP NEEDED FOR BATHING: A LITTLE
DRESSING REGULAR LOWER BODY CLOTHING: A LITTLE
MOBILITY SCORE: 17
MOVING TO AND FROM BED TO CHAIR: A LITTLE
STANDING UP FROM CHAIR USING ARMS: A LITTLE
MOBILITY SCORE: 17
MOBILITY SCORE: 19
HELP NEEDED FOR BATHING: A LITTLE
MOVING FROM LYING ON BACK TO SITTING ON SIDE OF FLAT BED WITH BEDRAILS: A LITTLE
STANDING UP FROM CHAIR USING ARMS: A LITTLE
DRESSING REGULAR UPPER BODY CLOTHING: A LITTLE
CLIMB 3 TO 5 STEPS WITH RAILING: A LOT
CLIMB 3 TO 5 STEPS WITH RAILING: A LOT
MOVING TO AND FROM BED TO CHAIR: A LITTLE
CLIMB 3 TO 5 STEPS WITH RAILING: A LOT
TURNING FROM BACK TO SIDE WHILE IN FLAT BAD: A LITTLE
DAILY ACTIVITIY SCORE: 21
TURNING FROM BACK TO SIDE WHILE IN FLAT BAD: A LITTLE
DAILY ACTIVITIY SCORE: 21
WALKING IN HOSPITAL ROOM: A LITTLE
DRESSING REGULAR UPPER BODY CLOTHING: A LITTLE
MOVING TO AND FROM BED TO CHAIR: A LITTLE
WALKING IN HOSPITAL ROOM: A LITTLE
WALKING IN HOSPITAL ROOM: A LITTLE
STANDING UP FROM CHAIR USING ARMS: A LITTLE

## 2023-11-28 ASSESSMENT — PAIN DESCRIPTION - DESCRIPTORS
DESCRIPTORS: ACHING
DESCRIPTORS: ACHING

## 2023-11-28 ASSESSMENT — PAIN SCALES - GENERAL
PAINLEVEL_OUTOF10: 5 - MODERATE PAIN
PAINLEVEL_OUTOF10: 5 - MODERATE PAIN
PAINLEVEL_OUTOF10: 4

## 2023-11-28 ASSESSMENT — PAIN - FUNCTIONAL ASSESSMENT: PAIN_FUNCTIONAL_ASSESSMENT: 0-10

## 2023-11-28 NOTE — PROGRESS NOTES
Gm Thrasher is a 58 y.o. male on day 8 of admission presenting with Wound dehiscence.      Subjective   No events overnight, awaiting placement       Objective     Last Recorded Vitals  /72 (BP Location: Right arm, Patient Position: Lying)   Pulse 80   Temp 36.2 °C (97.2 °F) (Temporal)   Resp 16   Wt 101 kg (222 lb 3.6 oz)   SpO2 97%   Intake/Output last 3 Shifts:    Intake/Output Summary (Last 24 hours) at 11/28/2023 1137  Last data filed at 11/28/2023 1004  Gross per 24 hour   Intake 1320 ml   Output 550 ml   Net 770 ml       Admission Weight  Weight: 111 kg (245 lb) (11/20/23 0151)    Daily Weight  11/20/23 : 101 kg (222 lb 3.6 oz)    Image Results  Bedside PICC Imaging  These images are not reportable by radiology and will not be interpreted   by  Radiologists.  CT head wo IV contrast  Narrative: Interpreted By:  Poncho Castellon,   STUDY:  CT HEAD WO IV CONTRAST;  11/22/2023 9:32 am      INDICATION:  Signs/Symptoms:s/p spinal surgery with high amount of drainage from  wound vac.      COMPARISON:  11/21/2023      ACCESSION NUMBER(S):  [id number]      ORDERING CLINICIAN:  KARYNA NAIK      TECHNIQUE:  Noncontrast axial CT scan of head was performed. Angled reformats in  brain and bone windows were generated. The images were reviewed in  bone, brain, blood and soft tissue windows.      FINDINGS:  No acute edema. No acute hemorrhage. No mass effect. Ventricular  system is normal for age. No extra-axial fluid collections. Orbits  are normal. Mucoperiosteal thickening right maxillary sinus.          Impression: No acute findings.      Signed by: Poncho Castellon 11/22/2023 10:34 AM  Dictation workstation:   [id number]      Physical Exam  General: Not in acute distress, A&O x 3, alert, cooperative, well-developed  HEENT: Normocephalic, atraumatic, EOMI, moist mucous membranes, left pupil mildly dilated  Neck: Neck supple, trachea midline, no evidence of trauma  Cardiovascular: RRR, S1 and S2  appreciated, no murmurs rubs gallops appreciated, distal pulses 2+ bilaterally (radial and dorsalis pedis)  Respiratory: Vesicular breath sounds appreciated bilaterally, no adventitious sounds appreciated, no increased work of breathing  GI: Abdomen soft, nondistended, nontender to palpation, bowel sounds present  Extremities: No edema appreciated in lower extremities bilaterally, no cyanosis  Back: Surgical incision dressed appropriately, with scant serosanguineous drainage on dressing, staples intact without surrounding erythema, no germania drainage seen, no signs of infection  Neuro: A&O X3, no focal deficits, strength and sensation intact bilaterally, deficit in cranial nerve I  Skin: Warm and dry, without lesions or rashes  Relevant Results                 Scheduled medications  aspirin, 81 mg, oral, Daily  buprenorphine-naloxone, 1 tablet, sublingual, BID  ceFAZolin, 2 g, intravenous, q8h  clopidogrel, 75 mg, oral, Daily  escitalopram, 20 mg, oral, Daily  [Held by provider] icosapent ethyL, 2 g, oral, BID with meals  lidocaine, 5 mL, infiltration, Once  polyethylene glycol, 17 g, oral, Daily  pregabalin, 300 mg, oral, BID  tamsulosin, 0.4 mg, oral, Daily      Continuous medications     PRN medications  PRN medications: alteplase, haloperidol lactate, promethazine         Bedside PICC Imaging    Result Date: 11/22/2023  These images are not reportable by radiology and will not be interpreted by  Radiologists.    CT head wo IV contrast    Result Date: 11/22/2023  Interpreted By:  Poncho Castellon, STUDY: CT HEAD WO IV CONTRAST;  11/22/2023 9:32 am   INDICATION: Signs/Symptoms:s/p spinal surgery with high amount of drainage from wound vac.   COMPARISON: 11/21/2023   ACCESSION NUMBER(S): [id number]   ORDERING CLINICIAN: KARYNA NAIK   TECHNIQUE: Noncontrast axial CT scan of head was performed. Angled reformats in brain and bone windows were generated. The images were reviewed in bone, brain, blood and soft  tissue windows.   FINDINGS: No acute edema. No acute hemorrhage. No mass effect. Ventricular system is normal for age. No extra-axial fluid collections. Orbits are normal. Mucoperiosteal thickening right maxillary sinus.         No acute findings.   Signed by: Poncho Castellon 11/22/2023 10:34 AM Dictation workstation:   [id number]    CT lumbar spine w IV contrast    Result Date: 11/21/2023  EXAMINATION: CT OF THE LUMBAR SPINE WITH CONTRAST  11/19/2023 6:12 pm TECHNIQUE: CT of the lumbar spine was performed with the administration of intravenous contrast. Multiplanar reformatted images are provided for review. Automated exposure control, iterative reconstruction, and/or weight based adjustment of the mA/kV was utilized to reduce the radiation dose to as low as reasonably achievable. COMPARISON: None HISTORY: ORDERING SYSTEM PROVIDED HISTORY: back pain TECHNOLOGIST PROVIDED HISTORY: Reason for exam:->back pain Decision Support Exception - unselect if not a suspected or confirmed emergency medical condition->Emergency Medical Condition (MA) What reading provider will be dictating this exam?->CRC FINDINGS: BONES/ALIGNMENT:  There is normal alignment of the spine. The vertebral body heights are maintained. No osseous destructive lesion is seen.  Bilateral posteriorly placed pedicle screws, L3 through S1, secured to bilateral vertically oriented rods. SOFT TISSUES: No paraspinal mass is seen. No area of abnormal contrast enhancement. L1-L2: There is no significant disc protrusion, central spinal canal stenosis or neural foraminal narrowing. L2-L3: There is no significant disc protrusion, central spinal canal stenosis or neural foraminal narrowing. L3-L4: There is no significant disc protrusion, central spinal canal stenosis or neural foraminal narrowing.  L3-4 intervertebral disc space device L4-L5: There is no significant disc protrusion, central spinal canal stenosis or neural foraminal narrowing.  L4-5 intervertebral  disc space device. L5-S1: There is no significant disc protrusion, central spinal canal stenosis or neural foraminal narrowing.  L5-S1 intervertebral disc space device.    Internal fixation L3 through S1 with intervertebral disc space devices as discussed. No acute findings.    CT brain attack head wo IV contrast    Result Date: 11/21/2023  Interpreted By:  Martin Mathis, STUDY: CT BRAIN ATTACK HEAD WO IV CONTRAST;  11/21/2023 1:04 am   INDICATION: Signs/Symptoms:Dilated pupil.   COMPARISON: 10/13/2023   ACCESSION NUMBER(S): [id number]   ORDERING CLINICIAN: NESHA GRIDER   TECHNIQUE: Axial noncontrast CT images of the head. Sagittal and coronal reformats were provided.   FINDINGS: BRAIN: No acute intracranial hemorrhage. No mass effect or midline shift. Gray-white matter interfaces are preserved. VENTRICLES and EXTRA-AXIAL SPACES: Normal. EXTRACRANIAL SOFT TISSUES:  Within normal limits. PARANASAL SINUSES/MASTOIDS: Mild paranasal sinus mucosal thickening. Polypoid right maxillary sinus mucosal thickening. Mastoids are clear. BONES AND ORBITS: No displaced skull fracture. No suspicious osseous lesion.  Orbits are within normal limits. OTHER FINDINGS: None.       1. No acute intracranial hemorrhage or mass effect.   Martin Mathis discussed the significance and urgency of this critical finding by telephone with  Dr. Lawson on 11/21/2023 at 4:11 am.  (**-RCF-**) Findings:  See findings.     Signed by: Martin Mathis 11/21/2023 4:11 AM Dictation workstation:   [id number]    CT lumbar spine wo IV contrast    Result Date: 11/20/2023  Interpreted By:  Joe Harley, STUDY: CT LUMBAR SPINE WO IV CONTRAST;  11/20/2023 6:59 am   INDICATION: Signs/Symptoms:concern for wound infection.   COMPARISON: CT scan from 10/03/2023. Patient also had lumbar spine MRI earlier this morning..   ACCESSION NUMBER(S): [id number]   ORDERING CLINICIAN: DEAN OGLESBY   TECHNIQUE: Thin section axial images were obtained  from T11 down through the mid sacrum. Sagittal and coronal reconstruction images were generated. Soft tissue and bone windows were reviewed.   FINDINGS: VERTEBRAL BODIES AND POSTERIOR ELEMENTS: Previous laminectomy with posterior fusion from L3 through S1. There are pedicle screws with fixation rods in place at those levels. The fixation hardware is grossly intact. There are metallic disc spacers from L3-4 through L5-S1, intact. There is stable endplate osteophytosis from L3-4 through L5-S1 and also at L1-2 and T12-L1. Mild disc space narrowing at T12-L1 and L1-2, unchanged. There is no suspicious lucency adjacent to the pedicle screws. There is no gross focal interval bone destruction. There are edematous changes posteriorly in the paraspinal soft tissues and subcutaneous soft tissues, including abnormal gas density which has progressed since 10/03/2023. This gas extends from the subcutaneous soft tissues overlying the paraspinal muscles through the paraspinal muscles and adjacent to the left side of the spinous process and lamina at the L3-4 level, and then branches to the right and left to surround the facet joints at that level and extends caudally to the L4-5 facets. There is a punctate gas bubble posteriorly to the left of midline in the laminectomy defect at L5. The subcutaneous posterior midline gas collection with soft tissue edema extends caudally down to the S1 level. The edema/fluid in the subcutaneous soft tissues posteriorly extends from the L1-2 level down to the S1 level. Mass effect upon the thecal sac is difficult to ascertain due to non use of IV contrast and also due to beam hardening artifact from the fixation hardware, despite the use metal suppression technique. However, this is much better depicted in the MRI and the description is in the MRI report. There are sclerotic arthritic changes in both SI joints. Mild spur formation in both hips.   SPINAL CANAL: No focal disc herniation in this  unenhanced exam.   SOFT TISSUES: See above. Also, there is mild gas in the L5-S1 disc space which appears to be greater than it was previously, and there is mild soft tissue gas anterior to L5-S1 to the right of midline along with mild paraspinal soft tissue swelling at that level.   ABDOMEN/PELVIS: As on the prior exam, there is moderate to pronounced right-sided hydronephrosis ending at the right ureteropelvic junction, with gadolinium contrast material filling the dilated right renal collecting system and extending down the right ureter with some evident in the urinary bladder. The patient does have a Navarro catheter in place. The left kidney remains atrophic, and there is IV gadolinium contrast material in the left renal collecting system which is mildly prominent, also ending at the left ureterovesical junction. There is mild contrast evident in the nondilated left ureter. There are moderate aortoiliac calcifications. There is moderate stool throughout the imaged colon and rectum.       Lumbosacral spine surgical and arthritic changes as described. Mild DJD in both hips.   Abnormal soft tissue swelling/fluid in the posterior paraspinal musculature and overlying subcutaneous soft tissues, with progression associated gas density which extends along the left side of the L2 spinous process and then extends caudally surrounding the inter facet joints at L3-4 and L4-5. An infectious process cannot be excluded in this unenhanced exam. Suggest percutaneous needle aspiration to examine for abscess. Also, please refer to the MRI report for more information regarding the effect of this process on the thecal sac.   There are findings that are at least mildly suspicious for discitis at the L5-S1 level.   Navarro catheter in an otherwise nearly empty urinary bladder.   Stable atrophy of the left kidney with mild stable fullness of the left renal collecting system. Moderate to pronounced hydronephrosis on the right, perhaps due  "to underlying right congenital UPJ obstruction.   Signed by: Joe Harley 11/20/2023 8:21 AM Dictation workstation:   [id number]    CT head wo IV contrast    Result Date: 11/20/2023  EXAMINATION: CT OF THE HEAD WITHOUT CONTRAST  11/19/2023 6:12 pm TECHNIQUE: CT of the head was performed without the administration of intravenous contrast. Automated exposure control, iterative reconstruction, and/or weight based adjustment of the mA/kV was utilized to reduce the radiation dose to as low as reasonably achievable. COMPARISON: None. HISTORY: ORDERING SYSTEM PROVIDED HISTORY: confusion TECHNOLOGIST PROVIDED HISTORY: Reason for exam:->confusion Has a \"code stroke\" or \"stroke alert\" been called?->No Decision Support Exception - unselect if not a suspected or confirmed emergency medical condition->Emergency Medical Condition (MA) What reading provider will be dictating this exam?->CRC FINDINGS: BRAIN/VENTRICLES: There is no acute intracranial hemorrhage, mass effect or midline shift.  No abnormal extra-axial fluid collection.  The gray-white differentiation is maintained without evidence of an acute infarct.  There is no evidence of hydrocephalus. ORBITS: The visualized portion of the orbits demonstrate no acute abnormality. SINUSES: The visualized paranasal sinuses and mastoid air cells demonstrate no acute abnormality. SOFT TISSUES/SKULL:  No acute abnormality of the visualized skull or soft tissues.    No acute intracranial abnormality.    MR lumbar spine wo IV contrast    Result Date: 11/20/2023  Interpreted By:  Asad Chong, STUDY: MRI of the lumbar spine without IV contrast;  11/20/2023 4:56 am   INDICATION: Signs/Symptoms:concern for epidural abscess.   COMPARISON: MRI lumbar spine of 10/09/2022.   ACCESSION NUMBER(S): [id number]   ORDERING CLINICIAN: EVI ROSENBAUM   TECHNIQUE: Sagittal and axial STIR and T1-weighted MRI images of the lumbar spine were acquired using a spondylolysis protocol.  No contrast was " administered.   FINDINGS: Evaluation is severely limited by motion artifact.   Redemonstration of findings lumbosacral fusion and laminectomies. There is redemonstration of a multiloculated collection in the midline/left paramedian posterior subcutaneous soft tissues deep to the incision, and more deeply within the laminectomy bed abutting and within the dorsal epidural fat. The collection also extends about the posterior elements of the L2 vertebra. A rather large volume of air is newly present in the collection, probably secondary to recent intervention/communication with the atmosphere although infection with gas producing organism can not be excluded. The dominant locule of the collection is in the left paramedian posterior soft tissues, centered about the level of the L2-L3 disc space, measures a proximally 2.3 x 2.9 cm in transaxial diameters, and up to 7.8 cm in cc length. The next largest locule of the collection, centered about the L3-L4 disc space, within the laminectomy bed measures approximately 2.0 x 2.0 cm in transaxial diameters, and roughly 8.7 cm in CC length. An additional smaller collection centered at the mid L5 level measures up to 1.7 x 1.2 cm in transaxial diameters in the dorsal epidural fat. Mass effect from the collection and associated edema in the surrounding soft tissues is again noted to cause marked narrowing of the thecal sac from L2-L3 through L5-S1, worst at the L5-S1 level secondary to combination of grade 1 retrolisthesis, disc osteophyte bulging, dorsal epidural lipomatosis with edematous fat.   Vertebrae/Intervertebral Discs: Redemonstration mild grade 1 degenerative retrolisthesis at L4-L5. Alignment is otherwise maintained.   Cord: The lower thoracic cord appears unremarkable. The conus terminates at T12-L1.   Soft tissues: The prevertebral and posterior paraspinal soft tissues are unremarkable.   Similar bilateral hydronephrosis relative to CT lumbar spine of 10/03/2023.        Evaluation is severely limited by motion artifact.   Redemonstration of findings lumbosacral fusion and laminectomies. There is redemonstration of a multiloculated collection in the midline/left paramedian posterior subcutaneous soft tissues deep to the incision, and more deeply within the laminectomy bed abutting and within the dorsal epidural fat. The sterility of this collection cannot be determined by imaging.   The collection also extends about the posterior elements of the L2 vertebra. A rather large volume of air is newly present in the collection, probably secondary to recent intervention/communication with the atmosphere although infection with gas producing organism cannot be excluded.   The dominant locule of the collection is in the left paramedian posterior soft tissues, centered about the level of the L2-L3 disc space, measures a proximally 2.3 x 2.9 cm in transaxial diameters, and up to 7.8 cm in cc length. The next largest locule of the collection, centered about the L3-L4 disc space, within the laminectomy bed measures approximately 2.0 x 2.0 cm in transaxial diameters, and roughly 8.7 cm in CC length. An additional smaller collection centered at the mid L5 level measures up to 1.7 x 1.2 cm in transaxial diameters in the dorsal epidural fat.   Mass effect from the collection and associated edema in the surrounding soft tissues is again noted to cause moderate to marked narrowing of the thecal sac from L2-L3 through L5-S1, worst at the L5-S1 level where there is effacement of CSF and compression of cauda equina nerve roots, secondary to combination of grade 1 retrolisthesis, disc osteophyte bulging, dorsal epidural lipomatosis with edematous fat.   I personally reviewed the images/study and I agree with the findings as stated. This study was interpreted at [facility], .   MACRO: None   Signed by: Asad Chong 11/20/2023 5:24 AM Dictation  workstation:   [id number]    XR chest 1 view    Result Date: 11/19/2023  EXAMINATION: ONE XRAY VIEW OF THE CHEST 11/19/2023 5:39 pm COMPARISON: Portable chest from 11/21/2022. HISTORY: ORDERING SYSTEM PROVIDED HISTORY: Sepsis TECHNOLOGIST PROVIDED HISTORY: Reason for exam:->Sepsis What reading provider will be dictating this exam?->CRC FINDINGS: The lungs remain clear. There is no sign of any infiltrate or effusion. The heart remains normal in size.  The mediastinum is normal in appearance. Again seen is a metal plate and anchoring screws in the inferior cervical spine from anterior cervical fusion.    No active disease seen in the chest.    XR tibia fibula right 2 views    Result Date: 11/6/2023  Interpreted By:  Vic Araya, STUDY: XR TIBIA FIBULA RIGHT 2 VIEWS;  ;  11/6/2023 11:05 am   INDICATION: Signs/Symptoms:pain.   ACCESSION NUMBER(S): [id number]   ORDERING CLINICIAN: VIC ARAYA   FINDINGS: No acute fracture dislocations of the right tibia. Well-aligned tibial and fibula fracture status post nailing. No interval change compared to fluoroscopic imaging.       Signed by: Vic Araya 11/6/2023 2:39 PM Dictation workstation:   [id number]          Assessment/Plan        Principal Problem:    Wound dehiscence  Active Problems:    CAD (coronary artery disease)    Patient is a 58 year-old male with past medical history of MDD, T2 DM, BPH, hypertension, hyperlipidemia, JEMAL, chronic pain in neck and back, lumbar radiculopathy, and heroin use who is transferred from [facility] in the setting of recent lumbar surgery with subarachnoid hemorrhage/subdural hematoma (9/22/2023) presented with wound dehiscence requiring lumbar exploration and washout.  Patient was admitted to medicine for evaluation and management of incisional infection. Neurosurgery was consulted and is following.     Multiple lumbar post operative fluid collections with mass effect  - MRI and CT scan as discussed above. Repeat CT head imaging  ordered due to concern for midline shift, results negative for any acute pathology  - Lumbar wound washout procedure performed by NSGY: Dr. Vitaly Amezcua   - Wound culture 11/20: MSSA, but BCNTD  - Per ID: PICC line placed 11/22, Cefazolin x 6 weeks, stop date 1/1/2024  - Follow-up outpatient with Dr. Browning in 6 weeks     2.Left pupil dilation   -A brain attack was called at 12:45 AM 11/21 due to AMS and left pupil dilation. CT scan of head w/o contrast revealed no intracranial abnormalities.  -Patient was AOx0 with left pupil dilation, but mental status improved to AOx3.  -No visual disturbances, pain, or acute concerns overall.  -Psych consulted: continue Suboxone on D/C & FU with Suboxone prescriber.     3. S/P R distal tibial nail 10/17/23  R ankle fracture on 10/16/23, underwent R distal tibial nail 10/17 by Dr. Hale  - FU note on 11/06 needs to be non-weightbearing for 4 weeks on R leg  - Non weight bearing until 12/6/23 on RLE, repeat 2 view xrays of R tibia at that time  - Non weightbearing boot ordered for RLE      4. H/O NSTEMI (10/13/23)  - PCI to RCA  - ASA and Plavix have been held during this admission due to operation  - ASA resumed 11/23/2023 POD #3  - Resume Plavix POD #7 (11/27) (resumed)     5.  Hx of Chronic opioid dependence on Suboxone:  - Home Suboxone resumed on 11/24/2023  - Patient has needed less and less pain management, however if patient does have breakthrough pain consider Toradol.     6.NIDDM2  -A1C 5.7     IVF: None at this time  Diet: Diabetic + cardiac  DVT Ppx: SCD  Dispo: Anticipate 2 additional days hospital stay     Code status: Full code     Assessment and plan discussed with attending.     Dispo: Medically optimized for discharge, cleared by ID & NSGY, awaiting placement      Lucio Solis DO

## 2023-11-28 NOTE — NURSING NOTE
EOS:     1900- assumed care of pt.    2030- shift assessment and PM medications administered at this time. Pt with reports of discomfort in left knee, pt will await relief of suboxone.    2200- pt feels more comfortable with left knee when propped with pillow while laying on side. Pt comfortable enough for resting in bed at this time.    0500- pt with urinary incontinence at this time. Pt given CHG, bath, ambulated to chair with 2x assist walking boot and walker without issue. New dressings placed along surgical spine incision/staples and left side wound.     0700- pt with no acute events this shift. Pt vitals stable and remained on room air. Pt with bed alarm active when resting for sleep, chair alarm active after pt ambulation at 0500.  Call light within reach at all times, safety maintained.

## 2023-11-28 NOTE — PROGRESS NOTES
INPATIENT PROGRESS NOTES    PATIENT NAME: Gm Thrasher    MRN: [id number]  SERVICE DATE:  11/28/2023   SERVICE TIME:  2:49 PM    SIGNATURE: Yohana Browning MD      SUBJECTIVE  Afebrile  No events overnight       OBJECTIVE  PHYSICAL EXAM:   Patient Vitals for the past 24 hrs:   BP Temp Temp src Pulse Resp SpO2   11/28/23 1200 90/60 36.3 °C (97.3 °F) Temporal 78 16 99 %   11/28/23 0800 113/72 36.2 °C (97.2 °F) Temporal 80 16 97 %   11/28/23 0400 (!) 136/104 36.3 °C (97.3 °F) Temporal 86 16 97 %   11/28/23 0000 104/55 37.2 °C (99 °F) Temporal 75 18 95 %   11/27/23 2000 112/53 37.4 °C (99.3 °F) Temporal 75 18 94 %   11/27/23 1600 93/50 36.9 °C (98.4 °F) Temporal 84 16 94 %         Gen: NAD  Neck: symmetric, no mass  Cardiovascular: RRR  Respiratory: No distress   Extremities: no leg edema      Labs:  Lab Results   Component Value Date    WBC 6.3 11/27/2023    HGB 8.1 (L) 11/27/2023    HCT 26.0 (L) 11/27/2023    MCV 93 11/27/2023     11/27/2023     Lab Results   Component Value Date    GLUCOSE 104 (H) 11/27/2023    CALCIUM 8.5 (L) 11/27/2023     (L) 11/27/2023    K 3.8 11/27/2023    CO2 31 11/27/2023    CL 94 (L) 11/27/2023    BUN 6 11/27/2023    CREATININE 0.98 11/27/2023   ESR: --  Lab Results   Component Value Date    SEDRATE 55 (H) 11/20/2023     Lab Results   Component Value Date    CRP 18.12 (H) 11/20/2023     Lab Results   Component Value Date    ALT 12 11/23/2023    AST 28 11/23/2023    ALKPHOS 95 11/23/2023    BILITOT 0.5 11/23/2023         DATA:   Diagnostic tests reviewed for today's visit:    Labs this admission reviewed  Imagings this admission reviewed  Cultures: Reviewed          ASSESSMENT :   -Lumbar spine epidural phlegmon status post washout on 11/20 with culture growing MSSA  -History of diabetes, subdural hematoma, neuropathy, BPH, drug abuse     PLAN:  -Tolerating cefazolin with no side effects and plan to complete the course on 1/1/24  -Closely monitor side effects including rash,  diarrhea, JEMAL, CDI, etc.    Yohana Browning MD.   Infectious Diseases Attending

## 2023-11-28 NOTE — CARE PLAN
Pt has been up in chair, voices no complaints.now resting back in bed, boot on when up and weight bearing.. call light in reach, safety maintained, alarms in use. Picc line flushed well positive blood return

## 2023-11-28 NOTE — PROGRESS NOTES
Per liaison at [facility], case is still under review and pending with [facility]. Request was sent to direct precert team to escalate precert this morning.       Janet Matta RN

## 2023-11-28 NOTE — CARE PLAN
The patient's goals for the shift include      The clinical goals for the shift include see care plan      Problem: Discharge Planning  Goal: Discharge to home or other facility with appropriate resources  Outcome: Progressing     Problem: Chronic Conditions and Co-morbidities  Goal: Patient's chronic conditions and co-morbidity symptoms are monitored and maintained or improved  Outcome: Progressing     Problem: Skin  Goal: Decreased wound size/increased tissue granulation at next dressing change  Outcome: Progressing  Flowsheets (Taken 11/28/2023 0600)  Decreased wound size/increased tissue granulation at next dressing change: Promote sleep for wound healing  Goal: Prevent/minimize sheer/friction injuries  Outcome: Progressing  Flowsheets (Taken 11/28/2023 0600)  Prevent/minimize sheer/friction injuries: Use pull sheet  Goal: Promote/optimize nutrition  Outcome: Progressing  Flowsheets (Taken 11/28/2023 0600)  Promote/optimize nutrition: Monitor/record intake including meals  Goal: Promote skin healing  Outcome: Progressing  Flowsheets (Taken 11/28/2023 0600)  Promote skin healing: Assess skin/pad under line(s)/device(s)     Problem: Fall/Injury  Goal: Verbalize understanding of risk factor reduction measures to prevent injury from fall in the home  Outcome: Progressing     Problem: Safety - Adult  Goal: Free from fall injury  Outcome: Progressing

## 2023-11-28 NOTE — PROGRESS NOTES
Physical Therapy    Physical Therapy Treatment    Patient Name: Gm Thrasher  MRN: [id number]  Today's Date: 11/28/2023  Time Calculation  Start Time: 0856  Stop Time: 0920  Time Calculation (min): 24 min       Assessment/Plan   PT Assessment  PT Assessment Results: Decreased strength, Decreased endurance, Impaired balance, Decreased mobility  Rehab Prognosis: Good  Barriers to Discharge: medical clearance  Evaluation/Treatment Tolerance: Patient limited by fatigue, Patient limited by pain  Barriers to Participation: Ability to acquire knowledge, Comorbidities, Insight into problems  End of Session Communication: Physician, Bedside nurse  Assessment Comment: Pt is a 57 y/o M presenting from OSH for wound dehiscence in the setting of recent lumbar decompression sx 9/29/23. Recent history of SAH/SDH 9/22/23 and R ankle surgical fixation 10/17/23. At time of evaluation, pt presents with reduced strength, endurance, and balance from baseline function. Pt with decreased safety insight/cognition and reduced ability to maintain RLE NWB. Would benefit from further skilled PT at moderate intensity to optimize strength and function.  End of Session Patient Position: Up in chair, Alarm on  PT Plan  Inpatient/Swing Bed or Outpatient: Inpatient  PT Plan  Treatment/Interventions: Bed mobility, Transfer training, Gait training  PT Plan: Skilled PT  PT Frequency: 3 times per week  PT Discharge Recommendations: Moderate intensity level of continued care  Equipment Recommended upon Discharge: Wheeled walker  PT Recommended Transfer Status: Assist x1, Assistive device  PT - OK to Discharge: Yes (to next level of care when cleared by medical team)     11/28/23 0856   PT  Visit   PT Received On 11/28/23   Response to Previous Treatment Patient with no complaints from previous session.   General   Prior to Session Communication Bedside nurse   Patient Position Received Bed, 3 rail up   Preferred Learning Style verbal;visual   General  Comment patient cleared for WBAT per Dr Solis with boot on RLE for short distance in room only.   Precautions   LE Weight Bearing Status Other (Comment)  (Per Dr. Solis, WBAT with boot on for short distance in room only)   Medical Precautions Fall precautions   Post-Surgical Precautions Spinal precautions   Pain Assessment   Pain Assessment 0-10   Pain Score 5 - Moderate pain   Pain Type Chronic pain   Pain Location Knee   Pain Orientation Right   Pain Radiating Towards quad   Pain Descriptors Aching   Cognition   Orientation Level Oriented X4   Therapeutic Activity   Therapeutic Activity Performed Yes   Therapeutic Activity 1 instructed patient with proper donning and use of off loading boot RLE   Bed Mobility   Bed Mobility Yes   Bed Mobility 1   Bed Mobility 1 Supine to sitting   Level of Assistance 1 Contact guard   Bed Mobility Comments 1 uses bedrails, HOB elevated; completes via log roll   Ambulation/Gait Training   Ambulation/Gait Training Performed Yes   Ambulation/Gait Training 1   Surface 1 Level tile   Device 1 Rolling walker   Gait Support Devices Gait belt   Assistance 1 Minimum assistance;Contact guard   Quality of Gait 1 WBOS;Inconsistent stride length;Antalgic   Comments/Distance (ft) 1 x1 significant LOB to the left requiring min assist to correct.   Transfers   Transfer Yes   Transfer 1   Transfer From 1 Sit to   Transfer to 1 Stand   Technique 1 Sit to stand;Stand to sit   Transfer Device 1 Walker;Gait belt   Transfer Level of Assistance 1 Contact guard   Activity Tolerance   Endurance Tolerates 30 min exercise with multiple rests   PT Assessment   PT Assessment Results Decreased strength;Decreased endurance;Impaired balance;Decreased mobility   Rehab Prognosis Good   Evaluation/Treatment Tolerance Patient limited by fatigue;Patient limited by pain   End of Session Patient Position Up in chair;Alarm on   Outpatient Education   Individual(s) Educated Patient   Education Provided Fall Risk   PT  Plan   Inpatient/Swing Bed or Outpatient Inpatient   PT Plan   Treatment/Interventions Bed mobility;Transfer training;Gait training   PT Plan Skilled PT   PT Frequency 3 times per week   PT Discharge Recommendations Moderate intensity level of continued care   Equipment Recommended upon Discharge Wheeled walker   PT Recommended Transfer Status Assist x1;Assistive device     Outcome Measures:  [facility] Basic Mobility  Turning from your back to your side while in a flat bed without using bedrails: A little  Moving from lying on your back to sitting on the side of a flat bed without using bedrails: A little  Moving to and from bed to chair (including a wheelchair): A little  Standing up from a chair using your arms (e.g. wheelchair or bedside chair): A little  To walk in hospital room: A little  Climbing 3-5 steps with railing: A lot  Basic Mobility - Total Score: 17  Education Documentation  Mobility Training, taught by Rocco Nguyen PTA at 11/28/2023  9:27 AM.  Learner: Patient  Readiness: Acceptance  Method: Explanation, Demonstration  Response: Verbalizes Understanding, Demonstrated Understanding    Education Comments  Educated on WB status, ambulation with walker to minimize weight through RLE as well as limiting distance.  Also educated on donning and applicationR off loading boot.    EDUCATION:  Outpatient Education  Individual(s) Educated: Patient  Education Provided: Fall Risk    GOALS:  Encounter Problems       Encounter Problems (Active)       Balance       STG - Maintains static standing balance with upper extremity support (Progressing)       Start:  11/24/23    Expected End:  12/08/23       X 2-3 min with 100% compliance RLE NWB, CGA         STG - Maintains static sitting balance without upper extremity support (Progressing)       Start:  11/24/23    Expected End:  12/08/23       Sitting EOB x10 min while completing dynamic activity task, with close sup             Mobility       STG - Patient will ambulate  (Progressing)       Start:  11/24/23    Expected End:  12/08/23       With 2WW, hop to pattern x 10 ft (with 100% compliance to RLE NWB status) , close supervision             Pain - Adult          Transfers       STG - Transfer from bed to chair (Progressing)       Start:  11/24/23    Expected End:  12/08/23       With 2WW and CGA, 100% compliance RLE NWB         STG - Patient will transfer sit to and from stand (Progressing)       Start:  11/24/23    Expected End:  12/08/23       With 2WW, close sup (100% compliance NWB RLE)         STG - Patient maintains weight bearing status during transfers (Progressing)       Start:  11/24/23    Expected End:  12/08/23

## 2023-11-29 VITALS
OXYGEN SATURATION: 100 % | WEIGHT: 222.22 LBS | RESPIRATION RATE: 18 BRPM | SYSTOLIC BLOOD PRESSURE: 90 MMHG | HEIGHT: 71 IN | HEART RATE: 88 BPM | TEMPERATURE: 98.1 F | BODY MASS INDEX: 31.11 KG/M2 | DIASTOLIC BLOOD PRESSURE: 53 MMHG

## 2023-11-29 PROCEDURE — 97165 OT EVAL LOW COMPLEX 30 MIN: CPT | Mod: GO

## 2023-11-29 PROCEDURE — 97112 NEUROMUSCULAR REEDUCATION: CPT | Mod: GO

## 2023-11-29 PROCEDURE — 2500000004 HC RX 250 GENERAL PHARMACY W/ HCPCS (ALT 636 FOR OP/ED): Performed by: STUDENT IN AN ORGANIZED HEALTH CARE EDUCATION/TRAINING PROGRAM

## 2023-11-29 PROCEDURE — 2500000004 HC RX 250 GENERAL PHARMACY W/ HCPCS (ALT 636 FOR OP/ED): Performed by: INTERNAL MEDICINE

## 2023-11-29 PROCEDURE — 2500000001 HC RX 250 WO HCPCS SELF ADMINISTERED DRUGS (ALT 637 FOR MEDICARE OP): Performed by: INTERNAL MEDICINE

## 2023-11-29 PROCEDURE — 2500000002 HC RX 250 W HCPCS SELF ADMINISTERED DRUGS (ALT 637 FOR MEDICARE OP, ALT 636 FOR OP/ED)

## 2023-11-29 PROCEDURE — 2500000001 HC RX 250 WO HCPCS SELF ADMINISTERED DRUGS (ALT 637 FOR MEDICARE OP): Performed by: STUDENT IN AN ORGANIZED HEALTH CARE EDUCATION/TRAINING PROGRAM

## 2023-11-29 PROCEDURE — 99233 SBSQ HOSP IP/OBS HIGH 50: CPT

## 2023-11-29 RX ADMIN — CEFAZOLIN SODIUM 2 G: 2 INJECTION, SOLUTION INTRAVENOUS at 01:09

## 2023-11-29 RX ADMIN — CLOPIDOGREL BISULFATE 75 MG: 75 TABLET ORAL at 08:37

## 2023-11-29 RX ADMIN — BUPRENORPHINE AND NALOXONE 1 TABLET: 8; 2 TABLET SUBLINGUAL at 08:36

## 2023-11-29 RX ADMIN — PREGABALIN 300 MG: 150 CAPSULE ORAL at 08:36

## 2023-11-29 RX ADMIN — CEFAZOLIN SODIUM 2 G: 2 INJECTION, SOLUTION INTRAVENOUS at 08:37

## 2023-11-29 RX ADMIN — ASPIRIN 81 MG CHEWABLE TABLET 81 MG: 81 TABLET CHEWABLE at 08:36

## 2023-11-29 RX ADMIN — TAMSULOSIN HYDROCHLORIDE 0.4 MG: 0.4 CAPSULE ORAL at 08:36

## 2023-11-29 RX ADMIN — CEFAZOLIN SODIUM 2 G: 2 INJECTION, SOLUTION INTRAVENOUS at 16:54

## 2023-11-29 RX ADMIN — ESCITALOPRAM OXALATE 20 MG: 20 TABLET, FILM COATED ORAL at 08:37

## 2023-11-29 ASSESSMENT — COGNITIVE AND FUNCTIONAL STATUS - GENERAL
PERSONAL GROOMING: A LITTLE
WALKING IN HOSPITAL ROOM: A LOT
HELP NEEDED FOR BATHING: A LOT
DAILY ACTIVITIY SCORE: 22
MOBILITY SCORE: 20
DRESSING REGULAR UPPER BODY CLOTHING: A LITTLE
HELP NEEDED FOR BATHING: A LITTLE
DRESSING REGULAR LOWER BODY CLOTHING: A LITTLE
TOILETING: A LITTLE
CLIMB 3 TO 5 STEPS WITH RAILING: A LOT

## 2023-11-29 ASSESSMENT — PAIN - FUNCTIONAL ASSESSMENT
PAIN_FUNCTIONAL_ASSESSMENT: 0-10
PAIN_FUNCTIONAL_ASSESSMENT: 0-10

## 2023-11-29 ASSESSMENT — PAIN SCALES - GENERAL
PAINLEVEL_OUTOF10: 8
PAINLEVEL_OUTOF10: 5 - MODERATE PAIN

## 2023-11-29 ASSESSMENT — ACTIVITIES OF DAILY LIVING (ADL): BATHING_ASSISTANCE: MINIMAL

## 2023-11-29 NOTE — CARE PLAN
The patient's goals for the shift include      The clinical goals for the shift include Pain will be controlled throughout the shift    Problem: Discharge Planning  Goal: Discharge to home or other facility with appropriate resources  Outcome: Progressing     Problem: Chronic Conditions and Co-morbidities  Goal: Patient's chronic conditions and co-morbidity symptoms are monitored and maintained or improved  Outcome: Progressing     Problem: Safety - Adult  Goal: Free from fall injury  Outcome: Progressing     Problem: Skin  Goal: Decreased wound size/increased tissue granulation at next dressing change  Outcome: Progressing  Goal: Prevent/minimize sheer/friction injuries  Outcome: Progressing  Goal: Promote/optimize nutrition  Outcome: Progressing  Goal: Promote skin healing  Outcome: Progressing     Problem: Skin  Goal: Prevent/minimize sheer/friction injuries  Outcome: Progressing     Problem: Skin  Goal: Promote/optimize nutrition  Outcome: Progressing     Problem: Skin  Goal: Promote skin healing  Outcome: Progressing     Problem: Fall/Injury  Goal: Verbalize understanding of risk factor reduction measures to prevent injury from fall in the home  Outcome: Progressing

## 2023-11-29 NOTE — PROGRESS NOTES
Gm Thrasher is a 58 y.o. male on day 9 of admission presenting with Wound dehiscence.      Subjective   Seen & examined at bedside.  No new complaints.  Awaiting placement.  [facility] reached out to [facility] regarding precert today, still pending.       Objective     Last Recorded Vitals  BP 93/58 (BP Location: Right arm, Patient Position: Lying)   Pulse 65   Temp 36.2 °C (97.2 °F) (Temporal)   Resp 18   Wt 101 kg (222 lb 3.6 oz)   SpO2 96%   Intake/Output last 3 Shifts:    Intake/Output Summary (Last 24 hours) at 11/29/2023 1116  Last data filed at 11/29/2023 0900  Gross per 24 hour   Intake 800 ml   Output 600 ml   Net 200 ml       Admission Weight  Weight: 111 kg (245 lb) (11/20/23 0151)    Daily Weight  11/20/23 : 101 kg (222 lb 3.6 oz)    Image Results  Bedside PICC Imaging  These images are not reportable by radiology and will not be interpreted   by  Radiologists.  CT head wo IV contrast  Narrative: Interpreted By:  Poncho Castellon,   STUDY:  CT HEAD WO IV CONTRAST;  11/22/2023 9:32 am      INDICATION:  Signs/Symptoms:s/p spinal surgery with high amount of drainage from  wound vac.      COMPARISON:  11/21/2023      ACCESSION NUMBER(S):  [id number]      ORDERING CLINICIAN:  KARYNA NAIK      TECHNIQUE:  Noncontrast axial CT scan of head was performed. Angled reformats in  brain and bone windows were generated. The images were reviewed in  bone, brain, blood and soft tissue windows.      FINDINGS:  No acute edema. No acute hemorrhage. No mass effect. Ventricular  system is normal for age. No extra-axial fluid collections. Orbits  are normal. Mucoperiosteal thickening right maxillary sinus.          Impression: No acute findings.      Signed by: Poncho Castellon 11/22/2023 10:34 AM  Dictation workstation:   [id number]      Physical Exam  General: Not in acute distress, A&O x 3, alert, cooperative, well-developed  HEENT: Normocephalic, atraumatic, EOMI, moist mucous membranes, left pupil mildly dilated  Neck:  Neck supple, trachea midline, no evidence of trauma  Cardiovascular: RRR, S1 and S2 appreciated, no murmurs rubs gallops appreciated, distal pulses 2+ bilaterally (radial and dorsalis pedis)  Respiratory: Vesicular breath sounds appreciated bilaterally, no adventitious sounds appreciated, no increased work of breathing  GI: Abdomen soft, nondistended, nontender to palpation, bowel sounds present  Extremities: No edema appreciated in lower extremities bilaterally, no cyanosis  Back: Surgical incision dressed appropriately, with scant serosanguineous drainage on dressing, staples intact without surrounding erythema, no germaina drainage seen, no signs of infection  Neuro: A&O X3, no focal deficits, strength and sensation intact bilaterally, deficit in cranial nerve I  Relevant Results      Scheduled medications  aspirin, 81 mg, oral, Daily  buprenorphine-naloxone, 1 tablet, sublingual, BID  ceFAZolin, 2 g, intravenous, q8h  clopidogrel, 75 mg, oral, Daily  escitalopram, 20 mg, oral, Daily  [Held by provider] icosapent ethyL, 2 g, oral, BID with meals  lidocaine, 5 mL, infiltration, Once  polyethylene glycol, 17 g, oral, Daily  pregabalin, 300 mg, oral, BID  tamsulosin, 0.4 mg, oral, Daily      Continuous medications     PRN medications  PRN medications: alteplase, haloperidol lactate, promethazine   Bedside PICC Imaging    Result Date: 11/22/2023  These images are not reportable by radiology and will not be interpreted by  Radiologists.    CT head wo IV contrast    Result Date: 11/22/2023  Interpreted By:  Poncho Castellon, STUDY: CT HEAD WO IV CONTRAST;  11/22/2023 9:32 am   INDICATION: Signs/Symptoms:s/p spinal surgery with high amount of drainage from wound vac.   COMPARISON: 11/21/2023   ACCESSION NUMBER(S): [id number]   ORDERING CLINICIAN: KARYNA NAIK   TECHNIQUE: Noncontrast axial CT scan of head was performed. Angled reformats in brain and bone windows were generated. The images were reviewed in bone, brain,  blood and soft tissue windows.   FINDINGS: No acute edema. No acute hemorrhage. No mass effect. Ventricular system is normal for age. No extra-axial fluid collections. Orbits are normal. Mucoperiosteal thickening right maxillary sinus.         No acute findings.   Signed by: Pnocho Castellon 11/22/2023 10:34 AM Dictation workstation:   [id number]    CT lumbar spine w IV contrast    Result Date: 11/21/2023  EXAMINATION: CT OF THE LUMBAR SPINE WITH CONTRAST  11/19/2023 6:12 pm TECHNIQUE: CT of the lumbar spine was performed with the administration of intravenous contrast. Multiplanar reformatted images are provided for review. Automated exposure control, iterative reconstruction, and/or weight based adjustment of the mA/kV was utilized to reduce the radiation dose to as low as reasonably achievable. COMPARISON: None HISTORY: ORDERING SYSTEM PROVIDED HISTORY: back pain TECHNOLOGIST PROVIDED HISTORY: Reason for exam:->back pain Decision Support Exception - unselect if not a suspected or confirmed emergency medical condition->Emergency Medical Condition (MA) What reading provider will be dictating this exam?->CRC FINDINGS: BONES/ALIGNMENT:  There is normal alignment of the spine. The vertebral body heights are maintained. No osseous destructive lesion is seen.  Bilateral posteriorly placed pedicle screws, L3 through S1, secured to bilateral vertically oriented rods. SOFT TISSUES: No paraspinal mass is seen. No area of abnormal contrast enhancement. L1-L2: There is no significant disc protrusion, central spinal canal stenosis or neural foraminal narrowing. L2-L3: There is no significant disc protrusion, central spinal canal stenosis or neural foraminal narrowing. L3-L4: There is no significant disc protrusion, central spinal canal stenosis or neural foraminal narrowing.  L3-4 intervertebral disc space device L4-L5: There is no significant disc protrusion, central spinal canal stenosis or neural foraminal narrowing.  L4-5  intervertebral disc space device. L5-S1: There is no significant disc protrusion, central spinal canal stenosis or neural foraminal narrowing.  L5-S1 intervertebral disc space device.    Internal fixation L3 through S1 with intervertebral disc space devices as discussed. No acute findings.    CT brain attack head wo IV contrast    Result Date: 11/21/2023  Interpreted By:  Martin Mathis, STUDY: CT BRAIN ATTACK HEAD WO IV CONTRAST;  11/21/2023 1:04 am   INDICATION: Signs/Symptoms:Dilated pupil.   COMPARISON: 10/13/2023   ACCESSION NUMBER(S): [id number]   ORDERING CLINICIAN: NESHA GRIDER   TECHNIQUE: Axial noncontrast CT images of the head. Sagittal and coronal reformats were provided.   FINDINGS: BRAIN: No acute intracranial hemorrhage. No mass effect or midline shift. Gray-white matter interfaces are preserved. VENTRICLES and EXTRA-AXIAL SPACES: Normal. EXTRACRANIAL SOFT TISSUES:  Within normal limits. PARANASAL SINUSES/MASTOIDS: Mild paranasal sinus mucosal thickening. Polypoid right maxillary sinus mucosal thickening. Mastoids are clear. BONES AND ORBITS: No displaced skull fracture. No suspicious osseous lesion.  Orbits are within normal limits. OTHER FINDINGS: None.       1. No acute intracranial hemorrhage or mass effect.   Martin Mathis discussed the significance and urgency of this critical finding by telephone with  Dr. Lawson on 11/21/2023 at 4:11 am.  (**-RCF-**) Findings:  See findings.     Signed by: Martin Mathis 11/21/2023 4:11 AM Dictation workstation:   [id number]    CT lumbar spine wo IV contrast    Result Date: 11/20/2023  Interpreted By:  Joe Harley, STUDY: CT LUMBAR SPINE WO IV CONTRAST;  11/20/2023 6:59 am   INDICATION: Signs/Symptoms:concern for wound infection.   COMPARISON: CT scan from 10/03/2023. Patient also had lumbar spine MRI earlier this morning..   ACCESSION NUMBER(S): [id number]   ORDERING CLINICIAN: DEAN OGLESBY   TECHNIQUE: Thin section axial images  were obtained from T11 down through the mid sacrum. Sagittal and coronal reconstruction images were generated. Soft tissue and bone windows were reviewed.   FINDINGS: VERTEBRAL BODIES AND POSTERIOR ELEMENTS: Previous laminectomy with posterior fusion from L3 through S1. There are pedicle screws with fixation rods in place at those levels. The fixation hardware is grossly intact. There are metallic disc spacers from L3-4 through L5-S1, intact. There is stable endplate osteophytosis from L3-4 through L5-S1 and also at L1-2 and T12-L1. Mild disc space narrowing at T12-L1 and L1-2, unchanged. There is no suspicious lucency adjacent to the pedicle screws. There is no gross focal interval bone destruction. There are edematous changes posteriorly in the paraspinal soft tissues and subcutaneous soft tissues, including abnormal gas density which has progressed since 10/03/2023. This gas extends from the subcutaneous soft tissues overlying the paraspinal muscles through the paraspinal muscles and adjacent to the left side of the spinous process and lamina at the L3-4 level, and then branches to the right and left to surround the facet joints at that level and extends caudally to the L4-5 facets. There is a punctate gas bubble posteriorly to the left of midline in the laminectomy defect at L5. The subcutaneous posterior midline gas collection with soft tissue edema extends caudally down to the S1 level. The edema/fluid in the subcutaneous soft tissues posteriorly extends from the L1-2 level down to the S1 level. Mass effect upon the thecal sac is difficult to ascertain due to non use of IV contrast and also due to beam hardening artifact from the fixation hardware, despite the use metal suppression technique. However, this is much better depicted in the MRI and the description is in the MRI report. There are sclerotic arthritic changes in both SI joints. Mild spur formation in both hips.   SPINAL CANAL: No focal disc  herniation in this unenhanced exam.   SOFT TISSUES: See above. Also, there is mild gas in the L5-S1 disc space which appears to be greater than it was previously, and there is mild soft tissue gas anterior to L5-S1 to the right of midline along with mild paraspinal soft tissue swelling at that level.   ABDOMEN/PELVIS: As on the prior exam, there is moderate to pronounced right-sided hydronephrosis ending at the right ureteropelvic junction, with gadolinium contrast material filling the dilated right renal collecting system and extending down the right ureter with some evident in the urinary bladder. The patient does have a Navarro catheter in place. The left kidney remains atrophic, and there is IV gadolinium contrast material in the left renal collecting system which is mildly prominent, also ending at the left ureterovesical junction. There is mild contrast evident in the nondilated left ureter. There are moderate aortoiliac calcifications. There is moderate stool throughout the imaged colon and rectum.       Lumbosacral spine surgical and arthritic changes as described. Mild DJD in both hips.   Abnormal soft tissue swelling/fluid in the posterior paraspinal musculature and overlying subcutaneous soft tissues, with progression associated gas density which extends along the left side of the L2 spinous process and then extends caudally surrounding the inter facet joints at L3-4 and L4-5. An infectious process cannot be excluded in this unenhanced exam. Suggest percutaneous needle aspiration to examine for abscess. Also, please refer to the MRI report for more information regarding the effect of this process on the thecal sac.   There are findings that are at least mildly suspicious for discitis at the L5-S1 level.   Navarro catheter in an otherwise nearly empty urinary bladder.   Stable atrophy of the left kidney with mild stable fullness of the left renal collecting system. Moderate to pronounced hydronephrosis on the  "right, perhaps due to underlying right congenital UPJ obstruction.   Signed by: Joe Harley 11/20/2023 8:21 AM Dictation workstation:   [id number]    CT head wo IV contrast    Result Date: 11/20/2023  EXAMINATION: CT OF THE HEAD WITHOUT CONTRAST  11/19/2023 6:12 pm TECHNIQUE: CT of the head was performed without the administration of intravenous contrast. Automated exposure control, iterative reconstruction, and/or weight based adjustment of the mA/kV was utilized to reduce the radiation dose to as low as reasonably achievable. COMPARISON: None. HISTORY: ORDERING SYSTEM PROVIDED HISTORY: confusion TECHNOLOGIST PROVIDED HISTORY: Reason for exam:->confusion Has a \"code stroke\" or \"stroke alert\" been called?->No Decision Support Exception - unselect if not a suspected or confirmed emergency medical condition->Emergency Medical Condition (MA) What reading provider will be dictating this exam?->CRC FINDINGS: BRAIN/VENTRICLES: There is no acute intracranial hemorrhage, mass effect or midline shift.  No abnormal extra-axial fluid collection.  The gray-white differentiation is maintained without evidence of an acute infarct.  There is no evidence of hydrocephalus. ORBITS: The visualized portion of the orbits demonstrate no acute abnormality. SINUSES: The visualized paranasal sinuses and mastoid air cells demonstrate no acute abnormality. SOFT TISSUES/SKULL:  No acute abnormality of the visualized skull or soft tissues.    No acute intracranial abnormality.    MR lumbar spine wo IV contrast    Result Date: 11/20/2023  Interpreted By:  sAad Chong, STUDY: MRI of the lumbar spine without IV contrast;  11/20/2023 4:56 am   INDICATION: Signs/Symptoms:concern for epidural abscess.   COMPARISON: MRI lumbar spine of 10/09/2022.   ACCESSION NUMBER(S): [id number]   ORDERING CLINICIAN: EVI ROSENBAUM   TECHNIQUE: Sagittal and axial STIR and T1-weighted MRI images of the lumbar spine were acquired using a spondylolysis protocol.  " No contrast was administered.   FINDINGS: Evaluation is severely limited by motion artifact.   Redemonstration of findings lumbosacral fusion and laminectomies. There is redemonstration of a multiloculated collection in the midline/left paramedian posterior subcutaneous soft tissues deep to the incision, and more deeply within the laminectomy bed abutting and within the dorsal epidural fat. The collection also extends about the posterior elements of the L2 vertebra. A rather large volume of air is newly present in the collection, probably secondary to recent intervention/communication with the atmosphere although infection with gas producing organism can not be excluded. The dominant locule of the collection is in the left paramedian posterior soft tissues, centered about the level of the L2-L3 disc space, measures a proximally 2.3 x 2.9 cm in transaxial diameters, and up to 7.8 cm in cc length. The next largest locule of the collection, centered about the L3-L4 disc space, within the laminectomy bed measures approximately 2.0 x 2.0 cm in transaxial diameters, and roughly 8.7 cm in CC length. An additional smaller collection centered at the mid L5 level measures up to 1.7 x 1.2 cm in transaxial diameters in the dorsal epidural fat. Mass effect from the collection and associated edema in the surrounding soft tissues is again noted to cause marked narrowing of the thecal sac from L2-L3 through L5-S1, worst at the L5-S1 level secondary to combination of grade 1 retrolisthesis, disc osteophyte bulging, dorsal epidural lipomatosis with edematous fat.   Vertebrae/Intervertebral Discs: Redemonstration mild grade 1 degenerative retrolisthesis at L4-L5. Alignment is otherwise maintained.   Cord: The lower thoracic cord appears unremarkable. The conus terminates at T12-L1.   Soft tissues: The prevertebral and posterior paraspinal soft tissues are unremarkable.   Similar bilateral hydronephrosis relative to CT lumbar spine of  10/03/2023.       Evaluation is severely limited by motion artifact.   Redemonstration of findings lumbosacral fusion and laminectomies. There is redemonstration of a multiloculated collection in the midline/left paramedian posterior subcutaneous soft tissues deep to the incision, and more deeply within the laminectomy bed abutting and within the dorsal epidural fat. The sterility of this collection cannot be determined by imaging.   The collection also extends about the posterior elements of the L2 vertebra. A rather large volume of air is newly present in the collection, probably secondary to recent intervention/communication with the atmosphere although infection with gas producing organism cannot be excluded.   The dominant locule of the collection is in the left paramedian posterior soft tissues, centered about the level of the L2-L3 disc space, measures a proximally 2.3 x 2.9 cm in transaxial diameters, and up to 7.8 cm in cc length. The next largest locule of the collection, centered about the L3-L4 disc space, within the laminectomy bed measures approximately 2.0 x 2.0 cm in transaxial diameters, and roughly 8.7 cm in CC length. An additional smaller collection centered at the mid L5 level measures up to 1.7 x 1.2 cm in transaxial diameters in the dorsal epidural fat.   Mass effect from the collection and associated edema in the surrounding soft tissues is again noted to cause moderate to marked narrowing of the thecal sac from L2-L3 through L5-S1, worst at the L5-S1 level where there is effacement of CSF and compression of cauda equina nerve roots, secondary to combination of grade 1 retrolisthesis, disc osteophyte bulging, dorsal epidural lipomatosis with edematous fat.   I personally reviewed the images/study and I agree with the findings as stated. This study was interpreted at [facility], [place], [place].   MACRO: None   Signed by: Asad Chong 11/20/2023 5:24 AM  Dictation workstation:   [id number]    XR chest 1 view    Result Date: 11/19/2023  EXAMINATION: ONE XRAY VIEW OF THE CHEST 11/19/2023 5:39 pm COMPARISON: Portable chest from 11/21/2022. HISTORY: ORDERING SYSTEM PROVIDED HISTORY: Sepsis TECHNOLOGIST PROVIDED HISTORY: Reason for exam:->Sepsis What reading provider will be dictating this exam?->CRC FINDINGS: The lungs remain clear. There is no sign of any infiltrate or effusion. The heart remains normal in size.  The mediastinum is normal in appearance. Again seen is a metal plate and anchoring screws in the inferior cervical spine from anterior cervical fusion.    No active disease seen in the chest.    XR tibia fibula right 2 views    Result Date: 11/6/2023  Interpreted By:  Vic Araya, STUDY: XR TIBIA FIBULA RIGHT 2 VIEWS;  ;  11/6/2023 11:05 am   INDICATION: Signs/Symptoms:pain.   ACCESSION NUMBER(S): [id number]   ORDERING CLINICIAN: VIC ARAYA   FINDINGS: No acute fracture dislocations of the right tibia. Well-aligned tibial and fibula fracture status post nailing. No interval change compared to fluoroscopic imaging.       Signed by: Vic Araya 11/6/2023 2:39 PM Dictation workstation:   [id number]      Results for orders placed or performed during the hospital encounter of 11/20/23 (from the past 24 hour(s))   POCT GLUCOSE   Result Value Ref Range    POCT Glucose 103 (H) 74 - 99 mg/dL           Assessment/Plan        This patient has a central line   Reason for the central line remaining today? Parenteral medication    Principal Problem:    Wound dehiscence  Active Problems:    CAD (coronary artery disease)  Patient is a 58 year-old male with past medical history of MDD, T2 DM, BPH, hypertension, hyperlipidemia, JEMAL, chronic pain in neck and back, lumbar radiculopathy, and heroin use who is transferred from [facility] in the setting of recent lumbar surgery with subarachnoid hemorrhage/subdural hematoma (9/22/2023) presented with wound dehiscence  requiring lumbar exploration and washout.  Patient was admitted to medicine for evaluation and management of incisional infection. Neurosurgery was consulted and is following.     Multiple lumbar post operative fluid collections with mass effect  - MRI and CT scan as discussed above. Repeat CT head imaging ordered due to concern for midline shift, results negative for any acute pathology  - Lumbar wound washout procedure performed by NSGY: Dr. Vitaly Amezcua   - Wound culture 11/20: MSSA, but BCNTD  - Per ID: PICC line placed 11/22, Cefazolin x 6 weeks, stop date 1/1/2024  - Follow-up outpatient with Dr. Browning in 6 weeks     2.Left pupil dilation   -A brain attack was called at 12:45 AM 11/21 due to AMS and left pupil dilation. CT scan of head w/o contrast revealed no intracranial abnormalities.  -Patient was AOx0 with left pupil dilation, but mental status improved to AOx3.  -No visual disturbances, pain, or acute concerns overall.  -Psych consulted: continue Suboxone on D/C & FU with Suboxone prescriber.     3. S/P R distal tibial nail 10/17/23  R ankle fracture on 10/16/23, underwent R distal tibial nail 10/17 by Dr. Hale  - FU note on 11/06 needs to be non-weightbearing for 4 weeks on R leg  - Non weight bearing until 12/6/23 on RLE, repeat 2 view xrays of R tibia at that time  - Non weightbearing boot ordered for RLE      4. H/O NSTEMI (10/13/23)  - PCI to RCA  - ASA and Plavix have been held during this admission due to operation  - ASA resumed 11/23/2023 POD #3  - Resume Plavix POD #7 (11/27) (resumed)     5.  Hx of Chronic opioid dependence on Suboxone:  - Home Suboxone resumed on 11/24/2023  - Patient has needed less and less pain management, however if patient does have breakthrough pain consider Toradol.     6.NIDDM2  -A1C 5.7     IVF: None at this time  Diet: Diabetic + cardiac  DVT Ppx: SCD  Dispo: Anticipate 2 additional days hospital stay     Code status: Full code     Assessment and plan discussed  with attending.     Dispo: Medically optimized for discharge, cleared by ID & NSGY, awaiting placement      Lucio Solis DO

## 2023-11-29 NOTE — NURSING NOTE
1500- Report called to Baljit at [place].     1730- No acute changes throughout the shift. Back dressing changed before discharge. IV antibiotics given. Right knee pain, suboxone given with decrease in pain. Patient up in chair for the afternoon. Safety maintained and call light within reach.

## 2023-11-29 NOTE — PROGRESS NOTES
Message sent to SNF to check on status of precert. Updated patient at bedside that precert is still pending for [facility]. Patient requested phone number for insurance company so he can call them as well. Insurance info and phone number provided to patient.       Janet Matta RN

## 2023-11-29 NOTE — PROGRESS NOTES
"  Gm Thrasher is a 58 y.o. male on day 9 of admission presenting with Wound dehiscence.    Subjective     No concerns today. Incision heavily padded. Patient in walking boot for left ankle       Objective     Awake, Ox3  Fcx4 5/5  SILT  Incision c/d/i    Last Recorded Vitals  Blood pressure 113/60, pulse 74, temperature 36.7 °C (98.1 °F), temperature source Temporal, resp. rate 18, height 1.803 m (5' 11\"), weight 101 kg (222 lb 3.6 oz), SpO2 98 %.  Intake/Output last 3 Shifts:  I/O last 3 completed shifts:  In: 660 (6.5 mL/kg) [P.O.:360; IV Piggyback:300]  Out: 1150 (11.4 mL/kg) [Urine:1150 (0.3 mL/kg/hr)]  Weight: 100.8 kg               Assessment/Plan   Principal Problem:    Wound dehiscence  Active Problems:    CAD (coronary artery disease)    Patient is a 58 year old male p/w wound dehiscence, 11/20 s/p wound washout.    No changes to recs from prior  Floor  ID recs - ancef, abx stop date 1/1/2024; PICC in place  PTOT-SNF    Patient to be discharged to SNF pending precert. Outpatient follow-up arranged.           Nicholas Paredes PA-C           "

## 2023-11-29 NOTE — CONSULTS
Nutrition Assessment Note  Nutrition Assessment  Initial  Completed by Registered Dietitian Nutritionist  Contact via Epic Chat    Reason for Assessment  Reason for Assessment: Length of stay    Nutrition Note:  Gm Thrasher is a 58 y.o. male presenting from home alone for wound dehiscence. ID and Neurosurgery on consult. PICC placed 11/22 for 6 weeks of IV  antibiotics. Pre-cert pending for SNF. Pt eating % of meals; however, reports not being diabetic so unsure why he's on a diabetic diet. Per chart notes, pt w/recent lumbar decompression and spinal fusion complicated by postoperative subarachnoid hemorrhage and subdural hematoma completed 9/29/2023. Pt admitted to [facility] last month for syncopal episode and later found to have a new left psoas rim-enhancing fluid collection with potential extension into the L4-L5 region measuring 2.5 x 9.3 cm as well as bilateral hydronephrosis.       Past Medical History   has a past medical history of Borderline diabetes, Emphysema lung (CMS/[facility]) (09/20/2023), H/O chest x-ray, H/O CT scan, H/O CT scan (10/11/2023), H/O CT scan of abdomen, H/O CT scan of brain, H/O CT scan of brain (10/10/2023), H/O CT scan of chest, H/O CT scan of chest (10/10/2023), H/O CT scan of head (10/12/2023), H/O diagnostic ultrasound, H/O diagnostic ultrasound (10/10/2023), H/O echocardiogram, H/O magnetic resonance imaging of cervical spine, H/O magnetic resonance imaging of lumbar spine, H/O magnetic resonance imaging of lumbar spine (10/10/2023), H/O x-ray of lower extremity (10/10/2023), and High cholesterol.    He has no past medical history of Arthritis, Asthma, Cancer (CMS/[facility]), or CHF (congestive heart failure) (CMS/[facility]).  Surgical History   has a past surgical history that includes Kidney surgery (09/12/2013); Lumbar fusion (10/06/2023); Other surgical history (01/28/2020); Other surgical history (01/28/2020); Other surgical history (01/28/2020); Other surgical history (01/28/2020);  "Cardiac catheterization; Cervical laminectomy; Esophagogastroduodenoscopy; Lumbar laminectomy; Lumbar fusion (09/30/2023); Cardiac catheterization (Right, 10/11/2023); Cardiac catheterization (N/A, 10/13/2023); and Ankle surgery (10/17/2023).    Current Diet: Adult diet Carb Controlled; 75 gram carb/meal, 45 gram Carb evening snack  Food allergies: NKFA. is allergic to metoprolol and mirtazapine.    GI assessment: Pt reports no GI issues at this time. Last BM 11/23 per documentation; however, pt reports BM 2 days ago.  Oral Problems: none per pt  Mobility: lumbar infection post op    Pain Assessment: 0-10  Pain Score: 5 - Moderate pain  Pain Type: Chronic pain  Pain Location: Knee  Pain Orientation: Right  Pain Radiating Towards: quad  Pain Descriptors: Aching    Vitals: Blood pressure 93/58, pulse 65, temperature 36.2 °C (97.2 °F), temperature source Temporal, resp. rate 18, height 1.803 m (5' 11\"), weight 101 kg (222 lb 3.6 oz), SpO2 96 %.    Recent Lab Results:  Lab Results   Component Value Date    GLUCOSE 104 (H) 11/27/2023    CALCIUM 8.5 (L) 11/27/2023     (L) 11/27/2023    K 3.8 11/27/2023    CO2 31 11/27/2023    CL 94 (L) 11/27/2023    BUN 6 11/27/2023    CREATININE 0.98 11/27/2023     Lab Results   Component Value Date    HGBA1C 5.7 (H) 10/10/2023    HGBA1C 6.5 (A) 06/02/2023     Results from last 7 days   Lab Units 11/28/23 2001 11/26/23 2029 11/22/23  1623   POCT GLUCOSE mg/dL 103* 98 95     Medications reviewed:  aspirin, 81 mg, oral, Daily  buprenorphine-naloxone, 1 tablet, sublingual, BID  ceFAZolin, 2 g, intravenous, q8h  clopidogrel, 75 mg, oral, Daily  escitalopram, 20 mg, oral, Daily  [Held by provider] icosapent ethyL, 2 g, oral, BID with meals  lidocaine, 5 mL, infiltration, Once  polyethylene glycol, 17 g, oral, Daily  pregabalin, 300 mg, oral, BID  tamsulosin, 0.4 mg, oral, Daily       Height: 180.3cm  Admission Weight: 111 kg (245 lb)   Weight history/ % weight change:   Significant " Weight Loss: yes  Interpretation of Weight Loss: 12.2% x 1 month  Per archives 115kg 10/17/23, 7/20/23 117.9kg, 99.8kg 11/21/22. Pt reports intentional wt loss to avoid diabetes and notes last known wt of 248# unknown time ago. Pt thinks he lost about 50# in the past 3 months while trying to lose wt and being on Ozempic.   BMI 30.9    History:  Food and Nutrient History  Energy Intake: Good > 75 %  Food and Nutrient History: Pt w/regular diet PTA, endorses good appetite and eats 3 meals/d. Pt lives alone, but goes to sister's house 2 streets away for dinner meals. Pt eats eggs and toast at breakfast. Lunch is often a sandwich. Pt used to eat snacks, but not the past 3 months. Pt endorses being on Ozempic and that has lead to wt loss and decreased appetite. Pt denies being diabetic, reports intentional wt loss to avoid meds/diabetes. Endorses being borderline diabetic only.  Vitamin/Herbal Supplement Use: no vitamins or oral nutrition supplements PTA per pt     Energy Needs:  Calculated Energy Needs Using Equations  Temp: 36.2 °C (97.2 °F)    Estimated Energy Needs  Total Energy Estimated Needs (kCal):  (8044-1461)  Total Estimated Energy Need per Day (kCal/kg):  (20-22kcal/kg)    Estimated Protein Needs  Total Protein Estimated Needs (g):  (100-120)  Total Protein Estimated Needs (g/kg):  (1.0-1.2g/kg)    Estimated Fluid Needs  Method for Estimating Needs: 1mL/kcal     Nutrition Focused Physical Findings:  Subcutaneous Fat Loss  Orbital Fat Pads: Well nourshed (slightly bulging fat pads)  Buccal Fat Pads: Well nourished (full, rounded cheeks)  Triceps: Well nourished (ample fat tissue)    Muscle Wasting  Temporalis: Well nourished (well-defined muscle)  Pectoralis (Clavicular Region): Well nourished (clavicle not visible)  Gastrocnemius: Well nourished (well developed bulbous muscle)    Edema  Edema: +1 trace  Edema Location: BLLE     Physical Findings (Nutrition Deficiency/Toxicity)  Skin:  (dry, flaky, brusing,  incision wound to back)     Nutrition Diagnosis   Malnutrition Diagnosis  Patient has Malnutrition Diagnosis: No    Patient has Nutrition Diagnosis: Yes  Nutrition Diagnosis 1: Increased nutrient needs  Diagnosis Status (1): New  Related to (1): acute nutritional stress  As Evidenced by (1): lumbar post op wound infection.                      Nutrition Interventions/Recommendations   Nutrition Prescription  Individualized Nutrition Prescription Provided for : Diet: continue w/Diabetic Carb Controlled diet of 75g carb/m, 45g/snack as ordered.    Food and/or Nutrient Delivery Interventions  Interventions: Meals and snacks    Meals and Snacks: Carbohydrate-modified diet  Goal: will consume 75% of meals.          Education Documentation  No documentation found.     Discussed AYR ordering and current diet order rationale.     Nutrition Monitoring and Evaluation   Food and Nutrient Related History  Energy Intake: Estimated energy intake  Criteria: consume 75% of estimated energy needs.        Follow Up  Time Spent (min): 45 minutes  Last Date of Nutrition Visit: 11/29/23  Nutrition Follow-Up Needed?: 5-7 days  Follow up Comment: VINITA

## 2023-11-29 NOTE — PROGRESS NOTES
Occupational Therapy    Occupational Therapy    Evaluation    Patient Name: Gm Thrasher  MRN: [id number]  Today's Date: 11/29/2023  Time Calculation  Start Time: 1223  Stop Time: 1246  Time Calculation (min): 23 min  Rm: 3028    Assessment  IP OT Assessment  OT Assessment: Pt pleasant and cooperative. Pt appears close to baseline level of function. Pt does have deficits with balance and an increased risk for falls. Recommend SNF to increase safety and independence with ADL's.  Prognosis: Fair  End of Session Communication: Bedside nurse  End of Session Patient Position: Up in chair, Alarm on    Plan:  Treatment Interventions: ADL retraining, UE strengthening/ROM, Neuromuscular reeducation, Endurance training, Functional transfer training  OT Frequency: 3 times per week  OT Discharge Recommendations: Moderate intensity level of continued care (SNF)  OT Recommended Transfer Status: Stand by assist  OT - OK to Discharge: Yes (to next level of care)    Subjective     Current Problem:  1. Wound dehiscence  Case Request Operating Room: Lumbar wound exploration and washout    Case Request Operating Room: Lumbar wound exploration and washout    Tissue/Wound Culture/Smear    Tissue/Wound Culture/Smear    Tissue/Wound Culture/Smear    Tissue/Wound Culture/Smear    ceFAZolin in dextrose 5 % (Ancef) 2 gram/100 mL solution      2. Cauda equina compression (CMS/HCC)        3. Epidural abscess  ceFAZolin in dextrose 5 % (Ancef) 2 gram/100 mL solution      4. NSTEMI (non-ST elevated myocardial infarction) (CMS/HCC)  aspirin 81 mg chewable tablet    clopidogrel (Plavix) 75 mg tablet          PMH: Pt has had an extensive medical history since 9/2023 11/22/22: ED ([facility]) unresponsive at home   -left AMA    12/28/22: neurosurgery appointment secondary to left lumbar pain    9/29/23: L3-4; L4-5 extensive fusion and laminectomy (with revision of prior laminectomy (about 10 years ago)); repair of dural tear  10/3/23: subarachnoid  hemorrhage (R convexity); possible L4 compression fx; hematoma L psoas & quadratus lumborum muscles  10/5/23: discharged home    10/9/23: ED secondary to syncopal event with R ankle pain (+) tibial fx  10/11/23: LHC (+) NSTEMI  10/17/23: IM nailing R tibia (NWB R LE)  10/20/23: discharged    11/6/23: follow up appointment with ortho and remains NWB R LE    11/9/23: follow up with [facility] secondary to lumbar incision drainage (given antibiotics)    11/19/23: ([facility]) ED secondary to change in mental status; confusion  -pt was found down in bed, and may have been in bed for days  11/20/23: transfer to [facility] for follow up with lumbar wound dehiscence; cauda equina; and epidural abscess  -lumbar wound washout  11/21/23: RR called secondary to change in mental status  and L pupil dilated  -PICC line placed  -WB status changed to WBAT R LE with boot      General:  General  Reason for Referral: ADL's; Safety Assessment  Referred By: Marlena Zambrano MD  Past Medical History Relevant to Rehab: T2DM, drug use, chronic neck/back pain, SAH/SDH (9/22/23)        Precautions:  LE Weight Bearing Status:  (WBAT R LE with boot)  Medical Precautions: Fall precautions  Post-Surgical Precautions: Spinal precautions (Pt has had chroinc drainage from back incision); pt with light drainage during OT eval  Precautions Comment: bed/chair alarm (Pt with L drop foot; has an AFO, but does not wear)    Pain:  Pain Assessment  Pain Assessment: 0-10  Pain Score: 8 (Pt stated the most severe pain is in R knee with weight bearing)    Objective     Cognition:  Overall Cognitive Status: Within Functional Limits  Orientation Level: Oriented X4  Safety/Judgement:  (Pt with decreased safety and increased risk for falls)    Home Living:  Home Living Comments: Per EMR, pt lives in a 1 floor house with his wife. Pt does not have RA. Pt has been in/out of hospital with multiple medical complications since 10/2023     Prior Function:  Prior Function  Comments: At baseline, pt was completing ADL's independently. Pt has required assist at times s/p surgeries secondary to drains, braces, orthotics.    ADL:  Eating Assistance: Independent  Grooming Assistance: Stand by  Bathing Assistance: Minimal  UE Dressing Assistance: Minimal  LE Dressing Assistance: Moderate  Toileting Assistance with Device: Moderate    Activity Tolerance:  Endurance: Tolerates 10 - 20 min exercise with multiple rests    Bed Mobility/Transfers:   Bed Mobility  Bed Mobility: Yes  Bed Mobility 1  Bed Mobility 1: Supine to sitting  Level of Assistance 1: Close supervision  Transfers  Transfer: Yes  Transfer 1  Transfer From 1: Sit to, Stand to  Transfer to 1: Stand, Sit  Transfer Level of Assistance 1: Contact guard    Ambulation/Gait Training:  Ambulation/Gait Training  Ambulation/Gait Training Performed: Yes  Ambulation/Gait Training 1  Device 1: Rolling walker  Assistance 1: Contact guard, Moderate assistance (Pt required Mod A with changes in direction)    Sitting Balance:  Static Sitting Balance  Static Sitting-Comment/Number of Minutes: good  Dynamic Sitting Balance  Dynamic Sitting-Comments: fair+    Standing Balance:  Static Standing Balance  Static Standing-Comment/Number of Minutes: fair  Dynamic Standing Balance  Dynamic Standing-Comments: poor (Pt lost balance when changing directions with ambulation)    Sensation:  Sensation Comment: decreased sensation B feet    Strength:  Strength Comments: B UE's WFL    Extremities: RUE   RUE : Within Functional Limits and LUE   LUE: Within Functional Limits    Outcome Measures: [facility] Daily Activity  Bathing (including washing, rinsing, drying): A lot  Putting on and taking off regular upper body clothing: A little  Toileting, which includes using toilet, bedpan or urinal: A little  Taking care of personal grooming such as brushing teeth: A little  Eating Meals: None      EDUCATION:  Education  Individual(s) Educated: Patient  Education Provided:   (safety)    Goals:   Encounter Problems            OT Goals       OT Goal 1       Start:  11/29/23    Expected End:  12/13/23       Pt will complete ADL's and mobility with good stand balance           OT Goal 2       Start:  11/29/23    Expected End:  12/13/23       Pt with complete all transfers safely with supervision         OT Goal 3       Start:  11/29/23    Expected End:  12/13/23       Pt will complete UB dressing ADL's independently          OT Goal 4       Start:  11/29/23    Expected End:  12/13/23       Pt with complete grooming ADL's with supervision and good stand balance                   Treatment: Pt was able to complete bed mobility with supervision. Pt with good sit balance on EOB. Pt completed all transfers with CGA. Pt ambulated in room with CGA using wheeled walker. Pt did have significant LOB (retro) when changing directions while ambulating. Pt required Max A to regain balance, and to avoid falling. Pt returned to and remained in bedside chair with chair alarm on and call light within reach.

## 2023-11-29 NOTE — PROGRESS NOTES
INPATIENT PROGRESS NOTES    PATIENT NAME: Gm Thrasher    MRN: [id number]  SERVICE DATE:  11/29/2023   SERVICE TIME:  2:15 PM    SIGNATURE: Yohana Browning MD      SUBJECTIVE  Afebrile  No events overnight       OBJECTIVE  PHYSICAL EXAM:   Patient Vitals for the past 24 hrs:   BP Temp Temp src Pulse Resp SpO2   11/29/23 0800 93/58 36.2 °C (97.2 °F) Temporal 65 18 96 %   11/28/23 2000 113/60 36.7 °C (98.1 °F) Temporal 74 18 98 %   11/28/23 1600 -- 36.1 °C (97 °F) Temporal 78 20 100 %         Gen: NAD  Neck: symmetric, no mass  Cardiovascular: RRR  Respiratory: No distress   Extremities: no leg edema      Labs:  Lab Results   Component Value Date    WBC 6.3 11/27/2023    HGB 8.1 (L) 11/27/2023    HCT 26.0 (L) 11/27/2023    MCV 93 11/27/2023     11/27/2023     Lab Results   Component Value Date    GLUCOSE 104 (H) 11/27/2023    CALCIUM 8.5 (L) 11/27/2023     (L) 11/27/2023    K 3.8 11/27/2023    CO2 31 11/27/2023    CL 94 (L) 11/27/2023    BUN 6 11/27/2023    CREATININE 0.98 11/27/2023   ESR: --  Lab Results   Component Value Date    SEDRATE 55 (H) 11/20/2023     Lab Results   Component Value Date    CRP 18.12 (H) 11/20/2023     Lab Results   Component Value Date    ALT 12 11/23/2023    AST 28 11/23/2023    ALKPHOS 95 11/23/2023    BILITOT 0.5 11/23/2023         DATA:   Diagnostic tests reviewed for today's visit:    Labs this admission reviewed  Imagings this admission reviewed  Cultures: Reviewed          ASSESSMENT :   -Lumbar spine epidural phlegmon status post washout on 11/20 with culture growing MSSA  -History of diabetes, subdural hematoma, neuropathy, BPH, drug abuse     PLAN:  -Tolerating cefazolin with no side effects  -Closely monitor side effects including rash, diarrhea, JEMAL, CDI, etc.  -Stop date 1/1/24      Yohana Browning MD.   Infectious Diseases Attending

## 2023-11-29 NOTE — PROGRESS NOTES
Patient has precert for [facility]. Transport time is confirmed for 4:30pm. Nursing and facility aware. 7000 was completed on Monday. Gold form sent. Patient to update his family.       Janet Matta RN

## 2023-12-04 ENCOUNTER — OFFICE VISIT (OUTPATIENT)
Dept: ORTHOPEDIC SURGERY | Facility: CLINIC | Age: 58
End: 2023-12-04
Payer: COMMERCIAL

## 2023-12-04 ENCOUNTER — ANCILLARY PROCEDURE (OUTPATIENT)
Dept: RADIOLOGY | Facility: CLINIC | Age: 58
End: 2023-12-04
Payer: COMMERCIAL

## 2023-12-04 DIAGNOSIS — Z98.890 S/P SURGICAL MANIPULATION OF ANKLE JOINT: Primary | ICD-10-CM

## 2023-12-04 DIAGNOSIS — Z98.890 S/P SURGICAL MANIPULATION OF ANKLE JOINT: ICD-10-CM

## 2023-12-04 PROCEDURE — 73590 X-RAY EXAM OF LOWER LEG: CPT | Mod: RIGHT SIDE | Performed by: ORTHOPAEDIC SURGERY

## 2023-12-04 PROCEDURE — 3008F BODY MASS INDEX DOCD: CPT | Performed by: ORTHOPAEDIC SURGERY

## 2023-12-04 PROCEDURE — 3048F LDL-C <100 MG/DL: CPT | Performed by: ORTHOPAEDIC SURGERY

## 2023-12-04 PROCEDURE — 99024 POSTOP FOLLOW-UP VISIT: CPT | Performed by: ORTHOPAEDIC SURGERY

## 2023-12-04 PROCEDURE — 4004F PT TOBACCO SCREEN RCVD TLK: CPT | Performed by: ORTHOPAEDIC SURGERY

## 2023-12-04 PROCEDURE — 3044F HG A1C LEVEL LT 7.0%: CPT | Performed by: ORTHOPAEDIC SURGERY

## 2023-12-04 PROCEDURE — 73590 X-RAY EXAM OF LOWER LEG: CPT | Mod: RT

## 2023-12-04 NOTE — PROGRESS NOTES
History of Present Illness  DOS 10/17/2023: Right distal tibial nailing  Patient returns today for evaluation right distal tibia nailing.  The patient notes improvement in pain.  The patient denies any significant numbness or tingling of calf pain.  No other specific concerns.  Is been nonweightbearing     Exam  Right lower extremity:  Incision healing nicely, no erythema or drainage  Intact flexion and extension of digits  Neurovascular exam normal distally  2+ dorsalis pedis pulse and good cap refill, no calf tenderness     Radiographs  Well aligned distal tibia fracture status post nailing     Assessment  Patient status post right distal tibial nailing and nonop fibula  Plan  Immobilization: Removable boot, weightbearing as tolerated   weight bearing:   Reviewed rehab /return to activities  Follow up 4-6 weeks repeat x-rays 2 view tibia  If his knee pain persists may consider cortisone injection

## 2023-12-08 ENCOUNTER — APPOINTMENT (OUTPATIENT)
Dept: PRIMARY CARE | Facility: CLINIC | Age: 58
End: 2023-12-08
Payer: COMMERCIAL

## 2023-12-22 ENCOUNTER — OFFICE VISIT (OUTPATIENT)
Dept: NEUROSURGERY | Facility: CLINIC | Age: 58
End: 2023-12-22
Payer: COMMERCIAL

## 2023-12-22 VITALS
HEIGHT: 71 IN | BODY MASS INDEX: 31.92 KG/M2 | WEIGHT: 228 LBS | SYSTOLIC BLOOD PRESSURE: 116 MMHG | HEART RATE: 82 BPM | DIASTOLIC BLOOD PRESSURE: 82 MMHG

## 2023-12-22 DIAGNOSIS — M54.16 LEFT LUMBAR RADICULOPATHY: Primary | ICD-10-CM

## 2023-12-22 PROBLEM — M43.10 SPONDYLOLISTHESIS: Status: ACTIVE | Noted: 2023-01-13

## 2023-12-22 PROBLEM — G06.2 EPIDURAL ABSCESS (HHS-HCC): Status: ACTIVE | Noted: 2023-12-22

## 2023-12-22 PROBLEM — T81.30XA DEHISCENCE OF WOUND: Status: ACTIVE | Noted: 2023-11-09

## 2023-12-22 PROBLEM — R73.01 IMPAIRED FASTING GLUCOSE: Status: ACTIVE | Noted: 2023-10-17

## 2023-12-22 PROBLEM — J43.9 PULMONARY EMPHYSEMA (MULTI): Status: ACTIVE | Noted: 2023-09-20

## 2023-12-22 PROBLEM — I21.9 MYOCARDIAL INFARCTION (MULTI): Status: ACTIVE | Noted: 2023-12-22

## 2023-12-22 PROBLEM — G83.4 CAUDA EQUINA COMPRESSION (MULTI): Status: ACTIVE | Noted: 2023-12-22

## 2023-12-22 PROCEDURE — 99024 POSTOP FOLLOW-UP VISIT: CPT | Performed by: PHYSICIAN ASSISTANT

## 2023-12-22 PROCEDURE — 3008F BODY MASS INDEX DOCD: CPT | Performed by: PHYSICIAN ASSISTANT

## 2023-12-22 PROCEDURE — 3048F LDL-C <100 MG/DL: CPT | Performed by: PHYSICIAN ASSISTANT

## 2023-12-22 PROCEDURE — 4004F PT TOBACCO SCREEN RCVD TLK: CPT | Performed by: PHYSICIAN ASSISTANT

## 2023-12-22 PROCEDURE — 3079F DIAST BP 80-89 MM HG: CPT | Performed by: PHYSICIAN ASSISTANT

## 2023-12-22 PROCEDURE — 3044F HG A1C LEVEL LT 7.0%: CPT | Performed by: PHYSICIAN ASSISTANT

## 2023-12-22 PROCEDURE — 3074F SYST BP LT 130 MM HG: CPT | Performed by: PHYSICIAN ASSISTANT

## 2023-12-22 RX ORDER — HALOPERIDOL 5 MG/ML
5 INJECTION INTRAMUSCULAR EVERY 6 HOURS PRN
COMMUNITY
Start: 2023-11-21 | End: 2024-01-04 | Stop reason: ALTCHOICE

## 2023-12-22 ASSESSMENT — ENCOUNTER SYMPTOMS
DEPRESSION: 0
LOSS OF SENSATION IN FEET: 1
OCCASIONAL FEELINGS OF UNSTEADINESS: 1

## 2023-12-22 ASSESSMENT — PATIENT HEALTH QUESTIONNAIRE - PHQ9
1. LITTLE INTEREST OR PLEASURE IN DOING THINGS: NOT AT ALL
SUM OF ALL RESPONSES TO PHQ9 QUESTIONS 1 & 2: 0
2. FEELING DOWN, DEPRESSED OR HOPELESS: NOT AT ALL

## 2023-12-22 NOTE — PROGRESS NOTES
[facility]  Department of Neurological Surgery  Post Operative Patient Visit      History of Present Illness:  Gm Thrasher is a 58 y.o. year old male who presents post lumbar wound washout on November 20, 2023.  Currently Michelle presents for remainder staple removal.  Inspection of incision shows well-approximated, nonerythemic, nonedematous surgical incision with good fibrotic tissue spanning.  Staples removed at visit today with patient tolerating well and no complications encountered.  To continue on regularly scheduled follow-up plan.        14/14 systems reviewed and negative other than what is listed in the history of present illness    Patient Active Problem List   Diagnosis    CAD (coronary artery disease)    H/O fracture of ankle    Type 2 diabetes mellitus with hyperglycemia, without long-term current use of insulin (CMS/HCC)    Lumbar post-laminectomy syndrome    Lumbar spondylosis    Neurogenic claudication due to lumbar spinal stenosis    H/O non-ST elevation myocardial infarction (NSTEMI)    Abnormal drug screen    IFG (impaired fasting glucose)    Degenerative lumbar spinal stenosis    Left lumbar radiculopathy    Lumbar canal stenosis    Leukocytosis    Herniation of lumbar intervertebral disc without myelopathy    Emphysema lung (CMS/HCC)    Mood disorder (CMS/HCC)    Other long term (current) drug therapy    Spondylolisthesis, lumbosacral region    History of rhabdomyolysis    S/P surgical manipulation of ankle joint    Wound dehiscence    Cauda equina compression (CMS/HCC)    Epidural abscess    Myocardial infarction (CMS/HCC)    Pulmonary emphysema (CMS/HCC)    Impaired fasting glucose    Spondylolisthesis    Dehiscence of wound     Past Medical History:   Diagnosis Date    Borderline diabetes     Emphysema lung (CMS/HCC) 09/20/2023    H/O chest x-ray     1/20: Impression: Stable, enlarged cardiac mediastinal silhouette with mild central pulmonary vascular congestion.  Nonspecific bibasilar airspace disease, worsened in overall appearance compared with the previous study, findings can be seen in  infiltrate/pneumonia and/or atelectasis.    H/O CT scan     CT Ankle: 1/20: A vertically oriented nondisplaced oblique fracture of the medial ankle mortise extends into the distal tibial metadiaphysis.  Several small old avulsion fracture fragments are noted inferior to both malleoli; as well as soft tissue swelling about the ankle.  There is no other fracture, radiodense foreign bodies, pathologic calcifications, or worrisome bone destruction identified.    H/O CT scan 10/11/2023    1. Acute minimally comminuted and displaced fractures through distal tibia and fibula as described above. 2. There are also findings consistent with remote trauma with significant cortical remodeling of the distal tibia with associated incongruity of the distal tibial articular surface as described above. 3. Moderate arthrosis of the tibiotalar and subtalar joints. 4. Soft tissue swelling about th    H/O CT scan of abdomen     2016: Diffuse urinary bladder wall thickening, Small atrophic left kidney with pyelocalyceal ectasia and severe cortical thinning, , Moderate pyelocalyceal ectasia of the right kidney with cortical thinning; unchanged    H/O CT scan of brain     11/22: normal 1/20: FINDINGS:  There is no intracranial hemorrhage, mass effect, midline shift, extra-axial collection, evidence of hydrocephalus, recent ischemic infarct, or skull fracture identified.    There is no significant atrophy or white matter changes, for age.  Moderate mucosal thickening within the paranasal sinuses again noted. The mastoid air cells are clear    H/O CT scan of brain 10/10/2023    There is some subtle increased attenuation along the right  tentorium cerebelli.  This is suspicious for a acute  subdural hematoma.    H/O CT scan of chest     10/19: 1. Bibasilar infiltrate/pneumonia with patchy pneumonia scattered  throughout the left lung and bilateral pleural effusions.  2. Vascular calcifications left anterior descending coronary artery with mild to moderate cardiomegaly.  3. Moderate to moderately severe diffuse fatty infiltration of liver. 10/21: Bilateral groundglass opacities as discussed. These findings are nonspecific    H/O CT scan of chest 10/10/2023    NO PE. Somewhat patchy subpleural curvilinear markings are seen in the  bilateral lower lobes, overall morphology favoring atelectasis or infectious  infiltrate.  Scattered central small cysts are observed.    H/O CT scan of head 10/12/2023    In comparison to prior CT dated 10/03/2023, there has been interval improvement in the right frontal subarachnoid hemorrhage and diffuse subdural hemorrhage along the tentorium and overlying the right cerebellar hemisphere as well as resolution of right parietal lobe intraparenchymal hemorrhage.    H/O diagnostic ultrasound     RUQ US 2013: Cholelithiasis and mild gallbladder distention, Increased liver echogenicity    H/O diagnostic ultrasound 10/10/2023    renal - Severe right hydronephrosis, similar in appearance to CT lumbar spine examination. Bilateral ureteral jets visualized.    Mild asymmetric enlargement of the right kidney with cortical renal scarring involving the left kidney.    H/O echocardiogram     1/20: Normal LV and RV.  No significant valve disease.  Normal estimated PA pressure.  Normal diastolic filling pattern.    H/O magnetic resonance imaging of cervical spine     2003: CENTRAL STENOSIS WORST C3-C4 1/21: Multilevel degenerative changes with disc height loss, disc osteophyte complexes, facet/uncovertebral degenerative changes. Moderate to severe canal and bilateral foraminal narrowing at C3-C4, C4-C5, C5-C6, C6-C7 grossly similar to  prior cervical MRI    H/O magnetic resonance imaging of lumbar spine     2012:  NEW RIGHT POSTERIOR EXTRUSION L4-5, WITH CONTACT OF THE EXITING RIGHT L5 AND DESCENDING RIGHT  S1 NERVES. s/p laminectomy L5 2019 ([facility]): NARROWING OF MULTIPLE LUMBAR DISC LEVELS WITH FAIRLY ADVANCED DEGENERATIVE CHANGES. SEE INDIVIDUAL LEVELS ABOVE.  MILD LUMBAR CANAL STENOSIS AT THE L4-5 LEVEL.  NARROWING OF NEURAL FORAMINA,    H/O magnetic resonance imaging of lumbar spine 10/10/2023    MR findings worrisome for retroperitoneal abscess, possibly extending into  the L4-5 disc space.   Apparent clumping of the proximal cauda equina nerve roots versus mass effect  due to an intrathecal fluid collection.  Recommend MRI of the T-spine for  further evaluation    H/O x-ray of lower extremity 10/10/2023    XR ankle: NEW ACUTE OBLIQUE FRACTURE OF THE DISTAL RIGHT TIBIA WITH A  OLD VERTICAL FRACTURE OF THE TIBIA.   THERE IS A NEW COMMINUTED FRACTURE OF THE DISTAL RIGHT FIBULA.    High cholesterol      Past Surgical History:   Procedure Laterality Date    ANKLE SURGERY  10/17/2023    Insertion Intramedullary RIGHT Nail Tibia by Dr Vic Hale    CARDIAC CATHETERIZATION      8/2015: The coronary anatomy is R dominant.  L main coronary artery was normal.  Left anterior descending artery presents luminal irregularities.  Circumflex coronary artery normal.  R coronary artery presents luminal irregularities.  Mid RCA lesion 40% stenosis.  LVEF 50%    CARDIAC CATHETERIZATION Right 10/11/2023    Procedure: Left Heart Cath;  Surgeon: Jonathan GASPAR MD;  Location: [facility] Cardiac Cath Lab;  Service: Cardiovascular;  Laterality: Right;  radial    CARDIAC CATHETERIZATION N/A 10/13/2023    Procedure: PCI RCA, right radial 6Fr;  Surgeon: Juvenal Le MD;  Location: [facility] Cardiac Cath Lab;  Service: Cardiovascular;  Laterality: N/A;    CERVICAL LAMINECTOMY      C3-C4, s/p cervical laminectomy.    ESOPHAGOGASTRODUODENOSCOPY      12/15: Localized mild erythema was found at the gastroesophageal junction    KIDNEY SURGERY  09/12/2013    Kidney Surgery    LUMBAR FUSION  10/06/2023    1. L3-4, L4-5 extreme lateral interbody fusion 2. L3-5  laminectomy, L5-S1 revision laminectomy, L5-S1 transforaminal lumbar interbody fusion, L3-S1 instrumented posterolateral fusion, repair of dural tear    LUMBAR FUSION  09/30/2023    L3-4 & L4-5 XLIF(NUVASIVE), L5-S1 TLIF(MEDTRONIC), L3-S1 POSTEROLATERAL FUSION, L3-5    LUMBAR LAMINECTOMY      s/p L5 laminectomy x2;  residual left foot drop    OTHER SURGICAL HISTORY  01/28/2020    Appendectomy    OTHER SURGICAL HISTORY  01/28/2020    Cholecystectomy    OTHER SURGICAL HISTORY  01/28/2020    [place] tooth extraction    OTHER SURGICAL HISTORY  01/28/2020    Tonsillectomy     Social History     Tobacco Use    Smoking status: Every Day     Packs/day: 2     Types: Cigarettes    Smokeless tobacco: Never    Tobacco comments:     Smoking cessation program ordered   Substance Use Topics    Alcohol use: Not Currently     family history includes No Known Problems in his mother.    Current Outpatient Medications:     alteplase (Cathflo Activase) 2 mg injection, 2 mL (2 mg) by intra-catheter route., Disp: , Rfl:     haloperidol lactate (Haldol) 5 mg/mL injection, Inject 1 mL (5 mg) into the muscle every 6 hours if needed., Disp: , Rfl:     aspirin 81 mg chewable tablet, Chew 1 tablet (81 mg) once daily., Disp: , Rfl: 0    atorvastatin (Lipitor) 80 mg tablet, Take 1 tablet (80 mg) by mouth once daily., Disp: 30 tablet, Rfl: 0    buprenorphine-naloxone (Suboxone) 8-2 mg SL film, Place 3 Film under the tongue once daily., Disp: , Rfl:     ceFAZolin in dextrose 5 % (Ancef) 2 gram/100 mL solution, Infuse 100 mL (2 g) at 200 mL/hr over 30 minutes into a venous catheter every 8 hours., Disp: , Rfl:     clopidogrel (Plavix) 75 mg tablet, Take 1 tablet (75 mg) by mouth once daily. Do not start before November 27, 2023., Disp: 30 tablet, Rfl: 0    cyanocobalamin (Vitamin B-12) 1,000 mcg/mL oral liquid, Take 1 mL (1,000 mcg) by mouth. Daily for 1 wk. Then 1 ml weekly for a month. Then 1 ml monthly, Disp: , Rfl:     ergocalciferol (Vitamin  D-2) 1.25 MG (32899 UT) capsule, Take 1 capsule (50,000 Units) by mouth 1 (one) time per week., Disp: , Rfl:     escitalopram (Lexapro) 20 mg tablet, Take 1 tablet (20 mg) by mouth once daily., Disp: , Rfl:     metFORMIN (Glucophage) 500 mg tablet, Take 2 tablets (1,000 mg) by mouth 2 times a day with meals., Disp: 120 tablet, Rfl: 11    miscellaneous medical supply misc, Knee walker for non weight bearing right leg post right ankle surgery, Disp: 1 each, Rfl: 0    naloxone (Narcan) 4 mg/0.1 mL nasal spray, Administer 1 spray (4 mg) into affected nostril(s).  CALL 911 MAY REPEAT ONCE, Disp: , Rfl:     pantoprazole (ProtoNix) 40 mg EC tablet, Take 1 tablet (40 mg) by mouth once daily in the morning. Take before meals for 14 days. Do not crush, chew, or split. Do not start before October 6, 2023., Disp: 14 tablet, Rfl: 0    pregabalin (Lyrica) 300 mg capsule, Take 1 capsule (300 mg) by mouth 2 times a day., Disp: 180 capsule, Rfl: 0    promethazine (Phenergan) 25 mg tablet, Take 1 tablet (25 mg) by mouth every 6 hours if needed for nausea or vomiting., Disp: , Rfl:     semaglutide 0.25 mg or 0.5 mg (2 mg/3 mL) pen injector, Inject 0.5 mg under the skin 1 (one) time per week., Disp: 3 mL, Rfl: 1    tamsulosin (Flomax) 0.4 mg 24 hr capsule, Take 1 capsule (0.4 mg) by mouth once daily., Disp: , Rfl:     Vascepa 1 gram capsule, Take 2 capsules (2 g) by mouth 2 times a day with meals., Disp: 360 capsule, Rfl: 3  Allergies   Allergen Reactions    Metoprolol Unknown    Mirtazapine Unknown         The above clinical summary has been dictated with voice recognition software. It has not been proofread for grammatical errors, typographical mistakes, or other semantic inconsistencies.    Thank you for visiting our office today. It was our pleasure to take part in your healthcare.     Do not hesitate to call with any questions regarding your plan of care after leaving at [phone] M-F 8am-4pm.     To clinicians, thank you very  much for this kind referral. It is a privilege to partner with you in the care of your patients. My office would be delighted to assist you with any further consultations or with questions regarding the plan of care outlined. Do not hesitate to call the office or contact me directly.       Sincerely,      SHITAL Valdez, PA-C  Associate Physician Assistant, Neurosurgery  Clinical   [facility] [employer] [place] [address]    Phone: [phone]  Fax: [phone]

## 2023-12-27 ENCOUNTER — PATIENT OUTREACH (OUTPATIENT)
Dept: PRIMARY CARE | Facility: CLINIC | Age: 58
End: 2023-12-27
Payer: COMMERCIAL

## 2023-12-27 ENCOUNTER — HOSPITAL ENCOUNTER (OUTPATIENT)
Dept: CARDIOLOGY | Facility: HOSPITAL | Age: 58
Discharge: HOME | End: 2023-12-27
Payer: COMMERCIAL

## 2023-12-27 DIAGNOSIS — E11.65 TYPE 2 DIABETES MELLITUS WITH HYPERGLYCEMIA, WITHOUT LONG-TERM CURRENT USE OF INSULIN (MULTI): ICD-10-CM

## 2023-12-27 DIAGNOSIS — Z87.81 H/O FRACTURE OF ANKLE: ICD-10-CM

## 2023-12-27 DIAGNOSIS — J43.9 PULMONARY EMPHYSEMA, UNSPECIFIED EMPHYSEMA TYPE (MULTI): ICD-10-CM

## 2023-12-27 LAB
ATRIAL RATE: 83 BPM
ATRIAL RATE: 83 BPM
P AXIS: -4 DEGREES
P AXIS: 63 DEGREES
P OFFSET: 179 MS
P OFFSET: 183 MS
P ONSET: 156 MS
P ONSET: 156 MS
PR INTERVAL: 118 MS
PR INTERVAL: 120 MS
Q ONSET: 215 MS
Q ONSET: 216 MS
QRS COUNT: 14 BEATS
QRS COUNT: 14 BEATS
QRS DURATION: 104 MS
QRS DURATION: 96 MS
QT INTERVAL: 392 MS
QT INTERVAL: 396 MS
QTC CALCULATION(BAZETT): 460 MS
QTC CALCULATION(BAZETT): 465 MS
QTC FREDERICIA: 437 MS
QTC FREDERICIA: 441 MS
R AXIS: 47 DEGREES
R AXIS: 48 DEGREES
T AXIS: 3 DEGREES
T AXIS: 9 DEGREES
T OFFSET: 411 MS
T OFFSET: 414 MS
VENTRICULAR RATE: 83 BPM
VENTRICULAR RATE: 83 BPM

## 2023-12-27 PROCEDURE — 99490 CHRNC CARE MGMT STAFF 1ST 20: CPT | Performed by: INTERNAL MEDICINE

## 2023-12-27 PROCEDURE — 93005 ELECTROCARDIOGRAM TRACING: CPT

## 2023-12-27 PROCEDURE — 93005 ELECTROCARDIOGRAM TRACING: CPT | Mod: MUE

## 2023-12-27 NOTE — PROGRESS NOTES
Doing much better  Gaining strength  Infection clearing with IV antibiotics    Plan for discharge on 1/1/24  We scheduled TCM for 1/8/24     He will call me with any changes in discharge dates

## 2023-12-28 ENCOUNTER — APPOINTMENT (OUTPATIENT)
Dept: NEUROSURGERY | Facility: CLINIC | Age: 58
End: 2023-12-28
Payer: COMMERCIAL

## 2024-01-04 ENCOUNTER — PATIENT OUTREACH (OUTPATIENT)
Dept: PRIMARY CARE | Facility: CLINIC | Age: 59
End: 2024-01-04
Payer: COMMERCIAL

## 2024-01-04 DIAGNOSIS — T81.30XA WOUND DEHISCENCE: ICD-10-CM

## 2024-01-04 DIAGNOSIS — I21.4 NSTEMI (NON-ST ELEVATED MYOCARDIAL INFARCTION) (MULTI): ICD-10-CM

## 2024-01-04 RX ORDER — ATORVASTATIN CALCIUM 80 MG/1
80 TABLET, FILM COATED ORAL DAILY
Qty: 90 TABLET | Refills: 3 | Status: SHIPPED | OUTPATIENT
Start: 2024-01-04 | End: 2025-01-03

## 2024-01-04 RX ORDER — CLOPIDOGREL BISULFATE 75 MG/1
75 TABLET ORAL DAILY
COMMUNITY
End: 2024-01-04 | Stop reason: SDUPTHER

## 2024-01-04 RX ORDER — CLOPIDOGREL BISULFATE 75 MG/1
75 TABLET ORAL DAILY
Qty: 30 TABLET | Refills: 2 | Status: SHIPPED | OUTPATIENT
Start: 2024-01-04 | End: 2024-04-03

## 2024-01-04 NOTE — PROGRESS NOTES
Discharge Facility: [facility]  Discharge Diagnosis: Wound Dehiscence  -Lumbar spine epidural phlegmon status post washout on 11/20 with culture growing MSSA  Admission Date: 11/20/23  Discharge Date: 11/29/23    Discharge Facility: [facility]  Admission Date: 11/29/23  Discharge Date: 1/2/24    PCP Appointment Date: 1/12/24  Specialist Appointment Date:  Hospital Encounter and Summary: Linked   See Discharge Assessment below for further details.    Last Progress Note from 11/29/23 at [facility]  Patient is a 58 year-old male with past medical history of MDD, T2 DM, BPH, hypertension, hyperlipidemia, JEMAL, chronic pain in neck and back, lumbar radiculopathy, and heroin use who is transferred from [facility] in the setting of recent lumbar surgery with subarachnoid hemorrhage/subdural hematoma (9/22/2023) presented with wound dehiscence requiring lumbar exploration and washout.  Patient was admitted to medicine for evaluation and management of incisional infection. Neurosurgery was consulted and is following.     Multiple lumbar post operative fluid collections with mass effect  - MRI and CT scan as discussed above. Repeat CT head imaging ordered due to concern for midline shift, results negative for any acute pathology  - Lumbar wound washout procedure performed by NSGY: Dr. Vitaly Amezcua   - Wound culture 11/20: MSSA, but BCNTD  - Per ID: PICC line placed 11/22, Cefazolin x 6 weeks, stop date 1/1/2024  - Follow-up outpatient with Dr. Browning in 6 weeks     2.Left pupil dilation   -A brain attack was called at 12:45 AM 11/21 due to AMS and left pupil dilation. CT scan of head w/o contrast revealed no intracranial abnormalities.  -Patient was AOx0 with left pupil dilation, but mental status improved to AOx3.  -No visual disturbances, pain, or acute concerns overall.  -Psych consulted: continue Suboxone on D/C & FU with Suboxone prescriber.     3. S/P R distal tibial nail 10/17/23  R ankle fracture on 10/16/23,  underwent R distal tibial nail 10/17 by Dr. Hale  - FU note on 11/06 needs to be non-weightbearing for 4 weeks on R leg  - Non weight bearing until 12/6/23 on RLE, repeat 2 view xrays of R tibia at that time  - Non weightbearing boot ordered for RLE      4. H/O NSTEMI (10/13/23)  - PCI to RCA  - ASA and Plavix have been held during this admission due to operation  - ASA resumed 11/23/2023 POD #3  - Resume Plavix POD #7 (11/27) (resumed)     5.  Hx of Chronic opioid dependence on Suboxone:  - Home Suboxone resumed on 11/24/2023  - Patient has needed less and less pain management, however if patient does have breakthrough pain consider Toradol.     6.NIDDM2  -A1C 5.7     Medications  Medications reviewed with patient/caregiver?: Yes (1/4/2024 11:20 AM)  Is the patient having any side effects they believe may be caused by any medication additions or changes?: No (1/4/2024 11:20 AM)  Does the patient have all medications ordered at discharge?: No (1/4/2024 11:20 AM)  Is the patient taking all medications as directed (includes completed medication regime)?: No (1/4/2024 11:20 AM)  What is preventing the patient from taking all medications as directed?: Side effects; Doesn't understand importance; Desires to consult PCP first (1/4/2024 11:20 AM)  Care Management Interventions: Provided patient education (1/4/2024 11:20 AM)  Medication Comments: **Reviewed patient medications at length. He is unsure what medications he was in while at SNF. States he left quickly and was not sent home with a supply. His sister Jaylin assists him with medications. However, he is home and is not interested in taking several of the medications on his list. He is open to discussing these further at his [facility] Follow Up appointment. He is agreeable to getting back on the Plavix and Atorvastatin. We discussed why these would be important to preventing blood clots and potential stroke. (1/4/2024 11:20 AM)    Appointments  Does the patient  have a primary care provider?: Yes (1/4/2024 11:20 AM)  Care Management Interventions: Verified appointment date/time/provider (1/4/2024 11:20 AM)  Has the patient kept scheduled appointments due by today?: Yes (1/4/2024 11:20 AM)  Care Management Interventions: Advised patient to keep appointment (1/4/2024 11:20 AM)    Self Management  What is the home health agency?: Unsure (**Patient is aware to call me Monday if he has not been contacted by [facility] to arrange appointment for PT/OT Evaluation.) (1/4/2024 11:20 AM)  Has [facility] visited the patient within 72 hours of discharge?: No (1/4/2024 11:20 AM)    Patient Teaching  Does the patient have access to their discharge instructions?: Yes (1/4/2024 11:20 AM)  Care Management Interventions: Reviewed instructions with patient (1/4/2024 11:20 AM)  What is the patient's perception of their health status since discharge?: Improving (1/4/2024 11:20 AM)  Is the patient/caregiver able to teach back the hierarchy of who to call/visit for symptoms/problems? PCP, Specialist, Home Health nurse, Urgent Care, ED, 911: Yes (1/4/2024 11:20 AM)

## 2024-01-05 ENCOUNTER — APPOINTMENT (OUTPATIENT)
Dept: NEUROSURGERY | Facility: CLINIC | Age: 59
End: 2024-01-05
Payer: COMMERCIAL

## 2024-01-08 ENCOUNTER — APPOINTMENT (OUTPATIENT)
Dept: ORTHOPEDIC SURGERY | Facility: CLINIC | Age: 59
End: 2024-01-08
Payer: COMMERCIAL

## 2024-01-08 ENCOUNTER — APPOINTMENT (OUTPATIENT)
Dept: PRIMARY CARE | Facility: CLINIC | Age: 59
End: 2024-01-08
Payer: COMMERCIAL

## 2024-01-12 ENCOUNTER — OFFICE VISIT (OUTPATIENT)
Dept: PRIMARY CARE | Facility: CLINIC | Age: 59
End: 2024-01-12
Payer: COMMERCIAL

## 2024-01-12 ENCOUNTER — LAB (OUTPATIENT)
Dept: LAB | Facility: LAB | Age: 59
End: 2024-01-12
Payer: COMMERCIAL

## 2024-01-12 VITALS
DIASTOLIC BLOOD PRESSURE: 73 MMHG | BODY MASS INDEX: 32.08 KG/M2 | TEMPERATURE: 99.2 F | HEART RATE: 92 BPM | WEIGHT: 230 LBS | OXYGEN SATURATION: 93 % | SYSTOLIC BLOOD PRESSURE: 113 MMHG

## 2024-01-12 DIAGNOSIS — Z76.89 ENCOUNTER FOR SUPPORT AND COORDINATION OF TRANSITION OF CARE: Primary | ICD-10-CM

## 2024-01-12 DIAGNOSIS — J43.9 PULMONARY EMPHYSEMA, UNSPECIFIED EMPHYSEMA TYPE (MULTI): ICD-10-CM

## 2024-01-12 DIAGNOSIS — F11.11: ICD-10-CM

## 2024-01-12 DIAGNOSIS — F32.1 CURRENT MODERATE EPISODE OF MAJOR DEPRESSIVE DISORDER WITHOUT PRIOR EPISODE (MULTI): ICD-10-CM

## 2024-01-12 DIAGNOSIS — E11.65 TYPE 2 DIABETES MELLITUS WITH HYPERGLYCEMIA, WITHOUT LONG-TERM CURRENT USE OF INSULIN (MULTI): ICD-10-CM

## 2024-01-12 DIAGNOSIS — M47.816 LUMBAR SPONDYLOSIS: ICD-10-CM

## 2024-01-12 DIAGNOSIS — R74.8 ELEVATED ALKALINE PHOSPHATASE LEVEL: Primary | ICD-10-CM

## 2024-01-12 DIAGNOSIS — E87.1 HYPONATREMIA: ICD-10-CM

## 2024-01-12 DIAGNOSIS — T81.30XA WOUND DEHISCENCE: ICD-10-CM

## 2024-01-12 DIAGNOSIS — Z79.899 MEDICATION MANAGEMENT: ICD-10-CM

## 2024-01-12 DIAGNOSIS — E78.2 MIXED HYPERLIPIDEMIA: ICD-10-CM

## 2024-01-12 DIAGNOSIS — I25.2 H/O NON-ST ELEVATION MYOCARDIAL INFARCTION (NSTEMI): ICD-10-CM

## 2024-01-12 DIAGNOSIS — R74.8 ELEVATED ALKALINE PHOSPHATASE LEVEL: ICD-10-CM

## 2024-01-12 DIAGNOSIS — R31.9 HEMATURIA, UNSPECIFIED TYPE: ICD-10-CM

## 2024-01-12 DIAGNOSIS — E11.69 TYPE 2 DIABETES MELLITUS WITH OBESITY (MULTI): ICD-10-CM

## 2024-01-12 DIAGNOSIS — E53.8 VITAMIN B12 DEFICIENCY: ICD-10-CM

## 2024-01-12 DIAGNOSIS — G83.4 CAUDA EQUINA COMPRESSION (MULTI): ICD-10-CM

## 2024-01-12 DIAGNOSIS — E66.01 OBESITY, CLASS III, BMI 40-49.9 (MORBID OBESITY) (MULTI): ICD-10-CM

## 2024-01-12 DIAGNOSIS — E66.9 TYPE 2 DIABETES MELLITUS WITH OBESITY (MULTI): ICD-10-CM

## 2024-01-12 DIAGNOSIS — F39 MOOD DISORDER (CMS-HCC): ICD-10-CM

## 2024-01-12 DIAGNOSIS — D64.9 ANEMIA, UNSPECIFIED TYPE: ICD-10-CM

## 2024-01-12 DIAGNOSIS — E55.9 VITAMIN D DEFICIENCY: ICD-10-CM

## 2024-01-12 DIAGNOSIS — E83.51 HYPOCALCEMIA: ICD-10-CM

## 2024-01-12 DIAGNOSIS — I21.4 NSTEMI (NON-ST ELEVATED MYOCARDIAL INFARCTION) (MULTI): ICD-10-CM

## 2024-01-12 DIAGNOSIS — E78.1 HIGH TRIGLYCERIDES: ICD-10-CM

## 2024-01-12 PROBLEM — I21.9 MYOCARDIAL INFARCTION (MULTI): Status: RESOLVED | Noted: 2023-12-22 | Resolved: 2024-01-12

## 2024-01-12 PROBLEM — R73.01 IMPAIRED FASTING GLUCOSE: Status: RESOLVED | Noted: 2023-10-17 | Resolved: 2024-01-12

## 2024-01-12 PROBLEM — R73.01 IFG (IMPAIRED FASTING GLUCOSE): Status: RESOLVED | Noted: 2023-10-17 | Resolved: 2024-01-12

## 2024-01-12 LAB
25(OH)D3 SERPL-MCNC: 24 NG/ML (ref 30–100)
ALBUMIN SERPL BCP-MCNC: 3.4 G/DL (ref 3.4–5)
ALP SERPL-CCNC: 155 U/L (ref 33–120)
ALT SERPL W P-5'-P-CCNC: 9 U/L (ref 10–52)
AMPHETAMINES UR QL SCN: ABNORMAL
ANION GAP SERPL CALC-SCNC: 13 MMOL/L (ref 10–20)
APPEARANCE UR: CLEAR
AST SERPL W P-5'-P-CCNC: 15 U/L (ref 9–39)
BARBITURATES UR QL SCN: ABNORMAL
BASOPHILS # BLD AUTO: 0.04 X10*3/UL (ref 0–0.1)
BASOPHILS NFR BLD AUTO: 0.5 %
BILIRUB SERPL-MCNC: 0.4 MG/DL (ref 0–1.2)
BILIRUB UR STRIP.AUTO-MCNC: NEGATIVE MG/DL
BUN SERPL-MCNC: 6 MG/DL (ref 6–23)
BZE UR QL SCN: ABNORMAL
CALCIUM SERPL-MCNC: 8.6 MG/DL (ref 8.6–10.3)
CANNABINOIDS UR QL SCN: ABNORMAL
CHLORIDE SERPL-SCNC: 96 MMOL/L (ref 98–107)
CO2 SERPL-SCNC: 31 MMOL/L (ref 21–32)
COLOR UR: YELLOW
CREAT SERPL-MCNC: 0.79 MG/DL (ref 0.5–1.3)
CREAT UR-MCNC: 116 MG/DL (ref 20–370)
EGFRCR SERPLBLD CKD-EPI 2021: >90 ML/MIN/1.73M*2
EOSINOPHIL # BLD AUTO: 0.2 X10*3/UL (ref 0–0.7)
EOSINOPHIL NFR BLD AUTO: 2.5 %
ERYTHROCYTE [DISTWIDTH] IN BLOOD BY AUTOMATED COUNT: 16.4 % (ref 11.5–14.5)
EST. AVERAGE GLUCOSE BLD GHB EST-MCNC: 94 MG/DL
FERRITIN SERPL-MCNC: 238 NG/ML (ref 20–300)
GLUCOSE SERPL-MCNC: 90 MG/DL (ref 74–99)
GLUCOSE UR STRIP.AUTO-MCNC: NEGATIVE MG/DL
HBA1C MFR BLD: 4.9 %
HCT VFR BLD AUTO: 34.9 % (ref 41–52)
HGB BLD-MCNC: 10.7 G/DL (ref 13.5–17.5)
HOLD SPECIMEN: NORMAL
IMM GRANULOCYTES # BLD AUTO: 0.02 X10*3/UL (ref 0–0.7)
IMM GRANULOCYTES NFR BLD AUTO: 0.3 % (ref 0–0.9)
IRON SATN MFR SERPL: 6 % (ref 25–45)
IRON SERPL-MCNC: 17 UG/DL (ref 35–150)
KETONES UR STRIP.AUTO-MCNC: NEGATIVE MG/DL
LEUKOCYTE ESTERASE UR QL STRIP.AUTO: ABNORMAL
LIPASE SERPL-CCNC: 8 U/L (ref 9–82)
LYMPHOCYTES # BLD AUTO: 2.42 X10*3/UL (ref 1.2–4.8)
LYMPHOCYTES NFR BLD AUTO: 30.5 %
MCH RBC QN AUTO: 27.4 PG (ref 26–34)
MCHC RBC AUTO-ENTMCNC: 30.7 G/DL (ref 32–36)
MCV RBC AUTO: 90 FL (ref 80–100)
MONOCYTES # BLD AUTO: 0.59 X10*3/UL (ref 0.1–1)
MONOCYTES NFR BLD AUTO: 7.4 %
MUCOUS THREADS #/AREA URNS AUTO: NORMAL /LPF
NEUTROPHILS # BLD AUTO: 4.67 X10*3/UL (ref 1.2–7.7)
NEUTROPHILS NFR BLD AUTO: 58.8 %
NITRITE UR QL STRIP.AUTO: NEGATIVE
NRBC BLD-RTO: 0 /100 WBCS (ref 0–0)
PCP UR QL SCN: ABNORMAL
PH UR STRIP.AUTO: 6 [PH]
PLATELET # BLD AUTO: 449 X10*3/UL (ref 150–450)
POTASSIUM SERPL-SCNC: 3.7 MMOL/L (ref 3.5–5.3)
PROT SERPL-MCNC: 8.1 G/DL (ref 6.4–8.2)
PROT UR STRIP.AUTO-MCNC: NEGATIVE MG/DL
RBC # BLD AUTO: 3.9 X10*6/UL (ref 4.5–5.9)
RBC # UR STRIP.AUTO: NEGATIVE /UL
RBC #/AREA URNS AUTO: NORMAL /HPF
SODIUM SERPL-SCNC: 136 MMOL/L (ref 136–145)
SP GR UR STRIP.AUTO: 1.01
TIBC SERPL-MCNC: 286 UG/DL (ref 240–445)
UIBC SERPL-MCNC: 269 UG/DL (ref 110–370)
UROBILINOGEN UR STRIP.AUTO-MCNC: 2 MG/DL
VIT B12 SERPL-MCNC: 231 PG/ML (ref 211–911)
WBC # BLD AUTO: 7.9 X10*3/UL (ref 4.4–11.3)
WBC #/AREA URNS AUTO: NORMAL /HPF

## 2024-01-12 PROCEDURE — 80346 BENZODIAZEPINES1-12: CPT

## 2024-01-12 PROCEDURE — 80365 DRUG SCREENING OXYCODONE: CPT

## 2024-01-12 PROCEDURE — 81001 URINALYSIS AUTO W/SCOPE: CPT

## 2024-01-12 PROCEDURE — 3074F SYST BP LT 130 MM HG: CPT | Performed by: NURSE PRACTITIONER

## 2024-01-12 PROCEDURE — 3008F BODY MASS INDEX DOCD: CPT | Performed by: NURSE PRACTITIONER

## 2024-01-12 PROCEDURE — 82977 ASSAY OF GGT: CPT

## 2024-01-12 PROCEDURE — 83690 ASSAY OF LIPASE: CPT

## 2024-01-12 PROCEDURE — 80368 SEDATIVE HYPNOTICS: CPT

## 2024-01-12 PROCEDURE — 80358 DRUG SCREENING METHADONE: CPT

## 2024-01-12 PROCEDURE — 83036 HEMOGLOBIN GLYCOSYLATED A1C: CPT

## 2024-01-12 PROCEDURE — 82728 ASSAY OF FERRITIN: CPT

## 2024-01-12 PROCEDURE — 80354 DRUG SCREENING FENTANYL: CPT

## 2024-01-12 PROCEDURE — 99495 TRANSJ CARE MGMT MOD F2F 14D: CPT | Performed by: NURSE PRACTITIONER

## 2024-01-12 PROCEDURE — 83540 ASSAY OF IRON: CPT

## 2024-01-12 PROCEDURE — 80349 CANNABINOIDS NATURAL: CPT

## 2024-01-12 PROCEDURE — 83550 IRON BINDING TEST: CPT

## 2024-01-12 PROCEDURE — 87086 URINE CULTURE/COLONY COUNT: CPT

## 2024-01-12 PROCEDURE — 80373 DRUG SCREENING TRAMADOL: CPT

## 2024-01-12 PROCEDURE — 3060F POS MICROALBUMINURIA REV: CPT | Performed by: NURSE PRACTITIONER

## 2024-01-12 PROCEDURE — 3078F DIAST BP <80 MM HG: CPT | Performed by: NURSE PRACTITIONER

## 2024-01-12 PROCEDURE — 3044F HG A1C LEVEL LT 7.0%: CPT | Performed by: NURSE PRACTITIONER

## 2024-01-12 PROCEDURE — 80361 OPIATES 1 OR MORE: CPT

## 2024-01-12 PROCEDURE — 82607 VITAMIN B-12: CPT

## 2024-01-12 PROCEDURE — 82570 ASSAY OF URINE CREATININE: CPT

## 2024-01-12 PROCEDURE — 80307 DRUG TEST PRSMV CHEM ANLYZR: CPT

## 2024-01-12 PROCEDURE — 85025 COMPLETE CBC W/AUTO DIFF WBC: CPT

## 2024-01-12 PROCEDURE — 80053 COMPREHEN METABOLIC PANEL: CPT

## 2024-01-12 PROCEDURE — 82306 VITAMIN D 25 HYDROXY: CPT

## 2024-01-12 RX ORDER — ICOSAPENT ETHYL 1000 MG/1
2 CAPSULE ORAL
Qty: 360 CAPSULE | Refills: 3 | Status: SHIPPED | OUTPATIENT
Start: 2024-01-12 | End: 2025-01-11

## 2024-01-12 RX ORDER — PREGABALIN 300 MG/1
300 CAPSULE ORAL 2 TIMES DAILY
Qty: 180 CAPSULE | Refills: 0 | Status: SHIPPED | OUTPATIENT
Start: 2024-01-12 | End: 2024-04-22 | Stop reason: SDUPTHER

## 2024-01-12 RX ORDER — PROMETHAZINE HYDROCHLORIDE 25 MG/1
25 TABLET ORAL EVERY 6 HOURS PRN
Qty: 120 TABLET | Refills: 0 | Status: SHIPPED | OUTPATIENT
Start: 2024-01-12 | End: 2024-02-08 | Stop reason: SDUPTHER

## 2024-01-12 ASSESSMENT — PATIENT HEALTH QUESTIONNAIRE - PHQ9
1. LITTLE INTEREST OR PLEASURE IN DOING THINGS: NOT AT ALL
SUM OF ALL RESPONSES TO PHQ9 QUESTIONS 1 AND 2: 0
2. FEELING DOWN, DEPRESSED OR HOPELESS: NOT AT ALL

## 2024-01-12 NOTE — ASSESSMENT & PLAN NOTE
Would like to increase ozempic dose  Checking labs  If stable will try increase from 0.5 mg qw to 1.0 mg qw  Also checking HA1C  - may try cutting back on metformin given successful weight loss

## 2024-01-12 NOTE — ASSESSMENT & PLAN NOTE
Discharge Facility: [facility]  Discharge Diagnosis: Wound Dehiscence  -Lumbar spine epidural phlegmon status post washout on 11/20 with culture growing MSSA  Admission Date: 11/20/23  Discharge Date: 11/29/23     Discharge Facility: [facility]  Admission Date: 11/29/23  Discharge Date: 1/2/24

## 2024-01-12 NOTE — PROGRESS NOTES
"Patient: Gm Thrasher  : 1965  PCP: Marsha Francis MD  MRN: [id number]  Program: No linked episodes    Discharge Facility: [facility]  Discharge Diagnosis: Wound Dehiscence  -Lumbar spine epidural phlegmon status post washout on  with culture growing MSSA  Admission Date: 23  Discharge Date: 23     Discharge Facility: [facility]  Admission Date: 23  Discharge Date: 24    Gm Thrasher is a 58 y.o. male presenting today for follow-up after being discharged from the hospital 12 days ago. The main problem requiring admission was Wound Dehiscence. The discharge summary and/or Transitional Care Management documentation was reviewed. Medication reconciliation was performed as indicated via the \"Timothy as Reviewed\" timestamp.     Gm Thrasher was contacted by Transitional Care Management services two days after his discharge. This encounter and supporting documentation was reviewed.    The complexity of medical decision making for this patient's transitional care is moderate.    HPI   Wound dehiscence &     Hospital course copied:   Last Progress Note from 23 at [facility]  Patient is a 58 year-old male with past medical history of MDD, T2 DM, BPH, hypertension, hyperlipidemia, JEMAL, chronic pain in neck and back, lumbar radiculopathy, and heroin use who is transferred from [facility] in the setting of recent lumbar surgery with subarachnoid hemorrhage/subdural hematoma (2023) presented with wound dehiscence requiring lumbar exploration and washout.  Patient was admitted to medicine for evaluation and management of incisional infection. Neurosurgery was consulted and is following.     Multiple lumbar post operative fluid collections with mass effect  - MRI and CT scan as discussed above. Repeat CT head imaging ordered due to concern for midline shift, results negative for any acute pathology  - Lumbar wound washout procedure performed by NSGY: Dr. Vitaly Amezcua   - Wound " culture 11/20: MSSA, but BCNTD  - Per ID: PICC line placed 11/22, Cefazolin x 6 weeks, stop date 1/1/2024  - Follow-up outpatient with Dr. Browning in 6 weeks     2.Left pupil dilation   -A brain attack was called at 12:45 AM 11/21 due to AMS and left pupil dilation. CT scan of head w/o contrast revealed no intracranial abnormalities.  -Patient was AOx0 with left pupil dilation, but mental status improved to AOx3.  -No visual disturbances, pain, or acute concerns overall.  -Psych consulted: continue Suboxone on D/C & FU with Suboxone prescriber.     3. S/P R distal tibial nail 10/17/23  R ankle fracture on 10/16/23, underwent R distal tibial nail 10/17 by Dr. Hale  - FU note on 11/06 needs to be non-weightbearing for 4 weeks on R leg  - Non weight bearing until 12/6/23 on RLE, repeat 2 view xrays of R tibia at that time  - Non weightbearing boot ordered for RLE      4. H/O NSTEMI (10/13/23)  - PCI to RCA  - ASA and Plavix have been held during this admission due to operation  - ASA resumed 11/23/2023 POD #3  - Resume Plavix POD #7 (11/27) (resumed)     5.  Hx of Chronic opioid dependence on Suboxone:  - Home Suboxone resumed on 11/24/2023  - Patient has needed less and less pain management, however if patient does have breakthrough pain consider Toradol.     6.NIDDM2  -A1C 5.7  ___  1/2/24 ID telephone encounter:  PER DR BROWNING I CALLED OVER TO [facility] AND LEFT A MESSAGE FOR QUEENIE RAMIREZ TO EXTEND HIS IV ABX THERAPY THUR 01/11/24 WHICH IS WHEN HE WILL HAVE HIS FOLLOW UP APPOINTMENT.     SHAHAB THE ADON @ [facility] CALLED RE THE NEW ORDERS THAT WERE GIVEN TO EXTEND THE PTS IV ABX TILL HIS FOLLOW UP WITH DR BROWNING ON THURSDAY 1/11/24. SHAHAB STATED THAT THE PT IS REFUSING TO CONTINUE THE IV ABX AND WANTS TO LEAVE AMA. DR BROWNING STATED HE IS TO EXTEND AND FOLLOW AND THAT SHAHAB CAN DOCUMENT ON THERE END IF THE PT CHOOSES TO LEAVE AMA.     12/22/23 neurosurg copied:  Gm Thrasher is a 58 y.o. year old male who presents  post lumbar wound washout on November 20, 2023.  Currently Michelle presents for remainder staple removal.  Inspection of incision shows well-approximated, nonerythemic, nonedematous surgical incision with good fibrotic tissue spanning.  Staples removed at visit today with patient tolerating well and no complications encountered.  To continue on regularly scheduled follow-up plan.     12/4/23 ortho copied:  Patient status post right distal tibial nailing and nonop fibula  Plan  Immobilization: Removable boot, weightbearing as tolerated   weight bearing:   Reviewed rehab /return to activities  Follow up 4-6 weeks repeat x-rays 2 view tibia  If his knee pain persists may consider cortisone injection    Labs copied:  Component      Latest Ref Rng 11/20/2023   GLUCOSE      74 - 99 mg/dL    SODIUM      136 - 145 mmol/L    POTASSIUM      3.5 - 5.3 mmol/L    CHLORIDE      98 - 107 mmol/L    Bicarbonate      21 - 32 mmol/L    Anion Gap      10 - 20 mmol/L    Blood Urea Nitrogen      6 - 23 mg/dL    Creatinine      0.50 - 1.30 mg/dL    EGFR      >60 mL/min/1.73m*2    Calcium      8.6 - 10.3 mg/dL    PHOSPHORUS      2.5 - 4.9 mg/dL    Albumin      3.4 - 5.0 g/dL    WBC      4.4 - 11.3 x10*3/uL    nRBC      0.0 - 0.0 /100 WBCs    RBC      4.50 - 5.90 x10*6/uL    HEMOGLOBIN      13.5 - 17.5 g/dL    HEMATOCRIT      41.0 - 52.0 %    MCV      80 - 100 fL    MCH      26.0 - 34.0 pg    MCHC      32.0 - 36.0 g/dL    RED CELL DISTRIBUTION WIDTH      11.5 - 14.5 %    Platelets      150 - 450 x10*3/uL    Amphetamine Screen, Urine      Presumptive Negative  Presumptive Negative    Barbiturate Screen, Urine      Presumptive Negative  Presumptive Negative    Benzodiazepines Screen, Urine      Presumptive Negative  Presumptive Negative    Cannabinoid Screen, Urine      Presumptive Negative  Presumptive Negative    Cocaine Metabolite Screen, Urine      Presumptive Negative  Presumptive Negative    Fentanyl Screen, Urine      Presumptive Negative   Presumptive Positive !    Opiate Screen, Urine      Presumptive Negative  Presumptive Positive !    Oxycodone Screen, Urine      Presumptive Negative  Presumptive Negative    PCP Screen, Urine      Presumptive Negative  Presumptive Negative    ABO TYPE    Rh Type    ANTIBODY SCREEN    HIV 1/2 Antigen/Antibody Screen with Reflex to Confirmation      Nonreactive     MAGNESIUM      1.60 - 2.40 mg/dL      Component      Latest Ref [facility] 11/21/2023   GLUCOSE      74 - 99 mg/dL    SODIUM      136 - 145 mmol/L    POTASSIUM      3.5 - 5.3 mmol/L    CHLORIDE      98 - 107 mmol/L    Bicarbonate      21 - 32 mmol/L    Anion Gap      10 - 20 mmol/L    Blood Urea Nitrogen      6 - 23 mg/dL    Creatinine      0.50 - 1.30 mg/dL    EGFR      >60 mL/min/1.73m*2    Calcium      8.6 - 10.3 mg/dL    PHOSPHORUS      2.5 - 4.9 mg/dL    Albumin      3.4 - 5.0 g/dL    WBC      4.4 - 11.3 x10*3/uL    nRBC      0.0 - 0.0 /100 WBCs    RBC      4.50 - 5.90 x10*6/uL    HEMOGLOBIN      13.5 - 17.5 g/dL    HEMATOCRIT      41.0 - 52.0 %    MCV      80 - 100 fL    MCH      26.0 - 34.0 pg    MCHC      32.0 - 36.0 g/dL    RED CELL DISTRIBUTION WIDTH      11.5 - 14.5 %    Platelets      150 - 450 x10*3/uL    Amphetamine Screen, Urine      Presumptive Negative     Barbiturate Screen, Urine      Presumptive Negative     Benzodiazepines Screen, Urine      Presumptive Negative     Cannabinoid Screen, Urine      Presumptive Negative     Cocaine Metabolite Screen, Urine      Presumptive Negative     Fentanyl Screen, Urine      Presumptive Negative     Opiate Screen, Urine      Presumptive Negative     Oxycodone Screen, Urine      Presumptive Negative     PCP Screen, Urine      Presumptive Negative     ABO TYPE O    Rh Type POS    ANTIBODY SCREEN NEG    HIV 1/2 Antigen/Antibody Screen with Reflex to Confirmation      Nonreactive  Nonreactive    MAGNESIUM      1.60 - 2.40 mg/dL      Component      Latest Ref Rn 11/27/2023   GLUCOSE      74 - 99 mg/dL 104 (H)     SODIUM      136 - 145 mmol/L 133 (L)    POTASSIUM      3.5 - 5.3 mmol/L 3.8    CHLORIDE      98 - 107 mmol/L 94 (L)    Bicarbonate      21 - 32 mmol/L 31    Anion Gap      10 - 20 mmol/L 12    Blood Urea Nitrogen      6 - 23 mg/dL 6    Creatinine      0.50 - 1.30 mg/dL 0.98    EGFR      >60 mL/min/1.73m*2 >90    Calcium      8.6 - 10.3 mg/dL 8.5 (L)    PHOSPHORUS      2.5 - 4.9 mg/dL 3.2    Albumin      3.4 - 5.0 g/dL 2.6 (L)    WBC      4.4 - 11.3 x10*3/uL 6.3    nRBC      0.0 - 0.0 /100 WBCs 0.0    RBC      4.50 - 5.90 x10*6/uL 2.81 (L)    HEMOGLOBIN      13.5 - 17.5 g/dL 8.1 (L)    HEMATOCRIT      41.0 - 52.0 % 26.0 (L)    MCV      80 - 100 fL 93    MCH      26.0 - 34.0 pg 28.8    MCHC      32.0 - 36.0 g/dL 31.2 (L)    RED CELL DISTRIBUTION WIDTH      11.5 - 14.5 % 15.1 (H)    Platelets      150 - 450 x10*3/uL 330    Amphetamine Screen, Urine      Presumptive Negative     Barbiturate Screen, Urine      Presumptive Negative     Benzodiazepines Screen, Urine      Presumptive Negative     Cannabinoid Screen, Urine      Presumptive Negative     Cocaine Metabolite Screen, Urine      Presumptive Negative     Fentanyl Screen, Urine      Presumptive Negative     Opiate Screen, Urine      Presumptive Negative     Oxycodone Screen, Urine      Presumptive Negative     PCP Screen, Urine      Presumptive Negative     ABO TYPE    Rh Type    ANTIBODY SCREEN    HIV 1/2 Antigen/Antibody Screen with Reflex to Confirmation      Nonreactive     MAGNESIUM      1.60 - 2.40 mg/dL 1.78       SOFT TISSUE RESECTION  Tissue/Wound Culture/Smear (2+) Few Methicillin Susceptible Staphylococcus aureus (MSSA) Abnormal                Gram Stain No polymorphonuclear leukocytes seen      No organisms seen              Resulting Agency: [facility]     Susceptibility     Methicillin Susceptible Staphylococcus aureus (MSSA)     MICROSCAN    $ Clindamycin Susceptible    $$ Erythromycin Susceptible    $$ Oxacillin Susceptible    $$ Tetracycline  Susceptible    $ Trimethoprim/Sulfamethoxazole Susceptible    $ Vancomycin Susceptible               SPINE TISSUE:  Tissue/Wound Culture/Smear (1+) Rare Methicillin Susceptible Staphylococcus aureus (MSSA) Abnormal                Gram Stain No organisms seen      (2+) Few Polymorphonuclear leukocytes              Resulting Agency: [facility]     Susceptibility     Methicillin Susceptible Staphylococcus aureus (MSSA)     MICROSCAN    $ Clindamycin Susceptible    $$ Erythromycin Susceptible    $$ Oxacillin Susceptible    $$ Tetracycline Susceptible    $ Trimethoprim/Sulfamethoxazole Susceptible    $ Vancomycin Susceptible               Amphetamine Screen, Urine  Presumptive Negative Presumptive Negative PRESUMPTIVE NEGATIVE R, CM   Comment: CUTOFF LEVEL: 500 NG/ML  Cross-reactivity has been reported with high concentrations  of the following drugs: buproprion, chloroquine, chlorpromazine,  ephedrine, mephentermine, fenfluramine, phentermine,  phenylpropanolamine, pseudoephedrine, and propranolol.   Barbiturate Screen, Urine  Presumptive Negative Presumptive Negative PRESUMPTIVE NEGATIVE R, CM   Comment: CUTOFF LEVEL: 200 NG/ML   Benzodiazepines Screen, Urine  Presumptive Negative Presumptive Negative    Comment: CUTOFF LEVEL: 200 NG/ML   Cannabinoid Screen, Urine  Presumptive Negative Presumptive Negative PRESUMPTIVE NEGATIVE R, CM   Comment: CUTOFF LEVEL: 50 NG/ML   Cocaine Metabolite Screen, Urine  Presumptive Negative Presumptive Negative PRESUMPTIVE NEGATIVE R, CM   Comment: CUTOFF LEVEL: 150 NG/ML   Fentanyl Screen, Urine  Presumptive Negative Presumptive Positive Abnormal     Comment: CUTOFF LEVEL: 5 NG/ML   Opiate Screen, Urine  Presumptive Negative Presumptive Positive Abnormal     Comment: CUTOFF LEVEL: 300 NG/ML  The opiate screen does not detect fentanyl, meperidine, or  tramadol. Oxycodone is not consistently detected (refer to  Oxycodone Screen, Urine result).   Oxycodone Screen, Urine  Presumptive Negative  Presumptive Negative    Comment: CUTOFF LEVEL: 100 NG/ML  This test will accurately detect both oxycodone and oxymorphone.   PCP Screen, Urine  Presumptive Negative Presumptive Negative PRESUMPTIVE NEGATIVE R, CM   Comment: CUTOFF LEVEL:  25 NG/ML     Imaging copied:   12/4/23 XR tib fib:  Well-aligned right distal tibia and fibula shaft fracture status post  intramedullary nail fixation. Early callus and remodeling is  consistent with union. Incomplete compared to prior x-rays.  Concentric ankle mortise    11/22/23 CT head:  No acute edema. No acute hemorrhage. No mass effect. Ventricular  system is normal for age. No extra-axial fluid collections. Orbits  are normal. Mucoperiosteal thickening right maxillary sinus.    11/21/23 CT brain attack:  BRAIN: No acute intracranial hemorrhage. No mass effect or midline  shift. Gray-white matter interfaces are preserved. VENTRICLES and  EXTRA-AXIAL SPACES: Normal. EXTRACRANIAL SOFT TISSUES:  Within normal  limits. PARANASAL SINUSES/MASTOIDS: Mild paranasal sinus mucosal  thickening. Polypoid right maxillary sinus mucosal thickening.  Mastoids are clear. BONES AND ORBITS: No displaced skull fracture. No  suspicious osseous lesion.  Orbits are within normal limits. OTHER  FINDINGS: None.    11/20/23 CT L spine:  Lumbosacral spine surgical and arthritic changes as described. Mild  DJD in both hips.      Abnormal soft tissue swelling/fluid in the posterior paraspinal  musculature and overlying subcutaneous soft tissues, with progression  associated gas density which extends along the left side of the L2  spinous process and then extends caudally surrounding the inter facet  joints at L3-4 and L4-5. An infectious process cannot be excluded in  this unenhanced exam. Suggest percutaneous needle aspiration to  examine for abscess. Also, please refer to the MRI report for more  information regarding the effect of this process on the thecal sac.      There are findings that are at  least mildly suspicious for discitis  at the L5-S1 level.      Navarro catheter in an otherwise nearly empty urinary bladder.      Stable atrophy of the left kidney with mild stable fullness of the  left renal collecting system. Moderate to pronounced hydronephrosis  on the right, perhaps due to underlying right congenital UPJ  obstruction.    11/20/23 MR L spine:  Evaluation is severely limited by motion artifact.      Redemonstration of findings lumbosacral fusion and laminectomies.  There is redemonstration of a multiloculated collection in the  midline/left paramedian posterior subcutaneous soft tissues deep to  the incision, and more deeply within the laminectomy bed abutting and  within the dorsal epidural fat. The sterility of this collection  cannot be determined by imaging.      The collection also extends about the posterior elements of the L2  vertebra. A rather large volume of air is newly present in the  collection, probably secondary to recent intervention/communication  with the atmosphere although infection with gas producing organism  cannot be excluded.      The dominant locule of the collection is in the left paramedian  posterior soft tissues, centered about the level of the L2-L3 disc  space, measures a proximally 2.3 x 2.9 cm in transaxial diameters,  and up to 7.8 cm in cc length. The next largest locule of the  collection, centered about the L3-L4 disc space, within the  laminectomy bed measures approximately 2.0 x 2.0 cm in transaxial  diameters, and roughly 8.7 cm in CC length. An additional smaller  collection centered at the mid L5 level measures up to 1.7 x 1.2 cm  in transaxial diameters in the dorsal epidural fat.      Mass effect from the collection and associated edema in the  surrounding soft tissues is again noted to cause moderate to marked  narrowing of the thecal sac from L2-L3 through L5-S1, worst at the  L5-S1 level where there is effacement of CSF and compression of  cauda  equina nerve roots, secondary to combination of grade 1  retrolisthesis, disc osteophyte bulging, dorsal epidural lipomatosis  with edematous fat.    Review of Systems   All other systems reviewed and are negative.    Physical Exam  Constitutional:       General: He is not in acute distress.     Appearance: Normal appearance.   HENT:      Head: Normocephalic and atraumatic.      Right Ear: Tympanic membrane normal.      Left Ear: Tympanic membrane normal.      Nose: Nose normal.      Mouth/Throat:      Mouth: Mucous membranes are moist.      Pharynx: Oropharynx is clear.   Eyes:      Conjunctiva/sclera: Conjunctivae normal.   Cardiovascular:      Rate and Rhythm: Normal rate and regular rhythm.      Pulses: Normal pulses.   Pulmonary:      Effort: Pulmonary effort is normal.      Breath sounds: Normal breath sounds.   Abdominal:      General: Bowel sounds are normal.      Palpations: Abdomen is soft.   Musculoskeletal:      Comments: In wheelchair (he is able to self-propel). Boot on R foot    Skin:     Comments: Lumbar surgical wound is healed    Neurological:      General: No focal deficit present.      Mental Status: He is alert.   Psychiatric:         Mood and Affect: Mood normal.         Problem List Items Addressed This Visit       RESOLVED: Cauda equina compression (CMS/HCC)    Overview     MR 11/23: Evaluation is severely limited by motion artifact.      Redemonstration of findings lumbosacral fusion and laminectomies.  There is redemonstration of a multiloculated collection in the  midline/left paramedian posterior subcutaneous soft tissues deep to  the incision, and more deeply within the laminectomy bed abutting and  within the dorsal epidural fat. The sterility of this collection  cannot be determined by imaging.      The collection also extends about the posterior elements of the L2  vertebra. A rather large volume of air is newly present in the  collection, probably secondary to recent  intervention/communication  with the atmosphere although infection with gas producing organism  cannot be excluded.      The dominant locule of the collection is in the left paramedian  posterior soft tissues, centered about the level of the L2-L3 disc  space, measures a proximally 2.3 x 2.9 cm in transaxial diameters,  and up to 7.8 cm in cc length. The next largest locule of the  collection, centered about the L3-L4 disc space, within the  laminectomy bed measures approximately 2.0 x 2.0 cm in transaxial  diameters, and roughly 8.7 cm in CC length. An additional smaller  collection centered at the mid L5 level measures up to 1.7 x 1.2 cm  in transaxial diameters in the dorsal epidural fat.      Mass effect from the collection and associated edema in the  surrounding soft tissues is again noted to cause moderate to marked  narrowing of the thecal sac from L2-L3 through L5-S1, worst at the  L5-S1 level where there is effacement of CSF and compression of cauda  equina nerve roots, secondary to combination of grade 1  retrolisthesis, disc osteophyte bulging, dorsal epidural lipomatosis  with edematous fat.         Current moderate episode of major depressive disorder without prior episode (CMS/[facility])    Overview     On SSRI  Stable   Monitor         Encounter for support and coordination of transition of care - Primary    Current Assessment & Plan     Discharge Facility: [facility]  Discharge Diagnosis: Wound Dehiscence  -Lumbar spine epidural phlegmon status post washout on 11/20 with culture growing MSSA  Admission Date: 11/20/23  Discharge Date: 11/29/23     Discharge Facility: [facility]  Admission Date: 11/29/23  Discharge Date: 1/2/24         H/O non-ST elevation myocardial infarction (NSTEMI)    Overview     10/13/23 - Angiography reveals a 80% stenosis of the proximal to mid right coronary artery coronary artery. Pre-intervention ALBERTINA flow was 3. Percutaneous coronary intervention was performed within the  proximal to mid right coronary artery. The stenosis was successfully reduced from 80% to 0%. Post-intervention ALBERTINA flow was 3.     Plavix, ASA, vascepa, statin         Relevant Medications    Vascepa 1 gram capsule    H/O opioid abuse (CMS/[facility])    Overview     Home Suboxone resumed on 11/24/2023          Current Assessment & Plan     Needs UDS for CS compliance   He's on lyrica also          High triglycerides    Relevant Medications    Vascepa 1 gram capsule    Lumbar spondylosis    Overview     S/p 1. L3-4, L4-5 extreme lateral interbody fusion and  L3-5 laminectomy, L5-S1 revision laminectomy, L5-S1 transforaminal lumbar interbody fusion, L3-S1 instrumented posterolateral fusion, repair of dural tear  on September 29, 2023 w/complications: Postoperative lumbar fluid collections, concern for epidural abscesses and cauda equina, Postoperative site infection   s/p drainage and Bactrim course 11/9/2023, and Wound dehiscence          Relevant Medications    pregabalin (Lyrica) 300 mg capsule    Other Relevant Orders    Opiate/Opioid/Benzo Prescription Compliance    Disability Placard    Medication management    Overview     UDS, signed controlled substance contract, and [place]AR[place] check all up to date           Relevant Orders    Opiate/Opioid/Benzo Prescription Compliance    Mixed hyperlipidemia    Overview     on statin  Goal Ldl <100         Relevant Medications    Vascepa 1 gram capsule    Mood disorder (CMS/[facility])    Overview     On Lexapro  See psychiatry and twice weekly counseling  Chronic phenergan also helps manage anxiety sx          Relevant Medications    promethazine (Phenergan) 25 mg tablet    Pulmonary emphysema (CMS/[facility])    Overview     Currently stable  Monitor   Advised tobacco cessation         Type 2 diabetes mellitus with hyperglycemia, without long-term current use of insulin (CMS/[facility])    Overview     6/23: Hba1c 6.5%    On Metformin & Ozempic         Current Assessment & Plan     Would like to  increase ozempic dose  Checking labs  If stable will try increase from 0.5 mg qw to 1.0 mg qw  Also checking HA1C  - may try cutting back on metformin given successful weight loss          Relevant Medications    Vascepa 1 gram capsule    Other Relevant Orders    Lipase (Completed)    Hemoglobin A1c    Type 2 diabetes mellitus with obesity (CMS/HCC)    Overview      A1C = 4.9  On metformin, ozempic, statin, vascepa         Current Assessment & Plan     Down about 50 lbs with ozempic  Recheck HA1C  - may be able to try dc metformin  He would like to increase ozempic dose  - check labs first          Relevant Medications    Vascepa 1 gram capsule    Vitamin B12 deficiency    Overview     :192   = 231  On supp         Current Assessment & Plan     Check level in presence of anemia          Vitamin D deficiency    Overview      = 24  Goal ~50  On supp          Current Assessment & Plan     Check level          Relevant Orders    Vitamin D 25-Hydroxy,Total (for eval of Vitamin D levels) (Completed)    RESOLVED: Wound dehiscence    Current Assessment & Plan     Lumbar wound is healed          Other Visit Diagnoses       Hyponatremia        recheck level    Relevant Orders    Comprehensive Metabolic Panel    Hypocalcemia        recheck level    Relevant Orders    Comprehensive Metabolic Panel    Anemia, unspecified type        hx iron def  recheck with iron, B12 levels    Relevant Orders    CBC and Auto Differential    Iron and TIBC (Completed)    Ferritin (Completed)    Vitamin B12 (Completed)    Hematuria, unspecified type        likely d/t cruz cath  recheck    Relevant Orders    Urinalysis with Reflex Culture and Microscopic (Completed)    Elevated alkaline phosphatase level        = 155  added on GGT  ? bone turnover             Family History   Problem Relation Name Age of Onset    No Known Problems Mother          F: ETOHIsm, DM, COPD M: Lung CA, CAD B: CA metastatic lung () S; ETOH ism S:        Medications  Medications reviewed with patient/caregiver?: Yes (1/4/2024 11:20 AM)  Is the patient having any side effects they believe may be caused by any medication additions or changes?: No (1/4/2024 11:20 AM)  Does the patient have all medications ordered at discharge?: No (1/4/2024 11:20 AM)  Is the patient taking all medications as directed (includes completed medication regime)?: No (1/4/2024 11:20 AM)  What is preventing the patient from taking all medications as directed?: Side effects; Doesn't understand importance; Desires to consult PCP first (1/4/2024 11:20 AM)  Care Management Interventions: Provided patient education (1/4/2024 11:20 AM)  Medication Comments: **Reviewed patient medications at length. He is unsure what medications he was in while at SNF. States he left quickly and was not sent home with a supply. His sister Jaylin assists him with medications. However, he is home and is not interested in taking several of the medications on his list. He is open to discussing these further at his [facility] Follow Up appointment. He is agreeable to getting back on the Plavix and Atorvastatin. We discussed why these would be important to preventing blood clots and potential stroke. (1/4/2024 11:20 AM)    Appointments  Does the patient have a primary care provider?: Yes (1/4/2024 11:20 AM)  Care Management Interventions: Verified appointment date/time/provider (1/4/2024 11:20 AM)  Has the patient kept scheduled appointments due by today?: Yes (1/4/2024 11:20 AM)  Care Management Interventions: Advised patient to keep appointment (1/4/2024 11:20 AM)    Self Management  What is the home health agency?: Unsure (**Patient is aware to call me Monday if he has not been contacted by [facility] to arrange appointment for PT/OT Evaluation.) (1/4/2024 11:20 AM)  Has home health visited the patient within 72 hours of discharge?: No (1/4/2024 11:20 AM)    Patient Teaching  Does the patient have access to their  discharge instructions?: Yes (1/4/2024 11:20 AM)  Care Management Interventions: Reviewed instructions with patient (1/4/2024 11:20 AM)  What is the patient's perception of their health status since discharge?: Improving (1/4/2024 11:20 AM)  Is the patient/caregiver able to teach back the hierarchy of who to call/visit for symptoms/problems? PCP, Specialist, Home Health nurse, Urgent Care, ED, 911: Yes (1/4/2024 11:20 AM)        No follow-ups on file.      Problem List Items Addressed This Visit       RESOLVED: Cauda equina compression (CMS/HCC)    Overview     MR 11/23: Evaluation is severely limited by motion artifact.      Redemonstration of findings lumbosacral fusion and laminectomies.  There is redemonstration of a multiloculated collection in the  midline/left paramedian posterior subcutaneous soft tissues deep to  the incision, and more deeply within the laminectomy bed abutting and  within the dorsal epidural fat. The sterility of this collection  cannot be determined by imaging.      The collection also extends about the posterior elements of the L2  vertebra. A rather large volume of air is newly present in the  collection, probably secondary to recent intervention/communication  with the atmosphere although infection with gas producing organism  cannot be excluded.      The dominant locule of the collection is in the left paramedian  posterior soft tissues, centered about the level of the L2-L3 disc  space, measures a proximally 2.3 x 2.9 cm in transaxial diameters,  and up to 7.8 cm in cc length. The next largest locule of the  collection, centered about the L3-L4 disc space, within the  laminectomy bed measures approximately 2.0 x 2.0 cm in transaxial  diameters, and roughly 8.7 cm in CC length. An additional smaller  collection centered at the mid L5 level measures up to 1.7 x 1.2 cm  in transaxial diameters in the dorsal epidural fat.      Mass effect from the collection and associated edema in  the  surrounding soft tissues is again noted to cause moderate to marked  narrowing of the thecal sac from L2-L3 through L5-S1, worst at the  L5-S1 level where there is effacement of CSF and compression of cauda  equina nerve roots, secondary to combination of grade 1  retrolisthesis, disc osteophyte bulging, dorsal epidural lipomatosis  with edematous fat.         Current moderate episode of major depressive disorder without prior episode (CMS/[facility])    Overview     On SSRI  Stable   Monitor         Encounter for support and coordination of transition of care - Primary    Current Assessment & Plan     Discharge Facility: [facility]  Discharge Diagnosis: Wound Dehiscence  -Lumbar spine epidural phlegmon status post washout on 11/20 with culture growing MSSA  Admission Date: 11/20/23  Discharge Date: 11/29/23     Discharge Facility: [facility]  Admission Date: 11/29/23  Discharge Date: 1/2/24         H/O non-ST elevation myocardial infarction (NSTEMI)    Overview     10/13/23 - Angiography reveals a 80% stenosis of the proximal to mid right coronary artery coronary artery. Pre-intervention ALBERTINA flow was 3. Percutaneous coronary intervention was performed within the proximal to mid right coronary artery. The stenosis was successfully reduced from 80% to 0%. Post-intervention ALBERTINA flow was 3.     Plavix, ASA, vascepa, statin         Relevant Medications    Vascepa 1 gram capsule    H/O opioid abuse (CMS/[facility])    Overview     Home Suboxone resumed on 11/24/2023          Current Assessment & Plan     Needs UDS for CS compliance   He's on lyrica also          High triglycerides    Relevant Medications    Vascepa 1 gram capsule    Lumbar spondylosis    Overview     S/p 1. L3-4, L4-5 extreme lateral interbody fusion and  L3-5 laminectomy, L5-S1 revision laminectomy, L5-S1 transforaminal lumbar interbody fusion, L3-S1 instrumented posterolateral fusion, repair of dural tear  on September 29, 2023 w/complications:  Postoperative lumbar fluid collections, concern for epidural abscesses and cauda equina, Postoperative site infection   s/p drainage and Bactrim course 11/9/2023, and Wound dehiscence          Relevant Medications    pregabalin (Lyrica) 300 mg capsule    Other Relevant Orders    Opiate/Opioid/Benzo Prescription Compliance    Disability Placard    Medication management    Overview     UDS, signed controlled substance contract, and OAR[place] check all up to date           Relevant Orders    Opiate/Opioid/Benzo Prescription Compliance    Mixed hyperlipidemia    Overview     on statin  Goal Ldl <100         Relevant Medications    Vascepa 1 gram capsule    Mood disorder (CMS/[facility])    Overview     On Lexapro  See psychiatry and twice weekly counseling  Chronic phenergan also helps manage anxiety sx          Relevant Medications    promethazine (Phenergan) 25 mg tablet    Pulmonary emphysema (CMS/[facility])    Overview     Currently stable  Monitor   Advised tobacco cessation         Type 2 diabetes mellitus with hyperglycemia, without long-term current use of insulin (CMS/[facility])    Overview     6/23: Hba1c 6.5%    On Metformin & Ozempic         Current Assessment & Plan     Would like to increase ozempic dose  Checking labs  If stable will try increase from 0.5 mg qw to 1.0 mg qw  Also checking HA1C  - may try cutting back on metformin given successful weight loss          Relevant Medications    Vascepa 1 gram capsule    Other Relevant Orders    Lipase (Completed)    Hemoglobin A1c    Type 2 diabetes mellitus with obesity (CMS/[facility])    Overview     1/24 A1C = 4.9  On metformin, ozempic, statin, vascepa         Current Assessment & Plan     Down about 50 lbs with ozempic  Recheck HA1C  - may be able to try dc metformin  He would like to increase ozempic dose  - check labs first          Relevant Medications    Vascepa 1 gram capsule    Vitamin B12 deficiency    Overview     12/22:192  1/24 = 231  On supp         Current  Assessment & Plan     Check level in presence of anemia          Vitamin D deficiency    Overview     1/24 = 24  Goal ~50  On supp          Current Assessment & Plan     Check level          Relevant Orders    Vitamin D 25-Hydroxy,Total (for eval of Vitamin D levels) (Completed)    RESOLVED: Wound dehiscence    Current Assessment & Plan     Lumbar wound is healed          Other Visit Diagnoses       Hyponatremia        recheck level    Relevant Orders    Comprehensive Metabolic Panel    Hypocalcemia        recheck level    Relevant Orders    Comprehensive Metabolic Panel    Anemia, unspecified type        hx iron def  recheck with iron, B12 levels    Relevant Orders    CBC and Auto Differential    Iron and TIBC (Completed)    Ferritin (Completed)    Vitamin B12 (Completed)    Hematuria, unspecified type        likely d/t cruz cath  recheck    Relevant Orders    Urinalysis with Reflex Culture and Microscopic (Completed)    Elevated alkaline phosphatase level        = 155  added on GGT  ? bone turnover             Controlled Substance Visit:  I have personally reviewed the [place]AR[place] report and have considered the risks of abuse, dependence, addiction and diversion and I believe that it is clinically appropriate for the patient to be prescribed this medication.    Is the patient prescribed a combination of a benzodiazepine and opioid?  No    Last Urine Drug Screen / ordered today: Yes  Recent Results (from the past 8760 hour(s))   Screen Opiate/Opioid/Benzo Prescription Compliance    Collection Time: 01/12/24 12:25 PM   Result Value Ref Range    Creatinine, Urine Random 116.0 20.0 - 370.0 mg/dL    Amphetamine Screen, Urine Presumptive Negative Presumptive Negative    Barbiturate Screen, Urine Presumptive Negative Presumptive Negative    Cannabinoid Screen, Urine Presumptive Positive (A) Presumptive Negative    Cocaine Metabolite Screen, Urine Presumptive Negative Presumptive Negative    PCP Screen, Urine Presumptive  Negative Presumptive Negative   Drug Screen, Urine    Collection Time: 11/20/23 11:28 AM   Result Value Ref Range    Amphetamine Screen, Urine Presumptive Negative Presumptive Negative    Barbiturate Screen, Urine Presumptive Negative Presumptive Negative    Benzodiazepines Screen, Urine Presumptive Negative Presumptive Negative    Cannabinoid Screen, Urine Presumptive Negative Presumptive Negative    Cocaine Metabolite Screen, Urine Presumptive Negative Presumptive Negative    Fentanyl Screen, Urine Presumptive Positive (A) Presumptive Negative    Opiate Screen, Urine Presumptive Positive (A) Presumptive Negative    Oxycodone Screen, Urine Presumptive Negative Presumptive Negative    PCP Screen, Urine Presumptive Negative Presumptive Negative     N/A    Controlled Substance Agreement:  Date of the Last Agreement: 1/12/2024  I have reviewed each line item on the Controlled Substance Agreement including, but not limited to, the benefits, risks, and alternatives to treatment with a Controlled Substance medication(s). The patient has verbalized understanding and signed the agreement.    Lyrica:  What is the patient's goal of therapy? Nerve pain  Is this being achieved with current treatment? yes    Pain Assessment:  No data recorded      Activities of Daily Living:  Is your overall impression that this patient is benefiting (symptom reduction outweighs side effects) from Lyrica therapy? Yes

## 2024-01-14 LAB — BACTERIA UR CULT: NORMAL

## 2024-01-15 ENCOUNTER — DOCUMENTATION (OUTPATIENT)
Dept: HOME HEALTH SERVICES | Facility: HOME HEALTH | Age: 59
End: 2024-01-15

## 2024-01-15 ENCOUNTER — TELEPHONE (OUTPATIENT)
Dept: PRIMARY CARE | Facility: CLINIC | Age: 59
End: 2024-01-15

## 2024-01-15 ENCOUNTER — ANCILLARY PROCEDURE (OUTPATIENT)
Dept: RADIOLOGY | Facility: CLINIC | Age: 59
End: 2024-01-15
Payer: COMMERCIAL

## 2024-01-15 ENCOUNTER — OFFICE VISIT (OUTPATIENT)
Dept: ORTHOPEDIC SURGERY | Facility: CLINIC | Age: 59
End: 2024-01-15
Payer: COMMERCIAL

## 2024-01-15 ENCOUNTER — HOME HEALTH ADMISSION (OUTPATIENT)
Dept: HOME HEALTH SERVICES | Facility: HOME HEALTH | Age: 59
End: 2024-01-15

## 2024-01-15 DIAGNOSIS — S82.831D: ICD-10-CM

## 2024-01-15 DIAGNOSIS — S82.301D: ICD-10-CM

## 2024-01-15 DIAGNOSIS — M47.816 LUMBAR SPONDYLOSIS: ICD-10-CM

## 2024-01-15 DIAGNOSIS — E11.65 TYPE 2 DIABETES MELLITUS WITH HYPERGLYCEMIA, WITHOUT LONG-TERM CURRENT USE OF INSULIN (MULTI): ICD-10-CM

## 2024-01-15 DIAGNOSIS — E66.01 OBESITY, CLASS III, BMI 40-49.9 (MORBID OBESITY) (MULTI): ICD-10-CM

## 2024-01-15 PROBLEM — M54.16 LEFT LUMBAR RADICULOPATHY: Status: RESOLVED | Noted: 2023-10-17 | Resolved: 2024-01-15

## 2024-01-15 PROBLEM — G83.4 CAUDA EQUINA COMPRESSION (MULTI): Status: RESOLVED | Noted: 2023-12-22 | Resolved: 2024-01-15

## 2024-01-15 PROBLEM — Z98.890 S/P SURGICAL MANIPULATION OF ANKLE JOINT: Status: RESOLVED | Noted: 2023-10-30 | Resolved: 2024-01-15

## 2024-01-15 PROBLEM — F11.11 H/O OPIOID ABUSE (MULTI): Status: ACTIVE | Noted: 2024-01-15

## 2024-01-15 PROBLEM — M43.10 SPONDYLOLISTHESIS: Status: RESOLVED | Noted: 2023-01-13 | Resolved: 2024-01-15

## 2024-01-15 PROBLEM — M51.9 LUMBAR DISC DISEASE: Status: ACTIVE | Noted: 2024-01-15

## 2024-01-15 PROBLEM — T81.30XA DEHISCENCE OF WOUND: Status: RESOLVED | Noted: 2023-11-09 | Resolved: 2024-01-15

## 2024-01-15 PROBLEM — G06.1 SPINAL EPIDURAL ABSCESS (HHS-HCC): Status: RESOLVED | Noted: 2023-12-22 | Resolved: 2024-01-15

## 2024-01-15 PROBLEM — E11.69 TYPE 2 DIABETES MELLITUS WITH OBESITY (MULTI): Status: ACTIVE | Noted: 2024-01-15

## 2024-01-15 PROBLEM — Z86.79 H/O SUBARACHNOID HEMORRHAGE: Status: ACTIVE | Noted: 2024-01-15

## 2024-01-15 PROBLEM — G06.1 SPINAL EPIDURAL ABSCESS (HHS-HCC): Status: ACTIVE | Noted: 2023-12-22

## 2024-01-15 PROBLEM — M96.1 LUMBAR POST-LAMINECTOMY SYNDROME: Status: RESOLVED | Noted: 2023-02-26 | Resolved: 2024-01-15

## 2024-01-15 PROBLEM — E66.9 TYPE 2 DIABETES MELLITUS WITH OBESITY (MULTI): Status: ACTIVE | Noted: 2024-01-15

## 2024-01-15 LAB — GGT SERPL-CCNC: 40 U/L (ref 5–64)

## 2024-01-15 PROCEDURE — L1902 AFO ANKLE GAUNTLET PRE OTS: HCPCS | Performed by: ORTHOPAEDIC SURGERY

## 2024-01-15 PROCEDURE — 73590 X-RAY EXAM OF LOWER LEG: CPT | Mod: RIGHT SIDE | Performed by: ORTHOPAEDIC SURGERY

## 2024-01-15 PROCEDURE — E0114 CRUTCH UNDERARM PAIR NO WOOD: HCPCS | Performed by: ORTHOPAEDIC SURGERY

## 2024-01-15 PROCEDURE — 73590 X-RAY EXAM OF LOWER LEG: CPT | Mod: RT

## 2024-01-15 PROCEDURE — L1812 KO ELASTIC W/JOINTS PRE OTS: HCPCS | Performed by: ORTHOPAEDIC SURGERY

## 2024-01-15 NOTE — PROGRESS NOTES
History of Present Illness   Chief Complaint   Patient presents with    Right Lower Leg - Follow-up     Right distal tibial nailing 10/17/23, needs a brace       History of Present Illness:  Patient returns today states overall his ankle is doing well.  He is really having mostly knee pain.  He is of aching pain some soreness and swelling.  Feels it gets stiff and sore.  Has been progressively weightbearing has been using his brace and boot on occasion.  He does feel little bit of subjective instability in the knee    Imaging  Ankle: No acute fractures or dislocations.  No arthritic change.  Tibia: Well aligned tibia fracture with interval change in hardware are in early callus and healing     Assessment/Plan:   Right distal tibial nailing  Right knee strain    Plan:  We reviewed the role of imaging, physical therapy, injections and the time frame to healing and correlation with outcome.  Knee brace and lace up ankle brace.  Formal physical therapy  NSAID: Ibuprofen.  GI side effects and medical risks discussed  Ice: 30 minutes on and off  Exercise home program: Medically directed ankle therapy / Handout given  Follow-up in 6 weeks for repeat x-rays 2 view tibia  Formal physical therapy for home health PT.  Crutches for ambulation/weightbearing as tolerated     Physical Exam  Well-nourished, well-developed. No acute distress. Alert and oriented x3. Responds appropriately to questioning. Good mood. Normal affect.  Right ankle:  Skin healthy and intact  Swelling and ecchymosis:  Moderate  Tenderness: Some tenderness along the distal medial tibia neurovascular exam normal distally  Palpable pedal pulse and good cap refill    Right knee: 5/5 quad strength, some pain in the distal incision.  Trace effusion to the knee.  Flexion to 110    Review of Systems   GENERAL: Negative for malaise, significant weight loss, fever  MUSCULOSKELETAL: See HPI  NEURO:  Negative     Past Medical History:   Diagnosis Date    Borderline  diabetes     Cauda equina compression (CMS/HCC) 12/22/2023    MR 11/23: Evaluation is severely limited by motion artifact.      Redemonstration of findings lumbosacral fusion and laminectomies.  There is redemonstration of a multiloculated collection in the  midline/left paramedian posterior subcutaneous soft tissues deep to  the incision, and more deeply within the laminectomy bed abutting and  within the dorsal epidural fat. The sterility of this collectio    Emphysema lung (CMS/HCC) 09/20/2023    H/O chest x-ray     1/20: Impression: Stable, enlarged cardiac mediastinal silhouette with mild central pulmonary vascular congestion. Nonspecific bibasilar airspace disease, worsened in overall appearance compared with the previous study, findings can be seen in  infiltrate/pneumonia and/or atelectasis.    H/O CT scan     CT Ankle: 1/20: A vertically oriented nondisplaced oblique fracture of the medial ankle mortise extends into the distal tibial metadiaphysis.  Several small old avulsion fracture fragments are noted inferior to both malleoli; as well as soft tissue swelling about the ankle.  There is no other fracture, radiodense foreign bodies, pathologic calcifications, or worrisome bone destruction identified.    H/O CT scan 10/11/2023    1. Acute minimally comminuted and displaced fractures through distal tibia and fibula as described above. 2. There are also findings consistent with remote trauma with significant cortical remodeling of the distal tibia with associated incongruity of the distal tibial articular surface as described above. 3. Moderate arthrosis of the tibiotalar and subtalar joints. 4. Soft tissue swelling about th    H/O CT scan of abdomen     2016: Diffuse urinary bladder wall thickening, Small atrophic left kidney with pyelocalyceal ectasia and severe cortical thinning, , Moderate pyelocalyceal ectasia of the right kidney with cortical thinning; unchanged    H/O CT scan of brain     11/22: normal  1/20: FINDINGS:  There is no intracranial hemorrhage, mass effect, midline shift, extra-axial collection, evidence of hydrocephalus, recent ischemic infarct, or skull fracture identified.    There is no significant atrophy or white matter changes, for age.  Moderate mucosal thickening within the paranasal sinuses again noted. The mastoid air cells are clear    H/O CT scan of brain 10/10/2023    There is some subtle increased attenuation along the right  tentorium cerebelli.  This is suspicious for a acute  subdural hematoma.    H/O CT scan of chest     10/19: 1. Bibasilar infiltrate/pneumonia with patchy pneumonia scattered throughout the left lung and bilateral pleural effusions.  2. Vascular calcifications left anterior descending coronary artery with mild to moderate cardiomegaly.  3. Moderate to moderately severe diffuse fatty infiltration of liver. 10/21: Bilateral groundglass opacities as discussed. These findings are nonspecific    H/O CT scan of chest 10/10/2023    NO PE. Somewhat patchy subpleural curvilinear markings are seen in the  bilateral lower lobes, overall morphology favoring atelectasis or infectious  infiltrate.  Scattered central small cysts are observed.    H/O CT scan of head 10/12/2023    In comparison to prior CT dated 10/03/2023, there has been interval improvement in the right frontal subarachnoid hemorrhage and diffuse subdural hemorrhage along the tentorium and overlying the right cerebellar hemisphere as well as resolution of right parietal lobe intraparenchymal hemorrhage.    H/O diagnostic ultrasound     RUQ US 2013: Cholelithiasis and mild gallbladder distention, Increased liver echogenicity    H/O diagnostic ultrasound 10/10/2023    renal - Severe right hydronephrosis, similar in appearance to CT lumbar spine examination. Bilateral ureteral jets visualized.    Mild asymmetric enlargement of the right kidney with cortical renal scarring involving the left kidney.    H/O  echocardiogram     1/20: Normal LV and RV.  No significant valve disease.  Normal estimated PA pressure.  Normal diastolic filling pattern.    H/O magnetic resonance imaging of cervical spine     2003: CENTRAL STENOSIS WORST C3-C4 1/21: Multilevel degenerative changes with disc height loss, disc osteophyte complexes, facet/uncovertebral degenerative changes. Moderate to severe canal and bilateral foraminal narrowing at C3-C4, C4-C5, C5-C6, C6-C7 grossly similar to  prior cervical MRI    H/O magnetic resonance imaging of lumbar spine     2012:  NEW RIGHT POSTERIOR EXTRUSION L4-5, WITH CONTACT OF THE EXITING RIGHT L5 AND DESCENDING RIGHT S1 NERVES. s/p laminectomy L5 2019 ([facility]): NARROWING OF MULTIPLE LUMBAR DISC LEVELS WITH FAIRLY ADVANCED DEGENERATIVE CHANGES. SEE INDIVIDUAL LEVELS ABOVE.  MILD LUMBAR CANAL STENOSIS AT THE L4-5 LEVEL.  NARROWING OF NEURAL FORAMINA,    H/O magnetic resonance imaging of lumbar spine 10/10/2023    MR findings worrisome for retroperitoneal abscess, possibly extending into  the L4-5 disc space.   Apparent clumping of the proximal cauda equina nerve roots versus mass effect  due to an intrathecal fluid collection.  Recommend MRI of the T-spine for  further evaluation    H/O x-ray of lower extremity 10/10/2023    XR ankle: NEW ACUTE OBLIQUE FRACTURE OF THE DISTAL RIGHT TIBIA WITH A  OLD VERTICAL FRACTURE OF THE TIBIA.   THERE IS A NEW COMMINUTED FRACTURE OF THE DISTAL RIGHT FIBULA.    High cholesterol     Myocardial infarction (CMS/HCC) 12/22/2023    S/P surgical manipulation of ankle joint 10/30/2023    Spinal epidural abscess 12/22/2023    H/O Epidural Abscess  Lumbar spine epidural phlegmon status post washout on 11/23 with culture growing MSSA     Wound dehiscence 11/09/2023       Medication Documentation Review Audit       Reviewed by Shruti Bhakta MA (Medical Assistant) on 01/15/24 at 0946      Medication Order Taking? Sig Documenting Provider Last Dose Status   aspirin 81 mg  chewable tablet [id number] No Chew 1 tablet (81 mg) once daily. Lucio Solis,  Taking Active   atorvastatin (Lipitor) 80 mg tablet [id number] No Take 1 tablet (80 mg) by mouth once daily. Marsha Francis MD Taking Active   buprenorphine-naloxone (Suboxone) 8-2 mg SL film [id number] No Place 3 Film under the tongue once daily. Historical Provider, MD Taking Active   clopidogrel (Plavix) 75 mg tablet [id number] No Take 1 tablet (75 mg) by mouth once daily. Marsha Francis MD Taking Active   cyanocobalamin (Vitamin B-12) 1,000 mcg/mL oral liquid [id number] No Take 1 mL (1,000 mcg) by mouth. Daily for 1 wk. Then 1 ml weekly for a month. Then 1 ml monthly Historical Provider, MD Taking Active   ergocalciferol (Vitamin D-2) 1.25 MG (32101 UT) capsule [id number] No Take 1 capsule (50,000 Units) by mouth 1 (one) time per week. Historical Provider, MD Taking Active   escitalopram (Lexapro) 20 mg tablet [id number] No Take 1 tablet (20 mg) by mouth once daily. Historical Provider, MD Taking Active   metFORMIN (Glucophage) 500 mg tablet [id number] No Take 2 tablets (1,000 mg) by mouth 2 times a day with meals. Janet Giraldo APRN-CNP Taking Active   miscellaneous medical supply misc [id number] No Knee walker for non weight bearing right leg post right ankle surgery JARED Ordaz-CNP Taking Active   naloxone (Narcan) 4 mg/0.1 mL nasal spray [id number] No Administer 1 spray (4 mg) into affected nostril(s).  CALL 911 MAY REPEAT ONCE Historical Provider, MD Taking Active   pantoprazole (ProtoNix) 40 mg EC tablet [id number] No Take 1 tablet (40 mg) by mouth once daily in the morning. Take before meals for 14 days. Do not crush, chew, or split. Do not start before 2023. Nicholas Paredes PA-C n/a  10/20/23 2359   Discontinued 24 1152   pregabalin (Lyrica) 300 mg capsule [id number]  Take 1 capsule (300 mg) by mouth 2 times a day. Alisa Newell, JARED-CNP  Active   Discontinued 24 1152   promethazine  (Phenergan) 25 mg tablet [id number]  Take 1 tablet (25 mg) by mouth every 6 hours if needed for nausea or vomiting. ANGELINA Coat  Active   semaglutide 0.25 mg or 0.5 mg (2 mg/3 mL) pen injector [id number] No Inject 0.5 mg under the skin 1 (one) time per week. ANGELINA Ordaz Taking Active   tamsulosin (Flomax) 0.4 mg 24 hr capsule [id number] No Take 1 capsule (0.4 mg) by mouth once daily. Historical Provider, MD Taking Active   Patient not taking:  Discontinued 24 1152   Vascepa 1 gram capsule [id number]  Take 2 capsules (2 g) by mouth 2 times a day with meals. ANGELINA Cota  Active                    Allergies   Allergen Reactions    Metoprolol Unknown    Mirtazapine Unknown       Social History     Socioeconomic History    Marital status: Legally      Spouse name: Not on file    Number of children: Not on file    Years of education: Not on file    Highest education level: Not on file   Occupational History    Not on file   Tobacco Use    Smoking status: Every Day     Packs/day: 1     Types: Cigarettes    Smokeless tobacco: Never    Tobacco comments:     Smoking cessation program ordered   Vaping Use    Vaping Use: Never used   Substance and Sexual Activity    Alcohol use: Not Currently    Drug use: Not Currently     Types: Amphetamines, Cocaine    Sexual activity: Defer   Other Topics Concern    Not on file   Social History Narrative     (Ling)    no kids    Doesnt work/Disbility    Smoker: started age 24    Smokes 1-2ppd    On suboxone - pain management    No ETOH    -------    Family History:    F: ETOHIsm, DM, COPD    M: Lung CA, CAD    B: CA metastatic lung ()    S; ETOH ism    S:     Social Determinants of Health     Financial Resource Strain: Low Risk  (2023)    Overall Financial Resource Strain (CARDIA)     Difficulty of Paying Living Expenses: Not hard at all   Food Insecurity: No Food Insecurity (10/18/2023)    Hunger Vital Sign      Worried About Running Out of Food in the Last Year: Never true     Ran Out of Food in the Last Year: Never true   Transportation Needs: No Transportation Needs (11/20/2023)    PRAPARE - Transportation     Lack of Transportation (Medical): No     Lack of Transportation (Non-Medical): No   Physical Activity: Not on file   Stress: Not on file   Social Connections: Not on file   Intimate Partner Violence: Not on file   Housing Stability: Low Risk  (11/20/2023)    Housing Stability Vital Sign     Unable to Pay for Housing in the Last Year: No     Number of Places Lived in the Last Year: 1     Unstable Housing in the Last Year: No       Past Surgical History:   Procedure Laterality Date    ANKLE SURGERY  10/17/2023    Insertion Intramedullary RIGHT Nail Tibia by Dr Vic Hale    CARDIAC CATHETERIZATION      8/2015: The coronary anatomy is R dominant.  L main coronary artery was normal.  Left anterior descending artery presents luminal irregularities.  Circumflex coronary artery normal.  R coronary artery presents luminal irregularities.  Mid RCA lesion 40% stenosis.  LVEF 50%    CARDIAC CATHETERIZATION Right 10/11/2023    Procedure: Left Heart Cath;  Surgeon: Jonathan GASPAR MD;  Location: [facility] Cardiac Cath Lab;  Service: Cardiovascular;  Laterality: Right;  radial    CARDIAC CATHETERIZATION N/A 10/13/2023    Procedure: PCI RCA, right radial 6Fr;  Surgeon: Juvenal Le MD;  Location: [facility] Cardiac Cath Lab;  Service: Cardiovascular;  Laterality: N/A;    CERVICAL LAMINECTOMY      C3-C4, s/p cervical laminectomy.    ESOPHAGOGASTRODUODENOSCOPY      12/15: Localized mild erythema was found at the gastroesophageal junction    KIDNEY SURGERY  09/12/2013    Kidney Surgery    LUMBAR FUSION  10/06/2023    1. L3-4, L4-5 extreme lateral interbody fusion 2. L3-5 laminectomy, L5-S1 revision laminectomy, L5-S1 transforaminal lumbar interbody fusion, L3-S1 instrumented posterolateral fusion, repair of dural tear    LUMBAR FUSION   09/30/2023    L3-4 & L4-5 XLIF(NUVASIVE), L5-S1 TLIF(MEDTRONIC), L3-S1 POSTEROLATERAL FUSION, L3-5    LUMBAR LAMINECTOMY      s/p L5 laminectomy x2;  residual left foot drop    OTHER SURGICAL HISTORY  01/28/2020    Appendectomy    OTHER SURGICAL HISTORY  01/28/2020    Cholecystectomy    OTHER SURGICAL HISTORY  01/28/2020    [place] tooth extraction    OTHER SURGICAL HISTORY  01/28/2020    Tonsillectomy        Electrocardiogram, 12-lead PRN ACS symptoms    Result Date: 12/27/2023  Normal sinus rhythm Nonspecific T wave abnormality Confirmed by Cornell Acuna ([id number]) on 12/27/2023 5:01:12 PM    Electrocardiogram, 12-lead PRN ACS symptoms    Result Date: 12/27/2023  Normal sinus rhythm Normal ECG Confirmed by Cornell Acuna ([id number]) on 12/27/2023 4:55:45 PM

## 2024-01-15 NOTE — HH CARE COORDINATION
Home Care received a referral for Physical Therapy. Unfortunately, we are unable to accept and process the referral at this time.    Patients, please reach out to the referring provider or your PCP to assist in obtaining an alternative home care agency and/or guidance to meet your needs.    Providers, please reach out to  Home Care with any questions regarding the declined referral.

## 2024-01-15 NOTE — ASSESSMENT & PLAN NOTE
Down about 50 lbs with ozempic  Recheck HA1C  - may be able to try dc metformin  He would like to increase ozempic dose  - check labs first

## 2024-01-15 NOTE — HH CARE COORDINATION
Home Care received a Referral for Physical Therapy. We have processed the referral for a Start of Care on 01/16/2024.      If you have any questions or concerns, please feel free to contact us at [phone]. Follow the prompts, enter your five digit zip code, and you will be directed to your care team on [facility] 1.

## 2024-01-17 LAB — CARBOXYTHC UR-MCNC: 39 NG/ML

## 2024-01-26 ENCOUNTER — PATIENT OUTREACH (OUTPATIENT)
Dept: PRIMARY CARE | Facility: CLINIC | Age: 59
End: 2024-01-26
Payer: COMMERCIAL

## 2024-01-26 DIAGNOSIS — E55.9 VITAMIN D DEFICIENCY: ICD-10-CM

## 2024-01-26 DIAGNOSIS — M47.816 LUMBAR SPONDYLOSIS: ICD-10-CM

## 2024-01-26 DIAGNOSIS — J43.9 PULMONARY EMPHYSEMA, UNSPECIFIED EMPHYSEMA TYPE (MULTI): ICD-10-CM

## 2024-01-26 DIAGNOSIS — D50.9 IRON DEFICIENCY ANEMIA, UNSPECIFIED IRON DEFICIENCY ANEMIA TYPE: ICD-10-CM

## 2024-01-26 DIAGNOSIS — E11.65 TYPE 2 DIABETES MELLITUS WITH HYPERGLYCEMIA, WITHOUT LONG-TERM CURRENT USE OF INSULIN (MULTI): ICD-10-CM

## 2024-01-26 DIAGNOSIS — E53.8 VITAMIN B12 DEFICIENCY: ICD-10-CM

## 2024-01-26 PROCEDURE — 99490 CHRNC CARE MGMT STAFF 1ST 20: CPT | Performed by: INTERNAL MEDICINE

## 2024-01-26 RX ORDER — FERROUS SULFATE 325(65) MG
325 TABLET, DELAYED RELEASE (ENTERIC COATED) ORAL 2 TIMES WEEKLY
Qty: 24 TABLET | Refills: 3 | Status: SHIPPED | OUTPATIENT
Start: 2024-01-29 | End: 2025-01-28

## 2024-01-26 RX ORDER — CYANOCOBALAMIN 1000 UG/ML
1000 INJECTION, SOLUTION INTRAMUSCULAR; SUBCUTANEOUS
Qty: 3 ML | Refills: 3 | Status: SHIPPED | OUTPATIENT
Start: 2024-01-26 | End: 2024-04-16 | Stop reason: SDUPTHER

## 2024-01-26 RX ORDER — CYANOCOBALAMIN 1000 UG/ML
1000 INJECTION, SOLUTION INTRAMUSCULAR; SUBCUTANEOUS
COMMUNITY
End: 2024-01-26 | Stop reason: SDUPTHER

## 2024-01-26 RX ORDER — FERROUS SULFATE 325(65) MG
65 TABLET, DELAYED RELEASE (ENTERIC COATED) ORAL 2 TIMES WEEKLY
COMMUNITY
End: 2024-01-26 | Stop reason: SDUPTHER

## 2024-01-26 RX ORDER — ERGOCALCIFEROL 1.25 MG/1
50000 CAPSULE ORAL
Qty: 12 CAPSULE | Refills: 0 | Status: SHIPPED | OUTPATIENT
Start: 2024-01-26 | End: 2024-04-25

## 2024-01-26 NOTE — PROGRESS NOTES
Doing very well  Gaining strength  Walking on crutches now  Wearing an ankle and knee brace    Did not start any  home Health  He is unsure why  Active at home    Needs   -B12 sent to pharmacy  [facility] [address]    -vitamin d weekly  He has not been taking this    Would be willing to try iron supplement  Will do twice weekly    Did not get handicap placard   He will look for it over the weekend  In his house

## 2024-02-08 DIAGNOSIS — F39 MOOD DISORDER (CMS-HCC): ICD-10-CM

## 2024-02-08 RX ORDER — PROMETHAZINE HYDROCHLORIDE 25 MG/1
25 TABLET ORAL EVERY 6 HOURS PRN
Qty: 120 TABLET | Refills: 0 | Status: SHIPPED | OUTPATIENT
Start: 2024-02-08 | End: 2024-03-07 | Stop reason: SDUPTHER

## 2024-02-20 ENCOUNTER — PATIENT OUTREACH (OUTPATIENT)
Dept: PRIMARY CARE | Facility: CLINIC | Age: 59
End: 2024-02-20
Payer: COMMERCIAL

## 2024-02-20 DIAGNOSIS — J43.9 PULMONARY EMPHYSEMA, UNSPECIFIED EMPHYSEMA TYPE (MULTI): ICD-10-CM

## 2024-02-20 DIAGNOSIS — M47.816 LUMBAR SPONDYLOSIS: ICD-10-CM

## 2024-02-20 DIAGNOSIS — E11.65 TYPE 2 DIABETES MELLITUS WITH HYPERGLYCEMIA, WITHOUT LONG-TERM CURRENT USE OF INSULIN (MULTI): ICD-10-CM

## 2024-02-20 PROCEDURE — 99490 CHRNC CARE MGMT STAFF 1ST 20: CPT | Performed by: INTERNAL MEDICINE

## 2024-02-20 NOTE — PROGRESS NOTES
Doing well  Getting stronger  Still walking on crutches  Knee is very painful  Goes again 2/22 to see Dr. Hale    Back on Ozempic  Doing really well  Does not check blood sugar at home    No other concerns at this time

## 2024-02-26 ENCOUNTER — HOSPITAL ENCOUNTER (OUTPATIENT)
Dept: RADIOLOGY | Facility: CLINIC | Age: 59
Discharge: HOME | End: 2024-02-26
Payer: COMMERCIAL

## 2024-02-26 ENCOUNTER — TELEPHONE (OUTPATIENT)
Dept: ORTHOPEDIC SURGERY | Facility: CLINIC | Age: 59
End: 2024-02-26

## 2024-02-26 ENCOUNTER — OFFICE VISIT (OUTPATIENT)
Dept: ORTHOPEDIC SURGERY | Facility: CLINIC | Age: 59
End: 2024-02-26
Payer: COMMERCIAL

## 2024-02-26 DIAGNOSIS — M17.11 PRIMARY OSTEOARTHRITIS OF RIGHT KNEE: Primary | ICD-10-CM

## 2024-02-26 DIAGNOSIS — S82.301D: ICD-10-CM

## 2024-02-26 DIAGNOSIS — S82.831D: ICD-10-CM

## 2024-02-26 PROCEDURE — 99214 OFFICE O/P EST MOD 30 MIN: CPT | Performed by: ORTHOPAEDIC SURGERY

## 2024-02-26 PROCEDURE — 3008F BODY MASS INDEX DOCD: CPT | Performed by: ORTHOPAEDIC SURGERY

## 2024-02-26 PROCEDURE — 73590 X-RAY EXAM OF LOWER LEG: CPT | Mod: RT

## 2024-02-26 PROCEDURE — 2500000005 HC RX 250 GENERAL PHARMACY W/O HCPCS: Performed by: ORTHOPAEDIC SURGERY

## 2024-02-26 PROCEDURE — 3044F HG A1C LEVEL LT 7.0%: CPT | Performed by: ORTHOPAEDIC SURGERY

## 2024-02-26 PROCEDURE — 20610 DRAIN/INJ JOINT/BURSA W/O US: CPT | Mod: RT | Performed by: ORTHOPAEDIC SURGERY

## 2024-02-26 PROCEDURE — 2500000004 HC RX 250 GENERAL PHARMACY W/ HCPCS (ALT 636 FOR OP/ED): Performed by: ORTHOPAEDIC SURGERY

## 2024-02-26 PROCEDURE — 73590 X-RAY EXAM OF LOWER LEG: CPT | Mod: RIGHT SIDE | Performed by: ORTHOPAEDIC SURGERY

## 2024-02-26 PROCEDURE — 3060F POS MICROALBUMINURIA REV: CPT | Performed by: ORTHOPAEDIC SURGERY

## 2024-02-26 RX ORDER — TRIAMCINOLONE ACETONIDE 40 MG/ML
40 INJECTION, SUSPENSION INTRA-ARTICULAR; INTRAMUSCULAR
Status: COMPLETED | OUTPATIENT
Start: 2024-02-26 | End: 2024-02-26

## 2024-02-26 RX ORDER — LIDOCAINE HYDROCHLORIDE 10 MG/ML
8 INJECTION INFILTRATION; PERINEURAL
Status: COMPLETED | OUTPATIENT
Start: 2024-02-26 | End: 2024-02-26

## 2024-02-26 RX ADMIN — TRIAMCINOLONE ACETONIDE 40 MG: 40 INJECTION, SUSPENSION INTRA-ARTICULAR; INTRAMUSCULAR at 09:11

## 2024-02-26 RX ADMIN — LIDOCAINE HYDROCHLORIDE 8 ML: 10 INJECTION, SOLUTION INFILTRATION; PERINEURAL at 09:11

## 2024-02-26 NOTE — PROGRESS NOTES
History of Present Illness   Chief Complaint   Patient presents with    Right Lower Leg - Follow-up     Right distal tibial nailing 10/17/23, 4 months out, with x-rays today       History of Present Illness:  Patient returns today states overall his ankle is doing well.  Ankle feels a bit getting stronger on a consistent basis.  Is to wean out of his brace.  He is got excellent strength and is definitely moving in the right direction.     He is really having mostly knee pain.  He is of aching pain some soreness and swelling.  Feels it gets stiff and sore.  Overall he states he is actually doing worse.  Still makes mechanical issues feel stiff and swollen.  States his knee is definitely worse than his ankle he does feel little bit of subjective instability in the knee    Imaging  Ankle: No acute fractures or dislocations.  No arthritic change.  Tibia: Well aligned tibia fracture with interval change in hardware are in early callus and healing     Assessment/Plan:   Right distal tibial nailing  Right knee strain    Plan:  We reviewed the role of imaging, physical therapy, injections and the time frame to healing and correlation with outcome.  Knee brace and lace up ankle brace.  Formal physical therapy  NSAID: Ibuprofen.  GI side effects and medical risks discussed  Ice: 30 minutes on and off  Exercise home program: Medically directed ankle therapy / Handout given  Encouraged him to get gym membership in the pool  Recommended doing a cortisone injection today   Follow-up in 8 weeks    L Inj/Asp: R knee on 2/26/2024 9:11 AM  Indications: pain  Details: 22 G needle, anterolateral approach  Medications: 8 mL lidocaine 10 mg/mL (1 %); 40 mg triamcinolone acetonide 40 mg/mL  Outcome: tolerated well, no immediate complications  Procedure, treatment alternatives, risks and benefits explained, specific risks discussed. Consent was given by the patient. Immediately prior to procedure a time out was called to verify the  correct patient, procedure, equipment, support staff and site/side marked as required. Patient was prepped and draped in the usual sterile fashion.              physical Exam  Well-nourished, well-developed. No acute distress. Alert and oriented x3. Responds appropriately to questioning. Good mood. Normal affect.  Right ankle:  Skin healthy and intact  Swelling and ecchymosis:  Moderate  Tenderness: Some tenderness along the distal medial tibia neurovascular exam normal distally  Palpable pedal pulse and good cap refill    Right knee: 5/5 quad strength, some pain in the distal incision.  Trace effusion to the knee.  Flexion to 110.  Moderate crepitus on the patellofemoral exam.  Positive Janna positive Apley grind.  Pain along the medial joint line    Review of Systems   GENERAL: Negative for malaise, significant weight loss, fever  MUSCULOSKELETAL: See HPI  NEURO:  Negative     Past Medical History:   Diagnosis Date    Borderline diabetes     Cauda equina compression (CMS/HCC) 12/22/2023    MR 11/23: Evaluation is severely limited by motion artifact.      Redemonstration of findings lumbosacral fusion and laminectomies.  There is redemonstration of a multiloculated collection in the  midline/left paramedian posterior subcutaneous soft tissues deep to  the incision, and more deeply within the laminectomy bed abutting and  within the dorsal epidural fat. The sterility of this collectio    Emphysema lung (CMS/HCC) 09/20/2023    H/O chest x-ray     1/20: Impression: Stable, enlarged cardiac mediastinal silhouette with mild central pulmonary vascular congestion. Nonspecific bibasilar airspace disease, worsened in overall appearance compared with the previous study, findings can be seen in  infiltrate/pneumonia and/or atelectasis.    H/O CT scan     CT Ankle: 1/20: A vertically oriented nondisplaced oblique fracture of the medial ankle mortise extends into the distal tibial metadiaphysis.  Several small old avulsion  fracture fragments are noted inferior to both malleoli; as well as soft tissue swelling about the ankle.  There is no other fracture, radiodense foreign bodies, pathologic calcifications, or worrisome bone destruction identified.    H/O CT scan 10/11/2023    1. Acute minimally comminuted and displaced fractures through distal tibia and fibula as described above. 2. There are also findings consistent with remote trauma with significant cortical remodeling of the distal tibia with associated incongruity of the distal tibial articular surface as described above. 3. Moderate arthrosis of the tibiotalar and subtalar joints. 4. Soft tissue swelling about th    H/O CT scan of abdomen     2016: Diffuse urinary bladder wall thickening, Small atrophic left kidney with pyelocalyceal ectasia and severe cortical thinning, , Moderate pyelocalyceal ectasia of the right kidney with cortical thinning; unchanged    H/O CT scan of brain     11/22: normal 1/20: FINDINGS:  There is no intracranial hemorrhage, mass effect, midline shift, extra-axial collection, evidence of hydrocephalus, recent ischemic infarct, or skull fracture identified.    There is no significant atrophy or white matter changes, for age.  Moderate mucosal thickening within the paranasal sinuses again noted. The mastoid air cells are clear    H/O CT scan of brain 10/10/2023    There is some subtle increased attenuation along the right  tentorium cerebelli.  This is suspicious for a acute  subdural hematoma.    H/O CT scan of chest     10/19: 1. Bibasilar infiltrate/pneumonia with patchy pneumonia scattered throughout the left lung and bilateral pleural effusions.  2. Vascular calcifications left anterior descending coronary artery with mild to moderate cardiomegaly.  3. Moderate to moderately severe diffuse fatty infiltration of liver. 10/21: Bilateral groundglass opacities as discussed. These findings are nonspecific    H/O CT scan of chest 10/10/2023    NO PE.  Somewhat patchy subpleural curvilinear markings are seen in the  bilateral lower lobes, overall morphology favoring atelectasis or infectious  infiltrate.  Scattered central small cysts are observed.    H/O CT scan of head 10/12/2023    In comparison to prior CT dated 10/03/2023, there has been interval improvement in the right frontal subarachnoid hemorrhage and diffuse subdural hemorrhage along the tentorium and overlying the right cerebellar hemisphere as well as resolution of right parietal lobe intraparenchymal hemorrhage.    H/O diagnostic ultrasound     RUQ US 2013: Cholelithiasis and mild gallbladder distention, Increased liver echogenicity    H/O diagnostic ultrasound 10/10/2023    renal - Severe right hydronephrosis, similar in appearance to CT lumbar spine examination. Bilateral ureteral jets visualized.    Mild asymmetric enlargement of the right kidney with cortical renal scarring involving the left kidney.    H/O echocardiogram     1/20: Normal LV and RV.  No significant valve disease.  Normal estimated PA pressure.  Normal diastolic filling pattern.    H/O magnetic resonance imaging of cervical spine     2003: CENTRAL STENOSIS WORST C3-C4 1/21: Multilevel degenerative changes with disc height loss, disc osteophyte complexes, facet/uncovertebral degenerative changes. Moderate to severe canal and bilateral foraminal narrowing at C3-C4, C4-C5, C5-C6, C6-C7 grossly similar to  prior cervical MRI    H/O magnetic resonance imaging of lumbar spine     2012:  NEW RIGHT POSTERIOR EXTRUSION L4-5, WITH CONTACT OF THE EXITING RIGHT L5 AND DESCENDING RIGHT S1 NERVES. s/p laminectomy L5 2019 ([facility]): NARROWING OF MULTIPLE LUMBAR DISC LEVELS WITH FAIRLY ADVANCED DEGENERATIVE CHANGES. SEE INDIVIDUAL LEVELS ABOVE.  MILD LUMBAR CANAL STENOSIS AT THE L4-5 LEVEL.  NARROWING OF NEURAL FORAMINA,    H/O magnetic resonance imaging of lumbar spine 10/10/2023    MR findings worrisome for retroperitoneal abscess, possibly  extending into  the L4-5 disc space.   Apparent clumping of the proximal cauda equina nerve roots versus mass effect  due to an intrathecal fluid collection.  Recommend MRI of the T-spine for  further evaluation    H/O x-ray of lower extremity 10/10/2023    XR ankle: NEW ACUTE OBLIQUE FRACTURE OF THE DISTAL RIGHT TIBIA WITH A  OLD VERTICAL FRACTURE OF THE TIBIA.   THERE IS A NEW COMMINUTED FRACTURE OF THE DISTAL RIGHT FIBULA.    High cholesterol     Myocardial infarction (CMS/HCC) 12/22/2023    S/P surgical manipulation of ankle joint 10/30/2023    Spinal epidural abscess 12/22/2023    H/O Epidural Abscess  Lumbar spine epidural phlegmon status post washout on 11/23 with culture growing MSSA     Wound dehiscence 11/09/2023       Medication Documentation Review Audit       Reviewed by Shruti Bhakta MA (Medical Assistant) on 01/15/24 at 0946      Medication Order Taking? Sig Documenting Provider Last Dose Status   aspirin 81 mg chewable tablet [id number] No Chew 1 tablet (81 mg) once daily. Lucio Solis,  Taking Active   atorvastatin (Lipitor) 80 mg tablet [id number] No Take 1 tablet (80 mg) by mouth once daily. Marsha Francis MD Taking Active   buprenorphine-naloxone (Suboxone) 8-2 mg SL film [id number] No Place 3 Film under the tongue once daily. Mery Dyer MD Taking Active   clopidogrel (Plavix) 75 mg tablet [id number] No Take 1 tablet (75 mg) by mouth once daily. Marsha Francis MD Taking Active   cyanocobalamin (Vitamin B-12) 1,000 mcg/mL oral liquid [id number] No Take 1 mL (1,000 mcg) by mouth. Daily for 1 wk. Then 1 ml weekly for a month. Then 1 ml monthly Mery Dyer MD Taking Active   ergocalciferol (Vitamin D-2) 1.25 MG (45511 UT) capsule [id number] No Take 1 capsule (50,000 Units) by mouth 1 (one) time per week. Mery Dyer MD Taking Active   escitalopram (Lexapro) 20 mg tablet [id number] No Take 1 tablet (20 mg) by mouth once daily. Mery Dyer MD Taking Active    metFORMIN (Glucophage) 500 mg tablet [id number] No Take 2 tablets (1,000 mg) by mouth 2 times a day with meals. ANGELINA Ordaz Taking Active   miscellaneous medical supply misc [id number] No Knee walker for non weight bearing right leg post right ankle surgery ANGELINA Ordaz Taking Active   naloxone (Narcan) 4 mg/0.1 mL nasal spray [id number] No Administer 1 spray (4 mg) into affected nostril(s).  CALL 911 MAY REPEAT ONCE Historical Provider, MD Taking Active   pantoprazole (ProtoNix) 40 mg EC tablet [id number] No Take 1 tablet (40 mg) by mouth once daily in the morning. Take before meals for 14 days. Do not crush, chew, or split. Do not start before 2023. Nicholas Paredes PA-C n/a  10/20/23 1495   Discontinued 24 1152   pregabalin (Lyrica) 300 mg capsule [id number]  Take 1 capsule (300 mg) by mouth 2 times a day. ANGELINA Cota  Active   Discontinued 24 1152   promethazine (Phenergan) 25 mg tablet [id number]  Take 1 tablet (25 mg) by mouth every 6 hours if needed for nausea or vomiting. ANGELINA Cota  Active   semaglutide 0.25 mg or 0.5 mg (2 mg/3 mL) pen injector [id number] No Inject 0.5 mg under the skin 1 (one) time per week. ANGELINA Ordaz Taking Active   tamsulosin (Flomax) 0.4 mg 24 hr capsule [id number] No Take 1 capsule (0.4 mg) by mouth once daily. Historical Provider, MD Taking Active   Patient not taking:  Discontinued 24 1152   Vascepa 1 gram capsule [id number]  Take 2 capsules (2 g) by mouth 2 times a day with meals. ANGELINA Cota  Active                    Allergies   Allergen Reactions    Metoprolol Unknown    Mirtazapine Unknown       Social History     Socioeconomic History    Marital status: Legally      Spouse name: Not on file    Number of children: Not on file    Years of education: Not on file    Highest education level: Not on file   Occupational History    Not on file   Tobacco  Use    Smoking status: Every Day     Packs/day: 1     Types: Cigarettes    Smokeless tobacco: Never    Tobacco comments:     Smoking cessation program ordered   Vaping Use    Vaping Use: Never used   Substance and Sexual Activity    Alcohol use: Not Currently    Drug use: Not Currently     Types: Amphetamines, Cocaine    Sexual activity: Defer   Other Topics Concern    Not on file   Social History Narrative     (Ling)    no kids    Doesnt work/Disbility    Smoker: started age 24    Smokes 1-2ppd    On suboxone - pain management    No ETOH    -------    Family History:    F: ETOHIsm, DM, COPD    M: Lung CA, CAD    B: CA metastatic lung ()    S; ETOH ism    S:     Social Determinants of Health     Financial Resource Strain: Low Risk  (2023)    Overall Financial Resource Strain (CARDIA)     Difficulty of Paying Living Expenses: Not hard at all   Food Insecurity: No Food Insecurity (10/18/2023)    Hunger Vital Sign     Worried About Running Out of Food in the Last Year: Never true     Ran Out of Food in the Last Year: Never true   Transportation Needs: No Transportation Needs (2023)    PRAPARE - Transportation     Lack of Transportation (Medical): No     Lack of Transportation (Non-Medical): No   Physical Activity: Not on file   Stress: Not on file   Social Connections: Not on file   Intimate Partner Violence: Not on file   Housing Stability: Low Risk  (2023)    Housing Stability Vital Sign     Unable to Pay for Housing in the Last Year: No     Number of Places Lived in the Last Year: 1     Unstable Housing in the Last Year: No       Past Surgical History:   Procedure Laterality Date    ANKLE SURGERY  10/17/2023    Insertion Intramedullary RIGHT Nail Tibia by Dr Vic Hale    CARDIAC CATHETERIZATION      2015: The coronary anatomy is R dominant.  L main coronary artery was normal.  Left anterior descending artery presents luminal irregularities.  Circumflex coronary artery normal.  R  coronary artery presents luminal irregularities.  Mid RCA lesion 40% stenosis.  LVEF 50%    CARDIAC CATHETERIZATION Right 10/11/2023    Procedure: Left Heart Cath;  Surgeon: Jonathan GASPAR MD;  Location: [facility] Cardiac Cath Lab;  Service: Cardiovascular;  Laterality: Right;  radial    CARDIAC CATHETERIZATION N/A 10/13/2023    Procedure: PCI RCA, right radial 6Fr;  Surgeon: Juvenal Le MD;  Location: [facility] Cardiac Cath Lab;  Service: Cardiovascular;  Laterality: N/A;    CERVICAL LAMINECTOMY      C3-C4, s/p cervical laminectomy.    ESOPHAGOGASTRODUODENOSCOPY      12/15: Localized mild erythema was found at the gastroesophageal junction    KIDNEY SURGERY  09/12/2013    Kidney Surgery    LUMBAR FUSION  10/06/2023    1. L3-4, L4-5 extreme lateral interbody fusion 2. L3-5 laminectomy, L5-S1 revision laminectomy, L5-S1 transforaminal lumbar interbody fusion, L3-S1 instrumented posterolateral fusion, repair of dural tear    LUMBAR FUSION  09/30/2023    L3-4 & L4-5 XLIF(NUVASIVE), L5-S1 TLIF([employer]TR[employer]IC), L3-S1 POSTEROLATERAL FUSION, L3-5    LUMBAR LAMINECTOMY      s/p L5 laminectomy x2;  residual left foot drop    OTHER SURGICAL HISTORY  01/28/2020    Appendectomy    OTHER SURGICAL HISTORY  01/28/2020    Cholecystectomy    OTHER SURGICAL HISTORY  01/28/2020    [place] tooth extraction    OTHER SURGICAL HISTORY  01/28/2020    Tonsillectomy        Electrocardiogram, 12-lead PRN ACS symptoms    Result Date: 12/27/2023  Normal sinus rhythm Nonspecific T wave abnormality Confirmed by Cornell Acuna ([id number]) on 12/27/2023 5:01:12 PM    Electrocardiogram, 12-lead PRN ACS symptoms    Result Date: 12/27/2023  Normal sinus rhythm Normal ECG Confirmed by Cornell Acuna ([id number]) on 12/27/2023 4:55:45 PM

## 2024-02-26 NOTE — TELEPHONE ENCOUNTER
2/26/24 - [place] lateral stabilizer exchanged today ([id number]) due to severe wear and velcro not sticking

## 2024-03-07 DIAGNOSIS — F39 MOOD DISORDER (CMS-HCC): ICD-10-CM

## 2024-03-07 RX ORDER — PROMETHAZINE HYDROCHLORIDE 25 MG/1
25 TABLET ORAL EVERY 6 HOURS PRN
Qty: 120 TABLET | Refills: 0 | Status: SHIPPED | OUTPATIENT
Start: 2024-03-07 | End: 2024-04-04 | Stop reason: SDUPTHER

## 2024-03-26 ENCOUNTER — PATIENT OUTREACH (OUTPATIENT)
Dept: PRIMARY CARE | Facility: CLINIC | Age: 59
End: 2024-03-26
Payer: COMMERCIAL

## 2024-03-26 NOTE — PROGRESS NOTES
Patient doing well  No concerns at this time  Will graduate from Chronic Care management  Patient aware he can always re-enroll should the need arise.

## 2024-04-04 DIAGNOSIS — F39 MOOD DISORDER (CMS-HCC): ICD-10-CM

## 2024-04-04 RX ORDER — PROMETHAZINE HYDROCHLORIDE 25 MG/1
25 TABLET ORAL EVERY 6 HOURS PRN
Qty: 120 TABLET | Refills: 0 | Status: SHIPPED | OUTPATIENT
Start: 2024-04-04 | End: 2024-05-02 | Stop reason: SDUPTHER

## 2024-04-16 ENCOUNTER — OFFICE VISIT (OUTPATIENT)
Dept: PRIMARY CARE | Facility: CLINIC | Age: 59
End: 2024-04-16
Payer: COMMERCIAL

## 2024-04-16 VITALS
BODY MASS INDEX: 25.2 KG/M2 | HEART RATE: 83 BPM | OXYGEN SATURATION: 93 % | TEMPERATURE: 97.3 F | HEIGHT: 71 IN | DIASTOLIC BLOOD PRESSURE: 71 MMHG | SYSTOLIC BLOOD PRESSURE: 111 MMHG | WEIGHT: 180 LBS

## 2024-04-16 DIAGNOSIS — R74.8 ALKALINE PHOSPHATASE ELEVATION: ICD-10-CM

## 2024-04-16 DIAGNOSIS — F41.9 ANXIETY: ICD-10-CM

## 2024-04-16 DIAGNOSIS — Z12.11 SCREEN FOR COLON CANCER: ICD-10-CM

## 2024-04-16 DIAGNOSIS — E78.2 MIXED HYPERLIPIDEMIA: ICD-10-CM

## 2024-04-16 DIAGNOSIS — E53.8 VITAMIN B12 DEFICIENCY: ICD-10-CM

## 2024-04-16 DIAGNOSIS — E61.1 IRON DEFICIENCY: ICD-10-CM

## 2024-04-16 DIAGNOSIS — Z71.89 ADVANCED DIRECTIVES, COUNSELING/DISCUSSION: ICD-10-CM

## 2024-04-16 DIAGNOSIS — Z79.899 MEDICATION MANAGEMENT: ICD-10-CM

## 2024-04-16 DIAGNOSIS — F11.11: ICD-10-CM

## 2024-04-16 DIAGNOSIS — M47.816 LUMBAR SPONDYLOSIS: ICD-10-CM

## 2024-04-16 DIAGNOSIS — Z00.00 ROUTINE ADULT HEALTH MAINTENANCE: Primary | Chronic | ICD-10-CM

## 2024-04-16 DIAGNOSIS — F17.200 CURRENT SMOKER: ICD-10-CM

## 2024-04-16 DIAGNOSIS — I25.10 CORONARY ARTERY DISEASE INVOLVING NATIVE CORONARY ARTERY OF NATIVE HEART WITHOUT ANGINA PECTORIS: ICD-10-CM

## 2024-04-16 DIAGNOSIS — Z71.89 CARDIAC RISK COUNSELING: ICD-10-CM

## 2024-04-16 DIAGNOSIS — E55.9 VITAMIN D DEFICIENCY: ICD-10-CM

## 2024-04-16 DIAGNOSIS — Z13.89 SCREENING FOR MULTIPLE CONDITIONS: ICD-10-CM

## 2024-04-16 DIAGNOSIS — Z12.5 SCREENING FOR PROSTATE CANCER: ICD-10-CM

## 2024-04-16 DIAGNOSIS — F32.1 CURRENT MODERATE EPISODE OF MAJOR DEPRESSIVE DISORDER WITHOUT PRIOR EPISODE (MULTI): ICD-10-CM

## 2024-04-16 PROBLEM — R89.2 ABNORMAL DRUG SCREEN: Status: RESOLVED | Noted: 2023-10-17 | Resolved: 2024-04-16

## 2024-04-16 PROBLEM — D72.829 LEUKOCYTOSIS: Status: RESOLVED | Noted: 2019-02-03 | Resolved: 2024-04-16

## 2024-04-16 PROBLEM — E11.65 TYPE 2 DIABETES MELLITUS WITH HYPERGLYCEMIA, WITHOUT LONG-TERM CURRENT USE OF INSULIN (MULTI): Status: RESOLVED | Noted: 2023-02-26 | Resolved: 2024-04-16

## 2024-04-16 PROBLEM — F39 MOOD DISORDER (CMS-HCC): Status: RESOLVED | Noted: 2023-09-20 | Resolved: 2024-04-16

## 2024-04-16 PROBLEM — I10 HYPERTENSION, ESSENTIAL, BENIGN: Status: RESOLVED | Noted: 2023-02-26 | Resolved: 2024-04-16

## 2024-04-16 PROBLEM — I25.2 H/O NON-ST ELEVATION MYOCARDIAL INFARCTION (NSTEMI): Status: RESOLVED | Noted: 2023-10-09 | Resolved: 2024-04-16

## 2024-04-16 PROBLEM — M48.062 NEUROGENIC CLAUDICATION DUE TO LUMBAR SPINAL STENOSIS: Status: RESOLVED | Noted: 2023-09-30 | Resolved: 2024-04-16

## 2024-04-16 PROBLEM — M43.17 SPONDYLOLISTHESIS, LUMBOSACRAL REGION: Status: RESOLVED | Noted: 2023-01-13 | Resolved: 2024-04-16

## 2024-04-16 PROBLEM — E11.59 TYPE 2 DIABETES MELLITUS WITH CIRCULATORY DISORDER, WITHOUT LONG-TERM CURRENT USE OF INSULIN (MULTI): Status: ACTIVE | Noted: 2024-01-15

## 2024-04-16 PROBLEM — Z76.89 ENCOUNTER FOR SUPPORT AND COORDINATION OF TRANSITION OF CARE: Status: RESOLVED | Noted: 2024-01-12 | Resolved: 2024-04-16

## 2024-04-16 PROBLEM — E78.1 HIGH TRIGLYCERIDES: Status: RESOLVED | Noted: 2023-06-07 | Resolved: 2024-04-16

## 2024-04-16 PROCEDURE — G0446 INTENS BEHAVE THER CARDIO DX: HCPCS | Performed by: INTERNAL MEDICINE

## 2024-04-16 PROCEDURE — G0439 PPPS, SUBSEQ VISIT: HCPCS | Performed by: INTERNAL MEDICINE

## 2024-04-16 PROCEDURE — 3078F DIAST BP <80 MM HG: CPT | Performed by: INTERNAL MEDICINE

## 2024-04-16 PROCEDURE — 3060F POS MICROALBUMINURIA REV: CPT | Performed by: INTERNAL MEDICINE

## 2024-04-16 PROCEDURE — 3008F BODY MASS INDEX DOCD: CPT | Performed by: INTERNAL MEDICINE

## 2024-04-16 PROCEDURE — G0444 DEPRESSION SCREEN ANNUAL: HCPCS | Performed by: INTERNAL MEDICINE

## 2024-04-16 PROCEDURE — 3044F HG A1C LEVEL LT 7.0%: CPT | Performed by: INTERNAL MEDICINE

## 2024-04-16 PROCEDURE — 99214 OFFICE O/P EST MOD 30 MIN: CPT | Performed by: INTERNAL MEDICINE

## 2024-04-16 PROCEDURE — 4004F PT TOBACCO SCREEN RCVD TLK: CPT | Performed by: INTERNAL MEDICINE

## 2024-04-16 PROCEDURE — 3074F SYST BP LT 130 MM HG: CPT | Performed by: INTERNAL MEDICINE

## 2024-04-16 PROCEDURE — 99497 ADVNCD CARE PLAN 30 MIN: CPT | Performed by: INTERNAL MEDICINE

## 2024-04-16 RX ORDER — SYRINGE-NEEDLE,INSULIN,0.5 ML 27GX1/2"
SYRINGE, EMPTY DISPOSABLE MISCELLANEOUS
Qty: 12 EACH | Refills: 0 | Status: SHIPPED | OUTPATIENT
Start: 2024-04-16

## 2024-04-16 RX ORDER — CYANOCOBALAMIN 1000 UG/ML
1000 INJECTION, SOLUTION INTRAMUSCULAR; SUBCUTANEOUS
Qty: 3 ML | Refills: 3 | Status: SHIPPED | OUTPATIENT
Start: 2024-04-16 | End: 2025-04-16

## 2024-04-16 ASSESSMENT — ACTIVITIES OF DAILY LIVING (ADL)
BATHING: INDEPENDENT
TAKING_MEDICATION: INDEPENDENT
MANAGING_FINANCES: INDEPENDENT
DRESSING: INDEPENDENT
TAKING_MEDICATION: INDEPENDENT
DRESSING: INDEPENDENT
GROCERY_SHOPPING: INDEPENDENT
DOING_HOUSEWORK: INDEPENDENT
GROCERY_SHOPPING: INDEPENDENT
MANAGING_FINANCES: INDEPENDENT
DOING_HOUSEWORK: INDEPENDENT
BATHING: INDEPENDENT

## 2024-04-16 ASSESSMENT — PATIENT HEALTH QUESTIONNAIRE - PHQ9
SUM OF ALL RESPONSES TO PHQ9 QUESTIONS 1 AND 2: 0
1. LITTLE INTEREST OR PLEASURE IN DOING THINGS: NOT AT ALL
2. FEELING DOWN, DEPRESSED OR HOPELESS: NOT AT ALL

## 2024-04-16 NOTE — PROGRESS NOTES
CC/HPI:   Follow-up (90 day follow up from TCM with DG)  Also due for MW/CPE  Reviewed DG note    Admission Date: 11/20/23  Discharge Date: 11/29/23  Discharge Facility: [facility]  Admission Date: 11/29/23  Discharge Date: 1/2/24    S/p R ankle fracture on 10/16/23, underwent R distal tibial nail 10/17 by Dr. Hale     S/p lumbar surgery with subarachnoid hemorrhage/subdural hematoma (9/22/2023) presented with wound dehiscence requiring lumbar exploration and washout.   - Wound culture 11/20: MSSA, but BCNTD  - Per ID: PICC line placed 11/22, Cefazolin x 6 weeks, stop date 1/1/2024    H/O NSTEMI (10/13/23)  - PCI to RCA    Home Suboxone resumed on 11/24/2023     Saw Ortho in 2/24 (Copied)  Assessment/Plan:   Right distal tibial nailing  Right knee strain    We reviewed the role of imaging, physical therapy, injections and the time frame to healing and correlation with outcome.  Knee brace and lace up ankle brace.  Formal physical therapy  NSAID: Ibuprofen.  GI side effects and medical risks discussed  Ice: 30 minutes on and off  Exercise home program: Medically directed ankle therapy / Handout given  Encouraged him to get gym membership in the pool  Recommended doing a cortisone injection today   Follow-up in 8 weeks     L Inj/Asp: R knee on 2/26/2024 9:11 AM  Indications: pain  Details: 22 G needle, anterolateral approach    Hasnt been taking his medications/Vitamins (D.B12. Iron)  Stopped Metformin, Flomax and Lexapro  Labs reviewed    Component      Latest Ref Rng 1/12/2024   Vitamin B12      211 - 911 pg/mL 231    Vitamin D, 25-Hydroxy, Total      30 - 100 ng/mL 24 (L)       Component      Latest Ref Rng 1/12/2024   IRON      35 - 150 ug/dL 17 (L)    UIBC      110 - 370 ug/dL 269    TIBC      240 - 445 ug/dL 286    % Saturation      25 - 45 % 6 (L)    FERRITIN      20 - 300 ng/mL 238      Component      Latest Ref Rng 1/12/2024   LEUKOCYTES (10*3/UL) IN BLOOD BY AUTOMATED COUNT, Irish      4.4 - 11.3  x10*3/uL 7.9    nRBC      0.0 - 0.0 /100 WBCs 0.0    ERYTHROCYTES (10*6/UL) IN BLOOD BY AUTOMATED COUNT, Moldovan      4.50 - 5.90 x10*6/uL 3.90 (L)    HEMOGLOBIN      13.5 - 17.5 g/dL 10.7 (L)    HEMATOCRIT      41.0 - 52.0 % 34.9 (L)    MCV      80 - 100 fL 90    MCH      26.0 - 34.0 pg 27.4    MCHC      32.0 - 36.0 g/dL 30.7 (L)    RED CELL DISTRIBUTION WIDTH      11.5 - 14.5 % 16.4 (H)    PLATELETS (10*3/UL) IN BLOOD AUTOMATED COUNT, Moldovan      150 - 450 x10*3/uL 449    NEUTROPHILS/100 LEUKOCYTES IN BLOOD BY AUTOMATED COUNT, [place]      40.0 - 80.0 % 58.8    Immature Granulocytes %, Automated      0.0 - 0.9 % 0.3    Lymphocytes %      13.0 - 44.0 % 30.5    Monocytes %      2.0 - 10.0 % 7.4    Eosinophils %      0.0 - 6.0 % 2.5    Basophils %      0.0 - 2.0 % 0.5    NEUTROPHILS (10*3/UL) IN BLOOD BY AUTOMATED COUNT, [place]      1.20 - 7.70 x10*3/uL 4.67    Immature Granulocytes Absolute, Automated      0.00 - 0.70 x10*3/uL 0.02    Lymphocytes Absolute      1.20 - 4.80 x10*3/uL 2.42    Monocytes Absolute      0.10 - 1.00 x10*3/uL 0.59    Eosinophils Absolute      0.00 - 0.70 x10*3/uL 0.20    Basophils Absolute      0.00 - 0.10 x10*3/uL 0.04    GLUCOSE      74 - 99 mg/dL 90    SODIUM      136 - 145 mmol/L 136    POTASSIUM      3.5 - 5.3 mmol/L 3.7    CHLORIDE      98 - 107 mmol/L 96 (L)    Bicarbonate      21 - 32 mmol/L 31    Anion Gap      10 - 20 mmol/L 13    Blood Urea Nitrogen      6 - 23 mg/dL 6    Creatinine      0.50 - 1.30 mg/dL 0.79    EGFR      >60 mL/min/1.73m*2 >90    Calcium      8.6 - 10.3 mg/dL 8.6    Albumin      3.4 - 5.0 g/dL 3.4    Alkaline Phosphatase      33 - 120 U/L 155 (H)    Total Protein      6.4 - 8.2 g/dL 8.1    AST      9 - 39 U/L 15    Bilirubin Total      0.0 - 1.2 mg/dL 0.4    ALT      10 - 52 U/L 9 (L)    Hemoglobin A1C      see below % 4.9    Estimated Average Glucose      Not Established mg/dL 94    LIPASE      9 - 82 U/L 8 (L)    GGT      5 - 64 U/L 40            Assessment and Plan:  Problem List Items Addressed This Visit          High    Routine adult health maintenance - Primary (Chronic)    Overview     Moderna COVID 19 vaccine 4/17/21, 5/13/21, 2/24/22  Influenza Seasonal Inj Quad Age 6 Mo-64 Yrs Pres Free8/29/2019; 12/14/22  Shingrix 8/8/20, 6/1/23   Prevnar 20 6/1/23  Pneumococcal-13 Vac Aivvynxcx06/28/2019   Tdap (Age 7+)7/13/2015 1/6/22: Current 10-Year ASCVD Risk:  33.86% - High Risk  Cscope: no records at [facility]  BP cuff check:  our cuff 126/78 hr 89   pt cuff 126/79 hr 97          Current Assessment & Plan     Cologuard ordered  Will do Tetanus at next CPE            Medium    Advanced directives, counseling/discussion    Overview     4/16/24: Health Care Agent is Ling Thrasher (, but is wife)  FULL CODE         Anxiety    Overview     On Lexapro in past  Saw counselor 2x a week for 7 years  Mike/Tata Smith  Uses just prn Phenergan now  Works         CAD (coronary artery disease)    Overview     CT chest 2019: 2. Vascular calcifications left anterior descending coronary artery with mild to moderate cardiomegaly.  H/P NSTEMI  10/13/23 - Angiography reveals a 80% stenosis of the proximal to mid right coronary artery coronary artery  S/p PCI to RCA on 10/13/2023   On statin/vascepa and ASA         Cardiac risk counseling    Overview     4/16/24: Current 10-Year ASCVD Risk:  12.3%   On ASA (CAD)         Current moderate episode of major depressive disorder without prior episode (Multi)    Overview     On SSRI in past  (Lexapro caused gynecomastia)  Saw psychiatry/counselor in past  Not medicated now  Monitor         Current smoker    Overview     started age 32  Smokes 1ppd  Discussed cessation         Current Assessment & Plan     Agrees to referral         Relevant Orders    Referral to Tobacco Cessation Counseling    H/O opioid abuse (Multi)    Overview     Home Suboxone resumed on 11/24/2023   Managed by Pain MD         Iron deficiency     "Overview     1/24: Iron 17, %sat 6%  Started on Iron         Relevant Orders    Iron and TIBC    Ferritin    CBC and Auto Differential    Lumbar spondylosis    Overview     S/p 1. L3-4, L4-5 extreme lateral interbody fusion and  L3-5 laminectomy, L5-S1 revision laminectomy, L5-S1 transforaminal lumbar interbody fusion, L3-S1 instrumented posterolateral fusion, repair of dural tear  on September 29, 2023 w/complications: Postoperative lumbar fluid collections, concern for epidural abscesses and cauda equina, Postoperative site infection   s/p drainage and Bactrim course 11/9/2023, and Wound dehiscence   On Lyrica  Suboxone also helps w/pain control         Medication management    Overview     UDS, signed controlled substance contract, and OARRS check all up to date  Pain MD manages Subuxone         Mixed hyperlipidemia    Overview     on statin  Goal Ldl <100         Relevant Orders    Comprehensive metabolic panel    Screen for colon cancer    Relevant Orders    Cologuard® colon cancer screening    Screening for multiple conditions    Overview     Depression screening completed           Screening for prostate cancer    Relevant Orders    Prostate Specific Antigen, Screen    Vitamin B12 deficiency    Overview     12/22:192  1/24 = 231  On supp         Relevant Medications    cyanocobalamin (Vitamin B-12) 1,000 mcg/mL injection    insulin syringe-needle U-100 1 mL 30 gauge x 1/2\" syringe    Other Relevant Orders    Vitamin B12    CBC and Auto Differential    Vitamin D deficiency    Overview     1/24 = 24  Goal ~50  On supp          Current Assessment & Plan     Resume supplements  Repeat labs in 3months         Relevant Orders    Vitamin D 25-Hydroxy,Total (for eval of Vitamin D levels)     Other Visit Diagnoses       Alkaline phosphatase elevation        suspect due to broken bones and spine surgery  will repeat    Relevant Orders    Alkaline Phosphatase, Isoenzymes    Comprehensive metabolic panel      " "      Allergies and Medications  Metoprolol and Mirtazapine  Current Outpatient Medications   Medication Instructions    aspirin 81 mg, oral, Daily    atorvastatin (LIPITOR) 80 mg, oral, Daily    buprenorphine-naloxone (Suboxone) 8-2 mg SL film 3 Film, sublingual, Daily    cyanocobalamin (VITAMIN B-12) 1,000 mcg, intramuscular, Every 30 days    ergocalciferol (VITAMIN D-2) 50,000 Units, oral, Once Weekly    ferrous sulfate 325 (65 Fe) MG EC tablet 1 tablet, oral, 2 times weekly, Do not crush, chew, or split.    insulin syringe-needle U-100 1 mL 30 gauge x 1/2\" syringe Use with B12, may substitite    miscellaneous medical supply misc Knee walker for non weight bearing right leg post right ankle surgery    naloxone (NARCAN) 4 mg, nasal,  CALL 911 MAY REPEAT ONCE<BR>    pregabalin (LYRICA) 300 mg, oral, 2 times daily    promethazine (PHENERGAN) 25 mg, oral, Every 6 hours PRN    semaglutide (OZEMPIC) 1 mg, subcutaneous, Once Weekly    Vascepa 2 g, oral, 2 times daily with meals       ROS otherwise negative aside from what was mentioned above in HPI.    Vitals  /71   Pulse 83   Temp 36.3 °C (97.3 °F)   Ht 1.803 m (5' 11\")   Wt 81.6 kg (180 lb)   SpO2 93%   BMI 25.10 kg/m²   Body mass index is 25.1 kg/m².  Physical Exam  Gen: Alert, NAD  HEENT:  Unremarkable  Neck:  No DORA  Respiratory:  Lungs CTAB  Cardiovascular:  Heart RRR  Neuro:  Gross motor and sensory intact  Skin:  No suspicious lesions present  Breast: No masses, or axillary lymphadenopathy  Abd: Soft, NT    Patient Care Team:  Marsha Francis MD as PCP - General (Internal Medicine)  Nicholas Paredes PA-C as Physician Assistant (Neurosurgery)  Vic Hale MD as Consulting Physician (Orthopaedic Surgery)       Activities of Daily Living  In your present state of health, do you have any difficulty performing the following activities?:   Preparing food and eating?: No  Bathing yourself: No  Getting dressed: No  Using the toilet:No  Moving around from " "place to place: No  In the past year have you fallen or had a near fall?: Yes  Able to manage finances independently: Yes  Able to perform grocery shopping: Yes  Able to manage medications independently: Yes  Able to do housework independently: Yes  Patient self-assessment of health status? Fair    Current exercise habits: Not great   Dietary issues discussed: Yes  Hearing difficulties: No  Safe in current home environment: Yes  Visual Acuity assessed: Yes  Cognitive Impairment No  Allergies and Medications  Metoprolol and Mirtazapine  Current Outpatient Medications   Medication Instructions    aspirin 81 mg, oral, Daily    atorvastatin (LIPITOR) 80 mg, oral, Daily    buprenorphine-naloxone (Suboxone) 8-2 mg SL film 3 Film, sublingual, Daily    cyanocobalamin (VITAMIN B-12) 1,000 mcg, intramuscular, Every 30 days    ergocalciferol (VITAMIN D-2) 50,000 Units, oral, Once Weekly    ferrous sulfate 325 (65 Fe) MG EC tablet 1 tablet, oral, 2 times weekly, Do not crush, chew, or split.    insulin syringe-needle U-100 1 mL 30 gauge x 1/2\" syringe Use with B12, may substitite    miscellaneous medical supply misc Knee walker for non weight bearing right leg post right ankle surgery    naloxone (NARCAN) 4 mg, nasal,  CALL 911 MAY REPEAT ONCE<BR>    pregabalin (LYRICA) 300 mg, oral, 2 times daily    promethazine (PHENERGAN) 25 mg, oral, Every 6 hours PRN    semaglutide (OZEMPIC) 1 mg, subcutaneous, Once Weekly    Vascepa 2 g, oral, 2 times daily with meals       Medications and Supplements  prescribed by me and other practitioners or clinical pharmacist (such as prescriptions, OTC's, herbal therapies and supplements) were reviewed and documented in the medical record.      Active Problem List  Patient Active Problem List   Diagnosis    Anxiety    Chronic GERD    CAD (coronary artery disease)    Current moderate episode of major depressive disorder without prior episode (Multi)    Cervical spinal stenosis    H/O fracture of " ankle    Lumbar spondylosis    Mixed hyperlipidemia    Vesico-ureteral reflux    Vitamin B12 deficiency    Vitamin D deficiency    Routine adult health maintenance    Advanced directives, counseling/discussion    Cardiac risk counseling    Screening for multiple conditions    Medication management    Current smoker    Degenerative lumbar spinal stenosis    Lumbar canal stenosis    History of rhabdomyolysis    Pulmonary emphysema (Multi)    Lumbar disc disease    H/O opioid abuse (Multi)    H/O subarachnoid hemorrhage    Type 2 diabetes mellitus with circulatory disorder, without long-term current use of insulin (Multi)    Iron deficiency    Screening for prostate cancer    Screen for colon cancer       Comprehensive Medical/Surgical/Social/Family History  Past Medical History:   Diagnosis Date    Borderline diabetes     Cauda equina compression (Multi) 12/22/2023    MR 11/23: Evaluation is severely limited by motion artifact.      Redemonstration of findings lumbosacral fusion and laminectomies.  There is redemonstration of a multiloculated collection in the  midline/left paramedian posterior subcutaneous soft tissues deep to  the incision, and more deeply within the laminectomy bed abutting and  within the dorsal epidural fat. The sterility of this collectio    Emphysema lung (Multi) 09/20/2023    H/O chest x-ray     1/20: Impression: Stable, enlarged cardiac mediastinal silhouette with mild central pulmonary vascular congestion. Nonspecific bibasilar airspace disease, worsened in overall appearance compared with the previous study, findings can be seen in  infiltrate/pneumonia and/or atelectasis.    H/O CT scan     CT Ankle: 1/20: A vertically oriented nondisplaced oblique fracture of the medial ankle mortise extends into the distal tibial metadiaphysis.  Several small old avulsion fracture fragments are noted inferior to both malleoli; as well as soft tissue swelling about the ankle.  There is no other fracture,  radiodense foreign bodies, pathologic calcifications, or worrisome bone destruction identified.    H/O CT scan 10/11/2023    1. Acute minimally comminuted and displaced fractures through distal tibia and fibula as described above. 2. There are also findings consistent with remote trauma with significant cortical remodeling of the distal tibia with associated incongruity of the distal tibial articular surface as described above. 3. Moderate arthrosis of the tibiotalar and subtalar joints. 4. Soft tissue swelling about th    H/O CT scan of abdomen     2016: Diffuse urinary bladder wall thickening, Small atrophic left kidney with pyelocalyceal ectasia and severe cortical thinning, , Moderate pyelocalyceal ectasia of the right kidney with cortical thinning; unchanged    H/O CT scan of brain     11/22: normal 1/20: FINDINGS:  There is no intracranial hemorrhage, mass effect, midline shift, extra-axial collection, evidence of hydrocephalus, recent ischemic infarct, or skull fracture identified.    There is no significant atrophy or white matter changes, for age.  Moderate mucosal thickening within the paranasal sinuses again noted. The mastoid air cells are clear    H/O CT scan of brain 10/10/2023    There is some subtle increased attenuation along the right  tentorium cerebelli.  This is suspicious for a acute  subdural hematoma.    H/O CT scan of chest     10/19: 1. Bibasilar infiltrate/pneumonia with patchy pneumonia scattered throughout the left lung and bilateral pleural effusions.  2. Vascular calcifications left anterior descending coronary artery with mild to moderate cardiomegaly.  3. Moderate to moderately severe diffuse fatty infiltration of liver. 10/21: Bilateral groundglass opacities as discussed. These findings are nonspecific    H/O CT scan of chest 10/10/2023    NO PE. Somewhat patchy subpleural curvilinear markings are seen in the  bilateral lower lobes, overall morphology favoring atelectasis or  infectious  infiltrate.  Scattered central small cysts are observed.    H/O CT scan of head 10/12/2023    In comparison to prior CT dated 10/03/2023, there has been interval improvement in the right frontal subarachnoid hemorrhage and diffuse subdural hemorrhage along the tentorium and overlying the right cerebellar hemisphere as well as resolution of right parietal lobe intraparenchymal hemorrhage.    H/O diagnostic ultrasound     RUQ US 2013: Cholelithiasis and mild gallbladder distention, Increased liver echogenicity    H/O diagnostic ultrasound 10/10/2023    renal - Severe right hydronephrosis, similar in appearance to CT lumbar spine examination. Bilateral ureteral jets visualized.    Mild asymmetric enlargement of the right kidney with cortical renal scarring involving the left kidney.    H/O echocardiogram     1/20: Normal LV and RV.  No significant valve disease.  Normal estimated PA pressure.  Normal diastolic filling pattern.    H/O magnetic resonance imaging of cervical spine     2003: CENTRAL STENOSIS WORST C3-C4 1/21: Multilevel degenerative changes with disc height loss, disc osteophyte complexes, facet/uncovertebral degenerative changes. Moderate to severe canal and bilateral foraminal narrowing at C3-C4, C4-C5, C5-C6, C6-C7 grossly similar to  prior cervical MRI    H/O magnetic resonance imaging of lumbar spine     2012:  NEW RIGHT POSTERIOR EXTRUSION L4-5, WITH CONTACT OF THE EXITING RIGHT L5 AND DESCENDING RIGHT S1 NERVES. s/p laminectomy L5 2019 ([facility]): NARROWING OF MULTIPLE LUMBAR DISC LEVELS WITH FAIRLY ADVANCED DEGENERATIVE CHANGES. SEE INDIVIDUAL LEVELS ABOVE.  MILD LUMBAR CANAL STENOSIS AT THE L4-5 LEVEL.  NARROWING OF NEURAL FORAMINA,    H/O magnetic resonance imaging of lumbar spine 10/10/2023    MR findings worrisome for retroperitoneal abscess, possibly extending into  the L4-5 disc space.   Apparent clumping of the proximal cauda equina nerve roots versus mass effect  due to an  intrathecal fluid collection.  Recommend MRI of the T-spine for  further evaluation    H/O x-ray of lower extremity 10/10/2023    XR ankle: NEW ACUTE OBLIQUE FRACTURE OF THE DISTAL RIGHT TIBIA WITH A  OLD VERTICAL FRACTURE OF THE TIBIA.   THERE IS A NEW COMMINUTED FRACTURE OF THE DISTAL RIGHT FIBULA.    High cholesterol     Myocardial infarction (Multi) 12/22/2023    S/P surgical manipulation of ankle joint 10/30/2023    Spinal epidural abscess ([facility]) 12/22/2023    H/O Epidural Abscess  Lumbar spine epidural phlegmon status post washout on 11/23 with culture growing MSSA     Wound dehiscence 11/09/2023     Past Surgical History:   Procedure Laterality Date    ANKLE SURGERY  10/17/2023    Insertion Intramedullary RIGHT Nail Tibia by Dr Vic Hale    CARDIAC CATHETERIZATION      8/2015: The coronary anatomy is R dominant.  L main coronary artery was normal.  Left anterior descending artery presents luminal irregularities.  Circumflex coronary artery normal.  R coronary artery presents luminal irregularities.  Mid RCA lesion 40% stenosis.  LVEF 50%    CARDIAC CATHETERIZATION Right 10/11/2023    Procedure: Left Heart Cath;  Surgeon: Jonathan GASPAR MD;  Location: [facility] Card[facility] Lab;  Service: Cardiovascular;  Laterality: Right;  radial    CARDIAC CATHETERIZATION N/A 10/13/2023    Procedure: PCI RCA, right radial 6Fr;  Surgeon: Juvenal Le MD;  Location: [facility] Cardiac Cath Lab;  Service: Cardiovascular;  Laterality: N/A;    CERVICAL LAMINECTOMY      C3-C4, s/p cervical laminectomy.    ESOPHAGOGASTRODUODENOSCOPY      12/15: Localized mild erythema was found at the gastroesophageal junction    KIDNEY SURGERY  09/12/2013    Kidney Surgery    LUMBAR FUSION  10/06/2023    1. L3-4, L4-5 extreme lateral interbody fusion 2. L3-5 laminectomy, L5-S1 revision laminectomy, L5-S1 transforaminal lumbar interbody fusion, L3-S1 instrumented posterolateral fusion, repair of dural tear    LUMBAR FUSION  09/30/2023    L3-4 & L4-5  XLIF(NUVASIVE), L5-S1 TLIF([employer]), L3-S1 POSTEROLATERAL FUSION, L3-5    LUMBAR LAMINECTOMY      s/p L5 laminectomy x2;  residual left foot drop    OTHER SURGICAL HISTORY  2020    Appendectomy    OTHER SURGICAL HISTORY  2020    Cholecystectomy    OTHER SURGICAL HISTORY  2020    [place] tooth extraction    OTHER SURGICAL HISTORY  2020    Tonsillectomy     Social History     Social History Narrative     (Ling Thrasher),no kids    Doesnt work/Disbility    Smoker: started age 24,Smokes 1ppd (used to be 2ppd)    On suboxone - pain management    No ETOH    -------    Family History:    F: ETOHIsm, DM, COPD    M: Lung CA, CAD    B: CA metastatic lung ()    S; ETOH ism    S:     Tobacco/Alcohol/Opioid use, as well as Illicit Drug Use was screened for/reviewed and documented in Social Documentation section of the chart and medication list as appropriate   (~5min spent discussing the above)    Depression Screening  Depression screening completed using the PHQ-2 questions, with results documented in the chart/encounter (~5min).  (See Rooming Screening section for documentation, and/or progress note for additional information)    Cardiac Risk Assessment  Cardiovascular risk was discussed and, if needed, lifestyle modifications recommended, including nutritional choices, exercise, and elimination of habits contributing to risk. We agreed on a plan to reduce the current cardiovascular risk based on above discussion as needed.     Aspirin use/disuse was discussed and documented in the Problem List of the medical record (under Cardiac Risk Counseling) after reviewing the updated guidelines below:  Consider low dose Aspirin ( mg) use if the benefit for cardiovascular disease prevention outweighs risk for bleeding complications.   Discussed that in general, low dose ASA should be considered:  In patients WITHOUT prior MI/stroke/PAD (primary prevention):   a. Age <60: Use if 10-year  cardiovascular disease risk >20%, with discussion of risks and benefits with patient  b. Age 60-<70: Use if 10-year cardiovascular disease risk >20% and low bleeding (e.g., gastrointestinal) risk, with discussion of risks and benefits with patient  c. Age >=70: Do not use    In patients WITH prior MI/stroke/PAD (secondary prevention):   Generally use unless extremely high bleeding (e.g., gastrointenstinal) risk, with discussion of risks and benefits with patient    Advance Directives Discussion  Advanced Care Planning (including a Living Will, Healthcare POA, as well as specific end of life choices and/or directives), was discussed with the patient and/or surrogate, voluntarily, and details of that discussion documented in the Problem List (under Advanced Directives Discussion) of the medical record.    (~16 min spent discussing above)     During the course of the visit the patient was educated and counseled about age appropriate screening and preventive services.   Completed preventive screenings were documented in the chart (see Routine Health Maintenance in Problem List) and orders were placed for outstanding screenings/procedures as documented in the Assessment and Plan.    Patient Instructions (the written plan) was given to the patient at check out.

## 2024-04-17 DIAGNOSIS — M47.816 LUMBAR SPONDYLOSIS: ICD-10-CM

## 2024-04-17 DIAGNOSIS — Z79.899 MEDICATION MANAGEMENT: ICD-10-CM

## 2024-04-17 RX ORDER — PREGABALIN 300 MG/1
300 CAPSULE ORAL 2 TIMES DAILY
Qty: 180 CAPSULE | Refills: 0 | Status: CANCELLED | OUTPATIENT
Start: 2024-04-17 | End: 2024-07-16

## 2024-04-17 NOTE — TELEPHONE ENCOUNTER
Left patient detailed vm   Sorry didn't see that on the sticky note, good catch  Can refuse this rx

## 2024-04-17 NOTE — TELEPHONE ENCOUNTER
Patient calling requesting the following formatted refills:       Requested Prescriptions     Pending Prescriptions Disp Refills    pregabalin (Lyrica) 300 mg capsule 180 capsule 0     Sig: Take 1 capsule (300 mg) by mouth 2 times a day.           Faviola Vuong MA

## 2024-04-18 ENCOUNTER — LAB (OUTPATIENT)
Dept: LAB | Facility: LAB | Age: 59
End: 2024-04-18
Payer: COMMERCIAL

## 2024-04-18 DIAGNOSIS — Z79.899 MEDICATION MANAGEMENT: ICD-10-CM

## 2024-04-18 PROCEDURE — 80349 CANNABINOIDS NATURAL: CPT

## 2024-04-18 NOTE — TELEPHONE ENCOUNTER
Patient returned call  States he wasn't aware to recheck   Relayed it looks like suyapa relayed this to him   Maybe he didn't realize to repeat it ?  Ordered another THC urine as doesn't look like order is in

## 2024-04-22 DIAGNOSIS — M47.816 LUMBAR SPONDYLOSIS: ICD-10-CM

## 2024-04-22 RX ORDER — PREGABALIN 300 MG/1
300 CAPSULE ORAL 2 TIMES DAILY
Qty: 14 CAPSULE | Refills: 0 | Status: SHIPPED | OUTPATIENT
Start: 2024-04-22 | End: 2024-04-23 | Stop reason: SDUPTHER

## 2024-04-23 DIAGNOSIS — M47.816 LUMBAR SPONDYLOSIS: ICD-10-CM

## 2024-04-23 LAB — CARBOXYTHC UR-MCNC: <15 NG/ML

## 2024-04-23 RX ORDER — PREGABALIN 300 MG/1
300 CAPSULE ORAL 2 TIMES DAILY
Qty: 180 CAPSULE | Refills: 0 | Status: SHIPPED | OUTPATIENT
Start: 2024-04-23 | End: 2024-07-22

## 2024-05-02 DIAGNOSIS — E66.01 OBESITY, CLASS III, BMI 40-49.9 (MORBID OBESITY) (MULTI): ICD-10-CM

## 2024-05-02 DIAGNOSIS — E11.65 TYPE 2 DIABETES MELLITUS WITH HYPERGLYCEMIA, WITHOUT LONG-TERM CURRENT USE OF INSULIN (MULTI): ICD-10-CM

## 2024-05-02 DIAGNOSIS — F39 MOOD DISORDER (CMS-HCC): ICD-10-CM

## 2024-05-02 RX ORDER — PROMETHAZINE HYDROCHLORIDE 25 MG/1
25 TABLET ORAL EVERY 6 HOURS PRN
Qty: 120 TABLET | Refills: 0 | Status: SHIPPED | OUTPATIENT
Start: 2024-05-02 | End: 2024-05-03 | Stop reason: SDUPTHER

## 2024-05-03 LAB — NONINV COLON CA DNA+OCC BLD SCRN STL QL: NEGATIVE

## 2024-05-03 RX ORDER — PROMETHAZINE HYDROCHLORIDE 25 MG/1
25 TABLET ORAL EVERY 6 HOURS PRN
Qty: 120 TABLET | Refills: 0 | Status: SHIPPED | OUTPATIENT
Start: 2024-05-03 | End: 2024-05-31 | Stop reason: SDUPTHER

## 2024-05-31 DIAGNOSIS — F39 MOOD DISORDER (CMS-HCC): ICD-10-CM

## 2024-05-31 RX ORDER — PROMETHAZINE HYDROCHLORIDE 25 MG/1
25 TABLET ORAL EVERY 6 HOURS PRN
Qty: 120 TABLET | Refills: 0 | Status: SHIPPED | OUTPATIENT
Start: 2024-05-31 | End: 2024-06-30

## 2024-06-19 ENCOUNTER — OFFICE VISIT (OUTPATIENT)
Dept: ORTHOPEDIC SURGERY | Age: 59
End: 2024-06-19
Payer: MEDICARE

## 2024-06-19 VITALS
HEIGHT: 71 IN | TEMPERATURE: 97.2 F | OXYGEN SATURATION: 94 % | BODY MASS INDEX: 33.6 KG/M2 | WEIGHT: 240 LBS | HEART RATE: 101 BPM

## 2024-06-19 DIAGNOSIS — M10.021 ACUTE IDIOPATHIC GOUT OF RIGHT ELBOW: Primary | ICD-10-CM

## 2024-06-19 PROCEDURE — 99214 OFFICE O/P EST MOD 30 MIN: CPT | Performed by: PHYSICIAN ASSISTANT

## 2024-06-19 RX ORDER — COLCHICINE 0.6 MG/1
0.6 TABLET ORAL 2 TIMES DAILY
Qty: 20 TABLET | Refills: 0 | Status: SHIPPED | OUTPATIENT
Start: 2024-06-19 | End: 2024-06-29

## 2024-06-19 RX ORDER — PREDNISONE 20 MG/1
20 TABLET ORAL DAILY
Qty: 7 TABLET | Refills: 0 | Status: SHIPPED | OUTPATIENT
Start: 2024-06-19 | End: 2024-06-26

## 2024-06-19 ASSESSMENT — ENCOUNTER SYMPTOMS: RESPIRATORY NEGATIVE: 1

## 2024-06-19 NOTE — PROGRESS NOTES
Demetrius Lares Jr. (:  1965) is a 59 y.o. male,New patient, here for evaluation of the following chief complaint(s):  Joint Swelling (Patient presents for right elbow pain/ swelling. Present for approximately 5 days. Pain is currently 8/10 )      Assessment & Plan   1. Acute idiopathic gout of right elbow      No follow-ups on file.       Subjective   Is a 59-year-old right-hand-dominant male complaining of right elbow pain.  He has a history of gout.  He has had gout in his foot as well as his knee.  He is concerned he is having an acute gouty flare in his right elbow.  He denies any injury.  Denies change in sensation of the hand or the arm.  Denies fever or chills.        Review of Systems   Constitutional: Negative.    HENT: Negative.     Respiratory: Negative.     Skin: Negative.    Neurological: Negative.           Objective   Physical Exam  Musculoskeletal:      Comments: Right elbow-soft tissue swelling throughout the elbow.  No warmth, erythema, fluctuance or sign of infection.  Full extension, flexion is painful at 60 degrees.  Medial and lateral epicondyles are nontender.  Olecranon is nontender.  Sensation is intact distally to light touch.  Capillary refill is brisk.     Explained to the patient that this certainly could be a gouty flare.  There is nothing to aspirate and inject.  He will begin a short course of prednisone as well as colchicine.  He will contact the office should his symptoms not improve sufficiently.  I see no sign of active infection.  He does have a long history of gouty arthritis.       On this date 2024 I have spent 35 minutes reviewing previous notes, test results and face to face with the patient discussing the diagnosis and importance of compliance with the treatment plan as well as documenting on the day of the visit.      An electronic signature was used to authenticate this note.    --DAVID Chapman

## 2024-06-27 DIAGNOSIS — F39 MOOD DISORDER (CMS-HCC): ICD-10-CM

## 2024-06-27 RX ORDER — PROMETHAZINE HYDROCHLORIDE 25 MG/1
25 TABLET ORAL EVERY 6 HOURS PRN
Qty: 120 TABLET | Refills: 0 | Status: SHIPPED | OUTPATIENT
Start: 2024-06-27 | End: 2024-07-27

## 2024-07-12 ENCOUNTER — TELEPHONE (OUTPATIENT)
Dept: PRIMARY CARE | Facility: CLINIC | Age: 59
End: 2024-07-12
Payer: COMMERCIAL

## 2024-07-12 ENCOUNTER — LAB (OUTPATIENT)
Dept: LAB | Facility: LAB | Age: 59
End: 2024-07-12
Payer: COMMERCIAL

## 2024-07-12 DIAGNOSIS — E78.2 MIXED HYPERLIPIDEMIA: ICD-10-CM

## 2024-07-12 DIAGNOSIS — Z12.5 SCREENING FOR PROSTATE CANCER: ICD-10-CM

## 2024-07-12 DIAGNOSIS — R74.8 ELEVATED ALKALINE PHOSPHATASE LEVEL: ICD-10-CM

## 2024-07-12 DIAGNOSIS — E61.1 IRON DEFICIENCY: ICD-10-CM

## 2024-07-12 DIAGNOSIS — R74.8 ALKALINE PHOSPHATASE ELEVATION: ICD-10-CM

## 2024-07-12 DIAGNOSIS — E53.8 VITAMIN B12 DEFICIENCY: ICD-10-CM

## 2024-07-12 DIAGNOSIS — E55.9 VITAMIN D DEFICIENCY: ICD-10-CM

## 2024-07-12 LAB
ALBUMIN SERPL BCP-MCNC: 3.8 G/DL (ref 3.4–5)
ALP SERPL-CCNC: 134 U/L (ref 33–120)
ALT SERPL W P-5'-P-CCNC: 10 U/L (ref 10–52)
ANION GAP SERPL CALC-SCNC: 13 MMOL/L (ref 10–20)
AST SERPL W P-5'-P-CCNC: 12 U/L (ref 9–39)
BASOPHILS # BLD AUTO: 0.09 X10*3/UL (ref 0–0.1)
BASOPHILS NFR BLD AUTO: 1 %
BILIRUB SERPL-MCNC: 0.3 MG/DL (ref 0–1.2)
BUN SERPL-MCNC: 10 MG/DL (ref 6–23)
CALCIUM SERPL-MCNC: 9 MG/DL (ref 8.6–10.3)
CHLORIDE SERPL-SCNC: 99 MMOL/L (ref 98–107)
CO2 SERPL-SCNC: 30 MMOL/L (ref 21–32)
CREAT SERPL-MCNC: 1.3 MG/DL (ref 0.5–1.3)
EGFRCR SERPLBLD CKD-EPI 2021: 64 ML/MIN/1.73M*2
EOSINOPHIL # BLD AUTO: 0.34 X10*3/UL (ref 0–0.7)
EOSINOPHIL NFR BLD AUTO: 3.9 %
ERYTHROCYTE [DISTWIDTH] IN BLOOD BY AUTOMATED COUNT: 15.3 % (ref 11.5–14.5)
FERRITIN SERPL-MCNC: 62 NG/ML (ref 20–300)
GLUCOSE SERPL-MCNC: 99 MG/DL (ref 74–99)
HCT VFR BLD AUTO: 41.1 % (ref 41–52)
HGB BLD-MCNC: 13.4 G/DL (ref 13.5–17.5)
IMM GRANULOCYTES # BLD AUTO: 0.02 X10*3/UL (ref 0–0.7)
IMM GRANULOCYTES NFR BLD AUTO: 0.2 % (ref 0–0.9)
IRON SATN MFR SERPL: 18 % (ref 25–45)
IRON SERPL-MCNC: 58 UG/DL (ref 35–150)
LYMPHOCYTES # BLD AUTO: 3.7 X10*3/UL (ref 1.2–4.8)
LYMPHOCYTES NFR BLD AUTO: 42.1 %
MCH RBC QN AUTO: 29 PG (ref 26–34)
MCHC RBC AUTO-ENTMCNC: 32.6 G/DL (ref 32–36)
MCV RBC AUTO: 89 FL (ref 80–100)
MONOCYTES # BLD AUTO: 0.45 X10*3/UL (ref 0.1–1)
MONOCYTES NFR BLD AUTO: 5.1 %
NEUTROPHILS # BLD AUTO: 4.18 X10*3/UL (ref 1.2–7.7)
NEUTROPHILS NFR BLD AUTO: 47.7 %
NRBC BLD-RTO: 0 /100 WBCS (ref 0–0)
PLATELET # BLD AUTO: 268 X10*3/UL (ref 150–450)
POTASSIUM SERPL-SCNC: 3.8 MMOL/L (ref 3.5–5.3)
PROT SERPL-MCNC: 7 G/DL (ref 6.4–8.2)
PSA SERPL-MCNC: 0.06 NG/ML
RBC # BLD AUTO: 4.62 X10*6/UL (ref 4.5–5.9)
SODIUM SERPL-SCNC: 138 MMOL/L (ref 136–145)
TIBC SERPL-MCNC: 327 UG/DL (ref 240–445)
UIBC SERPL-MCNC: 269 UG/DL (ref 110–370)
WBC # BLD AUTO: 8.8 X10*3/UL (ref 4.4–11.3)

## 2024-07-12 PROCEDURE — 83550 IRON BINDING TEST: CPT

## 2024-07-12 PROCEDURE — 80053 COMPREHEN METABOLIC PANEL: CPT

## 2024-07-12 PROCEDURE — 83036 HEMOGLOBIN GLYCOSYLATED A1C: CPT

## 2024-07-12 PROCEDURE — 84080 ASSAY ALKALINE PHOSPHATASES: CPT

## 2024-07-12 PROCEDURE — 82306 VITAMIN D 25 HYDROXY: CPT

## 2024-07-12 PROCEDURE — 83540 ASSAY OF IRON: CPT

## 2024-07-12 PROCEDURE — 82607 VITAMIN B-12: CPT

## 2024-07-12 PROCEDURE — G0103 PSA SCREENING: HCPCS

## 2024-07-12 PROCEDURE — 85025 COMPLETE CBC W/AUTO DIFF WBC: CPT

## 2024-07-12 PROCEDURE — 82728 ASSAY OF FERRITIN: CPT

## 2024-07-12 NOTE — TELEPHONE ENCOUNTER
Patient wife called asking if there are active lab orders for patient to complete prior to upcoming appt. Relayed there are active orders to complete. Patient wife verbalized understanding

## 2024-07-13 LAB
25(OH)D3 SERPL-MCNC: 22 NG/ML (ref 30–100)
VIT B12 SERPL-MCNC: 247 PG/ML (ref 211–911)

## 2024-07-15 ENCOUNTER — APPOINTMENT (OUTPATIENT)
Dept: PRIMARY CARE | Facility: CLINIC | Age: 59
End: 2024-07-15
Payer: COMMERCIAL

## 2024-07-15 VITALS
BODY MASS INDEX: 30.21 KG/M2 | HEIGHT: 71 IN | OXYGEN SATURATION: 90 % | DIASTOLIC BLOOD PRESSURE: 60 MMHG | HEART RATE: 95 BPM | WEIGHT: 215.8 LBS | SYSTOLIC BLOOD PRESSURE: 90 MMHG | TEMPERATURE: 96.1 F

## 2024-07-15 DIAGNOSIS — E66.09 CLASS 1 OBESITY DUE TO EXCESS CALORIES WITH SERIOUS COMORBIDITY AND BODY MASS INDEX (BMI) OF 30.0 TO 30.9 IN ADULT: ICD-10-CM

## 2024-07-15 DIAGNOSIS — E11.59 TYPE 2 DIABETES MELLITUS WITH OTHER CIRCULATORY COMPLICATION, WITHOUT LONG-TERM CURRENT USE OF INSULIN (MULTI): Primary | ICD-10-CM

## 2024-07-15 DIAGNOSIS — I21.4 NSTEMI (NON-ST ELEVATED MYOCARDIAL INFARCTION) (MULTI): ICD-10-CM

## 2024-07-15 DIAGNOSIS — F39 MOOD DISORDER (CMS-HCC): ICD-10-CM

## 2024-07-15 DIAGNOSIS — M47.816 LUMBAR SPONDYLOSIS: ICD-10-CM

## 2024-07-15 DIAGNOSIS — E53.8 VITAMIN B12 DEFICIENCY: ICD-10-CM

## 2024-07-15 PROBLEM — E66.811 CLASS 1 OBESITY DUE TO EXCESS CALORIES WITH SERIOUS COMORBIDITY AND BODY MASS INDEX (BMI) OF 30.0 TO 30.9 IN ADULT: Status: ACTIVE | Noted: 2023-02-26

## 2024-07-15 LAB
ALP BONE SERPL-CCNC: 62 U/L (ref 0–55)
ALP LIVER SERPL-CCNC: 92 U/L (ref 0–94)
ALP OTHER SERPL-CCNC: 0 U/L
ALP SERPL-CCNC: 154 U/L (ref 40–120)
EST. AVERAGE GLUCOSE BLD GHB EST-MCNC: 105 MG/DL
HBA1C MFR BLD: 5.3 %

## 2024-07-15 PROCEDURE — 3044F HG A1C LEVEL LT 7.0%: CPT | Performed by: NURSE PRACTITIONER

## 2024-07-15 PROCEDURE — 3078F DIAST BP <80 MM HG: CPT | Performed by: NURSE PRACTITIONER

## 2024-07-15 PROCEDURE — 3008F BODY MASS INDEX DOCD: CPT | Performed by: NURSE PRACTITIONER

## 2024-07-15 PROCEDURE — 4004F PT TOBACCO SCREEN RCVD TLK: CPT | Performed by: NURSE PRACTITIONER

## 2024-07-15 PROCEDURE — 3060F POS MICROALBUMINURIA REV: CPT | Performed by: NURSE PRACTITIONER

## 2024-07-15 PROCEDURE — 3074F SYST BP LT 130 MM HG: CPT | Performed by: NURSE PRACTITIONER

## 2024-07-15 PROCEDURE — 99214 OFFICE O/P EST MOD 30 MIN: CPT | Performed by: NURSE PRACTITIONER

## 2024-07-15 RX ORDER — SYRINGE-NEEDLE,INSULIN,0.5 ML 27GX1/2"
SYRINGE, EMPTY DISPOSABLE MISCELLANEOUS
Qty: 3 EACH | Refills: 3 | Status: SHIPPED | OUTPATIENT
Start: 2024-07-15 | End: 2024-07-15

## 2024-07-15 RX ORDER — CYANOCOBALAMIN 1000 UG/ML
1000 INJECTION, SOLUTION INTRAMUSCULAR; SUBCUTANEOUS
Qty: 4 ML | Refills: 3 | Status: SHIPPED | OUTPATIENT
Start: 2024-07-15 | End: 2025-07-15

## 2024-07-15 RX ORDER — SEMAGLUTIDE 2.68 MG/ML
2 INJECTION, SOLUTION SUBCUTANEOUS
Qty: 3 ML | Refills: 2 | Status: SHIPPED | OUTPATIENT
Start: 2024-07-15

## 2024-07-15 RX ORDER — PROMETHAZINE HYDROCHLORIDE 25 MG/1
25 TABLET ORAL EVERY 6 HOURS PRN
Qty: 120 TABLET | Refills: 0 | Status: SHIPPED | OUTPATIENT
Start: 2024-07-15 | End: 2024-08-14

## 2024-07-15 RX ORDER — SYRINGE-NEEDLE,INSULIN,0.5 ML 27GX1/2"
SYRINGE, EMPTY DISPOSABLE MISCELLANEOUS
Qty: 4 EACH | Refills: 3 | Status: SHIPPED | OUTPATIENT
Start: 2024-07-15

## 2024-07-15 RX ORDER — ATORVASTATIN CALCIUM 80 MG/1
80 TABLET, FILM COATED ORAL DAILY
Qty: 90 TABLET | Refills: 3 | Status: SHIPPED | OUTPATIENT
Start: 2024-07-15 | End: 2025-07-15

## 2024-07-15 RX ORDER — CYANOCOBALAMIN 1000 UG/ML
1000 INJECTION, SOLUTION INTRAMUSCULAR; SUBCUTANEOUS
Qty: 3 ML | Refills: 3 | Status: SHIPPED | OUTPATIENT
Start: 2024-07-15 | End: 2024-07-15

## 2024-07-15 RX ORDER — PREGABALIN 300 MG/1
300 CAPSULE ORAL 2 TIMES DAILY
Qty: 180 CAPSULE | Refills: 0 | Status: SHIPPED | OUTPATIENT
Start: 2024-07-15 | End: 2024-10-13

## 2024-07-15 NOTE — PATIENT INSTRUCTIONS
In 3 months, right before next b12 injection, get lab    Ozempic follow up in 3 mo  CSV follow up in 6 mo

## 2024-07-15 NOTE — ASSESSMENT & PLAN NOTE
Will increase to q21 days per patient request  To see if it helps with fatigue  Recheck b12 in 3 mo

## 2024-07-15 NOTE — PROGRESS NOTES
"Problem List Items Addressed This Visit       Class 1 obesity due to excess calories with serious comorbidity and body mass index (BMI) of 30.0 to 30.9 in adult    Relevant Medications    semaglutide (Ozempic) 2 mg/dose (8 mg/3 mL) pen injector    Lumbar spondylosis    Overview     S/p 1. L3-4, L4-5 extreme lateral interbody fusion and  L3-5 laminectomy, L5-S1 revision laminectomy, L5-S1 transforaminal lumbar interbody fusion, L3-S1 instrumented posterolateral fusion, repair of dural tear  on September 29, 2023 w/complications: Postoperative lumbar fluid collections, concern for epidural abscesses and cauda equina, Postoperative site infection   s/p drainage and Bactrim course 11/9/2023, and Wound dehiscence   On Lyrica  Suboxone also helps w/pain control         Relevant Medications    pregabalin (Lyrica) 300 mg capsule    Type 2 diabetes mellitus with circulatory disorder, without long-term current use of insulin (Multi) - Primary    Overview     1/24 A1C = 4.9  On metformin in past (stopped in 1/24)  On ozempic  On statin         Relevant Medications    semaglutide (Ozempic) 2 mg/dose (8 mg/3 mL) pen injector    Other Relevant Orders    Hemoglobin A1C    Vitamin B12 deficiency    Overview     12/22:192  1/24 = 231  On supp         Current Assessment & Plan     Will increase to q21 days per patient request  To see if it helps with fatigue  Recheck b12 in 3 mo         Relevant Medications    cyanocobalamin (Vitamin B-12) 1,000 mcg/mL injection    insulin syringe-needle U-100 1 mL 30 gauge x 1/2\" syringe    Other Relevant Orders    Vitamin B12     Other Visit Diagnoses       NSTEMI (non-ST elevated myocardial infarction) (Multi)        Relevant Medications    atorvastatin (Lipitor) 80 mg tablet    Mood disorder (CMS-HCC)        Relevant Medications    promethazine (Phenergan) 25 mg tablet             Controlled Substance Visit:  I have personally reviewed the [place] report and have considered the risks of abuse, " dependence, addiction and diversion and I believe that it is clinically appropriate for the patient to be prescribed this medication.    Is the patient prescribed a combination of a benzodiazepine and opioid?  No    Last Urine Drug Screen / ordered today: No  Recent Results (from the past 8760 hour(s))   Confirmation Opiate/Opioid/Benzo Prescription Compliance    Collection Time: 01/12/24 12:25 PM   Result Value Ref Range    Clonazepam <25 <25 ng/mL    7-Aminoclonazepam <25 <25 ng/mL    Alprazolam <25 <25 ng/mL    Alpha-Hydroxyalprazolam <25 <25 ng/mL    Midazolam <25 <25 ng/mL    Alpha-Hydroxymidazolam <25 <25 ng/mL    Chlordiazepoxide <25 <25 ng/mL    Diazepam <25 <25 ng/mL    Nordiazepam <25 <25 ng/mL    Temazepam <25 <25 ng/mL    Oxazepam <25 <25 ng/mL    Lorazepam <25 <25 ng/mL    Methadone <25 <25 ng/mL    EDDP <25 <25 ng/mL    6-Acetylmorphine <25 <25 ng/mL    Codeine <50 <50 ng/mL    Hydrocodone <25 <25 ng/mL    Hydromorphone <25 <25 ng/mL    Morphine  <50 <50 ng/mL    Norhydrocodone <25 <25 ng/mL    Noroxycodone <25 <25 ng/mL    Oxycodone <25 <25 ng/mL    Oxymorphone <25 <25 ng/mL    Fentanyl <2.5 <2.5 ng/mL    Norfentanyl <2.5 <2.5 ng/mL    Tramadol <50 <50 ng/mL    O-Desmethyltramadol <50 <50 ng/mL    Zolpidem <25 <25 ng/mL    Zolpidem Metabolite (ZCA) <25 <25 ng/mL   Screen Opiate/Opioid/Benzo Prescription Compliance    Collection Time: 01/12/24 12:25 PM   Result Value Ref Range    Creatinine, Urine Random 116.0 20.0 - 370.0 mg/dL    Amphetamine Screen, Urine Presumptive Negative Presumptive Negative    Barbiturate Screen, Urine Presumptive Negative Presumptive Negative    Cannabinoid Screen, Urine Presumptive Positive (A) Presumptive Negative    Cocaine Metabolite Screen, Urine Presumptive Negative Presumptive Negative    PCP Screen, Urine Presumptive Negative Presumptive Negative   Drug Screen, Urine    Collection Time: 11/20/23 11:28 AM   Result Value Ref Range    Amphetamine Screen, Urine Presumptive  Negative Presumptive Negative    Barbiturate Screen, Urine Presumptive Negative Presumptive Negative    Benzodiazepines Screen, Urine Presumptive Negative Presumptive Negative    Cannabinoid Screen, Urine Presumptive Negative Presumptive Negative    Cocaine Metabolite Screen, Urine Presumptive Negative Presumptive Negative    Fentanyl Screen, Urine Presumptive Positive (A) Presumptive Negative    Opiate Screen, Urine Presumptive Positive (A) Presumptive Negative    Oxycodone Screen, Urine Presumptive Negative Presumptive Negative    PCP Screen, Urine Presumptive Negative Presumptive Negative     THC (Marijuana), Urine, Confirmation  Order: [id number]   Status: Final result       Visible to patient: No (inaccessible in [facility])       Dx: Medication management    3 Result Notes       1 Follow-up Encounter       Component  Ref Range & Units 2 mo ago 6 mo ago   68-Atx-5-carboxy-THC, Urn, Quant  ng/mL <15 39 CM   Comment: INTERPRETIVE INFORMATION: THC Metabolite, Urine,                            Quantitative    Methodology: Quantitative Liquid Chromatography-Tandem Mass  Spectrometry  Positive cutoff: 15 ng/mL  For medical purposes only; not valid for forensic use.  The drug analyte detected in this assay, 9-carboxy THC, is a  metabolite of delta-9-tetrahydrocannabinol (THC). Detection of  9-carboxy THC suggests use of, or exposure to, a product  containing THC.  This test cannot distinguish between prescribed  or non-prescribed forms of THC, nor can it distinguish between  active or passive use. The 9-carboxy THC metabolite can be  detected in urine for several weeks. Normalization of results to  creatinine concentration can help document elimination or suggest  recent use, when specimens are collected at least one week apart.    This test was developed and its performance characteristics  determined by [employer]. It has not been cleared or  approved by the [place] Food and Drug Administration. This test  was  performed in a CLIA certified laboratory and is intended for  clinical purposes.  Performed By: [facility]  [address] [place] [address]  : Bhupendra Cummings MD, PhD  CLIA Number: [id number]   Resulting Agency [facility]              Specimen Collected: 04/18/24 12:05 Last Resulted: 04/23/24 10:28          Results are as expected.     Controlled Substance Agreement:  Date of the Last Agreement: 1/12/24  I have reviewed each line item on the Controlled Substance Agreement including, but not limited to, the benefits, risks, and alternatives to treatment with a Controlled Substance medication(s). The patient has verbalized understanding and signed the agreement.    Lyrica:  What is the patient's goal of therapy? Control nerve pain  Is this being achieved with current treatment? yes    Pain Assessment:  Analgesia  What was your pain level on average during the past week?: 4  What was your pain level at its worst during the past week?: 7  What percentage of your pain has been relieved during the past week?: 30 %  Is the amount of pain relief you are now obtaining from your current pain relievers enough to make a real difference in your life?: Y  Query to Clinician: Is the patient's pain relief clinically significant?: Yes    Activities of Daily Living  Physical Functioning: Better  Family Relationships: Better  Social Relationships: Better  Mood: Same  Sleep Patterns: Same  Overall Functioning: Better    Adverse Events  Is patient experiencing any side effects from current pain relievers?: N  Patient's Overall Severity of Side Effects: None      Assessment  Is your overall impression that this patient is benefiting from opioid therapy?: Yes  Specific Analgesic Plan: Continue present regimen        Activities of Daily Living:  Is your overall impression that this patient is benefiting (symptom reduction outweighs side effects) from Lyrica therapy? Yes     1. Physical  "Functioning: Better  2. Family Relationship: Same  3. Social Relationship: Same  4. Mood: Better  5. Sleep Patterns: Better  6. Overall Function: Same      Gen: Alert, NAD  HEENT:  PERRLA, EOMI, conjunctiva and sclera normal in appearance; Neck supple  Respiratory:  Lungs CTAB  Cardiovascular:  Heart RRR. No M/R/G  Abdomen: soft, BS X 4  Neuro:  Gross motor and sensory intact  Skin:  No suspicious lesions present   Mood: normal          Subjective   Patient ID: Gm Thrasher is a 59 y.o. male who presents for csv  (Csv lyrica ).    Height:  5'11\"  Weight:  215 lb  Current med/dose:  1mg  On current dose at least 1 month prior to requesting dose increase:  several months  Side effects: none  Pharmacy:  [facility] [address]    Would like ot increase b12  Fatigued  Would like to see if this helped with energy levels    Just had knee surgery  Fracture tib fib        Review of Systems  ROS completely negative except what was mentioned in the HPI.  Problem List, surgical, social, and family histories which were reviewed and updated as necessary within the EMR. I also personally reviewed the notes, labs, and imaging that pertained to what was documented or discussed in the HPI.        "

## 2024-07-18 NOTE — PROGRESS NOTES
Chief Complaint   Patient presents with    Right Lower Leg - Follow-up     Right distal tibial nailing 10/17/23  S/p csi 02/26/2024     History of Present Illness:  DOS: 10/17/23 Right tibia IM nail.    Patient returns today complaining of left knee and ankle pain and instability.  Ankle feels a bit getting stronger on a consistent basis.  Is to wean out of his brace.  He is got excellent strength and is definitely moving in the right direction. He is really having mostly knee pain.  He is of aching pain some soreness and swelling.  Feels it gets stiff and sore.  Overall he states his knee is actually doing worse.  Still makes mechanical issues feel stiff and swollen.  States his left knee is definitely worse than his ankle he does feel little bit of subjective instability in the knee. He is having a hard time walking and his left is fairly limited.     Imaging:  Ankle: No acute fractures or dislocations.  No arthritic change.  Tibia: Well aligned tibia fracture with interval change in hardware are in early callus and healing     Assessment:   Right distal tibial nailing  Left ankle instability  Bilateral knee arthritis    Plan:  We reviewed the role of imaging, physical therapy, injections and the time frame to healing and correlation with outcome.  Knee brace and lace up ankle brace on the left.  NSAID: Ibuprofen.  GI side effects and medical risks discussed  Ice: 30 minutes on and off  Exercise home program: Medically directed ankle therapy / Handout given  [facility] therapy  Follow-up in 8 weeks    Physical Exam:  Well-nourished, well-developed. No acute distress. Alert and oriented x3. Responds appropriately to questioning. Good mood. Normal affect.  Right ankle:  Trace effusion  Skin healthy and intact  Swelling and ecchymosis:  Moderate  Tenderness: Some tenderness along the distal medial tibia neurovascular exam normal distally  Palpable pedal pulse and good cap refill    Right knee: 5/5 quad strength, some  pain in the distal incision.  Trace effusion to the knee.  Flexion to 120.  Moderate crepitus on the patellofemoral exam.  Positive Janna positive Apley grind.  Pain along the medial joint line    Review of Systems:  GENERAL: Negative for malaise, significant weight loss, fever  MUSCULOSKELETAL: See HPI  NEURO:  Negative     Past Medical History:   Diagnosis Date    Colon cancer screening 05/04/2024    Cologuard (-)    H/O chest x-ray     1/20: Impression: Stable, enlarged cardiac mediastinal silhouette with mild central pulmonary vascular congestion. Nonspecific bibasilar airspace disease, worsened in overall appearance compared with the previous study, findings can be seen in  infiltrate/pneumonia and/or atelectasis.    H/O CT scan     CT Ankle: 1/20: A vertically oriented nondisplaced oblique fracture of the medial ankle mortise extends into the distal tibial metadiaphysis.  Several small old avulsion fracture fragments are noted inferior to both malleoli; as well as soft tissue swelling about the ankle.  There is no other fracture, radiodense foreign bodies, pathologic calcifications, or worrisome bone destruction identified.    H/O CT scan 10/11/2023    1. Acute minimally comminuted and displaced fractures through distal tibia and fibula as described above. 2. There are also findings consistent with remote trauma with significant cortical remodeling of the distal tibia with associated incongruity of the distal tibial articular surface as described above. 3. Moderate arthrosis of the tibiotalar and subtalar joints. 4. Soft tissue swelling about th    H/O CT scan of abdomen     2016: Diffuse urinary bladder wall thickening, Small atrophic left kidney with pyelocalyceal ectasia and severe cortical thinning, , Moderate pyelocalyceal ectasia of the right kidney with cortical thinning; unchanged    H/O CT scan of brain     11/22: normal 1/20: FINDINGS:  There is no intracranial hemorrhage, mass effect, midline  shift, extra-axial collection, evidence of hydrocephalus, recent ischemic infarct, or skull fracture identified.    There is no significant atrophy or white matter changes, for age.  Moderate mucosal thickening within the paranasal sinuses again noted. The mastoid air cells are clear    H/O CT scan of brain 10/10/2023    There is some subtle increased attenuation along the right  tentorium cerebelli.  This is suspicious for a acute  subdural hematoma.    H/O CT scan of chest     10/19: 1. Bibasilar infiltrate/pneumonia with patchy pneumonia scattered throughout the left lung and bilateral pleural effusions.  2. Vascular calcifications left anterior descending coronary artery with mild to moderate cardiomegaly.  3. Moderate to moderately severe diffuse fatty infiltration of liver. 10/21: Bilateral groundglass opacities as discussed. These findings are nonspecific    H/O CT scan of chest 10/10/2023    NO PE. Somewhat patchy subpleural curvilinear markings are seen in the  bilateral lower lobes, overall morphology favoring atelectasis or infectious  infiltrate.  Scattered central small cysts are observed.    H/O CT scan of head 10/12/2023    In comparison to prior CT dated 10/03/2023, there has been interval improvement in the right frontal subarachnoid hemorrhage and diffuse subdural hemorrhage along the tentorium and overlying the right cerebellar hemisphere as well as resolution of right parietal lobe intraparenchymal hemorrhage.    H/O diagnostic ultrasound     RUQ US 2013: Cholelithiasis and mild gallbladder distention, Increased liver echogenicity    H/O diagnostic ultrasound 10/10/2023    renal - Severe right hydronephrosis, similar in appearance to CT lumbar spine examination. Bilateral ureteral jets visualized.    Mild asymmetric enlargement of the right kidney with cortical renal scarring involving the left kidney.    H/O echocardiogram     1/20: Normal LV and RV.  No significant valve disease.  Normal  estimated PA pressure.  Normal diastolic filling pattern.    H/O magnetic resonance imaging of cervical spine     2003: CENTRAL STENOSIS WORST C3-C4 1/21: Multilevel degenerative changes with disc height loss, disc osteophyte complexes, facet/uncovertebral degenerative changes. Moderate to severe canal and bilateral foraminal narrowing at C3-C4, C4-C5, C5-C6, C6-C7 grossly similar to  prior cervical MRI    H/O magnetic resonance imaging of lumbar spine     2012:  NEW RIGHT POSTERIOR EXTRUSION L4-5, WITH CONTACT OF THE EXITING RIGHT L5 AND DESCENDING RIGHT S1 NERVES. s/p laminectomy L5 2019 ([facility]): NARROWING OF MULTIPLE LUMBAR DISC LEVELS WITH FAIRLY ADVANCED DEGENERATIVE CHANGES. SEE INDIVIDUAL LEVELS ABOVE.  MILD LUMBAR CANAL STENOSIS AT THE L4-5 LEVEL.  NARROWING OF NEURAL FORAMINA,    H/O magnetic resonance imaging of lumbar spine 10/10/2023    MR findings worrisome for retroperitoneal abscess, possibly extending into  the L4-5 disc space.   Apparent clumping of the proximal cauda equina nerve roots versus mass effect  due to an intrathecal fluid collection.  Recommend MRI of the T-spine for  further evaluation    H/O x-ray of lower extremity 10/10/2023    XR ankle: NEW ACUTE OBLIQUE FRACTURE OF THE DISTAL RIGHT TIBIA WITH A  OLD VERTICAL FRACTURE OF THE TIBIA.   THERE IS A NEW COMMINUTED FRACTURE OF THE DISTAL RIGHT FIBULA.    S/P surgical manipulation of ankle joint 10/30/2023       Medication Documentation Review Audit       Reviewed by Hailey Barraza MA (Medical Assistant) on 07/19/24 at 0936      Medication Order Taking? Sig Documenting Provider Last Dose Status   aspirin 81 mg chewable tablet [id number] No Chew 1 tablet (81 mg) once daily. Lucio Solis, DO Taking Active   Discontinued 07/15/24 0920   atorvastatin (Lipitor) 80 mg tablet [id number]  Take 1 tablet (80 mg) by mouth once daily. Janet Giraldo, APRN-CNP  Active   buprenorphine-naloxone (Suboxone) 8-2 mg SL film [id number] No Place 3 Film  "under the tongue once daily. Historical Provider, MD Taking Active   Discontinued 07/15/24 0920     Discontinued 07/15/24 0924   cyanocobalamin (Vitamin B-12) 1,000 mcg/mL injection [id number]  Inject 1 mL (1,000 mcg) into the muscle every 21 (twenty-one) days. ANGELINA Ordaz  Active   ferrous sulfate 325 (65 Fe) MG EC tablet [id number] No Take 1 tablet by mouth 2 times a week. Do not crush, chew, or split. Marsha Francis MD Taking Active   Discontinued 07/15/24 0920     Discontinued 07/15/24 0924   insulin syringe-needle U-100 1 mL 30 gauge x 1/2\" syringe [id number]  Use with B12, may substitite ANGELINA Ordaz  Active   miscellaneous medical supply misc [id number] No Knee walker for non weight bearing right leg post right ankle surgery ANGELINA Ordaz Taking Active   naloxone (Narcan) 4 mg/0.1 mL nasal spray [id number] No Administer 1 spray (4 mg) into affected nostril(s).  CALL 911 MAY REPEAT ONCE Historical Provider, MD Taking Active   Discontinued 07/15/24 0920   pregabalin (Lyrica) 300 mg capsule [id number]  Take 1 capsule (300 mg) by mouth 2 times a day. ANGELINA Ordaz  Active   Discontinued 07/15/24 0920   promethazine (Phenergan) 25 mg tablet [id number]  Take 1 tablet (25 mg) by mouth every 6 hours if needed for nausea or vomiting. ANGELINA Ordaz  Active   Discontinued 07/15/24 0915   semaglutide (Ozempic) 2 mg/dose (8 mg/3 mL) pen injector [id number]  Inject 2 mg under the skin every 7 days. ANGELINA Ordaz  Active   Patient not taking:  Discontinued 07/15/24 0917                    Allergies   Allergen Reactions    Metoprolol Unknown    Mirtazapine Unknown       Social History     Socioeconomic History    Marital status: Legally      Spouse name: Not on file    Number of children: Not on file    Years of education: Not on file    Highest education level: Not on file   Occupational History    Not on file   Tobacco Use "    Smoking status: Every Day     Types: Cigarettes    Smokeless tobacco: Never    Tobacco comments:     Chantix caused AE     Smoking cessation program ordered   Vaping Use    Vaping status: Never Used   Substance and Sexual Activity    Alcohol use: Not Currently    Drug use: Not Currently    Sexual activity: Defer   Other Topics Concern    Not on file   Social History Narrative     ([employer]),no kids    Doesnt work/Disbility    Smoker: started age 24,Smokes 1ppd (used to be 2ppd)    On suboxone - pain management    No ETOH    -------    Family History:    F: ETOHIsm, DM, COPD    M: Lung CA, CAD    B: CA metastatic lung ()    S; ETOH ism    S:     Social Determinants of Health     Financial Resource Strain: Low Risk  (2023)    Overall Financial Resource Strain (CARDIA)     Difficulty of Paying Living Expenses: Not hard at all   Food Insecurity: No Food Insecurity (10/18/2023)    Hunger Vital Sign     Worried About Running Out of Food in the Last Year: Never true     Ran Out of Food in the Last Year: Never true   Transportation Needs: No Transportation Needs (2023)    PRAPARE - Transportation     Lack of Transportation (Medical): No     Lack of Transportation (Non-Medical): No   Physical Activity: Not on file   Stress: Not on file   Social Connections: Not on file   Intimate Partner Violence: Not on file   Housing Stability: Low Risk  (2023)    Housing Stability Vital Sign     Unable to Pay for Housing in the Last Year: No     Number of Places Lived in the Last Year: 1     Unstable Housing in the Last Year: No       Past Surgical History:   Procedure Laterality Date    ANKLE SURGERY  10/17/2023    Insertion Intramedullary RIGHT Nail Tibia by Dr Vic Hale    CARDIAC CATHETERIZATION      2015: The coronary anatomy is R dominant.  L main coronary artery was normal.  Left anterior descending artery presents luminal irregularities.  Circumflex coronary artery normal.  R coronary  artery presents luminal irregularities.  Mid RCA lesion 40% stenosis.  LVEF 50%    CARDIAC CATHETERIZATION Right 10/11/2023    Procedure: Left Heart Cath;  Surgeon: Jnoathan GASPAR MD;  Location: [facility] Cardiac [facility];  Service: Cardiovascular;  Laterality: Right;  radial    CARDIAC CATHETERIZATION N/A 10/13/2023    Procedure: PCI RCA, right radial 6Fr;  Surgeon: Juvenal Le MD;  Location: [facility] Cardiac Cath Lab;  Service: Cardiovascular;  Laterality: N/A;    CERVICAL LAMINECTOMY      C3-C4, s/p cervical laminectomy.    ESOPHAGOGASTRODUODENOSCOPY      12/15: Localized mild erythema was found at the gastroesophageal junction    KIDNEY SURGERY  09/12/2013    Kidney Surgery    LUMBAR FUSION  10/06/2023    1. L3-4, L4-5 extreme lateral interbody fusion 2. L3-5 laminectomy, L5-S1 revision laminectomy, L5-S1 transforaminal lumbar interbody fusion, L3-S1 instrumented posterolateral fusion, repair of dural tear    LUMBAR FUSION  09/30/2023    L3-4 & L4-5 XLIF(NUVASIVE), L5-S1 TLIF([employer]TR[employer]), L3-S1 POSTEROLATERAL FUSION, L3-5    LUMBAR LAMINECTOMY      s/p L5 laminectomy x2;  residual left foot drop    OTHER SURGICAL HISTORY  01/28/2020    Appendectomy    OTHER SURGICAL HISTORY  01/28/2020    Cholecystectomy    OTHER SURGICAL HISTORY  01/28/2020    [place] tooth extraction    OTHER SURGICAL HISTORY  01/28/2020    Tonsillectomy        Electrocardiogram, 12-lead PRN ACS symptoms    Result Date: 12/27/2023  Normal sinus rhythm Nonspecific T wave abnormality Confirmed by Cornell Acuna ([id number]) on 12/27/2023 5:01:12 PM    Electrocardiogram, 12-lead PRN ACS symptoms    Result Date: 12/27/2023  Normal sinus rhythm Normal ECG Confirmed by Cornell Acuna ([id number]) on 12/27/2023 4:55:45 PM

## 2024-07-19 ENCOUNTER — OFFICE VISIT (OUTPATIENT)
Dept: ORTHOPEDIC SURGERY | Facility: CLINIC | Age: 59
End: 2024-07-19
Payer: COMMERCIAL

## 2024-07-19 DIAGNOSIS — Z87.81 H/O FRACTURE OF ANKLE: ICD-10-CM

## 2024-07-19 DIAGNOSIS — Z98.890 S/P SURGICAL MANIPULATION OF ANKLE JOINT: ICD-10-CM

## 2024-07-19 DIAGNOSIS — M17.12 OSTEOARTHRITIS OF LEFT KNEE, UNSPECIFIED OSTEOARTHRITIS TYPE: Primary | ICD-10-CM

## 2024-07-19 PROCEDURE — 99213 OFFICE O/P EST LOW 20 MIN: CPT | Performed by: ORTHOPAEDIC SURGERY

## 2024-08-16 ENCOUNTER — TELEPHONE (OUTPATIENT)
Dept: PRIMARY CARE | Facility: CLINIC | Age: 59
End: 2024-08-16
Payer: COMMERCIAL

## 2024-08-16 DIAGNOSIS — F39 MOOD DISORDER (CMS-HCC): ICD-10-CM

## 2024-08-16 RX ORDER — PROMETHAZINE HYDROCHLORIDE 25 MG/1
25 TABLET ORAL EVERY 6 HOURS PRN
Qty: 120 TABLET | Refills: 0 | Status: SHIPPED | OUTPATIENT
Start: 2024-08-16 | End: 2024-09-15

## 2024-09-13 DIAGNOSIS — F39 MOOD DISORDER (CMS-HCC): ICD-10-CM

## 2024-09-13 RX ORDER — PROMETHAZINE HYDROCHLORIDE 25 MG/1
25 TABLET ORAL EVERY 6 HOURS PRN
Qty: 120 TABLET | Refills: 0 | Status: SHIPPED | OUTPATIENT
Start: 2024-09-13 | End: 2024-10-13

## 2024-10-06 DIAGNOSIS — F39 MOOD DISORDER (CMS-HCC): ICD-10-CM

## 2024-10-07 DIAGNOSIS — F39 MOOD DISORDER (CMS-HCC): ICD-10-CM

## 2024-10-07 RX ORDER — PROMETHAZINE HYDROCHLORIDE 25 MG/1
25 TABLET ORAL EVERY 6 HOURS PRN
Qty: 120 TABLET | Refills: 0 | Status: SHIPPED | OUTPATIENT
Start: 2024-10-07

## 2024-10-08 RX ORDER — PROMETHAZINE HYDROCHLORIDE 25 MG/1
25 TABLET ORAL EVERY 6 HOURS PRN
Qty: 120 TABLET | Refills: 0 | OUTPATIENT
Start: 2024-10-08

## 2024-10-14 ENCOUNTER — APPOINTMENT (OUTPATIENT)
Dept: PRIMARY CARE | Facility: CLINIC | Age: 59
End: 2024-10-14
Payer: COMMERCIAL

## 2024-10-14 VITALS
DIASTOLIC BLOOD PRESSURE: 87 MMHG | TEMPERATURE: 97.7 F | WEIGHT: 224.8 LBS | HEART RATE: 90 BPM | HEIGHT: 71 IN | BODY MASS INDEX: 31.47 KG/M2 | OXYGEN SATURATION: 96 % | SYSTOLIC BLOOD PRESSURE: 123 MMHG

## 2024-10-14 DIAGNOSIS — M47.816 LUMBAR SPONDYLOSIS: ICD-10-CM

## 2024-10-14 DIAGNOSIS — F32.1 CURRENT MODERATE EPISODE OF MAJOR DEPRESSIVE DISORDER WITHOUT PRIOR EPISODE (MULTI): ICD-10-CM

## 2024-10-14 DIAGNOSIS — E11.59 TYPE 2 DIABETES MELLITUS WITH OTHER CIRCULATORY COMPLICATION, WITHOUT LONG-TERM CURRENT USE OF INSULIN: ICD-10-CM

## 2024-10-14 DIAGNOSIS — F41.9 ANXIETY: Primary | ICD-10-CM

## 2024-10-14 DIAGNOSIS — E53.8 VITAMIN B12 DEFICIENCY: ICD-10-CM

## 2024-10-14 DIAGNOSIS — E66.811 CLASS 1 OBESITY DUE TO EXCESS CALORIES WITH SERIOUS COMORBIDITY AND BODY MASS INDEX (BMI) OF 30.0 TO 30.9 IN ADULT: ICD-10-CM

## 2024-10-14 DIAGNOSIS — I21.4 NSTEMI (NON-ST ELEVATED MYOCARDIAL INFARCTION) (MULTI): ICD-10-CM

## 2024-10-14 DIAGNOSIS — F39 MOOD DISORDER (CMS-HCC): ICD-10-CM

## 2024-10-14 DIAGNOSIS — E66.09 CLASS 1 OBESITY DUE TO EXCESS CALORIES WITH SERIOUS COMORBIDITY AND BODY MASS INDEX (BMI) OF 30.0 TO 30.9 IN ADULT: ICD-10-CM

## 2024-10-14 DIAGNOSIS — D50.9 IRON DEFICIENCY ANEMIA, UNSPECIFIED IRON DEFICIENCY ANEMIA TYPE: ICD-10-CM

## 2024-10-14 PROCEDURE — 3044F HG A1C LEVEL LT 7.0%: CPT | Performed by: NURSE PRACTITIONER

## 2024-10-14 PROCEDURE — 4004F PT TOBACCO SCREEN RCVD TLK: CPT | Performed by: NURSE PRACTITIONER

## 2024-10-14 PROCEDURE — 3008F BODY MASS INDEX DOCD: CPT | Performed by: NURSE PRACTITIONER

## 2024-10-14 PROCEDURE — 3079F DIAST BP 80-89 MM HG: CPT | Performed by: NURSE PRACTITIONER

## 2024-10-14 PROCEDURE — 99214 OFFICE O/P EST MOD 30 MIN: CPT | Performed by: NURSE PRACTITIONER

## 2024-10-14 PROCEDURE — 3060F POS MICROALBUMINURIA REV: CPT | Performed by: NURSE PRACTITIONER

## 2024-10-14 PROCEDURE — 3074F SYST BP LT 130 MM HG: CPT | Performed by: NURSE PRACTITIONER

## 2024-10-14 RX ORDER — HYDROXYZINE HYDROCHLORIDE 25 MG/1
25 TABLET, FILM COATED ORAL EVERY 8 HOURS PRN
Qty: 90 TABLET | Refills: 0 | Status: SHIPPED | OUTPATIENT
Start: 2024-10-14 | End: 2024-11-13

## 2024-10-14 RX ORDER — SEMAGLUTIDE 2.68 MG/ML
2 INJECTION, SOLUTION SUBCUTANEOUS
Qty: 3 ML | Refills: 2 | Status: SHIPPED | OUTPATIENT
Start: 2024-10-14

## 2024-10-14 RX ORDER — PROMETHAZINE HYDROCHLORIDE 25 MG/1
25 TABLET ORAL EVERY 6 HOURS PRN
Qty: 120 TABLET | Refills: 0 | Status: SHIPPED | OUTPATIENT
Start: 2024-10-14

## 2024-10-14 RX ORDER — ATORVASTATIN CALCIUM 80 MG/1
80 TABLET, FILM COATED ORAL DAILY
Qty: 90 TABLET | Refills: 3 | Status: SHIPPED | OUTPATIENT
Start: 2024-10-14 | End: 2025-10-14

## 2024-10-14 RX ORDER — CYANOCOBALAMIN 1000 UG/ML
1000 INJECTION, SOLUTION INTRAMUSCULAR; SUBCUTANEOUS
Qty: 4 ML | Refills: 3 | Status: SHIPPED | OUTPATIENT
Start: 2024-10-14 | End: 2025-10-14

## 2024-10-14 RX ORDER — FERROUS SULFATE 325(65) MG
325 TABLET, DELAYED RELEASE (ENTERIC COATED) ORAL 2 TIMES WEEKLY
Qty: 24 TABLET | Refills: 3 | Status: SHIPPED | OUTPATIENT
Start: 2024-10-14 | End: 2025-10-14

## 2024-10-14 RX ORDER — DULOXETIN HYDROCHLORIDE 20 MG/1
20 CAPSULE, DELAYED RELEASE ORAL 2 TIMES DAILY
Qty: 180 CAPSULE | Refills: 1 | Status: SHIPPED | OUTPATIENT
Start: 2024-10-14 | End: 2025-04-12

## 2024-10-14 RX ORDER — PREGABALIN 300 MG/1
300 CAPSULE ORAL 2 TIMES DAILY
Qty: 180 CAPSULE | Refills: 0 | Status: SHIPPED | OUTPATIENT
Start: 2024-10-14 | End: 2025-01-12

## 2024-10-14 RX ORDER — SYRINGE-NEEDLE,INSULIN,0.5 ML 27GX1/2"
SYRINGE, EMPTY DISPOSABLE MISCELLANEOUS
Qty: 4 EACH | Refills: 3 | Status: SHIPPED | OUTPATIENT
Start: 2024-10-14

## 2024-10-14 ASSESSMENT — ANXIETY QUESTIONNAIRES
4. TROUBLE RELAXING: NEARLY EVERY DAY
3. WORRYING TOO MUCH ABOUT DIFFERENT THINGS: MORE THAN HALF THE DAYS
GAD7 TOTAL SCORE: 14
7. FEELING AFRAID AS IF SOMETHING AWFUL MIGHT HAPPEN: MORE THAN HALF THE DAYS
1. FEELING NERVOUS, ANXIOUS, OR ON EDGE: MORE THAN HALF THE DAYS
6. BECOMING EASILY ANNOYED OR IRRITABLE: MORE THAN HALF THE DAYS
IF YOU CHECKED OFF ANY PROBLEMS ON THIS QUESTIONNAIRE, HOW DIFFICULT HAVE THESE PROBLEMS MADE IT FOR YOU TO DO YOUR WORK, TAKE CARE OF THINGS AT HOME, OR GET ALONG WITH OTHER PEOPLE: SOMEWHAT DIFFICULT
5. BEING SO RESTLESS THAT IT IS HARD TO SIT STILL: SEVERAL DAYS
2. NOT BEING ABLE TO STOP OR CONTROL WORRYING: MORE THAN HALF THE DAYS

## 2024-10-14 ASSESSMENT — PATIENT HEALTH QUESTIONNAIRE - PHQ9
7. TROUBLE CONCENTRATING ON THINGS, SUCH AS READING THE NEWSPAPER OR WATCHING TELEVISION: MORE THAN HALF THE DAYS
1. LITTLE INTEREST OR PLEASURE IN DOING THINGS: MORE THAN HALF THE DAYS
9. THOUGHTS THAT YOU WOULD BE BETTER OFF DEAD, OR OF HURTING YOURSELF: NOT AT ALL
6. FEELING BAD ABOUT YOURSELF - OR THAT YOU ARE A FAILURE OR HAVE LET YOURSELF OR YOUR FAMILY DOWN: MORE THAN HALF THE DAYS
8. MOVING OR SPEAKING SO SLOWLY THAT OTHER PEOPLE COULD HAVE NOTICED. OR THE OPPOSITE, BEING SO FIGETY OR RESTLESS THAT YOU HAVE BEEN MOVING AROUND A LOT MORE THAN USUAL: MORE THAN HALF THE DAYS
2. FEELING DOWN, DEPRESSED OR HOPELESS: MORE THAN HALF THE DAYS
5. POOR APPETITE OR OVEREATING: MORE THAN HALF THE DAYS
SUM OF ALL RESPONSES TO PHQ9 QUESTIONS 1 AND 2: 4
10. IF YOU CHECKED OFF ANY PROBLEMS, HOW DIFFICULT HAVE THESE PROBLEMS MADE IT FOR YOU TO DO YOUR WORK, TAKE CARE OF THINGS AT HOME, OR GET ALONG WITH OTHER PEOPLE: SOMEWHAT DIFFICULT
SUM OF ALL RESPONSES TO PHQ QUESTIONS 1-9: 14
4. FEELING TIRED OR HAVING LITTLE ENERGY: MORE THAN HALF THE DAYS
3. TROUBLE FALLING OR STAYING ASLEEP OR SLEEPING TOO MUCH: NOT AT ALL

## 2024-10-14 NOTE — PROGRESS NOTES
Subjective   Patient ID: Gm Thrasher is a 59 y.o. male who presents for Follow-up (Regarding ozempic/).    Went off lexapro  Was causing gynecomastia  Anxiety and depression is worse  Is going through a separation  Tearful    Ozempic working well  No adverse side effects  Needs refills on everything        Review of Systems  ROS completely negative except what was mentioned in the HPI.  Problem List, surgical, social, and family histories which were reviewed and updated as necessary within the EMR. I also personally reviewed the notes, labs, and imaging that pertained to what was documented or discussed in the HPI.    Objective   Physical Exam  Vitals and nursing note reviewed.   Constitutional:       General: He is not in acute distress.     Appearance: Normal appearance. He is not ill-appearing.   HENT:      Head: Normocephalic and atraumatic.      Right Ear: External ear normal.      Left Ear: External ear normal.      Nose: Nose normal.      Mouth/Throat:      Mouth: Mucous membranes are moist.   Eyes:      Extraocular Movements: Extraocular movements intact.      Conjunctiva/sclera: Conjunctivae normal.      Pupils: Pupils are equal, round, and reactive to light.   Cardiovascular:      Rate and Rhythm: Normal rate and regular rhythm.      Heart sounds: Normal heart sounds.   Pulmonary:      Effort: Pulmonary effort is normal.      Breath sounds: Normal breath sounds.   Musculoskeletal:         General: Normal range of motion.      Cervical back: Normal range of motion and neck supple.   Skin:     General: Skin is warm and dry.   Neurological:      General: No focal deficit present.      Mental Status: He is alert and oriented to person, place, and time. Mental status is at baseline.   Psychiatric:         Mood and Affect: Mood normal.         Behavior: Behavior normal.         Thought Content: Thought content normal.         Judgment: Judgment normal.       Over the past 2 weeks, how often have you been  "bothered by any of the following problems?  Trouble falling or staying asleep, or sleeping too much: Not at all  Feeling tired or having little energy: More than half the days  Poor appetite or overeating: More than half the days  Feeling bad about yourself - or that you are a failure or have let yourself or your family down: More than half the days  Trouble concentrating on things, such as reading the newspaper or watching television: More than half the days  Moving or speaking so slowly that other people could have noticed? Or the opposite - being so fidgety or restless that you have been moving around a lot more than usual.: More than half the days  Thoughts that you would be better off dead or hurting yourself in some way: Not at all  Patient Health Questionnaire-9 Score: 14    Over the last 2 weeks, how often have you been bothered by any of the following problems?  Feeling nervous, anxious, or on edge: More than half the days  Not being able to stop or control worrying: More than half the days  Worrying too much about different things: More than half the days  Trouble relaxing: Nearly every day  Being so restless that it is hard to sit still: Several days  Becoming easily annoyed or irritable: More than half the days  Feeling afraid as if something awful might happen: More than half the days  SOTO-7 Total Score: 14      /87   Pulse 90   Temp 36.5 °C (97.7 °F) (Temporal)   Ht 1.803 m (5' 11\")   Wt 102 kg (224 lb 12.8 oz)   SpO2 96%   BMI 31.35 kg/m²     Assessment/Plan    Problem List Items Addressed This Visit       Anxiety - Primary    Overview     Lexapro - caused gynecomastia   Saw counselor 2x a week for 7 years  Mike/Tata Smith  Uses just prn Phenergan now         Current Assessment & Plan     SOTO = 14, PHQ = 14  Discussed various pharmacotherapies  Will start cymbalta  Hydroxyzine prn  Declined to give him klonopin per his request         Relevant Medications    DULoxetine (Cymbalta) 20 " "mg DR capsule    hydrOXYzine HCL (Atarax) 25 mg tablet    Other Relevant Orders    Referral to Psychology    Class 1 obesity due to excess calories with serious comorbidity and body mass index (BMI) of 30.0 to 30.9 in adult    Relevant Medications    semaglutide (Ozempic) 2 mg/dose (8 mg/3 mL) pen injector    Current moderate episode of major depressive disorder without prior episode (Multi)    Overview     On SSRI in past  (Lexapro caused gynecomastia)  Saw psychiatry/counselor in past  Not medicated now  Monitor         Current Assessment & Plan     SOTO = 14, PHQ = 14  Discussed various pharmacotherapies  Will start cymbalta  Hydroxyzine prn         Relevant Medications    DULoxetine (Cymbalta) 20 mg DR capsule    Other Relevant Orders    Referral to Psychology    Lumbar spondylosis    Overview     S/p 1. L3-4, L4-5 extreme lateral interbody fusion and  L3-5 laminectomy, L5-S1 revision laminectomy, L5-S1 transforaminal lumbar interbody fusion, L3-S1 instrumented posterolateral fusion, repair of dural tear  on September 29, 2023 w/complications: Postoperative lumbar fluid collections, concern for epidural abscesses and cauda equina, Postoperative site infection   s/p drainage and Bactrim course 11/9/2023, and Wound dehiscence   On Lyrica  Suboxone also helps w/pain control         Relevant Medications    pregabalin (Lyrica) 300 mg capsule    Type 2 diabetes mellitus with circulatory disorder, without long-term current use of insulin    Overview     1/24 A1C = 4.9  7/24 A1C = 5.3  On metformin in past (stopped in 1/24)  On ozempic  On statin         Current Assessment & Plan     Doing well on ozempic  Continue         Relevant Medications    semaglutide (Ozempic) 2 mg/dose (8 mg/3 mL) pen injector    Vitamin B12 deficiency    Overview     12/22:192  1/24 = 231  On supp         Relevant Medications    cyanocobalamin (Vitamin B-12) 1,000 mcg/mL injection    insulin syringe-needle U-100 1 mL 30 gauge x 1/2\" syringe "     Other Visit Diagnoses       NSTEMI (non-ST elevated myocardial infarction) (Multi)        Relevant Medications    atorvastatin (Lipitor) 80 mg tablet    Iron deficiency anemia, unspecified iron deficiency anemia type        Relevant Medications    ferrous sulfate 325 (65 Fe) MG EC tablet    Mood disorder (CMS-HCC)        Relevant Medications    promethazine (Phenergan) 25 mg tablet    Other Relevant Orders    Referral to Psychology

## 2024-10-14 NOTE — ASSESSMENT & PLAN NOTE
SOTO = 14, PHQ = 14  Discussed various pharmacotherapies  Will start cymbalta  Hydroxyzine prn  Declined to give him klonopin per his request

## 2024-12-02 DIAGNOSIS — F39 MOOD DISORDER (CMS-HCC): ICD-10-CM

## 2024-12-02 RX ORDER — PROMETHAZINE HYDROCHLORIDE 25 MG/1
25 TABLET ORAL EVERY 6 HOURS PRN
Qty: 120 TABLET | Refills: 0 | Status: SHIPPED | OUTPATIENT
Start: 2024-12-02

## 2024-12-27 DIAGNOSIS — F39 MOOD DISORDER (CMS-HCC): ICD-10-CM

## 2024-12-27 RX ORDER — PROMETHAZINE HYDROCHLORIDE 25 MG/1
25 TABLET ORAL EVERY 6 HOURS PRN
Qty: 120 TABLET | Refills: 3 | Status: SHIPPED | OUTPATIENT
Start: 2024-12-27

## 2024-12-27 NOTE — TELEPHONE ENCOUNTER
"Refill Request      Last OV with PCP 10/14/2024     Future Appointments       Date / Time Provider Department Dept Phone    1/13/2025 9:00 AM (Arrive by 8:45 AM) Janet Giraldo, APRN-CNP [facility] [phone]    4/29/2025 8:40 AM (Arrive by 8:25 AM) Marsha Francis MD [facility] [phone]          Lab Results   Component Value Date    HGBA1C 5.3 07/12/2024     Lab Results   Component Value Date    CHOL 100 10/12/2023    CHOL 113 10/10/2023    CHOL 158 06/02/2023     Lab Results   Component Value Date    HDL 15.6 10/12/2023    HDL 18.4 10/10/2023    HDL 20.6 (A) 06/02/2023     Lab Results   Component Value Date    LDLCALC 43 10/12/2023    LDLCALC 63 (L) 10/10/2023     Lab Results   Component Value Date    TRIG 207 (H) 10/12/2023    TRIG 160 (H) 10/10/2023    TRIG 305 (H) 06/02/2023     No components found for: \"CHOLHDL\"  Lab Results   Component Value Date    TSH 2.51 12/27/2022     Lab Results   Component Value Date    GLUCOSE 99 07/12/2024    CALCIUM 9.0 07/12/2024     07/12/2024    K 3.8 07/12/2024    CO2 30 07/12/2024    CL 99 07/12/2024    BUN 10 07/12/2024    CREATININE 1.30 07/12/2024       "

## 2025-01-13 ENCOUNTER — LAB (OUTPATIENT)
Dept: LAB | Facility: LAB | Age: 60
End: 2025-01-13
Payer: COMMERCIAL

## 2025-01-13 ENCOUNTER — APPOINTMENT (OUTPATIENT)
Dept: PRIMARY CARE | Facility: CLINIC | Age: 60
End: 2025-01-13
Payer: COMMERCIAL

## 2025-01-13 VITALS
OXYGEN SATURATION: 97 % | SYSTOLIC BLOOD PRESSURE: 124 MMHG | TEMPERATURE: 97.3 F | WEIGHT: 215 LBS | HEART RATE: 84 BPM | DIASTOLIC BLOOD PRESSURE: 80 MMHG | BODY MASS INDEX: 29.99 KG/M2

## 2025-01-13 DIAGNOSIS — M47.816 LUMBAR SPONDYLOSIS: ICD-10-CM

## 2025-01-13 DIAGNOSIS — Z79.899 MEDICATION MANAGEMENT: ICD-10-CM

## 2025-01-13 DIAGNOSIS — Z79.899 MEDICATION MANAGEMENT: Primary | ICD-10-CM

## 2025-01-13 DIAGNOSIS — E53.8 VITAMIN B12 DEFICIENCY: ICD-10-CM

## 2025-01-13 DIAGNOSIS — R09.82 PND (POST-NASAL DRIP): ICD-10-CM

## 2025-01-13 LAB — VIT B12 SERPL-MCNC: 416 PG/ML (ref 211–911)

## 2025-01-13 PROCEDURE — 82607 VITAMIN B-12: CPT

## 2025-01-13 PROCEDURE — 3074F SYST BP LT 130 MM HG: CPT | Performed by: NURSE PRACTITIONER

## 2025-01-13 PROCEDURE — 80307 DRUG TEST PRSMV CHEM ANLYZR: CPT

## 2025-01-13 PROCEDURE — 99213 OFFICE O/P EST LOW 20 MIN: CPT | Performed by: NURSE PRACTITIONER

## 2025-01-13 PROCEDURE — G2211 COMPLEX E/M VISIT ADD ON: HCPCS | Performed by: NURSE PRACTITIONER

## 2025-01-13 PROCEDURE — 80348 DRUG SCREENING BUPRENORPHINE: CPT

## 2025-01-13 PROCEDURE — 3079F DIAST BP 80-89 MM HG: CPT | Performed by: NURSE PRACTITIONER

## 2025-01-13 RX ORDER — AZELASTINE 1 MG/ML
1 SPRAY, METERED NASAL 2 TIMES DAILY
Qty: 30 ML | Refills: 12 | Status: SHIPPED | OUTPATIENT
Start: 2025-01-13 | End: 2026-01-13

## 2025-01-13 RX ORDER — PREGABALIN 300 MG/1
300 CAPSULE ORAL 2 TIMES DAILY
Qty: 180 CAPSULE | Refills: 0 | Status: SHIPPED | OUTPATIENT
Start: 2025-01-13 | End: 2025-04-13

## 2025-01-13 ASSESSMENT — ANXIETY QUESTIONNAIRES
GAD7 TOTAL SCORE: 13
3. WORRYING TOO MUCH ABOUT DIFFERENT THINGS: NEARLY EVERY DAY
2. NOT BEING ABLE TO STOP OR CONTROL WORRYING: MORE THAN HALF THE DAYS
5. BEING SO RESTLESS THAT IT IS HARD TO SIT STILL: SEVERAL DAYS
6. BECOMING EASILY ANNOYED OR IRRITABLE: SEVERAL DAYS
4. TROUBLE RELAXING: NEARLY EVERY DAY
IF YOU CHECKED OFF ANY PROBLEMS ON THIS QUESTIONNAIRE, HOW DIFFICULT HAVE THESE PROBLEMS MADE IT FOR YOU TO DO YOUR WORK, TAKE CARE OF THINGS AT HOME, OR GET ALONG WITH OTHER PEOPLE: SOMEWHAT DIFFICULT
7. FEELING AFRAID AS IF SOMETHING AWFUL MIGHT HAPPEN: NOT AT ALL
1. FEELING NERVOUS, ANXIOUS, OR ON EDGE: NEARLY EVERY DAY

## 2025-01-13 NOTE — PROGRESS NOTES
Subjective   Patient ID: Gm Thrasher is a 59 y.o. male who presents for Follow-up.    Lyrica  Works well  The only thing that does work  The weather has worsened the pain some days  Had back surgery 1/2024  After surgery, collapsed and broke leg in 3 places  They stated he had an MI with a 90% blockage, stent placed  Was in skilled for 1.5 months  Once car is running, wants to go to gym        Review of Systems  ROS completely negative except what was mentioned in the HPI.  Problem List, surgical, social, and family histories which were reviewed and updated as necessary within the EMR. I also personally reviewed the notes, labs, and imaging that pertained to what was documented or discussed in the HPI.    Objective   Physical Exam  Vitals and nursing note reviewed.   Constitutional:       General: He is not in acute distress.     Appearance: Normal appearance. He is not ill-appearing.   HENT:      Head: Normocephalic and atraumatic.      Right Ear: External ear normal.      Left Ear: External ear normal.      Nose: Nose normal.      Mouth/Throat:      Mouth: Mucous membranes are moist.   Eyes:      Extraocular Movements: Extraocular movements intact.      Conjunctiva/sclera: Conjunctivae normal.      Pupils: Pupils are equal, round, and reactive to light.   Cardiovascular:      Rate and Rhythm: Normal rate and regular rhythm.      Heart sounds: Normal heart sounds.   Pulmonary:      Effort: Pulmonary effort is normal.      Breath sounds: Normal breath sounds.   Musculoskeletal:         General: Normal range of motion.      Cervical back: Normal range of motion and neck supple.   Skin:     General: Skin is warm and dry.   Neurological:      General: No focal deficit present.      Mental Status: He is alert and oriented to person, place, and time. Mental status is at baseline.   Psychiatric:         Mood and Affect: Mood normal.         Behavior: Behavior normal.         Thought Content: Thought content normal.          Judgment: Judgment normal.         /80   Pulse 84   Temp 36.3 °C (97.3 °F)   Wt 97.5 kg (215 lb)   SpO2 97%   BMI 29.99 kg/m²     Assessment/Plan    Problem List Items Addressed This Visit       Lumbar spondylosis    Overview     S/p 1. L3-4, L4-5 extreme lateral interbody fusion and  L3-5 laminectomy, L5-S1 revision laminectomy, L5-S1 transforaminal lumbar interbody fusion, L3-S1 instrumented posterolateral fusion, repair of dural tear  on September 29, 2023 w/complications: Postoperative lumbar fluid collections, concern for epidural abscesses and cauda equina, Postoperative site infection   s/p drainage and Bactrim course 11/9/2023, and Wound dehiscence   On Lyrica  Suboxone also helps w/pain control         Relevant Medications    pregabalin (Lyrica) 300 mg capsule    Other Relevant Orders    Drug Screen 9 Panel, Blood with Reflex to Confirmation    Medication management - Primary    Overview     Serum drug screen, signed controlled substance contract, and OARRS check all up to date  Pain MD manages Subuxone         Relevant Orders    Drug Screen 9 Panel, Blood with Reflex to Confirmation     Other Visit Diagnoses       PND (post-nasal drip)        Relevant Medications    azelastine (Astelin) 137 mcg (0.1 %) nasal spray           OARRS:  Janet Giraldo, JARED-CNP on 1/13/2025 12:50 PM  I have personally reviewed the OARRS report for Gm Thrasher. I have considered the risks of abuse, dependence, addiction and diversion and I believe that it is clinically appropriate for Gm Thrasher to be prescribed this medication    Is the patient prescribed a combination of a benzodiazepine and opioid?  No    Last Urine Drug Screen / ordered today: Yes  No results found for this or any previous visit (from the past 8760 hours).  Results are as expected.     Clinical rationale for not completing a Urine Drug Screen: Patient suffering from anuria or incontinent  Blood drawn    Controlled Substance  Agreement:  Date of the Last Agreement: 1/13/25  Reviewed Controlled Substance Agreement including but not limited to the benefits, risks, and alternatives to treatment with a Controlled Substance medication(s).    Lyrica:  What is the patient's goal of therapy? pain  Is this being achieved with current treatment? yes    Activities of Daily Living:  Is your overall impression that this patient is benefiting (symptom reduction outweighs side effects) from Lyrica therapy? Yes     1. Physical Functioning: Better  2. Family Relationship: Better  3. Social Relationship: Better  4. Mood: Better  5. Sleep Patterns: Better  6. Overall Function: Better

## 2025-01-16 LAB
AMPHETAMINES SERPL QL SCN: NEGATIVE NG/ML
ANNOTATION COMMENT IMP: NORMAL
BARBITURATES SERPL QL SCN: NEGATIVE NG/ML
BENZODIAZ SERPL QL SCN: NEGATIVE NG/ML
BUPRENORPHINE SERPL-MCNC: POSITIVE NG/ML
CANNABINOIDS SERPL QL SCN: NEGATIVE NG/ML
COCAINE SERPL QL SCN: NEGATIVE NG/ML
METHADONE SERPL QL SCN: NEGATIVE NG/ML
METHAMPHET SERPL QL: NEGATIVE NG/ML
OPIATES SERPL QL SCN: NEGATIVE NG/ML
OXYCODONE SERPL QL: NEGATIVE NG/ML
PCP SERPL QL SCN: NEGATIVE NG/ML

## 2025-01-18 LAB
BUPRENORPHINE SERPL-MCNC: 8.6 NG/ML
NORBUPRENORPHINE SERPL-MCNC: 31.5 NG/ML

## 2025-01-30 ENCOUNTER — TELEPHONE (OUTPATIENT)
Dept: PRIMARY CARE | Facility: CLINIC | Age: 60
End: 2025-01-30
Payer: COMMERCIAL

## 2025-01-30 NOTE — TELEPHONE ENCOUNTER
Called Patient to follow up on Knee braces. No answer. Left voicemail for him to return call to the office

## 2025-01-30 NOTE — TELEPHONE ENCOUNTER
Candida Coles routed this to us in a CRM. Please advise. Thank you.  Source   Gm Thrasher (Patient)    Subject   Gm Thrasher (Patient)    Topic   Information Request - Returning a call      Communication   Pt states he needs a brace for knee/both ankles.      Pt had surgery 23 with .      Pt would like a call back in regard to getting a new brace. He's experiencing pain.      Please give pt a call back. Thank you        Patient Information    Patient Name Gender  SSN   Gm Thrasher Male 1965 [id number]     Contacts    Contact Date/Time Type Contact Phone/Fax   2025 02:02 PM EST Phone (Incoming) Gm Thrasher [phone]     Routing History     From To [facility]   2025 02:09 PM Candida BUTLER DO [id number] CLERICAL Routine

## 2025-01-31 ENCOUNTER — TELEPHONE (OUTPATIENT)
Dept: PRIMARY CARE | Facility: CLINIC | Age: 60
End: 2025-01-31
Payer: COMMERCIAL

## 2025-01-31 NOTE — TELEPHONE ENCOUNTER
Ok to format orders   Can do as DME misc and write in what he needs  He would probably be better served calling Ortho though as they are experts in the different types, I am not  If needs f/up with ortho format and schedule

## 2025-01-31 NOTE — TELEPHONE ENCOUNTER
Patient states he needs a brace for knee /both ankles  Patient had surgery 12/4/23 with .  Patient would like a call back in regard to getting a new brace. He's experiencing pain.  Please advise.

## 2025-02-03 NOTE — PROGRESS NOTES
Chief Complaint   Patient presents with    Right Lower Leg - Follow-up     Right distal tibial nailing 10/17/23  S/p csi 02/26/2024     History of Present Illness:  DOS: 10/17/23 Right tibia IM nail. We gave him a cortisone injection into the right knee on 2/26/24. He has occasional complaints of pain in the knees but otherwise doing good and happy with his progress. His knee and ankle brace work great, but they are breaking down and starting to fall apart. He needs new ones today.     Imaging:  Ankle: No acute fractures or dislocations.  No arthritic change.  Tibia: Well aligned tibia fracture with interval change in hardware are in early callus and healing     Assessment:   Right distal tibial nailing    Plan:  We reviewed the role of imaging, physical therapy, injections and the time frame to healing and correlation with outcome.  Continue ice, ibuprofen, and home exercise. Ibuprofen refilled.   Knee braces and ankle brace today.  Follow-up as needed. We will get updated x-rays if he returns.     Physical Exam:  Well-nourished, well-developed. No acute distress. Alert and oriented x3. Responds appropriately to questioning. Good mood. Normal affect.  Right ankle:  Trace effusion  Skin healthy and intact  Swelling and ecchymosis:  Moderate  Tenderness: Some tenderness along the distal medial tibia neurovascular exam normal distally  Palpable pedal pulse and good cap refill    Right knee: 5/5 quad strength, some pain in the distal incision.  Trace effusion to the knee.  Flexion to 120.  Moderate crepitus on the patellofemoral exam.  Positive Janna positive Apley grind.  Pain along the medial joint line    Review of Systems:  GENERAL: Negative for malaise, significant weight loss, fever  MUSCULOSKELETAL: See HPI  NEURO:  Negative     Past Medical History:   Diagnosis Date    Colon cancer screening 05/04/2024    Cologuard (-)    H/O chest x-ray     1/20: Impression: Stable, enlarged cardiac mediastinal silhouette  with mild central pulmonary vascular congestion. Nonspecific bibasilar airspace disease, worsened in overall appearance compared with the previous study, findings can be seen in  infiltrate/pneumonia and/or atelectasis.    H/O CT scan     CT Ankle: 1/20: A vertically oriented nondisplaced oblique fracture of the medial ankle mortise extends into the distal tibial metadiaphysis.  Several small old avulsion fracture fragments are noted inferior to both malleoli; as well as soft tissue swelling about the ankle.  There is no other fracture, radiodense foreign bodies, pathologic calcifications, or worrisome bone destruction identified.    H/O CT scan 10/11/2023    1. Acute minimally comminuted and displaced fractures through distal tibia and fibula as described above. 2. There are also findings consistent with remote trauma with significant cortical remodeling of the distal tibia with associated incongruity of the distal tibial articular surface as described above. 3. Moderate arthrosis of the tibiotalar and subtalar joints. 4. Soft tissue swelling about th    H/O CT scan of abdomen     2016: Diffuse urinary bladder wall thickening, Small atrophic left kidney with pyelocalyceal ectasia and severe cortical thinning, , Moderate pyelocalyceal ectasia of the right kidney with cortical thinning; unchanged    H/O CT scan of brain     11/22: normal 1/20: FINDINGS:  There is no intracranial hemorrhage, mass effect, midline shift, extra-axial collection, evidence of hydrocephalus, recent ischemic infarct, or skull fracture identified.    There is no significant atrophy or white matter changes, for age.  Moderate mucosal thickening within the paranasal sinuses again noted. The mastoid air cells are clear    H/O CT scan of brain 10/10/2023    There is some subtle increased attenuation along the right  tentorium cerebelli.  This is suspicious for a acute  subdural hematoma.    H/O CT scan of chest     10/19: 1. Bibasilar  infiltrate/pneumonia with patchy pneumonia scattered throughout the left lung and bilateral pleural effusions.  2. Vascular calcifications left anterior descending coronary artery with mild to moderate cardiomegaly.  3. Moderate to moderately severe diffuse fatty infiltration of liver. 10/21: Bilateral groundglass opacities as discussed. These findings are nonspecific    H/O CT scan of chest 10/10/2023    NO PE. Somewhat patchy subpleural curvilinear markings are seen in the  bilateral lower lobes, overall morphology favoring atelectasis or infectious  infiltrate.  Scattered central small cysts are observed.    H/O CT scan of head 10/12/2023    In comparison to prior CT dated 10/03/2023, there has been interval improvement in the right frontal subarachnoid hemorrhage and diffuse subdural hemorrhage along the tentorium and overlying the right cerebellar hemisphere as well as resolution of right parietal lobe intraparenchymal hemorrhage.    H/O diagnostic ultrasound     RUQ US 2013: Cholelithiasis and mild gallbladder distention, Increased liver echogenicity    H/O diagnostic ultrasound 10/10/2023    renal - Severe right hydronephrosis, similar in appearance to CT lumbar spine examination. Bilateral ureteral jets visualized.    Mild asymmetric enlargement of the right kidney with cortical renal scarring involving the left kidney.    H/O echocardiogram     1/20: Normal LV and RV.  No significant valve disease.  Normal estimated PA pressure.  Normal diastolic filling pattern.    H/O magnetic resonance imaging of cervical spine     2003: CENTRAL STENOSIS WORST C3-C4 1/21: Multilevel degenerative changes with disc height loss, disc osteophyte complexes, facet/uncovertebral degenerative changes. Moderate to severe canal and bilateral foraminal narrowing at C3-C4, C4-C5, C5-C6, C6-C7 grossly similar to  prior cervical MRI    H/O magnetic resonance imaging of lumbar spine     2012:  NEW RIGHT POSTERIOR EXTRUSION L4-5, WITH  CONTACT OF THE EXITING RIGHT L5 AND DESCENDING RIGHT S1 NERVES. s/p laminectomy L5 2019 ([facility]): NARROWING OF MULTIPLE LUMBAR DISC LEVELS WITH FAIRLY ADVANCED DEGENERATIVE CHANGES. SEE INDIVIDUAL LEVELS ABOVE.  MILD LUMBAR CANAL STENOSIS AT THE L4-5 LEVEL.  NARROWING OF NEURAL FORAMINA,    H/O magnetic resonance imaging of lumbar spine 10/10/2023    MR findings worrisome for retroperitoneal abscess, possibly extending into  the L4-5 disc space.   Apparent clumping of the proximal cauda equina nerve roots versus mass effect  due to an intrathecal fluid collection.  Recommend MRI of the T-spine for  further evaluation    H/O x-ray of lower extremity 10/10/2023    XR ankle: NEW ACUTE OBLIQUE FRACTURE OF THE DISTAL RIGHT TIBIA WITH A  OLD VERTICAL FRACTURE OF THE TIBIA.   THERE IS A NEW COMMINUTED FRACTURE OF THE DISTAL RIGHT FIBULA.    S/P surgical manipulation of ankle joint 10/30/2023       Medication Documentation Review Audit       Reviewed by ANGELINA Ordaz (Nurse Practitioner) on 01/13/25 at 1255      Medication Order Taking? Sig Documenting Provider Last Dose Status   aspirin 81 mg chewable tablet [id number] No Chew 1 tablet (81 mg) once daily.   Patient not taking: Reported on 10/14/2024    Lucio Solis, DO Not Taking Active   atorvastatin (Lipitor) 80 mg tablet [id number]  Take 1 tablet (80 mg) by mouth once daily. ANGELINA Ordaz  Active   azelastine (Astelin) 137 mcg (0.1 %) nasal spray [id number]  Administer 1 spray into each nostril 2 times a day. Use in each nostril as directed ANGELINA Ordaz  Active   buprenorphine-naloxone (Suboxone) 8-2 mg SL film [id number] No Place 3 Film under the tongue once daily. Historical Provider, MD Taking Active   cyanocobalamin (Vitamin B-12) 1,000 mcg/mL injection [id number]  Inject 1 mL (1,000 mcg) into the muscle every 21 (twenty-one) days. ANGELINA Ordaz  Active   DULoxetine (Cymbalta) 20 mg DR capsule [id number]   "Take 1 capsule (20 mg) by mouth 2 times a day. Do not crush or chew. ANGELINA Ordaz  Active   ferrous sulfate 325 (65 Fe) MG EC tablet [id number]  Take 1 tablet by mouth 2 times a week. Do not crush, chew, or split. ANGELINA Ordaz  Active   hydrOXYzine HCL (Atarax) 25 mg tablet [id number]  Take 1 tablet (25 mg) by mouth every 8 hours if needed for anxiety (or sleep). ANGELINA Ordaz   24 2359   insulin syringe-needle U-100 1 mL 30 gauge x 1/2\" syringe [id number]  Use with B12, may substitite ANGELINA Ordaz  Active   miscellaneous medical supply misc [id number] No Knee walker for non weight bearing right leg post right ankle surgery ANGELINA Ordaz Taking Active   naloxone (Narcan) 4 mg/0.1 mL nasal spray [id number] No Administer 1 spray (4 mg) into affected nostril(s).  CALL 911 MAY REPEAT ONCE Historical MD Manisha Taking Active     Discontinued 25 1247   pregabalin (Lyrica) 300 mg capsule [id number]  Take 1 capsule (300 mg) by mouth 2 times a day. ANGELINA Ordaz  Active   promethazine (Phenergan) 25 mg tablet [id number]  TAKE 1 TABLET BY MOUTH EVERY 6 HOURS AS NEEDED FOR NAUSEA AND VOMITING Marsha Francis MD  Active   semaglutide (Ozempic) 2 mg/dose (8 mg/3 mL) pen injector [id number]  Inject 2 mg under the skin every 7 days. ANGELINA Ordaz  Active                    Allergies   Allergen Reactions    Metoprolol Unknown    Mirtazapine Unknown       Social History     Socioeconomic History    Marital status: Legally      Spouse name: Not on file    Number of children: Not on file    Years of education: Not on file    Highest education level: Not on file   Occupational History    Not on file   Tobacco Use    Smoking status: Every Day     Types: Cigarettes    Smokeless tobacco: Never    Tobacco comments:     Chantix caused AE     Smoking cessation program ordered   Vaping Use    Vaping status: " Never Used   Substance and Sexual Activity    Alcohol use: Not Currently    Drug use: Not Currently    Sexual activity: Defer   Other Topics Concern    Not on file   Social History Narrative     (Ling Thrasher),no kids    Doesnt work/Disbility    Smoker: started age 24,Smokes 1ppd (used to be 2ppd)    On suboxone - pain management    No ETOH    -------    Family History:    F: ETOHIsm, DM, COPD    M: Lung CA, CAD    B: CA metastatic lung ()    S; ETOH ism    S:     Social Drivers of Health     Financial Resource Strain: Low Risk  (2023)    Overall Financial Resource Strain (CARDIA)     Difficulty of Paying Living Expenses: Not hard at all   Food Insecurity: No Food Insecurity (10/18/2023)    Hunger Vital Sign     Worried About Running Out of Food in the Last Year: Never true     Ran Out of Food in the Last Year: Never true   Transportation Needs: No Transportation Needs (2023)    PRAPARE - Transportation     Lack of Transportation (Medical): No     Lack of Transportation (Non-Medical): No   Physical Activity: Not on file   Stress: Not on file   Social Connections: Not on file   Intimate Partner Violence: Not on file   Housing Stability: Low Risk  (2023)    Housing Stability Vital Sign     Unable to Pay for Housing in the Last Year: No     Number of Places Lived in the Last Year: 1     Unstable Housing in the Last Year: No       Past Surgical History:   Procedure Laterality Date    ANKLE SURGERY  10/17/2023    Insertion Intramedullary RIGHT Nail Tibia by Dr Vic Hale    CARDIAC CATHETERIZATION      2015: The coronary anatomy is R dominant.  L main coronary artery was normal.  Left anterior descending artery presents luminal irregularities.  Circumflex coronary artery normal.  R coronary artery presents luminal irregularities.  Mid RCA lesion 40% stenosis.  LVEF 50%    CARDIAC CATHETERIZATION Right 10/11/2023    Procedure: Left Heart Cath;  Surgeon: Jonathan GASPAR MD;  Location[facility]  Cardiac Cath Lab;  Service: Cardiovascular;  Laterality: Right;  radial    CARDIAC CATHETERIZATION N/A 10/13/2023    Procedure: PCI RCA, right radial 6Fr;  Surgeon: Juvenal Le MD;  Location: [facility] Cardiac Cath Lab;  Service: Cardiovascular;  Laterality: N/A;    CERVICAL LAMINECTOMY      C3-C4, s/p cervical laminectomy.    ESOPHAGOGASTRODUODENOSCOPY      12/15: Localized mild erythema was found at the gastroesophageal junction    KIDNEY SURGERY  09/12/2013    Kidney Surgery    LUMBAR FUSION  10/06/2023    1. L3-4, L4-5 extreme lateral interbody fusion 2. L3-5 laminectomy, L5-S1 revision laminectomy, L5-S1 transforaminal lumbar interbody fusion, L3-S1 instrumented posterolateral fusion, repair of dural tear    LUMBAR FUSION  09/30/2023    L3-4 & L4-5 XLIF(NUVASIVE), L5-S1 TLIF(MEDTRONIC), L3-S1 POSTEROLATERAL FUSION, L3-5    LUMBAR LAMINECTOMY      s/p L5 laminectomy x2;  residual left foot drop    OTHER SURGICAL HISTORY  01/28/2020    Appendectomy    OTHER SURGICAL HISTORY  01/28/2020    Cholecystectomy    OTHER SURGICAL HISTORY  01/28/2020    [place] tooth extraction    OTHER SURGICAL HISTORY  01/28/2020    Tonsillectomy        Electrocardiogram, 12-lead PRN ACS symptoms    Result Date: 12/27/2023  Normal sinus rhythm Nonspecific T wave abnormality Confirmed by Cornell Acuna ([id number]) on 12/27/2023 5:01:12 PM    Electrocardiogram, 12-lead PRN ACS symptoms    Result Date: 12/27/2023  Normal sinus rhythm Normal ECG Confirmed by Cornell Acuna ([id number]) on 12/27/2023 4:55:45 PM                          Scribe Attestation  By signing my name below, Rosemarie MAC Scrsandra   attest that this documentation has been prepared under the direction and in the presence of Vic Hale MD.

## 2025-02-04 ENCOUNTER — TELEPHONE (OUTPATIENT)
Dept: ORTHOPEDIC SURGERY | Facility: CLINIC | Age: 60
End: 2025-02-04
Payer: COMMERCIAL

## 2025-02-04 ENCOUNTER — OFFICE VISIT (OUTPATIENT)
Dept: ORTHOPEDIC SURGERY | Facility: CLINIC | Age: 60
End: 2025-02-04
Payer: COMMERCIAL

## 2025-02-04 DIAGNOSIS — M17.11 PRIMARY OSTEOARTHRITIS OF RIGHT KNEE: ICD-10-CM

## 2025-02-04 DIAGNOSIS — Z87.81 H/O FRACTURE OF ANKLE: ICD-10-CM

## 2025-02-04 PROCEDURE — 99213 OFFICE O/P EST LOW 20 MIN: CPT | Performed by: ORTHOPAEDIC SURGERY

## 2025-02-04 NOTE — TELEPHONE ENCOUNTER
2/3/25 - ankle lace up [phone] large) & freesport ([id number] medium).  Original dispense date is 7/19/24.   Both products currently out of warranty but exchanged for patient due to physician request.  Was explained to patient that should anything happen to either of the braces moving forward, that they are not within warranty so they would have to be resubmitted through insurance.   Pt stated he understands.   All information given to patient and patient was re-instructed on how to put on and wear the freeport brace ([id number]).  The condyle support pad was being placed incorrectly (on the inside of the knee instead of outside of knee).   Patient stated it felt much better.   All questions answered at this time

## 2025-04-10 DIAGNOSIS — F39 MOOD DISORDER (CMS-HCC): ICD-10-CM

## 2025-04-10 RX ORDER — PROMETHAZINE HYDROCHLORIDE 25 MG/1
25 TABLET ORAL EVERY 6 HOURS PRN
Qty: 120 TABLET | Refills: 3 | Status: SHIPPED | OUTPATIENT
Start: 2025-04-10

## 2025-04-14 DIAGNOSIS — M47.816 LUMBAR SPONDYLOSIS: ICD-10-CM

## 2025-04-14 RX ORDER — PREGABALIN 300 MG/1
300 CAPSULE ORAL 2 TIMES DAILY
Qty: 180 CAPSULE | Refills: 0 | Status: SHIPPED | OUTPATIENT
Start: 2025-04-14 | End: 2025-07-13

## 2025-04-14 NOTE — TELEPHONE ENCOUNTER
"Refill Request      Last OV with PCP 1/13/2025     Future Appointments       Date / Time Provider Department Dept Phone    4/29/2025 9:00 AM (Arrive by 8:45 AM) Marsha Francis MD [facility] [phone]          Lab Results   Component Value Date    HGBA1C 5.3 07/12/2024     Lab Results   Component Value Date    CHOL 100 10/12/2023    CHOL 113 10/10/2023    CHOL 158 06/02/2023     Lab Results   Component Value Date    HDL 15.6 10/12/2023    HDL 18.4 10/10/2023    HDL 20.6 (A) 06/02/2023     Lab Results   Component Value Date    LDLCALC 43 10/12/2023    LDLCALC 63 (L) 10/10/2023     Lab Results   Component Value Date    TRIG 207 (H) 10/12/2023    TRIG 160 (H) 10/10/2023    TRIG 305 (H) 06/02/2023     No components found for: \"CHOLHDL\"  Lab Results   Component Value Date    TSH 2.51 12/27/2022     Lab Results   Component Value Date    GLUCOSE 99 07/12/2024    CALCIUM 9.0 07/12/2024     07/12/2024    K 3.8 07/12/2024    CO2 30 07/12/2024    CL 99 07/12/2024    BUN 10 07/12/2024    CREATININE 1.30 07/12/2024       "

## 2025-04-29 ENCOUNTER — APPOINTMENT (OUTPATIENT)
Dept: PRIMARY CARE | Facility: CLINIC | Age: 60
End: 2025-04-29
Payer: COMMERCIAL

## 2025-04-29 ENCOUNTER — TELEPHONE (OUTPATIENT)
Dept: PRIMARY CARE | Facility: CLINIC | Age: 60
End: 2025-04-29

## 2025-04-29 NOTE — TELEPHONE ENCOUNTER
Patient no showed his appointment today.  Dr. Francis had a cancellation on Monday 5/5 at 200.   Left patient vm to contact the office as soon as possible to offer him that appointment.

## 2025-05-01 ENCOUNTER — TELEPHONE (OUTPATIENT)
Dept: PRIMARY CARE | Facility: CLINIC | Age: 60
End: 2025-05-01
Payer: COMMERCIAL

## 2025-05-01 DIAGNOSIS — Z12.5 SCREENING FOR PROSTATE CANCER: ICD-10-CM

## 2025-05-01 DIAGNOSIS — E53.8 VITAMIN B12 DEFICIENCY: ICD-10-CM

## 2025-05-01 DIAGNOSIS — E55.9 VITAMIN D DEFICIENCY: ICD-10-CM

## 2025-05-01 DIAGNOSIS — E11.59 TYPE 2 DIABETES MELLITUS WITH OTHER CIRCULATORY COMPLICATION, WITHOUT LONG-TERM CURRENT USE OF INSULIN: ICD-10-CM

## 2025-05-01 DIAGNOSIS — E61.1 IRON DEFICIENCY: ICD-10-CM

## 2025-05-01 DIAGNOSIS — Z00.00 ROUTINE ADULT HEALTH MAINTENANCE: Chronic | ICD-10-CM

## 2025-05-01 DIAGNOSIS — E78.2 MIXED HYPERLIPIDEMIA: ICD-10-CM

## 2025-05-01 NOTE — TELEPHONE ENCOUNTER
Left 3rd vm to schedule. Patient does not have mychart.  Closing encounter due to patient not answering or returning calls.  Will address if patient calls office

## 2025-05-01 NOTE — TELEPHONE ENCOUNTER
Patient scheduled CPE for 5/6 would like any labs needed ahead of time put in so he can get them drawn before the appt. Thanks.

## 2025-05-06 ENCOUNTER — APPOINTMENT (OUTPATIENT)
Dept: PRIMARY CARE | Facility: CLINIC | Age: 60
End: 2025-05-06
Payer: COMMERCIAL

## 2025-05-06 DIAGNOSIS — Z23 NEED FOR TDAP VACCINATION: Primary | ICD-10-CM

## 2025-05-06 PROBLEM — I50.9 HEART FAILURE, UNSPECIFIED HF CHRONICITY, UNSPECIFIED HEART FAILURE TYPE: Status: RESOLVED | Noted: 2025-05-06 | Resolved: 2025-05-06

## 2025-05-06 PROBLEM — Z13.9 ENCOUNTER FOR SCREENING INVOLVING SOCIAL DETERMINANTS OF HEALTH (SDOH): Status: ACTIVE | Noted: 2025-05-06

## 2025-05-06 PROBLEM — I11.0 HYPERTENSIVE HEART DISEASE WITH HEART FAILURE: Status: ACTIVE | Noted: 2025-05-06

## 2025-05-06 PROBLEM — Z13.39 ALCOHOL SCREENING: Status: ACTIVE | Noted: 2025-05-06

## 2025-05-06 PROBLEM — I50.9 HEART FAILURE, UNSPECIFIED HF CHRONICITY, UNSPECIFIED HEART FAILURE TYPE: Status: ACTIVE | Noted: 2025-05-06

## 2025-05-06 PROBLEM — F11.11 H/O OPIOID ABUSE: Status: RESOLVED | Noted: 2024-01-15 | Resolved: 2025-05-06

## 2025-05-06 PROBLEM — I11.0 HYPERTENSIVE HEART DISEASE WITH HEART FAILURE: Status: RESOLVED | Noted: 2025-05-06 | Resolved: 2025-05-06

## 2025-05-12 DIAGNOSIS — E11.59 TYPE 2 DIABETES MELLITUS WITH OTHER CIRCULATORY COMPLICATION, WITHOUT LONG-TERM CURRENT USE OF INSULIN: ICD-10-CM

## 2025-06-07 LAB
25(OH)D3+25(OH)D2 SERPL-MCNC: 14 NG/ML (ref 30–100)
ALBUMIN SERPL-MCNC: 4.2 G/DL (ref 3.6–5.1)
ALP SERPL-CCNC: 96 U/L (ref 35–144)
ALT SERPL-CCNC: 7 U/L (ref 9–46)
ANION GAP SERPL CALCULATED.4IONS-SCNC: 12 MMOL/L (CALC) (ref 7–17)
AST SERPL-CCNC: 18 U/L (ref 10–35)
BASOPHILS # BLD AUTO: 95 CELLS/UL (ref 0–200)
BASOPHILS NFR BLD AUTO: 0.9 %
BILIRUB SERPL-MCNC: 0.6 MG/DL (ref 0.2–1.2)
BUN SERPL-MCNC: 14 MG/DL (ref 7–25)
CALCIUM SERPL-MCNC: 9.3 MG/DL (ref 8.6–10.3)
CHLORIDE SERPL-SCNC: 97 MMOL/L (ref 98–110)
CHOLEST SERPL-MCNC: 271 MG/DL
CHOLEST/HDLC SERPL: 8.2 (CALC)
CO2 SERPL-SCNC: 28 MMOL/L (ref 20–32)
CREAT SERPL-MCNC: 1.29 MG/DL (ref 0.7–1.3)
EGFRCR SERPLBLD CKD-EPI 2021: 64 ML/MIN/1.73M2
EOSINOPHIL # BLD AUTO: 221 CELLS/UL (ref 15–500)
EOSINOPHIL NFR BLD AUTO: 2.1 %
ERYTHROCYTE [DISTWIDTH] IN BLOOD BY AUTOMATED COUNT: 14.2 % (ref 11–15)
EST. AVERAGE GLUCOSE BLD GHB EST-MCNC: 111 MG/DL
EST. AVERAGE GLUCOSE BLD GHB EST-SCNC: 6.2 MMOL/L
FERRITIN SERPL-MCNC: 126 NG/ML (ref 38–380)
GLUCOSE SERPL-MCNC: 83 MG/DL (ref 65–99)
HBA1C MFR BLD: 5.5 %
HCT VFR BLD AUTO: 43.4 % (ref 38.5–50)
HDLC SERPL-MCNC: 33 MG/DL
HGB BLD-MCNC: 14.5 G/DL (ref 13.2–17.1)
IRON SATN MFR SERPL: 16 % (CALC) (ref 20–48)
IRON SERPL-MCNC: 54 MCG/DL (ref 50–180)
LDLC SERPL CALC-MCNC: 187 MG/DL (CALC)
LYMPHOCYTES # BLD AUTO: 4715 CELLS/UL (ref 850–3900)
LYMPHOCYTES NFR BLD AUTO: 44.9 %
MCH RBC QN AUTO: 30.7 PG (ref 27–33)
MCHC RBC AUTO-ENTMCNC: 33.4 G/DL (ref 32–36)
MCV RBC AUTO: 91.8 FL (ref 80–100)
MONOCYTES # BLD AUTO: 788 CELLS/UL (ref 200–950)
MONOCYTES NFR BLD AUTO: 7.5 %
NEUTROPHILS # BLD AUTO: 4683 CELLS/UL (ref 1500–7800)
NEUTROPHILS NFR BLD AUTO: 44.6 %
NONHDLC SERPL-MCNC: 238 MG/DL (CALC)
PLATELET # BLD AUTO: 285 THOUSAND/UL (ref 140–400)
PMV BLD REES-ECKER: 9.7 FL (ref 7.5–12.5)
POTASSIUM SERPL-SCNC: 4.4 MMOL/L (ref 3.5–5.3)
PROT SERPL-MCNC: 7.9 G/DL (ref 6.1–8.1)
PSA SERPL-MCNC: 0.11 NG/ML
RBC # BLD AUTO: 4.73 MILLION/UL (ref 4.2–5.8)
SODIUM SERPL-SCNC: 137 MMOL/L (ref 135–146)
TIBC SERPL-MCNC: 331 MCG/DL (CALC) (ref 250–425)
TRIGL SERPL-MCNC: 284 MG/DL
VIT B12 SERPL-MCNC: 330 PG/ML (ref 200–1100)
WBC # BLD AUTO: 10.5 THOUSAND/UL (ref 3.8–10.8)

## 2025-06-08 LAB
APPEARANCE UR: CLEAR
BACTERIA #/AREA URNS HPF: ABNORMAL /HPF
BILIRUB UR QL STRIP: NEGATIVE
COLOR UR: YELLOW
GLUCOSE UR QL STRIP: NEGATIVE
HGB UR QL STRIP: NEGATIVE
HYALINE CASTS #/AREA URNS LPF: ABNORMAL /LPF
KETONES UR QL STRIP: NEGATIVE
LEUKOCYTE ESTERASE UR QL STRIP: ABNORMAL
NITRITE UR QL STRIP: NEGATIVE
PH UR STRIP: 5.5 [PH] (ref 5–8)
PROT UR QL STRIP: NEGATIVE
RBC #/AREA URNS HPF: ABNORMAL /HPF
SERVICE CMNT-IMP: ABNORMAL
SP GR UR STRIP: 1.01 (ref 1–1.03)
SQUAMOUS #/AREA URNS HPF: ABNORMAL /HPF
WBC #/AREA URNS HPF: ABNORMAL /HPF

## 2025-06-09 ENCOUNTER — APPOINTMENT (OUTPATIENT)
Dept: PRIMARY CARE | Facility: CLINIC | Age: 60
End: 2025-06-09
Payer: COMMERCIAL

## 2025-06-09 VITALS
WEIGHT: 231.4 LBS | BODY MASS INDEX: 32.4 KG/M2 | TEMPERATURE: 98.2 F | HEIGHT: 71 IN | OXYGEN SATURATION: 95 % | DIASTOLIC BLOOD PRESSURE: 60 MMHG | HEART RATE: 79 BPM | SYSTOLIC BLOOD PRESSURE: 94 MMHG

## 2025-06-09 DIAGNOSIS — E66.09 CLASS 1 OBESITY DUE TO EXCESS CALORIES WITH SERIOUS COMORBIDITY AND BODY MASS INDEX (BMI) OF 32.0 TO 32.9 IN ADULT: ICD-10-CM

## 2025-06-09 DIAGNOSIS — F51.01 PRIMARY INSOMNIA: ICD-10-CM

## 2025-06-09 DIAGNOSIS — Z71.89 CARDIAC RISK COUNSELING: ICD-10-CM

## 2025-06-09 DIAGNOSIS — I25.10 CORONARY ARTERY DISEASE INVOLVING NATIVE CORONARY ARTERY OF NATIVE HEART WITHOUT ANGINA PECTORIS: ICD-10-CM

## 2025-06-09 DIAGNOSIS — E78.2 MIXED HYPERLIPIDEMIA: ICD-10-CM

## 2025-06-09 DIAGNOSIS — Z71.89 ADVANCED DIRECTIVES, COUNSELING/DISCUSSION: ICD-10-CM

## 2025-06-09 DIAGNOSIS — Z13.39 ALCOHOL SCREENING: ICD-10-CM

## 2025-06-09 DIAGNOSIS — F11.20 ABSTINENCE SYNDROME ON MAINTENANCE OPIOID AGONIST THERAPY, NO SYMPTOMS: ICD-10-CM

## 2025-06-09 DIAGNOSIS — Z79.899 MEDICATION MANAGEMENT: ICD-10-CM

## 2025-06-09 DIAGNOSIS — E66.811 CLASS 1 OBESITY DUE TO EXCESS CALORIES WITH SERIOUS COMORBIDITY AND BODY MASS INDEX (BMI) OF 30.0 TO 30.9 IN ADULT: ICD-10-CM

## 2025-06-09 DIAGNOSIS — E66.09 CLASS 1 OBESITY DUE TO EXCESS CALORIES WITH SERIOUS COMORBIDITY AND BODY MASS INDEX (BMI) OF 30.0 TO 30.9 IN ADULT: ICD-10-CM

## 2025-06-09 DIAGNOSIS — E55.9 VITAMIN D DEFICIENCY: ICD-10-CM

## 2025-06-09 DIAGNOSIS — Z87.891 PERSONAL HISTORY OF TOBACCO USE: ICD-10-CM

## 2025-06-09 DIAGNOSIS — E53.8 VITAMIN B12 DEFICIENCY: ICD-10-CM

## 2025-06-09 DIAGNOSIS — M47.816 LUMBAR SPONDYLOSIS: ICD-10-CM

## 2025-06-09 DIAGNOSIS — N52.9 ERECTILE DYSFUNCTION, UNSPECIFIED ERECTILE DYSFUNCTION TYPE: ICD-10-CM

## 2025-06-09 DIAGNOSIS — E66.811 CLASS 1 OBESITY DUE TO EXCESS CALORIES WITH SERIOUS COMORBIDITY AND BODY MASS INDEX (BMI) OF 32.0 TO 32.9 IN ADULT: ICD-10-CM

## 2025-06-09 DIAGNOSIS — Z00.00 ROUTINE ADULT HEALTH MAINTENANCE: Primary | Chronic | ICD-10-CM

## 2025-06-09 DIAGNOSIS — J43.9 PULMONARY EMPHYSEMA, UNSPECIFIED EMPHYSEMA TYPE (MULTI): ICD-10-CM

## 2025-06-09 DIAGNOSIS — F32.1 CURRENT MODERATE EPISODE OF MAJOR DEPRESSIVE DISORDER WITHOUT PRIOR EPISODE (MULTI): ICD-10-CM

## 2025-06-09 DIAGNOSIS — E11.59 TYPE 2 DIABETES MELLITUS WITH OTHER CIRCULATORY COMPLICATION, WITHOUT LONG-TERM CURRENT USE OF INSULIN: ICD-10-CM

## 2025-06-09 DIAGNOSIS — Z13.9 ENCOUNTER FOR SCREENING INVOLVING SOCIAL DETERMINANTS OF HEALTH (SDOH): ICD-10-CM

## 2025-06-09 DIAGNOSIS — Z12.5 SCREENING FOR PROSTATE CANCER: ICD-10-CM

## 2025-06-09 DIAGNOSIS — F41.9 ANXIETY: ICD-10-CM

## 2025-06-09 DIAGNOSIS — Z71.6 ENCOUNTER FOR SMOKING CESSATION COUNSELING: ICD-10-CM

## 2025-06-09 DIAGNOSIS — F17.200 CURRENT SMOKER: ICD-10-CM

## 2025-06-09 DIAGNOSIS — E61.1 IRON DEFICIENCY: ICD-10-CM

## 2025-06-09 DIAGNOSIS — Z13.89 SCREENING FOR MULTIPLE CONDITIONS: ICD-10-CM

## 2025-06-09 PROBLEM — G47.00 INSOMNIA: Status: ACTIVE | Noted: 2025-06-09

## 2025-06-09 PROCEDURE — G0446 INTENS BEHAVE THER CARDIO DX: HCPCS | Performed by: INTERNAL MEDICINE

## 2025-06-09 PROCEDURE — G0136 PR ADMINISTRATION OF A STANDARDIZED, EVIDENCE-BASED SOCIAL DETERMINANTS OF HEALTH RISK ASSESSMENT TOOL, 5 TO 15 MINUTES: HCPCS | Performed by: INTERNAL MEDICINE

## 2025-06-09 PROCEDURE — 99214 OFFICE O/P EST MOD 30 MIN: CPT | Performed by: INTERNAL MEDICINE

## 2025-06-09 PROCEDURE — 3074F SYST BP LT 130 MM HG: CPT | Performed by: INTERNAL MEDICINE

## 2025-06-09 PROCEDURE — 3078F DIAST BP <80 MM HG: CPT | Performed by: INTERNAL MEDICINE

## 2025-06-09 PROCEDURE — 99406 BEHAV CHNG SMOKING 3-10 MIN: CPT | Performed by: INTERNAL MEDICINE

## 2025-06-09 PROCEDURE — G0442 ANNUAL ALCOHOL SCREEN 15 MIN: HCPCS | Performed by: INTERNAL MEDICINE

## 2025-06-09 PROCEDURE — 99497 ADVNCD CARE PLAN 30 MIN: CPT | Performed by: INTERNAL MEDICINE

## 2025-06-09 PROCEDURE — 3008F BODY MASS INDEX DOCD: CPT | Performed by: INTERNAL MEDICINE

## 2025-06-09 PROCEDURE — G0444 DEPRESSION SCREEN ANNUAL: HCPCS | Performed by: INTERNAL MEDICINE

## 2025-06-09 PROCEDURE — G0439 PPPS, SUBSEQ VISIT: HCPCS | Performed by: INTERNAL MEDICINE

## 2025-06-09 PROCEDURE — 4004F PT TOBACCO SCREEN RCVD TLK: CPT | Performed by: INTERNAL MEDICINE

## 2025-06-09 PROCEDURE — 99396 PREV VISIT EST AGE 40-64: CPT | Performed by: INTERNAL MEDICINE

## 2025-06-09 RX ORDER — TADALAFIL 20 MG/1
20 TABLET ORAL DAILY PRN
Qty: 10 TABLET | Refills: 2 | Status: SHIPPED | OUTPATIENT
Start: 2025-06-09

## 2025-06-09 RX ORDER — PROMETHAZINE HYDROCHLORIDE 25 MG/1
25 TABLET ORAL EVERY 6 HOURS PRN
Qty: 120 TABLET | Refills: 3 | Status: CANCELLED | OUTPATIENT
Start: 2025-06-09

## 2025-06-09 RX ORDER — ATORVASTATIN CALCIUM 80 MG/1
80 TABLET, FILM COATED ORAL DAILY
Qty: 90 TABLET | Refills: 3 | Status: SHIPPED | OUTPATIENT
Start: 2025-06-09 | End: 2026-06-09

## 2025-06-09 RX ORDER — AMITRIPTYLINE HYDROCHLORIDE 25 MG/1
25 TABLET, FILM COATED ORAL NIGHTLY
Qty: 90 TABLET | Refills: 3 | Status: SHIPPED | OUTPATIENT
Start: 2025-06-09 | End: 2026-06-09

## 2025-06-09 RX ORDER — CYANOCOBALAMIN 1000 UG/ML
1000 INJECTION, SOLUTION INTRAMUSCULAR; SUBCUTANEOUS
Qty: 4 ML | Refills: 3 | Status: SHIPPED | OUTPATIENT
Start: 2025-06-09 | End: 2026-06-09

## 2025-06-09 RX ORDER — HYDROXYZINE HYDROCHLORIDE 25 MG/1
25 TABLET, FILM COATED ORAL EVERY 8 HOURS PRN
Qty: 90 TABLET | Refills: 0 | Status: CANCELLED | OUTPATIENT
Start: 2025-06-09 | End: 2025-07-09

## 2025-06-09 RX ORDER — ERGOCALCIFEROL 1.25 MG/1
1.25 CAPSULE ORAL WEEKLY
Qty: 12 CAPSULE | Refills: 3 | Status: SHIPPED | OUTPATIENT
Start: 2025-06-09 | End: 2026-06-09

## 2025-06-09 RX ORDER — FERROUS SULFATE 325(65) MG
325 TABLET, DELAYED RELEASE (ENTERIC COATED) ORAL 3 TIMES WEEKLY
Qty: 36 TABLET | Refills: 3 | Status: SHIPPED | OUTPATIENT
Start: 2025-06-09 | End: 2026-06-09

## 2025-06-09 RX ORDER — SYRINGE-NEEDLE,INSULIN,0.5 ML 27GX1/2"
SYRINGE, EMPTY DISPOSABLE MISCELLANEOUS
Qty: 4 EACH | Refills: 3 | Status: SHIPPED | OUTPATIENT
Start: 2025-06-09

## 2025-06-09 RX ORDER — ALBUTEROL SULFATE 90 UG/1
1 INHALANT RESPIRATORY (INHALATION) ONCE
OUTPATIENT
Start: 2025-06-09

## 2025-06-09 RX ORDER — SEMAGLUTIDE 2.68 MG/ML
2 INJECTION, SOLUTION SUBCUTANEOUS
Qty: 3 ML | Refills: 2 | Status: SHIPPED | OUTPATIENT
Start: 2025-06-09 | End: 2025-06-09 | Stop reason: SINTOL

## 2025-06-09 RX ORDER — ALBUTEROL SULFATE 0.83 MG/ML
3 SOLUTION RESPIRATORY (INHALATION) ONCE
OUTPATIENT
Start: 2025-06-09 | End: 2025-06-09

## 2025-06-09 SDOH — ECONOMIC STABILITY: FOOD INSECURITY: WITHIN THE PAST 12 MONTHS, THE FOOD YOU BOUGHT JUST DIDN'T LAST AND YOU DIDN'T HAVE MONEY TO GET MORE.: NEVER TRUE

## 2025-06-09 SDOH — ECONOMIC STABILITY: INCOME INSECURITY: IN THE LAST 12 MONTHS, WAS THERE A TIME WHEN YOU WERE NOT ABLE TO PAY THE MORTGAGE OR RENT ON TIME?: NO

## 2025-06-09 SDOH — ECONOMIC STABILITY: FOOD INSECURITY: WITHIN THE PAST 12 MONTHS, YOU WORRIED THAT YOUR FOOD WOULD RUN OUT BEFORE YOU GOT MONEY TO BUY MORE.: NEVER TRUE

## 2025-06-09 SDOH — ECONOMIC STABILITY: TRANSPORTATION INSECURITY
IN THE PAST 12 MONTHS, HAS THE LACK OF TRANSPORTATION KEPT YOU FROM MEDICAL APPOINTMENTS OR FROM GETTING MEDICATIONS?: NO

## 2025-06-09 SDOH — ECONOMIC STABILITY: TRANSPORTATION INSECURITY
IN THE PAST 12 MONTHS, HAS LACK OF TRANSPORTATION KEPT YOU FROM MEETINGS, WORK, OR FROM GETTING THINGS NEEDED FOR DAILY LIVING?: NO

## 2025-06-09 ASSESSMENT — PAIN SCALES - GENERAL: PAINLEVEL_OUTOF10: 6

## 2025-06-09 ASSESSMENT — PATIENT HEALTH QUESTIONNAIRE - PHQ9
8. MOVING OR SPEAKING SO SLOWLY THAT OTHER PEOPLE COULD HAVE NOTICED. OR THE OPPOSITE, BEING SO FIGETY OR RESTLESS THAT YOU HAVE BEEN MOVING AROUND A LOT MORE THAN USUAL: MORE THAN HALF THE DAYS
2. FEELING DOWN, DEPRESSED OR HOPELESS: NEARLY EVERY DAY
7. TROUBLE CONCENTRATING ON THINGS, SUCH AS READING THE NEWSPAPER OR WATCHING TELEVISION: MORE THAN HALF THE DAYS
9. THOUGHTS THAT YOU WOULD BE BETTER OFF DEAD, OR OF HURTING YOURSELF: NOT AT ALL
4. FEELING TIRED OR HAVING LITTLE ENERGY: NEARLY EVERY DAY
3. TROUBLE FALLING OR STAYING ASLEEP OR SLEEPING TOO MUCH: NEARLY EVERY DAY
6. FEELING BAD ABOUT YOURSELF - OR THAT YOU ARE A FAILURE OR HAVE LET YOURSELF OR YOUR FAMILY DOWN: MORE THAN HALF THE DAYS
SUM OF ALL RESPONSES TO PHQ9 QUESTIONS 1 AND 2: 6
SUM OF ALL RESPONSES TO PHQ QUESTIONS 1-9: 18
5. POOR APPETITE OR OVEREATING: NOT AT ALL
1. LITTLE INTEREST OR PLEASURE IN DOING THINGS: NEARLY EVERY DAY

## 2025-06-09 ASSESSMENT — SOCIAL DETERMINANTS OF HEALTH (SDOH): IN THE PAST 12 MONTHS, HAS THE ELECTRIC, GAS, OIL, OR WATER COMPANY THREATENED TO SHUT OFF SERVICE IN YOUR HOME?: NO

## 2025-06-09 NOTE — PROGRESS NOTES
Problem List Items Addressed This Visit          Medium    Routine adult health maintenance - Primary (Chronic)    Overview   Moderna COVID 19 vaccine 4/17/21, 5/13/21, 2/24/22  Influenza Seasonal Inj Quad Age 6 Mo-64 Yrs Pres Free8/29/2019; 12/14/22  Shingrix 8/8/20, 6/1/23   Prevnar 20 6/1/23  Pneumococcal-13 Vac Moykglslw34/28/2019   Tdap (Age 7+)7/13/2015 1/6/22: Current 10-Year ASCVD Risk:  33.86% - High Risk  5/4/24 Cologuard (-)   PSA 7/12/24: 0.06, 0.11 6/6/25  BP cuff check:  our cuff 126/78 hr 89   pt cuff 126/79 hr 97          Current Assessment & Plan   Annual Wellness exam completed   Preventive Health History reviewed  Ordered:   Labs    TDAP at pharmacy             Relevant Medications    diph,pertuss,acel,,tet vac,PF, (Adacel) 2 Lf-(2.5-5-3-5 mcg)-5Lf/0.5 mL injection    Anxiety    Overview   Lexapro - caused gynecomastia and ineffective  Cymbalta 40mg ineffective  Klonopin worked in past  Saw counselor 2x a week for 7 years  Mike/Tata Smith  Failed atarax  Uses just prn Phenergan now         Current Assessment & Plan   Will do Genesight testing given ineffectiveness of previous meds  Will start medication once results available  Elavil for now for sleep         Relevant Orders    TSH with reflex to Free T4 if abnormal    CAD (coronary artery disease)    Overview   CT chest 2019: 2. Vascular calcifications left anterior descending coronary artery with mild to moderate cardiomegaly.  H/O NSTEMI  10/13/23 - Angiography reveals a 80% stenosis of the proximal to mid right coronary artery coronary artery  S/p PCI to RCA on 10/13/2023   On statin and ASA (also on semaglutide/Ozempic)         Relevant Medications    tadalafil, antihypertensive, 20 mg tablet    semaglutide 0.25 mg or 0.5 mg (2 mg/3 mL) pen injector    Current moderate episode of major depressive disorder without prior episode (Multi)    Overview   On SSRI in past, (Lexapro caused gynecomastia)  Remeron caused AE  Cymbalta  "ineffective  Saw psychiatry/counselor in past  Not medicated now  Monitor         Lumbar spondylosis    Overview   S/p 1. L3-4, L4-5 extreme lateral interbody fusion and  L3-5 laminectomy, L5-S1 revision laminectomy, L5-S1 transforaminal lumbar interbody fusion, L3-S1 instrumented posterolateral fusion, repair of dural tear  on September 29, 2023 w/complications: Postoperative lumbar fluid collections, concern for epidural abscesses and cauda equina, Postoperative site infection   s/p drainage and Bactrim course 11/9/2023, and Wound dehiscence   On Lyrica  Suboxone also helps w/pain control         Mixed hyperlipidemia    Overview   on statin  Goal Ldl <100         Relevant Medications    atorvastatin (Lipitor) 80 mg tablet    Other Relevant Orders    Lipid Panel    ALT    Class 1 obesity due to excess calories with serious comorbidity and body mass index (BMI) of 32.0 to 32.9 in adult    Overview   Started Ozempic in 2023 (Hba1c was 6.5%)         Current Assessment & Plan   15 minutes spent discussing:  BMI was above normal measurement. Current weight: 105 kg (231 lb 6.4 oz)  Weight change since last visit (-) denotes wt loss 16.4 lbs   Weight loss needed to achieve BMI 25: 52.5 Lbs  Weight loss needed to achieve BMI 30: 16.8 Lbs  Resume ozemoic but has to start low again as has been off for ~2months  Increase gradually to to 2mg         Abstinence syndrome on maintenance opioid agonist therapy, no symptoms    Overview   On subuxone.  managed by [facility]  sees psychiatry and twice weekly counseling         Vitamin B12 deficiency    Overview   12/22:192  1/24 = 231  On supp         Relevant Medications    cyanocobalamin (Vitamin B-12) 1,000 mcg/mL injection    insulin syringe-needle U-100 1 mL 30 gauge x 1/2\" syringe    Other Relevant Orders    Vitamin B12    Vitamin D deficiency    Overview   6/25: 14  Goal ~50  On supp          Current Assessment & Plan   Compliance is an issue so will do once " weekly high dose         Relevant Medications    ergocalciferol (Vitamin D-2) 1250 mcg (50,000 units) capsule    Other Relevant Orders    Vitamin D 25-Hydroxy,Total (for eval of Vitamin D levels)    Vitamin D 25-Hydroxy,Total (for eval of Vitamin D levels)    Advanced directives, counseling/discussion    Overview   6/9/25: HCPOA is sister Jaylin Negrete. Ling Thrasher (, but is still involved with health)  FULL CODE         Cardiac risk counseling    Overview   6/9/25:  Current 10-Year ASCVD Risk:  123%   On ASA (CAD)         Screening for multiple conditions    Overview   Depression screening completed           Medication management    Overview   Serum drug screen, signed controlled substance contract, and [place]AR[place] check all up to date  Pain MD manages Subuxone         Relevant Orders    Opiate/Opioid/Benzo Prescription Compliance    Pregabalin,Urine    Current smoker    Overview   started age 32  Smokes 1ppd  Discussed cessation         Relevant Orders    Complete Pulmonary Function Test Pre/Post Bronchodilator (Spirometry Pre/Post/DLCO/Lung Volumes)    Pulmonary emphysema (Multi)    Overview   Not medicated  Asymptomatic  Monitor   Advised tobacco cessation         Current Assessment & Plan   Needs to quit smoking  Referral to [facility]         Relevant Orders    Complete Pulmonary Function Test Pre/Post Bronchodilator (Spirometry Pre/Post/DLCO/Lung Volumes)    Type 2 diabetes mellitus with circulatory disorder, without long-term current use of insulin    Overview   6/23: Hba1c 6.5%  On metformin in past (stopped in 1/24)  On ozempic since 2023  On statin         Current Assessment & Plan   Resume ozemoic but has to start low again as has been off for ~2months         Relevant Medications    semaglutide 0.25 mg or 0.5 mg (2 mg/3 mL) pen injector    Other Relevant Orders    Albumin-Creatinine Ratio, Urine Random    Comprehensive Metabolic Panel    CBC and Auto Differential    Lipid Panel    Urinalysis with Reflex  Microscopic    Albumin-Creatinine Ratio, Urine Random    Hemoglobin A1C    Iron deficiency    Overview   1/24: Iron 17, %sat 6%  Started on Iron         Relevant Medications    ferrous sulfate 325 (65 Fe) mg EC tablet    Other Relevant Orders    Iron and TIBC    Iron and TIBC    Ferritin    Screening for prostate cancer    Relevant Orders    Prostate Specific Antigen, Screen    Alcohol screening    Overview   06/09/25   5 minutes spent screening for alcohol misuse  See snapshot (social documentation) for details.           Personal history of tobacco use    Relevant Orders    Referral to Tobacco Cessation Counseling    Encounter for smoking cessation counseling    Relevant Orders    Referral to Tobacco Cessation Counseling    Erectile dysfunction    Overview   Uses Cialis prn         Relevant Medications    tadalafil, antihypertensive, 20 mg tablet    Insomnia    Overview   Klonopin worked in past (but is on Subuxone for prior opiate dependence)  Trazodone and Atarax ineffective  Remeron caused hypersomnolence           Current Assessment & Plan   Was taking phenergan for sleep, effective   Will try replacing it with Elavil         Relevant Medications    amitriptyline (Elavil) 25 mg tablet        Controlled Substance Visit:  I have personally reviewed the OARRS report and have considered the risks of abuse, dependence, addiction and diversion and I believe that it is clinically appropriate for the patient to be prescribed this medication.    Is the patient prescribed a combination of a benzodiazepine and opioid?  No    Last Urine Drug Screen / ordered today: No  Recent Results (from the past 8760 hours)   Drug Screen 9 Panel, Blood with Reflex to Confirmation    Collection Time: 01/13/25 12:55 PM   Result Value Ref Range    Amphetamine Screen, S/P Negative Cutoff 20 ng/mL    Methamphetamine Screen, S/P Negative Cutoff 20 ng/mL    Benzodiazepine Screen, S/P Negative Cutoff 50 ng/mL    Buprenorphine Screen, S/P  Positive Cutoff 1 ng/mL    Cannabinoids Screen, S/P Negative Cutoff 20 ng/mL    Cocaine Screen Screen, S/P Negative Cutoff 20 ng/mL    Methadone Screen, S/P Negative Cutoff 25 ng/mL    Oxycodone Screen, S/P Negative Cutoff 20 ng/mL    Phencyclidine Screen, S/P Negative Cutoff 10 ng/mL    Opiate Screen, S/P Negative Cutoff 20 ng/mL    Drug Screen Comment S/P See Note     Barbiturate Screen, S/P Negative Cutoff 50 ng/mL     Results are as expected.     Controlled Substance Agreement:  Date of the Last Agreement: 01/13/2025  I have reviewed each line item on the Controlled Substance Agreement including, but not limited to, the benefits, risks, and alternatives to treatment with a Controlled Substance medication(s). The patient has verbalized understanding and signed the agreement.    Lyrica:  What is the patient's goal of therapy? To help with pain  Is this being achieved with current treatment? yes    Pain Assessment:  Analgesia  What was your pain level on average during the past week?: 7  What was your pain level at its worst during the past week?: 10 - Pain as bad as it can be  What percentage of your pain has been relieved during the past week?: 80 %  Is the amount of pain relief you are now obtaining from your current pain relievers enough to make a real difference in your life?: N    Activities of Daily Living  Physical Functioning: Better  Family Relationships: Same  Social Relationships: Same  Mood: Better  Sleep Patterns: Better  Overall Functioning: Better    Adverse Events  Is patient experiencing any side effects from current pain relievers?: N  Patient's Overall Severity of Side Effects: None        Activities of Daily Living:  Is your overall impression that this patient is benefiting (symptom reduction outweighs side effects) from Lyrica therapy? Yes     1. Physical Functioning: Better  2. Family Relationship: Same  3. Social Relationship: Same  4. Mood: Better  5. Sleep Patterns: Better  6. Overall  Function: Better      : Alert, NAD  HEENT:  PERRLA, EOMI, conjunctiva and sclera normal in appearance; Neck supple  Respiratory:  Lungs CTAB  Cardiovascular:  Heart RRR. No M/R/G  Abdomen: soft, BS X 4  Neuro:  Gross motor and sensory intact  Skin:  No suspicious lesions present   Mood: normal

## 2025-06-09 NOTE — Clinical Note
Please see why urine albumin wasn't collected when he went tolab Email SH if need be Also need genesight test done today

## 2025-06-09 NOTE — ASSESSMENT & PLAN NOTE
Will do Genesight testing given ineffectiveness of previous meds  Will start medication once results available  Elavil for now for sleep

## 2025-06-09 NOTE — PROGRESS NOTES
Annual Medicare Wellness Exam/Comprehensive Problem Focused Follow Up  HPI/CC  Chief Complaint   Patient presents with    Medicare Annual Wellness Visit Subsequent     Reviewed chart for care received since last appointment.  Started on B12 and Vvit D last year bc low    Labws reviewed:  Component      Latest Ref Rng 6/6/2025   WHITE BLOOD CELL COUNT      3.8 - 10.8 Thousand/uL 10.5    RED BLOOD CELL COUNT      4.20 - 5.80 Million/uL 4.73    HEMOGLOBIN      13.2 - 17.1 g/dL 14.5    HEMATOCRIT      38.5 - 50.0 % 43.4    MCV      80.0 - 100.0 fL 91.8    MCH      27.0 - 33.0 pg 30.7    MCHC      32.0 - 36.0 g/dL 33.4    RDW      11.0 - 15.0 % 14.2    PLATELET COUNT      140 - 400 Thousand/uL 285    MPV      7.5 - 12.5 fL 9.7    ABSOLUTE NEUTROPHILS      1,500 - 7,800 cells/uL 4,683    ABSOLUTE LYMPHOCYTES      850 - 3,900 cells/uL 4,715 (H)    ABSOLUTE MONOCYTES      200 - 950 cells/uL 788    ABSOLUTE EOSINOPHILS      15 - 500 cells/uL 221    ABSOLUTE BASOPHILS      0 - 200 cells/uL 95    NEUTROPHILS      % 44.6    LYMPHOCYTES      % 44.9    MONOCYTES      % 7.5    EOSINOPHILS      % 2.1    BASOPHILS      % 0.9       Component      Latest Ref Rng 6/7/2025   COLOR      YELLOW  YELLOW    APPEARANCE      CLEAR  CLEAR    SPECIFIC GRAVITY      1.001 - 1.035  1.008    PH      5.0 - 8.0  5.5    GLUCOSE      NEGATIVE  NEGATIVE    BILIRUBIN      NEGATIVE  NEGATIVE    KETONES      NEGATIVE  NEGATIVE    OCCULT BLOOD      NEGATIVE  NEGATIVE    PROTEIN      NEGATIVE  NEGATIVE    NITRITE      NEGATIVE  NEGATIVE    LEUKOCYTE ESTERASE      NEGATIVE  TRACE !    WBC      < OR = 5 /HPF NONE SEEN    RBC      < OR = 2 /HPF NONE SEEN    SQUAMOUS EPITHELIAL CELLS      < OR = 5 /HPF NONE SEEN    BACTERIA      NONE SEEN /HPF NONE SEEN    HYALINE CAST      NONE SEEN /LPF NONE SEEN       Component      Latest Ref Rng 6/6/2025   GLUCOSE      65 - 99 mg/dL 83    UREA NITROGEN (BUN)      7 - 25 mg/dL 14    CREATININE      0.70 - 1.30 mg/dL 1.29     EGFR      > OR = 60 mL/min/1.73m2 64    SODIUM      135 - 146 mmol/L 137    POTASSIUM      3.5 - 5.3 mmol/L 4.4    CHLORIDE      98 - 110 mmol/L 97 (L)    CARBON DIOXIDE      20 - 32 mmol/L 28    ELECTROLYTE BALANCE      7 - 17 mmol/L (calc) 12    CALCIUM      8.6 - 10.3 mg/dL 9.3    PROTEIN, TOTAL      6.1 - 8.1 g/dL 7.9    ALBUMIN      3.6 - 5.1 g/dL 4.2    BILIRUBIN, TOTAL      0.2 - 1.2 mg/dL 0.6    ALKALINE PHOSPHATASE      35 - 144 U/L 96    AST      10 - 35 U/L 18    ALT      9 - 46 U/L 7 (L)    CHOLESTEROL, TOTAL      <200 mg/dL 271 (H)    HDL CHOLESTEROL      > OR = 40 mg/dL 33 (L)    TRIGLYCERIDES      <150 mg/dL 284 (H)    LDL-CHOLESTEROL      mg/dL (calc) 187 (H)    CHOL/HDLC RATIO      <5.0 (calc) 8.2 (H)    NON HDL CHOLESTEROL      <130 mg/dL (calc) 238 (H)    HEMOGLOBIN A1c      <5.7 % 5.5    eAG (mg/dL)      mg/dL 111    eAG (mmol/L)      mmol/L 6.2    IRON, TOTAL      50 - 180 mcg/dL 54    IRON BINDING CAPACITY      250 - 425 mcg/dL (calc) 331    % SATURATION      20 - 48 % (calc) 16 (L)    PSA, TOTAL      < OR = 4.00 ng/mL 0.11    VITAMIN D,25-OH,TOTAL,IA      30 - 100 ng/mL 14 (L)    FERRITIN      38 - 380 ng/mL 126    VITAMIN B12      200 - 1,100 pg/mL 330       Wasn't taking Lipitor or Vit D  Supposed to be taking B12 every 3 weeks  Last will was 2 months ago    Having a lot of panic attacks  Cannot sleep  Up for 3-4 hours until falls asleep  Atarax ineffective  Using phenergan top sleep tight now  Used to take klonopin for sleep and anxiety and helped    Last took Ozempic 2-3 months ago      Assessment and Plan:  Problem List Items Addressed This Visit          High    Routine adult health maintenance - Primary (Chronic)    Overview   Moderna COVID 19 vaccine 4/17/21, 5/13/21, 2/24/22  Influenza Seasonal Inj Quad Age 6 Mo-64 Yrs Pres Free8/29/2019; 12/14/22  Shingrix 8/8/20, 6/1/23   Prevnar 20 6/1/23  Pneumococcal-13 Vac Ixzysljmx97/28/2019   Tdap (Age 7+)7/13/2015 1/6/22: Current 10-Year  ASCVD Risk:  33.86% - High Risk  5/4/24 Cologuard (-)   PSA 7/12/24: 0.06, 0.11 6/6/25  BP cuff check:  our cuff 126/78 hr 89   pt cuff 126/79 hr 97          Current Assessment & Plan   Annual Wellness exam completed   Preventive Health History reviewed  Ordered:   Labs    TDAP at pharmacy             Relevant Medications    diph,pertuss,acel,,tet vac,PF, (Adacel) 2 Lf-(2.5-5-3-5 mcg)-5Lf/0.5 mL injection       Medium    Abstinence syndrome on maintenance opioid agonist therapy, no symptoms    Overview   On subuxone.  managed by [facility]  sees psychiatry and twice weekly counseling         Advanced directives, counseling/discussion    Overview   6/9/25: HCPOA is sister Jaylin Negrete. Ling Anna Marie (, but is still involved with health)  FULL CODE         Alcohol screening    Overview   06/09/25   5 minutes spent screening for alcohol misuse  See snapshot (social documentation) for details.           Anxiety    Overview   Lexapro - caused gynecomastia and ineffective  Cymbalta 40mg ineffective  Klonopin worked in past  Saw counselor 2x a week for 7 years  [facility]/Tata Smith  Failed atarax  Uses just prn Phenergan now         Current Assessment & Plan   Will do Genesight testing given ineffectiveness of previous meds  Will start medication once results available  Elavil for now for sleep         Relevant Orders    TSH with reflex to Free T4 if abnormal    CAD (coronary artery disease)    Overview   CT chest 2019: 2. Vascular calcifications left anterior descending coronary artery with mild to moderate cardiomegaly.  H/O NSTEMI  10/13/23 - Angiography reveals a 80% stenosis of the proximal to mid right coronary artery coronary artery  S/p PCI to RCA on 10/13/2023   On statin and ASA (also on semaglutide/Ozempic)         Relevant Medications    tadalafil, antihypertensive, 20 mg tablet    semaglutide 0.25 mg or 0.5 mg (2 mg/3 mL) pen injector    Cardiac risk counseling    Overview   6/9/25:   Current 10-Year ASCVD Risk:  123%   On ASA (CAD)         Class 1 obesity due to excess calories with serious comorbidity and body mass index (BMI) of 32.0 to 32.9 in adult    Overview   Started Ozempic in 2023 (Hba1c was 6.5%)         Current Assessment & Plan   BMI was above normal measurement. Current weight: 105 kg (231 lb 6.4 oz)  Weight change since last visit (-) denotes wt loss 16.4 lbs   Weight loss needed to achieve BMI 25: 52.5 Lbs  Weight loss needed to achieve BMI 30: 16.8 Lbs  Resume ozemoic but has to start low again as has been off for ~2months  Increase gradually to to 2mg         Current moderate episode of major depressive disorder without prior episode (Multi)    Overview   On SSRI in past, (Lexapro caused gynecomastia)  Remeron caused AE  Cymbalta ineffective  Saw psychiatry/counselor in past  Not medicated now  Monitor         Current smoker    Overview   started age 32  Smokes 1ppd  Discussed cessation         Relevant Orders    Complete Pulmonary Function Test Pre/Post Bronchodilator (Spirometry Pre/Post/DLCO/Lung Volumes)    Encounter for screening involving social determinants of health (SDoH)    Overview   05/06/25: 5 minutes spent on SDOH screening.  Specifically: Housing, Food Insecurity, Utilities and Transportatin Needs were evaluated   (See Screenings in Rooming section for documentation)           Encounter for smoking cessation counseling    Overview   Patient was counseled for several minutes (3) on the cessation of smoking.   Offered smoking cessation program enrollmen             Relevant Orders    Referral to Tobacco Cessation Counseling    Erectile dysfunction    Overview   Uses Cialis prn         Relevant Medications    tadalafil, antihypertensive, 20 mg tablet    Insomnia    Overview   Klonopin worked in past (but is on Subuxone for prior opiate dependence)  Trazodone and Atarax ineffective  Remeron caused hypersomnolence           Current Assessment & Plan   Was taking  phenergan for sleep, effective   Will try replacing it with Elavil         Relevant Medications    amitriptyline (Elavil) 25 mg tablet    Iron deficiency    Overview   1/24: Iron 17, %sat 6%  Started on Iron         Relevant Medications    ferrous sulfate 325 (65 Fe) mg EC tablet    Other Relevant Orders    Iron and TIBC    Iron and TIBC    Ferritin    Lumbar spondylosis    Overview   S/p 1. L3-4, L4-5 extreme lateral interbody fusion and  L3-5 laminectomy, L5-S1 revision laminectomy, L5-S1 transforaminal lumbar interbody fusion, L3-S1 instrumented posterolateral fusion, repair of dural tear  on September 29, 2023 w/complications: Postoperative lumbar fluid collections, concern for epidural abscesses and cauda equina, Postoperative site infection   s/p drainage and Bactrim course 11/9/2023, and Wound dehiscence   On Lyrica  Suboxone also helps w/pain control         Medication management    Overview   Serum drug screen, signed controlled substance contract, and [place]AR[place] check all up to date  Pain MD manages Subuxone         Relevant Orders    Opiate/Opioid/Benzo Prescription Compliance    Pregabalin,Urine    Mixed hyperlipidemia    Overview   on statin  Goal Ldl <100         Relevant Medications    atorvastatin (Lipitor) 80 mg tablet    Other Relevant Orders    Lipid Panel    ALT    Personal history of tobacco use    Relevant Orders    Referral to Tobacco Cessation Counseling    Pulmonary emphysema (Multi)    Overview   Not medicated  Asymptomatic  Monitor   Advised tobacco cessation         Current Assessment & Plan   Needs to quit smoking  Referral to [facility]         Relevant Orders    Complete Pulmonary Function Test Pre/Post Bronchodilator (Spirometry Pre/Post/DLCO/Lung Volumes)    Screening for multiple conditions    Overview   Depression screening completed           Screening for prostate cancer    Relevant Orders    Prostate Specific Antigen, Screen    Type 2 diabetes mellitus with circulatory disorder, 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    Overview   6/23: Hba1c 6.5%  On metformin in past (stopped in 1/24)  On ozempic since 2023  On statin         Current Assessment & Plan   Resume ozemoic but has to start low again as has been off for ~2months         Relevant Medications    semaglutide 0.25 mg or 0.5 mg (2 mg/3 mL) pen injector    Other Relevant Orders    Comprehensive Metabolic Panel    CBC and Auto Differential    Lipid Panel    Urinalysis with Reflex Microscopic    Albumin-Creatinine Ratio, Urine Random    Hemoglobin A1C    Vitamin B12 deficiency    Overview   12/22:192  1/24 = 231  On supp         Relevant Medications    cyanocobalamin (Vitamin B-12) 1,000 mcg/mL injection    insulin syringe-needle U-100 1 mL 30 gauge x 1/2\" syringe    Other Relevant Orders    Vitamin B12    Vitamin D deficiency    Overview   6/25: 14  Goal ~50  On supp          Current Assessment & Plan   Compliance is an issue so will do once weekly high dose         Relevant Medications    ergocalciferol (Vitamin D-2) 1250 mcg (50,000 units) capsule    Other Relevant Orders    Vitamin D 25-Hydroxy,Total (for eval of Vitamin D levels)    Vitamin D 25-Hydroxy,Total (for eval of Vitamin D levels)         ROS otherwise negative aside from what was mentioned above in HPI.    Vitals  BP 94/60   Pulse 79   Temp 36.8 °C (98.2 °F)   Ht 1.803 m (5' 11\")   Wt 105 kg (231 lb 6.4 oz)   SpO2 95%   BMI 32.27 kg/m²   Body mass index is 32.27 kg/m².  Physical Exam  Gen: Alert, NAD  HEENT:  Unremarkable  Neck:  No DORA  Respiratory:  Lungs CTAB  Cardiovascular:  Heart RRR  Neuro:  Gross motor and sensory intact  Skin:  No suspicious lesions present  Breast: No masses, or axillary lymphadenopathy      Patient Care Team:  Marsha Francis MD as PCP - General (Internal Medicine)  Nicholas Paredes PA-C as Physician Assistant (Neurosurgery)  Vic Hale MD as Consulting Physician (Orthopaedic Surgery)       Health Risk Assessment:  Patient was asked if he/she " has any difficulty performing the following activities of daily living:  Preparing nutritious food and eating? No  Grocery shopping? Yes  Bathing and grooming yourself? No  Getting dressed? No  Using the toilet?No  Using the phone? No  Moving around from place to place (physical ambulation)? Yes  Doing housework (including laundry) independently? Yes  Managing finances independently? Yes  Managing medications independently? Yes  Doing housework (including laundry) independently? Yes    Patient was asked if he/she:  Feels safe in current home environment?: Yes  Uses seatbelt? Yes  Sees the dentist regularly? No  Exercises regularly: No  Suffers from depression, stress, anger, loneliness or social isolation, pain, suicidality? No    Dietary issues discussed: Yes  Cognitive Impairment No  Hearing difficulties: No  Visual Acuity assessed: No    What is your self-assessment of overall health status and life satisfaction? Fair     5 minutes spent on SDOH screening:   Specifically Housing, Food Insecurity, Utilities and Transportatin Needs were evaluated   (See Screenings in Rooming section for documentation)  Food Insecurity: No Food Insecurity (6/9/2025)    Hunger Vital Sign     Worried About Running Out of Food in the Last Year: Never true     Ran Out of Food in the Last Year: Never true     Housing Stability: Unknown (6/9/2025)    Housing Stability Vital Sign     Unable to Pay for Housing in the Last Year: No     Number of Times Moved in the Last Year: Not on file     Homeless in the Last Year: No     Transportation Needs: No Transportation Needs (6/9/2025)    PRAPARE - Transportation     Lack of Transportation (Medical): No     Lack of Transportation (Non-Medical): No         Allergies and Medications  Lexapro [escitalopram oxalate], Metoprolol, and Mirtazapine  Current Outpatient Medications   Medication Instructions    amitriptyline (ELAVIL) 25 mg, oral, Nightly    aspirin 81 mg, oral, Daily    atorvastatin  "(LIPITOR) 80 mg, oral, Daily    buprenorphine-naloxone (Suboxone) 8-2 mg SL film 3 Film, Daily    cyanocobalamin (VITAMIN B-12) 1,000 mcg, intramuscular, Every 21 days    diph,pertuss,acel,,tet vac,PF, (Adacel) 2 Lf-(2.5-5-3-5 mcg)-5Lf/0.5 mL injection 0.5 mL, intramuscular, Once    ergocalciferol (VITAMIN D-2) 1.25 mg, oral, Weekly    ferrous sulfate 325 (65 Fe) mg EC tablet 1 tablet, oral, 3 times weekly, Do not crush, chew, or split.    insulin syringe-needle U-100 1 mL 30 gauge x 1/2\" syringe Use with B12, may substitite    miscellaneous medical supply misc Knee walker for non weight bearing right leg post right ankle surgery    naloxone (NARCAN) 4 mg    pregabalin (LYRICA) 300 mg, oral, 2 times daily    semaglutide 0.25 mg, subcutaneous, Weekly    tadalafil 20 mg, oral, Daily PRN       Medications and Supplements  prescribed by me and other practitioners or clinical pharmacist (such as prescriptions, OTC's, herbal therapies and supplements) were reviewed and documented in the medical record.      Active Problem List  Problem List[1]    Comprehensive Medical/Surgical/Social/Family History  Medical History[2]  Surgical History[3]  Social History     Social History Narrative    Social History:     ([employer]),no kids    Doesnt work/Disbility    Smoker: started age 24, Smokes  <1/2 ppd (used to be 2ppd)    On suboxone - pain management    No ETOH since     -------    Family History:    F: ETOHIsm, DM, COPD    M: Lung CA, CAD, Dementia    B: CA metastatic lung ()    S: ETOHism    S:         Tobacco/Alcohol/Opioid use, as well as Illicit Drug Use was screened for/reviewed and documented in Social Documentation section of the chart and medication list as appropriate     Depression Screening  Depression screening completed using the PHQ-2 questions, with results documented in the chart/encounter (5 min spent on this).  Patient Health Questionnaire-2 Score: 6 (2025  9:20 AM)  (See also " Rooming/Screening section for documentation, and/or progress note for additional information)    Cardiac Risk Assessment (15 minutes spent on this)  Cardiovascular risk was discussed and, if needed, lifestyle modifications recommended, including nutritional choices, exercise, and elimination of habits contributing to risk. We agreed on a plan to reduce the current cardiovascular risk based on above discussion as needed.     Aspirin use/disuse was discussed and documented in the Problem List of the medical record (under Cardiac Risk Counseling) after reviewing the updated guidelines below:  Consider low dose Aspirin ( mg) use if the benefit for cardiovascular disease prevention outweighs risk for bleeding complications.   Discussed that in general, low dose ASA should be considered:  In patients WITHOUT prior MI/stroke/PAD (primary prevention):   a. Age <60: Use if 10-year cardiovascular disease risk >20%, with discussion of risks and benefits with patient  b. Age 60-<70: Use if 10-year cardiovascular disease risk >20% and low bleeding (e.g., gastrointestinal) risk, with discussion of risks and benefits with patient  c. Age >=70: Do not use    In patients WITH prior MI/stroke/PAD (secondary prevention):   Generally use unless extremely high bleeding (e.g., gastrointenstinal) risk, with discussion of risks and benefits with patient    Advance Directives Discussion  Advanced Care Planning (including a Living Will, Healthcare POA, as well as specific end of life choices and/or directives), was discussed with the patient and/or surrogate, voluntarily, and details of that discussion documented in the Problem List (under Advanced Directives Discussion) of the medical record.   (16 min spent discussing above)       During the course of the visit the patient was educated and counseled about age appropriate screening and preventive services.   Completed preventive screenings were documented in the chart (see Routine  Health Maintenance in Problem List) and orders were placed for outstanding screenings/procedures as documented in the Assessment and Plan.    Patient Instructions (the written plan) was given to the patient at check out as part of the AVS.             [1]   Patient Active Problem List  Diagnosis    Anxiety    Chronic GERD    CAD (coronary artery disease)    Current moderate episode of major depressive disorder without prior episode (Multi)    Cervical spinal stenosis    H/O fracture of ankle    Lumbar spondylosis    Mixed hyperlipidemia    Class 1 obesity due to excess calories with serious comorbidity and body mass index (BMI) of 32.0 to 32.9 in adult    Abstinence syndrome on maintenance opioid agonist therapy, no symptoms    Vesico-ureteral reflux    Vitamin B12 deficiency    Vitamin D deficiency    Routine adult health maintenance    Advanced directives, counseling/discussion    Cardiac risk counseling    Screening for multiple conditions    Medication management    Current smoker    Degenerative lumbar spinal stenosis    Lumbar canal stenosis    History of rhabdomyolysis    Pulmonary emphysema (Multi)    Lumbar disc disease    H/O subarachnoid hemorrhage    Type 2 diabetes mellitus with circulatory disorder, without long-term current use of insulin    Iron deficiency    Screening for prostate cancer    Screen for colon cancer    Alcohol screening    Encounter for screening involving social determinants of health (SDoH)    Personal history of tobacco use    Encounter for smoking cessation counseling    Erectile dysfunction    Insomnia   [2]   Past Medical History:  Diagnosis Date    Colon cancer screening 05/04/2024    Cologuard (-)    H/O chest x-ray     1/20: Impression: Stable, enlarged cardiac mediastinal silhouette with mild central pulmonary vascular congestion. Nonspecific bibasilar airspace disease, worsened in overall appearance compared with the previous study, findings can be seen in   infiltrate/pneumonia and/or atelectasis.    H/O CT scan     CT Ankle: 1/20: A vertically oriented nondisplaced oblique fracture of the medial ankle mortise extends into the distal tibial metadiaphysis.  Several small old avulsion fracture fragments are noted inferior to both malleoli; as well as soft tissue swelling about the ankle.  There is no other fracture, radiodense foreign bodies, pathologic calcifications, or worrisome bone destruction identified.    H/O CT scan 10/11/2023    1. Acute minimally comminuted and displaced fractures through distal tibia and fibula as described above. 2. There are also findings consistent with remote trauma with significant cortical remodeling of the distal tibia with associated incongruity of the distal tibial articular surface as described above. 3. Moderate arthrosis of the tibiotalar and subtalar joints. 4. Soft tissue swelling about th    H/O CT scan of abdomen     2016: Diffuse urinary bladder wall thickening, Small atrophic left kidney with pyelocalyceal ectasia and severe cortical thinning, , Moderate pyelocalyceal ectasia of the right kidney with cortical thinning; unchanged    H/O CT scan of brain     11/22: normal 1/20: FINDINGS:  There is no intracranial hemorrhage, mass effect, midline shift, extra-axial collection, evidence of hydrocephalus, recent ischemic infarct, or skull fracture identified.    There is no significant atrophy or white matter changes, for age.  Moderate mucosal thickening within the paranasal sinuses again noted. The mastoid air cells are clear    H/O CT scan of brain 10/10/2023    There is some subtle increased attenuation along the right  tentorium cerebelli.  This is suspicious for a acute  subdural hematoma.    H/O CT scan of chest     10/19: 1. Bibasilar infiltrate/pneumonia with patchy pneumonia scattered throughout the left lung and bilateral pleural effusions.  2. Vascular calcifications left anterior descending coronary artery with mild  to moderate cardiomegaly.  3. Moderate to moderately severe diffuse fatty infiltration of liver. 10/21: Bilateral groundglass opacities as discussed. These findings are nonspecific    H/O CT scan of chest 10/10/2023    NO PE. Somewhat patchy subpleural curvilinear markings are seen in the  bilateral lower lobes, overall morphology favoring atelectasis or infectious  infiltrate.  Scattered central small cysts are observed.    H/O CT scan of head 10/12/2023    In comparison to prior CT dated 10/03/2023, there has been interval improvement in the right frontal subarachnoid hemorrhage and diffuse subdural hemorrhage along the tentorium and overlying the right cerebellar hemisphere as well as resolution of right parietal lobe intraparenchymal hemorrhage.    H/O diagnostic ultrasound     RUQ US 2013: Cholelithiasis and mild gallbladder distention, Increased liver echogenicity    H/O diagnostic ultrasound 10/10/2023    renal - Severe right hydronephrosis, similar in appearance to CT lumbar spine examination. Bilateral ureteral jets visualized.    Mild asymmetric enlargement of the right kidney with cortical renal scarring involving the left kidney.    H/O echocardiogram     1/20: Normal LV and RV.  No significant valve disease.  Normal estimated PA pressure.  Normal diastolic filling pattern.    H/O echocardiogram 10/10/2023    1. Left ventricular systolic function is low normal with a 50-55% estimated ejection fraction.  2. There is low normal right ventricular systolic function.  3. RVSP within normal limits.    H/O magnetic resonance imaging of cervical spine     2003: CENTRAL STENOSIS WORST C3-C4 1/21: Multilevel degenerative changes with disc height loss, disc osteophyte complexes, facet/uncovertebral degenerative changes. Moderate to severe canal and bilateral foraminal narrowing at C3-C4, C4-C5, C5-C6, C6-C7 grossly similar to  prior cervical MRI    H/O magnetic resonance imaging of lumbar spine     2012:  NEW RIGHT  POSTERIOR EXTRUSION L4-5, WITH CONTACT OF THE EXITING RIGHT L5 AND DESCENDING RIGHT S1 NERVES. s/p laminectomy L5 2019 ([facility]): NARROWING OF MULTIPLE LUMBAR DISC LEVELS WITH FAIRLY ADVANCED DEGENERATIVE CHANGES. SEE INDIVIDUAL LEVELS ABOVE.  MILD LUMBAR CANAL STENOSIS AT THE L4-5 LEVEL.  NARROWING OF NEURAL FORAMINA,    H/O magnetic resonance imaging of lumbar spine 10/10/2023    MR findings worrisome for retroperitoneal abscess, possibly extending into  the L4-5 disc space.   Apparent clumping of the proximal cauda equina nerve roots versus mass effect  due to an intrathecal fluid collection.  Recommend MRI of the T-spine for  further evaluation    H/O x-ray of lower extremity 10/10/2023    XR ankle: NEW ACUTE OBLIQUE FRACTURE OF THE DISTAL RIGHT TIBIA WITH A  OLD VERTICAL FRACTURE OF THE TIBIA.   THERE IS A NEW COMMINUTED FRACTURE OF THE DISTAL RIGHT FIBULA.   [3]   Past Surgical History:  Procedure Laterality Date    ANKLE SURGERY  10/17/2023    Insertion Intramedullary RIGHT Nail Tibia by Dr Vic Hale    CARDIAC CATHETERIZATION      8/2015: The coronary anatomy is R dominant.  L main coronary artery was normal.  Left anterior descending artery presents luminal irregularities.  Circumflex coronary artery normal.  R coronary artery presents luminal irregularities.  Mid RCA lesion 40% stenosis.  LVEF 50%    CARDIAC CATHETERIZATION Right 10/11/2023    Procedure: Left Heart Cath;  Surgeon: Jonathan GASPAR MD;  Location: [facility];  Service: Cardiovascular;  Laterality: Right;  radial    CARDIAC CATHETERIZATION N/A 10/13/2023    Procedure: PCI RCA, right radial 6Fr;  Surgeon: Juvenal Le MD;  Location: [facility];  Service: Cardiovascular;  Laterality: N/A;    CERVICAL LAMINECTOMY      C3-C4, s/p cervical laminectomy.    ESOPHAGOGASTRODUODENOSCOPY      12/15: Localized mild erythema was found at the gastroesophageal junction    KIDNEY SURGERY  09/12/2013    Kidney Surgery    LUMBAR FUSION   10/06/2023    1. L3-4, L4-5 extreme lateral interbody fusion 2. L3-5 laminectomy, L5-S1 revision laminectomy, L5-S1 transforaminal lumbar interbody fusion, L3-S1 instrumented posterolateral fusion, repair of dural tear    LUMBAR FUSION  09/30/2023    L3-4 & L4-5 XLIF(NUVASIVE), L5-S1 TLIF([employer]TRONIC), L3-S1 POSTEROLATERAL FUSION, L3-5    LUMBAR LAMINECTOMY      s/p L5 laminectomy x2;  residual left foot drop    OTHER SURGICAL HISTORY  01/28/2020    Appendectomy    OTHER SURGICAL HISTORY  01/28/2020    Cholecystectomy    OTHER SURGICAL HISTORY  01/28/2020    [place] tooth extraction    OTHER SURGICAL HISTORY  01/28/2020    Tonsillectomy

## 2025-06-09 NOTE — ASSESSMENT & PLAN NOTE
BMI was above normal measurement. Current weight: 105 kg (231 lb 6.4 oz)  Weight change since last visit (-) denotes wt loss 16.4 lbs   Weight loss needed to achieve BMI 25: 52.5 Lbs  Weight loss needed to achieve BMI 30: 16.8 Lbs  Resume ozemoic but has to start low again as has been off for ~2months  Increase gradually to to 2mg

## 2025-06-10 LAB
ALBUMIN/CREAT UR: 6 MG/G CREAT
APPEARANCE UR: CLEAR
BACTERIA #/AREA URNS HPF: ABNORMAL /HPF
BILIRUB UR QL STRIP: NEGATIVE
COLOR UR: YELLOW
CREAT UR-MCNC: 72 MG/DL (ref 20–320)
GLUCOSE UR QL STRIP: NEGATIVE
HGB UR QL STRIP: NEGATIVE
HYALINE CASTS #/AREA URNS LPF: ABNORMAL /LPF
KETONES UR QL STRIP: NEGATIVE
LEUKOCYTE ESTERASE UR QL STRIP: ABNORMAL
MICROALBUMIN UR-MCNC: 0.4 MG/DL
NITRITE UR QL STRIP: NEGATIVE
PH UR STRIP: 5.5 [PH] (ref 5–8)
PROT UR QL STRIP: NEGATIVE
RBC #/AREA URNS HPF: ABNORMAL /HPF
SERVICE CMNT-IMP: ABNORMAL
SP GR UR STRIP: 1.01 (ref 1–1.03)
SQUAMOUS #/AREA URNS HPF: ABNORMAL /HPF
WBC #/AREA URNS HPF: ABNORMAL /HPF

## 2025-06-19 ENCOUNTER — TELEPHONE (OUTPATIENT)
Dept: PRIMARY CARE | Facility: CLINIC | Age: 60
End: 2025-06-19
Payer: COMMERCIAL

## 2025-06-19 NOTE — TELEPHONE ENCOUNTER
Left VM asking patient to call office.  Relayed that the Genesight test we did was unable to be processed due to lack of DNA sample.  Relayed for him to call the office to discuss options.

## 2025-06-24 DIAGNOSIS — M47.816 LUMBAR SPONDYLOSIS: ICD-10-CM

## 2025-06-24 RX ORDER — PREGABALIN 300 MG/1
300 CAPSULE ORAL 2 TIMES DAILY
Qty: 180 CAPSULE | Refills: 0 | Status: SHIPPED | OUTPATIENT
Start: 2025-06-24 | End: 2025-09-22

## 2025-06-26 DIAGNOSIS — F39 MOOD DISORDER: ICD-10-CM

## 2025-06-26 RX ORDER — PROMETHAZINE HYDROCHLORIDE 25 MG/1
25 TABLET ORAL EVERY 6 HOURS PRN
Qty: 120 TABLET | Refills: 3 | Status: SHIPPED | OUTPATIENT
Start: 2025-06-26

## 2025-07-09 ENCOUNTER — TELEPHONE (OUTPATIENT)
Dept: PRIMARY CARE | Facility: CLINIC | Age: 60
End: 2025-07-09

## 2025-07-09 ENCOUNTER — APPOINTMENT (OUTPATIENT)
Dept: PRIMARY CARE | Facility: CLINIC | Age: 60
End: 2025-07-09
Payer: COMMERCIAL

## 2025-07-09 VITALS
TEMPERATURE: 97.7 F | HEART RATE: 90 BPM | DIASTOLIC BLOOD PRESSURE: 65 MMHG | BODY MASS INDEX: 30.6 KG/M2 | WEIGHT: 218.6 LBS | OXYGEN SATURATION: 95 % | HEIGHT: 71 IN | SYSTOLIC BLOOD PRESSURE: 109 MMHG

## 2025-07-09 DIAGNOSIS — F41.9 ANXIETY: ICD-10-CM

## 2025-07-09 DIAGNOSIS — F32.1 CURRENT MODERATE EPISODE OF MAJOR DEPRESSIVE DISORDER WITHOUT PRIOR EPISODE (MULTI): Primary | ICD-10-CM

## 2025-07-09 DIAGNOSIS — J01.00 ACUTE NON-RECURRENT MAXILLARY SINUSITIS: ICD-10-CM

## 2025-07-09 DIAGNOSIS — H61.21 IMPACTED CERUMEN OF RIGHT EAR: ICD-10-CM

## 2025-07-09 PROCEDURE — 3078F DIAST BP <80 MM HG: CPT | Performed by: NURSE PRACTITIONER

## 2025-07-09 PROCEDURE — 3074F SYST BP LT 130 MM HG: CPT | Performed by: NURSE PRACTITIONER

## 2025-07-09 PROCEDURE — 4004F PT TOBACCO SCREEN RCVD TLK: CPT | Performed by: NURSE PRACTITIONER

## 2025-07-09 PROCEDURE — 99214 OFFICE O/P EST MOD 30 MIN: CPT | Performed by: NURSE PRACTITIONER

## 2025-07-09 PROCEDURE — 69209 REMOVE IMPACTED EAR WAX UNI: CPT | Performed by: NURSE PRACTITIONER

## 2025-07-09 PROCEDURE — 3008F BODY MASS INDEX DOCD: CPT | Performed by: NURSE PRACTITIONER

## 2025-07-09 RX ORDER — AMOXICILLIN AND CLAVULANATE POTASSIUM 875; 125 MG/1; MG/1
875 TABLET, FILM COATED ORAL 2 TIMES DAILY
Qty: 14 TABLET | Refills: 0 | Status: SHIPPED | OUTPATIENT
Start: 2025-07-09 | End: 2025-07-16

## 2025-07-09 RX ORDER — FLUTICASONE PROPIONATE 50 MCG
1 SPRAY, SUSPENSION (ML) NASAL 2 TIMES DAILY
Qty: 16 G | Refills: 1 | Status: SHIPPED | OUTPATIENT
Start: 2025-07-09 | End: 2026-07-09

## 2025-07-09 RX ORDER — SYRINGE WITH NEEDLE, 1 ML 25GX5/8"
SYRINGE, EMPTY DISPOSABLE MISCELLANEOUS
COMMUNITY
Start: 2025-06-09

## 2025-07-09 RX ORDER — DESVENLAFAXINE 50 MG/1
50 TABLET, EXTENDED RELEASE ORAL DAILY
Qty: 90 TABLET | Refills: 3 | Status: SHIPPED | OUTPATIENT
Start: 2025-07-09 | End: 2026-07-09

## 2025-07-09 ASSESSMENT — PATIENT HEALTH QUESTIONNAIRE - PHQ9
1. LITTLE INTEREST OR PLEASURE IN DOING THINGS: SEVERAL DAYS
6. FEELING BAD ABOUT YOURSELF - OR THAT YOU ARE A FAILURE OR HAVE LET YOURSELF OR YOUR FAMILY DOWN: MORE THAN HALF THE DAYS
9. THOUGHTS THAT YOU WOULD BE BETTER OFF DEAD, OR OF HURTING YOURSELF: NOT AT ALL
2. FEELING DOWN, DEPRESSED OR HOPELESS: SEVERAL DAYS
7. TROUBLE CONCENTRATING ON THINGS, SUCH AS READING THE NEWSPAPER OR WATCHING TELEVISION: NEARLY EVERY DAY
4. FEELING TIRED OR HAVING LITTLE ENERGY: NEARLY EVERY DAY
8. MOVING OR SPEAKING SO SLOWLY THAT OTHER PEOPLE COULD HAVE NOTICED. OR THE OPPOSITE, BEING SO FIGETY OR RESTLESS THAT YOU HAVE BEEN MOVING AROUND A LOT MORE THAN USUAL: NOT AT ALL
SUM OF ALL RESPONSES TO PHQ9 QUESTIONS 1 AND 2: 2
SUM OF ALL RESPONSES TO PHQ QUESTIONS 1-9: 13
5. POOR APPETITE OR OVEREATING: NOT AT ALL
3. TROUBLE FALLING OR STAYING ASLEEP OR SLEEPING TOO MUCH: NEARLY EVERY DAY

## 2025-07-09 ASSESSMENT — ANXIETY QUESTIONNAIRES
6. BECOMING EASILY ANNOYED OR IRRITABLE: NEARLY EVERY DAY
7. FEELING AFRAID AS IF SOMETHING AWFUL MIGHT HAPPEN: SEVERAL DAYS
2. NOT BEING ABLE TO STOP OR CONTROL WORRYING: NEARLY EVERY DAY
4. TROUBLE RELAXING: NEARLY EVERY DAY
GAD7 TOTAL SCORE: 17
IF YOU CHECKED OFF ANY PROBLEMS ON THIS QUESTIONNAIRE, HOW DIFFICULT HAVE THESE PROBLEMS MADE IT FOR YOU TO DO YOUR WORK, TAKE CARE OF THINGS AT HOME, OR GET ALONG WITH OTHER PEOPLE: VERY DIFFICULT
5. BEING SO RESTLESS THAT IT IS HARD TO SIT STILL: MORE THAN HALF THE DAYS
1. FEELING NERVOUS, ANXIOUS, OR ON EDGE: MORE THAN HALF THE DAYS
3. WORRYING TOO MUCH ABOUT DIFFERENT THINGS: NEARLY EVERY DAY

## 2025-07-09 NOTE — PROGRESS NOTES
Subjective   Patient ID: Gm Thrasher is a 60 y.o. male who presents for Sick Visit.    7 days  Nasal congestion ,   Blowing nose constantly,   Ears clogged and   headaches -   possible sinus infection   No fever or vomiting   No SOB or wheezing    Gene sight   Anxiety and depression worse  Off medication  Would like to restart        Review of Systems  ROS completely negative except what was mentioned in the HPI.  Problem List, surgical, social, and family histories which were reviewed and updated as necessary within the EMR. I also personally reviewed the notes, labs, and imaging that pertained to what was documented or discussed in the HPI.    Objective   Physical Exam  Vitals and nursing note reviewed.   Constitutional:       General: He is not in acute distress.     Appearance: Normal appearance. He is not ill-appearing.   HENT:      Head: Normocephalic and atraumatic.      Right Ear: External ear normal. There is impacted cerumen.      Left Ear: Tympanic membrane, ear canal and external ear normal.      Nose: Nose normal.      Mouth/Throat:      Mouth: Mucous membranes are moist.      Pharynx: Oropharynx is clear.   Eyes:      Extraocular Movements: Extraocular movements intact.      Conjunctiva/sclera: Conjunctivae normal.      Pupils: Pupils are equal, round, and reactive to light.   Cardiovascular:      Rate and Rhythm: Normal rate and regular rhythm.      Heart sounds: Normal heart sounds.   Pulmonary:      Effort: Pulmonary effort is normal.      Breath sounds: Wheezing present.   Musculoskeletal:         General: Normal range of motion.      Cervical back: Normal range of motion and neck supple.   Skin:     General: Skin is warm and dry.   Neurological:      General: No focal deficit present.      Mental Status: He is alert and oriented to person, place, and time. Mental status is at baseline.   Psychiatric:         Mood and Affect: Mood normal.         Behavior: Behavior normal.         Thought  "Content: Thought content normal.         Judgment: Judgment normal.         /65   Pulse 90   Temp 36.5 °C (97.7 °F) (Temporal)   Ht 1.803 m (5' 11\")   Wt 99.2 kg (218 lb 9.6 oz)   SpO2 95%   BMI 30.49 kg/m²     Assessment/Plan    Problem List Items Addressed This Visit       Anxiety    Overview   Lexapro - caused gynecomastia and ineffective  Cymbalta 40mg ineffective  Klonopin worked in past  Saw counselor 2x a week for 7 years  [facility]/Tata [facility]  Failed atarax  Uses just prn Phenergan now         Current moderate episode of major depressive disorder without prior episode (Multi) - Primary    Overview   On SSRI in past, (Lexapro caused gynecomastia)  Remeron caused AE  Cymbalta ineffective  Wellbutrin ineffective  Saw psychiatry/counselor in past  7/2025 Genesight:  Smokers - Use as Directed: Viibryd, Emsam, Pristiq         Current Assessment & Plan   GAD7 = 17;  PHQ9 = 13  Reviewed genesight  Will start pristiq  Encouraged restarting counseling         Relevant Medications    desvenlafaxine (Pristiq) 50 mg 24 hr tablet    Other Relevant Orders    Follow Up In [facility] - PCP - Established     Other Visit Diagnoses         Acute non-recurrent maxillary sinusitis        Start antibiotic & flonase. Pt declined CXR, but not sob or CP at this time    Relevant Medications    amoxicillin-clavulanate (Augmentin) 875-125 mg tablet    fluticasone (Flonase) 50 mcg/actuation nasal spray      Impacted cerumen of right ear               "

## 2025-07-09 NOTE — TELEPHONE ENCOUNTER
Pt requested 2 refills after his appointment today. He requested -     pregabalin (Lyrica) 300 mg capsule   amitriptyline (Elavil) 25 mg tablet       Per Pt chart lyrica was sent 6/24/25 and amitriptyline was sent 6/9/25. I called Pt's pharmacy to ensure Rx was received. They stated that Lyrica will be ready 7/11/ 25 and Pt picked up the other in June ( 90 day supply). I called Pt but was unable to speak with him. Detailed Vm was left , requested call back if any questions.

## 2025-07-15 ENCOUNTER — TELEPHONE (OUTPATIENT)
Dept: PRIMARY CARE | Facility: CLINIC | Age: 60
End: 2025-07-15
Payer: COMMERCIAL

## 2025-07-15 VITALS — BODY MASS INDEX: 30.52 KG/M2 | HEIGHT: 71 IN | WEIGHT: 218 LBS

## 2025-07-15 DIAGNOSIS — I25.10 CORONARY ARTERY DISEASE INVOLVING NATIVE CORONARY ARTERY OF NATIVE HEART WITHOUT ANGINA PECTORIS: ICD-10-CM

## 2025-07-15 DIAGNOSIS — J01.00 ACUTE NON-RECURRENT MAXILLARY SINUSITIS: ICD-10-CM

## 2025-07-15 DIAGNOSIS — E11.59 TYPE 2 DIABETES MELLITUS WITH OTHER CIRCULATORY COMPLICATION, WITHOUT LONG-TERM CURRENT USE OF INSULIN: ICD-10-CM

## 2025-07-15 RX ORDER — AMOXICILLIN AND CLAVULANATE POTASSIUM 875; 125 MG/1; MG/1
875 TABLET, FILM COATED ORAL 2 TIMES DAILY
Qty: 6 TABLET | Refills: 0 | Status: SHIPPED | OUTPATIENT
Start: 2025-07-15 | End: 2025-07-18

## 2025-07-15 NOTE — TELEPHONE ENCOUNTER
Per IO note 7/9/25-          Acute non-recurrent maxillary sinusitis         Start antibiotic & flonase. Pt declined CXR, but not sob or CP at this time     Relevant Medications     amoxicillin-clavulanate (Augmentin) 875-125 mg tablet     fluticasone (Flonase) 50 mcg/actuation nasal spray       Impacted cerumen of right ear           I called to triage Pt further and he stated that he is feeling better but thinks 1 more dosage of abx will completely clear up sinus infection. He is still experiencing slight Head congestion and productive cough. He denies Fever, vomiting and diarrhea. He also stated that chest congestion is much better.     He would like additional Abx sent to [facility] [place]   He would also like increased dosage of semaglutide .5 mg sent into [facility] in [place] as well. Weight / height documented

## 2025-07-15 NOTE — TELEPHONE ENCOUNTER
Patient called stating he is not feeling well still. Would like the same antibiotic sent in again.    Please advise

## 2025-07-18 ENCOUNTER — OFFICE VISIT (OUTPATIENT)
Dept: ORTHOPEDIC SURGERY | Facility: CLINIC | Age: 60
End: 2025-07-18
Payer: COMMERCIAL

## 2025-07-18 ENCOUNTER — HOSPITAL ENCOUNTER (OUTPATIENT)
Dept: RADIOLOGY | Facility: CLINIC | Age: 60
Discharge: HOME | End: 2025-07-18
Payer: COMMERCIAL

## 2025-07-18 DIAGNOSIS — M25.571 BILATERAL ANKLE JOINT PAIN: ICD-10-CM

## 2025-07-18 DIAGNOSIS — M25.572 BILATERAL ANKLE JOINT PAIN: Primary | ICD-10-CM

## 2025-07-18 DIAGNOSIS — M25.561 RIGHT KNEE PAIN, UNSPECIFIED CHRONICITY: ICD-10-CM

## 2025-07-18 DIAGNOSIS — M25.572 BILATERAL ANKLE JOINT PAIN: ICD-10-CM

## 2025-07-18 DIAGNOSIS — M25.571 BILATERAL ANKLE JOINT PAIN: Primary | ICD-10-CM

## 2025-07-18 PROCEDURE — 99212 OFFICE O/P EST SF 10 MIN: CPT | Performed by: INTERNAL MEDICINE

## 2025-07-18 PROCEDURE — 73610 X-RAY EXAM OF ANKLE: CPT | Mod: 50

## 2025-07-18 PROCEDURE — 73562 X-RAY EXAM OF KNEE 3: CPT | Mod: RT

## 2025-07-18 NOTE — PROGRESS NOTES
"  Acute Injury New Patient Visit    CC: No chief complaint on file.      HPI: Gm is a 60 y.o. male presents today for evaluation for chronic bilateral ankle pain. He is here for initial evaluation and x-rays. He states that the right knee is doing well.  Has a history of tibia fracture treated by Dr. Hale, was last seen last year.  He is here to discuss persistent ankle pain with instability.  He states his appointment with Dr. Hale in several weeks.        Review of Systems   GENERAL: Negative for malaise, significant weight loss, fever  MUSCULOSKELETAL: See HPI  NEURO:  Negative for numbness / tingling     Past Medical History  Medical History[1]    Medication review  Medication Documentation Review Audit       Reviewed by Chey Bliss MA (Medical Assistant) on 07/18/25 at 1301      Medication Order Taking? Sig Documenting Provider Last Dose Status   amitriptyline (Elavil) 25 mg tablet [id number]  Take 1 tablet (25 mg) by mouth once daily at bedtime. Marsha Francis MD  Active     Discontinued 07/15/25 1234   amoxicillin-clavulanate (Augmentin) 875-125 mg tablet [id number]  Take 1 tablet by mouth 2 times a day for 3 days. Janet Giraldo, APRN-CNP  Active   aspirin 81 mg chewable tablet [id number]  Chew 1 tablet (81 mg) once daily. Lucio Solis,   Active   atorvastatin (Lipitor) 80 mg tablet [id number]  Take 1 tablet (80 mg) by mouth once daily. Marsha Francis MD  Active   BD Luer-Giuseppe Syringe 3 mL 25 gauge x 1\" syringe [id number]  Use with B12 Historical Provider, MD  Active   buprenorphine-naloxone (Suboxone) 8-2 mg SL film [id number]  Place 3 Film under the tongue once daily. Historical Provider, MD  Active   cyanocobalamin (Vitamin B-12) 1,000 mcg/mL injection [id number]  Inject 1 mL (1,000 mcg) into the muscle every 21 (twenty-one) days. Marsha Francis MD  Active   desvenlafaxine (Pristiq) 50 mg 24 hr tablet [id number]  Take 1 tablet (50 mg) by mouth once daily. Do not crush, chew, or split. Janet" "M Fernald, APRN-CNP  Active   ergocalciferol (Vitamin D-2) 1250 mcg (50,000 units) capsule [id number]  Take 1 capsule (1.25 mg) by mouth 1 (one) time per week. Marsha Francis MD  Active   ferrous sulfate 325 (65 Fe) mg EC tablet [id number]  Take 1 tablet by mouth 3 times a week. Do not crush, chew, or split. Marsha Francis MD  Active   fluticasone (Flonase) 50 mcg/actuation nasal spray [id number]  Administer 1 spray into each nostril 2 times a day. Shake gently. Before first use, prime pump. After use, clean tip and replace cap. ANGELINA Ordaz  Active   insulin syringe-needle U-100 1 mL 30 gauge x 1/2\" syringe [id number]  Use with B12, may substitite Marsha Francis MD  Active   miscellaneous medical supply misc [id number] No Knee walker for non weight bearing right leg post right ankle surgery ANGELINA Ordaz Taking Active   naloxone (Narcan) 4 mg/0.1 mL nasal spray [id number]  Administer 1 spray (4 mg) into affected nostril(s).  CALL 911 MAY REPEAT ONCE Mery Dyer MD  Active   pregabalin (Lyrica) 300 mg capsule [id number]  Take 1 capsule (300 mg) by mouth 2 times a day. Marsha Francis MD  Active   promethazine (Phenergan) 25 mg tablet [id number]  TAKE 1 TABLET BY MOUTH EVERY 6 HOURS AS NEEDED FOR NAUSEA AND VOMITING Marsha Francis MD  Active     Discontinued 07/15/25 1234   semaglutide 0.25 mg or 0.5 mg (2 mg/3 mL) pen injector [id number]  Inject 0.25 mg under the skin 1 (one) time per week. ANGELINA Ordaz  Active   tadalafil, antihypertensive, 20 mg tablet [id number]  Take 1 tablet (20 mg) by mouth once daily as needed for erectile dysfunction. Marsha Francis MD  Active                    Allergies  RX Allergies[2]    Social History  Social History     Socioeconomic History    Marital status: Legally      Spouse name: Not on file    Number of children: Not on file    Years of education: Not on file    Highest education level: Not on file   Occupational History "    Not on file   Tobacco Use    Smoking status: Every Day     Types: Cigarettes    Smokeless tobacco: Never    Tobacco comments:     Chantix caused AE     Smoking cessation program ordered   Vaping Use    Vaping status: Never Used   Substance and Sexual Activity    Alcohol use: Not Currently    Drug use: Not Currently    Sexual activity: Defer   Other Topics Concern    Not on file   Social History Narrative    Social History:     ([employer]),no kids    Doesnt work/Disbility    Smoker: started age 24, Smokes  <1/2 ppd (used to be 2ppd)    On suboxone - pain management    No ETOH since     -------    Family History:    F: ETOHIsm, DM, COPD    M: Lung CA, CAD, Dementia    B: CA metastatic lung ()    S: ETOHism    S:     Social Drivers of Health     Financial Resource Strain: Low Risk  (2023)    Overall Financial Resource Strain (CARDIA)     Difficulty of Paying Living Expenses: Not hard at all   Food Insecurity: No Food Insecurity (2025)    Hunger Vital Sign     Worried About Running Out of Food in the Last Year: Never true     Ran Out of Food in the Last Year: Never true   Transportation Needs: No Transportation Needs (2025)    PRAPARE - Transportation     Lack of Transportation (Medical): No     Lack of Transportation (Non-Medical): No   Physical Activity: Not on file   Stress: Not on file   Social Connections: Not on file   Intimate Partner Violence: Not on file   Housing Stability: Unknown (2025)    Housing Stability Vital Sign     Unable to Pay for Housing in the Last Year: No     Number of Times Moved in the Last Year: Not on file     Homeless in the Last Year: No       Surgical History  Surgical History[3]    Physical Exam:  GENERAL:  Patient is awake, alert, and oriented to person place and time.  Patient appears well nourished and well kept.  Affect Calm, Not Acutely Distressed.  HEENT:  Normocephalic, Atraumatic, EOMI  CARDIOVASCULAR:  Hemodynamically  stable.  RESPIRATORY:  Normal respirations with unlabored breathing.  Extremity: Left ankle shows skin is intact.  There is no erythema or warmth.  There is no clinical signs of infection.  There is no pain over the lateral malleolus.  There is no pain of the medial malleolus.  There is mild pain over the ATF, no pain over the CF or PTF ligament.  There is no pain over the deltoid ligament.  No pain over the Achilles tendon.  Negative Allen's test.  Negative squeeze test.  Negative anterior drawer test.  Negative talar tilt test.  No pain over the anterior process of the talus.  There is no pain over the talar dome.  There is no pain at the base of the fifth metatarsal bone.  No pain of the calcaneus.  No pain over the plantar aponeurosis.  No pain of the midfoot.  Neurovascularly intact.    Right ankle shows skin is intact.  There is no erythema or warmth.  There is no clinical signs of infection.  There is no pain over the lateral malleolus.  There is no pain of the medial malleolus.  There is mild pain over the ATF, no pain over the CF or PTF ligament.  There is no pain over the deltoid ligament.  No pain over the Achilles tendon.  Negative Allen's test.  Negative squeeze test.  Negative anterior drawer test.  Negative talar tilt test.  No pain over the anterior process of the talus.  There is no pain over the talar dome.  There is no pain at the base of the fifth metatarsal bone.  No pain of the calcaneus.  No pain over the plantar aponeurosis.  No pain of the midfoot.  Neurovascularly intact.    Right knee examination shows skin is intact.  There is no erythema or warmth.  No effusion.  Can flex the right knee to 130 degrees.  Full extension at 0 degrees.  No pain over the medial joint line.  No pain over the lateral joint line.  There is no pain over the patellar or quadricep tendon.  No pain over the proximal tibia.  No pain over the popliteal fossa.  Positive valgus stress test with slight stability.   Negative varus stress test.  Negative Janna's test medially with no instability.  Negative Janna's test laterally with no instability.  Negative Lachman's test.  Patellar and quadricep mechanism intact.  Negative anterior and posterior drawer test.  Negative patellar apprehension test.  Distal pulses are palpable, neurovascularly intact.  Walking with no significant antalgic gait.      Diagnostics: X-rays reviewed  XR tibia fibula right 2 views  Interpreted By:  Demetri Araya,   STUDY:  XR TIBIA FIBULA RIGHT 2 VIEWS;  ;  2/26/2024 8:56 am      INDICATION:  Signs/Symptoms:pain.      ACCESSION NUMBER(S):  [id number]      ORDERING CLINICIAN:  DEMETRI ARAYA      FINDINGS:  No acute fractures or dislocations of the right tibia or fibula.  Patient has intact hardware from distal tibial nailing. Early callus  remodeling is present at the fracture site. Concentric ankle mortise  and syndesmosis.              Signed by: Demetri Araya 2/26/2024 5:22 PM  Dictation workstation:   [id number]      Procedure: None    Assessment:   1.  Bilateral ankle sprain with ankle instability  2.  Right knee sprain with history of distal tibial nailing and knee osteoarthritis    Plan: Gm presents today for evaluation for chronic bilateral ankle pain with instability. We recommended lace up ankle braces for the right and left ankle.  Did also fit her for a new hinged knee brace for the right knee for support and stability.  He will follow-up with Dr Araya as scheduled.  We did recommend physical therapy, however patient declined at this time.    Orders Placed This Encounter    XR ankle bilateral complete minimum 3 views    XR knee right 3 views      At the conclusion of the visit there were no further questions by the patient/family regarding their plan of care.  Patient was instructed to call or return with any issues, questions, or concerns regarding their injury and/or treatment plan described above.     07/18/25 at 1:50 PM -  Nena Orr MD  Scribe Attestation  By signing my name below, Morgan MAC Scribe   attest that this documentation has been prepared under the direction and in the presence of Nena Orr MD.    Office: [phone]    We already utilize the scribe attestation, IMorgan, am scribing for, and in the presence of Dr. Orr.    I, Nena Orr MD personally performed the services described in the documentation as scribed by Morgan Rocha in my presence, and confirm it is both accurate and complete.    This note was prepared using voice recognition software.  The details of this note are correct and have been reviewed, and corrected to the best of my ability.  Some grammatical errors may persist related to the Dragon software.         [1]   Past Medical History:  Diagnosis Date    Colon cancer screening 05/04/2024    Cologuard (-)    H/O chest x-ray     1/20: Impression: Stable, enlarged cardiac mediastinal silhouette with mild central pulmonary vascular congestion. Nonspecific bibasilar airspace disease, worsened in overall appearance compared with the previous study, findings can be seen in  infiltrate/pneumonia and/or atelectasis.    H/O CT scan     CT Ankle: 1/20: A vertically oriented nondisplaced oblique fracture of the medial ankle mortise extends into the distal tibial metadiaphysis.  Several small old avulsion fracture fragments are noted inferior to both malleoli; as well as soft tissue swelling about the ankle.  There is no other fracture, radiodense foreign bodies, pathologic calcifications, or worrisome bone destruction identified.    H/O CT scan 10/11/2023    1. Acute minimally comminuted and displaced fractures through distal tibia and fibula as described above. 2. There are also findings consistent with remote trauma with significant cortical remodeling of the distal tibia with associated incongruity of the distal tibial articular surface as described above. 3. Moderate  arthrosis of the tibiotalar and subtalar joints. 4. Soft tissue swelling about th    H/O CT scan of abdomen     2016: Diffuse urinary bladder wall thickening, Small atrophic left kidney with pyelocalyceal ectasia and severe cortical thinning, , Moderate pyelocalyceal ectasia of the right kidney with cortical thinning; unchanged    H/O CT scan of brain     11/22: normal 1/20: FINDINGS:  There is no intracranial hemorrhage, mass effect, midline shift, extra-axial collection, evidence of hydrocephalus, recent ischemic infarct, or skull fracture identified.    There is no significant atrophy or white matter changes, for age.  Moderate mucosal thickening within the paranasal sinuses again noted. The mastoid air cells are clear    H/O CT scan of brain 10/10/2023    There is some subtle increased attenuation along the right  tentorium cerebelli.  This is suspicious for a acute  subdural hematoma.    H/O CT scan of chest     10/19: 1. Bibasilar infiltrate/pneumonia with patchy pneumonia scattered throughout the left lung and bilateral pleural effusions.  2. Vascular calcifications left anterior descending coronary artery with mild to moderate cardiomegaly.  3. Moderate to moderately severe diffuse fatty infiltration of liver. 10/21: Bilateral groundglass opacities as discussed. These findings are nonspecific    H/O CT scan of chest 10/10/2023    NO PE. Somewhat patchy subpleural curvilinear markings are seen in the  bilateral lower lobes, overall morphology favoring atelectasis or infectious  infiltrate.  Scattered central small cysts are observed.    H/O CT scan of head 10/12/2023    In comparison to prior CT dated 10/03/2023, there has been interval improvement in the right frontal subarachnoid hemorrhage and diffuse subdural hemorrhage along the tentorium and overlying the right cerebellar hemisphere as well as resolution of right parietal lobe intraparenchymal hemorrhage.    H/O diagnostic ultrasound     RUQ US 2013:  Cholelithiasis and mild gallbladder distention, Increased liver echogenicity    H/O diagnostic ultrasound 10/10/2023    renal - Severe right hydronephrosis, similar in appearance to CT lumbar spine examination. Bilateral ureteral jets visualized.    Mild asymmetric enlargement of the right kidney with cortical renal scarring involving the left kidney.    H/O echocardiogram     1/20: Normal LV and RV.  No significant valve disease.  Normal estimated PA pressure.  Normal diastolic filling pattern.    H/O echocardiogram 10/10/2023    1. Left ventricular systolic function is low normal with a 50-55% estimated ejection fraction.  2. There is low normal right ventricular systolic function.  3. RVSP within normal limits.    H/O magnetic resonance imaging of cervical spine     2003: CENTRAL STENOSIS WORST C3-C4 1/21: Multilevel degenerative changes with disc height loss, disc osteophyte complexes, facet/uncovertebral degenerative changes. Moderate to severe canal and bilateral foraminal narrowing at C3-C4, C4-C5, C5-C6, C6-C7 grossly similar to  prior cervical MRI    H/O magnetic resonance imaging of lumbar spine     2012:  NEW RIGHT POSTERIOR EXTRUSION L4-5, WITH CONTACT OF THE EXITING RIGHT L5 AND DESCENDING RIGHT S1 NERVES. s/p laminectomy L5 2019 ([facility]): NARROWING OF MULTIPLE LUMBAR DISC LEVELS WITH FAIRLY ADVANCED DEGENERATIVE CHANGES. SEE INDIVIDUAL LEVELS ABOVE.  MILD LUMBAR CANAL STENOSIS AT THE L4-5 LEVEL.  NARROWING OF NEURAL FORAMINA,    H/O magnetic resonance imaging of lumbar spine 10/10/2023    MR findings worrisome for retroperitoneal abscess, possibly extending into  the L4-5 disc space.   Apparent clumping of the proximal cauda equina nerve roots versus mass effect  due to an intrathecal fluid collection.  Recommend MRI of the T-spine for  further evaluation    H/O x-ray of lower extremity 10/10/2023    XR ankle: NEW ACUTE OBLIQUE FRACTURE OF THE DISTAL RIGHT TIBIA WITH A  OLD VERTICAL FRACTURE OF THE  TIBIA.   THERE IS A NEW COMMINUTED FRACTURE OF THE DISTAL RIGHT FIBULA.   [2]   Allergies  Allergen Reactions    Lexapro [Escitalopram Oxalate] Other     gynecomastia    Metoprolol Unknown    Mirtazapine Unknown   [3]   Past Surgical History:  Procedure Laterality Date    ANKLE SURGERY  10/17/2023    Insertion Intramedullary RIGHT Nail Tibia by Dr Vic Hale    CARDIAC CATHETERIZATION      8/2015: The coronary anatomy is R dominant.  L main coronary artery was normal.  Left anterior descending artery presents luminal irregularities.  Circumflex coronary artery normal.  R coronary artery presents luminal irregularities.  Mid RCA lesion 40% stenosis.  LVEF 50%    CARDIAC CATHETERIZATION Right 10/11/2023    Procedure: Left Heart Cath;  Surgeon: Jonathan GASPAR MD;  Location: [facility] Cardiac [facility] Lab;  Service: Cardiovascular;  Laterality: Right;  radial    CARDIAC CATHETERIZATION N/A 10/13/2023    Procedure: PCI RCA, right radial 6Fr;  Surgeon: Juvenal Le MD;  Location: [facility] Cardiac Cath Lab;  Service: Cardiovascular;  Laterality: N/A;    CERVICAL LAMINECTOMY      C3-C4, s/p cervical laminectomy.    ESOPHAGOGASTRODUODENOSCOPY      12/15: Localized mild erythema was found at the gastroesophageal junction    KIDNEY SURGERY  09/12/2013    Kidney Surgery    LUMBAR FUSION  10/06/2023    1. L3-4, L4-5 extreme lateral interbody fusion 2. L3-5 laminectomy, L5-S1 revision laminectomy, L5-S1 transforaminal lumbar interbody fusion, L3-S1 instrumented posterolateral fusion, repair of dural tear    LUMBAR FUSION  09/30/2023    L3-4 & L4-5 XLIF(NUVASIVE), L5-S1 TLIF([employer]TR[employer]IC), L3-S1 POSTEROLATERAL FUSION, L3-5    LUMBAR LAMINECTOMY      s/p L5 laminectomy x2;  residual left foot drop    OTHER SURGICAL HISTORY  01/28/2020    Appendectomy    OTHER SURGICAL HISTORY  01/28/2020    Cholecystectomy    OTHER SURGICAL HISTORY  01/28/2020    [place] tooth extraction    OTHER SURGICAL HISTORY  01/28/2020    Tonsillectomy

## 2025-08-19 ENCOUNTER — APPOINTMENT (OUTPATIENT)
Dept: ORTHOPEDIC SURGERY | Facility: CLINIC | Age: 60
End: 2025-08-19
Payer: COMMERCIAL

## 2025-08-20 ENCOUNTER — TELEPHONE (OUTPATIENT)
Dept: PRIMARY CARE | Facility: CLINIC | Age: 60
End: 2025-08-20
Payer: COMMERCIAL

## 2025-08-25 DIAGNOSIS — I25.10 CORONARY ARTERY DISEASE INVOLVING NATIVE CORONARY ARTERY OF NATIVE HEART WITHOUT ANGINA PECTORIS: Primary | ICD-10-CM

## 2025-08-28 ENCOUNTER — HOSPITAL ENCOUNTER (EMERGENCY)
Age: 60
Discharge: HOME | End: 2025-08-29
Payer: COMMERCIAL

## 2025-08-28 VITALS
DIASTOLIC BLOOD PRESSURE: 56 MMHG | RESPIRATION RATE: 18 BRPM | BODY MASS INDEX: 32.98 KG/M2 | WEIGHT: 235.6 LBS | OXYGEN SATURATION: 97 % | HEART RATE: 79 BPM | SYSTOLIC BLOOD PRESSURE: 127 MMHG | HEIGHT: 71 IN | TEMPERATURE: 98.6 F

## 2025-08-28 DIAGNOSIS — S60.459A SUPERFICIAL FOREIGN BODY OF FINGER, INITIAL ENCOUNTER: Primary | ICD-10-CM

## 2025-08-28 DIAGNOSIS — F41.1 ANXIETY STATE: ICD-10-CM

## 2025-08-28 PROCEDURE — 99283 EMERGENCY DEPT VISIT LOW MDM: CPT

## 2025-08-28 PROCEDURE — 93005 ELECTROCARDIOGRAM TRACING: CPT

## 2025-08-28 PROCEDURE — 6370000000 HC RX 637 (ALT 250 FOR IP)

## 2025-08-28 RX ORDER — HYDROXYZINE HYDROCHLORIDE 25 MG/1
25 TABLET, FILM COATED ORAL ONCE
Status: COMPLETED | OUTPATIENT
Start: 2025-08-28 | End: 2025-08-28

## 2025-08-28 RX ORDER — HYDROXYZINE HYDROCHLORIDE 25 MG/1
25 TABLET, FILM COATED ORAL EVERY 12 HOURS PRN
Qty: 20 TABLET | Refills: 0 | Status: SHIPPED | OUTPATIENT
Start: 2025-08-28 | End: 2025-09-07

## 2025-08-28 RX ADMIN — HYDROXYZINE HYDROCHLORIDE 25 MG: 25 TABLET ORAL at 23:47

## 2025-08-28 ASSESSMENT — PAIN DESCRIPTION - ORIENTATION: ORIENTATION: LEFT

## 2025-08-28 ASSESSMENT — PAIN DESCRIPTION - DESCRIPTORS: DESCRIPTORS: ACHING

## 2025-08-28 ASSESSMENT — PAIN - FUNCTIONAL ASSESSMENT: PAIN_FUNCTIONAL_ASSESSMENT: 0-10

## 2025-08-28 ASSESSMENT — PAIN DESCRIPTION - LOCATION: LOCATION: FINGER (COMMENT WHICH ONE)

## 2025-08-28 ASSESSMENT — PAIN DESCRIPTION - PAIN TYPE: TYPE: ACUTE PAIN

## 2025-08-28 ASSESSMENT — PAIN SCALES - GENERAL: PAINLEVEL_OUTOF10: 6

## 2025-08-29 LAB
EKG ATRIAL RATE: 75 BPM
EKG DIAGNOSIS: NORMAL
EKG P AXIS: 82 DEGREES
EKG P-R INTERVAL: 166 MS
EKG Q-T INTERVAL: 384 MS
EKG QRS DURATION: 86 MS
EKG QTC CALCULATION (BAZETT): 428 MS
EKG R AXIS: 52 DEGREES
EKG T AXIS: 58 DEGREES
EKG VENTRICULAR RATE: 75 BPM

## 2026-06-29 ENCOUNTER — APPOINTMENT (OUTPATIENT)
Dept: PRIMARY CARE | Facility: CLINIC | Age: 61
End: 2026-06-29
Payer: COMMERCIAL

## (undated) DEVICE — WOUND SYSTEM, DEBRIDEMENT & CLEANING, O.R DUOPAK

## (undated) DEVICE — GUIDEWIRE, RUN THROUGH WIRE, 180CM

## (undated) DEVICE — GLOVE, SURGICAL, PROTEXIS PI MICRO, 7.0, PF, LF

## (undated) DEVICE — INTERPULSE HIGH FLOW TIP

## (undated) DEVICE — SOLUTION, IRRIGATION, STERILE WATER, 1000 ML, POUR BOTTLE

## (undated) DEVICE — DRESSING, GAUZE, 16 PLY, 4 X 4 IN, STERILE

## (undated) DEVICE — Device

## (undated) DEVICE — PADDING, WEBRIL, UNDERCAST, STERILE, 4 IN

## (undated) DEVICE — STAPLER, SKIN, PLUS, WIDE, 35

## (undated) DEVICE — SUTURE, VICRYL, 0, 18 IN, CT-1, UNDYED

## (undated) DEVICE — HIGH FLOW TIP FOR INTERPULSE HANDPIECE SET

## (undated) DEVICE — DRESSING, PREVENA PLUS, CUSTOMIZABLE, 90CM

## (undated) DEVICE — SOLUTION, IRRIGATION, SODIUM CHLORIDE 0.9%, 1000 ML, POUR BOTTLE

## (undated) DEVICE — SUTURE, VICRYL, 0, 36 IN, CT-1, UNDYED

## (undated) DEVICE — MANIFOLD KIT, CUSTOM (SJM)

## (undated) DEVICE — APPLICATOR, CHLORAPREP, W/ORANGE TINT, 26ML

## (undated) DEVICE — DRESSING, ADHESIVE, ISLAND, TELFA, 4 X 14 IN

## (undated) DEVICE — NEEDLE, MERIT ADVANCE, ONE WALL, 21 GA X 4CM, STANDARD SMOOTH

## (undated) DEVICE — CATHETER, GUIDELINER, 6FR V2

## (undated) DEVICE — SEALER, BIPOLAR, AQUA MANTYS 6.0

## (undated) DEVICE — TOWEL PACK, STERILE, 4/PACK, BLUE

## (undated) DEVICE — SUTURE, ETHILON, 3-0, 18 IN, PS2, BLACK

## (undated) DEVICE — GLOVE, SURGICAL, PROTEXIS PI ORTHO, 7.5, PF, LF

## (undated) DEVICE — INTERPULSE HANDPIECE SET W/ 10FT SUCTION TUBING

## (undated) DEVICE — BANDAGE, COFLEX, 6 X 5 YDS, FOAM TAN, STERILE, LF

## (undated) DEVICE — GLOVE, SURGICAL, PROTEXIS PI MICRO, 7.5, PF, LF

## (undated) DEVICE — SUTURE, PROLENE, 6-0, 24 IN, BV1, DA, BLUE

## (undated) DEVICE — CATHETER, OPTITORQUE, 5FR, JACKY, 3.5/ 2H/110CM, CURVED

## (undated) DEVICE — SUTURE, VICRYL, 3-0,18 IN, SH, UNDYED

## (undated) DEVICE — CATHETER, BALLOON, NC EUPHORA NONCOMPLIANT 3.0 X 15 X 142CM

## (undated) DEVICE — CATHETER, VISTA BRIGHT TIP 6FR 100CM, JR 4

## (undated) DEVICE — TR BAND, RADIAL COMPRESSION, STANDARD, 24CM

## (undated) DEVICE — CATHETER, BALLOON, NC EUPHORA NONCOMPLIANT 4.0 X 20 X 142CM

## (undated) DEVICE — CATHETER, BALLOON DILATION, EUPHORA SEMICOMPLIANT 2.50  X 15 MM X 142CM

## (undated) DEVICE — DRESSING, ABDOMINAL, WET PRUF, TENDERSORB, 5 X 9 IN, STERILE

## (undated) DEVICE — TIP, SUCTION, YANKAUER, FLEXIBLE

## (undated) DEVICE — BANDAGE, ELASTIC, PREMIUM, SELF-CLOSE, 6 IN X 5.5 YD, STERILE

## (undated) DEVICE — SUTURE, VICRYL PLUS 2-0, CT-1, 27IN, UNDYED

## (undated) DEVICE — SUTURE, VICRYL, 2-0, 36 IN, CT-1, UNDYED

## (undated) DEVICE — INTRODUCER SHEATH, GLIDESHEATH, 6FR 10CM

## (undated) DEVICE — ELECTRODE, ELECTROSURGICAL, BLADE EXT 4 INCH, INSULATED

## (undated) DEVICE — DRESSING, NON-ADHERENT, OIL EMULSION, CURITY, 3 X 8 IN, STERILE

## (undated) DEVICE — SUTURE, POLYSORB 2/0  36  UNDYED  GS-21  209P"

## (undated) DEVICE — SUTURE, STRATAFIX, 1 SYMMETRIC, PDS PLUS, 60CM, CTX, VIOLET

## (undated) DEVICE — MBRACE, WRIST SUPPORT WITH HOOK

## (undated) DEVICE — GLOVE, SURGICAL, PROTEXIS PI BLUE W/NEUTHERA, 8.5, PF, LF

## (undated) DEVICE — PADDING, WEBRIL, UNDERCAST, STERILE, 6 IN

## (undated) DEVICE — GUIDEWIRE, 3.2 X 400

## (undated) DEVICE — BANDAGE, ELASTIC, MATRIX, SELF-CLOSURE, 4 IN X 5 YD, LF

## (undated) DEVICE — INFLATION DEVICE, BASIXCOMPAX, 30 ATM/BAR, 20ML, MAP152

## (undated) DEVICE — THERAPY UNIT, PREVENA PLUS 125